# Patient Record
Sex: FEMALE | Race: WHITE | NOT HISPANIC OR LATINO | Employment: OTHER | URBAN - METROPOLITAN AREA
[De-identification: names, ages, dates, MRNs, and addresses within clinical notes are randomized per-mention and may not be internally consistent; named-entity substitution may affect disease eponyms.]

---

## 2017-01-06 ENCOUNTER — OFFICE VISIT (OUTPATIENT)
Dept: FAMILY MEDICINE | Facility: CLINIC | Age: 77
End: 2017-01-06
Payer: COMMERCIAL

## 2017-01-06 VITALS
SYSTOLIC BLOOD PRESSURE: 130 MMHG | TEMPERATURE: 98 F | OXYGEN SATURATION: 99 % | HEIGHT: 65 IN | HEART RATE: 63 BPM | BODY MASS INDEX: 20.41 KG/M2 | WEIGHT: 122.5 LBS | RESPIRATION RATE: 16 BRPM | DIASTOLIC BLOOD PRESSURE: 70 MMHG

## 2017-01-06 DIAGNOSIS — R05.3 CHRONIC COUGH: ICD-10-CM

## 2017-01-06 DIAGNOSIS — M35.00 SJOGREN'S DISEASE (H): ICD-10-CM

## 2017-01-06 DIAGNOSIS — D75.89 MACROCYTOSIS: ICD-10-CM

## 2017-01-06 DIAGNOSIS — R77.8 ABNORMAL SPEP: ICD-10-CM

## 2017-01-06 DIAGNOSIS — I25.10 ASCVD (ARTERIOSCLEROTIC CARDIOVASCULAR DISEASE): ICD-10-CM

## 2017-01-06 PROCEDURE — 99214 OFFICE O/P EST MOD 30 MIN: CPT | Performed by: FAMILY MEDICINE

## 2017-01-06 NOTE — MR AVS SNAPSHOT
After Visit Summary   1/6/2017    Ya Stahl    MRN: [id number]           Patient Information     Date Of Birth          1940        Visit Information        Provider Department      1/6/2017 8:30 AM Scarlett Spencer MD [facility]        Today's Diagnoses     Cough    -  1        Follow-ups after your visit        Additional Services     PULMONARY MEDICINE REFERRAL       Your provider has referred you to: [facility]: [facility] [phone]   [email or web address]    Please be aware that coverage of these services is subject to the terms and limitations of your health insurance plan.  Call member services at your health plan with any benefit or coverage questions.      Please bring the following with you to your appointment:    (1) Any X-Rays, CTs or MRIs which have been performed.  Contact the facility where they were done to arrange for  prior to your scheduled appointment.    (2) List of current medications   (3) This referral request   (4) Any documents/labs given to you for this referral                  Your next 10 appointments already scheduled     Jan 12, 2017  1:45 PM   New Visit with Vandana Kasper MD   [facility] ([facility])    [facility]  [address]  [facility] [id number] [phone]            Jan 17, 2017   Procedure with Fan Giordano MD   [facility] ([facility])    [address]  [facility] [id number] [phone]           [facility] is located at [address] [place]              Who to contact     If you have questions or need follow up information about today's clinic visit or your schedule please contact [facility] directly at [phone].  Normal or non-critical lab and imaging results will be communicated to you by MyChart, letter or phone within 4  "business days after the clinic has received the results. If you do not hear from us within 7 days, please contact the clinic through [employer] or phone. If you have a critical or abnormal lab result, we will notify you by phone as soon as possible.  Submit refill requests through [employer] or call your pharmacy and they will forward the refill request to us. Please allow 3 business days for your refill to be completed.          Additional Information About Your Visit        [employer] Information     [employer] lets you send messages to your doctor, view your test results, renew your prescriptions, schedule appointments and more. To sign up, go to [email or web address] . Click on \"Log in\" on the left side of the screen, which will take you to the Welcome page. Then click on \"Sign up Now\" on the right side of the page.     You will be asked to enter the access code listed below, as well as some personal information. Please follow the directions to create your username and password.     Your access code is: [email or web address]  Expires: 2017  3:39 PM     Your access code will  in 90 days. If you need help or a new code, please call your [place] clinic or [phone].        Care EveryWhere ID     This is your Care EveryWhere ID. This could be used by other organizations to access your [place] medical records  [id number]        Your Vitals Were     Pulse Temperature Respirations Height BMI (Body Mass Index) Pulse Oximetry    63 98  F (36.7  C) (Oral) 16 5' 5.25\" (1.657 m) 20.24 kg/m2 99%       Blood Pressure from Last 3 Encounters:   17 130/70   16 114/78   16 120/68    Weight from Last 3 Encounters:   17 122 lb 8 oz (55.566 kg)   16 123 lb (55.792 kg)   16 123 lb 4.8 oz (55.929 kg)              We Performed the Following     PULMONARY MEDICINE REFERRAL          Today's Medication Changes          These changes are accurate as of: 17  9:11 AM.  If you have any questions, ask " your nurse or doctor.               These medicines have changed or have updated prescriptions.        Dose/Directions    ammonium lactate 12 % cream   Commonly known as:  LAC-HYDRIN   This may have changed:  when to take this   Used for:  Callus        Apply topically 2 times daily as needed for dry skin   Quantity:  385 g   Refills:  3       conjugated estrogens cream   Commonly known as:  PREMARIN   This may have changed:    - how much to take  - when to take this  - reasons to take this  - additional instructions   Used for:  Atrophic vaginitis        Place 1.5 g vaginally twice a week.   Quantity:  42.5 g   Refills:  prn       MIRALAX powder   This may have changed:  See the new instructions.   Used for:  Chronic constipation   Generic drug:  polyethylene glycol        STIR 1 CAPFUL (17GM) IN 8 OZ OF LIQUID AND DRINK   Quantity:  1 BOTTLE   Refills:  1 YEAR                Primary Care Provider Office Phone # Fax #    Scarlett Spencer [phone]       [facility] [address] [facility] [address]        Thank you!     Thank you for choosing [facility]  for your care. Our goal is always to provide you with excellent care. Hearing back from our patients is one way we can continue to improve our services. Please take a few minutes to complete the written survey that you may receive in the mail after your visit with us. Thank you!             Your Updated Medication List - Protect others around you: Learn how to safely use, store and throw away your medicines at www.disposemymeds.org.          This list is accurate as of: 1/6/17  9:11 AM.  Always use your most recent med list.                   Brand Name Dispense Instructions for use    ACAI PO      Take 1 tablet by mouth daily       alendronate 70 MG tablet    FOSAMAX    12 tablet    Take 1 tablet (70 mg) by mouth every 7 days Take with over 8 ounces water and stay upright for at least 30 minutes after dose.  Take  at least 60 minutes before breakfast       ammonium lactate 12 % cream    LAC-HYDRIN    385 g    Apply topically 2 times daily as needed for dry skin       aspirin 81 MG EC tablet     1 tablet    Take 1 tablet (81 mg) by mouth daily       atorvastatin 20 MG tablet    LIPITOR    102 tablet    Take 1 tablet (20 mg) by mouth daily       calcium-vitamin D 600-400 MG-UNIT per tablet    CALTRATE     Take 1 tablet by mouth daily       CENTRUM SILVER per tablet      Take 1 tablet by mouth daily       cevimeline 30 MG capsule    EVOXAC     Take 1 capsule (30 mg) by mouth 3 times daily       clopidogrel 75 MG tablet    PLAVIX    90 tablet    Take 1 tablet (75 mg) by mouth daily       conjugated estrogens cream    PREMARIN    42.5 g    Place 1.5 g vaginally twice a week.       levothyroxine 88 MCG tablet    SYNTHROID/LEVOTHROID    102 tablet    Take 1 tablet (88 mcg) by mouth daily       MIRALAX powder   Generic drug:  polyethylene glycol     1 BOTTLE    STIR 1 CAPFUL (17GM) IN 8 OZ OF LIQUID AND DRINK       nitroglycerin 0.4 MG sublingual tablet    NITROSTAT    25 tablet    Place 1 tablet (0.4 mg) under the tongue every 5 minutes as needed for chest pain if you are still having symptoms after 3 doses (15 minutes) call 911.       OMEGA-3 FISH OIL PO      Take 1 g by mouth daily       * OSTEO BI-FLEX ADV TRIPLE ST PO      Take 1 tablet by mouth daily       * LUTEIN 20 PO      Take 1 tablet by mouth daily       scopolamine 72 hr patch    TRANSDERM    4 patch    Place 1 patch onto the skin every 72 hours       * Notice:  This list has 2 medication(s) that are the same as other medications prescribed for you. Read the directions carefully, and ask your doctor or other care provider to review them with you.

## 2017-01-06 NOTE — PROGRESS NOTES
SUBJECTIVE:                                                    Ya Stahl is a 76 year old female who presents to clinic today for the following health issues:      Hypertension Follow-up      Outpatient blood pressures are not being checked.    Low Salt Diet: very little        Amount of exercise or physical activity: walking 6 days per and club 3 times per week    Problems taking medications regularly: No    Medication side effects: none    Diet: regular (no restrictions)        Patient Active Problem List   Diagnosis     Hypothyroidism     Chondrodermatitis nodularis helicis     Grave's disease     Bile reflux gastritis     Hyperlipidemia LDL goal <70     Advanced directives, counseling/discussion     Sjogren's disease (H)     HL (hearing loss)     Mixed incontinence     Health Care Home     Chronic cough     Impaired fasting glucose     Status post-operative repair of hip fracture, RIGHT     Constipation     Pubic ramus fracture (H)     Osteoporosis     Gastroesophageal reflux disease without esophagitis     AMI (acute myocardial infarction) (H)     ASCVD (arteriosclerotic cardiovascular disease)     Macrocytosis       Current Outpatient Prescriptions   Medication Sig Dispense Refill     scopolamine (TRANSDERM) 72 hr patch Place 1 patch onto the skin every 72 hours 4 patch 0     levothyroxine (SYNTHROID/LEVOTHROID) 88 MCG tablet Take 1 tablet (88 mcg) by mouth daily 102 tablet 3     atorvastatin (LIPITOR) 20 MG tablet Take 1 tablet (20 mg) by mouth daily 102 tablet 3     cevimeline (EVOXAC) 30 MG capsule Take 1 capsule (30 mg) by mouth 3 times daily       alendronate (FOSAMAX) 70 MG tablet Take 1 tablet (70 mg) by mouth every 7 days Take with over 8 ounces water and stay upright for at least 30 minutes after dose.  Take at least 60 minutes before breakfast 12 tablet 1     clopidogrel (PLAVIX) 75 MG tablet Take 1 tablet (75 mg) by mouth daily 90 tablet 3     Multiple Vitamins-Minerals (CENTRUM SILVER) per  tablet Take 1 tablet by mouth daily       Misc Natural Products (OSTEO BI-FLEX ADV TRIPLE ST PO) Take 1 tablet by mouth daily       Misc Natural Products (LUTEIN 20 PO) Take 1 tablet by mouth daily       Omega-3 Fatty Acids (OMEGA-3 FISH OIL PO) Take 1 g by mouth daily       aspirin 81 MG EC tablet Take 1 tablet (81 mg) by mouth daily 1 tablet 0     nitroglycerin (NITROSTAT) 0.4 MG SL tablet Place 1 tablet (0.4 mg) under the tongue every 5 minutes as needed for chest pain if you are still having symptoms after 3 doses (15 minutes) call 911. 25 tablet 0     calcium-vitamin D (CALTRATE) 600-400 MG-UNIT per tablet Take 1 tablet by mouth daily       ACAI PO Take 1 tablet by mouth daily       conjugated estrogens (PREMARIN) vaginal cream Place 1.5 g vaginally twice a week. (Patient taking differently: 1.5 g 2 times daily as needed Place 1.5 g vaginally twice a week.) 42.5 g prn     ammonium lactate (LAC-HYDRIN) 12 % cream Apply topically 2 times daily as needed for dry skin (Patient taking differently: Apply topically daily as needed for dry skin ) 385 g 3     MIRALAX OR POWD STIR 1 CAPFUL (17GM) IN 8 OZ OF LIQUID AND DRINK (Patient taking differently: STIR 1 CAPFUL (17GM) IN 8 OZ OF LIQUID AND DRINK twice daily) 1 BOTTLE 1 YEAR     Social History   Substance Use Topics     Smoking status: Never Smoker      Smokeless tobacco: Never Used     Alcohol Use: Yes      Comment: social; maybe a glass of wine or highball per week         ROS:  CONSTITUTIONAL:NEGATIVE for fever, chills, change in weight  ENT/MOUTH: NEGATIVE for ear, mouth and throat problems  RESP: cough is really bad. Did not go away with stopping ACEi.  Will occasionally wake up at night. But notes it can get her feeling fatigued.  Worse usually most mornings.  Sometimes will be in the afternoon.  Coughs all day; with an occasional hard cough. Will cough more if does a lot of talking.  Not so much with exercise.  CV: NEGATIVE for chest pain, palpitations or  "peripheral edema   PSYCHIATRIC: NEGATIVE for changes in mood or affect    Stopped ACEi due to cough.  She notes her bp had not been an issue; was put on it when she had her MI.    Notes lungs have been checked.  Has had allergy tests; perhaps 2-3 years ago, went to .    Saw a lung specialist about 3 years ago.  This has been going on x 15-16 years.   She thought due to medication for sjogrens. Cut way back on that; now down to only 3 per day. Seems to have started coughing when started generic for Evoxac; did not have when was on the branded name.  She notes her prescriber tried a Mani for the brand name and they would not allow it.    Inhalers do not help.    Notes that her voice has changed as she is coughing so much.     Also asking about recent labs.     OBJECTIVE:                                                    /70 mmHg  Pulse 63  Temp(Src) 98  F (36.7  C) (Oral)  Resp 16  Ht 5' 5.25\" (1.657 m)  Wt 122 lb 8 oz (55.566 kg)  BMI 20.24 kg/m2  SpO2 99%  Body mass index is 20.24 kg/(m^2).  GENERAL APPEARANCE: alert and no distress  HENT: ear canals and TM's normal and nose and mouth without ulcers or lesions  RESP: lungs clear to auscultation - no rales, rhonchi or wheezes  CV: regular rates and rhythm  MS: extremities normal- no gross deformities noted  PSYCH: mentation appears normal and affect normal/bright    Component      Latest Ref Rng 12/13/2016   WBC      4.0 - 11.0 10e9/L 5.5   RBC Count      3.8 - 5.2 10e12/L 4.26   Hemoglobin      11.7 - 15.7 g/dL 13.9   Hematocrit      35.0 - 47.0 % 42.8   MCV      78 - 100 fl 101 (H)   MCH      26.5 - 33.0 pg 32.6   MCHC      31.5 - 36.5 g/dL 32.5   RDW      10.0 - 15.0 % 13.5   Platelet Count      150 - 450 10e9/L 287   % Neutrophils       58.0   % Lymphocytes       35.0   % Monocytes       5.0   % Eosinophils       2.0   Absolute Neutrophil      1.6 - 8.3 10e9/L 3.2   Absolute Lymphocytes      0.8 - 5.3 10e9/L 1.9   Absolute Monocytes      0.0 - 1.3 " 10e9/L 0.3   Absolute Eosinophils      0.0 - 0.7 10e9/L 0.1   RBC Morphology       Consistent with reported results   Platelet Estimate       Normal   Diff Method       Manual Differential   Albumin Fraction      3.7 - 5.1 g/dL 4.2   Alpha 1 Fraction      0.2 - 0.4 g/dL 0.3   Alpha 2 Fraction      0.5 - 0.9 g/dL 0.8   Beta Fraction      0.6 - 1.0 g/dL 0.8   Gamma Fraction      0.7 - 1.6 g/dL 1.0   Monoclonal Peak      0.0 g/dL 0.1 (H)   ELP Interpretation:       Small monoclonal protein (0.1 g/dL) seen in the gamma fraction, not previously . . .   Immunofixation ELP          IGG      695 - 1620 mg/dL    IGA      70 - 380 mg/dL    IGM      60 - 265 mg/dL    Immunofix ELP Urine            Component      Latest Ref Rng 12/28/2016   WBC      4.0 - 11.0 10e9/L    RBC Count      3.8 - 5.2 10e12/L    Hemoglobin      11.7 - 15.7 g/dL    Hematocrit      35.0 - 47.0 %    MCV      78 - 100 fl    MCH      26.5 - 33.0 pg    MCHC      31.5 - 36.5 g/dL    RDW      10.0 - 15.0 %    Platelet Count      150 - 450 10e9/L    % Neutrophils          % Lymphocytes          % Monocytes          % Eosinophils          Absolute Neutrophil      1.6 - 8.3 10e9/L    Absolute Lymphocytes      0.8 - 5.3 10e9/L    Absolute Monocytes      0.0 - 1.3 10e9/L    Absolute Eosinophils      0.0 - 0.7 10e9/L    RBC Morphology          Platelet Estimate          Diff Method          Albumin Fraction      3.7 - 5.1 g/dL    Alpha 1 Fraction      0.2 - 0.4 g/dL    Alpha 2 Fraction      0.5 - 0.9 g/dL    Beta Fraction      0.6 - 1.0 g/dL    Gamma Fraction      0.7 - 1.6 g/dL    Monoclonal Peak      0.0 g/dL    ELP Interpretation:          Immunofixation ELP       (Note) . . .   IGG      695 - 1620 mg/dL 898   IGA      70 - 380 mg/dL 152   IGM      60 - 265 mg/dL 174   Immunofix ELP Urine       (Note) . . .        ASSESSMENT/PLAN:                                                        Chronic cough  Uncertain etiology.   This is becoming very bothersome to  her.  Will refer to Pulmonary at this time; she notes she has been in the past, but interested in getting another opinion.  She has attributed to generic for Evoxac; but has worked down on dose. She does not want to go off. She notes she has discussed with her specialist dentist about alternatives. She might want to consider paying for the brand for short time to see if really does make a difference.   - PULMONARY MEDICINE REFERRAL    Abnormal SPEP  Discussed. I am not sure how to interpret.  I wonder about MGUS. Don't know about multiple myeloma.   She does have appointment next week with Hematologist.     Sjogren's disease (H)  As above.     ASCVD (arteriosclerotic cardiovascular disease)  She notes she was put on ACEi when she had her MI. Willing to go back on. At this time, will hold as her bp is ok. Will await Pulmonary evaluation due to cough.   Consider seeing what Cardiology feels once she gets this done.     Macrocytosis  Reason for pursuing hematology evaluation.      Scarlett Spencer MD, MD  [facility]

## 2017-01-06 NOTE — NURSING NOTE
"Chief Complaint   Patient presents with     Hypertension       Initial /70 mmHg  Pulse 63  Temp(Src) 98  F (36.7  C) (Oral)  Resp 16  Ht 5' 5.25\" (1.657 m)  Wt 122 lb 8 oz (55.566 kg)  BMI 20.24 kg/m2  SpO2 99% Estimated body mass index is 20.24 kg/(m^2) as calculated from the following:    Height as of this encounter: 5' 5.25\" (1.657 m).    Weight as of this encounter: 122 lb 8 oz (55.566 kg).  BP completed using cuff size: seymour Mitchell CMA      "

## 2017-01-10 DIAGNOSIS — M81.0 OSTEOPOROSIS: Primary | ICD-10-CM

## 2017-01-10 NOTE — TELEPHONE ENCOUNTER
alendronate (FOSAMAX) 70 MG    Last Written Prescription Date: 6/14/16  Last Fill Quantity: 12, # refills: 1  [place] [facility] [place] [facility] or [facility] prescribing provider: 1/6/17       DEXA Scan:  Last order of DX HIP/PELVIS/SPINE was found on 4/12/2011 from Orders Only on 4/12/2011     Last order of DX HIP/PELVIS/SPINE W LAT FRACTION ANALYSIS was found on 7/21/2014 from Radiant Appointment on 7/21/2014       CREATININE   Date Value Ref Range Status   11/29/2016 0.85 0.52 - 1.04 mg/dL Final

## 2017-01-11 ENCOUNTER — TELEPHONE (OUTPATIENT)
Dept: ONCOLOGY | Facility: CLINIC | Age: 77
End: 2017-01-11

## 2017-01-11 RX ORDER — ALENDRONATE SODIUM 70 MG/1
70 TABLET ORAL
Qty: 12 TABLET | Refills: 3 | Status: SHIPPED
Start: 2017-01-11 | End: 2017-12-19

## 2017-01-11 NOTE — TELEPHONE ENCOUNTER
Phoned patient today for Pre-visit welcome call.  Patient was available.Confirmed appt:    Scheduled for:  Date:  1/12/17  Time: 1345  with Dr.:  Dr. Ranjith MD    Requested patient:  Please check in at [id number]  [facility] [place].  Please Arrive 15 minutes before your appointment time.    Please Bring:  Insurance Card(s)  Photo ID    Patient is [place] patient, so all records are in EMR.  MD will be able to preview record prior to visit.  Patient aware of parking and location of clinic in building.  Did receive information form Central Scheduling.      Patient s Care Team:  MD Conrado Orourke, RN Patient Care Coordinator    Conrado Waldrop, RN, BSN, OCN  [facility]  Patient Care Coordinator'

## 2017-01-11 NOTE — TELEPHONE ENCOUNTER
Pt had dexa on 07/21/14, dexa isn't active in health maintenance. Please advise on dexa recheck.    Wilfredo, RN  Triage Nurse

## 2017-01-12 ENCOUNTER — HOSPITAL ENCOUNTER (OUTPATIENT)
Facility: CLINIC | Age: 77
Setting detail: SPECIMEN
Discharge: HOME | End: 2017-01-12
Attending: INTERNAL MEDICINE | Admitting: INTERNAL MEDICINE
Payer: MEDICARE

## 2017-01-12 ENCOUNTER — ONCOLOGY VISIT (OUTPATIENT)
Dept: ONCOLOGY | Facility: CLINIC | Age: 77
End: 2017-01-12
Attending: INTERNAL MEDICINE
Payer: COMMERCIAL

## 2017-01-12 VITALS
SYSTOLIC BLOOD PRESSURE: 159 MMHG | WEIGHT: 123.5 LBS | BODY MASS INDEX: 20.58 KG/M2 | DIASTOLIC BLOOD PRESSURE: 89 MMHG | OXYGEN SATURATION: 99 % | TEMPERATURE: 97.2 F | HEIGHT: 65 IN | RESPIRATION RATE: 16 BRPM | HEART RATE: 55 BPM

## 2017-01-12 DIAGNOSIS — D47.2 MONOCLONAL PARAPROTEINEMIA: ICD-10-CM

## 2017-01-12 DIAGNOSIS — D75.89 MACROCYTOSIS: Primary | ICD-10-CM

## 2017-01-12 LAB
ALBUMIN SERPL-MCNC: 4 G/DL (ref 3.4–5)
ALP SERPL-CCNC: 69 U/L (ref 40–150)
ALT SERPL W P-5'-P-CCNC: 37 U/L (ref 0–50)
ANION GAP SERPL CALCULATED.3IONS-SCNC: 7 MMOL/L (ref 3–14)
AST SERPL W P-5'-P-CCNC: 31 U/L (ref 0–45)
BASOPHILS # BLD AUTO: 0 10E9/L (ref 0–0.2)
BASOPHILS NFR BLD AUTO: 0.5 %
BILIRUB SERPL-MCNC: 0.8 MG/DL (ref 0.2–1.3)
BUN SERPL-MCNC: 19 MG/DL (ref 7–30)
CALCIUM SERPL-MCNC: 9.3 MG/DL (ref 8.5–10.1)
CHLORIDE SERPL-SCNC: 101 MMOL/L (ref 94–109)
CO2 SERPL-SCNC: 32 MMOL/L (ref 20–32)
CREAT SERPL-MCNC: 0.68 MG/DL (ref 0.52–1.04)
DIFFERENTIAL METHOD BLD: ABNORMAL
EOSINOPHIL # BLD AUTO: 0.1 10E9/L (ref 0–0.7)
EOSINOPHIL NFR BLD AUTO: 1.4 %
ERYTHROCYTE [DISTWIDTH] IN BLOOD BY AUTOMATED COUNT: 12.9 % (ref 10–15)
GFR SERPL CREATININE-BSD FRML MDRD: 85 ML/MIN/1.7M2
GLUCOSE SERPL-MCNC: 84 MG/DL (ref 70–99)
HCT VFR BLD AUTO: 44.7 % (ref 35–47)
HGB BLD-MCNC: 14.4 G/DL (ref 11.7–15.7)
IMM GRANULOCYTES # BLD: 0 10E9/L (ref 0–0.4)
IMM GRANULOCYTES NFR BLD: 0.4 %
LDH SERPL L TO P-CCNC: 204 U/L (ref 81–234)
LYMPHOCYTES # BLD AUTO: 2 10E9/L (ref 0.8–5.3)
LYMPHOCYTES NFR BLD AUTO: 25.1 %
MCH RBC QN AUTO: 32.6 PG (ref 26.5–33)
MCHC RBC AUTO-ENTMCNC: 32.2 G/DL (ref 31.5–36.5)
MCV RBC AUTO: 101 FL (ref 78–100)
MONOCYTES # BLD AUTO: 0.9 10E9/L (ref 0–1.3)
MONOCYTES NFR BLD AUTO: 11.1 %
NEUTROPHILS # BLD AUTO: 5 10E9/L (ref 1.6–8.3)
NEUTROPHILS NFR BLD AUTO: 61.5 %
NRBC # BLD AUTO: 0 10*3/UL
NRBC BLD AUTO-RTO: 0 /100
PLATELET # BLD AUTO: 286 10E9/L (ref 150–450)
POTASSIUM SERPL-SCNC: 4 MMOL/L (ref 3.4–5.3)
PROT SERPL-MCNC: 7.7 G/DL (ref 6.8–8.8)
RBC # BLD AUTO: 4.42 10E12/L (ref 3.8–5.2)
SODIUM SERPL-SCNC: 140 MMOL/L (ref 133–144)
WBC # BLD AUTO: 8.1 10E9/L (ref 4–11)

## 2017-01-12 PROCEDURE — 99211 OFF/OP EST MAY X REQ PHY/QHP: CPT

## 2017-01-12 PROCEDURE — 82784 ASSAY IGA/IGD/IGG/IGM EACH: CPT | Performed by: INTERNAL MEDICINE

## 2017-01-12 PROCEDURE — 84165 PROTEIN E-PHORESIS SERUM: CPT | Performed by: INTERNAL MEDICINE

## 2017-01-12 PROCEDURE — 86334 IMMUNOFIX E-PHORESIS SERUM: CPT | Performed by: INTERNAL MEDICINE

## 2017-01-12 PROCEDURE — 83883 ASSAY NEPHELOMETRY NOT SPEC: CPT | Performed by: INTERNAL MEDICINE

## 2017-01-12 PROCEDURE — 83615 LACTATE (LD) (LDH) ENZYME: CPT | Performed by: INTERNAL MEDICINE

## 2017-01-12 PROCEDURE — 82232 ASSAY OF BETA-2 PROTEIN: CPT | Performed by: INTERNAL MEDICINE

## 2017-01-12 PROCEDURE — 85025 COMPLETE CBC W/AUTO DIFF WBC: CPT | Performed by: INTERNAL MEDICINE

## 2017-01-12 PROCEDURE — 99203 OFFICE O/P NEW LOW 30 MIN: CPT | Performed by: INTERNAL MEDICINE

## 2017-01-12 PROCEDURE — 80053 COMPREHEN METABOLIC PANEL: CPT | Performed by: INTERNAL MEDICINE

## 2017-01-12 PROCEDURE — 36415 COLL VENOUS BLD VENIPUNCTURE: CPT

## 2017-01-12 PROCEDURE — 00000402 ZZHCL STATISTIC TOTAL PROTEIN: Performed by: INTERNAL MEDICINE

## 2017-01-12 ASSESSMENT — PAIN SCALES - GENERAL: PAINLEVEL: NO PAIN (0)

## 2017-01-12 NOTE — PROGRESS NOTES
"Ya Stahl is a 76 year old female who presents for:  Chief Complaint   Patient presents with     Oncology Clinic Visit     New Patient - Consult        Initial Vitals:  There were no vitals taken for this visit. Estimated body mass index is 20.24 kg/(m^2) as calculated from the following:    Height as of 1/6/17: 1.657 m (5' 5.25\").    Weight as of 1/6/17: 55.566 kg (122 lb 8 oz).. There is no height or weight on file to calculate BSA. BP completed using cuff size: regular  Data Unavailable No LMP recorded. Patient has had a hysterectomy. Allergies and medications reviewed.     Medications: Medication refills not needed today.  Pharmacy name entered into creads:    [facility] [id number] [place] [id number] [facility] AT [place] [address] & Counts include 234 beds at the [facility] - [place], [place] - [address] [place] [address]    Comments:New patient    8 minutes for nursing intake (face to face time)   Anne Marie Leon CMA     DISCHARGE PLAN:  Next appointments: See patient instruction section  Departure Mode: Ambulatory  Accompanied by:   0 minutes for nursing discharge (face to face time)   Anne Marie Leon CMA              "

## 2017-01-12 NOTE — Clinical Note
"January 12, 2017      RE: Ya Stahl  [address] [address]  [place] [address]        Dear Colleague,    Thank you for the opportunity to participate in the care of your patient, Ya Stahl, at [facility]. Please see a copy of my visit note below.    Ya Stahl is a 76 year old female who presents for:  Chief Complaint   Patient presents with     Oncology Clinic Visit     New Patient - Consult        Initial Vitals:  There were no vitals taken for this visit. Estimated body mass index is 20.24 kg/(m^2) as calculated from the following:    Height as of 1/6/17: 1.657 m (5' 5.25\").    Weight as of 1/6/17: 55.566 kg (122 lb 8 oz).. There is no height or weight on file to calculate BSA. BP completed using cuff size: regular  Data Unavailable No LMP recorded. Patient has had a hysterectomy. Allergies and medications reviewed.     Medications: Medication refills not needed today.  Pharmacy name entered into [employer]:    [facility] [id number] [place] [address] [facility] [place] [address] & [facility] - [place], [place] - [address]    Comments:New patient    8 minutes for nursing intake (face to face time)   Anne Marie Leon CMA     DISCHARGE PLAN:  Next appointments: See patient instruction section  Departure Mode: Ambulatory  Accompanied by:   0 minutes for nursing discharge (face to face time)   Anne Marie Leon CMA                [facility]    Hematology/Oncology New Patient Note      Today's Date: 01/12/2017    Reason for Consult: Monoclonal gammopathy, macrocytosis      HISTORY OF PRESENT ILLNESS: Ya Stahl is a 76 year old female with PMHx of Sjogren's syndrome, GERD, CAD, who presents with monoclonal gammopathy and macrocytosis.      On chart review, it appears that she has had a high-normal MCV for many years.  In November 2016, it " became slightly elevated at 103.  Her hemoglobin, WBC, and platelet count are normal.  Vitamin B12 and folate levels are normal.  She had SPEP checked, which showed M-spike of 0.1 g/dL, ANNA with IgM kappa.      Ya says that she feels well.  She denies any problems.  Energy level is good.  She denies fatigue.  She notes that she takes Plavix, so if she cuts herself, it takes a little longer to stop bleeding.  She denies easy bruising.  No other bleeding symptoms.  She notes that she has had chronic back pain, but she has been doing exercises, so that has been better.        REVIEW OF SYSTEMS:   14 point ROS was reviewed and is negative other than as noted above in HPI.       HOME MEDICATIONS:  Current Outpatient Prescriptions   Medication Sig Dispense Refill     alendronate (FOSAMAX) 70 MG tablet Take 1 tablet (70 mg) by mouth every 7 days Take with over 8 ounces water and stay upright for at least 30 minutes after dose.  Take at least 60 minutes before breakfast 12 tablet 3     scopolamine (TRANSDERM) 72 hr patch Place 1 patch onto the skin every 72 hours 4 patch 0     levothyroxine (SYNTHROID/LEVOTHROID) 88 MCG tablet Take 1 tablet (88 mcg) by mouth daily 102 tablet 3     atorvastatin (LIPITOR) 20 MG tablet Take 1 tablet (20 mg) by mouth daily 102 tablet 3     cevimeline (EVOXAC) 30 MG capsule Take 1 capsule (30 mg) by mouth 3 times daily       clopidogrel (PLAVIX) 75 MG tablet Take 1 tablet (75 mg) by mouth daily 90 tablet 3     Multiple Vitamins-Minerals (CENTRUM SILVER) per tablet Take 1 tablet by mouth daily       Misc Natural Products (OSTEO BI-FLEX ADV TRIPLE ST PO) Take 1 tablet by mouth daily       Misc Natural Products (LUTEIN 20 PO) Take 1 tablet by mouth daily       Omega-3 Fatty Acids (OMEGA-3 FISH OIL PO) Take 1 g by mouth daily       aspirin 81 MG EC tablet Take 1 tablet (81 mg) by mouth daily 1 tablet 0     nitroglycerin (NITROSTAT) 0.4 MG SL tablet Place 1 tablet (0.4 mg) under the tongue every  5 minutes as needed for chest pain if you are still having symptoms after 3 doses (15 minutes) call 911. 25 tablet 0     calcium-vitamin D (CALTRATE) 600-400 MG-UNIT per tablet Take 1 tablet by mouth daily       ACAI PO Take 1 tablet by mouth daily       conjugated estrogens (PREMARIN) vaginal cream Place 1.5 g vaginally twice a week. (Patient taking differently: 1.5 g 2 times daily as needed Place 1.5 g vaginally twice a week.) 42.5 g prn     ammonium lactate (LAC-HYDRIN) 12 % cream Apply topically 2 times daily as needed for dry skin (Patient taking differently: Apply topically daily as needed for dry skin ) 385 g 3     MIRALAX OR POWD STIR 1 CAPFUL (17GM) IN 8 OZ OF LIQUID AND DRINK (Patient taking differently: STIR 1 CAPFUL (17GM) IN 8 OZ OF LIQUID AND DRINK twice daily) 1 BOTTLE 1 YEAR         ALLERGIES:  Allergies   Allergen Reactions     Codeine      fatigue     Vicodin [Acetaminophen] Fatigue         PAST MEDICAL HISTORY:  Past Medical History   Diagnosis Date     Displacement of lumbar intervertebral disc without myelopathy      Unspecified hypothyroidism      Sicca syndrome (H)      Arthritis      Sjogren's syndrome (H)      sees specialist; dentist     Gastro-oesophageal reflux disease      Low back pain      Hypertension      Coronary artery disease      History of angina      Heart attack (H) 3/2016         PAST SURGICAL HISTORY:  Past Surgical History   Procedure Laterality Date     C nonspecific procedure  1976     D&C     Hysterectomy, pap no longer indicated  1977     Hysterectomy     Gyn surgery  1978     ovaries removed     Bunionectomy  ~2010     L foot.      Inject steroid (location)  9/11/2012     Procedure: INJECT STEROID (LOCATION);;  Surgeon: Anne Marie Catherine MD;  Location: [facility]     Surgical history of -   distant past     ablation for palpitations.     Orthopedic surgery  ~2008     Right foot, bunionectomy      Rotator cuff repair rt/lt  ~1998     Lt shoulder; rotator cuff     Endoscopic  release carpal tunnel  9/11/2012     R Procedure: ENDOSCOPIC RELEASE CARPAL TUNNEL;  Right Endoscopic carpal tunnel release, left ringer finger cortizon injection;  Surgeon: Anne Marie Catherine MD;  Location: [facility] OR     Arthroscopy knee  10/5/2012     R Procedure: ARTHROSCOPY KNEE;  RIGHT KNEE ARTHROSCOPY, PARTIAL MEDIAL MENISCECTOMY;  Surgeon: Karli Zaragoza MD;  Location:  [facility]     Right hip orif 4/1/2014       Esophagoscopy, gastroscopy, duodenoscopy (egd), combined N/A 12/26/2014     Procedure: COMBINED ESOPHAGOSCOPY, GASTROSCOPY, DUODENOSCOPY (EGD);  Surgeon: Fan Giordano MD;  Location:  GI     Surgical history of -   June 2015     left carpal tunnel surgery     Heart cath, angioplasty  3/4/16     dz <40%     Laparoscopic cholecystectomy N/A 4/7/2016     Procedure: LAPAROSCOPIC CHOLECYSTECTOMY;  Surgeon: Hans Medina MD;  Location:  OR     Excise exostosis foot Left 12/1/2016     Procedure: EXCISE EXOSTOSIS FOOT;  Surgeon: Jody Gallagher, DPM, Pod;  Location: [facility] OR         SOCIAL HISTORY:  Social History     Social History     Marital Status:      Spouse Name: N/A     Number of Children: N/A     Years of Education: N/A     Occupational History     Not on file.     Social History Main Topics     Smoking status: Never Smoker      Smokeless tobacco: Never Used     Alcohol Use: Yes      Comment: social; maybe a glass of wine or highball per week      Drug Use: No     Sexual Activity:     Partners: Male     Other Topics Concern     Parent/Sibling W/ Cabg, Mi Or Angioplasty Before 65f 55m? Yes     Caffeine Concern No     1 capp. daily     Sleep Concern No     Special Diet No     Exercise Yes     walks 3 miles 6 days per week , curves 3 days per week     Social History Narrative    Dairy/d 0-1 servings/d.     Caffeine 0 servings/d    Exercise 6 x week walk and curves    Sunscreen used - Yes    Seatbelts used - Yes    Working smoke/CO detectors in the home - Yes    Guns stored in the home - Yes  "LOCKED    Self Breast Exams - sometimes    Self Testicular Exam - NA    Eye Exam up to date - Yes 4/2009    Dental Exam up to date - Yes 12/2009    Pap Smear up to date - Yes - Hysterectomy    Mammogram up to date - Yes 11/25/2009    PSA up to date - NA    Dexa Scan up to date - 2006    Flex Sig / Colonoscopy up to date - Yes 5/14/2008    Immunizations up to date -9-2007 tetanus    Abuse: Current or Past(Physical, Sexual or Emotional)- No    Do you feel safe in your environment - Yes    DAMION Shaffer CMA    11/25/2009 updated         FAMILY HISTORY:  Family History   Problem Relation Age of Onset     C.A.D. Mother 76     C.A.D. Father 59     CANCER Mother      non Hodgekin's Lymphoma     Hypertension Brother      Hypertension Brother      Connective Tissue Disorder Daughter      scleroderma     HEART DISEASE Father      valve problem     HEART DISEASE Mother      enlarged heart         PHYSICAL EXAM:  Vital signs:  /89 mmHg  Pulse 55  Temp(Src) 97.2  F (36.2  C) (Tympanic)  Resp 16  Ht 1.657 m (5' 5.25\")  Wt 56.019 kg (123 lb 8 oz)  BMI 20.40 kg/m2  SpO2 99%   GENERAL/CONSTITUTIONAL: No acute distress. Accompanied by .  EYES: No scleral icterus.  RESPIRATORY: Clear to auscultation bilaterally. No crackles or wheezing.   CARDIOVASCULAR: Regular rate and rhythm without murmurs, gallops, or rubs.  GASTROINTESTINAL: No tenderness. The patient has normal bowel sounds. No guarding.  No distention.  MUSCULOSKELETAL: Warm and well-perfused, no cyanosis, clubbing, or edema.  NEUROLOGIC: Alert, oriented, answers questions appropriately.  INTEGUMENTARY: No jaundice.      LABS:  CBC RESULTS:   Recent Labs   Lab Test  12/13/16   0808   WBC  5.5   RBC  4.26   HGB  13.9   HCT  42.8   MCV  101*   MCH  32.6   MCHC  32.5   RDW  13.5   PLT  287       Recent Labs   Lab Test  11/29/16   1553  03/30/16   1115   NA  141  139   POTASSIUM  4.4  3.9   CHLORIDE  105  103   CO2  27  28   ANIONGAP  9  8   GLC  89  100*   BUN  " 20  21   CR  0.85  0.70   CARMEN  9.5  9.2     AST       24   11/29/2016  ALT       35   11/29/2016  BILITOTAL      0.7   11/29/2016  ALBUMIN      3.8   11/29/2016  PROTTOTAL      6.9   11/29/2016   ALKPHOS       56   11/29/2016    Component      Latest Ref Rng 12/13/2016   % Retic      0.5 - 2.0 % 1.2   Absolute Retic      25 - 95 10e9/L 50.9   Vitamin B12      193 - 986 pg/mL 592   Folate      >5.4 ng/mL 35.2     SPEP:  M-spike (12/13/16): 0.1 g/dL IgM kappa      PATHOLOGY:  Peripheral smear 12/13/16:  FINAL DIAGNOSIS:   Peripheral blood morphology:   - Macrocytosis, mild.   - Negative for anemia and reticulocytosis.     COMMENT:   There is evidence of effective hematopoiesis.  The differential   diagnosis of macrocytosis, in the absence of anemia and reticulocytosis,   includes thyroid disease, liver disease, medications and vitamin   B12/folate deficiency.  There is increased rouleaux formation.  Serum   protein electrophoresis/immunofixation should be considered to exclude   M- paraproteinemia and pseudo-macrocytosis.  Correlation with clinical   findings is recommended.         ASSESSMENT/PLAN:  Ya Stahl is a 76 year old female with:    1) MGUS: SPEP with 0.1 g/dL M-spike, IgM kappa.  Normal calcium level, renal function, normal counts.  Will obtain skeletal survey to evaluate if lytic lesions.  If normal, then can likely monitor the SPEP periodically.  Would hold off on bone marrow biopsy for now.    -additional labs today  -schedule skeletal survey  -will call patient with results.  If skeletal survey is negative and labs stable, can return to clinic in 3 months with labs    2) Macrocytosis: She does not have anemia or reticulocytosis.  Vitamin B12 and folate levels are normal.  It may be secondary to #1.  She has hypothyroidism, but is controlled on medication.    -monitor      I spent a total of 30 minutes with the patient, with over >50% of the time in counseling and/or coordination of care.        Vandana Kasper MD  Hematology/Oncology  [facility]        Please do not hesitate to contact me if you have any questions/concerns.     Sincerely,       Vandana Kasper MD

## 2017-01-12 NOTE — PROGRESS NOTES
[facility]    Hematology/Oncology New Patient Note      Today's Date: 01/12/2017    Reason for Consult: Monoclonal gammopathy, macrocytosis      HISTORY OF PRESENT ILLNESS: Ya Stahl is a 76 year old female with PMHx of Sjogren's syndrome, GERD, CAD, who presents with monoclonal gammopathy and macrocytosis.      On chart review, it appears that she has had a high-normal MCV for many years.  In November 2016, it became slightly elevated at 103.  Her hemoglobin, WBC, and platelet count are normal.  Vitamin B12 and folate levels are normal.  She had SPEP checked, which showed M-spike of 0.1 g/dL, ANNA with IgM kappa.      Ya says that she feels well.  She denies any problems.  Energy level is good.  She denies fatigue.  She notes that she takes Plavix, so if she cuts herself, it takes a little longer to stop bleeding.  She denies easy bruising.  No other bleeding symptoms.  She notes that she has had chronic back pain, but she has been doing exercises, so that has been better.        REVIEW OF SYSTEMS:   14 point ROS was reviewed and is negative other than as noted above in HPI.       HOME MEDICATIONS:  Current Outpatient Prescriptions   Medication Sig Dispense Refill     alendronate (FOSAMAX) 70 MG tablet Take 1 tablet (70 mg) by mouth every 7 days Take with over 8 ounces water and stay upright for at least 30 minutes after dose.  Take at least 60 minutes before breakfast 12 tablet 3     scopolamine (TRANSDERM) 72 hr patch Place 1 patch onto the skin every 72 hours 4 patch 0     levothyroxine (SYNTHROID/LEVOTHROID) 88 MCG tablet Take 1 tablet (88 mcg) by mouth daily 102 tablet 3     atorvastatin (LIPITOR) 20 MG tablet Take 1 tablet (20 mg) by mouth daily 102 tablet 3     cevimeline (EVOXAC) 30 MG capsule Take 1 capsule (30 mg) by mouth 3 times daily       clopidogrel (PLAVIX) 75 MG tablet Take 1 tablet (75 mg) by mouth daily 90 tablet 3     Multiple Vitamins-Minerals (CENTRUM SILVER)  per tablet Take 1 tablet by mouth daily       Misc Natural Products (OSTEO BI-FLEX ADV TRIPLE ST PO) Take 1 tablet by mouth daily       Misc Natural Products (LUTEIN 20 PO) Take 1 tablet by mouth daily       Omega-3 Fatty Acids (OMEGA-3 FISH OIL PO) Take 1 g by mouth daily       aspirin 81 MG EC tablet Take 1 tablet (81 mg) by mouth daily 1 tablet 0     nitroglycerin (NITROSTAT) 0.4 MG SL tablet Place 1 tablet (0.4 mg) under the tongue every 5 minutes as needed for chest pain if you are still having symptoms after 3 doses (15 minutes) call 911. 25 tablet 0     calcium-vitamin D (CALTRATE) 600-400 MG-UNIT per tablet Take 1 tablet by mouth daily       ACAI PO Take 1 tablet by mouth daily       conjugated estrogens (PREMARIN) vaginal cream Place 1.5 g vaginally twice a week. (Patient taking differently: 1.5 g 2 times daily as needed Place 1.5 g vaginally twice a week.) 42.5 g prn     ammonium lactate (LAC-HYDRIN) 12 % cream Apply topically 2 times daily as needed for dry skin (Patient taking differently: Apply topically daily as needed for dry skin ) 385 g 3     MIRALAX OR POWD STIR 1 CAPFUL (17GM) IN 8 OZ OF LIQUID AND DRINK (Patient taking differently: STIR 1 CAPFUL (17GM) IN 8 OZ OF LIQUID AND DRINK twice daily) 1 BOTTLE 1 YEAR         ALLERGIES:  Allergies   Allergen Reactions     Codeine      fatigue     Vicodin [Acetaminophen] Fatigue         PAST MEDICAL HISTORY:  Past Medical History   Diagnosis Date     Displacement of lumbar intervertebral disc without myelopathy      Unspecified hypothyroidism      Sicca syndrome (H)      Arthritis      Sjogren's syndrome (H)      sees specialist; dentist     Gastro-oesophageal reflux disease      Low back pain      Hypertension      Coronary artery disease      History of angina      Heart attack (H) 3/2016         PAST SURGICAL HISTORY:  Past Surgical History   Procedure Laterality Date     C nonspecific procedure  1976     D&C     Hysterectomy, pap no longer indicated   1977     Hysterectomy     Gyn surgery  1978     ovaries removed     Bunionectomy  ~2010     L foot.      Inject steroid (location)  9/11/2012     Procedure: INJECT STEROID (LOCATION);;  Surgeon: Anne Marie Catherine MD;  Location: [facility] OR     Surgical history of -   distant past     ablation for palpitations.     Orthopedic surgery  ~2008     Right foot, bunionectomy      Rotator cuff repair rt/lt  ~1998     Lt shoulder; rotator cuff     Endoscopic release carpal tunnel  9/11/2012     R Procedure: ENDOSCOPIC RELEASE CARPAL TUNNEL;  Right Endoscopic carpal tunnel release, left ringer finger cortizon injection;  Surgeon: Anne Marie Catherine MD;  Location: [facility] OR     Arthroscopy knee  10/5/2012     R Procedure: ARTHROSCOPY KNEE;  RIGHT KNEE ARTHROSCOPY, PARTIAL MEDIAL MENISCECTOMY;  Surgeon: Karli Zaragoza MD;  Location: [facility]     Right hip orif 4/1/2014       Esophagoscopy, gastroscopy, duodenoscopy (egd), combined N/A 12/26/2014     Procedure: COMBINED ESOPHAGOSCOPY, GASTROSCOPY, DUODENOSCOPY (EGD);  Surgeon: Fan Giordano MD;  Location:  GI     Surgical history of -   June 2015     left carpal tunnel surgery     Heart cath, angioplasty  3/4/16     dz <40%     Laparoscopic cholecystectomy N/A 4/7/2016     Procedure: LAPAROSCOPIC CHOLECYSTECTOMY;  Surgeon: Hans Medina MD;  Location: [facility] OR     Excise exostosis foot Left 12/1/2016     Procedure: EXCISE EXOSTOSIS FOOT;  Surgeon: Jody Gallagher, DPM, Pod;  Location: [facility] OR         SOCIAL HISTORY:  Social History     Social History     Marital Status:      Spouse Name: N/A     Number of Children: N/A     Years of Education: N/A     Occupational History     Not on file.     Social History Main Topics     Smoking status: Never Smoker      Smokeless tobacco: Never Used     Alcohol Use: Yes      Comment: social; maybe a glass of wine or highball per week      Drug Use: No     Sexual Activity:     Partners: Male     Other Topics Concern     Parent/Sibling W/ Cabg, Mi  "Or Angioplasty Before 65f 55m? Yes     Caffeine Concern No     1 capp. daily     Sleep Concern No     Special Diet No     Exercise Yes     walks 3 miles 6 days per week , curves 3 days per week     Social History Narrative    Dairy/d 0-1 servings/d.     Caffeine 0 servings/d    Exercise 6 x week walk and curves    Sunscreen used - Yes    Seatbelts used - Yes    Working smoke/CO detectors in the home - Yes    Guns stored in the home - Yes LOCKED    Self Breast Exams - sometimes    Self Testicular Exam - NA    Eye Exam up to date - Yes 4/2009    Dental Exam up to date - Yes 12/2009    Pap Smear up to date - Yes - Hysterectomy    Mammogram up to date - Yes 11/25/2009    PSA up to date - NA    Dexa Scan up to date - 2006    Flex Sig / Colonoscopy up to date - Yes 5/14/2008    Immunizations up to date -9-2007 tetanus    Abuse: Current or Past(Physical, Sexual or Emotional)- No    Do you feel safe in your environment - Yes    DAMION Shaffer CMA    11/25/2009 updated         FAMILY HISTORY:  Family History   Problem Relation Age of Onset     C.A.D. Mother 76     C.A.D. Father 59     CANCER Mother      non Hodgekin's Lymphoma     Hypertension Brother      Hypertension Brother      Connective Tissue Disorder Daughter      scleroderma     HEART DISEASE Father      valve problem     HEART DISEASE Mother      enlarged heart         PHYSICAL EXAM:  Vital signs:  /89 mmHg  Pulse 55  Temp(Src) 97.2  F (36.2  C) (Tympanic)  Resp 16  Ht 1.657 m (5' 5.25\")  Wt 56.019 kg (123 lb 8 oz)  BMI 20.40 kg/m2  SpO2 99%   GENERAL/CONSTITUTIONAL: No acute distress. Accompanied by .  EYES: No scleral icterus.  RESPIRATORY: Clear to auscultation bilaterally. No crackles or wheezing.   CARDIOVASCULAR: Regular rate and rhythm without murmurs, gallops, or rubs.  GASTROINTESTINAL: No tenderness. The patient has normal bowel sounds. No guarding.  No distention.  MUSCULOSKELETAL: Warm and well-perfused, no cyanosis, clubbing, or " edema.  NEUROLOGIC: Alert, oriented, answers questions appropriately.  INTEGUMENTARY: No jaundice.      LABS:  CBC RESULTS:   Recent Labs   Lab Test  12/13/16   0808   WBC  5.5   RBC  4.26   HGB  13.9   HCT  42.8   MCV  101*   MCH  32.6   MCHC  32.5   RDW  13.5   PLT  287       Recent Labs   Lab Test  11/29/16   1553  03/30/16   1115   NA  141  139   POTASSIUM  4.4  3.9   CHLORIDE  105  103   CO2  27  28   ANIONGAP  9  8   GLC  89  100*   BUN  20  21   CR  0.85  0.70   CARMEN  9.5  9.2     AST       24   11/29/2016  ALT       35   11/29/2016  BILITOTAL      0.7   11/29/2016  ALBUMIN      3.8   11/29/2016  PROTTOTAL      6.9   11/29/2016   ALKPHOS       56   11/29/2016    Component      Latest Ref Rng 12/13/2016   % Retic      0.5 - 2.0 % 1.2   Absolute Retic      25 - 95 10e9/L 50.9   Vitamin B12      193 - 986 pg/mL 592   Folate      >5.4 ng/mL 35.2     SPEP:  M-spike (12/13/16): 0.1 g/dL IgM kappa      PATHOLOGY:  Peripheral smear 12/13/16:  FINAL DIAGNOSIS:   Peripheral blood morphology:   - Macrocytosis, mild.   - Negative for anemia and reticulocytosis.     COMMENT:   There is evidence of effective hematopoiesis.  The differential   diagnosis of macrocytosis, in the absence of anemia and reticulocytosis,   includes thyroid disease, liver disease, medications and vitamin   B12/folate deficiency.  There is increased rouleaux formation.  Serum   protein electrophoresis/immunofixation should be considered to exclude   M- paraproteinemia and pseudo-macrocytosis.  Correlation with clinical   findings is recommended.         ASSESSMENT/PLAN:  Ya Stahl is a 76 year old female with:    1) MGUS: SPEP with 0.1 g/dL M-spike, IgM kappa.  Normal calcium level, renal function, normal counts.  Will obtain skeletal survey to evaluate if lytic lesions.  If normal, then can likely monitor the SPEP periodically.  Would hold off on bone marrow biopsy for now.    -additional labs today  -schedule skeletal survey  -will call  patient with results.  If skeletal survey is negative and labs stable, can return to clinic in 3 months with labs    2) Macrocytosis: She does not have anemia or reticulocytosis.  Vitamin B12 and folate levels are normal.  It may be secondary to #1.  She has hypothyroidism, but is controlled on medication.    -monitor      I spent a total of 30 minutes with the patient, with over >50% of the time in counseling and/or coordination of care.       Vandana Kasper MD  Hematology/Oncology  [facility]

## 2017-01-12 NOTE — PATIENT INSTRUCTIONS
-labs today-Ruchi  -schedule skeletal survey- Scheduled for Jan 13th @ 10:30/[facility].  Amanda  -return to clinic in 3 months with labs a few days prior-  Scheduled for April 6th @ 12:45/Amanda    Be sure and have your labs drawn at [facility] 1 week prior-March 30th.  Amanda  Orders are in Epic.  Amanda    AVS printed and given to ptMary Alice Patel

## 2017-01-12 NOTE — MR AVS SNAPSHOT
After Visit Summary   1/12/2017    Ya Stahl    MRN: [id number]           Patient Information     Date Of Birth          1940        Visit Information        Provider Department      1/12/2017 1:45 PM Vandana Kasper MD [facility] Oncology [facility]      Today's Diagnoses     Macrocytosis    -  1     Monoclonal paraproteinemia           Care Instructions    -labs today  -schedule skeletal survey- Scheduled for Jan 13th @ 10:30/[facility].  Amanda  -return to clinic in 3 months with labs a few days prior-  Scheduled for April 6th @ 12:45/Amanda    Be sure and have your labs drawn at [facility] 1 week prior-March 30th.  Amanda  Orders are in Epic.  Amanda    AVS printed and given to pt.  Amanda        Follow-ups after your visit        Your next 10 appointments already scheduled     Jan 13, 2017  9:30 AM   Nurse Only with  NURSE   [facility] ([facility]    [address] [address] [phone]            Jan 13, 2017 10:45 AM   XR BONE SURVEY COMPLETE with RHXR1   [facility] ([facility])    [address] [place] [address]   [phone]           Please bring a list of your current medicines to your exam. (Include vitamins, minerals and over-thecounter medicines.) Leave your valuables at home.  Tell your doctor if there is a chance you may be pregnant.  You do not need to do anything special for this exam.            Jan 17, 2017   Procedure with Fan Giordano MD   [facility] ([facility])    [address] [place] [address]   [phone]           [facility] is located at [address] [place]            Mar 06, 2017  9:30 AM   Return Visit with Venancio Montanez MD   [facility] PHYSICIANS [facility] [place] ([facility])    [address]  [place]  "[place] [address]   [phone]            Apr 06, 2017 12:45 PM   Return Visit with Vandana Kasper MD   [facility] ([facility])    [facility]  [address] [place] [address]   [phone]              Future tests that were ordered for you today     Open Future Orders        Priority Expected Expires Ordered    CBC with platelets differential Routine 4/12/2017 1/12/2018 1/12/2017    Comprehensive metabolic panel Routine 4/12/2017 1/12/2018 1/12/2017    Kappa and lambda light chain Routine 4/12/2017 1/12/2018 1/12/2017    Protein electrophoresis Routine 4/12/2017 1/12/2018 1/12/2017    XR Bone Survey Complete Routine 1/12/2017 1/12/2018 1/12/2017            Who to contact     If you have questions or need follow up information about today's clinic visit or your schedule please contact [facility] directly at [phone].  Normal or non-critical lab and imaging results will be communicated to you by [employer], letter or phone within 4 business days after the clinic has received the results. If you do not hear from us within 7 days, please contact the clinic through Certifyt or phone. If you have a critical or abnormal lab result, we will notify you by phone as soon as possible.  Submit refill requests through Agilence or call your pharmacy and they will forward the refill request to us. Please allow 3 business days for your refill to be completed.          Additional Information About Your Visit        [employer] Information     Agilence lets you send messages to your doctor, view your test results, renew your prescriptions, schedule appointments and more. To sign up, go to [email or web address] . Click on \"Log in\" on the left side of the screen, which will take you to the Welcome page. Then click on \"Sign up Now\" on the right side of the page.     You will be asked to enter the access code listed below, as " "well as some personal information. Please follow the directions to create your username and password.     Your access code is: [email or web address]  Expires: 2017  3:39 PM     Your access code will  in 90 days. If you need help or a new code, please call your [place] clinic or [phone].        Care EveryWhere ID     This is your Care EveryWhere ID. This could be used by other organizations to access your [place] medical records  [id number]        Your Vitals Were     Pulse Temperature Respirations Height BMI (Body Mass Index) Pulse Oximetry    55 97.2  F (36.2  C) (Tympanic) 16 1.657 m (5' 5.25\") 20.40 kg/m2 99%       Blood Pressure from Last 3 Encounters:   17 159/89   17 130/70   16 114/78    Weight from Last 3 Encounters:   17 56.019 kg (123 lb 8 oz)   17 55.566 kg (122 lb 8 oz)   16 55.792 kg (123 lb)              We Performed the Following     Beta 2 microglobuline     CBC with platelets differential     Comprehensive metabolic panel     Kappa and lambda light chain     Lactate Dehydrogenase     Protein electrophoresis     Protein Immunofixation Serum          Today's Medication Changes          These changes are accurate as of: 17  2:38 PM.  If you have any questions, ask your nurse or doctor.               These medicines have changed or have updated prescriptions.        Dose/Directions    ammonium lactate 12 % cream   Commonly known as:  LAC-HYDRIN   This may have changed:  when to take this   Used for:  Callus        Apply topically 2 times daily as needed for dry skin   Quantity:  385 g   Refills:  3       conjugated estrogens cream   Commonly known as:  PREMARIN   This may have changed:    - how much to take  - when to take this  - reasons to take this  - additional instructions   Used for:  Atrophic vaginitis        Place 1.5 g vaginally twice a week.   Quantity:  42.5 g   Refills:  prn       MIRALAX powder   This may have changed:  See the new " instructions.   Used for:  Chronic constipation   Generic drug:  polyethylene glycol        STIR 1 CAPFUL (17GM) IN 8 OZ OF LIQUID AND DRINK   Quantity:  1 BOTTLE   Refills:  1 YEAR                Primary Care Provider Office Phone # Fax #    Scarlett Spencer [phone]       [facility] [address]  [facility] [address]        Thank you!     Thank you for choosing [facility]  for your care. Our goal is always to provide you with excellent care. Hearing back from our patients is one way we can continue to improve our services. Please take a few minutes to complete the written survey that you may receive in the mail after your visit with us. Thank you!             Your Updated Medication List - Protect others around you: Learn how to safely use, store and throw away your medicines at www.disposemymeds.org.          This list is accurate as of: 1/12/17  2:38 PM.  Always use your most recent med list.                   Brand Name Dispense Instructions for use    ACAI PO      Take 1 tablet by mouth daily       alendronate 70 MG tablet    FOSAMAX    12 tablet    Take 1 tablet (70 mg) by mouth every 7 days Take with over 8 ounces water and stay upright for at least 30 minutes after dose.  Take at least 60 minutes before breakfast       ammonium lactate 12 % cream    LAC-HYDRIN    385 g    Apply topically 2 times daily as needed for dry skin       aspirin 81 MG EC tablet     1 tablet    Take 1 tablet (81 mg) by mouth daily       atorvastatin 20 MG tablet    LIPITOR    102 tablet    Take 1 tablet (20 mg) by mouth daily       calcium-vitamin D 600-400 MG-UNIT per tablet    CALTRATE     Take 1 tablet by mouth daily       CENTRUM SILVER per tablet      Take 1 tablet by mouth daily       cevimeline 30 MG capsule    EVOXAC     Take 1 capsule (30 mg) by mouth 3 times daily       clopidogrel 75 MG tablet    PLAVIX    90 tablet    Take 1 tablet (75 mg) by mouth daily        conjugated estrogens cream    PREMARIN    42.5 g    Place 1.5 g vaginally twice a week.       levothyroxine 88 MCG tablet    SYNTHROID/LEVOTHROID    102 tablet    Take 1 tablet (88 mcg) by mouth daily       MIRALAX powder   Generic drug:  polyethylene glycol     1 BOTTLE    STIR 1 CAPFUL (17GM) IN 8 OZ OF LIQUID AND DRINK       nitroglycerin 0.4 MG sublingual tablet    NITROSTAT    25 tablet    Place 1 tablet (0.4 mg) under the tongue every 5 minutes as needed for chest pain if you are still having symptoms after 3 doses (15 minutes) call 911.       OMEGA-3 FISH OIL PO      Take 1 g by mouth daily       * OSTEO BI-FLEX ADV TRIPLE ST PO      Take 1 tablet by mouth daily       * LUTEIN 20 PO      Take 1 tablet by mouth daily       scopolamine 72 hr patch    TRANSDERM    4 patch    Place 1 patch onto the skin every 72 hours       * Notice:  This list has 2 medication(s) that are the same as other medications prescribed for you. Read the directions carefully, and ask your doctor or other care provider to review them with you.

## 2017-01-13 ENCOUNTER — ALLIED HEALTH/NURSE VISIT (OUTPATIENT)
Dept: NURSING | Facility: CLINIC | Age: 77
End: 2017-01-13
Payer: COMMERCIAL

## 2017-01-13 ENCOUNTER — TELEPHONE (OUTPATIENT)
Dept: ONCOLOGY | Facility: CLINIC | Age: 77
End: 2017-01-13

## 2017-01-13 ENCOUNTER — HOSPITAL ENCOUNTER (OUTPATIENT)
Dept: GENERAL RADIOLOGY | Facility: CLINIC | Age: 77
Discharge: HOME | End: 2017-01-13
Attending: INTERNAL MEDICINE | Admitting: INTERNAL MEDICINE
Payer: MEDICARE

## 2017-01-13 DIAGNOSIS — D47.2 MONOCLONAL PARAPROTEINEMIA: ICD-10-CM

## 2017-01-13 DIAGNOSIS — D75.89 MACROCYTOSIS: ICD-10-CM

## 2017-01-13 DIAGNOSIS — H61.21 IMPACTED CERUMEN OF RIGHT EAR: Primary | ICD-10-CM

## 2017-01-13 LAB
ALBUMIN SERPL ELPH-MCNC: 4.3 G/DL (ref 3.7–5.1)
ALPHA1 GLOB SERPL ELPH-MCNC: 0.2 G/DL (ref 0.2–0.4)
ALPHA2 GLOB SERPL ELPH-MCNC: 0.8 G/DL (ref 0.5–0.9)
B-GLOBULIN SERPL ELPH-MCNC: 0.8 G/DL (ref 0.6–1)
B2 MICROGLOB SERPL-MCNC: 1.7 MG/L
GAMMA GLOB SERPL ELPH-MCNC: 1.1 G/DL (ref 0.7–1.6)
IGA SERPL-MCNC: 192 MG/DL (ref 70–380)
IGG SERPL-MCNC: 1280 MG/DL (ref 695–1620)
IGM SERPL-MCNC: 220 MG/DL (ref 60–265)
IMMUNOFIXATION ELP: NORMAL
KAPPA LC UR-MCNC: 2.07 MG/DL (ref 0.33–1.94)
KAPPA LC/LAMBDA SER: 1.71 {RATIO} (ref 0.26–1.65)
LAMBDA LC SERPL-MCNC: 1.21 MG/DL (ref 0.57–2.63)
M PROTEIN SERPL ELPH-MCNC: 0.1 G/DL
PROT PATTERN SERPL ELPH-IMP: ABNORMAL

## 2017-01-13 PROCEDURE — 77075 RADEX OSSEOUS SURVEY COMPL: CPT

## 2017-01-13 PROCEDURE — 99207 ZZC NO CHARGE NURSE ONLY: CPT

## 2017-01-13 NOTE — NURSING NOTE
Right ear was flushed due to wax build up.  Wax was removed and ear drum was visible when complete.   Rylee Roque MA

## 2017-01-14 NOTE — TELEPHONE ENCOUNTER
I call patient regarding her skeletal survey results, which showed no lytic lesions.  Her labs are also stable with low M-spike of 0.1.  Will monitor for now, and repeat labs with follow-up in 3 months as scheduled.

## 2017-01-25 ENCOUNTER — TRANSFERRED RECORDS (OUTPATIENT)
Dept: HEALTH INFORMATION MANAGEMENT | Facility: CLINIC | Age: 77
End: 2017-01-25

## 2017-01-30 ENCOUNTER — HOSPITAL ENCOUNTER (OUTPATIENT)
Dept: CT IMAGING | Facility: CLINIC | Age: 77
Discharge: HOME | End: 2017-01-30
Attending: INTERNAL MEDICINE | Admitting: INTERNAL MEDICINE
Payer: MEDICARE

## 2017-01-30 DIAGNOSIS — R05.9 COUGH: ICD-10-CM

## 2017-01-30 DIAGNOSIS — M35.09 SICCA SYNDROME WITH OTHER ORGAN INVOLVEMENT: ICD-10-CM

## 2017-01-30 PROCEDURE — 71250 CT THORAX DX C-: CPT

## 2017-02-28 DIAGNOSIS — N95.2 ATROPHIC VAGINITIS: ICD-10-CM

## 2017-02-28 DIAGNOSIS — R07.89 OTHER CHEST PAIN: ICD-10-CM

## 2017-02-28 RX ORDER — CLOPIDOGREL BISULFATE 75 MG/1
75 TABLET ORAL DAILY
Qty: 90 TABLET | Refills: 0 | Status: SHIPPED | OUTPATIENT
Start: 2017-02-28 | End: 2017-03-06

## 2017-02-28 NOTE — TELEPHONE ENCOUNTER
conjugated estrogens (PREMARIN) vaginal cream      Last Written Prescription Date: 12/10/15  Last Fill Quantity: 42.5g, # refills: prn  Last Office Visit with G, [facility] or [facility] prescribing provider: 1/6/17  Next 5 appointments (look out 90 days)     Mar 06, 2017  9:30 AM CST   Return Visit with Venancio Montanez MD   [facility] [place] ([facility])    [address]  [facility] [address] [phone]            Apr 06, 2017 12:45 PM CDT   Return Visit with Vandana aKsper MD   [facility] ([facility])    [facility]  [address]  [facility] [address] [phone]                   BP Readings from Last 3 Encounters:   01/17/17 145/79   01/12/17 159/89   01/06/17 130/70     Date of last Breast Exam: 12/27/16

## 2017-03-01 NOTE — TELEPHONE ENCOUNTER
Routing refill request to provider for review/approval because:  A break in medication  Prompt showing to change dosage. Please approve of refill request.    Liane Garces, RN, BSN, PHN

## 2017-03-02 NOTE — TELEPHONE ENCOUNTER
Please verify what she is doing. This did say she was doing bid prn.    Does it mean twice weekly as needed? This would be OK.    I would probably go to 1 gm twice weekly or twice weekly prn...

## 2017-03-02 NOTE — TELEPHONE ENCOUNTER
Called patient to ask about the medication.  Patient is taking the medication only prn-taking it once or twice weekly.

## 2017-03-06 ENCOUNTER — OFFICE VISIT (OUTPATIENT)
Dept: CARDIOLOGY | Facility: CLINIC | Age: 77
End: 2017-03-06
Attending: INTERNAL MEDICINE
Payer: COMMERCIAL

## 2017-03-06 VITALS
SYSTOLIC BLOOD PRESSURE: 138 MMHG | BODY MASS INDEX: 21.33 KG/M2 | WEIGHT: 128 LBS | HEIGHT: 65 IN | HEART RATE: 60 BPM | DIASTOLIC BLOOD PRESSURE: 82 MMHG

## 2017-03-06 DIAGNOSIS — I25.10 CORONARY ARTERY DISEASE INVOLVING NATIVE CORONARY ARTERY OF NATIVE HEART WITHOUT ANGINA PECTORIS: ICD-10-CM

## 2017-03-06 PROCEDURE — 99214 OFFICE O/P EST MOD 30 MIN: CPT | Performed by: INTERNAL MEDICINE

## 2017-03-06 NOTE — MR AVS SNAPSHOT
"              After Visit Summary   3/6/2017    Ya Stahl    MRN: [id number]           Patient Information     Date Of Birth          1940        Visit Information        Provider Department      3/6/2017 9:30 AM Venancio Montanez MD [facility] [place]        Today's Diagnoses     Coronary artery disease involving native coronary artery of native heart without angina pectoris           Follow-ups after your visit        Your next 10 appointments already scheduled     Apr 06, 2017 12:45 PM CDT   Return Visit with Vandana Kasper MD   [facility] ([facility])    [facility]  [address]  [facility] [address] [phone]              Who to contact     If you have questions or need follow up information about today's clinic visit or your schedule please contact [facility] [place] directly at [phone].  Normal or non-critical lab and imaging results will be communicated to you by MyChart, letter or phone within 4 business days after the clinic has received the results. If you do not hear from us within 7 days, please contact the clinic through Vaimicomhart or phone. If you have a critical or abnormal lab result, we will notify you by phone as soon as possible.  Submit refill requests through [employer] or call your pharmacy and they will forward the refill request to us. Please allow 3 business days for your refill to be completed.          Additional Information About Your Visit        Vaimicomhart Information     [employer] lets you send messages to your doctor, view your test results, renew your prescriptions, schedule appointments and more. To sign up, go to [email or web address] . Click on \"Log in\" on the left side of the screen, which will take you to the Welcome page. Then click on \"Sign up Now\" on the right side of the page.     You will be asked to " "enter the access code listed below, as well as some personal information. Please follow the directions to create your username and password.     Your access code is: [email or web address]  Expires: 2017  9:54 AM     Your access code will  in 90 days. If you need help or a new code, please call your [place] clinic or [phone].        Care EveryWhere ID     This is your Care EveryWhere ID. This could be used by other organizations to access your [place] medical records  [id number]        Your Vitals Were     Pulse Height BMI (Body Mass Index)             60 1.657 m (5' 5.25\") 21.14 kg/m2          Blood Pressure from Last 3 Encounters:   17 138/82   17 145/79   17 159/89    Weight from Last 3 Encounters:   17 58.1 kg (128 lb)   17 56 kg (123 lb 8 oz)   17 55.6 kg (122 lb 8 oz)              We Performed the Following     Follow-Up with Cardiologist          Today's Medication Changes          These changes are accurate as of: 3/6/17  9:54 AM.  If you have any questions, ask your nurse or doctor.               These medicines have changed or have updated prescriptions.        Dose/Directions    MIRALAX powder   This may have changed:  See the new instructions.   Used for:  Chronic constipation   Generic drug:  polyethylene glycol        STIR 1 CAPFUL (17GM) IN 8 OZ OF LIQUID AND DRINK   Quantity:  1 BOTTLE   Refills:  1 YEAR         Stop taking these medicines if you haven't already. Please contact your care team if you have questions.     clopidogrel 75 MG tablet   Commonly known as:  PLAVIX   Stopped by:  Venancio Montanez MD                    Primary Care Provider Office Phone # Fax #    Scarlett Spencer [phone]       [facility] [address] [address] [facility] [address]        Thank you!     Thank you for choosing [facility] [place]  for your care. Our goal is always to provide you with excellent care. " Hearing back from our patients is one way we can continue to improve our services. Please take a few minutes to complete the written survey that you may receive in the mail after your visit with us. Thank you!             Your Updated Medication List - Protect others around you: Learn how to safely use, store and throw away your medicines at www.disposemymeds.org.          This list is accurate as of: 3/6/17  9:54 AM.  Always use your most recent med list.                   Brand Name Dispense Instructions for use    alendronate 70 MG tablet    FOSAMAX    12 tablet    Take 1 tablet (70 mg) by mouth every 7 days Take with over 8 ounces water and stay upright for at least 30 minutes after dose.  Take at least 60 minutes before breakfast       aspirin 81 MG EC tablet     1 tablet    Take 1 tablet (81 mg) by mouth daily       atorvastatin 20 MG tablet    LIPITOR    102 tablet    Take 1 tablet (20 mg) by mouth daily       calcium-vitamin D 600-400 MG-UNIT per tablet    CALTRATE     Take 1 tablet by mouth daily       CENTRUM SILVER per tablet      Take 1 tablet by mouth daily       cevimeline 30 MG capsule    EVOXAC     Take 30 mg by mouth Reported on 3/6/2017       conjugated estrogens cream    PREMARIN    30 g    Place 1.5 g vaginally 2 times daily as needed Place 1.5 g vaginally twice a week.       levothyroxine 88 MCG tablet    SYNTHROID/LEVOTHROID    102 tablet    Take 1 tablet (88 mcg) by mouth daily       MIRALAX powder   Generic drug:  polyethylene glycol     1 BOTTLE    STIR 1 CAPFUL (17GM) IN 8 OZ OF LIQUID AND DRINK       nitroglycerin 0.4 MG sublingual tablet    NITROSTAT    25 tablet    Place 1 tablet (0.4 mg) under the tongue every 5 minutes as needed for chest pain if you are still having symptoms after 3 doses (15 minutes) call 911.       OMEGA-3 FISH OIL PO      Take 1 g by mouth daily       * OSTEO BI-FLEX ADV TRIPLE ST PO      Take 1 tablet by mouth daily       * LUTEIN 20 PO      Take 1 tablet by mouth  daily       scopolamine 72 hr patch    TRANSDERM    4 patch    Place 1 patch onto the skin every 72 hours       * Notice:  This list has 2 medication(s) that are the same as other medications prescribed for you. Read the directions carefully, and ask your doctor or other care provider to review them with you.

## 2017-03-06 NOTE — PROGRESS NOTES
HISTORY OF PRESENT ILLNESS:  Ya Stahl, a 76-year-old woman with coronary artery disease, hypothyroidism, sicca syndrome, cholelithiasis( status post laparoscopic cholecystectomy) was seen today at your request for followup.      Ms. Stahl was seen at [facility] with an episode of atypical chest pain in 03/2016.  A CT scan showed no evidence of pulmonary embolus or aortic dissection.  Troponin levels were positive, but the pattern was not suggestive of plaque rupture.  Coronary angiography showed only mild atherosclerotic change, but no significant focal narrowing and a well-preserved ejection fraction.      The patient underwent an uncomplicated gallbladder surgery in 04/2016, only about 1 month after her hospitalization at  [facility].      She remains entirely free of chest, arm, neck, jaw or back discomfort with good exercise tolerance since last seen.      The patient bleeds easily on clopidogrel would like to stop the drug.  She is otherwise tolerating her medications well.  She plans a trip to the [place] in the near future.        PAST MEDICAL HISTORY:   1.  Hypertension.   2.  Dyslipidemia.   3.  Cholelithiasis, status post laparoscopic cholecystectomy.   4.  Sjogren's disease.   5.  Hypothyroidism.   6.  History of gastroesophageal reflux.   7. Mild coronary artery disease     PHYSICAL EXAMINATION:   GENERAL:  Exam today demonstrates a vivacious 76-year-old woman who appears comfortable at rest.   VITAL SIGNS:  Her blood pressure is 130/82, heart rate 60.  Her height is 1.66 meters.  Her weight is 58 kg for a BMI of 21.2.   LUNGS:  Clear to percussion and auscultation.   CARDIOVASCULAR:  Normal S1 with a normal S2, no S3.  There is no murmur, rub or click.      LABORATORY STUDIES:  Her LDL cholesterol is 43.  Her potassium was 4.0 with a creatinine of 0.88.      ASSESSMENT:  Ms. Stahl is asymptomatic on guideline-directed medical therapy.I feel the patient should  stop the clopidogrel as I believe the bleeding risk exceeds the benefit  I have nothing to add to her current excellent medical therapy and am not significantly contributing to her care at this time.     .      RECOMMENDATIONS:   1.  Continue present medical therapy with the exception of clopidogrel.   2.  Mediterranean style diet.   3.  Exercise program.   4.  We will see the patient on a p.r.n. basis in the future.      We appreciate the opportunity to see your patient, Ya Gloria.      cc:      Scarlett Spencer MD    [facility]   [address] [place] [address]         JADA KLEIN MD             D: 2017 09:59   T: 2017 14:05   MT: MARIANA      Name:     YA GLORIA   MRN:      [id number]        Account:      [id number]   :      1940           Service Date: 2017      Document: [id number]

## 2017-03-06 NOTE — PROGRESS NOTES
HPI and Plan:   See dictation    No orders of the defined types were placed in this encounter.      No orders of the defined types were placed in this encounter.      Medications Discontinued During This Encounter   Medication Reason     cevimeline (EVOXAC) 30 MG capsule Unavailable     ammonium lactate (LAC-HYDRIN) 12 % cream Therapy completed     ACAI PO Therapy completed     clopidogrel (PLAVIX) 75 MG tablet          Encounter Diagnosis   Name Primary?     Coronary artery disease involving native coronary artery of native heart without angina pectoris        CURRENT MEDICATIONS:  Current Outpatient Prescriptions   Medication Sig Dispense Refill     conjugated estrogens (PREMARIN) cream Place 1.5 g vaginally 2 times daily as needed Place 1.5 g vaginally twice a week. 30 g 3     cevimeline (EVOXAC) 30 MG capsule Take 30 mg by mouth Reported on 3/6/2017       alendronate (FOSAMAX) 70 MG tablet Take 1 tablet (70 mg) by mouth every 7 days Take with over 8 ounces water and stay upright for at least 30 minutes after dose.  Take at least 60 minutes before breakfast 12 tablet 3     scopolamine (TRANSDERM) 72 hr patch Place 1 patch onto the skin every 72 hours 4 patch 0     levothyroxine (SYNTHROID/LEVOTHROID) 88 MCG tablet Take 1 tablet (88 mcg) by mouth daily 102 tablet 3     atorvastatin (LIPITOR) 20 MG tablet Take 1 tablet (20 mg) by mouth daily 102 tablet 3     Multiple Vitamins-Minerals (CENTRUM SILVER) per tablet Take 1 tablet by mouth daily       Misc Natural Products (OSTEO BI-FLEX ADV TRIPLE ST PO) Take 1 tablet by mouth daily       Misc Natural Products (LUTEIN 20 PO) Take 1 tablet by mouth daily       Omega-3 Fatty Acids (OMEGA-3 FISH OIL PO) Take 1 g by mouth daily       aspirin 81 MG EC tablet Take 1 tablet (81 mg) by mouth daily 1 tablet 0     nitroglycerin (NITROSTAT) 0.4 MG SL tablet Place 1 tablet (0.4 mg) under the tongue every 5 minutes as needed for chest pain if you are still having symptoms after 3  doses (15 minutes) call 911. 25 tablet 0     calcium-vitamin D (CALTRATE) 600-400 MG-UNIT per tablet Take 1 tablet by mouth daily       MIRALAX OR POWD STIR 1 CAPFUL (17GM) IN 8 OZ OF LIQUID AND DRINK (Patient taking differently: STIR 1 CAPFUL (17GM) IN 8 OZ OF LIQUID AND DRINK twice daily) 1 BOTTLE 1 YEAR       ALLERGIES     Allergies   Allergen Reactions     Codeine      fatigue     Vicodin [Acetaminophen] Fatigue       PAST MEDICAL HISTORY:  Past Medical History   Diagnosis Date     Arthritis      Coronary artery disease      Displacement of lumbar intervertebral disc without myelopathy      Gastro-oesophageal reflux disease      Heart attack (H) 3/2016     History of angina      Hypertension      Low back pain      Sicca syndrome (H)      Sjogren's syndrome (H)      sees specialist; dentist     Unspecified hypothyroidism        PAST SURGICAL HISTORY:  Past Surgical History   Procedure Laterality Date     C nonspecific procedure  1976     D&C     Hysterectomy, pap no longer indicated  1977     Hysterectomy     Gyn surgery  1978     ovaries removed     Bunionectomy  ~2010     L foot.      Inject steroid (location)  9/11/2012     Procedure: INJECT STEROID (LOCATION);;  Surgeon: Anne Marie Catherine MD;  Location:  OR     Surgical history of -   distant past     ablation for palpitations.     Orthopedic surgery  ~2008     Right foot, bunionectomy      Rotator cuff repair rt/lt  ~1998     Lt shoulder; rotator cuff     Endoscopic release carpal tunnel  9/11/2012     R Procedure: ENDOSCOPIC RELEASE CARPAL TUNNEL;  Right Endoscopic carpal tunnel release, left ringer finger cortizon injection;  Surgeon: Anne Marie Catherine MD;  Location: [facility] OR     Arthroscopy knee  10/5/2012     R Procedure: ARTHROSCOPY KNEE;  RIGHT KNEE ARTHROSCOPY, PARTIAL MEDIAL MENISCECTOMY;  Surgeon: Karli Zaragoza MD;  Location: [facility]     Right hip orif 4/1/2014       Esophagoscopy, gastroscopy, duodenoscopy (egd), combined N/A 12/26/2014      Procedure: COMBINED ESOPHAGOSCOPY, GASTROSCOPY, DUODENOSCOPY (EGD);  Surgeon: Fan Giordano MD;  Location:  GI     Surgical history of -   June 2015     left carpal tunnel surgery     Heart cath, angioplasty  3/4/16     dz <40%     Laparoscopic cholecystectomy N/A 4/7/2016     Procedure: LAPAROSCOPIC CHOLECYSTECTOMY;  Surgeon: Hans Medina MD;  Location: [facility] OR     Excise exostosis foot Left 12/1/2016     Procedure: EXCISE EXOSTOSIS FOOT;  Surgeon: Jody Gallagher DPM, Pod;  Location: [facility] OR     Colonoscopy  1/17     normal; no repeat.     Colonoscopy N/A 1/17/2017     Procedure: COLONOSCOPY;  Surgeon: Fan Giordano MD;  Location: [facility] GI       FAMILY HISTORY:  Family History   Problem Relation Age of Onset     C.A.D. Mother 76     CANCER Mother      non Hodgekin's Lymphoma     HEART DISEASE Mother      enlarged heart     C.A.D. Father 59     HEART DISEASE Father      valve problem     Hypertension Brother      Hypertension Brother      Connective Tissue Disorder Daughter      scleroderma     Colon Cancer No family hx of        SOCIAL HISTORY:  Social History     Social History     Marital status:      Spouse name: N/A     Number of children: N/A     Years of education: N/A     Social History Main Topics     Smoking status: Never Smoker     Smokeless tobacco: Never Used     Alcohol use Yes      Comment: social; maybe a glass of wine or highball per week      Drug use: No     Sexual activity: Yes     Partners: Male     Other Topics Concern     Parent/Sibling W/ Cabg, Mi Or Angioplasty Before 65f 55m? Yes     Caffeine Concern No     1 capp. daily     Sleep Concern No     Special Diet No     Exercise Yes     walks 3 miles 6 days per week , curves 3 days per week     Social History Narrative    Dairy/d 0-1 servings/d.     Caffeine 0 servings/d    Exercise 6 x week walk and curves    Sunscreen used - Yes    Seatbelts used - Yes    Working smoke/CO detectors in the home - Yes    Guns stored in the  "home - Yes LOCKED    Self Breast Exams - sometimes    Self Testicular Exam - NA    Eye Exam up to date - Yes 4/2009    Dental Exam up to date - Yes 12/2009    Pap Smear up to date - Yes - Hysterectomy    Mammogram up to date - Yes 11/25/2009    PSA up to date - NA    Dexa Scan up to date - 2006    Flex Sig / Colonoscopy up to date - Yes 5/14/2008    Immunizations up to date -9-2007 tetanus    Abuse: Current or Past(Physical, Sexual or Emotional)- No    Do you feel safe in your environment - Yes    DAMION Shaffer, [facility]    11/25/2009 updated       Review of Systems:  Skin:  Positive for bruising     Eyes:  Positive for glasses Dry eyes  ENT:  Negative      Respiratory:  Positive for cough chronic dry cough (sees ENT)   Cardiovascular:  Negative      Gastroenterology: Negative      Genitourinary:  Negative      Musculoskeletal:  Positive for arthritis;joint pain;joint stiffness;back pain increased joint pain   Neurologic:  Negative      Psychiatric:  Negative      Heme/Lymph/Imm:  Positive for allergies RX allergies  Endocrine:  Positive for thyroid disorder      Physical Exam:  Vitals: /82  Pulse 60  Ht 1.657 m (5' 5.25\")  Wt 58.1 kg (128 lb)  BMI 21.14 kg/m2    Constitutional:           Skin:           Head:           Eyes:           ENT:           Neck:           Chest:             Cardiac:                    Abdomen:           Vascular:                                          Extremities and Back:                 Neurological:                 CC  Venancio Montanez MD   [facility]  [address]  [place] [id number]              "

## 2017-03-06 NOTE — LETTER
3/6/2017    Scarlett Spencer MD  [facility]   [address]  [facility] [address]    RE: Ya Patelmark       Dear Colleague,    I had the pleasure of seeing Ya Stahl in the [facility].    Ya Stahl, a 76-year-old woman with coronary artery disease, hypothyroidism, sicca syndrome, cholelithiasis( status post laparoscopic cholecystectomy) was seen today at your request for followup.      Ms. Stahl was seen at [facility] with an episode of atypical chest pain in 03/2016.  A CT scan showed no evidence of pulmonary embolus or aortic dissection.  Troponin levels were positive, but the pattern was not suggestive of plaque rupture.  Coronary angiography showed only mild atherosclerotic change, but no significant focal narrowing and a well-preserved ejection fraction.      The patient underwent an uncomplicated gallbladder surgery in 04/2016, only about 1 month after her hospitalization at  [facility].      She remains entirely free of chest, arm, neck, jaw or back discomfort with good exercise tolerance since last seen.      The patient bleeds easily on clopidogrel would like to stop the drug.  She is otherwise tolerating her medications well.  She plans a trip to the [place] in the near future.      PAST MEDICAL HISTORY:   1.  Hypertension.   2.  Dyslipidemia.   3.  Cholelithiasis, status post laparoscopic cholecystectomy.   4.  Sjogren's disease.   5.  Hypothyroidism.   6.  History of gastroesophageal reflux.   7. Mild coronary artery disease     PHYSICAL EXAMINATION:   GENERAL:  Exam today demonstrates a vivacious 76-year-old woman who appears comfortable at rest.   VITAL SIGNS:  Her blood pressure is 130/82, heart rate 60.  Her height is 1.66 meters.  Her weight is 58 kg for a BMI of 21.2.   LUNGS:  Clear to percussion and auscultation.   CARDIOVASCULAR:  Normal S1 with a normal S2, no S3.  There is no murmur, rub or click.       LABORATORY STUDIES:  Her LDL cholesterol is 43.  Her potassium was 4.0 with a creatinine of 0.88.     Outpatient Encounter Prescriptions as of 3/6/2017   Medication Sig Dispense Refill     [DISCONTINUED] conjugated estrogens (PREMARIN) cream Place 1.5 g vaginally 2 times daily as needed Place 1.5 g vaginally twice a week. 30 g 3     cevimeline (EVOXAC) 30 MG capsule Take 30 mg by mouth Reported on 3/6/2017       alendronate (FOSAMAX) 70 MG tablet Take 1 tablet (70 mg) by mouth every 7 days Take with over 8 ounces water and stay upright for at least 30 minutes after dose.  Take at least 60 minutes before breakfast 12 tablet 3     scopolamine (TRANSDERM) 72 hr patch Place 1 patch onto the skin every 72 hours 4 patch 0     levothyroxine (SYNTHROID/LEVOTHROID) 88 MCG tablet Take 1 tablet (88 mcg) by mouth daily 102 tablet 3     atorvastatin (LIPITOR) 20 MG tablet Take 1 tablet (20 mg) by mouth daily 102 tablet 3     Multiple Vitamins-Minerals (CENTRUM SILVER) per tablet Take 1 tablet by mouth daily       Misc Natural Products (OSTEO BI-FLEX ADV TRIPLE ST PO) Take 1 tablet by mouth daily       Misc Natural Products (LUTEIN 20 PO) Take 1 tablet by mouth daily       Omega-3 Fatty Acids (OMEGA-3 FISH OIL PO) Take 1 g by mouth daily       aspirin 81 MG EC tablet Take 1 tablet (81 mg) by mouth daily 1 tablet 0     nitroglycerin (NITROSTAT) 0.4 MG SL tablet Place 1 tablet (0.4 mg) under the tongue every 5 minutes as needed for chest pain if you are still having symptoms after 3 doses (15 minutes) call 911. 25 tablet 0     calcium-vitamin D (CALTRATE) 600-400 MG-UNIT per tablet Take 1 tablet by mouth daily       MIRALAX OR POWD STIR 1 CAPFUL (17GM) IN 8 OZ OF LIQUID AND DRINK (Patient taking differently: STIR 1 CAPFUL (17GM) IN 8 OZ OF LIQUID AND DRINK twice daily) 1 BOTTLE 1 YEAR     [DISCONTINUED] clopidogrel (PLAVIX) 75 MG tablet Take 1 tablet (75 mg) by mouth daily 90 tablet 0     [DISCONTINUED] cevimeline (EVOXAC) 30  MG capsule Take 30 mg by mouth 3 times daily Reported on 3/6/2017       [DISCONTINUED] ACAI PO Take 1 tablet by mouth daily Reported on 3/6/2017       [DISCONTINUED] ammonium lactate (LAC-HYDRIN) 12 % cream Apply topically 2 times daily as needed for dry skin (Patient not taking: Reported on 3/6/2017) 385 g 3     No facility-administered encounter medications on file as of 3/6/2017.       ASSESSMENT:  Ms. Stahl is asymptomatic on guideline-directed medical therapy.I feel the patient should stop the clopidogrel as I believe the bleeding risk exceeds the benefit  I have nothing to add to her current excellent medical therapy and am not significantly contributing to her care at this time.     RECOMMENDATIONS:   1.  Continue present medical therapy with the exception of clopidogrel.   2.  Mediterranean style diet.   3.  Exercise program.   4.  We will see the patient on a p.r.n. basis in the future.      We appreciate the opportunity to see your patient, Ya Stahl.     Sincerely,    Venancio Montanez MD     [facility]

## 2017-04-05 ENCOUNTER — RADIANT APPOINTMENT (OUTPATIENT)
Dept: GENERAL RADIOLOGY | Facility: CLINIC | Age: 77
End: 2017-04-05
Attending: PODIATRIST
Payer: COMMERCIAL

## 2017-04-05 ENCOUNTER — OFFICE VISIT (OUTPATIENT)
Dept: PODIATRY | Facility: CLINIC | Age: 77
End: 2017-04-05
Payer: COMMERCIAL

## 2017-04-05 VITALS
BODY MASS INDEX: 21.33 KG/M2 | DIASTOLIC BLOOD PRESSURE: 70 MMHG | HEIGHT: 65 IN | WEIGHT: 128 LBS | SYSTOLIC BLOOD PRESSURE: 128 MMHG

## 2017-04-05 DIAGNOSIS — M79.672 LEFT FOOT PAIN: ICD-10-CM

## 2017-04-05 DIAGNOSIS — M79.672 LEFT FOOT PAIN: Primary | ICD-10-CM

## 2017-04-05 DIAGNOSIS — S92.354A CLOSED NONDISPLACED FRACTURE OF FIFTH METATARSAL BONE OF RIGHT FOOT, INITIAL ENCOUNTER: ICD-10-CM

## 2017-04-05 LAB
BASOPHILS # BLD AUTO: 0 10E9/L (ref 0–0.2)
BASOPHILS NFR BLD AUTO: 0.3 %
CRP SERPL-MCNC: <2.9 MG/L (ref 0–8)
DIFFERENTIAL METHOD BLD: ABNORMAL
EOSINOPHIL # BLD AUTO: 0.3 10E9/L (ref 0–0.7)
EOSINOPHIL NFR BLD AUTO: 3.9 %
ERYTHROCYTE [DISTWIDTH] IN BLOOD BY AUTOMATED COUNT: 13.2 % (ref 10–15)
ERYTHROCYTE [SEDIMENTATION RATE] IN BLOOD BY WESTERGREN METHOD: 7 MM/H (ref 0–30)
HCT VFR BLD AUTO: 43.4 % (ref 35–47)
HGB BLD-MCNC: 13.7 G/DL (ref 11.7–15.7)
LYMPHOCYTES # BLD AUTO: 1.4 10E9/L (ref 0.8–5.3)
LYMPHOCYTES NFR BLD AUTO: 21.9 %
MCH RBC QN AUTO: 31.7 PG (ref 26.5–33)
MCHC RBC AUTO-ENTMCNC: 31.6 G/DL (ref 31.5–36.5)
MCV RBC AUTO: 101 FL (ref 78–100)
MONOCYTES # BLD AUTO: 0.7 10E9/L (ref 0–1.3)
MONOCYTES NFR BLD AUTO: 11.7 %
NEUTROPHILS # BLD AUTO: 3.9 10E9/L (ref 1.6–8.3)
NEUTROPHILS NFR BLD AUTO: 62.2 %
PLATELET # BLD AUTO: 231 10E9/L (ref 150–450)
RBC # BLD AUTO: 4.32 10E12/L (ref 3.8–5.2)
WBC # BLD AUTO: 6.3 10E9/L (ref 4–11)

## 2017-04-05 PROCEDURE — 85652 RBC SED RATE AUTOMATED: CPT | Performed by: PODIATRIST

## 2017-04-05 PROCEDURE — 73630 X-RAY EXAM OF FOOT: CPT | Mod: LT

## 2017-04-05 PROCEDURE — 36415 COLL VENOUS BLD VENIPUNCTURE: CPT | Performed by: PODIATRIST

## 2017-04-05 PROCEDURE — 85025 COMPLETE CBC W/AUTO DIFF WBC: CPT | Performed by: PODIATRIST

## 2017-04-05 PROCEDURE — 86140 C-REACTIVE PROTEIN: CPT | Performed by: PODIATRIST

## 2017-04-05 PROCEDURE — 99213 OFFICE O/P EST LOW 20 MIN: CPT | Performed by: PODIATRIST

## 2017-04-05 NOTE — NURSING NOTE
"Chief Complaint   Patient presents with     Foot Problems     left foot swelling x2.5months constant        Initial /70  Ht 5' 5.25\" (1.657 m)  Wt 128 lb (58.1 kg)  BMI 21.14 kg/m2 Estimated body mass index is 21.14 kg/(m^2) as calculated from the following:    Height as of this encounter: 5' 5.25\" (1.657 m).    Weight as of this encounter: 128 lb (58.1 kg).  Medication Reconciliation: complete   Jovi Peters MA      "

## 2017-04-05 NOTE — PATIENT INSTRUCTIONS
DR. DAVIS'S CLINIC SCHEDULE     [facility]  [address] [place] [address]  P: [phone]  F: [phone] [facility]  [address] [place] [address]  P: [phone]  F: [phone] [facility]  [address] [place] [address]  P: [phone]  F: [phone]   FRIDAY AM FRIDAY PM SURGERY   [facility]     [facility] [place]  [address]  [place] [address]  P: [phone] [facility]  [address]  [place] [address]  P: [phone]  F: [phone] Surgery [phone]   Appointment [phone] General After Hours:  [phone] Patient [phone]             Body Mass Index (BMI)  Many things can cause foot and ankle problems. Foot structure, activity level, foot mechanics and injuries are common causes of pain.    One very important issue that often goes unmentioned, is body weight.  Extra weight can cause increased stress on muscles, ligaments, bones and tendons.  Sometimes just a few extra pounds is all it takes to put one over her/his threshold.   Without reducing that stress, it can be difficult to alleviate pain.      Some people are uncomfortable addressing this issue, but we feel it is important for you to think about it.  As Foot &  Ankle specialists, our job is addressing the lower extremity problem and possible causes.     Regarding extra body weight, we encourage patients to discuss diet and weight management plans with their primary care doctors.  It is this team approach that gives you the best opportunity for pain relief and getting you back on your feet.        TOE & METATARSAL FRACTURES  The structure of the foot is complex, consisting of bones, muscles, tendons, and other soft tissues. Of the 26 bones in the foot, 19 are toe bones (phalanges) and metatarsal bones (the long bones in the midfoot). Fractures of  the toe and metatarsal bones are common and require evaluation by a specialist. A foot and ankle surgeon should be seen for proper diagnosis and treatment, even if initial treatment has been received in an emergency room.  A fracture is a break in the bone. Fractures can be divided into two categories: traumatic fractures and stress fractures.  TRAMATIC FRACTURES (also called acute fractures) are caused by a direct blow or impact, such as seriously stubbing your toe. Traumatic fractures can be displaced or non-displaced. If the fracture is displaced, the bone is broken in such a way that it has changed in position (dislocated).  Signs and symptoms of a traumatic fracture include:  You may hear a sound at the time of the break.    Pinpoint pain  (pain at the place of impact) at the time the fracture occurs and perhaps for a few hours later, but often the pain goes away after several hours.   Crooked or abnormal appearance of the toe.   Bruising and swelling the next day.   It is not true that  if you can walk on it, it s not broken.  Evaluation by a foot and ankle surgeon is always recommended.   STRESS FRACTURES are tiny, hairline breaks that are usually caused by repetitive stress. Stress fractures often afflict athletes who, for example, too rapidly increase their running mileage. They can also be caused by an abnormal foot structure, deformities, or osteoporosis. Improper footwear may also lead to stress fractures. Stress fractures should not be ignored. They require proper medical attention to heal correctly.  Symptoms of stress fractures include:  Pain with or after normal activity   Pain that goes away when resting and then returns when standing or during activity    Pinpoint pain  (pain at the site of the fracture) when touched   Swelling, but no bruising   IMPROPER TREATMENT  Some people say that  the doctor can t do anything for a broken bone in the foot.  This is usually not true. In fact, if a fractured  toe or metatarsal bone is not treated correctly, serious complications may develop. For example:  A deformity in the bony architecture which may limit the ability to move the foot or cause difficulty in fitting shoes   Arthritis, which may be caused by a fracture in a joint (the juncture where two bones meet), or may be a result of angular deformities that develop when a displaced fracture is severe or hasn t been properly corrected   Chronic pain and deformity   Non-union, or failure to heal, can lead to subsequent surgery or chronic pain.   PROPER TREATMENT FOR TOES  Fractures of the toe bones are almost always traumatic fractures. Treatment for traumatic fractures depends on the break itself and may include these options:  Rest. Sometimes rest is all that is needed to treat a traumatic fracture of the toe.   Splinting. The toe may be fitted with a splint to keep it in a fixed position.   Rigid or stiff-soled shoe. Wearing a stiff-soled shoe protects the toe and helps keep it properly positioned.    Akshat taping  the fractured toe to another toe is sometimes appropriate, but in other cases it may be harmful.   Surgery. If the break is badly displaced or if the joint is affected, surgery may be necessary. Surgery often involves the use of fixation devices, such as pins.   PROPER TREATMENT OF METATARSALS  Breaks in the metatarsal bones may be either stress or traumatic fractures. Certain kinds of fractures of the metatarsal bones present unique challenges.  For example, sometimes a fracture of the first metatarsal bone (behind the big toe) can lead to arthritis. Since the big toe is used so frequently and bears more weight than other toes, arthritis in that area can make it painful to walk, bend, or even stand.  Another type of break, called a Durán fracture, occurs at the base of the fifth metatarsal bone (behind the little toe). It is often misdiagnosed as an ankle sprain, and misdiagnosis can have serious  consequences since sprains and fractures require different treatments. Your foot and ankle surgeon is an expert in correctly identifying these conditions as well as other problems of the foot.  Treatment of metatarsal fractures depends on the type and extent of the fracture, and may include:  Rest. Sometimes rest is the only treatment needed to promote healing of a stress or traumatic fracture of a metatarsal bone.   Avoid the offending activity. Because stress fractures result from repetitive stress, it is important to avoid the activity that led to the fracture. Crutches or a wheelchair are sometimes required to offload weight from the foot to give it time to heal.   Immobilization, casting, or rigid shoe. A stiff-soled shoe or other form of immobilization may be used to protect the fractured bone while it is healing.   Surgery. Some traumatic fractures of the metatarsal bones require surgery, especially if the break is badly displaced.   Follow-up care. Your foot and ankle surgeon will provide instructions for care following surgical or non-surgical treatment. Physical therapy, exercises and rehabilitation may be included in a schedule for return to normal activities.

## 2017-04-05 NOTE — PROGRESS NOTES
"Podiatry / Foot and Ankle Surgery Progress Note    April 5, 2017    Subject: Patient was seen for pain and swelling to the left foot. She had surgery for spur removal 4 months ago. Was doing well until about a month and a half ago. Started to have pain and swelling to foot. Denies injury. Has increased walking since it has been nice out. Pain is all the time. 8/10. Hard to walk. Would like to know what is causing pain.     Objective:  Vitals: Ht 1.657 m (5' 5.25\")  Wt 58.1 kg (128 lb)  BMI 21.14 kg/m2  BMI= Body mass index is 21.14 kg/(m^2).     WBC   Date Value Ref Range Status   04/05/2017 6.3 4.0 - 11.0 10e9/L Final     ESR: 7    General:  Patient is alert and orientated.  NAD  Vascular:  DP and PT pulses are palpable.  No varicosities noted  CFT's < 3secs.  Skin temp is normal  Neuro:  Light and gross touch sensation intact to digits, dorsum, and plantar aspects of the feet.  Derm:  Skin is supple.  No rashes, lesions, or ulcerations noted.  Musculoskeletal:  Pain on palpation of left 5th metatarsal head.     Imaging:  Shows fracture of the left 5th metatarsal head. Soft tissue edema.     Assessment:    Left foot pain  Closed nondisplaced fracture of fifth metatarsal bone of right foot, initial encounter    Plan:  Reviewed xrays. Talked about fractures. Discussed that healing can take 6-10 weeks. Risk that the fracture will not heal and we may need to do surgery. Risk is increased 10-15% if you smoke.     Labs do not show any signs of infection.     Recommend she wear her boot for the next month. Will follow up at that time for reassessment and rexray. If still painful, may need further removal of bone. Will have her double her Vitamin D dose. She will also follow up with primary for possible dexascan for osteopenia.     Jody Gallagher DPM, Podiatry/Foot and Ankle Surgery          "

## 2017-04-05 NOTE — LETTER
"  4/5/2017       RE: Ya Stahl  [address]  [facility] [address]           Dear Colleague,    Thank you for referring your patient, Ya Stahl, to the [facility]. Please see a copy of my visit note below.    Podiatry / Foot and Ankle Surgery Progress Note    April 5, 2017    Subject: Patient was seen for pain and swelling to the left foot. She had surgery for spur removal 4 months ago. Was doing well until about a month and a half ago. Started to have pain and swelling to foot. Denies injury. Has increased walking since it has been nice out. Pain is all the time. 8/10. Hard to walk. Would like to know what is causing pain.     Objective:  Vitals: Ht 1.657 m (5' 5.25\")  Wt 58.1 kg (128 lb)  BMI 21.14 kg/m2  BMI= Body mass index is 21.14 kg/(m^2).     WBC   Date Value Ref Range Status   04/05/2017 6.3 4.0 - 11.0 10e9/L Final     ESR: 7    General:  Patient is alert and orientated.  NAD  Vascular:  DP and PT pulses are palpable.  No varicosities noted  CFT's < 3secs.  Skin temp is normal  Neuro:  Light and gross touch sensation intact to digits, dorsum, and plantar aspects of the feet.  Derm:  Skin is supple.  No rashes, lesions, or ulcerations noted.  Musculoskeletal:  Pain on palpation of left 5th metatarsal head.     Imaging:  Shows fracture of the left 5th metatarsal head. Soft tissue edema.     Assessment:    Left foot pain  Closed nondisplaced fracture of fifth metatarsal bone of right foot, initial encounter    Plan:  Reviewed xrays. Talked about fractures. Discussed that healing can take 6-10 weeks. Risk that the fracture will not heal and we may need to do surgery. Risk is increased 10-15% if you smoke.     Labs do not show any signs of infection.     Recommend she wear her boot for the next month. Will follow up at that time for reassessment and rexray. If still painful, may need further removal of bone. Will have her double her Vitamin D dose. She will " also follow up with primary for possible dexascan for osteopenia.     Jody Gallagher DPM, Podiatry/Foot and Ankle Surgery            Again, thank you for allowing me to participate in the care of your patient.        Sincerely,              Jody Gallagher DPM, Podiatry/Foot and Ankle Surgery

## 2017-04-05 NOTE — MR AVS SNAPSHOT
After Visit Summary   2017    Ya Stahl    MRN: [id number]           Patient Information     Date Of Birth          1940        Visit Information        Provider Department      2017 8:15 AM Jody Gallagher DPM, Podiatry/Foot and Ankle Surgery [facility]        Today's Diagnoses     Left foot pain    -  1      Care Instructions    DR. GALLAGHER'S CLINIC SCHEDULE     [facility]  [address] [place] [address]  P: [phone]  F: [phone] [facility]  [address] [place] [address]  P: [phone]  F: [phone] [facility]  [address] [place] [address]  P: [phone]  F: [phone]   FRIDAY AM FRIDAY PM SURGERY   [facility]     [facility]  [address]  [place] [address]  P: [phone] [facility]  [address]  [place] [address]  P: [phone]  F: [phone] Surgery [phone]   Appointment [phone] General After Hours:  [phone] Patient [phone]             Body Mass Index (BMI)  Many things can cause foot and ankle problems. Foot structure, activity level, foot mechanics and injuries are common causes of pain.    One very important issue that often goes unmentioned, is body weight.  Extra weight can cause increased stress on muscles, ligaments, bones and tendons.  Sometimes just a few extra pounds is all it takes to put one over her/his threshold.   Without reducing that stress, it can be difficult to alleviate pain.      Some people are uncomfortable addressing this issue, but we feel it is important for you to think about it.  As Foot &  Ankle specialists, our job is addressing the lower extremity problem and possible causes.     Regarding extra body weight, we encourage patients to discuss diet and weight management plans with their primary care  doctors.  It is this team approach that gives you the best opportunity for pain relief and getting you back on your feet.        TOE & METATARSAL FRACTURES  The structure of the foot is complex, consisting of bones, muscles, tendons, and other soft tissues. Of the 26 bones in the foot, 19 are toe bones (phalanges) and metatarsal bones (the long bones in the midfoot). Fractures of the toe and metatarsal bones are common and require evaluation by a specialist. A foot and ankle surgeon should be seen for proper diagnosis and treatment, even if initial treatment has been received in an emergency room.  A fracture is a break in the bone. Fractures can be divided into two categories: traumatic fractures and stress fractures.  TRAMATIC FRACTURES (also called acute fractures) are caused by a direct blow or impact, such as seriously stubbing your toe. Traumatic fractures can be displaced or non-displaced. If the fracture is displaced, the bone is broken in such a way that it has changed in position (dislocated).  Signs and symptoms of a traumatic fracture include:  You may hear a sound at the time of the break.    Pinpoint pain  (pain at the place of impact) at the time the fracture occurs and perhaps for a few hours later, but often the pain goes away after several hours.   Crooked or abnormal appearance of the toe.   Bruising and swelling the next day.   It is not true that  if you can walk on it, it s not broken.  Evaluation by a foot and ankle surgeon is always recommended.   STRESS FRACTURES are tiny, hairline breaks that are usually caused by repetitive stress. Stress fractures often afflict athletes who, for example, too rapidly increase their running mileage. They can also be caused by an abnormal foot structure, deformities, or osteoporosis. Improper footwear may also lead to stress fractures. Stress fractures should not be ignored. They require proper medical attention to heal correctly.  Symptoms of stress  fractures include:  Pain with or after normal activity   Pain that goes away when resting and then returns when standing or during activity    Pinpoint pain  (pain at the site of the fracture) when touched   Swelling, but no bruising   IMPROPER TREATMENT  Some people say that  the doctor can t do anything for a broken bone in the foot.  This is usually not true. In fact, if a fractured toe or metatarsal bone is not treated correctly, serious complications may develop. For example:  A deformity in the bony architecture which may limit the ability to move the foot or cause difficulty in fitting shoes   Arthritis, which may be caused by a fracture in a joint (the juncture where two bones meet), or may be a result of angular deformities that develop when a displaced fracture is severe or hasn t been properly corrected   Chronic pain and deformity   Non-union, or failure to heal, can lead to subsequent surgery or chronic pain.   PROPER TREATMENT FOR TOES  Fractures of the toe bones are almost always traumatic fractures. Treatment for traumatic fractures depends on the break itself and may include these options:  Rest. Sometimes rest is all that is needed to treat a traumatic fracture of the toe.   Splinting. The toe may be fitted with a splint to keep it in a fixed position.   Rigid or stiff-soled shoe. Wearing a stiff-soled shoe protects the toe and helps keep it properly positioned.    Akshat taping  the fractured toe to another toe is sometimes appropriate, but in other cases it may be harmful.   Surgery. If the break is badly displaced or if the joint is affected, surgery may be necessary. Surgery often involves the use of fixation devices, such as pins.   PROPER TREATMENT OF METATARSALS  Breaks in the metatarsal bones may be either stress or traumatic fractures. Certain kinds of fractures of the metatarsal bones present unique challenges.  For example, sometimes a fracture of the first metatarsal bone (behind the big  toe) can lead to arthritis. Since the big toe is used so frequently and bears more weight than other toes, arthritis in that area can make it painful to walk, bend, or even stand.  Another type of break, called a Durán fracture, occurs at the base of the fifth metatarsal bone (behind the little toe). It is often misdiagnosed as an ankle sprain, and misdiagnosis can have serious consequences since sprains and fractures require different treatments. Your foot and ankle surgeon is an expert in correctly identifying these conditions as well as other problems of the foot.  Treatment of metatarsal fractures depends on the type and extent of the fracture, and may include:  Rest. Sometimes rest is the only treatment needed to promote healing of a stress or traumatic fracture of a metatarsal bone.   Avoid the offending activity. Because stress fractures result from repetitive stress, it is important to avoid the activity that led to the fracture. Crutches or a wheelchair are sometimes required to offload weight from the foot to give it time to heal.   Immobilization, casting, or rigid shoe. A stiff-soled shoe or other form of immobilization may be used to protect the fractured bone while it is healing.   Surgery. Some traumatic fractures of the metatarsal bones require surgery, especially if the break is badly displaced.   Follow-up care. Your foot and ankle surgeon will provide instructions for care following surgical or non-surgical treatment. Physical therapy, exercises and rehabilitation may be included in a schedule for return to normal activities.           Follow-ups after your visit        Your next 10 appointments already scheduled     Apr 06, 2017 12:45 PM CDT   Return Visit with Vandana Kasper MD   [facility] ([facility])    [facility]  [address]  [facility] [address]   [phone]            Apr 25, 2017  2:00 PM CDT  "  Office Visit with Alice Whitfield, [facility] ([facility])    [address] [facility] [address] [phone]           Bring a current list of meds and any records pertaining to this visit.  For Physicals, please bring immunization records and any forms needing to be filled out.  Please arrive 10 minutes early to complete paperwork.              Who to contact     If you have questions or need follow up information about today's clinic visit or your schedule please contact [facility] directly at [phone].  Normal or non-critical lab and imaging results will be communicated to you by [employer], letter or phone within 4 business days after the clinic has received the results. If you do not hear from us within 7 days, please contact the clinic through [employer] or phone. If you have a critical or abnormal lab result, we will notify you by phone as soon as possible.  Submit refill requests through [employer] or call your pharmacy and they will forward the refill request to us. Please allow 3 business days for your refill to be completed.          Additional Information About Your Visit        [employer] Information     [employer] lets you send messages to your doctor, view your test results, renew your prescriptions, schedule appointments and more. To sign up, go to [email or web address] . Click on \"Log in\" on the left side of the screen, which will take you to the Welcome page. Then click on \"Sign up Now\" on the right side of the page.     You will be asked to enter the access code listed below, as well as some personal information. Please follow the directions to create your username and password.     Your access code is: [email or web address]  Expires: 2017 10:54 AM     Your access code will  in 90 days. If you need help or a new code, please call your [facility] or [phone].        Care EveryWhere ID     This is your Care EveryWhere " "ID. This could be used by other organizations to access your [facility] medical records  [id number]        Your Vitals Were     Height BMI (Body Mass Index)                5' 5.25\" (1.657 m) 21.14 kg/m2           Blood Pressure from Last 3 Encounters:   04/05/17 128/70   03/06/17 138/82   01/17/17 145/79    Weight from Last 3 Encounters:   04/05/17 128 lb (58.1 kg)   03/06/17 128 lb (58.1 kg)   01/12/17 123 lb 8 oz (56 kg)                 Today's Medication Changes          These changes are accurate as of: 4/5/17  8:28 AM.  If you have any questions, ask your nurse or doctor.               These medicines have changed or have updated prescriptions.        Dose/Directions    MIRALAX powder   This may have changed:  See the new instructions.   Used for:  Chronic constipation   Generic drug:  polyethylene glycol        STIR 1 CAPFUL (17GM) IN 8 OZ OF LIQUID AND DRINK   Quantity:  1 BOTTLE   Refills:  1 YEAR                Primary Care Provider Office Phone # Fax #    Scarlett Spencer [phone]       [facility] [address] [facility] [address]        Thank you!     Thank you for choosing [facility]  for your care. Our goal is always to provide you with excellent care. Hearing back from our patients is one way we can continue to improve our services. Please take a few minutes to complete the written survey that you may receive in the mail after your visit with us. Thank you!             Your Updated Medication List - Protect others around you: Learn how to safely use, store and throw away your medicines at www.disposemymeds.org.          This list is accurate as of: 4/5/17  8:28 AM.  Always use your most recent med list.                   Brand Name Dispense Instructions for use    alendronate 70 MG tablet    FOSAMAX    12 tablet    Take 1 tablet (70 mg) by mouth every 7 days Take with over 8 ounces water and stay upright for at least 30 minutes after dose.  Take at " least 60 minutes before breakfast       aspirin 81 MG EC tablet     1 tablet    Take 1 tablet (81 mg) by mouth daily       atorvastatin 20 MG tablet    LIPITOR    102 tablet    Take 1 tablet (20 mg) by mouth daily       calcium-vitamin D 600-400 MG-UNIT per tablet    CALTRATE     Take 1 tablet by mouth daily       CENTRUM SILVER per tablet      Take 1 tablet by mouth daily       cevimeline 30 MG capsule    EVOXAC     Take 30 mg by mouth Reported on 3/6/2017       conjugated estrogens cream    PREMARIN    30 g    Place 1 g vaginally twice a week prn       levothyroxine 88 MCG tablet    SYNTHROID/LEVOTHROID    102 tablet    Take 1 tablet (88 mcg) by mouth daily       MIRALAX powder   Generic drug:  polyethylene glycol     1 BOTTLE    STIR 1 CAPFUL (17GM) IN 8 OZ OF LIQUID AND DRINK       nitroglycerin 0.4 MG sublingual tablet    NITROSTAT    25 tablet    Place 1 tablet (0.4 mg) under the tongue every 5 minutes as needed for chest pain if you are still having symptoms after 3 doses (15 minutes) call 911.       OMEGA-3 FISH OIL PO      Take 1 g by mouth daily       * OSTEO BI-FLEX ADV TRIPLE ST PO      Take 1 tablet by mouth daily       * LUTEIN 20 PO      Take 1 tablet by mouth daily       scopolamine 72 hr patch    TRANSDERM    4 patch    Place 1 patch onto the skin every 72 hours       * Notice:  This list has 2 medication(s) that are the same as other medications prescribed for you. Read the directions carefully, and ask your doctor or other care provider to review them with you.

## 2017-04-10 ENCOUNTER — TELEPHONE (OUTPATIENT)
Dept: FAMILY MEDICINE | Facility: CLINIC | Age: 77
End: 2017-04-10

## 2017-04-10 DIAGNOSIS — D47.2 MGUS (MONOCLONAL GAMMOPATHY OF UNKNOWN SIGNIFICANCE): Primary | ICD-10-CM

## 2017-04-10 NOTE — TELEPHONE ENCOUNTER
Patient is calling to ask for labs to be ordered ahead of her next visit with Dr. Kasper 4/27.  She says these labs need to be done:    CBC with differential and platelets  CMP  Kappa and lambda light chain  Protein electrophoresis    You have ordered these in the past for her. Please sign if ok.  Please have nurse let her know when the orders are ok so she can schedule a lab only visit.     Yolie Canales RN  Triage Nurse

## 2017-04-10 NOTE — TELEPHONE ENCOUNTER
Can you see about getting the orders from the doctor that needs them. Then the results go directly to her.     Actually; the orders are all there already!

## 2017-04-11 NOTE — TELEPHONE ENCOUNTER
Future orders are in place.   Called patient to notify her this morning, then  The results will go to Dr. Kasper. Transferred patient to scheduling to set up lab visit.  Yolie Canales RN  Triage Nurse

## 2017-04-13 ENCOUNTER — HOSPITAL ENCOUNTER (OUTPATIENT)
Dept: LAB | Facility: CLINIC | Age: 77
Discharge: HOME | End: 2017-04-13
Attending: ORTHOPAEDIC SURGERY | Admitting: PHYSICIAN ASSISTANT
Payer: MEDICARE

## 2017-04-13 DIAGNOSIS — M79.673 FOOT PAIN: Primary | ICD-10-CM

## 2017-04-13 LAB
BASOPHILS # BLD AUTO: 0 10E9/L (ref 0–0.2)
BASOPHILS NFR BLD AUTO: 0.4 %
CRP SERPL-MCNC: <2.9 MG/L (ref 0–8)
DIFFERENTIAL METHOD BLD: NORMAL
EOSINOPHIL # BLD AUTO: 0.3 10E9/L (ref 0–0.7)
EOSINOPHIL NFR BLD AUTO: 3.9 %
ERYTHROCYTE [DISTWIDTH] IN BLOOD BY AUTOMATED COUNT: 13 % (ref 10–15)
ERYTHROCYTE [SEDIMENTATION RATE] IN BLOOD BY WESTERGREN METHOD: 6 MM/H (ref 0–30)
HCT VFR BLD AUTO: 42.5 % (ref 35–47)
HGB BLD-MCNC: 13.7 G/DL (ref 11.7–15.7)
IMM GRANULOCYTES # BLD: 0 10E9/L (ref 0–0.4)
IMM GRANULOCYTES NFR BLD: 0.3 %
LYMPHOCYTES # BLD AUTO: 2 10E9/L (ref 0.8–5.3)
LYMPHOCYTES NFR BLD AUTO: 29.5 %
MCH RBC QN AUTO: 31.8 PG (ref 26.5–33)
MCHC RBC AUTO-ENTMCNC: 32.2 G/DL (ref 31.5–36.5)
MCV RBC AUTO: 99 FL (ref 78–100)
MONOCYTES # BLD AUTO: 0.9 10E9/L (ref 0–1.3)
MONOCYTES NFR BLD AUTO: 12.7 %
NEUTROPHILS # BLD AUTO: 3.7 10E9/L (ref 1.6–8.3)
NEUTROPHILS NFR BLD AUTO: 53.2 %
NRBC # BLD AUTO: 0 10*3/UL
NRBC BLD AUTO-RTO: 0 /100
PLATELET # BLD AUTO: 231 10E9/L (ref 150–450)
RBC # BLD AUTO: 4.31 10E12/L (ref 3.8–5.2)
WBC # BLD AUTO: 6.9 10E9/L (ref 4–11)

## 2017-04-13 PROCEDURE — 36415 COLL VENOUS BLD VENIPUNCTURE: CPT | Performed by: PHYSICIAN ASSISTANT

## 2017-04-13 PROCEDURE — 85652 RBC SED RATE AUTOMATED: CPT | Performed by: PHYSICIAN ASSISTANT

## 2017-04-13 PROCEDURE — 86140 C-REACTIVE PROTEIN: CPT | Performed by: PHYSICIAN ASSISTANT

## 2017-04-13 PROCEDURE — 85025 COMPLETE CBC W/AUTO DIFF WBC: CPT | Performed by: PHYSICIAN ASSISTANT

## 2017-04-17 DIAGNOSIS — D75.89 MACROCYTOSIS: ICD-10-CM

## 2017-04-17 DIAGNOSIS — D47.2 MONOCLONAL PARAPROTEINEMIA: ICD-10-CM

## 2017-04-17 LAB
ALBUMIN SERPL-MCNC: 3.9 G/DL (ref 3.4–5)
ALP SERPL-CCNC: 60 U/L (ref 40–150)
ALT SERPL W P-5'-P-CCNC: 26 U/L (ref 0–50)
ANION GAP SERPL CALCULATED.3IONS-SCNC: 8 MMOL/L (ref 3–14)
AST SERPL W P-5'-P-CCNC: 21 U/L (ref 0–45)
BASOPHILS # BLD AUTO: 0 10E9/L (ref 0–0.2)
BASOPHILS NFR BLD AUTO: 0.4 %
BILIRUB SERPL-MCNC: 0.9 MG/DL (ref 0.2–1.3)
BUN SERPL-MCNC: 20 MG/DL (ref 7–30)
CALCIUM SERPL-MCNC: 9.3 MG/DL (ref 8.5–10.1)
CHLORIDE SERPL-SCNC: 106 MMOL/L (ref 94–109)
CO2 SERPL-SCNC: 28 MMOL/L (ref 20–32)
CREAT SERPL-MCNC: 0.73 MG/DL (ref 0.52–1.04)
DIFFERENTIAL METHOD BLD: NORMAL
EOSINOPHIL # BLD AUTO: 0.3 10E9/L (ref 0–0.7)
EOSINOPHIL NFR BLD AUTO: 5 %
ERYTHROCYTE [DISTWIDTH] IN BLOOD BY AUTOMATED COUNT: 13 % (ref 10–15)
GFR SERPL CREATININE-BSD FRML MDRD: 77 ML/MIN/1.7M2
GLUCOSE SERPL-MCNC: 108 MG/DL (ref 70–99)
HCT VFR BLD AUTO: 42.4 % (ref 35–47)
HGB BLD-MCNC: 13.7 G/DL (ref 11.7–15.7)
KAPPA LC UR-MCNC: 1.69 MG/DL (ref 0.33–1.94)
KAPPA LC/LAMBDA SER: 1.46 {RATIO} (ref 0.26–1.65)
LAMBDA LC SERPL-MCNC: 1.16 MG/DL (ref 0.57–2.63)
LYMPHOCYTES # BLD AUTO: 1.5 10E9/L (ref 0.8–5.3)
LYMPHOCYTES NFR BLD AUTO: 27.7 %
MCH RBC QN AUTO: 32.1 PG (ref 26.5–33)
MCHC RBC AUTO-ENTMCNC: 32.3 G/DL (ref 31.5–36.5)
MCV RBC AUTO: 99 FL (ref 78–100)
MONOCYTES # BLD AUTO: 0.7 10E9/L (ref 0–1.3)
MONOCYTES NFR BLD AUTO: 11.9 %
NEUTROPHILS # BLD AUTO: 3 10E9/L (ref 1.6–8.3)
NEUTROPHILS NFR BLD AUTO: 55 %
PLATELET # BLD AUTO: 236 10E9/L (ref 150–450)
POTASSIUM SERPL-SCNC: 4.6 MMOL/L (ref 3.4–5.3)
PROT SERPL-MCNC: 7.4 G/DL (ref 6.8–8.8)
RBC # BLD AUTO: 4.27 10E12/L (ref 3.8–5.2)
SODIUM SERPL-SCNC: 142 MMOL/L (ref 133–144)
WBC # BLD AUTO: 5.5 10E9/L (ref 4–11)

## 2017-04-17 PROCEDURE — 36415 COLL VENOUS BLD VENIPUNCTURE: CPT | Performed by: INTERNAL MEDICINE

## 2017-04-17 PROCEDURE — 85025 COMPLETE CBC W/AUTO DIFF WBC: CPT | Performed by: INTERNAL MEDICINE

## 2017-04-17 PROCEDURE — 83883 ASSAY NEPHELOMETRY NOT SPEC: CPT

## 2017-04-17 PROCEDURE — 84165 PROTEIN E-PHORESIS SERUM: CPT | Performed by: INTERNAL MEDICINE

## 2017-04-17 PROCEDURE — 80053 COMPREHEN METABOLIC PANEL: CPT | Performed by: INTERNAL MEDICINE

## 2017-04-17 PROCEDURE — 00000402 ZZHCL STATISTIC TOTAL PROTEIN: Performed by: INTERNAL MEDICINE

## 2017-04-18 LAB
ALBUMIN SERPL ELPH-MCNC: 4.3 G/DL (ref 3.7–5.1)
ALPHA1 GLOB SERPL ELPH-MCNC: 0.3 G/DL (ref 0.2–0.4)
ALPHA2 GLOB SERPL ELPH-MCNC: 0.8 G/DL (ref 0.5–0.9)
B-GLOBULIN SERPL ELPH-MCNC: 0.8 G/DL (ref 0.6–1)
GAMMA GLOB SERPL ELPH-MCNC: 1 G/DL (ref 0.7–1.6)
M PROTEIN SERPL ELPH-MCNC: 0.1 G/DL
PROT PATTERN SERPL ELPH-IMP: ABNORMAL

## 2017-04-25 ENCOUNTER — OFFICE VISIT (OUTPATIENT)
Dept: PHARMACY | Facility: CLINIC | Age: 77
End: 2017-04-25
Payer: COMMERCIAL

## 2017-04-25 ENCOUNTER — OFFICE VISIT (OUTPATIENT)
Dept: FAMILY MEDICINE | Facility: CLINIC | Age: 77
End: 2017-04-25
Payer: COMMERCIAL

## 2017-04-25 VITALS
SYSTOLIC BLOOD PRESSURE: 149 MMHG | HEIGHT: 65 IN | WEIGHT: 129 LBS | HEART RATE: 49 BPM | DIASTOLIC BLOOD PRESSURE: 80 MMHG | BODY MASS INDEX: 21.49 KG/M2

## 2017-04-25 VITALS
HEART RATE: 49 BPM | DIASTOLIC BLOOD PRESSURE: 80 MMHG | WEIGHT: 129.7 LBS | BODY MASS INDEX: 21.42 KG/M2 | SYSTOLIC BLOOD PRESSURE: 149 MMHG

## 2017-04-25 DIAGNOSIS — I25.10 ASCVD (ARTERIOSCLEROTIC CARDIOVASCULAR DISEASE): ICD-10-CM

## 2017-04-25 DIAGNOSIS — M79.672 LEFT FOOT PAIN: ICD-10-CM

## 2017-04-25 DIAGNOSIS — E78.5 HYPERLIPIDEMIA LDL GOAL <70: ICD-10-CM

## 2017-04-25 DIAGNOSIS — I10 ESSENTIAL HYPERTENSION: ICD-10-CM

## 2017-04-25 DIAGNOSIS — K59.00 CONSTIPATION, UNSPECIFIED CONSTIPATION TYPE: ICD-10-CM

## 2017-04-25 DIAGNOSIS — N89.8 VAGINAL DRYNESS: ICD-10-CM

## 2017-04-25 DIAGNOSIS — H04.123 DRY EYES: ICD-10-CM

## 2017-04-25 DIAGNOSIS — Z01.818 PREOP GENERAL PHYSICAL EXAM: Primary | ICD-10-CM

## 2017-04-25 DIAGNOSIS — E63.9 NUTRITIONAL DEFICIENCY: ICD-10-CM

## 2017-04-25 DIAGNOSIS — K21.9 GASTROESOPHAGEAL REFLUX DISEASE WITHOUT ESOPHAGITIS: ICD-10-CM

## 2017-04-25 DIAGNOSIS — M19.90 ARTHRITIS PAIN: ICD-10-CM

## 2017-04-25 DIAGNOSIS — M81.0 OSTEOPOROSIS: Primary | ICD-10-CM

## 2017-04-25 DIAGNOSIS — M35.00 SJOGREN'S DISEASE (H): ICD-10-CM

## 2017-04-25 DIAGNOSIS — E03.9 HYPOTHYROIDISM, UNSPECIFIED TYPE: ICD-10-CM

## 2017-04-25 PROCEDURE — 99607 MTMS BY PHARM ADDL 15 MIN: CPT | Performed by: PHARMACIST

## 2017-04-25 PROCEDURE — 99214 OFFICE O/P EST MOD 30 MIN: CPT | Performed by: FAMILY MEDICINE

## 2017-04-25 PROCEDURE — 99605 MTMS BY PHARM NP 15 MIN: CPT | Performed by: PHARMACIST

## 2017-04-25 ASSESSMENT — ANXIETY QUESTIONNAIRES
2. NOT BEING ABLE TO STOP OR CONTROL WORRYING: NOT AT ALL
1. FEELING NERVOUS, ANXIOUS, OR ON EDGE: NOT AT ALL
6. BECOMING EASILY ANNOYED OR IRRITABLE: NOT AT ALL
IF YOU CHECKED OFF ANY PROBLEMS ON THIS QUESTIONNAIRE, HOW DIFFICULT HAVE THESE PROBLEMS MADE IT FOR YOU TO DO YOUR WORK, TAKE CARE OF THINGS AT HOME, OR GET ALONG WITH OTHER PEOPLE: NOT DIFFICULT AT ALL
7. FEELING AFRAID AS IF SOMETHING AWFUL MIGHT HAPPEN: NOT AT ALL
5. BEING SO RESTLESS THAT IT IS HARD TO SIT STILL: NOT AT ALL
GAD7 TOTAL SCORE: 0
3. WORRYING TOO MUCH ABOUT DIFFERENT THINGS: NOT AT ALL

## 2017-04-25 ASSESSMENT — PATIENT HEALTH QUESTIONNAIRE - PHQ9: 5. POOR APPETITE OR OVEREATING: NOT AT ALL

## 2017-04-25 NOTE — PATIENT INSTRUCTIONS
Before Your Surgery      Call your surgeon if there is any change in your health. This includes signs of a cold or flu (such as a sore throat, runny nose, cough, rash or fever).    Do not smoke, drink alcohol or take over the counter medicine (unless your surgeon or primary care doctor tells you to) for the 24 hours before and after surgery.    If you take prescribed drugs: Follow your doctor s orders about which medicines to take and which to stop until after surgery.    Eating and drinking prior to surgery: follow the instructions from your surgeon    Take a shower or bath the night before surgery. Use the soap your surgeon gave you to gently clean your skin. If you do not have soap from your surgeon, use your regular soap. Do not shave or scrub the surgery site.  Wear clean pajamas and have clean sheets on your bed.       --------------------------------------------------------------    Stop the asprin and fish oil until after surgery.

## 2017-04-25 NOTE — MR AVS SNAPSHOT
After Visit Summary   4/25/2017    Ya Stahl    MRN: [id number]           Patient Information     Date Of Birth          1940        Visit Information        Provider Department      4/25/2017 3:10 PM Scarlett Spencer MD [facility]        Today's Diagnoses     Preop general physical exam    -  1    Left foot pain          Care Instructions      Before Your Surgery      Call your surgeon if there is any change in your health. This includes signs of a cold or flu (such as a sore throat, runny nose, cough, rash or fever).    Do not smoke, drink alcohol or take over the counter medicine (unless your surgeon or primary care doctor tells you to) for the 24 hours before and after surgery.    If you take prescribed drugs: Follow your doctor s orders about which medicines to take and which to stop until after surgery.    Eating and drinking prior to surgery: follow the instructions from your surgeon    Take a shower or bath the night before surgery. Use the soap your surgeon gave you to gently clean your skin. If you do not have soap from your surgeon, use your regular soap. Do not shave or scrub the surgery site.  Wear clean pajamas and have clean sheets on your bed.       --------------------------------------------------------------    Stop the asprin and fish oil until after surgery.            Follow-ups after your visit        Your next 10 appointments already scheduled     Apr 27, 2017  3:45 PM CDT   Return Visit with Vandana Kasper MD   [facility] ([facility])    [facility]  [address]  [place] [address]   [phone]            Apr 28, 2017   Procedure with Fabián Phipps MD   [facility] (--)    [address]  [facility] [address]   [phone]            May 03, 2017  1:00 PM CDT   Return Visit with Jody Gallagher DPM, Podiatry/Foot and Ankle  "Surgery   [facility] ([facility])    [address] [facility] [address]   [phone]              Who to contact     If you have questions or need follow up information about today's clinic visit or your schedule please contact [facility] directly at [phone].  Normal or non-critical lab and imaging results will be communicated to you by MyChart, letter or phone within 4 business days after the clinic has received the results. If you do not hear from us within 7 days, please contact the clinic through MyChart or phone. If you have a critical or abnormal lab result, we will notify you by phone as soon as possible.  Submit refill requests through [employer] or call your pharmacy and they will forward the refill request to us. Please allow 3 business days for your refill to be completed.          Additional Information About Your Visit        [employer] Information     [employer] lets you send messages to your doctor, view your test results, renew your prescriptions, schedule appointments and more. To sign up, go to [email or web address] . Click on \"Log in\" on the left side of the screen, which will take you to the Welcome page. Then click on \"Sign up Now\" on the right side of the page.     You will be asked to enter the access code listed below, as well as some personal information. Please follow the directions to create your username and password.     Your access code is: [email or web address]  Expires: 2017 10:54 AM     Your access code will  in 90 days. If you need help or a new code, please call your [facility] or [phone].        Care EveryWhere ID     This is your Care EveryWhere ID. This could be used by other organizations to access your [facility] medical records  [id number]        Your Vitals Were     Pulse Height BMI (Body Mass Index)             49 5' 5.25\" (1.657 m) 21.3 kg/m2          Blood Pressure from Last 3 Encounters:   17 149/80 "   04/25/17 149/80   04/05/17 128/70    Weight from Last 3 Encounters:   04/25/17 129 lb (58.5 kg)   04/25/17 129 lb 11.2 oz (58.8 kg)   04/05/17 128 lb (58.1 kg)              Today, you had the following     No orders found for display         Today's Medication Changes          These changes are accurate as of: 4/25/17  4:03 PM.  If you have any questions, ask your nurse or doctor.               Start taking these medicines.        Dose/Directions    calcium citrate-vitamin D 315-250 MG-UNIT Tabs per tablet   Commonly known as:  CITRACAL   Used for:  Osteoporosis   Started by:  Alice Whitfield RPH        Take 2 tablets in the morning and 1 tablet in the evening   Quantity:  120 tablet   Refills:  0         These medicines have changed or have updated prescriptions.        Dose/Directions    MIRALAX powder   This may have changed:  See the new instructions.   Used for:  Chronic constipation   Generic drug:  polyethylene glycol        STIR 1 CAPFUL (17GM) IN 8 OZ OF LIQUID AND DRINK   Quantity:  1 BOTTLE   Refills:  1 YEAR            Where to get your medicines      Some of these will need a paper prescription and others can be bought over the counter.  Ask your nurse if you have questions.     You don't need a prescription for these medications     calcium citrate-vitamin D 315-250 MG-UNIT Tabs per tablet                Primary Care Provider Office Phone # Fax #    Scarlett Spencer [phone]       [facility] [address] [facility] [address]        Thank you!     Thank you for choosing [facility]  for your care. Our goal is always to provide you with excellent care. Hearing back from our patients is one way we can continue to improve our services. Please take a few minutes to complete the written survey that you may receive in the mail after your visit with us. Thank you!             Your Updated Medication List - Protect others around you: Learn how to  safely use, store and throw away your medicines at www.disposemymeds.org.          This list is accurate as of: 4/25/17  4:03 PM.  Always use your most recent med list.                   Brand Name Dispense Instructions for use    ACAI PO      Take 25 mg by mouth daily       alendronate 70 MG tablet    FOSAMAX    12 tablet    Take 1 tablet (70 mg) by mouth every 7 days Take with over 8 ounces water and stay upright for at least 30 minutes after dose.  Take at least 60 minutes before breakfast       aspirin 81 MG EC tablet     1 tablet    Take 1 tablet (81 mg) by mouth daily       atorvastatin 20 MG tablet    LIPITOR    102 tablet    Take 1 tablet (20 mg) by mouth daily       calcium citrate-vitamin D 315-250 MG-UNIT Tabs per tablet    CITRACAL    120 tablet    Take 2 tablets in the morning and 1 tablet in the evening       CENTRUM SILVER per tablet      Take 1 tablet by mouth daily       cevimeline 30 MG capsule    EVOXAC     Take 30 mg by mouth 3 times daily Reported on 3/6/2017       conjugated estrogens cream    PREMARIN    30 g    Place 1 g vaginally twice a week prn       levothyroxine 88 MCG tablet    SYNTHROID/LEVOTHROID    102 tablet    Take 1 tablet (88 mcg) by mouth daily       LUBRICATING EYE DROPS OP      Apply 1 drop to eye 2 times daily       MIRALAX powder   Generic drug:  polyethylene glycol     1 BOTTLE    STIR 1 CAPFUL (17GM) IN 8 OZ OF LIQUID AND DRINK       nitroglycerin 0.4 MG sublingual tablet    NITROSTAT    25 tablet    Place 1 tablet (0.4 mg) under the tongue every 5 minutes as needed for chest pain if you are still having symptoms after 3 doses (15 minutes) call 911.       OMEGA-3 FISH OIL PO      Take 1 g by mouth 2 times daily (with meals)       OMEPRAZOLE PO      Take 20 mg by mouth 2 times daily (before meals)       * OSTEO BI-FLEX ADV TRIPLE ST PO      Take 2 tablets by mouth daily       * LUTEIN 20 PO      Take 1 tablet by mouth daily       * Notice:  This list has 2 medication(s)  that are the same as other medications prescribed for you. Read the directions carefully, and ask your doctor or other care provider to review them with you.

## 2017-04-25 NOTE — PROGRESS NOTES
SUBJECTIVE/OBJECTIVE:                                                    Ya Stahl is a 76 year old female coming in for an initial visit for Medication Therapy Management.  She was referred to me from Dr. Spencer.     Chief Complaint: Forgetting to take alendronate for 2 weeks .    Allergies/ADRs: Reviewed in Epic  Tobacco: No tobacco use   Alcohol: not currently using  Caffeine: no caffeine, capaccino  Activity: Walk 3 miles a day, treadmill, [employer] once a week; 15,000 steps per day  PMH: Reviewed in Epic    Medication Adherence: issues found and discussed below and takes meds 2 times per day    Hypertension: Current medications include none. She was taken off both lisinopril and amlodipine in the past.    ASCVD: Current medications are aspirin 81mg once and nitroglycerin as needed, atorvastatin.  She is not noticing any new bruising or bleeding.  She is not reporting chest pain.  She reports not being on beta-blocker due to low heart rate.  Saw Dr. Montanez from cardiology 3/6/17 who took her off clopidogrel due to bleeding/bruising.    GERD: Current medications include: omeprazole 20mg BID. Pt c/o acid burn on throat and hoarse and raspy by the end of the day.  Patient feels that current regimen is somewhat effective. Her ENT specialist said it may take 9 months for esophageal acid erosion to improve.  She has f/u appt with ENT.    Hyperlipidemia: Current therapy includes atorvastatin 20mg once daily.  Pt reports no significant myalgias or other side effects.     Hypothyroidism: Patient is taking levothyroxine 88 mcg daily. Patient is having the following symptoms: none. 7 lbs weight in last 2 months and not sure why. Second week in February this started.  No constipation that is different, she is on medication for this.  She did start working out a few months ago with weights.  TSH   Date Value Ref Range Status   11/29/2016 1.48 0.40 - 4.00 mU/L Final   ]  T4 Free   Date Value Ref Range Status    11/29/2010 1.23 0.70 - 1.85 ng/dL Final   ]    Osteoporosis: Current therapy includes: calcium carbonate 600/Vitamin D 800 1 tablet per day and multivitamin (has vitamin D 1,000 IU) and alendronate 70mg once weekly. She is forgetting to take this.  Been on since August of 2014. Pt is not experiencing side effects.  Pt is getting the following sources of dietary calcium: 1/2 cup of milk, every other day yogurt, some cheese or green leafy vegetables.  Nails break, she would like to be on more calcium.  Last vitamin D level:   Component      Latest Ref Rng 11/29/2010 12/5/2013   25 OH Vit D2       <5    25 OH Vit D3       56    25 OH Vit D total      30 - 75 ug/L <61 . . .    Vitamin D Deficiency screening      30 - 75 ug/L  48     DEXA History: 7/21/2014  Risk factors: recent fracture  chronic PPI use    Vitamins: Current medication are multivitamin, ACAI which is an antioxidant -ACAI extract 25mg QD, leutin 20mg once daily for eyes/macular degeneration.     Vaginal dryness: Premarin vaginal cream about once a week, helps    Constipation: Miralax twice daily.  She has BM every day. No side effects.    Sjogren's disease: Current medication is cevimeline 30mg 3 times a day and works well, if she does not take suffer.   She saw [facility] Dr. Mckeon 1/25/17 and she tried the Breo inhaler and fluticasone and not work but benzonate did work but not covered.     Arthritis Pain:  Once in awhile Aleve once in awhile, maybe once a week and Osteobioflex 1 per day for arthritis. Pain in hands and general arthritis pain. No side effects.    Dry eyes: Current medication is fish oil 1 tablet BID and eye drops-lubricating BID. No bruising.  Get burping with non enteric coated.    Current labs include:  BP Readings from Last 3 Encounters:   04/05/17 128/70   03/06/17 138/82   01/17/17 145/79     Today's Vitals: /80  Pulse (!) 49  Wt 129 lb 11.2 oz (58.8 kg)  BMI 21.42 kg/m2  Lab Results   Component Value Date    A1C 5.9  12/13/2016   .  Lab Results   Component Value Date    CHOL 157 12/13/2016     Lab Results   Component Value Date    TRIG 121 12/13/2016     Lab Results   Component Value Date    HDL 90 12/13/2016     Lab Results   Component Value Date    LDL 43 12/13/2016       Liver Function Studies -   Recent Labs   Lab Test  04/17/17   0735   PROTTOTAL  7.4   ALBUMIN  3.9   BILITOTAL  0.9   ALKPHOS  60   AST  21   ALT  26     Last Basic Metabolic Panel:  Lab Results   Component Value Date     04/17/2017      Lab Results   Component Value Date    POTASSIUM 4.6 04/17/2017     Lab Results   Component Value Date    CHLORIDE 106 04/17/2017     Lab Results   Component Value Date    BUN 20 04/17/2017     Lab Results   Component Value Date    CR 0.73 04/17/2017     GFR Estimate   Date Value Ref Range Status   04/17/2017 77 >60 mL/min/1.7m2 Final     Comment:     Non  GFR Calc   01/12/2017 85 >60 mL/min/1.7m2 Final     Comment:     Non  GFR Calc   11/29/2016 65 >60 mL/min/1.7m2 Final     Comment:     Non  GFR Calc     GFR Estimate If Black   Date Value Ref Range Status   04/17/2017 >90   GFR Calc   >60 mL/min/1.7m2 Final   01/12/2017 >90   GFR Calc   >60 mL/min/1.7m2 Final   11/29/2016 79 >60 mL/min/1.7m2 Final     Comment:      GFR Calc     TSH   Date Value Ref Range Status   11/29/2016 1.48 0.40 - 4.00 mU/L Final   ]    Most Recent Immunizations   Administered Date(s) Administered     Hepatitis A Vac Ped/Adol-2 Dose 06/16/1997     Influenza (H1N1) 11/25/2009     Influenza (High Dose) 3 valent vaccine 10/18/2016     Influenza (IIV3) 10/07/2014     Pneumococcal (PCV 13) 12/10/2015     Pneumococcal 23 valent 04/02/2010     TD (ADULT, 7+) 09/06/2007     TDAP Vaccine (Adacel) 12/04/2012     Zoster vaccine, live 12/18/2008     ASSESSMENT:                                                       Current medications were reviewed today.      Medication Adherence: needs improvement - see below Medicare Part D topics discussed:OTC products, Lab monitoring, Medication changes, Importance of taking medications as prescribed and Drug interactions    Hypertension: Needs Improvement. Patient is not meeting BP goal of < 140/90mmHg.  She would benefit from recheck as previous readings wnl    ASCVD: stable    GERD: Partially improved. Pt would benefit from continuing PPI for longer duration as recommended by ENT specialist.    Hyperlipidemia: Stable. Further increase in statin dose not warranted as LDL is well controlled (close to or less than 40mg/dL).     Hypothyroidism: Needs Improvement. Last TSH is within normal limits.  Pt would benefit from rechecking thyroid labs due to recent symptoms.    Osteoporosis: Needs Improvement. Pt is not meeting RDI of calcium 1200mg/day. Pt is meeting RDI of Vitamin D 1000 IU/day. Pt would benefit from:switching from calcium carbonate to calcium citrate for improved absorption in combination with PPI and bisphosphonate therapy (alendronate)-set up in pillbox once weekly.    Vitamins: stable    Vaginal dryness: stable    Constipation: stable    Sjogren's disease: unimproved.  Cough continues to be symptoms for her.  She would benefit from continuing to follow with  [facility]/Dr. Mckeon and ENT specialist.    Arthritis Pain:  Needs improvement.  Dose too low on Osteo-bioflex.    Dry eyes: Needs improvement.  She may benefit from increasing fish oil concentration-EPA+DHA--from [employer], the studies done used higher amounts of EPA+DHA.  She may benefit from evaluation of different product for macular degeneration, not just taking luetin, defer to eye specialist.    PLAN:                                                      Elevated blood pressure today:  Recheck at Dr. Spencer's visit today    Dry eyes:  Pt to give trial of triple strength fish oil next time.    Bone loss/Osteoporosis: Pt to use Pill box for  alendronate.  Stop calcium carbonate.  Take calcium citrate + D or Citracal+D take 2 tablets in the AM and 1 tablet at night.    Pain:  Increase Osteo Bi-Flex to 2 tablets per day.    Macular degeneration: pt to ask eye doctor about Luetin versus Perservision, AREDS formula.    Pt to Ask Dr. Spencer about needing thyroid lab versus muscle weight.    Next MTM/pharmacist visit: 1 year    I spent 50 minutes with this patient today (an extra 15 minutes was spent creating the Medication Action Plan). All changes were made via collaborative practice agreement with Scarlett Spencer. A copy of the visit note was provided to the patient's primary care provider.    The patient was given a summary of these recommendations as an after visit summary.     Alice Whitfield, PharmD [facility]  Medication Therapy Management Practitioner   #[phone]

## 2017-04-25 NOTE — PATIENT INSTRUCTIONS
Recommendations from today's MTM visit:                                                    MTM (medication therapy management) is a service provided by a clinical pharmacist designed to help you get the most of out of your medicines.     Dry eyes:  Look for triple strength fish oil.    Bone loss/Osteoporosis: Use Pill box for alendronate.  Stop calcium carbonate.  Take calcium citrate + D or Citracal+D take 2 tablets in the AM and 1 tablet at night.    Pain:  Increase Osteo Bi-Flex to 2 tablets per day.    Ask eye doctor about macular degeneration and type of vitamin he wants you on--Perservision, AREDS formula.    Great job on your activity!!    Ask Dr. Spencer about needing thyroid lab versus muscle weight.    Next MTM/pharmacist visit: 1 year    To schedule another MTM appointment, please call the clinic directly or you may call the MTM scheduling line at [phone] or toll-free at [phone].     My Clinical Pharmacist's contact information:                                                      It was a pleasure seeing you today!  Please feel free to contact me with any questions or concerns you have.      Alice Whitfield, PharmD [facility]  Medication Therapy Management Practitioner   #[phone]    You may receive a survey about the MTM services you received.  I would appreciate your feedback to help me serve you better in the future. Please fill it out and return it when you can. Your comments will be anonymous.      My healthcare goals:

## 2017-04-25 NOTE — PROGRESS NOTES
[facility]  [id number] [facility] [id number] [phone]  Dept: [phone]    PRE-OP EVALUATION:  Today's date: 2017    Ya Stahl (: 1940) presents for pre-operative evaluation assessment as requested by Dr. Godwin.  She requires evaluation and anesthesia risk assessment prior to undergoing surgery/procedure for treatment of left foot  Proposed procedure: reconstruct forefoot with metatarsalphalangeal fusion    Date of Surgery/ Procedure: 2017  Time of Surgery/ Procedure: 9:00  Hospital/Surgical Facility: [facility]  Primary Physician: Scarlett Spencer  Type of Anesthesia Anticipated: Local with MAC    Patient has a Health Care Directive or Living Will:  YES     Preop Questions 2017   1.  Do you have a history of heart attack, stroke, stent, bypass or surgery on an artery in the head, neck, heart or legs? YES - 2016   2.  Do you ever have any pain or discomfort in your chest? No   3.  Do you have a history of  Heart Failure? No   4.   Are you troubled by shortness of breath when:  walking on a level surface, or up a slight hill, or at night? No   5.  Do you currently have a cold, bronchitis or other respiratory infection? No   6.  Do you have a cough, shortness of breath, or wheezing? YES - chronic cough   7.  Do you sometimes get pains in the calves of your legs when you walk? No   8. Do you or anyone in your family have previous history of blood clots? No   9.  Do you or does anyone in your family have a serious bleeding problem such as prolonged bleeding following surgeries or cuts? No   10. Have you ever had problems with anemia or been told to take iron pills? No   11. Have you had any abnormal blood loss such as black, tarry or bloody stools, or abnormal vaginal bleeding? No   12. Have you ever had a blood transfusion? No   13. Have you or any of your relatives ever had problems with anesthesia? YES - PONV. Takes awhile to wake  up.   14. Do you have sleep apnea, excessive snoring or daytime drowsiness? No   15. Do you have any prosthetic heart valves? No   16. Do you have prosthetic joints? No   17. Is there any chance that you may be pregnant? No         HPI:                                                      Brief HPI related to upcoming procedure: Left foot pain.  Infection in bone laterally.       See problem list for active medical problems.  Problems all longstanding and stable, except as noted/documented.  See ROS for pertinent symptoms related to these conditions.                                                                                                  .    MEDICAL HISTORY:                                                      Patient Active Problem List    Diagnosis Date Noted     Health Care Home 05/09/2012     Priority: High           Diagnosis Specialist Due to be seen Following   cough ENT; [facility]; Dr. Sharita Londono. Inhalers.    [facility] spec in this  [facility]                   DX V65.8 REPLACED WITH [id number] (04/08/2013)       MGUS (monoclonal gammopathy of unknown significance) 01/14/2017     Priority: Medium     Monoclonal paraproteinemia 01/12/2017     Priority: Medium     Macrocytosis 12/01/2016     Priority: Medium     ASCVD (arteriosclerotic cardiovascular disease) 03/09/2016     Priority: Medium     AMI (acute myocardial infarction) (H) 03/04/2016     Priority: Medium     Constipation 04/07/2014     Priority: Medium     Status post-operative repair of hip fracture, RIGHT 04/01/2014     Priority: Medium     Gastroesophageal reflux disease without esophagitis 05/20/2015     Osteoporosis 08/12/2014     started alendronate Aug 2014       Pubic ramus fracture (H) 04/10/2014     Per report from DR. Julien at [facility] in [place], [place], XR showed a closed right superior/inferior pubic ramus fracture.        Impaired fasting glucose 12/09/2012     Chronic cough 09/10/2012     HL (hearing loss)  12/01/2011     Mixed incontinence 12/01/2011     Advanced directives, counseling/discussion 06/02/2011     Advance Directive Problem List Overview:   Name Relationship Phone    Primary Health Care Agent            Alternative Health Care Agent          Advance Directive received and scanned. Click on Code in the patient header to view. 6/2/2011     Was already on file here.       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 Provider to review        Past Medical History:   Diagnosis Date     Arthritis      Coronary artery disease      Displacement of lumbar intervertebral disc without myelopathy      Gastro-oesophageal reflux disease      Heart attack (H) 3/2016     History of angina      Hypertension      Low back pain      Sicca syndrome (H)      Sjogren's syndrome (H)     sees specialist; dentist     Unspecified hypothyroidism      Past Surgical History:   Procedure Laterality Date     ARTHROSCOPY KNEE  10/5/2012    R Procedure: ARTHROSCOPY KNEE;  RIGHT KNEE ARTHROSCOPY, PARTIAL MEDIAL MENISCECTOMY;  Surgeon: Karli Zaragoza MD;  Location:  [facility]     BUNIONECTOMY  ~2010    L foot.      C NONSPECIFIC PROCEDURE  1976    D&C     COLONOSCOPY  1/17    normal; no repeat.     COLONOSCOPY N/A 1/17/2017    Procedure: COLONOSCOPY;  Surgeon: Fan Giordano MD;  Location:  GI     ENDOSCOPIC RELEASE CARPAL TUNNEL  9/11/2012    R Procedure: ENDOSCOPIC RELEASE CARPAL TUNNEL;  Right Endoscopic carpal tunnel release, left ringer finger cortizon injection;  Surgeon: Anne Marie Catherine MD;  Location:  OR     ESOPHAGOSCOPY, GASTROSCOPY, DUODENOSCOPY (EGD), COMBINED N/A 12/26/2014    Procedure: COMBINED ESOPHAGOSCOPY, GASTROSCOPY,  DUODENOSCOPY (EGD);  Surgeon: Fan Giordano MD;  Location: [facility] GI     EXCISE EXOSTOSIS FOOT Left 12/1/2016    Procedure: EXCISE EXOSTOSIS FOOT;  Surgeon: Jody Gallagher DPM, Pod;  Location: [facility] OR     GYN SURGERY  1978    ovaries removed     HEART CATH, ANGIOPLASTY  3/4/16    dz <40%     HYSTERECTOMY, PAP NO LONGER INDICATED  1977    Hysterectomy     INJECT STEROID (LOCATION)  9/11/2012    Procedure: INJECT STEROID (LOCATION);;  Surgeon: Anne Marie Catherine MD;  Location: [facility] OR     LAPAROSCOPIC CHOLECYSTECTOMY N/A 4/7/2016    Procedure: LAPAROSCOPIC CHOLECYSTECTOMY;  Surgeon: Hans Medina MD;  Location: [facility] OR     ORTHOPEDIC SURGERY  ~2008    Right foot, bunionectomy      RIGHT HIP ORIF 4/1/2014       ROTATOR CUFF REPAIR RT/LT  ~1998    Lt shoulder; rotator cuff     SURGICAL HISTORY OF -   distant past    ablation for palpitations.     SURGICAL HISTORY OF -   June 2015    left carpal tunnel surgery     Current Outpatient Prescriptions   Medication Sig Dispense Refill     OMEPRAZOLE PO Take 20 mg by mouth 2 times daily (before meals)       calcium citrate-vitamin D (CITRACAL) 315-250 MG-UNIT TABS per tablet Take 2 tablets in the morning and 1 tablet in the evening 120 tablet      Carboxymethylcellul-Glycerin (LUBRICATING EYE DROPS OP) Apply 1 drop to eye 2 times daily       ACAI PO Take 25 mg by mouth daily       conjugated estrogens (PREMARIN) cream Place 1 g vaginally twice a week prn (Patient not taking: Reported on 4/25/2017) 30 g 3     cevimeline (EVOXAC) 30 MG capsule Take 30 mg by mouth 3 times daily Reported on 3/6/2017       alendronate (FOSAMAX) 70 MG tablet Take 1 tablet (70 mg) by mouth every 7 days Take with over 8 ounces water and stay upright for at least 30 minutes after dose.  Take at least 60 minutes before breakfast (Patient not taking: Reported on 4/25/2017) 12 tablet 3     levothyroxine (SYNTHROID/LEVOTHROID) 88 MCG tablet Take 1 tablet (88 mcg) by mouth daily 102 tablet 3     atorvastatin  (LIPITOR) 20 MG tablet Take 1 tablet (20 mg) by mouth daily 102 tablet 3     Multiple Vitamins-Minerals (CENTRUM SILVER) per tablet Take 1 tablet by mouth daily       Misc Natural Products (OSTEO BI-FLEX ADV TRIPLE ST PO) Take 2 tablets by mouth daily        Misc Natural Products (LUTEIN 20 PO) Take 1 tablet by mouth daily       Omega-3 Fatty Acids (OMEGA-3 FISH OIL PO) Take 1 g by mouth 2 times daily (with meals)        aspirin 81 MG EC tablet Take 1 tablet (81 mg) by mouth daily 1 tablet 0     nitroglycerin (NITROSTAT) 0.4 MG SL tablet Place 1 tablet (0.4 mg) under the tongue every 5 minutes as needed for chest pain if you are still having symptoms after 3 doses (15 minutes) call 911. 25 tablet 0     MIRALAX OR POWD STIR 1 CAPFUL (17GM) IN 8 OZ OF LIQUID AND DRINK (Patient taking differently: STIR 1 CAPFUL (17GM) IN 8 OZ OF LIQUID AND DRINK twice daily) 1 BOTTLE 1 YEAR     OTC products: None, except as noted above    Allergies   Allergen Reactions     Codeine      fatigue     Vicodin [Acetaminophen] Fatigue      Latex Allergy: NO    Social History   Substance Use Topics     Smoking status: Never Smoker     Smokeless tobacco: Never Used     Alcohol use Yes      Comment: social; maybe a glass of wine or highball per week      History   Drug Use No       REVIEW OF SYSTEMS:                                                    C: NEGATIVE for fever, chills, change in weight. Notes has gained some weight. Is still exercising. No change in how she is eating.  E/M: NEGATIVE for ear, mouth and throat problems  R: NEGATIVE for significant cough or short of breath x chronic cough.  CV: NEGATIVE for chest pain, palpitations or peripheral edema  No nausea, vomitting or change in bowel habits.  No urinary symptoms.    Saw ENT and said had acid burn; believes related to cough. Was told could take up to 9 months. So far, no change.   PPI bid.        EXAM:                                                    /80 (BP Location:  "Right arm, Patient Position: Chair, Cuff Size: Adult Regular)  Pulse (!) 49  Ht 5' 5.25\" (1.657 m)  Wt 129 lb (58.5 kg)  BMI 21.3 kg/m2  GENERAL APPEARANCE: alert and no distress  HENT: ear canals and TM's normal and nose and mouth without ulcers or lesions  RESP: lungs clear to auscultation - no rales, rhonchi or wheezes  CV: regular rates and rhythm  ABDOMEN: soft, nontender, no HSM or masses and bowel sounds normal  NEURO: Normal strength and tone, sensory exam grossly normal, mentation intact and speech normal    TSH   Date Value Ref Range Status   11/29/2016 1.48 0.40 - 4.00 mU/L Final   ]      DIAGNOSTICS:                                                    EKG (done in November 2016): sinus bradycardia, first degree AV block, unchanged from previous tracings    Component      Latest Ref Rng & Units 4/17/2017   WBC      4.0 - 11.0 10e9/L 5.5   RBC Count      3.8 - 5.2 10e12/L 4.27   Hemoglobin      11.7 - 15.7 g/dL 13.7   Hematocrit      35.0 - 47.0 % 42.4   MCV      78 - 100 fl 99   MCH      26.5 - 33.0 pg 32.1   MCHC      31.5 - 36.5 g/dL 32.3   RDW      10.0 - 15.0 % 13.0   Platelet Count      150 - 450 10e9/L 236   Diff Method       Automated Method   % Neutrophils      % 55.0   % Lymphocytes      % 27.7   % Monocytes      % 11.9   % Eosinophils      % 5.0   % Basophils      % 0.4   Absolute Neutrophil      1.6 - 8.3 10e9/L 3.0   Absolute Lymphocytes      0.8 - 5.3 10e9/L 1.5   Absolute Monocytes      0.0 - 1.3 10e9/L 0.7   Absolute Eosinophils      0.0 - 0.7 10e9/L 0.3   Absolute Basophils      0.0 - 0.2 10e9/L 0.0   Sodium      133 - 144 mmol/L 142   Potassium      3.4 - 5.3 mmol/L 4.6   Chloride      94 - 109 mmol/L 106   Carbon Dioxide      20 - 32 mmol/L 28   Anion Gap      3 - 14 mmol/L 8   Glucose      70 - 99 mg/dL 108 (H)   Urea Nitrogen      7 - 30 mg/dL 20   Creatinine      0.52 - 1.04 mg/dL 0.73   GFR Estimate      >60 mL/min/1.7m2 77   GFR Estimate If Black      >60 mL/min/1.7m2 >90 . . . "   Calcium      8.5 - 10.1 mg/dL 9.3   Bilirubin Total      0.2 - 1.3 mg/dL 0.9   Albumin      3.4 - 5.0 g/dL 3.9   Protein Total      6.8 - 8.8 g/dL 7.4   Alkaline Phosphatase      40 - 150 U/L 60   ALT      0 - 50 U/L 26   AST      0 - 45 U/L 21       Recent Labs   Lab Test  04/17/17   0735  04/13/17   1620   01/12/17   1427  12/13/16   0808  12/01/16   0955   08/23/16   1153   12/02/14   0722   HGB  13.7  13.7   < >  14.4  13.9   --    < >   --    < >   --    PLT  236  231   < >  286  287   --    < >   --    < >   --    INR   --    --    --    --    --   0.96   --   1.00   --    --    NA  142   --    --   140   --    --    < >   --    < >  139   POTASSIUM  4.6   --    --   4.0   --    --    < >   --    < >  4.1   CR  0.73   --    --   0.68   --    --    < >   --    < >  0.87   A1C   --    --    --    --   5.9   --    --    --    --   5.8    < > = values in this interval not displayed.        IMPRESSION:                                                    Reason for surgery/procedure: left foot pain    The proposed surgical procedure is considered INTERMEDIATE risk.    REVISED CARDIAC RISK INDEX  The patient has the following serious cardiovascular risks for perioperative complications such as (MI, PE, VFib and 3  AV Block):  History of MI > 1 year ago    INTERPRETATION: 1 risks: Class II (low risk - 0.9% complication rate)    The patient has the following additional risks for perioperative complications:  No identified additional risks    Preop general physical exam      Left foot pain  Anticipating surgery      RECOMMENDATIONS:                                                          --Patient is to take all scheduled medications on the day of surgery EXCEPT for modifications listed below.    Anticoagulant or Antiplatelet Medication Use  ASPIRIN: Discontinue ASA 7-10 days prior to procedure to reduce bleeding risk.  It should be resumed post-operatively. (she will stop now)  Discontinue fish oil at this time as  well.        APPROVAL GIVEN to proceed with proposed procedure, without further diagnostic evaluation       Signed Electronically by: Scarlett Spencer MD, MD    Copy of this evaluation report is provided to requesting physician.    [facility] Preop Guidelines

## 2017-04-25 NOTE — LETTER
"     [facility]     Date: 2017    Ya Stahl  [address]  [place] [address]    Dear Ms. Stahl,    Thank you for talking with me on 2017 about your health and medications. Medicare s MTM (Medication Therapy Management) program helps you understand your medications and use them safely.      This letter includes an action plan (Medication Action Plan) and medication list (Personal Medication List). The action plan has steps you should take to help you get the best results from your medications. The medication list will help you keep track of your medications and how to use them the right way.       Have your action plan and medication list with you when you talk with your doctors, pharmacists, and other healthcare providers in your care team.     Ask your doctors, pharmacists, and other healthcare providers to update the action plan and medication list at every visit.     Take your medication list with you if you go to the hospital or emergency room.     Give a copy of the action plan and medication list to your family or caregivers.     If you want to talk about this letter or any of the papers with it, please call   [phone].   We look forward to working with you, your doctors, and other healthcare providers to help you stay healthy.     Sincerely,    Alice Whitfield      Enclosed: Medication Action Plan and Personal Medication List    MEDICATION ACTION PLAN FOR Ya Stahl,  1940     This action plan will help you get the best results from your medications if you:   1. Read \"What we talked about.\"   2. Take the steps listed in the \"What I need to do\" boxes.   3. Fill in \"What I did and when I did it.\"   4. Fill in \"My follow-up plan\" and \"Questions I want to ask.\"     Have this action plan with you when you talk with your doctors, pharmacists, and other healthcare providers in your care team. Share this with your family or caregivers " too.  DATE PREPARED: 2017  What we talked about: Dry eyes                                                  What I need to do: Increase the fish oil concentration to the triple strength formulation, studies for dry eyes indicated a higher amount of EPA+DHA       What I did and when I did it:                                              What we talked about: Alendronate                                                  What I need to do: Use small pill box for taking alendronate to help you remember to take it       What I did and when I did it:                                               What we talked about: Change calcium product, does not absorb well while on omeprazole                                                  What I need to do: Stop calcium carbonate.  Take calcium citrate + D or Citracal+D take 2 tablets in the AM and 1 tablet at night.       What I did and when I did it:                                               What we talked about: Arthritis pain                                                  What I need to do: Increase Osteo Bi-Flex to 2 tablets per day       What I did and when I did it:                                               What we talked about: Thyroid and weight gain                                                  What I need to do: Ask Dr. Spencer if you need thyroid level rechecked       What I did and when I did it:                                                   My follow-up plan:                 Questions I want to ask:              If you have any questions about your action plan, call Alice Whitfield, Phone: [phone] , Monday-Friday 8-4:30pm.           MEDICATION LIST FOR [place] 1940     This medication list was made for you after we talked. We also used information from your doctor's chart.      Use blank rows to add new medications. Then fill in the dates you started using them.    Cross out medications when you no longer use them. Then  write the date and why you stopped using them.    Ask your doctors, pharmacists, and other healthcare providers to update this list at every visit. Keep this list up-to-date with:       Prescription medications    Over the counter drugs     Herbals    Vitamins    Minerals      If you go to the hospital or emergency room, take this list with you. Share this with your family or caregivers too.     DATE PREPARED: 4/25/2017  Allergies or side effects: Codeine and Vicodin [acetaminophen]     Medication:  ACAI PO      How I use it:  Take 25 mg by mouth daily      Why I use it:  Supplement    Prescriber:  Patient Reported      Date I started using it:       Date I stopped using it:         Why I stopped using it:            Medication:  ALENDRONATE SODIUM 70 MG PO TABS      How I use it:  Take 1 tablet (70 mg) by mouth every 7 days Take with over 8 ounces water and stay upright for at least 30 minutes after dose.  Take at least 60 minutes before breakfast      Why I use it: Osteoporosis    Prescriber:  Scarlett Spencer MD      Date I started using it:       Date I stopped using it:         Why I stopped using it:            Medication:  ASPIRIN 81 MG PO TBEC      How I use it:  Take 1 tablet (81 mg) by mouth daily      Why I use it:  Heart disease and Stroke prevention    Prescriber:  Scarlett Spencer MD      Date I started using it:       Date I stopped using it:         Why I stopped using it:            Medication:  ATORVASTATIN CALCIUM 20 MG PO TABS      How I use it:  Take 1 tablet (20 mg) by mouth daily      Why I use it: Hyperlipidemia LDL goal <70    Prescriber:  Scarlett Spencer MD      Date I started using it:       Date I stopped using it:         Why I stopped using it:            Medication:  CALCIUM CITRATE-VITAMIN D 315-250 MG-UNIT PO TABS      How I use it:  Take 2 tablets in the morning and 1 tablet in the evening      Why I use it: Osteoporosis    Prescriber:  Scarlett Spencer MD      Date I started using it:        Date I stopped using it:         Why I stopped using it:            Medication:  CENTRUM SILVER PO TABS      How I use it:  Take 1 tablet by mouth daily      Why I use it:  Supplement    Prescriber:  Patient Reported      Date I started using it:       Date I stopped using it:         Why I stopped using it:            Medication:  CEVIMELINE HCL 30 MG PO CAPS      How I use it:  Take 30 mg by mouth 3 times daily Reported on 3/6/2017      Why I use it:  Sjogren's    Prescriber:  Patient Reported      Date I started using it:       Date I stopped using it:         Why I stopped using it:            Medication:  ESTROGENS, CONJUGATED 0.625 MG/GM VA CREA      How I use it:  Place 1 g vaginally twice a week prn      Why I use it: Atrophic vaginitis    Prescriber:  Scarlett Spencer MD      Date I started using it:       Date I stopped using it:         Why I stopped using it:            Medication:  LEVOTHYROXINE SODIUM 88 MCG PO TABS      How I use it:  Take 1 tablet (88 mcg) by mouth daily      Why I use it: Hypothyroidism, unspecified type    Prescriber:  Scarlett Spencer MD      Date I started using it:       Date I stopped using it:         Why I stopped using it:            Medication:  LUBRICATING EYE DROPS OP      How I use it:  Apply 1 drop to eye 2 times daily      Why I use it:  Dry eyes    Prescriber:  Patient Reported      Date I started using it:       Date I stopped using it:         Why I stopped using it:            Medication:  LUTEIN 20 PO      How I use it:  Take 1 tablet by mouth daily      Why I use it:  Macular degeneration    Prescriber:  Patient Reported      Date I started using it:       Date I stopped using it:         Why I stopped using it:            Medication:  MIRALAX OR POWD      How I use it:  STIR 1 CAPFUL (17GM) IN 8 OZ OF LIQUID AND DRINK      Why I use it: Chronic constipation    Prescriber:  Altaf Trotter MD      Date I started using it:       Date I stopped using it:         Why I  stopped using it:            Medication:  NITROGLYCERIN 0.4 MG SL SUBL      How I use it:  Place 1 tablet (0.4 mg) under the tongue every 5 minutes as needed for chest pain if you are still having symptoms after 3 doses (15 minutes) call [phone].      Why I use it: Acute chest pain    Prescriber:  Miguel Padron MD      Date I started using it:       Date I stopped using it:         Why I stopped using it:            Medication:  OMEGA-3 FISH OIL PO      How I use it:  Take 1 g by mouth 2 times daily (with meals)       Why I use it:  Dry eyes    Prescriber:  Patient Reported      Date I started using it:       Date I stopped using it:         Why I stopped using it:            Medication:  OMEPRAZOLE PO      How I use it:  Take 20 mg by mouth 2 times daily (before meals)      Why I use it:  Reflux    Prescriber:  Patient Reported      Date I started using it:       Date I stopped using it:         Why I stopped using it:            Medication:  OSTEO BI-FLEX ADV TRIPLE ST PO      How I use it:  Take 2 tablets by mouth daily       Why I use it:  Arthritis pain    Prescriber:  Patient Reported      Date I started using it:       Date I stopped using it:         Why I stopped using it:            Medication:         How I use it:         Why I use it:      Prescriber:         Date I started using it:       Date I stopped using it:         Why I stopped using it:            Medication:         How I use it:         Why I use it:      Prescriber:         Date I started using it:       Date I stopped using it:         Why I stopped using it:            Medication:         How I use it:         Why I use it:      Prescriber:         Date I started using it:       Date I stopped using it:         Why I stopped using it:              Other Information:     If you have any questions about your action plan, call [phone].    According to the Paperwork Reduction Act of 1995, no persons are required to respond to a  collection of information unless it displays a valid OMB control number. The valid OMB number for this information collection is 0355-2470. The time required to complete this information collection is estimated to average 40 minutes per response, including the time to review instructions, searching existing data resources, gather the data needed, and complete and review the information collection. If you have any comments concerning the accuracy of the time estimate(s) or suggestions for improving this form, please write to: CMS, Attn: MANNY Reports Clearance Officer, [address] [place] [phone].

## 2017-04-25 NOTE — MR AVS SNAPSHOT
After Visit Summary   4/25/2017    Ya Stahl    MRN: [id number]           Patient Information     Date Of Birth          1940        Visit Information        Provider Department      4/25/2017 2:00 PM Alice Whitfield, [facility] MTM        Today's Diagnoses     Osteoporosis    -  1      Care Instructions    Recommendations from today's MTM visit:                                                    MTM (medication therapy management) is a service provided by a clinical pharmacist designed to help you get the most of out of your medicines.     Dry eyes:  Look for triple strength fish oil.    Bone loss/Osteoporosis: Use Pill box for alendronate.  Stop calcium carbonate.  Take calcium citrate + D or Citracal+D take 2 tablets in the AM and 1 tablet at night.    Pain:  Increase Osteo Bi-Flex to 2 tablets per day.    Ask eye doctor about macular degeneration and type of vitamin he wants you on--Perservision, AREDS formula.    Great job on your activity!!    Ask Dr. Spencer about needing thyroid lab versus muscle weight.    Next MTM/pharmacist visit: 1 year    To schedule another MTM appointment, please call the clinic directly or you may call the MTM scheduling line at [phone] or toll-free at [phone].     My Clinical Pharmacist's contact information:                                                      It was a pleasure seeing you today!  Please feel free to contact me with any questions or concerns you have.      Alice Whitfield, PharmD [facility]  Medication Therapy Management Practitioner   #[phone]    You may receive a survey about the MTM services you received.  I would appreciate your feedback to help me serve you better in the future. Please fill it out and return it when you can. Your comments will be anonymous.      My healthcare goals:                                                                    Follow-ups after your visit        Your next 10  "appointments already scheduled     Apr 25, 2017  3:10 PM CDT   [facility]   [facility]    [address] [facility] [id number] [phone]            Apr 27, 2017  3:45 PM CDT   Return Visit with Vandana Kasper MD   [facility])    [facility]  [address]  [facility] [address]   [phone]            Apr 28, 2017   Procedure with Fabián Phipps MD   [facility] (--)    [address]  [facility] [address] [phone]            May 03, 2017  1:00 PM CDT   Return Visit with Jody Gallagher DPM, Podiatry/Foot and Ankle Surgery   [facility]    [address] [facility] [address] [phone]              Who to contact     If you have questions or need follow up information about today's clinic visit or your schedule please contact [facility] MTM directly at [phone].  Normal or non-critical lab and imaging results will be communicated to you by MyChart, letter or phone within 4 business days after the clinic has received the results. If you do not hear from us within 7 days, please contact the clinic through MyChart or phone. If you have a critical or abnormal lab result, we will notify you by phone as soon as possible.  Submit refill requests through [employer] or call your pharmacy and they will forward the refill request to us. Please allow 3 business days for your refill to be completed.          Additional Information About Your Visit        [employer] Information     [employer] lets you send messages to your doctor, view your test results, renew your prescriptions, schedule appointments and more. To sign up, go to [email or web address] . Click on \"Log in\" on the left side of the screen, which will take you to the Welcome page. " "Then click on \"Sign up Now\" on the right side of the page.     You will be asked to enter the access code listed below, as well as some personal information. Please follow the directions to create your username and password.     Your access code is: [email or web address]  Expires: 2017 10:54 AM     Your access code will  in 90 days. If you need help or a new code, please call your [facility] clinic or [phone].        Care EveryWhere ID     This is your Care EveryWhere ID. This could be used by other organizations to access your [facility] medical records  [id number]        Your Vitals Were     Pulse BMI (Body Mass Index)                49 21.42 kg/m2           Blood Pressure from Last 3 Encounters:   17 149/80   17 128/70   17 138/82    Weight from Last 3 Encounters:   17 129 lb 11.2 oz (58.8 kg)   17 128 lb (58.1 kg)   17 128 lb (58.1 kg)              Today, you had the following     No orders found for display         Today's Medication Changes          These changes are accurate as of: 17  2:44 PM.  If you have any questions, ask your nurse or doctor.               Start taking these medicines.        Dose/Directions    calcium citrate-vitamin D 315-250 MG-UNIT Tabs per tablet   Commonly known as:  CITRACAL   Used for:  Osteoporosis   Started by:  Alice Whitfield RPH        Take 2 tablets in the morning and 1 tablet in the evening   Quantity:  120 tablet   Refills:  0         These medicines have changed or have updated prescriptions.        Dose/Directions    MIRALAX powder   This may have changed:  See the new instructions.   Used for:  Chronic constipation   Generic drug:  polyethylene glycol        STIR 1 CAPFUL (17GM) IN 8 OZ OF LIQUID AND DRINK   Quantity:  1 BOTTLE   Refills:  1 YEAR            Where to get your medicines      Some of these will need a paper prescription and others can be bought over the counter.  Ask your nurse if you have questions.     " You don't need a prescription for these medications     calcium citrate-vitamin D 315-250 MG-UNIT Tabs per tablet                Primary Care Provider Office Phone # Fax #    Scarlett Spencer [phone]       [facility] [address]  [facility] [address]        Thank you!     Thank you for choosing [facility]  for your care. Our goal is always to provide you with excellent care. Hearing back from our patients is one way we can continue to improve our services. Please take a few minutes to complete the written survey that you may receive in the mail after your visit with us. Thank you!             Your Updated Medication List - Protect others around you: Learn how to safely use, store and throw away your medicines at www.disposemymeds.org.          This list is accurate as of: 4/25/17  2:44 PM.  Always use your most recent med list.                   Brand Name Dispense Instructions for use    ACAI PO      Take 25 mg by mouth daily       alendronate 70 MG tablet    FOSAMAX    12 tablet    Take 1 tablet (70 mg) by mouth every 7 days Take with over 8 ounces water and stay upright for at least 30 minutes after dose.  Take at least 60 minutes before breakfast       aspirin 81 MG EC tablet     1 tablet    Take 1 tablet (81 mg) by mouth daily       atorvastatin 20 MG tablet    LIPITOR    102 tablet    Take 1 tablet (20 mg) by mouth daily       calcium citrate-vitamin D 315-250 MG-UNIT Tabs per tablet    CITRACAL    120 tablet    Take 2 tablets in the morning and 1 tablet in the evening       CENTRUM SILVER per tablet      Take 1 tablet by mouth daily       cevimeline 30 MG capsule    EVOXAC     Take 30 mg by mouth 3 times daily Reported on 3/6/2017       conjugated estrogens cream    PREMARIN    30 g    Place 1 g vaginally twice a week prn       levothyroxine 88 MCG tablet    SYNTHROID/LEVOTHROID    102 tablet    Take 1 tablet (88 mcg) by mouth daily       LUBRICATING  EYE DROPS OP      Apply 1 drop to eye 2 times daily       MIRALAX powder   Generic drug:  polyethylene glycol     1 BOTTLE    STIR 1 CAPFUL (17GM) IN 8 OZ OF LIQUID AND DRINK       nitroglycerin 0.4 MG sublingual tablet    NITROSTAT    25 tablet    Place 1 tablet (0.4 mg) under the tongue every 5 minutes as needed for chest pain if you are still having symptoms after 3 doses (15 minutes) call 911.       OMEGA-3 FISH OIL PO      Take 1 g by mouth 2 times daily (with meals)       OMEPRAZOLE PO      Take 20 mg by mouth 2 times daily (before meals)       * OSTEO BI-FLEX ADV TRIPLE ST PO      Take 2 tablets by mouth daily       * LUTEIN 20 PO      Take 1 tablet by mouth daily       * Notice:  This list has 2 medication(s) that are the same as other medications prescribed for you. Read the directions carefully, and ask your doctor or other care provider to review them with you.

## 2017-04-25 NOTE — NURSING NOTE
"Chief Complaint   Patient presents with     Pre-Op Exam       Initial /80 (BP Location: Right arm, Patient Position: Chair, Cuff Size: Adult Regular)  Pulse (!) 49  Ht 5' 5.25\" (1.657 m)  Wt 129 lb (58.5 kg)  BMI 21.3 kg/m2 Estimated body mass index is 21.3 kg/(m^2) as calculated from the following:    Height as of this encounter: 5' 5.25\" (1.657 m).    Weight as of this encounter: 129 lb (58.5 kg).  Medication Reconciliation: complete   Radha Mitchell, IAVN      "

## 2017-04-26 ASSESSMENT — PATIENT HEALTH QUESTIONNAIRE - PHQ9: SUM OF ALL RESPONSES TO PHQ QUESTIONS 1-9: 1

## 2017-04-26 ASSESSMENT — ANXIETY QUESTIONNAIRES: GAD7 TOTAL SCORE: 0

## 2017-04-27 ENCOUNTER — ONCOLOGY VISIT (OUTPATIENT)
Dept: ONCOLOGY | Facility: CLINIC | Age: 77
End: 2017-04-27
Attending: INTERNAL MEDICINE
Payer: COMMERCIAL

## 2017-04-27 VITALS
HEIGHT: 65 IN | WEIGHT: 129.4 LBS | BODY MASS INDEX: 21.56 KG/M2 | RESPIRATION RATE: 16 BRPM | HEART RATE: 60 BPM | TEMPERATURE: 97.6 F | SYSTOLIC BLOOD PRESSURE: 129 MMHG | DIASTOLIC BLOOD PRESSURE: 75 MMHG | OXYGEN SATURATION: 95 %

## 2017-04-27 DIAGNOSIS — D47.2 MGUS (MONOCLONAL GAMMOPATHY OF UNKNOWN SIGNIFICANCE): Primary | ICD-10-CM

## 2017-04-27 PROCEDURE — 99211 OFF/OP EST MAY X REQ PHY/QHP: CPT

## 2017-04-27 PROCEDURE — 99213 OFFICE O/P EST LOW 20 MIN: CPT | Performed by: INTERNAL MEDICINE

## 2017-04-27 ASSESSMENT — PAIN SCALES - GENERAL: PAINLEVEL: NO PAIN (0)

## 2017-04-27 NOTE — MR AVS SNAPSHOT
After Visit Summary   4/27/2017    Ya Stahl    MRN: [id number]           Patient Information     Date Of Birth          1940        Visit Information        Provider Department      4/27/2017 3:45 PM Vandana Kasper MD [facility]        Today's Diagnoses     MGUS (monoclonal gammopathy of unknown significance)    -  1      Care Instructions    -      return to clinic in 6 months with labs a week prior (labs can be done at [facility])  Scheduled  Yue CAROLINA given to patient          Follow-ups after your visit        Your next 10 appointments already scheduled     Apr 28, 2017   Procedure with Fabián Phipps MD   [facility] (--)    [address]  [facility] [address]   [phone]            May 03, 2017  1:00 PM CDT   Return Visit with Jody Gallagher DPM, Podiatry/Foot and Ankle Surgery   [facility] ([facility])    [address] [facility] [address]   [phone]            Oct 12, 2017  8:00 AM CDT   LAB with  LAB   [facility] ([facility])    [address] [facility] [address] [phone]           Patient must bring picture ID.  Patient should be prepared to give a urine specimen  Please do not eat 10-12 hours before your appointment if you are coming in fasting for labs on lipids, cholesterol, or glucose (sugar).  Pregnant women should follow their Care Team instructions. Water with medications is okay. Do not drink coffee or other fluids.   If you have concerns about taking  your medications, please ask at office or if scheduling via [employer], send a message by clicking on Secure Messaging, Message Your Care Team.            Oct 19, 2017  2:15 PM CDT   Return Visit with aVndana Kasper MD   [facility] ([facility])    [facility]  [address]   [address]  [facility] [address]   [phone]              Future tests that were ordered for you today     Open Future Orders        Priority Expected Expires Ordered    CBC with platelets differential Routine 10/27/2017 2018 2017    Comprehensive metabolic panel Routine 10/27/2017 2018 2017    Anegam and lambda light chain Routine 10/27/2017 2018 2017    Protein electrophoresis Routine 10/27/2017 2018 2017            Who to contact     If you have questions or need follow up information about today's clinic visit or your schedule please contact [facility] directly at [phone].  Normal or non-critical lab and imaging results will be communicated to you by [employer], letter or phone within 4 business days after the clinic has received the results. If you do not hear from us within 7 days, please contact the clinic through [employer] or phone. If you have a critical or abnormal lab result, we will notify you by phone as soon as possible.  Submit refill requests through [employer] or call your pharmacy and they will forward the refill request to us. Please allow 3 business days for your refill to be completed.          Additional Information About Your Visit        [employer] Information     [employer] lets you send messages to your doctor, view your test results, renew your prescriptions, schedule appointments and more. To sign up, go to [email or web address] . Click on \"Log in\" on the left side of the screen, which will take you to the Welcome page. Then click on \"Sign up Now\" on the right side of the page.     You will be asked to enter the access code listed below, as well as some personal information. Please follow the directions to create your username and password.     Your access code is: [email or web address]  Expires: 2017 10:54 AM     Your access code will  in 90 days. If you need help or a new code, please call your [facility] clinic or " "[phone].        Care EveryWhere ID     This is your Care EveryWhere ID. This could be used by other organizations to access your [place] medical records  [id number]        Your Vitals Were     Pulse Temperature Respirations Height Pulse Oximetry BMI (Body Mass Index)    60 97.6  F (36.4  C) (Tympanic) 16 1.657 m (5' 5.25\") 95% 21.37 kg/m2       Blood Pressure from Last 3 Encounters:   04/27/17 129/75   04/25/17 149/80   04/25/17 149/80    Weight from Last 3 Encounters:   04/27/17 58.7 kg (129 lb 6.4 oz)   04/25/17 58.5 kg (129 lb)   04/25/17 58.8 kg (129 lb 11.2 oz)                 Today's Medication Changes          These changes are accurate as of: 4/27/17  4:06 PM.  If you have any questions, ask your nurse or doctor.               These medicines have changed or have updated prescriptions.        Dose/Directions    MIRALAX powder   This may have changed:  See the new instructions.   Used for:  Chronic constipation   Generic drug:  polyethylene glycol        STIR 1 CAPFUL (17GM) IN 8 OZ OF LIQUID AND DRINK   Quantity:  1 BOTTLE   Refills:  1 YEAR                Primary Care Provider Office Phone # Fax #    Scarlett Spencer [phone]       [facility] [address] [facility] [address]        Thank you!     Thank you for choosing [facility]  for your care. Our goal is always to provide you with excellent care. Hearing back from our patients is one way we can continue to improve our services. Please take a few minutes to complete the written survey that you may receive in the mail after your visit with us. Thank you!             Your Updated Medication List - Protect others around you: Learn how to safely use, store and throw away your medicines at www.disposemymeds.org.          This list is accurate as of: 4/27/17  4:06 PM.  Always use your most recent med list.                   Brand Name Dispense Instructions for use    ACAI PO      Take 25 mg " by mouth daily       alendronate 70 MG tablet    FOSAMAX    12 tablet    Take 1 tablet (70 mg) by mouth every 7 days Take with over 8 ounces water and stay upright for at least 30 minutes after dose.  Take at least 60 minutes before breakfast       aspirin 81 MG EC tablet     1 tablet    Take 1 tablet (81 mg) by mouth daily       atorvastatin 20 MG tablet    LIPITOR    102 tablet    Take 1 tablet (20 mg) by mouth daily       calcium citrate-vitamin D 315-250 MG-UNIT Tabs per tablet    CITRACAL    120 tablet    Take 2 tablets in the morning and 1 tablet in the evening       CENTRUM SILVER per tablet      Take 1 tablet by mouth daily       cevimeline 30 MG capsule    EVOXAC     Take 30 mg by mouth 3 times daily Reported on 3/6/2017       conjugated estrogens cream    PREMARIN    30 g    Place 1 g vaginally twice a week prn       levothyroxine 88 MCG tablet    SYNTHROID/LEVOTHROID    102 tablet    Take 1 tablet (88 mcg) by mouth daily       LUBRICATING EYE DROPS OP      Apply 1 drop to eye 2 times daily       MIRALAX powder   Generic drug:  polyethylene glycol     1 BOTTLE    STIR 1 CAPFUL (17GM) IN 8 OZ OF LIQUID AND DRINK       nitroglycerin 0.4 MG sublingual tablet    NITROSTAT    25 tablet    Place 1 tablet (0.4 mg) under the tongue every 5 minutes as needed for chest pain if you are still having symptoms after 3 doses (15 minutes) call 911.       OMEGA-3 FISH OIL PO      Take 1 g by mouth 2 times daily (with meals)       OMEPRAZOLE PO      Take 20 mg by mouth 2 times daily (before meals)       * OSTEO BI-FLEX ADV TRIPLE ST PO      Take 2 tablets by mouth daily       * LUTEIN 20 PO      Take 1 tablet by mouth daily       * Notice:  This list has 2 medication(s) that are the same as other medications prescribed for you. Read the directions carefully, and ask your doctor or other care provider to review them with you.

## 2017-04-27 NOTE — PATIENT INSTRUCTIONS
-      return to clinic in 6 months with labs a week prior (labs can be done at [facility])  Scheduled  Yue CAROLINA given to patient

## 2017-04-27 NOTE — H&P (VIEW-ONLY)
[facility]  [address] [facility] [address] [phone]  Dept: [phone]    PRE-OP EVALUATION:  Today's date: 2017    Ya Stahl (: 1940) presents for pre-operative evaluation assessment as requested by Dr. Godwin.  She requires evaluation and anesthesia risk assessment prior to undergoing surgery/procedure for treatment of left foot  Proposed procedure: reconstruct forefoot with metatarsalphalangeal fusion    Date of Surgery/ Procedure: 2017  Time of Surgery/ Procedure: 9:00  Hospital/Surgical Facility: [facility]  Primary Physician: Scarlett Spencer  Type of Anesthesia Anticipated: Local with MAC    Patient has a Health Care Directive or Living Will:  YES     Preop Questions 2017   1.  Do you have a history of heart attack, stroke, stent, bypass or surgery on an artery in the head, neck, heart or legs? YES - 2016   2.  Do you ever have any pain or discomfort in your chest? No   3.  Do you have a history of  Heart Failure? No   4.   Are you troubled by shortness of breath when:  walking on a level surface, or up a slight hill, or at night? No   5.  Do you currently have a cold, bronchitis or other respiratory infection? No   6.  Do you have a cough, shortness of breath, or wheezing? YES - chronic cough   7.  Do you sometimes get pains in the calves of your legs when you walk? No   8. Do you or anyone in your family have previous history of blood clots? No   9.  Do you or does anyone in your family have a serious bleeding problem such as prolonged bleeding following surgeries or cuts? No   10. Have you ever had problems with anemia or been told to take iron pills? No   11. Have you had any abnormal blood loss such as black, tarry or bloody stools, or abnormal vaginal bleeding? No   12. Have you ever had a blood transfusion? No   13. Have you or any of your relatives ever had problems with anesthesia? YES - PONV. Takes awhile to wake  up.   14. Do you have sleep apnea, excessive snoring or daytime drowsiness? No   15. Do you have any prosthetic heart valves? No   16. Do you have prosthetic joints? No   17. Is there any chance that you may be pregnant? No         HPI:                                                      Brief HPI related to upcoming procedure: Left foot pain.  Infection in bone laterally.       See problem list for active medical problems.  Problems all longstanding and stable, except as noted/documented.  See ROS for pertinent symptoms related to these conditions.                                                                                                  .    MEDICAL HISTORY:                                                      Patient Active Problem List    Diagnosis Date Noted     Health Care Home 05/09/2012     Priority: High           Diagnosis Specialist Due to be seen Following   cough ENT; [facility]; Dr. Sharita Londono. Inhalers.    Sjogren's Dentist spec in this  [facility]                   DX V65.8 REPLACED WITH [id number]ALTH CARE HOME (04/08/2013)       MGUS (monoclonal gammopathy of unknown significance) 01/14/2017     Priority: Medium     Monoclonal paraproteinemia 01/12/2017     Priority: Medium     Macrocytosis 12/01/2016     Priority: Medium     ASCVD (arteriosclerotic cardiovascular disease) 03/09/2016     Priority: Medium     AMI (acute myocardial infarction) (H) 03/04/2016     Priority: Medium     Constipation 04/07/2014     Priority: Medium     Status post-operative repair of hip fracture, RIGHT 04/01/2014     Priority: Medium     Gastroesophageal reflux disease without esophagitis 05/20/2015     Osteoporosis 08/12/2014     started alendronate Aug 2014       Pubic ramus fracture (H) 04/10/2014     Per report from DR. Julien at [facility] in [place], [place], XR showed a closed right superior/inferior pubic ramus fracture.        Impaired fasting glucose 12/09/2012     Chronic cough 09/10/2012     HL (hearing loss)  12/01/2011     Mixed incontinence 12/01/2011     Advanced directives, counseling/discussion 06/02/2011     Advance Directive Problem List Overview:   Name Relationship Phone    Primary Health Care Agent            Alternative Health Care Agent          Advance Directive received and scanned. Click on Code in the patient header to view. 6/2/2011     Was already on file here.       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 Provider to review        Past Medical History:   Diagnosis Date     Arthritis      Coronary artery disease      Displacement of lumbar intervertebral disc without myelopathy      Gastro-oesophageal reflux disease      Heart attack (H) 3/2016     History of angina      Hypertension      Low back pain      Sicca syndrome (H)      Sjogren's syndrome (H)     sees specialist; dentist     Unspecified hypothyroidism      Past Surgical History:   Procedure Laterality Date     ARTHROSCOPY KNEE  10/5/2012    R Procedure: ARTHROSCOPY KNEE;  RIGHT KNEE ARTHROSCOPY, PARTIAL MEDIAL MENISCECTOMY;  Surgeon: Karli Zaragoza MD;  Location:  [facility]     BUNIONECTOMY  ~2010    L foot.      C NONSPECIFIC PROCEDURE  1976    D&C     COLONOSCOPY  1/17    normal; no repeat.     COLONOSCOPY N/A 1/17/2017    Procedure: COLONOSCOPY;  Surgeon: Fan Giordano MD;  Location:  GI     ENDOSCOPIC RELEASE CARPAL TUNNEL  9/11/2012    R Procedure: ENDOSCOPIC RELEASE CARPAL TUNNEL;  Right Endoscopic carpal tunnel release, left ringer finger cortizon injection;  Surgeon: Anne Marie Catherine MD;  Location:  OR     ESOPHAGOSCOPY, GASTROSCOPY, DUODENOSCOPY (EGD), COMBINED N/A 12/26/2014    Procedure: COMBINED ESOPHAGOSCOPY, GASTROSCOPY,  DUODENOSCOPY (EGD);  Surgeon: Fan Giordano MD;  Location: [facility] GI     EXCISE EXOSTOSIS FOOT Left 12/1/2016    Procedure: EXCISE EXOSTOSIS FOOT;  Surgeon: Jody Gallagher DPM, Pod;  Location: [facility] OR     GYN SURGERY  1978    ovaries removed     HEART CATH, ANGIOPLASTY  3/4/16    dz <40%     HYSTERECTOMY, PAP NO LONGER INDICATED  1977    Hysterectomy     INJECT STEROID (LOCATION)  9/11/2012    Procedure: INJECT STEROID (LOCATION);;  Surgeon: Anne Marie Catherine MD;  Location: [facility] OR     LAPAROSCOPIC CHOLECYSTECTOMY N/A 4/7/2016    Procedure: LAPAROSCOPIC CHOLECYSTECTOMY;  Surgeon: Hans Medina MD;  Location: [facility] OR     ORTHOPEDIC SURGERY  ~2008    Right foot, bunionectomy      RIGHT HIP ORIF 4/1/2014       ROTATOR CUFF REPAIR RT/LT  ~1998    Lt shoulder; rotator cuff     SURGICAL HISTORY OF -   distant past    ablation for palpitations.     SURGICAL HISTORY OF -   June 2015    left carpal tunnel surgery     Current Outpatient Prescriptions   Medication Sig Dispense Refill     OMEPRAZOLE PO Take 20 mg by mouth 2 times daily (before meals)       calcium citrate-vitamin D (CITRACAL) 315-250 MG-UNIT TABS per tablet Take 2 tablets in the morning and 1 tablet in the evening 120 tablet      Carboxymethylcellul-Glycerin (LUBRICATING EYE DROPS OP) Apply 1 drop to eye 2 times daily       ACAI PO Take 25 mg by mouth daily       conjugated estrogens (PREMARIN) cream Place 1 g vaginally twice a week prn (Patient not taking: Reported on 4/25/2017) 30 g 3     cevimeline (EVOXAC) 30 MG capsule Take 30 mg by mouth 3 times daily Reported on 3/6/2017       alendronate (FOSAMAX) 70 MG tablet Take 1 tablet (70 mg) by mouth every 7 days Take with over 8 ounces water and stay upright for at least 30 minutes after dose.  Take at least 60 minutes before breakfast (Patient not taking: Reported on 4/25/2017) 12 tablet 3     levothyroxine (SYNTHROID/LEVOTHROID) 88 MCG tablet Take 1 tablet (88 mcg) by mouth daily 102 tablet 3     atorvastatin  (LIPITOR) 20 MG tablet Take 1 tablet (20 mg) by mouth daily 102 tablet 3     Multiple Vitamins-Minerals (CENTRUM SILVER) per tablet Take 1 tablet by mouth daily       Misc Natural Products (OSTEO BI-FLEX ADV TRIPLE ST PO) Take 2 tablets by mouth daily        Misc Natural Products (LUTEIN 20 PO) Take 1 tablet by mouth daily       Omega-3 Fatty Acids (OMEGA-3 FISH OIL PO) Take 1 g by mouth 2 times daily (with meals)        aspirin 81 MG EC tablet Take 1 tablet (81 mg) by mouth daily 1 tablet 0     nitroglycerin (NITROSTAT) 0.4 MG SL tablet Place 1 tablet (0.4 mg) under the tongue every 5 minutes as needed for chest pain if you are still having symptoms after 3 doses (15 minutes) call 911. 25 tablet 0     MIRALAX OR POWD STIR 1 CAPFUL (17GM) IN 8 OZ OF LIQUID AND DRINK (Patient taking differently: STIR 1 CAPFUL (17GM) IN 8 OZ OF LIQUID AND DRINK twice daily) 1 BOTTLE 1 YEAR     OTC products: None, except as noted above    Allergies   Allergen Reactions     Codeine      fatigue     Vicodin [Acetaminophen] Fatigue      Latex Allergy: NO    Social History   Substance Use Topics     Smoking status: Never Smoker     Smokeless tobacco: Never Used     Alcohol use Yes      Comment: social; maybe a glass of wine or highball per week      History   Drug Use No       REVIEW OF SYSTEMS:                                                    C: NEGATIVE for fever, chills, change in weight. Notes has gained some weight. Is still exercising. No change in how she is eating.  E/M: NEGATIVE for ear, mouth and throat problems  R: NEGATIVE for significant cough or short of breath x chronic cough.  CV: NEGATIVE for chest pain, palpitations or peripheral edema  No nausea, vomitting or change in bowel habits.  No urinary symptoms.    Saw ENT and said had acid burn; believes related to cough. Was told could take up to 9 months. So far, no change.   PPI bid.        EXAM:                                                    /80 (BP Location:  "Right arm, Patient Position: Chair, Cuff Size: Adult Regular)  Pulse (!) 49  Ht 5' 5.25\" (1.657 m)  Wt 129 lb (58.5 kg)  BMI 21.3 kg/m2  GENERAL APPEARANCE: alert and no distress  HENT: ear canals and TM's normal and nose and mouth without ulcers or lesions  RESP: lungs clear to auscultation - no rales, rhonchi or wheezes  CV: regular rates and rhythm  ABDOMEN: soft, nontender, no HSM or masses and bowel sounds normal  NEURO: Normal strength and tone, sensory exam grossly normal, mentation intact and speech normal    TSH   Date Value Ref Range Status   11/29/2016 1.48 0.40 - 4.00 mU/L Final   ]      DIAGNOSTICS:                                                    EKG (done in November 2016): sinus bradycardia, first degree AV block, unchanged from previous tracings    Component      Latest Ref Rng & Units 4/17/2017   WBC      4.0 - 11.0 10e9/L 5.5   RBC Count      3.8 - 5.2 10e12/L 4.27   Hemoglobin      11.7 - 15.7 g/dL 13.7   Hematocrit      35.0 - 47.0 % 42.4   MCV      78 - 100 fl 99   MCH      26.5 - 33.0 pg 32.1   MCHC      31.5 - 36.5 g/dL 32.3   RDW      10.0 - 15.0 % 13.0   Platelet Count      150 - 450 10e9/L 236   Diff Method       Automated Method   % Neutrophils      % 55.0   % Lymphocytes      % 27.7   % Monocytes      % 11.9   % Eosinophils      % 5.0   % Basophils      % 0.4   Absolute Neutrophil      1.6 - 8.3 10e9/L 3.0   Absolute Lymphocytes      0.8 - 5.3 10e9/L 1.5   Absolute Monocytes      0.0 - 1.3 10e9/L 0.7   Absolute Eosinophils      0.0 - 0.7 10e9/L 0.3   Absolute Basophils      0.0 - 0.2 10e9/L 0.0   Sodium      133 - 144 mmol/L 142   Potassium      3.4 - 5.3 mmol/L 4.6   Chloride      94 - 109 mmol/L 106   Carbon Dioxide      20 - 32 mmol/L 28   Anion Gap      3 - 14 mmol/L 8   Glucose      70 - 99 mg/dL 108 (H)   Urea Nitrogen      7 - 30 mg/dL 20   Creatinine      0.52 - 1.04 mg/dL 0.73   GFR Estimate      >60 mL/min/1.7m2 77   GFR Estimate If Black      >60 mL/min/1.7m2 >90 . . . "   Calcium      8.5 - 10.1 mg/dL 9.3   Bilirubin Total      0.2 - 1.3 mg/dL 0.9   Albumin      3.4 - 5.0 g/dL 3.9   Protein Total      6.8 - 8.8 g/dL 7.4   Alkaline Phosphatase      40 - 150 U/L 60   ALT      0 - 50 U/L 26   AST      0 - 45 U/L 21       Recent Labs   Lab Test  04/17/17   0735  04/13/17   1620   01/12/17   1427  12/13/16   0808  12/01/16   0955   08/23/16   1153   12/02/14   0722   HGB  13.7  13.7   < >  14.4  13.9   --    < >   --    < >   --    PLT  236  231   < >  286  287   --    < >   --    < >   --    INR   --    --    --    --    --   0.96   --   1.00   --    --    NA  142   --    --   140   --    --    < >   --    < >  139   POTASSIUM  4.6   --    --   4.0   --    --    < >   --    < >  4.1   CR  0.73   --    --   0.68   --    --    < >   --    < >  0.87   A1C   --    --    --    --   5.9   --    --    --    --   5.8    < > = values in this interval not displayed.        IMPRESSION:                                                    Reason for surgery/procedure: left foot pain    The proposed surgical procedure is considered INTERMEDIATE risk.    REVISED CARDIAC RISK INDEX  The patient has the following serious cardiovascular risks for perioperative complications such as (MI, PE, VFib and 3  AV Block):  History of MI > 1 year ago    INTERPRETATION: 1 risks: Class II (low risk - 0.9% complication rate)    The patient has the following additional risks for perioperative complications:  No identified additional risks    Preop general physical exam      Left foot pain  Anticipating surgery      RECOMMENDATIONS:                                                          --Patient is to take all scheduled medications on the day of surgery EXCEPT for modifications listed below.    Anticoagulant or Antiplatelet Medication Use  ASPIRIN: Discontinue ASA 7-10 days prior to procedure to reduce bleeding risk.  It should be resumed post-operatively. (she will stop now)  Discontinue fish oil at this time as  well.        APPROVAL GIVEN to proceed with proposed procedure, without further diagnostic evaluation       Signed Electronically by: Scarlett Spencer MD, MD    Copy of this evaluation report is provided to requesting physician.    [facility] Preop Guidelines

## 2017-04-27 NOTE — PROGRESS NOTES
[facility]    Hematology/Oncology Established Patient Note      Today's Date: 04/27/2017    Reason for Follow-up: Monoclonal gammopathy, macrocytosis      HISTORY OF PRESENT ILLNESS: Ya Stahl is a 76 year old female with PMHx of Sjogren's syndrome, GERD, CAD, who presents with monoclonal gammopathy and macrocytosis.      On chart review, it appears that she has had a high-normal MCV for many years.  In November 2016, it became slightly elevated at 103.  Her hemoglobin, WBC, and platelet count are normal.  Vitamin B12 and folate levels are normal.  She had SPEP checked, which showed M-spike of 0.1 g/dL, ANNA with IgM kappa.        INTERIM HISTORY: Ya comes in for follow-up today.  She says that she feels well overall.  She is having left foot surgery tomorrow.      REVIEW OF SYSTEMS:   14 point ROS was reviewed and is negative other than as noted above in HPI.       HOME MEDICATIONS:  Current Outpatient Prescriptions   Medication Sig Dispense Refill     OMEPRAZOLE PO Take 20 mg by mouth 2 times daily (before meals)       calcium citrate-vitamin D (CITRACAL) 315-250 MG-UNIT TABS per tablet Take 2 tablets in the morning and 1 tablet in the evening 120 tablet      Carboxymethylcellul-Glycerin (LUBRICATING EYE DROPS OP) Apply 1 drop to eye 2 times daily       ACAI PO Take 25 mg by mouth daily       conjugated estrogens (PREMARIN) cream Place 1 g vaginally twice a week prn 30 g 3     cevimeline (EVOXAC) 30 MG capsule Take 30 mg by mouth 3 times daily Reported on 3/6/2017       alendronate (FOSAMAX) 70 MG tablet Take 1 tablet (70 mg) by mouth every 7 days Take with over 8 ounces water and stay upright for at least 30 minutes after dose.  Take at least 60 minutes before breakfast 12 tablet 3     levothyroxine (SYNTHROID/LEVOTHROID) 88 MCG tablet Take 1 tablet (88 mcg) by mouth daily 102 tablet 3     atorvastatin (LIPITOR) 20 MG tablet Take 1 tablet (20 mg) by mouth daily 102 tablet 3      Multiple Vitamins-Minerals (CENTRUM SILVER) per tablet Take 1 tablet by mouth daily       Misc Natural Products (OSTEO BI-FLEX ADV TRIPLE ST PO) Take 2 tablets by mouth daily        Misc Natural Products (LUTEIN 20 PO) Take 1 tablet by mouth daily       Omega-3 Fatty Acids (OMEGA-3 FISH OIL PO) Take 1 g by mouth 2 times daily (with meals)        aspirin 81 MG EC tablet Take 1 tablet (81 mg) by mouth daily 1 tablet 0     nitroglycerin (NITROSTAT) 0.4 MG SL tablet Place 1 tablet (0.4 mg) under the tongue every 5 minutes as needed for chest pain if you are still having symptoms after 3 doses (15 minutes) call 911. 25 tablet 0     MIRALAX OR POWD STIR 1 CAPFUL (17GM) IN 8 OZ OF LIQUID AND DRINK (Patient taking differently: STIR 1 CAPFUL (17GM) IN 8 OZ OF LIQUID AND DRINK twice daily) 1 BOTTLE 1 YEAR         ALLERGIES:  Allergies   Allergen Reactions     Codeine      fatigue     Vicodin [Acetaminophen] Fatigue         PAST MEDICAL HISTORY:  Past Medical History:   Diagnosis Date     Arthritis      Coronary artery disease      Displacement of lumbar intervertebral disc without myelopathy      Gastro-oesophageal reflux disease      Heart attack (H) 3/2016     History of angina      Hypertension      Low back pain      Sicca syndrome (H)      Sjogren's syndrome (H)     sees specialist; dentist     Unspecified hypothyroidism          PAST SURGICAL HISTORY:  Past Surgical History:   Procedure Laterality Date     ARTHROSCOPY KNEE  10/5/2012    R Procedure: ARTHROSCOPY KNEE;  RIGHT KNEE ARTHROSCOPY, PARTIAL MEDIAL MENISCECTOMY;  Surgeon: Karli Zaragoza MD;  Location: [facility]     BUNIONECTOMY  ~2010    L foot.      C NONSPECIFIC PROCEDURE  1976    D&C     COLONOSCOPY  1/17    normal; no repeat.     COLONOSCOPY N/A 1/17/2017    Procedure: COLONOSCOPY;  Surgeon: Fan Giordano MD;  Location:  GI     ENDOSCOPIC RELEASE CARPAL TUNNEL  9/11/2012    R Procedure: ENDOSCOPIC RELEASE CARPAL TUNNEL;  Right Endoscopic carpal  tunnel release, left ringer finger cortizon injection;  Surgeon: Anne Marie Catherine MD;  Location: [facility]     ESOPHAGOSCOPY, GASTROSCOPY, DUODENOSCOPY (EGD), COMBINED N/A 12/26/2014    Procedure: COMBINED ESOPHAGOSCOPY, GASTROSCOPY, DUODENOSCOPY (EGD);  Surgeon: Fan Giordano MD;  Location: [facility]     EXCISE EXOSTOSIS FOOT Left 12/1/2016    Procedure: EXCISE EXOSTOSIS FOOT;  Surgeon: Jody Gallagher DPM, Pod;  Location: [facility] OR     GYN SURGERY  1978    ovaries removed     HEART CATH, ANGIOPLASTY  3/4/16    dz <40%     HYSTERECTOMY, PAP NO LONGER INDICATED  1977    Hysterectomy     INJECT STEROID (LOCATION)  9/11/2012    Procedure: INJECT STEROID (LOCATION);;  Surgeon: Anne Marie Catherine MD;  Location: [facility]     LAPAROSCOPIC CHOLECYSTECTOMY N/A 4/7/2016    Procedure: LAPAROSCOPIC CHOLECYSTECTOMY;  Surgeon: Hans Medina MD;  Location: [facility] OR     ORTHOPEDIC SURGERY  ~2008    Right foot, bunionectomy      RIGHT HIP ORIF 4/1/2014       ROTATOR CUFF REPAIR RT/LT  ~1998    Lt shoulder; rotator cuff     SURGICAL HISTORY OF -   distant past    ablation for palpitations.     SURGICAL HISTORY OF -   June 2015    left carpal tunnel surgery         SOCIAL HISTORY:  Social History     Social History     Marital status:      Spouse name: N/A     Number of children: N/A     Years of education: N/A     Occupational History     Not on file.     Social History Main Topics     Smoking status: Never Smoker     Smokeless tobacco: Never Used     Alcohol use Yes      Comment: social; maybe a glass of wine or highball per week      Drug use: No     Sexual activity: Yes     Partners: Male     Other Topics Concern     Parent/Sibling W/ Cabg, Mi Or Angioplasty Before 65f 55m? Yes     Caffeine Concern No     1 capp. daily     Sleep Concern No     Special Diet No     Exercise Yes     walks 3 miles 6 days per week , curves 3 days per week     Social History Narrative    Dairy/d 0-1 servings/d.     Caffeine 0 servings/d    Exercise 6 x week  "walk and curves    Sunscreen used - Yes    Seatbelts used - Yes    Working smoke/CO detectors in the home - Yes    Guns stored in the home - Yes LOCKED    Self Breast Exams - sometimes    Self Testicular Exam - NA    Eye Exam up to date - Yes 4/2009    Dental Exam up to date - Yes 12/2009    Pap Smear up to date - Yes - Hysterectomy    Mammogram up to date - Yes 11/25/2009    PSA up to date - NA    Dexa Scan up to date - 2006    Flex Sig / Colonoscopy up to date - Yes 5/14/2008    Immunizations up to date -9-2007 tetanus    Abuse: Current or Past(Physical, Sexual or Emotional)- No    Do you feel safe in your environment - Yes    DAMION Shaffer, CMA    11/25/2009 updated         FAMILY HISTORY:  Family History   Problem Relation Age of Onset     C.A.D. Mother 76     CANCER Mother      non Hodgekin's Lymphoma     HEART DISEASE Mother      enlarged heart     C.A.D. Father 59     HEART DISEASE Father      valve problem     Hypertension Brother      Hypertension Brother      Connective Tissue Disorder Daughter      scleroderma     Colon Cancer No family hx of          PHYSICAL EXAM:  Vital signs:  /75  Pulse 60  Temp 97.6  F (36.4  C) (Tympanic)  Resp 16  Ht 1.657 m (5' 5.25\")  Wt 58.7 kg (129 lb 6.4 oz)  SpO2 95%  BMI 21.37 kg/m2   GENERAL/CONSTITUTIONAL: No acute distress.   EYES: No scleral icterus.  NEUROLOGIC: Alert, oriented, answers questions appropriately.  INTEGUMENTARY: No jaundice.      LABS:  CBC RESULTS:   Recent Labs   Lab Test  04/17/17   0735   WBC  5.5   RBC  4.27   HGB  13.7   HCT  42.4   MCV  99   MCH  32.1   MCHC  32.3   RDW  13.0   PLT  236     Recent Labs   Lab Test  04/17/17   0735  01/12/17   1427   NA  142  140   POTASSIUM  4.6  4.0   CHLORIDE  106  101   CO2  28  32   ANIONGAP  8  7   GLC  108*  84   BUN  20  19   CR  0.73  0.68   CARMEN  9.3  9.3     Lab Results   Component Value Date    AST 21 04/17/2017     Lab Results   Component Value Date    ALT 26 04/17/2017     No results found for: " BILICONJ   Lab Results   Component Value Date    BILITOTAL 0.9 04/17/2017     Lab Results   Component Value Date    ALBUMIN 3.9 04/17/2017     Lab Results   Component Value Date    PROTTOTAL 7.4 04/17/2017      Lab Results   Component Value Date    ALKPHOS 60 04/17/2017         SPEP:  M-spike (12/13/16): 0.1 g/dL IgM kappa  4/27/17: 0.1 g/dL    Skeletal survey 1/13/17:  No convincing osseous lytic lesions to suggest multiple  myeloma.       ASSESSMENT/PLAN:  Ya Stahl is a 76 year old female with:    1) MGUS: SPEP with 0.1 g/dL M-spike, IgM kappa.  Normal calcium level, renal function, normal counts.  No evidence of lytic lesions on skeletal survey.    Her M-spike continues to be low and stable at 0.1 g/dL.    Will continue to monitor for now.  -RTC in 6 months with repeat labs    2) Macrocytosis: She does not have anemia or reticulocytosis.  Vitamin B12 and folate levels are normal.  It may be secondary to #1.  She has hypothyroidism, but is controlled on medication.    -monitor      I spent a total of 15 minutes with the patient, with over >50% of the time in counseling and/or coordination of care.       Vandana Kasper MD  Hematology/Oncology  [facility]

## 2017-04-27 NOTE — LETTER
April 27, 2017      RE: Ya Stahl  [address]  [place] [address]      [facility]    Hematology/Oncology Established Patient Note      Today's Date: 04/27/2017    Reason for Follow-up: Monoclonal gammopathy, macrocytosis      HISTORY OF PRESENT ILLNESS: Ya Stahl is a 76 year old female with PMHx of Sjogren's syndrome, GERD, CAD, who presents with monoclonal gammopathy and macrocytosis.      On chart review, it appears that she has had a high-normal MCV for many years.  In November 2016, it became slightly elevated at 103.  Her hemoglobin, WBC, and platelet count are normal.  Vitamin B12 and folate levels are normal.  She had SPEP checked, which showed M-spike of 0.1 g/dL, ANNA with IgM kappa.        INTERIM HISTORY: Ya comes in for follow-up today.  She says that she feels well overall.  She is having left foot surgery tomorrow.      REVIEW OF SYSTEMS:   14 point ROS was reviewed and is negative other than as noted above in HPI.       HOME MEDICATIONS:  Current Outpatient Prescriptions   Medication Sig Dispense Refill     OMEPRAZOLE PO Take 20 mg by mouth 2 times daily (before meals)       calcium citrate-vitamin D (CITRACAL) 315-250 MG-UNIT TABS per tablet Take 2 tablets in the morning and 1 tablet in the evening 120 tablet      Carboxymethylcellul-Glycerin (LUBRICATING EYE DROPS OP) Apply 1 drop to eye 2 times daily       ACAI PO Take 25 mg by mouth daily       conjugated estrogens (PREMARIN) cream Place 1 g vaginally twice a week prn 30 g 3     cevimeline (EVOXAC) 30 MG capsule Take 30 mg by mouth 3 times daily Reported on 3/6/2017       alendronate (FOSAMAX) 70 MG tablet Take 1 tablet (70 mg) by mouth every 7 days Take with over 8 ounces water and stay upright for at least 30 minutes after dose.  Take at least 60 minutes before breakfast 12 tablet 3     levothyroxine (SYNTHROID/LEVOTHROID) 88 MCG tablet Take 1 tablet (88 mcg) by mouth daily 102  tablet 3     atorvastatin (LIPITOR) 20 MG tablet Take 1 tablet (20 mg) by mouth daily 102 tablet 3     Multiple Vitamins-Minerals (CENTRUM SILVER) per tablet Take 1 tablet by mouth daily       Misc Natural Products (OSTEO BI-FLEX ADV TRIPLE ST PO) Take 2 tablets by mouth daily        Misc Natural Products (LUTEIN 20 PO) Take 1 tablet by mouth daily       Omega-3 Fatty Acids (OMEGA-3 FISH OIL PO) Take 1 g by mouth 2 times daily (with meals)        aspirin 81 MG EC tablet Take 1 tablet (81 mg) by mouth daily 1 tablet 0     nitroglycerin (NITROSTAT) 0.4 MG SL tablet Place 1 tablet (0.4 mg) under the tongue every 5 minutes as needed for chest pain if you are still having symptoms after 3 doses (15 minutes) call 911. 25 tablet 0     MIRALAX OR POWD STIR 1 CAPFUL (17GM) IN 8 OZ OF LIQUID AND DRINK (Patient taking differently: STIR 1 CAPFUL (17GM) IN 8 OZ OF LIQUID AND DRINK twice daily) 1 BOTTLE 1 YEAR         ALLERGIES:  Allergies   Allergen Reactions     Codeine      fatigue     Vicodin [Acetaminophen] Fatigue         PAST MEDICAL HISTORY:  Past Medical History:   Diagnosis Date     Arthritis      Coronary artery disease      Displacement of lumbar intervertebral disc without myelopathy      Gastro-oesophageal reflux disease      Heart attack (H) 3/2016     History of angina      Hypertension      Low back pain      Sicca syndrome (H)      Sjogren's syndrome (H)     sees specialist; dentist     Unspecified hypothyroidism          PAST SURGICAL HISTORY:  Past Surgical History:   Procedure Laterality Date     ARTHROSCOPY KNEE  10/5/2012    R Procedure: ARTHROSCOPY KNEE;  RIGHT KNEE ARTHROSCOPY, PARTIAL MEDIAL MENISCECTOMY;  Surgeon: Karli Zaragoza MD;  Location:  [facility]     BUNIONECTOMY  ~2010    L foot.      C NONSPECIFIC PROCEDURE  1976    D&C     COLONOSCOPY  1/17    normal; no repeat.     COLONOSCOPY N/A 1/17/2017    Procedure: COLONOSCOPY;  Surgeon: Fan Giordano MD;  Location:  GI     ENDOSCOPIC RELEASE  CARPAL TUNNEL  9/11/2012    R Procedure: ENDOSCOPIC RELEASE CARPAL TUNNEL;  Right Endoscopic carpal tunnel release, left ringer finger cortizon injection;  Surgeon: Anne Marie Catherine MD;  Location: [facility]     ESOPHAGOSCOPY, GASTROSCOPY, DUODENOSCOPY (EGD), COMBINED N/A 12/26/2014    Procedure: COMBINED ESOPHAGOSCOPY, GASTROSCOPY, DUODENOSCOPY (EGD);  Surgeon: Fan Giordano MD;  Location: [facility]     EXCISE EXOSTOSIS FOOT Left 12/1/2016    Procedure: EXCISE EXOSTOSIS FOOT;  Surgeon: Jody Gallagher DPM, Pod;  Location: [facility]     GYN SURGERY  1978    ovaries removed     HEART CATH, ANGIOPLASTY  3/4/16    dz <40%     HYSTERECTOMY, PAP NO LONGER INDICATED  1977    Hysterectomy     INJECT STEROID (LOCATION)  9/11/2012    Procedure: INJECT STEROID (LOCATION);;  Surgeon: Anne Marie Catherine MD;  [facility]: [facility]     LAPAROSCOPIC CHOLECYSTECTOMY N/A 4/7/2016    Procedure: LAPAROSCOPIC CHOLECYSTECTOMY;  Surgeon: Hans Medina MD;  Location: [facility]     ORTHOPEDIC SURGERY  ~2008    Right foot, bunionectomy      RIGHT HIP ORIF 4/1/2014       ROTATOR CUFF REPAIR RT/LT  ~1998    Lt shoulder; rotator cuff     SURGICAL HISTORY OF -   distant past    ablation for palpitations.     SURGICAL HISTORY OF -   June 2015    left carpal tunnel surgery         SOCIAL HISTORY:  Social History     Social History     Marital status:      Spouse name: N/A     Number of children: N/A     Years of education: N/A     Occupational History     Not on file.     Social History Main Topics     Smoking status: Never Smoker     Smokeless tobacco: Never Used     Alcohol use Yes      Comment: social; maybe a glass of wine or highball per week      Drug use: No     Sexual activity: Yes     Partners: Male     Other Topics Concern     Parent/Sibling W/ Cabg, Mi Or Angioplasty Before 65f 55m? Yes     Caffeine Concern No     1 capp. daily     Sleep Concern No     Special Diet No     Exercise Yes     walks 3 miles 6 days per week , curves 3 days per week  "    Social History Narrative    Dairy/d 0-1 servings/d.     Caffeine 0 servings/d    Exercise 6 x week walk and curves    Sunscreen used - Yes    Seatbelts used - Yes    Working smoke/CO detectors in the home - Yes    Guns stored in the home - Yes LOCKED    Self Breast Exams - sometimes    Self Testicular Exam - NA    Eye Exam up to date - Yes 4/2009    Dental Exam up to date - Yes 12/2009    Pap Smear up to date - Yes - Hysterectomy    Mammogram up to date - Yes 11/25/2009    PSA up to date - NA    Dexa Scan up to date - 2006    Flex Sig / Colonoscopy up to date - Yes 5/14/2008    Immunizations up to date -9-2007 tetanus    Abuse: Current or Past(Physical, Sexual or Emotional)- No    Do you feel safe in your environment - Yes    DAMION Shaffer CMA    11/25/2009 updated         FAMILY HISTORY:  Family History   Problem Relation Age of Onset     C.A.D. Mother 76     CANCER Mother      non Hodgekin's Lymphoma     HEART DISEASE Mother      enlarged heart     C.A.D. Father 59     HEART DISEASE Father      valve problem     Hypertension Brother      Hypertension Brother      Connective Tissue Disorder Daughter      scleroderma     Colon Cancer No family hx of          PHYSICAL EXAM:  Vital signs:  /75  Pulse 60  Temp 97.6  F (36.4  C) (Tympanic)  Resp 16  Ht 1.657 m (5' 5.25\")  Wt 58.7 kg (129 lb 6.4 oz)  SpO2 95%  BMI 21.37 kg/m2   GENERAL/CONSTITUTIONAL: No acute distress.   EYES: No scleral icterus.  NEUROLOGIC: Alert, oriented, answers questions appropriately.  INTEGUMENTARY: No jaundice.      LABS:  CBC RESULTS:   Recent Labs   Lab Test  04/17/17   0735   WBC  5.5   RBC  4.27   HGB  13.7   HCT  42.4   MCV  99   MCH  32.1   MCHC  32.3   RDW  13.0   PLT  236     Recent Labs   Lab Test  04/17/17   0735  01/12/17   1427   NA  142  140   POTASSIUM  4.6  4.0   CHLORIDE  106  101   CO2  28  32   ANIONGAP  8  7   GLC  108*  84   BUN  20  19   CR  0.73  0.68   CARMEN  9.3  9.3     Lab Results   Component Value Date    " AST 21 04/17/2017     Lab Results   Component Value Date    ALT 26 04/17/2017     No results found for: BILICONJ   Lab Results   Component Value Date    BILITOTAL 0.9 04/17/2017     Lab Results   Component Value Date    ALBUMIN 3.9 04/17/2017     Lab Results   Component Value Date    PROTTOTAL 7.4 04/17/2017      Lab Results   Component Value Date    ALKPHOS 60 04/17/2017         SPEP:  M-spike (12/13/16): 0.1 g/dL IgM kappa  4/27/17: 0.1 g/dL    Skeletal survey 1/13/17:  No convincing osseous lytic lesions to suggest multiple  myeloma.       ASSESSMENT/PLAN:  Ya Stahl is a 76 year old female with:    1) MGUS: SPEP with 0.1 g/dL M-spike, IgM kappa.  Normal calcium level, renal function, normal counts.  No evidence of lytic lesions on skeletal survey.    Her M-spike continues to be low and stable at 0.1 g/dL.    Will continue to monitor for now.  -RTC in 6 months with repeat labs    2) Macrocytosis: She does not have anemia or reticulocytosis.  Vitamin B12 and folate levels are normal.  It may be secondary to #1.  She has hypothyroidism, but is controlled on medication.    -monitor      I spent a total of 15 minutes with the patient, with over >50% of the time in counseling and/or coordination of care.       Vandana Kasper MD  Hematology/Oncology  [facility]        Vandana Kasper MD

## 2017-04-27 NOTE — NURSING NOTE
"Oncology Rooming Note    April 27, 2017 3:29 PM   Ya Stahl is a 53 year old female who presents for: Oncology Clinic Visit    Initial Vitals: /75  Pulse 60  Temp 97.6  F (36.4  C) (Tympanic)  Resp 16  Ht 1.657 m (5' 5.25\")  Wt 58.7 kg (129 lb 6.4 oz)  SpO2 95%  BMI 21.37 kg/m2 Estimated body mass index is 21.37 kg/(m^2) as calculated from the following:    Height as of this encounter: 1.657 m (5' 5.25\").    Weight as of this encounter: 58.7 kg (129 lb 6.4 oz). Body surface area is 1.64 meters squared.  No Pain (0) Comment: Data Unavailable   No LMP recorded. Patient has had a hysterectomy.  Allergies reviewed: Yes  Medications reviewed: Yes    Medications: Medication refills not needed today.  Pharmacy name entered into [facility]:    [facility] [address] [place] [id number] [facility] AT [place] [address] & Counts include 234 beds at the [facility] - [place], [place] - [address] [place] [address]    Clinical concerns:Follow-up     8 minutes for nursing intake (face to face time)     Ruchi Love MA  DISCHARGE PLAN:  Next appointments: See patient instruction section  Departure Mode: Ambulatory  Accompanied by: self  0 minutes for nursing discharge (face to face time)                   "

## 2017-04-28 ENCOUNTER — APPOINTMENT (OUTPATIENT)
Dept: GENERAL RADIOLOGY | Facility: CLINIC | Age: 77
End: 2017-04-28
Attending: ORTHOPAEDIC SURGERY
Payer: MEDICARE

## 2017-04-28 ENCOUNTER — HOSPITAL ENCOUNTER (OUTPATIENT)
Facility: CLINIC | Age: 77
Discharge: HOME | End: 2017-04-28
Attending: ORTHOPAEDIC SURGERY | Admitting: ORTHOPAEDIC SURGERY
Payer: MEDICARE

## 2017-04-28 ENCOUNTER — ANESTHESIA (OUTPATIENT)
Dept: SURGERY | Facility: CLINIC | Age: 77
End: 2017-04-28
Payer: MEDICARE

## 2017-04-28 ENCOUNTER — ANESTHESIA EVENT (OUTPATIENT)
Dept: SURGERY | Facility: CLINIC | Age: 77
End: 2017-04-28
Payer: MEDICARE

## 2017-04-28 VITALS
BODY MASS INDEX: 21.3 KG/M2 | HEART RATE: 55 BPM | SYSTOLIC BLOOD PRESSURE: 166 MMHG | OXYGEN SATURATION: 99 % | HEIGHT: 65 IN | TEMPERATURE: 98.3 F | WEIGHT: 127.87 LBS | DIASTOLIC BLOOD PRESSURE: 97 MMHG | RESPIRATION RATE: 16 BRPM

## 2017-04-28 DIAGNOSIS — M87.00 AVASCULAR NECROSIS OF BONE (H): Primary | ICD-10-CM

## 2017-04-28 LAB
GRAM STN SPEC: NORMAL
MICRO REPORT STATUS: NORMAL
SPECIMEN SOURCE: NORMAL

## 2017-04-28 PROCEDURE — 88304 TISSUE EXAM BY PATHOLOGIST: CPT | Mod: 26 | Performed by: ORTHOPAEDIC SURGERY

## 2017-04-28 PROCEDURE — 87070 CULTURE OTHR SPECIMN AEROBIC: CPT | Performed by: ORTHOPAEDIC SURGERY

## 2017-04-28 PROCEDURE — 25000125 ZZHC RX 250: Performed by: NURSE ANESTHETIST, CERTIFIED REGISTERED

## 2017-04-28 PROCEDURE — 37000009 ZZH ANESTHESIA TECHNICAL FEE, EACH ADDTL 15 MIN: Performed by: ORTHOPAEDIC SURGERY

## 2017-04-28 PROCEDURE — 25000128 H RX IP 250 OP 636: Performed by: ORTHOPAEDIC SURGERY

## 2017-04-28 PROCEDURE — 88311 DECALCIFY TISSUE: CPT | Mod: 26 | Performed by: ORTHOPAEDIC SURGERY

## 2017-04-28 PROCEDURE — 88311 DECALCIFY TISSUE: CPT | Performed by: ORTHOPAEDIC SURGERY

## 2017-04-28 PROCEDURE — 25000125 ZZHC RX 250: Performed by: ORTHOPAEDIC SURGERY

## 2017-04-28 PROCEDURE — 88312 SPECIAL STAINS GROUP 1: CPT | Mod: 26 | Performed by: ORTHOPAEDIC SURGERY

## 2017-04-28 PROCEDURE — 71000027 ZZH RECOVERY PHASE 2 EACH 15 MINS: Performed by: ORTHOPAEDIC SURGERY

## 2017-04-28 PROCEDURE — 40000278 XR SURGERY CARM FLUORO LESS THAN 5 MIN: Mod: TC

## 2017-04-28 PROCEDURE — 25000128 H RX IP 250 OP 636: Performed by: NURSE ANESTHETIST, CERTIFIED REGISTERED

## 2017-04-28 PROCEDURE — A9270 NON-COVERED ITEM OR SERVICE: HCPCS | Mod: GY | Performed by: PHYSICIAN ASSISTANT

## 2017-04-28 PROCEDURE — 25000132 ZZH RX MED GY IP 250 OP 250 PS 637: Mod: GY | Performed by: PHYSICIAN ASSISTANT

## 2017-04-28 PROCEDURE — 25800025 ZZH RX 258: Performed by: NURSE ANESTHETIST, CERTIFIED REGISTERED

## 2017-04-28 PROCEDURE — 87075 CULTR BACTERIA EXCEPT BLOOD: CPT | Performed by: ORTHOPAEDIC SURGERY

## 2017-04-28 PROCEDURE — 87205 SMEAR GRAM STAIN: CPT | Performed by: ORTHOPAEDIC SURGERY

## 2017-04-28 PROCEDURE — 88312 SPECIAL STAINS GROUP 1: CPT | Performed by: ORTHOPAEDIC SURGERY

## 2017-04-28 PROCEDURE — 88304 TISSUE EXAM BY PATHOLOGIST: CPT | Performed by: ORTHOPAEDIC SURGERY

## 2017-04-28 PROCEDURE — S0020 INJECTION, BUPIVICAINE HYDRO: HCPCS | Performed by: ORTHOPAEDIC SURGERY

## 2017-04-28 PROCEDURE — 27210794 ZZH OR GENERAL SUPPLY STERILE: Performed by: ORTHOPAEDIC SURGERY

## 2017-04-28 PROCEDURE — 37000008 ZZH ANESTHESIA TECHNICAL FEE, 1ST 30 MIN: Performed by: ORTHOPAEDIC SURGERY

## 2017-04-28 PROCEDURE — 36000058 ZZH SURGERY LEVEL 3 EA 15 ADDTL MIN: Performed by: ORTHOPAEDIC SURGERY

## 2017-04-28 PROCEDURE — 40000306 ZZH STATISTIC PRE PROC ASSESS II: Performed by: ORTHOPAEDIC SURGERY

## 2017-04-28 PROCEDURE — 36000060 ZZH SURGERY LEVEL 3 W FLUORO 1ST 30 MIN: Performed by: ORTHOPAEDIC SURGERY

## 2017-04-28 RX ORDER — CEFAZOLIN SODIUM 1 G/3ML
1 INJECTION, POWDER, FOR SOLUTION INTRAMUSCULAR; INTRAVENOUS SEE ADMIN INSTRUCTIONS
Status: DISCONTINUED | OUTPATIENT
Start: 2017-04-28 | End: 2017-04-28 | Stop reason: HOSPADM

## 2017-04-28 RX ORDER — HYDROMORPHONE HYDROCHLORIDE 1 MG/ML
.3-.5 INJECTION, SOLUTION INTRAMUSCULAR; INTRAVENOUS; SUBCUTANEOUS EVERY 10 MIN PRN
Status: DISCONTINUED | OUTPATIENT
Start: 2017-04-28 | End: 2017-04-28 | Stop reason: HOSPADM

## 2017-04-28 RX ORDER — PROMETHAZINE HYDROCHLORIDE 25 MG/ML
12.5 INJECTION, SOLUTION INTRAMUSCULAR; INTRAVENOUS
Status: DISCONTINUED | OUTPATIENT
Start: 2017-04-28 | End: 2017-04-28 | Stop reason: HOSPADM

## 2017-04-28 RX ORDER — LIDOCAINE HYDROCHLORIDE 10 MG/ML
INJECTION, SOLUTION INFILTRATION; PERINEURAL PRN
Status: DISCONTINUED | OUTPATIENT
Start: 2017-04-28 | End: 2017-04-28

## 2017-04-28 RX ORDER — TRAMADOL HYDROCHLORIDE 50 MG/1
50 TABLET ORAL EVERY 6 HOURS PRN
Status: DISCONTINUED | OUTPATIENT
Start: 2017-04-28 | End: 2017-04-28 | Stop reason: HOSPADM

## 2017-04-28 RX ORDER — HYDRALAZINE HYDROCHLORIDE 20 MG/ML
2.5-5 INJECTION INTRAMUSCULAR; INTRAVENOUS EVERY 10 MIN PRN
Status: DISCONTINUED | OUTPATIENT
Start: 2017-04-28 | End: 2017-04-28 | Stop reason: HOSPADM

## 2017-04-28 RX ORDER — PROPOFOL 10 MG/ML
INJECTION, EMULSION INTRAVENOUS PRN
Status: DISCONTINUED | OUTPATIENT
Start: 2017-04-28 | End: 2017-04-28

## 2017-04-28 RX ORDER — ACETAMINOPHEN 325 MG/1
650 TABLET ORAL EVERY 6 HOURS PRN
Qty: 60 TABLET | Refills: 0 | Status: SHIPPED | OUTPATIENT
Start: 2017-04-28 | End: 2018-10-17

## 2017-04-28 RX ORDER — SODIUM CHLORIDE, SODIUM LACTATE, POTASSIUM CHLORIDE, CALCIUM CHLORIDE 600; 310; 30; 20 MG/100ML; MG/100ML; MG/100ML; MG/100ML
INJECTION, SOLUTION INTRAVENOUS CONTINUOUS PRN
Status: DISCONTINUED | OUTPATIENT
Start: 2017-04-28 | End: 2017-04-28

## 2017-04-28 RX ORDER — ONDANSETRON 4 MG/1
4 TABLET, ORALLY DISINTEGRATING ORAL
Status: DISCONTINUED | OUTPATIENT
Start: 2017-04-28 | End: 2017-04-28 | Stop reason: HOSPADM

## 2017-04-28 RX ORDER — FENTANYL CITRATE 50 UG/ML
25-50 INJECTION, SOLUTION INTRAMUSCULAR; INTRAVENOUS
Status: DISCONTINUED | OUTPATIENT
Start: 2017-04-28 | End: 2017-04-28 | Stop reason: HOSPADM

## 2017-04-28 RX ORDER — ONDANSETRON 2 MG/ML
4 INJECTION INTRAMUSCULAR; INTRAVENOUS EVERY 30 MIN PRN
Status: DISCONTINUED | OUTPATIENT
Start: 2017-04-28 | End: 2017-04-28 | Stop reason: HOSPADM

## 2017-04-28 RX ORDER — NALOXONE HYDROCHLORIDE 0.4 MG/ML
.1-.4 INJECTION, SOLUTION INTRAMUSCULAR; INTRAVENOUS; SUBCUTANEOUS
Status: DISCONTINUED | OUTPATIENT
Start: 2017-04-28 | End: 2017-04-28 | Stop reason: HOSPADM

## 2017-04-28 RX ORDER — SODIUM CHLORIDE, SODIUM LACTATE, POTASSIUM CHLORIDE, CALCIUM CHLORIDE 600; 310; 30; 20 MG/100ML; MG/100ML; MG/100ML; MG/100ML
INJECTION, SOLUTION INTRAVENOUS CONTINUOUS
Status: DISCONTINUED | OUTPATIENT
Start: 2017-04-28 | End: 2017-04-28 | Stop reason: HOSPADM

## 2017-04-28 RX ORDER — CEFAZOLIN SODIUM 2 G/100ML
2 INJECTION, SOLUTION INTRAVENOUS
Status: DISCONTINUED | OUTPATIENT
Start: 2017-04-28 | End: 2017-04-28

## 2017-04-28 RX ORDER — MEPERIDINE HYDROCHLORIDE 25 MG/ML
12.5 INJECTION INTRAMUSCULAR; INTRAVENOUS; SUBCUTANEOUS
Status: DISCONTINUED | OUTPATIENT
Start: 2017-04-28 | End: 2017-04-28 | Stop reason: HOSPADM

## 2017-04-28 RX ORDER — TRAMADOL HYDROCHLORIDE 50 MG/1
50 TABLET ORAL EVERY 6 HOURS PRN
Qty: 24 TABLET | Refills: 0 | Status: SHIPPED | OUTPATIENT
Start: 2017-04-28 | End: 2017-06-27

## 2017-04-28 RX ORDER — DROPERIDOL 2.5 MG/ML
0.62 INJECTION, SOLUTION INTRAMUSCULAR; INTRAVENOUS
Status: DISCONTINUED | OUTPATIENT
Start: 2017-04-28 | End: 2017-04-28 | Stop reason: CLARIF

## 2017-04-28 RX ORDER — GABAPENTIN 300 MG/1
300 CAPSULE ORAL 3 TIMES DAILY
Qty: 9 CAPSULE | Refills: 0 | Status: SHIPPED | OUTPATIENT
Start: 2017-04-28 | End: 2017-06-27

## 2017-04-28 RX ORDER — IBUPROFEN 600 MG/1
600 TABLET, FILM COATED ORAL EVERY 6 HOURS PRN
Qty: 60 TABLET | Refills: 0 | Status: SHIPPED | OUTPATIENT
Start: 2017-04-28 | End: 2018-10-17

## 2017-04-28 RX ORDER — ONDANSETRON 4 MG/1
4 TABLET, ORALLY DISINTEGRATING ORAL EVERY 30 MIN PRN
Status: DISCONTINUED | OUTPATIENT
Start: 2017-04-28 | End: 2017-04-28 | Stop reason: HOSPADM

## 2017-04-28 RX ORDER — DEXAMETHASONE SODIUM PHOSPHATE 4 MG/ML
4 INJECTION, SOLUTION INTRA-ARTICULAR; INTRALESIONAL; INTRAMUSCULAR; INTRAVENOUS; SOFT TISSUE EVERY 10 MIN PRN
Status: DISCONTINUED | OUTPATIENT
Start: 2017-04-28 | End: 2017-04-28 | Stop reason: HOSPADM

## 2017-04-28 RX ORDER — HYDROXYZINE HYDROCHLORIDE 10 MG/1
10 TABLET, FILM COATED ORAL
Status: DISCONTINUED | OUTPATIENT
Start: 2017-04-28 | End: 2017-04-28 | Stop reason: HOSPADM

## 2017-04-28 RX ORDER — CEFAZOLIN SODIUM 2 G/100ML
2 INJECTION, SOLUTION INTRAVENOUS
Status: COMPLETED | OUTPATIENT
Start: 2017-04-28 | End: 2017-04-28

## 2017-04-28 RX ORDER — METOCLOPRAMIDE HYDROCHLORIDE 5 MG/ML
5 INJECTION INTRAMUSCULAR; INTRAVENOUS EVERY 6 HOURS PRN
Status: DISCONTINUED | OUTPATIENT
Start: 2017-04-28 | End: 2017-04-28 | Stop reason: HOSPADM

## 2017-04-28 RX ORDER — DIMENHYDRINATE 50 MG/ML
25 INJECTION, SOLUTION INTRAMUSCULAR; INTRAVENOUS
Status: DISCONTINUED | OUTPATIENT
Start: 2017-04-28 | End: 2017-04-28 | Stop reason: HOSPADM

## 2017-04-28 RX ORDER — FENTANYL CITRATE 50 UG/ML
INJECTION, SOLUTION INTRAMUSCULAR; INTRAVENOUS PRN
Status: DISCONTINUED | OUTPATIENT
Start: 2017-04-28 | End: 2017-04-28

## 2017-04-28 RX ORDER — ACETAMINOPHEN 325 MG/1
650 TABLET ORAL EVERY 6 HOURS PRN
Status: DISCONTINUED | OUTPATIENT
Start: 2017-04-28 | End: 2017-04-28 | Stop reason: HOSPADM

## 2017-04-28 RX ORDER — GABAPENTIN 300 MG/1
300 CAPSULE ORAL 3 TIMES DAILY
Status: DISCONTINUED | OUTPATIENT
Start: 2017-04-28 | End: 2017-04-28 | Stop reason: HOSPADM

## 2017-04-28 RX ORDER — LABETALOL HYDROCHLORIDE 5 MG/ML
10 INJECTION, SOLUTION INTRAVENOUS
Status: DISCONTINUED | OUTPATIENT
Start: 2017-04-28 | End: 2017-04-28 | Stop reason: HOSPADM

## 2017-04-28 RX ORDER — IBUPROFEN 600 MG/1
600 TABLET, FILM COATED ORAL
Status: DISCONTINUED | OUTPATIENT
Start: 2017-04-28 | End: 2017-04-28 | Stop reason: HOSPADM

## 2017-04-28 RX ORDER — LIDOCAINE 40 MG/G
CREAM TOPICAL
Status: DISCONTINUED | OUTPATIENT
Start: 2017-04-28 | End: 2017-04-28 | Stop reason: HOSPADM

## 2017-04-28 RX ORDER — METOCLOPRAMIDE 5 MG/1
5 TABLET ORAL EVERY 6 HOURS PRN
Status: DISCONTINUED | OUTPATIENT
Start: 2017-04-28 | End: 2017-04-28 | Stop reason: HOSPADM

## 2017-04-28 RX ORDER — BUPIVACAINE HYDROCHLORIDE 5 MG/ML
INJECTION, SOLUTION PERINEURAL PRN
Status: DISCONTINUED | OUTPATIENT
Start: 2017-04-28 | End: 2017-04-28 | Stop reason: HOSPADM

## 2017-04-28 RX ORDER — IBUPROFEN 600 MG/1
600 TABLET, FILM COATED ORAL EVERY 6 HOURS PRN
Status: DISCONTINUED | OUTPATIENT
Start: 2017-04-28 | End: 2017-04-28 | Stop reason: HOSPADM

## 2017-04-28 RX ADMIN — FENTANYL CITRATE 50 MCG: 50 INJECTION, SOLUTION INTRAMUSCULAR; INTRAVENOUS at 09:34

## 2017-04-28 RX ADMIN — SODIUM CHLORIDE, POTASSIUM CHLORIDE, SODIUM LACTATE AND CALCIUM CHLORIDE: 600; 310; 30; 20 INJECTION, SOLUTION INTRAVENOUS at 09:01

## 2017-04-28 RX ADMIN — MIDAZOLAM HYDROCHLORIDE 1 MG: 1 INJECTION, SOLUTION INTRAMUSCULAR; INTRAVENOUS at 09:16

## 2017-04-28 RX ADMIN — PROPOFOL 30 MG: 10 INJECTION, EMULSION INTRAVENOUS at 09:33

## 2017-04-28 RX ADMIN — LIDOCAINE HYDROCHLORIDE 30 MG: 10 INJECTION, SOLUTION INFILTRATION; PERINEURAL at 09:29

## 2017-04-28 RX ADMIN — MIDAZOLAM HYDROCHLORIDE 1 MG: 1 INJECTION, SOLUTION INTRAMUSCULAR; INTRAVENOUS at 09:18

## 2017-04-28 RX ADMIN — FENTANYL CITRATE 50 MCG: 50 INJECTION, SOLUTION INTRAMUSCULAR; INTRAVENOUS at 09:22

## 2017-04-28 RX ADMIN — ACETAMINOPHEN 650 MG: 325 TABLET, FILM COATED ORAL at 10:15

## 2017-04-28 RX ADMIN — CEFAZOLIN SODIUM 2 G: 2 INJECTION, SOLUTION INTRAVENOUS at 09:17

## 2017-04-28 RX ADMIN — PROPOFOL 40 MG: 10 INJECTION, EMULSION INTRAVENOUS at 09:29

## 2017-04-28 RX ADMIN — FENTANYL CITRATE 50 MCG: 50 INJECTION, SOLUTION INTRAMUSCULAR; INTRAVENOUS at 09:29

## 2017-04-28 RX ADMIN — PROPOFOL 30 MG: 10 INJECTION, EMULSION INTRAVENOUS at 09:38

## 2017-04-28 RX ADMIN — SODIUM CHLORIDE, POTASSIUM CHLORIDE, SODIUM LACTATE AND CALCIUM CHLORIDE: 600; 310; 30; 20 INJECTION, SOLUTION INTRAVENOUS at 08:43

## 2017-04-28 NOTE — IP AVS SNAPSHOT
MRN:[id number]                      After Visit Summary   4/28/2017    Ya Stahl    MRN: [id number]           Thank you!     Thank you for choosing [facility] for your care. Our goal is always to provide you with excellent care. Hearing back from our patients is one way we can continue to improve our services. Please take a few minutes to complete the written survey that you may receive in the mail after you visit. If you would like to speak to someone directly about your visit please contact Patient Relations at [phone]. Thank you!          Patient Information     Date Of Birth          1940        About your hospital stay     You were admitted on:  April 28, 2017 You last received care in the:  [facility] PreOP/PostOP    You were discharged on:  April 28, 2017       Who to Call     For medical emergencies, please call 801.  For non-urgent questions about your medical care, please call your primary care provider or clinic, [phone]  For questions related to your surgery, please call your surgery clinic        Attending Provider     Provider Fabián Wheatley MD Orthopaedic Surgery       Primary Care Provider Office Phone # Fax #    Scarlett Spencer [phone]       [facility] [address] [facility] [address]        After Care Instructions     Activity       Ambulate with assistance until independent            Discharge Instructions       Review discharge instructions as directed by Provider.            Discharge Instructions       Patient to arrange for return to clinic appointment in two weeks.            Elevate affected extremity           Ice to affected area       Ice pack to affected area PRN (as needed).            No Alcohol       for 24 hours post-procedure            No dressing change       For 3 days.  Then may change dressing and redress with clean gauze and dressing.            No driving or  operating machinery       until the day after procedure            Weight bearing status - As tolerated                 Your next 10 appointments already scheduled     Oct 12, 2017  8:00 AM CDT   LAB with  LAB   [facility]    [address] [address] [phone]           Patient must bring picture ID.  Patient should be prepared to give a urine specimen  Please do not eat 10-12 hours before your appointment if you are coming in fasting for labs on lipids, cholesterol, or glucose (sugar).  Pregnant women should follow their Care Team instructions. Water with medications is okay. Do not drink coffee or other fluids.   If you have concerns about taking  your medications, please ask at office or if scheduling via [employer], send a message by clicking on Secure Messaging, Message Your Care Team.            Oct 19, 2017  2:15 PM CDT   Return Visit with Vandana Kasper MD   [facility] ([facility])    [facility]  [address]  [facility] [id number] [phone]              Further instructions from your care team       GENERAL ANESTHESIA OR SEDATION ADULT DISCHARGE INSTRUCTIONS   SPECIAL PRECAUTIONS FOR 24 HOURS AFTER SURGERY    IT IS NOT UNUSUAL TO FEEL LIGHT-HEADED OR FAINT, UP TO 24 HOURS AFTER SURGERY OR WHILE TAKING PAIN MEDICATION.  IF YOU HAVE THESE SYMPTOMS; SIT FOR A FEW MINUTES BEFORE STANDING AND HAVE SOMEONE ASSIST YOU WHEN YOU GET UP TO WALK OR USE THE BATHROOM.    YOU SHOULD REST AND RELAX FOR THE NEXT 24 HOURS AND YOU MUST MAKE ARRANGEMENTS TO HAVE SOMEONE STAY WITH YOU FOR AT LEAST 24 HOURS AFTER YOUR DISCHARGE.  AVOID HAZARDOUS AND STRENUOUS ACTIVITIES.  DO NOT MAKE IMPORTANT DECISIONS FOR 24 HOURS.    DO NOT DRIVE ANY VEHICLE OR OPERATE MECHANICAL EQUIPMENT FOR 24 HOURS FOLLOWING THE END OF YOUR SURGERY.  EVEN THOUGH YOU MAY FEEL NORMAL, YOUR  REACTIONS MAY BE AFFECTED BY THE MEDICATION YOU HAVE RECEIVED.    DO NOT DRINK ALCOHOLIC BEVERAGES FOR 24 HOURS FOLLOWING YOUR SURGERY.    DRINK CLEAR LIQUIDS (APPLE JUICE, GINGER ALE, 7-UP, BROTH, ETC.).  PROGRESS TO YOUR REGULAR DIET AS YOU FEEL ABLE.    YOU MAY HAVE A DRY MOUTH, A SORE THROAT, MUSCLES ACHES OR TROUBLE SLEEPING.  THESE SHOULD GO AWAY AFTER 24 HOURS.    CALL YOUR DOCTOR FOR ANY OF THE FOLLOWING:  SIGNS OF INFECTION (FEVER, GROWING TENDERNESS AT THE SURGERY SITE, A LARGE AMOUNT OF DRAINAGE OR BLEEDING, SEVERE PAIN, FOUL-SMELLING DRAINAGE, REDNESS OR SWELLING.    IT HAS BEEN OVER 8 TO 10 HOURS SINCE SURGERY AND YOU ARE STILL NOT ABLE TO URINATE (PASS WATER).       HOME CARE FOLLOWING MINOR SURGERY    DRESSING  Keep dressing dry and in place until your doctor instructs you to remove the dressing.    DRAINAGE  There should be minimal drainage. If bleeding should occur and soaks through the dressing apply a sterile, dry dressing over it and tape in place. If bleeding persists, apply gentle, steady pressure with your hand over the dressing for 5 minutes. If the bleeding does not stop, call your doctor.    SKIN CLOSURE  You may have stitches or special skin closures. You will be given an appointment for the removal of any external stitches. You may have stitches under the skin which will absorb. You may have steri-strips over the incision. These look like thin tapes and will peel off in 5-7 days. If they don t after 7 days, you may carefully remove them. You may shower with the steri-strips but do not soak them, as with swimming or taking a bath. A protective covering of plastic may be placed over external stitches for showering.    NOTIFY YOUR PHYSICIAN IF YOU HAVE ANY OF THE FOLLOWING SYMPTOMS:    1. Fever  2. Excessive bleeding or drainage  3. Disruption of the skin closure  4. Swelling, redness or excessive tenderness at the site  5. Severe pain  6. Drainage that is green, yellow, thick white or has a  "bad odor      Maximum acetaminophen (Tylenol) dose from all sources should not exceed 4 grams (4000 mg) per day.  You received 650 mg at 10:15 am              Pending Results     Date and Time Order Name Status Description    2017 0938 Surgical pathology exam In process     2017 0938 Wound Culture Aerobic Bacterial In process     2017 0938 Gram stain In process     2017 0938 Anaerobic bacterial culture In process             Admission Information     Date & Time Provider Department Dept. Phone    2017 Fabián Phipps MD [facility] PreOP/PostOP [phone]      Your Vitals Were     Blood Pressure Temperature Respirations Height Weight Pulse Oximetry    157/89 98.2  F (36.8  C) (Temporal) 27 1.657 m (5' 5.25\") 58 kg (127 lb 13.9 oz) 98%    BMI (Body Mass Index)                   21.12 kg/m2           MyChart Information     [employer] lets you send messages to your doctor, view your test results, renew your prescriptions, schedule appointments and more. To sign up, go to [email or web address] . Click on \"Log in\" on the left side of the screen, which will take you to the Welcome page. Then click on \"Sign up Now\" on the right side of the page.     You will be asked to enter the access code listed below, as well as some personal information. Please follow the directions to create your username and password.     Your access code is: [email or web address]  Expires: 2017 10:54 AM     Your access code will  in 90 days. If you need help or a new code, please call your [facility] clinic or [phone].        Care EveryWhere ID     This is your Care EveryWhere ID. This could be used by other organizations to access your [facility] medical records  [id number]           Review of your medicines      START taking        Dose / Directions    acetaminophen 325 MG tablet   Commonly known as:  TYLENOL   Used for:  Avascular necrosis of bone (H)        Dose:  650 mg   Take 2 tablets (650 mg) by " mouth every 6 hours as needed for mild pain or fever   Quantity:  60 tablet   Refills:  0       gabapentin 300 MG capsule   Commonly known as:  NEURONTIN   Used for:  Avascular necrosis of bone (H)        Dose:  300 mg   Take 1 capsule (300 mg) by mouth 3 times daily for 3 days   Quantity:  9 capsule   Refills:  0       ibuprofen 600 MG tablet   Commonly known as:  ADVIL/MOTRIN   Used for:  Avascular necrosis of bone (H)        Dose:  600 mg   Take 1 tablet (600 mg) by mouth every 6 hours as needed for moderate pain   Quantity:  60 tablet   Refills:  0       traMADol 50 MG tablet   Commonly known as:  ULTRAM   Used for:  Avascular necrosis of bone (H)        Dose:  50 mg   Take 1 tablet (50 mg) by mouth every 6 hours as needed for breakthrough pain   Quantity:  24 tablet   Refills:  0         CONTINUE these medicines which may have CHANGED, or have new prescriptions. If we are uncertain of the size of tablets/capsules you have at home, strength may be listed as something that might have changed.        Dose / Directions    MIRALAX powder   This may have changed:  See the new instructions.   Used for:  Chronic constipation   Generic drug:  polyethylene glycol        STIR 1 CAPFUL (17GM) IN 8 OZ OF LIQUID AND DRINK   Quantity:  1 BOTTLE   Refills:  1 YEAR         CONTINUE these medicines which have NOT CHANGED        Dose / Directions    ACAI PO        Dose:  25 mg   Take 25 mg by mouth daily   Refills:  0       alendronate 70 MG tablet   Commonly known as:  FOSAMAX   Used for:  Osteoporosis        Dose:  70 mg   Take 1 tablet (70 mg) by mouth every 7 days Take with over 8 ounces water and stay upright for at least 30 minutes after dose.  Take at least 60 minutes before breakfast   Quantity:  12 tablet   Refills:  3       aspirin 81 MG EC tablet        Dose:  81 mg   Take 1 tablet (81 mg) by mouth daily   Quantity:  1 tablet   Refills:  0       atorvastatin 20 MG tablet   Commonly known as:  LIPITOR   Used for:   Hyperlipidemia LDL goal <70        Dose:  20 mg   Take 1 tablet (20 mg) by mouth daily   Quantity:  102 tablet   Refills:  3       calcium citrate-vitamin D 315-250 MG-UNIT Tabs per tablet   Commonly known as:  CITRACAL   Used for:  Osteoporosis        Take 2 tablets in the morning and 1 tablet in the evening   Quantity:  120 tablet   Refills:  0       CENTRUM SILVER per tablet        Dose:  1 tablet   Take 1 tablet by mouth daily   Refills:  0       cevimeline 30 MG capsule   Commonly known as:  EVOXAC        Dose:  30 mg   Take 30 mg by mouth 3 times daily Reported on 3/6/2017   Refills:  0       conjugated estrogens cream   Commonly known as:  PREMARIN   Used for:  Atrophic vaginitis        Dose:  1 g   Place 1 g vaginally twice a week prn   Quantity:  30 g   Refills:  3       levothyroxine 88 MCG tablet   Commonly known as:  SYNTHROID/LEVOTHROID   Used for:  Hypothyroidism, unspecified type        Dose:  88 mcg   Take 1 tablet (88 mcg) by mouth daily   Quantity:  102 tablet   Refills:  3       nitroglycerin 0.4 MG sublingual tablet   Commonly known as:  NITROSTAT   Used for:  Acute chest pain        Dose:  0.4 mg   Place 1 tablet (0.4 mg) under the tongue every 5 minutes as needed for chest pain if you are still having symptoms after 3 doses (15 minutes) call 911.   Quantity:  25 tablet   Refills:  0       OMEGA-3 FISH OIL PO        Dose:  1 g   Take 1 g by mouth 2 times daily (with meals)   Refills:  0       OMEPRAZOLE PO        Dose:  20 mg   Take 20 mg by mouth 2 times daily (before meals)   Refills:  0       * OSTEO BI-FLEX ADV TRIPLE ST PO        Dose:  2 tablet   Take 2 tablets by mouth daily   Refills:  0       * LUTEIN 20 PO        Dose:  1 tablet   Take 1 tablet by mouth daily   Refills:  0       * Notice:  This list has 2 medication(s) that are the same as other medications prescribed for you. Read the directions carefully, and ask your doctor or other care provider to review them with you.          Where to get your medicines      These medications were sent to [id number] - [place] - [address] AT [facility] [address] & [facility]  [address] [place] [address]     Phone:  [phone]     acetaminophen 325 MG tablet    gabapentin 300 MG capsule    ibuprofen 600 MG tablet         Some of these will need a paper prescription and others can be bought over the counter. Ask your nurse if you have questions.     Bring a paper prescription for each of these medications     traMADol 50 MG tablet                Protect others around you: Learn how to safely use, store and throw away your medicines at www.disposemymeds.org.             Medication List: This is a list of all your medications and when to take them. Check marks below indicate your daily home schedule. Keep this list as a reference.      Medications           Morning Afternoon Evening Bedtime As Needed    ACAI PO   Take 25 mg by mouth daily                                acetaminophen 325 MG tablet   Commonly known as:  TYLENOL   Take 2 tablets (650 mg) by mouth every 6 hours as needed for mild pain or fever   Last time this was given:  650 mg on 4/28/2017 10:15 AM                                alendronate 70 MG tablet   Commonly known as:  FOSAMAX   Take 1 tablet (70 mg) by mouth every 7 days Take with over 8 ounces water and stay upright for at least 30 minutes after dose.  Take at least 60 minutes before breakfast                                aspirin 81 MG EC tablet   Take 1 tablet (81 mg) by mouth daily                                atorvastatin 20 MG tablet   Commonly known as:  LIPITOR   Take 1 tablet (20 mg) by mouth daily                                calcium citrate-vitamin D 315-250 MG-UNIT Tabs per tablet   Commonly known as:  CITRACAL   Take 2 tablets in the morning and 1 tablet in the evening                                CENTRUM SILVER per tablet   Take 1 tablet by mouth daily                                 cevimeline 30 MG capsule   Commonly known as:  EVOXAC   Take 30 mg by mouth 3 times daily Reported on 3/6/2017                                conjugated estrogens cream   Commonly known as:  PREMARIN   Place 1 g vaginally twice a week prn                                gabapentin 300 MG capsule   Commonly known as:  NEURONTIN   Take 1 capsule (300 mg) by mouth 3 times daily for 3 days                                ibuprofen 600 MG tablet   Commonly known as:  ADVIL/MOTRIN   Take 1 tablet (600 mg) by mouth every 6 hours as needed for moderate pain                                levothyroxine 88 MCG tablet   Commonly known as:  SYNTHROID/LEVOTHROID   Take 1 tablet (88 mcg) by mouth daily                                MIRALAX powder   STIR 1 CAPFUL (17GM) IN 8 OZ OF LIQUID AND DRINK   Generic drug:  polyethylene glycol                                nitroglycerin 0.4 MG sublingual tablet   Commonly known as:  NITROSTAT   Place 1 tablet (0.4 mg) under the tongue every 5 minutes as needed for chest pain if you are still having symptoms after 3 doses (15 minutes) call 911.                                OMEGA-3 FISH OIL PO   Take 1 g by mouth 2 times daily (with meals)                                OMEPRAZOLE PO   Take 20 mg by mouth 2 times daily (before meals)                                * OSTEO BI-FLEX ADV TRIPLE ST PO   Take 2 tablets by mouth daily                                * LUTEIN 20 PO   Take 1 tablet by mouth daily                                traMADol 50 MG tablet   Commonly known as:  ULTRAM   Take 1 tablet (50 mg) by mouth every 6 hours as needed for breakthrough pain                                * Notice:  This list has 2 medication(s) that are the same as other medications prescribed for you. Read the directions carefully, and ask your doctor or other care provider to review them with you.

## 2017-04-28 NOTE — OR NURSING
Pt with elevated BP and low HR (40s - 50s).  Dr Kirby made aware, ok to discharge patient, believes it is do to the epi in the local medication.

## 2017-04-28 NOTE — ANESTHESIA PREPROCEDURE EVALUATION
Anesthesia Evaluation     . Pt has had prior anesthetic. Type: General, MAC and Regional           ROS/MED HX    ENT/Pulmonary:  - neg pulmonary ROS     Neurologic:  - neg neurologic ROS     Cardiovascular:     (+) hypertension--CAD, angina-with excertion, -. : . . . :. .       METS/Exercise Tolerance:     Hematologic:  - neg hematologic  ROS       Musculoskeletal:  - neg musculoskeletal ROS       GI/Hepatic:     (+) GERD Asymptomatic on medication,       Renal/Genitourinary:  - ROS Renal section negative       Endo:     (+) thyroid problem hypothyroidism, .      Psychiatric:  - neg psychiatric ROS       Infectious Disease:  - neg infectious disease ROS       Malignancy:      - no malignancy   Other:    (+) No chance of pregnancy C-spine cleared: N/A, no H/O Chronic Pain,no other significant disability   - neg other ROS                 Physical Exam  Normal systems: cardiovascular, pulmonary and dental    Airway   Mallampati: II  TM distance: >3 FB  Neck ROM: full    Dental     Cardiovascular       Pulmonary                     Anesthesia Plan      History & Physical Review  History and physical reviewed and following examination; no interval change.    ASA Status:  3 .    NPO Status:  > 8 hours    Plan for MAC with Propofol induction. Maintenance will be Balanced.  Reason for MAC:  Deep or markedly invasive procedure (G8)  PONV prophylaxis:  Ondansetron (or other 5HT-3) and Dexamethasone or Solumedrol  Surgeon will perform a local block in OR      Postoperative Care  Postoperative pain management:  IV analgesics.      Consents  Anesthetic plan, risks, benefits and alternatives discussed with:  Patient.  Use of blood products discussed: Yes.   Use of blood products discussed with Patient.  .                          .

## 2017-04-28 NOTE — ANESTHESIA POSTPROCEDURE EVALUATION
Patient: Ya Stahl    Procedure(s):  5th resection metatarsophalangeal arthroplasty, irrigation and debridement of 5th metatarsophalangeal joint, left foot   - Wound Class: I-Clean    Diagnosis:osteomyelitis  Diagnosis Additional Information: Osteomyelitis, Avascular necrosis 5th metatarsal head, left foot  S/p exostectomy 5th metatarsal head, left foot    Anesthesia Type:  MAC    Note:  Anesthesia Post Evaluation    Patient location during evaluation: PACU  Patient participation: Able to fully participate in evaluation  Level of consciousness: awake and alert  Pain management: adequate  Airway patency: patent  Cardiovascular status: acceptable  Respiratory status: acceptable  Hydration status: acceptable  PONV: controlled     Anesthetic complications: None          Last vitals:  Vitals:    04/28/17 1105 04/28/17 1120 04/28/17 1128   BP: (!) 174/96 (!) 147/97 (!) 166/97   Pulse: 55     Resp: 18 16 16   Temp: 98  F (36.7  C)  98.3  F (36.8  C)   SpO2: 96% 99% 99%         Electronically Signed By: Artemio Kirby MD  April 28, 2017  12:17 PM

## 2017-04-28 NOTE — ANESTHESIA CARE TRANSFER NOTE
Patient: Ya Stahl    Procedure(s):  5th resection metatarsophalangeal arthroplasty, irrigation and debridement of 5th metatarsophalangeal joint, left foot   - Wound Class: I-Clean    Diagnosis: osteomyelitis  Diagnosis Additional Information: No value filed.    Anesthesia Type:   MAC     Note:  Airway :Room Air  Patient transferred to:Phase II        Vitals: (Last set prior to Anesthesia Care Transfer)    CRNA VITALS  4/28/2017 0924 - 4/28/2017 1001      4/28/2017             NIBP: 142/84    NIBP Mean: 111                Electronically Signed By: SINTIA Mota CRNA  April 28, 2017  10:01 AM

## 2017-04-28 NOTE — DISCHARGE INSTRUCTIONS
GENERAL ANESTHESIA OR SEDATION ADULT DISCHARGE INSTRUCTIONS   SPECIAL PRECAUTIONS FOR 24 HOURS AFTER SURGERY    IT IS NOT UNUSUAL TO FEEL LIGHT-HEADED OR FAINT, UP TO 24 HOURS AFTER SURGERY OR WHILE TAKING PAIN MEDICATION.  IF YOU HAVE THESE SYMPTOMS; SIT FOR A FEW MINUTES BEFORE STANDING AND HAVE SOMEONE ASSIST YOU WHEN YOU GET UP TO WALK OR USE THE BATHROOM.    YOU SHOULD REST AND RELAX FOR THE NEXT 24 HOURS AND YOU MUST MAKE ARRANGEMENTS TO HAVE SOMEONE STAY WITH YOU FOR AT LEAST 24 HOURS AFTER YOUR DISCHARGE.  AVOID HAZARDOUS AND STRENUOUS ACTIVITIES.  DO NOT MAKE IMPORTANT DECISIONS FOR 24 HOURS.    DO NOT DRIVE ANY VEHICLE OR OPERATE MECHANICAL EQUIPMENT FOR 24 HOURS FOLLOWING THE END OF YOUR SURGERY.  EVEN THOUGH YOU MAY FEEL NORMAL, YOUR REACTIONS MAY BE AFFECTED BY THE MEDICATION YOU HAVE RECEIVED.    DO NOT DRINK ALCOHOLIC BEVERAGES FOR 24 HOURS FOLLOWING YOUR SURGERY.    DRINK CLEAR LIQUIDS (APPLE JUICE, GINGER ALE, 7-UP, BROTH, ETC.).  PROGRESS TO YOUR REGULAR DIET AS YOU FEEL ABLE.    YOU MAY HAVE A DRY MOUTH, A SORE THROAT, MUSCLES ACHES OR TROUBLE SLEEPING.  THESE SHOULD GO AWAY AFTER 24 HOURS.    CALL YOUR DOCTOR FOR ANY OF THE FOLLOWING:  SIGNS OF INFECTION (FEVER, GROWING TENDERNESS AT THE SURGERY SITE, A LARGE AMOUNT OF DRAINAGE OR BLEEDING, SEVERE PAIN, FOUL-SMELLING DRAINAGE, REDNESS OR SWELLING.    IT HAS BEEN OVER 8 TO 10 HOURS SINCE SURGERY AND YOU ARE STILL NOT ABLE TO URINATE (PASS WATER).       HOME CARE FOLLOWING MINOR SURGERY    DRESSING  Keep dressing dry and in place until your doctor instructs you to remove the dressing.    DRAINAGE  There should be minimal drainage. If bleeding should occur and soaks through the dressing apply a sterile, dry dressing over it and tape in place. If bleeding persists, apply gentle, steady pressure with your hand over the dressing for 5 minutes. If the bleeding does not stop, call your doctor.    SKIN CLOSURE  You may have stitches or special skin  closures. You will be given an appointment for the removal of any external stitches. You may have stitches under the skin which will absorb. You may have steri-strips over the incision. These look like thin tapes and will peel off in 5-7 days. If they don t after 7 days, you may carefully remove them. You may shower with the steri-strips but do not soak them, as with swimming or taking a bath. A protective covering of plastic may be placed over external stitches for showering.    NOTIFY YOUR PHYSICIAN IF YOU HAVE ANY OF THE FOLLOWING SYMPTOMS:    1. Fever  2. Excessive bleeding or drainage  3. Disruption of the skin closure  4. Swelling, redness or excessive tenderness at the site  5. Severe pain  6. Drainage that is green, yellow, thick white or has a bad odor      Maximum acetaminophen (Tylenol) dose from all sources should not exceed 4 grams (4000 mg) per day.  You received 650 mg at 10:15 am

## 2017-04-28 NOTE — BRIEF OP NOTE
[facility] Brief Operative Note    Pre-operative diagnosis: Osteomyelitis 5th metatarsal head, left foot  S/p exostectomy 5th metatarsal head, left foot   Post-operative diagnosis Avascular necrosis 5th metatarsal head, left foot  S/p exostectomy 5th metatarsal head, left foot   Procedure: Procedure(s):  5th resection metatarsophalangeal arthroplasty, irrigation and debridement of 5th metatarsophalangeal joint, left foot   - Wound Class: I-Clean   Surgeon(s): Surgeon(s) and Role:     * Fabián Phipps MD - Primary   Estimated blood loss: minimal   Specimens:   ID Type Source Tests Collected by Time Destination   1 : 5th metatarsolphalangeal joint-Left foot Wound Foot, Left ANAEROBIC BACTERIAL CULTURE, GRAM STAIN, WOUND CULTURE AEROBIC BACTERIAL Fabián Phipps MD 4/28/2017  9:34 AM    A : 5th metatarsolphalangeal joint-Left foot Tissue Foot, Left SURGICAL PATHOLOGY EXAM Fabián Phipps MD 4/28/2017  9:38 AM       Findings: No gross purulence.

## 2017-04-28 NOTE — IP AVS SNAPSHOT
[facility] PreOP/PostOP    [address]    [facility] [address]    Phone:  [phone]    Fax:  [phone]                                       After Visit Summary   4/28/2017    Ya Stahl    MRN: [id number]           After Visit Summary Signature Page     I have received my discharge instructions, and my questions have been answered. I have discussed any challenges I see with this plan with the nurse or doctor.    ..........................................................................................................................................  Patient/Patient Representative Signature      ..........................................................................................................................................  Patient Representative Print Name and Relationship to Patient    ..................................................               ................................................  Date                                            Time    ..........................................................................................................................................  Reviewed by Signature/Title    ...................................................              ..............................................  Date                                                            Time

## 2017-04-30 LAB
BACTERIA SPEC CULT: NO GROWTH
MICRO REPORT STATUS: NORMAL
SPECIMEN SOURCE: NORMAL

## 2017-05-01 NOTE — OP NOTE
DATE OF PROCEDURE:  04/28/2017      PREOPERATIVE DIAGNOSES:   1.  Status post exostectomy fifth metatarsal head, left foot.   2.  Osteomyelitis, fifth metatarsal head, left foot.      POSTOPERATIVE DIAGNOSES:   1.  Status post exostectomy fifth metatarsal head, left foot.   2.  Avascular necrosis with arthritis fifth metatarsal head, left foot.      PROCEDURES:   1.  Resection arthroplasty, fifth metatarsophalangeal joint, left foot.   2.  Irrigation and debridement of fifth metatarsophalangeal joint, left foot  (excisional to the level of the bone).      SURGEON:  Fabián Phipps MD      ASSISTANT:  Kanchan Sandy PA-C      ANESTHESIA:  General.      ESTIMATED BLOOD LOSS:  Minimal.      DRAINS:  None.      COMPLICATIONS:  None.      SPECIMENS:  Fluid for aerobic and anaerobic culture, fifth metatarsal head bone for surgical pathology.      INDICATIONS:  Ya Stahl is a 76-year-old woman who presented with pain and swelling status post exostosectomy of the fifth toe.  Review of her x-rays over time demonstrated relatively rapid destruction of the fifth metatarsal head.  An MRI scan was performed and felt to be consistent with osteomyelitis.  Certainly avascular necrosis was another possibility, but given the pain and bone destruction, we recommended surgical treatment.      PROCEDURE IN DETAIL AND FINDINGS:  The patient was identified in the preoperative area appropriately marked.  She was brought to the operating room where general anesthetic was administered and airway secured without difficulty.  Preoperative antibiotic prophylaxis was provided within an hour of the incision.  Prophylaxis was discontinued on the date of surgery.  A tourniquet was placed on the left lower leg.  The left leg and foot were prepped and draped in sterile fashion.  The limb was elevated for exsanguination and the tourniquet inflated.  A pause for the cause was performed.      I made a direct lateral incision through  "her previous incision.  This was carried down and the joint was exposed.  The capsule was quite thickened.  This was entered.  The fifth metatarsal head was encountered.  There was no gross purulence.  The bone itself had a look more consistent with avascular necrosis.  There was complete collapse of the bone under the cartilage and the cartilage itself was flaking off in large unsupported sections.  The cartilage at the base of the proximal phalanx was well preserved.  I transected the bone at the level of the metatarsal neck being careful to angle this from distal dorsal to proximal plantar.  The bony pieces were then excised from the wound.  There was thickened reactive tissue surrounding this.  Using a knife, I surgically excised the tissues and removed them from the wound.  This was done down to and including the level of the bone at the distal aspect of the resection arthroplasty.      The wound was then irrigated with 1000 mL of sterile saline.  Fluid from the capsule when initially opened as well as bone from the metatarsal head was sent for micro and passed respectively.      The wound was then closed in layers.  The toe sat well both in resting plantar flexion and in the \"push up test\".  Therefore, I did not feel a pin was necessary.  In addition, given the concerns for potential infection, I did not wish to place a pin through the joint.      Adaptic and sterile dressings were applied.  The patient tolerated the procedure well.  There were no complications.  All sponge and needle counts were correct.      PLAN:  We will await the results of the cultures.  I did not place her on any additional antibiotics at this point.  Certainly this could be amended if the cultures are positive.      I will see her back in 2 weeks for wound check and sutures out.  She can then begin weightbearing essentially as tolerated in a Darco shoe and wean from that to a normal shoe over the course of 2 weeks.         ELSA " PELON CARRENO MD             D: 2017 12:53   T: 2017 22:40   MT: [id number]      Name:     PAT GLORIA   MRN:      [id number]        Account:        [id number]   :      1940           Procedure Date: 2017      Document: [id number]

## 2017-05-02 LAB — COPATH REPORT: NORMAL

## 2017-05-05 LAB
BACTERIA SPEC CULT: NORMAL
Lab: NORMAL
MICRO REPORT STATUS: NORMAL
SPECIMEN SOURCE: NORMAL

## 2017-06-27 ENCOUNTER — OFFICE VISIT (OUTPATIENT)
Dept: FAMILY MEDICINE | Facility: CLINIC | Age: 77
End: 2017-06-27
Payer: COMMERCIAL

## 2017-06-27 VITALS
DIASTOLIC BLOOD PRESSURE: 78 MMHG | OXYGEN SATURATION: 98 % | RESPIRATION RATE: 16 BRPM | WEIGHT: 131.6 LBS | SYSTOLIC BLOOD PRESSURE: 138 MMHG | HEIGHT: 65 IN | BODY MASS INDEX: 21.92 KG/M2 | HEART RATE: 51 BPM | TEMPERATURE: 98 F

## 2017-06-27 DIAGNOSIS — H26.9 CATARACT OF BOTH EYES, UNSPECIFIED CATARACT TYPE: ICD-10-CM

## 2017-06-27 DIAGNOSIS — M81.0 AGE-RELATED OSTEOPOROSIS WITHOUT CURRENT PATHOLOGICAL FRACTURE: ICD-10-CM

## 2017-06-27 DIAGNOSIS — Z01.818 PREOP GENERAL PHYSICAL EXAM: Primary | ICD-10-CM

## 2017-06-27 PROCEDURE — 99214 OFFICE O/P EST MOD 30 MIN: CPT | Performed by: FAMILY MEDICINE

## 2017-06-27 NOTE — NURSING NOTE
"Chief Complaint   Patient presents with     Pre-Op Exam       Initial /78 (BP Location: Right arm, Patient Position: Chair, Cuff Size: Adult Regular)  Pulse 51  Temp 98  F (36.7  C) (Oral)  Resp 16  Ht 5' 5.25\" (1.657 m)  Wt 131 lb 9.6 oz (59.7 kg)  SpO2 98%  BMI 21.73 kg/m2 Estimated body mass index is 21.73 kg/(m^2) as calculated from the following:    Height as of this encounter: 5' 5.25\" (1.657 m).    Weight as of this encounter: 131 lb 9.6 oz (59.7 kg).  Medication Reconciliation: complete   Radha Mitchell, IVAN      "

## 2017-06-27 NOTE — PROGRESS NOTES
[facility]  [address] [address] [phone]  Dept: [phone]    PRE-OP EVALUATION:  Today's date: 2017    Ya Stahl (: 1940) presents for pre-operative evaluation assessment as requested by Dr. hernandez.  She requires evaluation and anesthesia risk assessment prior to undergoing surgery/procedure for treatment of eyes .  Proposed procedure: cataract    Date of Surgery/ Procedure:2017 and 2017  Time of Surgery/ Procedure: 8:30  Hospital/Surgical Facility: [facility]  Fax number for surgical facility: [phone]  Primary Physician: Scarlett Spencer  Type of Anesthesia Anticipated: Local    Patient has a Health Care Directive or Living Will:  YES     Preop Questions 2017   1.  Do you have a history of heart attack, stroke, stent, bypass or surgery on an artery in the head, neck, heart or legs? YES - MI over a year ago.   2.  Do you ever have any pain or discomfort in your chest? No   3.  Do you have a history of  Heart Failure? No   4.   Are you troubled by shortness of breath when:  walking on a level surface, or up a slight hill, or at night? No   5.  Do you currently have a cold, bronchitis or other respiratory infection? No   6.  Do you have a cough, shortness of breath, or wheezing? YES - chronic cough   7.  Do you sometimes get pains in the calves of your legs when you walk? No   8. Do you or anyone in your family have previous history of blood clots? No   9.  Do you or does anyone in your family have a serious bleeding problem such as prolonged bleeding following surgeries or cuts? No   10. Have you ever had problems with anemia or been told to take iron pills? No   11. Have you had any abnormal blood loss such as black, tarry or bloody stools, or abnormal vaginal bleeding? No   12. Have you ever had a blood transfusion? No   13. Have you or any of your relatives ever had problems with anesthesia? YES - needs to go  light; does not wake up easily.   14. Do you have sleep apnea, excessive snoring or daytime drowsiness? No   15. Do you have any prosthetic heart valves? No   16. Do you have prosthetic joints? No   17. Is there any chance that you may be pregnant? No         HPI:                                                      Brief HPI related to upcoming procedure: decreased vision related to cataracts.       See problem list for active medical problems.  Problems all longstanding and stable, except as noted/documented.  See ROS for pertinent symptoms related to these conditions.                                                                                                  .    MEDICAL HISTORY:                                                      Patient Active Problem List    Diagnosis Date Noted     Health Care Home 05/09/2012     Priority: High           Diagnosis Specialist Due to be seen Following   cough ENT; [facility]; Dr. Sharita Londono. Inhalers.    S[facility]'s Dentist spec in this  [facility]                   DX V65.8 REPLACED WITH [id number] HEALTH CARE HOME (04/08/2013)       MGUS (monoclonal gammopathy of unknown significance) 01/14/2017     Priority: Medium     Monoclonal paraproteinemia 01/12/2017     Priority: Medium     Macrocytosis 12/01/2016     Priority: Medium     ASCVD (arteriosclerotic cardiovascular disease) 03/09/2016     Priority: Medium     AMI (acute myocardial infarction) (H) 03/04/2016     Priority: Medium     Constipation 04/07/2014     Priority: Medium     Status post-operative repair of hip fracture, RIGHT 04/01/2014     Priority: Medium     Gastroesophageal reflux disease without esophagitis 05/20/2015     Osteoporosis 08/12/2014     started alendronate Aug 2014       Pubic ramus fracture (H) 04/10/2014     Per report from DR. Julien at [facility] in [place], [place], XR showed a closed right superior/inferior pubic ramus fracture.        Impaired fasting glucose 12/09/2012     Chronic cough 09/10/2012      HL (hearing loss) 12/01/2011     Mixed incontinence 12/01/2011     Advanced directives, counseling/discussion 06/02/2011     Advance Directive Problem List Overview:   Name Relationship Phone    Primary Health Care Agent            Alternative Health Care Agent          Advance Directive received and scanned. Click on Code in the patient header to view. 6/2/2011     Was already on file here.       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 Provider to review        Past Medical History:   Diagnosis Date     Arthritis      Coronary artery disease      Displacement of lumbar intervertebral disc without myelopathy      Gastro-oesophageal reflux disease      Heart attack (H) 3/2016     History of angina      Hypertension      Low back pain      Sicca syndrome (H)      Sjogren's syndrome (H)     sees specialist; dentist     Unspecified hypothyroidism      Past Surgical History:   Procedure Laterality Date     ARTHROSCOPY KNEE  10/5/2012    R Procedure: ARTHROSCOPY KNEE;  RIGHT KNEE ARTHROSCOPY, PARTIAL MEDIAL MENISCECTOMY;  Surgeon: Karli Zaragoza MD;  Location:  [facility]     BUNIONECTOMY  ~2010    L foot.      C NONSPECIFIC PROCEDURE  1976    D&C     COLONOSCOPY  1/17    normal; no repeat.     COLONOSCOPY N/A 1/17/2017    Procedure: COLONOSCOPY;  Surgeon: Fan Giordano MD;  Location:  GI     ENDOSCOPIC RELEASE CARPAL TUNNEL  9/11/2012    R Procedure: ENDOSCOPIC RELEASE CARPAL TUNNEL;  Right Endoscopic carpal tunnel release, left ringer finger cortizon injection;  Surgeon: Anne Marie Catherine MD;  Location:  OR     ESOPHAGOSCOPY, GASTROSCOPY, DUODENOSCOPY (EGD), COMBINED N/A 12/26/2014    Procedure: COMBINED  ESOPHAGOSCOPY, GASTROSCOPY, DUODENOSCOPY (EGD);  Surgeon: Fan Giordano MD;  Location: [facility] GI     EXCISE EXOSTOSIS FOOT Left 12/1/2016    Procedure: EXCISE EXOSTOSIS FOOT;  Surgeon: Jody Gallagher, DPM, Pod;  Location: [facility] OR     GYN SURGERY  1978    ovaries removed     HEART CATH, ANGIOPLASTY  3/4/16    dz <40%     HYSTERECTOMY, PAP NO LONGER INDICATED  1977    Hysterectomy     INJECT STEROID (LOCATION)  9/11/2012    Procedure: INJECT STEROID (LOCATION);;  Surgeon: Anne Marie Catherine MD;  Location: [facility] OR     LAPAROSCOPIC CHOLECYSTECTOMY N/A 4/7/2016    Procedure: LAPAROSCOPIC CHOLECYSTECTOMY;  Surgeon: Hans Medina MD;  Location: [facility] OR     ORTHOPEDIC SURGERY  ~2008    Right foot, bunionectomy      RECONSTRUCT FOREFOOT WITH METATARSOPHALANGEAL (MTP) FUSION Left 4/28/2017    Procedure: RECONSTRUCT FOREFOOT WITH METATARSOPHALANGEAL (MTP) FUSION;  1. Resection arthroplasty, fifth metatarsophalangeal joint, left foot.   2. Irrigation and debridement of fifth metatarsophalangeal joint, left foot (excisional to the level of the bone).     ;  Surgeon: Fabián Phipps MD;  Location: [facility] OR     RIGHT HIP ORIF 4/1/2014       ROTATOR CUFF REPAIR RT/LT  ~1998    Lt shoulder; rotator cuff     SURGICAL HISTORY OF -   distant past    ablation for palpitations.     SURGICAL HISTORY OF -   June 2015    left carpal tunnel surgery     Current Outpatient Prescriptions   Medication Sig Dispense Refill     ibuprofen (ADVIL/MOTRIN) 600 MG tablet Take 1 tablet (600 mg) by mouth every 6 hours as needed for moderate pain 60 tablet 0     acetaminophen (TYLENOL) 325 MG tablet Take 2 tablets (650 mg) by mouth every 6 hours as needed for mild pain or fever 60 tablet 0     OMEPRAZOLE PO Take 20 mg by mouth 2 times daily (before meals)       calcium citrate-vitamin D (CITRACAL) 315-250 MG-UNIT TABS per tablet Take 2 tablets in the morning and 1 tablet in the evening 120 tablet      conjugated estrogens (PREMARIN) cream Place 1 g  vaginally twice a week prn 30 g 3     cevimeline (EVOXAC) 30 MG capsule Take 30 mg by mouth 3 times daily Reported on 3/6/2017       alendronate (FOSAMAX) 70 MG tablet Take 1 tablet (70 mg) by mouth every 7 days Take with over 8 ounces water and stay upright for at least 30 minutes after dose.  Take at least 60 minutes before breakfast 12 tablet 3     levothyroxine (SYNTHROID/LEVOTHROID) 88 MCG tablet Take 1 tablet (88 mcg) by mouth daily 102 tablet 3     atorvastatin (LIPITOR) 20 MG tablet Take 1 tablet (20 mg) by mouth daily 102 tablet 3     Multiple Vitamins-Minerals (CENTRUM SILVER) per tablet Take 1 tablet by mouth daily       Misc Natural Products (OSTEO BI-FLEX ADV TRIPLE ST PO) Take 2 tablets by mouth daily        Misc Natural Products (LUTEIN 20 PO) Take 1 tablet by mouth daily       Omega-3 Fatty Acids (OMEGA-3 FISH OIL PO) Take 1 g by mouth 2 times daily (with meals)        aspirin 81 MG EC tablet Take 1 tablet (81 mg) by mouth daily 1 tablet 0     nitroglycerin (NITROSTAT) 0.4 MG SL tablet Place 1 tablet (0.4 mg) under the tongue every 5 minutes as needed for chest pain if you are still having symptoms after 3 doses (15 minutes) call 911. 25 tablet 0     MIRALAX OR POWD STIR 1 CAPFUL (17GM) IN 8 OZ OF LIQUID AND DRINK (Patient taking differently: STIR 1 CAPFUL (17GM) IN 8 OZ OF LIQUID AND DRINK twice daily) 1 BOTTLE 1 YEAR     OTC products: None, except as noted above    Allergies   Allergen Reactions     Codeine      fatigue     Vicodin [Acetaminophen] Fatigue      Latex Allergy: NO    Social History   Substance Use Topics     Smoking status: Never Smoker     Smokeless tobacco: Never Used     Alcohol use Yes      Comment: social; maybe a glass of wine or highball per week      History   Drug Use No       REVIEW OF SYSTEMS:                                                    C: NEGATIVE for fever, chills, change in weight x weight gain.  E/M: NEGATIVE for ear, mouth and throat problems  R: NEGATIVE for  "significant cough or short of breath x chronic cough  CV: NEGATIVE for chest pain, palpitations or peripheral edema  No nausea, vomitting or change in bowel habits.  No urinary symptoms.      ENT said has acid burn. So supposed to take PPI twice daily.    EXAM:                                                    /78 (BP Location: Right arm, Patient Position: Chair, Cuff Size: Adult Regular)  Pulse 51  Temp 98  F (36.7  C) (Oral)  Resp 16  Ht 5' 5.25\" (1.657 m)  Wt 131 lb 9.6 oz (59.7 kg)  SpO2 98%  BMI 21.73 kg/m2  GENERAL APPEARANCE: alert and no distress  HENT: ear canals and TM's normal and nose and mouth without ulcers or lesions. She does wear bilateral hearing aids.  RESP: lungs clear to auscultation - no rales, rhonchi or wheezes  CV: regular rates and rhythm  ABDOMEN: soft, nontender, no HSM or masses and bowel sounds normal  MS: no ankle edema.  NEURO: Normal strength and tone, sensory exam grossly normal, mentation intact and speech normal  PSYCH: mentation appears normal and affect normal/bright    TSH   Date Value Ref Range Status   11/29/2016 1.48 0.40 - 4.00 mU/L Final   ]      DIAGNOSTICS:                                                    No labs or EKG required for low risk surgery (cataract, skin procedure, breast biopsy, etc)    Recent Labs   Lab Test  04/17/17   0735  04/13/17   1620   01/12/17   1427  12/13/16   0808  12/01/16   0955   08/23/16   1153   12/02/14   0722   HGB  13.7  13.7   < >  14.4  13.9   --    < >   --    < >   --    PLT  236  231   < >  286  287   --    < >   --    < >   --    INR   --    --    --    --    --   0.96   --   1.00   --    --    NA  142   --    --   140   --    --    < >   --    < >  139   POTASSIUM  4.6   --    --   4.0   --    --    < >   --    < >  4.1   CR  0.73   --    --   0.68   --    --    < >   --    < >  0.87   A1C   --    --    --    --   5.9   --    --    --    --   5.8    < > = values in this interval not displayed.        IMPRESSION:     "                                                Reason for surgery/procedure: bilateral cataracts.    The proposed surgical procedure is considered LOW risk.    REVISED CARDIAC RISK INDEX  The patient has the following serious cardiovascular risks for perioperative complications such as (MI, PE, VFib and 3  AV Block):  No serious cardiac risks  INTERPRETATION: 0 risks: Class I (very low risk - 0.4% complication rate)    The patient has the following additional risks for perioperative complications:  No identified additional risks    Preop general physical exam  She notes she was told she can take her prescribed medications on the am of surgery. We did review this.     Cataract of both eyes, unspecified cataract type  Anticipating surgery.    Age-related osteoporosis without current pathological fracture  Reviewed calcium and vitamin D recommendations; she had been asking how much caltrate to take. Also see patient instructions.       RECOMMENDATIONS:                                                          --Patient is to take all scheduled prescribed medications on the day of surgery    APPROVAL GIVEN to proceed with proposed procedure, without further diagnostic evaluation       Signed Electronically by: Scarlett Spencer MD, MD    Copy of this evaluation report is provided to requesting physician.    [place] Preop Guidelines

## 2017-06-27 NOTE — MR AVS SNAPSHOT
After Visit Summary   6/27/2017    Ya Stahl    MRN: [id number]           Patient Information     Date Of Birth          1940        Visit Information        Provider Department      6/27/2017 3:10 PM Scarlett Spencer MD [facility]        Today's Diagnoses     Preop general physical exam    -  1      Care Instructions      Before Your Surgery      Call your surgeon if there is any change in your health. This includes signs of a cold or flu (such as a sore throat, runny nose, cough, rash or fever).    Do not smoke, drink alcohol or take over the counter medicine (unless your surgeon or primary care doctor tells you to) for the 24 hours before and after surgery.    If you take prescribed drugs: Follow your doctor s orders about which medicines to take and which to stop until after surgery.    Eating and drinking prior to surgery: follow the instructions from your surgeon    Take a shower or bath the night before surgery. Use the soap your surgeon gave you to gently clean your skin. If you do not have soap from your surgeon, use your regular soap. Do not shave or scrub the surgery site.  Wear clean pajamas and have clean sheets on your bed.       -----------------------------------------------------------------    I would recommend about 5368-4993 mg calcium daily. This is best obtained in the diet.    Dairy is the best source.  A diet without dairy is thought to have about 250-300 mg calcium.  An 8 oz glass of milk has about 300 mg. A cup of yougurt has about the same. Many cheeses will have about that much per ounce.    Some foods are fortified with calcium, such as specific orange juices. This is probably the next best source of calcium.    Supplements can help make up the difference if you are not getting in the diet.    It is recommended to take 800-1000 iu vitamin D3 as well.            Follow-ups after your visit        Your next 10 appointments already scheduled     Oct 12,  "2017  8:00 AM CDT   LAB with  LAB   [place] [facility] [place] ([facility])    [address] [facility] [address] [phone]           Patient must bring picture ID.  Patient should be prepared to give a urine specimen  Please do not eat 10-12 hours before your appointment if you are coming in fasting for labs on lipids, cholesterol, or glucose (sugar).  Pregnant women should follow their Care Team instructions. Water with medications is okay. Do not drink coffee or other fluids.   If you have concerns about taking  your medications, please ask at office or if scheduling via nxtControl, send a message by clicking on Secure Messaging, Message Your Care Team.            Oct 19, 2017  2:15 PM CDT   Return Visit with Vandana Kasper MD   [facility] ([facility])    [facility]  [address] [place]  [facility] [address] [phone]              Who to contact     If you have questions or need follow up information about today's clinic visit or your schedule please contact [facility] directly at [phone].  Normal or non-critical lab and imaging results will be communicated to you by MyChart, letter or phone within 4 business days after the clinic has received the results. If you do not hear from us within 7 days, please contact the clinic through TearSciencehart or phone. If you have a critical or abnormal lab result, we will notify you by phone as soon as possible.  Submit refill requests through nxtControl or call your pharmacy and they will forward the refill request to us. Please allow 3 business days for your refill to be completed.          Additional Information About Your Visit        nxtControl Information     nxtControl lets you send messages to your doctor, view your test results, renew your prescriptions, schedule appointments and more. To sign up, go to [email or web address] . Click on \"Log in\" " "on the left side of the screen, which will take you to the Welcome page. Then click on \"Sign up Now\" on the right side of the page.     You will be asked to enter the access code listed below, as well as some personal information. Please follow the directions to create your username and password.     Your access code is: [email or web address]  Expires: 2017  3:54 PM     Your access code will  in 90 days. If you need help or a new code, please call your [facility] or [phone].        Care EveryWhere ID     This is your Care EveryWhere ID. This could be used by other organizations to access your [place] medical records  [id number]        Your Vitals Were     Pulse Temperature Respirations Height Pulse Oximetry BMI (Body Mass Index)    51 98  F (36.7  C) (Oral) 16 5' 5.25\" (1.657 m) 98% 21.73 kg/m2       Blood Pressure from Last 3 Encounters:   17 138/78   17 (!) 166/97   17 129/75    Weight from Last 3 Encounters:   17 131 lb 9.6 oz (59.7 kg)   17 127 lb 13.9 oz (58 kg)   17 129 lb 6.4 oz (58.7 kg)              Today, you had the following     No orders found for display         Today's Medication Changes          These changes are accurate as of: 17  3:54 PM.  If you have any questions, ask your nurse or doctor.               These medicines have changed or have updated prescriptions.        Dose/Directions    MIRALAX powder   This may have changed:  See the new instructions.   Used for:  Chronic constipation   Generic drug:  polyethylene glycol        STIR 1 CAPFUL (17GM) IN 8 OZ OF LIQUID AND DRINK   Quantity:  1 BOTTLE   Refills:  1 YEAR         Stop taking these medicines if you haven't already. Please contact your care team if you have questions.     ACAI PO   Stopped by:  Scarlett Spencer MD           gabapentin 300 MG capsule   Commonly known as:  NEURONTIN   Stopped by:  Scarlett Spencer MD                    Primary Care Provider Office Phone # Fax #    Scarlett " DEBBIE Spencer -[phone]       [facility] [address]  [facility] [address]        Equal Access to Services     KATHKEI FEDERICA : Hadii marquita lemus sixtoo Sobingali, wamelvinda luqadaha, qaybta kaalmada odalis, fritz mao ryanabimbola hawkins laevandreina tavarez. So [facility] [phone].    ATENCIÓN: Si habla español, tiene a costa disposición servicios gratuitos de asistencia lingüística. Llame al [phone].    We comply with applicable federal civil rights laws and [place] laws. We do not discriminate on the basis of race, color, national origin, age, disability sex, sexual orientation or gender identity.            Thank you!     Thank you for choosing [facility]  for your care. Our goal is always to provide you with excellent care. Hearing back from our patients is one way we can continue to improve our services. Please take a few minutes to complete the written survey that you may receive in the mail after your visit with us. Thank you!             Your Updated Medication List - Protect others around you: Learn how to safely use, store and throw away your medicines at www.disposemymeds.org.          This list is accurate as of: 6/27/17  3:54 PM.  Always use your most recent med list.                   Brand Name Dispense Instructions for use Diagnosis    acetaminophen 325 MG tablet    TYLENOL    60 tablet    Take 2 tablets (650 mg) by mouth every 6 hours as needed for mild pain or fever    Avascular necrosis of bone (H)       alendronate 70 MG tablet    FOSAMAX    12 tablet    Take 1 tablet (70 mg) by mouth every 7 days Take with over 8 ounces water and stay upright for at least 30 minutes after dose.  Take at least 60 minutes before breakfast    Osteoporosis       aspirin 81 MG EC tablet     1 tablet    Take 1 tablet (81 mg) by mouth daily        atorvastatin 20 MG tablet    LIPITOR    102 tablet    Take 1 tablet (20 mg) by mouth daily    Hyperlipidemia LDL goal <70       calcium  citrate-vitamin D 315-250 MG-UNIT Tabs per tablet    CITRACAL    120 tablet    Take 2 tablets in the morning and 1 tablet in the evening    Osteoporosis       CENTRUM SILVER per tablet      Take 1 tablet by mouth daily        cevimeline 30 MG capsule    EVOXAC     Take 30 mg by mouth 3 times daily Reported on 3/6/2017        conjugated estrogens cream    PREMARIN    30 g    Place 1 g vaginally twice a week prn    Atrophic vaginitis       ibuprofen 600 MG tablet    ADVIL/MOTRIN    60 tablet    Take 1 tablet (600 mg) by mouth every 6 hours as needed for moderate pain    Avascular necrosis of bone (H)       levothyroxine 88 MCG tablet    SYNTHROID/LEVOTHROID    102 tablet    Take 1 tablet (88 mcg) by mouth daily    Hypothyroidism, unspecified type       MIRALAX powder   Generic drug:  polyethylene glycol     1 BOTTLE    STIR 1 CAPFUL (17GM) IN 8 OZ OF LIQUID AND DRINK    Chronic constipation       nitroglycerin 0.4 MG sublingual tablet    NITROSTAT    25 tablet    Place 1 tablet (0.4 mg) under the tongue every 5 minutes as needed for chest pain if you are still having symptoms after 3 doses (15 minutes) call 911.    Acute chest pain       OMEGA-3 FISH OIL PO      Take 1 g by mouth 2 times daily (with meals)        OMEPRAZOLE PO      Take 20 mg by mouth 2 times daily (before meals)        * OSTEO BI-FLEX ADV TRIPLE ST PO      Take 2 tablets by mouth daily        * LUTEIN 20 PO      Take 1 tablet by mouth daily        * Notice:  This list has 2 medication(s) that are the same as other medications prescribed for you. Read the directions carefully, and ask your doctor or other care provider to review them with you.

## 2017-06-27 NOTE — PATIENT INSTRUCTIONS
Before Your Surgery      Call your surgeon if there is any change in your health. This includes signs of a cold or flu (such as a sore throat, runny nose, cough, rash or fever).    Do not smoke, drink alcohol or take over the counter medicine (unless your surgeon or primary care doctor tells you to) for the 24 hours before and after surgery.    If you take prescribed drugs: Follow your doctor s orders about which medicines to take and which to stop until after surgery.    Eating and drinking prior to surgery: follow the instructions from your surgeon    Take a shower or bath the night before surgery. Use the soap your surgeon gave you to gently clean your skin. If you do not have soap from your surgeon, use your regular soap. Do not shave or scrub the surgery site.  Wear clean pajamas and have clean sheets on your bed.       -----------------------------------------------------------------    I would recommend about 3654-0991 mg calcium daily. This is best obtained in the diet.    Dairy is the best source.  A diet without dairy is thought to have about 250-300 mg calcium.  An 8 oz glass of milk has about 300 mg. A cup of yougurt has about the same. Many cheeses will have about that much per ounce.    Some foods are fortified with calcium, such as specific orange juices. This is probably the next best source of calcium.    Supplements can help make up the difference if you are not getting in the diet.    It is recommended to take 800-1000 iu vitamin D3 as well.

## 2017-10-12 DIAGNOSIS — D47.2 MGUS (MONOCLONAL GAMMOPATHY OF UNKNOWN SIGNIFICANCE): ICD-10-CM

## 2017-10-12 LAB
ALBUMIN SERPL-MCNC: 4.1 G/DL (ref 3.4–5)
ALP SERPL-CCNC: 58 U/L (ref 40–150)
ALT SERPL W P-5'-P-CCNC: 35 U/L (ref 0–50)
ANION GAP SERPL CALCULATED.3IONS-SCNC: 9 MMOL/L (ref 3–14)
AST SERPL W P-5'-P-CCNC: 26 U/L (ref 0–45)
BASOPHILS # BLD AUTO: 0 10E9/L (ref 0–0.2)
BASOPHILS NFR BLD AUTO: 0.4 %
BILIRUB SERPL-MCNC: 1.5 MG/DL (ref 0.2–1.3)
BUN SERPL-MCNC: 20 MG/DL (ref 7–30)
CALCIUM SERPL-MCNC: 9.4 MG/DL (ref 8.5–10.1)
CHLORIDE SERPL-SCNC: 102 MMOL/L (ref 94–109)
CO2 SERPL-SCNC: 28 MMOL/L (ref 20–32)
CREAT SERPL-MCNC: 0.81 MG/DL (ref 0.52–1.04)
DIFFERENTIAL METHOD BLD: NORMAL
EOSINOPHIL # BLD AUTO: 0.2 10E9/L (ref 0–0.7)
EOSINOPHIL NFR BLD AUTO: 3.8 %
ERYTHROCYTE [DISTWIDTH] IN BLOOD BY AUTOMATED COUNT: 13.8 % (ref 10–15)
GFR SERPL CREATININE-BSD FRML MDRD: 69 ML/MIN/1.7M2
GLUCOSE SERPL-MCNC: 100 MG/DL (ref 70–99)
HCT VFR BLD AUTO: 44.6 % (ref 35–47)
HGB BLD-MCNC: 14.5 G/DL (ref 11.7–15.7)
LYMPHOCYTES # BLD AUTO: 1.5 10E9/L (ref 0.8–5.3)
LYMPHOCYTES NFR BLD AUTO: 26.6 %
MCH RBC QN AUTO: 32.3 PG (ref 26.5–33)
MCHC RBC AUTO-ENTMCNC: 32.5 G/DL (ref 31.5–36.5)
MCV RBC AUTO: 99 FL (ref 78–100)
MONOCYTES # BLD AUTO: 0.6 10E9/L (ref 0–1.3)
MONOCYTES NFR BLD AUTO: 11.5 %
NEUTROPHILS # BLD AUTO: 3.2 10E9/L (ref 1.6–8.3)
NEUTROPHILS NFR BLD AUTO: 57.7 %
PLATELET # BLD AUTO: 239 10E9/L (ref 150–450)
POTASSIUM SERPL-SCNC: 4.3 MMOL/L (ref 3.4–5.3)
PROT SERPL-MCNC: 7.6 G/DL (ref 6.8–8.8)
RBC # BLD AUTO: 4.49 10E12/L (ref 3.8–5.2)
SODIUM SERPL-SCNC: 139 MMOL/L (ref 133–144)
WBC # BLD AUTO: 5.5 10E9/L (ref 4–11)

## 2017-10-12 PROCEDURE — 00000402 ZZHCL STATISTIC TOTAL PROTEIN: Performed by: INTERNAL MEDICINE

## 2017-10-12 PROCEDURE — 84165 PROTEIN E-PHORESIS SERUM: CPT | Performed by: INTERNAL MEDICINE

## 2017-10-12 PROCEDURE — 80053 COMPREHEN METABOLIC PANEL: CPT | Performed by: INTERNAL MEDICINE

## 2017-10-12 PROCEDURE — 85025 COMPLETE CBC W/AUTO DIFF WBC: CPT | Performed by: INTERNAL MEDICINE

## 2017-10-12 PROCEDURE — 83883 ASSAY NEPHELOMETRY NOT SPEC: CPT | Performed by: INTERNAL MEDICINE

## 2017-10-12 PROCEDURE — 36415 COLL VENOUS BLD VENIPUNCTURE: CPT | Performed by: INTERNAL MEDICINE

## 2017-10-13 LAB
ALBUMIN SERPL ELPH-MCNC: 4.4 G/DL (ref 3.7–5.1)
ALPHA1 GLOB SERPL ELPH-MCNC: 0.3 G/DL (ref 0.2–0.4)
ALPHA2 GLOB SERPL ELPH-MCNC: 0.8 G/DL (ref 0.5–0.9)
B-GLOBULIN SERPL ELPH-MCNC: 0.9 G/DL (ref 0.6–1)
GAMMA GLOB SERPL ELPH-MCNC: 1.1 G/DL (ref 0.7–1.6)
KAPPA LC UR-MCNC: 1.54 MG/DL (ref 0.33–1.94)
KAPPA LC/LAMBDA SER: 0.89 {RATIO} (ref 0.26–1.65)
LAMBDA LC SERPL-MCNC: 1.74 MG/DL (ref 0.57–2.63)
M PROTEIN SERPL ELPH-MCNC: 0.1 G/DL
PROT PATTERN SERPL ELPH-IMP: ABNORMAL

## 2017-10-19 ENCOUNTER — ONCOLOGY VISIT (OUTPATIENT)
Dept: ONCOLOGY | Facility: CLINIC | Age: 77
End: 2017-10-19
Attending: INTERNAL MEDICINE
Payer: COMMERCIAL

## 2017-10-19 VITALS
SYSTOLIC BLOOD PRESSURE: 149 MMHG | HEART RATE: 59 BPM | OXYGEN SATURATION: 98 % | WEIGHT: 135.2 LBS | TEMPERATURE: 97.1 F | RESPIRATION RATE: 16 BRPM | BODY MASS INDEX: 22.33 KG/M2 | DIASTOLIC BLOOD PRESSURE: 79 MMHG

## 2017-10-19 DIAGNOSIS — D47.2 MGUS (MONOCLONAL GAMMOPATHY OF UNKNOWN SIGNIFICANCE): ICD-10-CM

## 2017-10-19 DIAGNOSIS — R10.13 EPIGASTRIC PAIN: Primary | ICD-10-CM

## 2017-10-19 PROCEDURE — 99214 OFFICE O/P EST MOD 30 MIN: CPT | Performed by: INTERNAL MEDICINE

## 2017-10-19 PROCEDURE — 99211 OFF/OP EST MAY X REQ PHY/QHP: CPT

## 2017-10-19 ASSESSMENT — PAIN SCALES - GENERAL: PAINLEVEL: NO PAIN (0)

## 2017-10-19 NOTE — LETTER
10/19/2017         RE: Ya Stahl  [address]  [facility] [address]        Dear Colleague,    Thank you for referring your patient, Ya Stahl, to the [facility]. Please see a copy of my visit note below.    [facility]    Hematology/Oncology Established Patient Note      Today's Date: 10/19/2017    Reason for Follow-up: Monoclonal gammopathy, macrocytosis      HISTORY OF PRESENT ILLNESS: Ya Stahl is a 77 year old female with PMHx of Sjogren's syndrome, GERD, CAD, who presents with monoclonal gammopathy and macrocytosis.      On chart review, it appears that she has had a high-normal MCV for many years.  In November 2016, it became slightly elevated at 103.  Her hemoglobin, WBC, and platelet count are normal.  Vitamin B12 and folate levels are normal.  She had SPEP checked, which showed M-spike of 0.1 g/dL, ANNA with IgM kappa.        INTERIM HISTORY: Ya comes in for follow-up today.  She says that she feels well.  She says that she has had a sore throat and she is getting an upper endoscopy soon.  She also says that she has had epigastric abdominal pain off and on for about a year.  Each episode could last about a day.  It seems to be worse after eating.          REVIEW OF SYSTEMS:   14 point ROS was reviewed and is negative other than as noted above in HPI.       HOME MEDICATIONS:  Current Outpatient Prescriptions   Medication Sig Dispense Refill     CycloSPORINE (RESTASIS OP)        ibuprofen (ADVIL/MOTRIN) 600 MG tablet Take 1 tablet (600 mg) by mouth every 6 hours as needed for moderate pain 60 tablet 0     acetaminophen (TYLENOL) 325 MG tablet Take 2 tablets (650 mg) by mouth every 6 hours as needed for mild pain or fever 60 tablet 0     OMEPRAZOLE PO Take 20 mg by mouth 2 times daily (before meals)       calcium citrate-vitamin D (CITRACAL) 315-250 MG-UNIT TABS per tablet Take 2 tablets in the morning and 1 tablet  in the evening 120 tablet      conjugated estrogens (PREMARIN) cream Place 1 g vaginally twice a week prn 30 g 3     cevimeline (EVOXAC) 30 MG capsule Take 30 mg by mouth 3 times daily Reported on 3/6/2017       alendronate (FOSAMAX) 70 MG tablet Take 1 tablet (70 mg) by mouth every 7 days Take with over 8 ounces water and stay upright for at least 30 minutes after dose.  Take at least 60 minutes before breakfast 12 tablet 3     levothyroxine (SYNTHROID/LEVOTHROID) 88 MCG tablet Take 1 tablet (88 mcg) by mouth daily 102 tablet 3     atorvastatin (LIPITOR) 20 MG tablet Take 1 tablet (20 mg) by mouth daily 102 tablet 3     Multiple Vitamins-Minerals (CENTRUM SILVER) per tablet Take 1 tablet by mouth daily       Misc Natural Products (OSTEO BI-FLEX ADV TRIPLE ST PO) Take 2 tablets by mouth daily        Misc Natural Products (LUTEIN 20 PO) Take 1 tablet by mouth daily       Omega-3 Fatty Acids (OMEGA-3 FISH OIL PO) Take 1 g by mouth 2 times daily (with meals)        aspirin 81 MG EC tablet Take 1 tablet (81 mg) by mouth daily 1 tablet 0     nitroglycerin (NITROSTAT) 0.4 MG SL tablet Place 1 tablet (0.4 mg) under the tongue every 5 minutes as needed for chest pain if you are still having symptoms after 3 doses (15 minutes) call 911. 25 tablet 0     MIRALAX OR POWD STIR 1 CAPFUL (17GM) IN 8 OZ OF LIQUID AND DRINK (Patient taking differently: STIR 1 CAPFUL (17GM) IN 8 OZ OF LIQUID AND DRINK twice daily) 1 BOTTLE 1 YEAR         ALLERGIES:  Allergies   Allergen Reactions     Codeine      fatigue     Vicodin [Acetaminophen] Fatigue         PAST MEDICAL HISTORY:  Past Medical History:   Diagnosis Date     Arthritis      Coronary artery disease      Displacement of lumbar intervertebral disc without myelopathy      Gastro-oesophageal reflux disease      Hearing loss     has bilateral aids     Heart attack 3/2016     History of angina      Hypertension      Low back pain      Sicca syndrome (H)      Sjogren's syndrome (H)      sees specialist; dentist     Unspecified hypothyroidism          PAST SURGICAL HISTORY:  Past Surgical History:   Procedure Laterality Date     ARTHROSCOPY KNEE  10/5/2012    R Procedure: ARTHROSCOPY KNEE;  RIGHT KNEE ARTHROSCOPY, PARTIAL MEDIAL MENISCECTOMY;  Surgeon: Karli Zaragoza MD;  Location:  SD     BUNIONECTOMY  ~2010    L foot.      C NONSPECIFIC PROCEDURE  1976    D&C     COLONOSCOPY N/A 1/17/2017    normal; no repeat Procedure: COLONOSCOPY;  Surgeon: Fan Giordano MD;  Location:  GI     ENDOSCOPIC RELEASE CARPAL TUNNEL  9/11/2012    R Procedure: ENDOSCOPIC RELEASE CARPAL TUNNEL;  Right Endoscopic carpal tunnel release, left ringer finger cortizon injection;  Surgeon: Anne Marie Catherine MD;  Location:  OR     ESOPHAGOSCOPY, GASTROSCOPY, DUODENOSCOPY (EGD), COMBINED N/A 12/26/2014    Procedure: COMBINED ESOPHAGOSCOPY, GASTROSCOPY, DUODENOSCOPY (EGD);  Surgeon: Fan Giordano MD;  Location:  GI     EXCISE EXOSTOSIS FOOT Left 12/1/2016    Procedure: EXCISE EXOSTOSIS FOOT;  Surgeon: Jody Gallagher DPM, Pod;  Location:  OR     GYN SURGERY  1978    ovaries removed     HEART CATH, ANGIOPLASTY  3/4/16    dz <40%     HYSTERECTOMY, PAP NO LONGER INDICATED  1977    Hysterectomy     INJECT STEROID (LOCATION)  9/11/2012    Procedure: INJECT STEROID (LOCATION);;  Surgeon: Anne Marie Catherine MD;  Location:  OR     LAPAROSCOPIC CHOLECYSTECTOMY N/A 4/7/2016    Procedure: LAPAROSCOPIC CHOLECYSTECTOMY;  Surgeon: Hans Medina MD;  Location:  OR     ORTHOPEDIC SURGERY  ~2008    Right foot, bunionectomy      RECONSTRUCT FOREFOOT WITH METATARSOPHALANGEAL (MTP) FUSION Left 4/28/2017    Procedure: RECONSTRUCT FOREFOOT WITH METATARSOPHALANGEAL (MTP) FUSION;  1. Resection arthroplasty, fifth metatarsophalangeal joint, left foot.   2. Irrigation and debridement of fifth metatarsophalangeal joint, left foot (excisional to the level of the bone).     ;  Surgeon: Fabián Phipps MD;  Location:  OR      RIGHT HIP ORIF 4/1/2014       ROTATOR CUFF REPAIR RT/LT  ~1998    Lt shoulder; rotator cuff     SURGICAL HISTORY OF -   distant past    ablation for palpitations.     SURGICAL HISTORY OF -   June 2015    left carpal tunnel surgery         SOCIAL HISTORY:  Social History     Social History     Marital status:      Spouse name: N/A     Number of children: N/A     Years of education: N/A     Occupational History     Not on file.     Social History Main Topics     Smoking status: Never Smoker     Smokeless tobacco: Never Used     Alcohol use Yes      Comment: social; maybe a glass of wine or highball per week      Drug use: No     Sexual activity: Yes     Partners: Male     Other Topics Concern     Parent/Sibling W/ Cabg, Mi Or Angioplasty Before 65f 55m? Yes     Caffeine Concern No     1 capp. daily     Sleep Concern No     Special Diet No     Exercise Yes     walks 3 miles 6 days per week , curves 3 days per week     Social History Narrative    Dairy/d 0-1 servings/d.     Caffeine 0 servings/d    Exercise 6 x week walk and curves    Sunscreen used - Yes    Seatbelts used - Yes    Working smoke/CO detectors in the home - Yes    Guns stored in the home - Yes LOCKED    Self Breast Exams - sometimes    Self Testicular Exam - NA    Eye Exam up to date - Yes 4/2009    Dental Exam up to date - Yes 12/2009    Pap Smear up to date - Yes - Hysterectomy    Mammogram up to date - Yes 11/25/2009    PSA up to date - NA    Dexa Scan up to date - 2006    Flex Sig / Colonoscopy up to date - Yes 5/14/2008    Immunizations up to date -9-2007 tetanus    Abuse: Current or Past(Physical, Sexual or Emotional)- No    Do you feel safe in your environment - Yes    DAMION Shaffer CMA    11/25/2009 updated         FAMILY HISTORY:  Family History   Problem Relation Age of Onset     C.A.D. Mother 76     CANCER Mother      non Hodgekin's Lymphoma     HEART DISEASE Mother      enlarged heart     C.A.D. Father 59     HEART DISEASE Father       valve problem     Hypertension Brother      Hypertension Brother      Connective Tissue Disorder Daughter      scleroderma     Colon Cancer No family hx of          PHYSICAL EXAM:  Vital signs:  /79  Pulse 59  Temp 97.1  F (36.2  C) (Oral)  Resp 16  Wt 61.3 kg (135 lb 3.2 oz)  SpO2 98%  BMI 22.33 kg/m2   GENERAL/CONSTITUTIONAL: No acute distress.   EYES: No scleral icterus.  CARDIOVASCULAR: Regular rate and rhythm.  RESPIRATORY: Clear to auscultation bilaterally  GASTROINTESTINAL: Positive bowel sounds, non-tender to palpation, non-distended.  NEUROLOGIC: Alert, oriented, answers questions appropriately.  INTEGUMENTARY: No jaundice.      LABS:  CBC RESULTS:   Recent Labs   Lab Test  10/12/17   0759   WBC  5.5   RBC  4.49   HGB  14.5   HCT  44.6   MCV  99   MCH  32.3   MCHC  32.5   RDW  13.8   PLT  239     Recent Labs   Lab Test  10/12/17   0759  04/17/17   0735   NA  139  142   POTASSIUM  4.3  4.6   CHLORIDE  102  106   CO2  28  28   ANIONGAP  9  8   GLC  100*  108*   BUN  20  20   CR  0.81  0.73   CARMEN  9.4  9.3     Lab Results   Component Value Date    AST 26 10/12/2017     Lab Results   Component Value Date    ALT 35 10/12/2017     No results found for: BILICONJ   Lab Results   Component Value Date    BILITOTAL 1.5 10/12/2017     Lab Results   Component Value Date    ALBUMIN 4.1 10/12/2017     Lab Results   Component Value Date    PROTTOTAL 7.6 10/12/2017      Lab Results   Component Value Date    ALKPHOS 58 10/12/2017         SPEP:  M-spike (12/13/16): 0.1 g/dL IgM kappa  4/27/17: 0.1 g/dL  10/12/17: 0.1 g/dL    Skeletal survey 1/13/17:  No convincing osseous lytic lesions to suggest multiple  myeloma.       ASSESSMENT/PLAN:  Ya Stahl is a 77 year old female with:    1) MGUS: SPEP with 0.1 g/dL M-spike, IgM kappa.  Normal calcium level, renal function, normal counts.  No evidence of lytic lesions on skeletal survey.    Her M-spike continues to be low and stable at 0.1 g/dL.    Will continue to  monitor for now.  -RTC in 6 months with repeat labs    2) Abdominal pain: It has been off and on for about a year.  Her bilirubin is slightly elevated, which may be non-specific, but with the abdominal pain, will obtain a CT abdomen/pelvis for further evaluation.  -CT abdomen/pelvis next week      I spent a total of 25 minutes with the patient, with over >50% of the time in counseling and/or coordination of care.       Vandana Kasper MD  Hematology/Oncology  [facility]        Again, thank you for allowing me to participate in the care of your patient.        Sincerely,        Vandana Kasper MD

## 2017-10-19 NOTE — PATIENT INSTRUCTIONS
-schedule CT scan next week-Scheduled for 10/25/17. Nuzhat NEVES  -return to clinic in 6 months with labs a few days prior-Scheduled for labs at [place] 4/13/18 and Dr. Kasper on 4/19/18. Nuzhat NEVES  Oral contrast and instructions given to Chivo NEVES  AVS printed and given to Chivo NEVES

## 2017-10-19 NOTE — NURSING NOTE
"Oncology Rooming Note    October 19, 2017 2:08 PM   Ya Stahl is a 77 year old female who presents for:    Chief Complaint   Patient presents with     Oncology Clinic Visit     Follow up      Initial Vitals: /79  Pulse 59  Temp 97.1  F (36.2  C) (Oral)  Resp 16  Wt 61.3 kg (135 lb 3.2 oz)  SpO2 98%  BMI 22.33 kg/m2 Estimated body mass index is 22.33 kg/(m^2) as calculated from the following:    Height as of 6/27/17: 1.657 m (5' 5.25\").    Weight as of this encounter: 61.3 kg (135 lb 3.2 oz). Body surface area is 1.68 meters squared.  No Pain (0) Comment: Data Unavailable   No LMP recorded. Patient has had a hysterectomy.  Allergies reviewed: Yes  Medications reviewed: Yes    Medications: Medication refills not needed today.  Pharmacy name entered into Truly:    [facility] [address] [place] [address] AT [place] [address] & [facility]    Clinical concerns: Follow up     8 minutes for nursing intake (face to face time)     Amy Aguayo CMA    DISCHARGE PLAN:  Next appointments: See patient instruction section  Departure Mode: Ambulatory  Accompanied by: self  0 minutes for nursing discharge (face to face time)   Amy Aguayo CMA                "

## 2017-10-19 NOTE — PROGRESS NOTES
[facility]    Hematology/Oncology Established Patient Note      Today's Date: 10/19/2017    Reason for Follow-up: Monoclonal gammopathy, macrocytosis      HISTORY OF PRESENT ILLNESS: Ya Stahl is a 77 year old female with PMHx of Sjogren's syndrome, GERD, CAD, who presents with monoclonal gammopathy and macrocytosis.      On chart review, it appears that she has had a high-normal MCV for many years.  In November 2016, it became slightly elevated at 103.  Her hemoglobin, WBC, and platelet count are normal.  Vitamin B12 and folate levels are normal.  She had SPEP checked, which showed M-spike of 0.1 g/dL, ANNA with IgM kappa.        INTERIM HISTORY: Ya comes in for follow-up today.  She says that she feels well.  She says that she has had a sore throat and she is getting an upper endoscopy soon.  She also says that she has had epigastric abdominal pain off and on for about a year.  Each episode could last about a day.  It seems to be worse after eating.          REVIEW OF SYSTEMS:   14 point ROS was reviewed and is negative other than as noted above in HPI.       HOME MEDICATIONS:  Current Outpatient Prescriptions   Medication Sig Dispense Refill     CycloSPORINE (RESTASIS OP)        ibuprofen (ADVIL/MOTRIN) 600 MG tablet Take 1 tablet (600 mg) by mouth every 6 hours as needed for moderate pain 60 tablet 0     acetaminophen (TYLENOL) 325 MG tablet Take 2 tablets (650 mg) by mouth every 6 hours as needed for mild pain or fever 60 tablet 0     OMEPRAZOLE PO Take 20 mg by mouth 2 times daily (before meals)       calcium citrate-vitamin D (CITRACAL) 315-250 MG-UNIT TABS per tablet Take 2 tablets in the morning and 1 tablet in the evening 120 tablet      conjugated estrogens (PREMARIN) cream Place 1 g vaginally twice a week prn 30 g 3     cevimeline (EVOXAC) 30 MG capsule Take 30 mg by mouth 3 times daily Reported on 3/6/2017       alendronate (FOSAMAX) 70 MG tablet Take 1 tablet (70 mg) by  mouth every 7 days Take with over 8 ounces water and stay upright for at least 30 minutes after dose.  Take at least 60 minutes before breakfast 12 tablet 3     levothyroxine (SYNTHROID/LEVOTHROID) 88 MCG tablet Take 1 tablet (88 mcg) by mouth daily 102 tablet 3     atorvastatin (LIPITOR) 20 MG tablet Take 1 tablet (20 mg) by mouth daily 102 tablet 3     Multiple Vitamins-Minerals (CENTRUM SILVER) per tablet Take 1 tablet by mouth daily       Misc Natural Products (OSTEO BI-FLEX ADV TRIPLE ST PO) Take 2 tablets by mouth daily        Misc Natural Products (LUTEIN 20 PO) Take 1 tablet by mouth daily       Omega-3 Fatty Acids (OMEGA-3 FISH OIL PO) Take 1 g by mouth 2 times daily (with meals)        aspirin 81 MG EC tablet Take 1 tablet (81 mg) by mouth daily 1 tablet 0     nitroglycerin (NITROSTAT) 0.4 MG SL tablet Place 1 tablet (0.4 mg) under the tongue every 5 minutes as needed for chest pain if you are still having symptoms after 3 doses (15 minutes) call 911. 25 tablet 0     MIRALAX OR POWD STIR 1 CAPFUL (17GM) IN 8 OZ OF LIQUID AND DRINK (Patient taking differently: STIR 1 CAPFUL (17GM) IN 8 OZ OF LIQUID AND DRINK twice daily) 1 BOTTLE 1 YEAR         ALLERGIES:  Allergies   Allergen Reactions     Codeine      fatigue     Vicodin [Acetaminophen] Fatigue         PAST MEDICAL HISTORY:  Past Medical History:   Diagnosis Date     Arthritis      Coronary artery disease      Displacement of lumbar intervertebral disc without myelopathy      Gastro-oesophageal reflux disease      Hearing loss     has bilateral aids     Heart attack 3/2016     History of angina      Hypertension      Low back pain      Sicca syndrome (H)      Sjogren's syndrome (H)     sees specialist; dentist     Unspecified hypothyroidism          PAST SURGICAL HISTORY:  Past Surgical History:   Procedure Laterality Date     ARTHROSCOPY KNEE  10/5/2012    R Procedure: ARTHROSCOPY KNEE;  RIGHT KNEE ARTHROSCOPY, PARTIAL MEDIAL MENISCECTOMY;  Surgeon:  Karli Zaragoaz MD;  Location:  SD     BUNIONECTOMY  ~2010    L foot.      C NONSPECIFIC PROCEDURE  1976    D&C     COLONOSCOPY N/A 1/17/2017    normal; no repeat Procedure: COLONOSCOPY;  Surgeon: Fan Giordano MD;  Location:  GI     ENDOSCOPIC RELEASE CARPAL TUNNEL  9/11/2012    R Procedure: ENDOSCOPIC RELEASE CARPAL TUNNEL;  Right Endoscopic carpal tunnel release, left ringer finger cortizon injection;  Surgeon: Anne Marie Catherine MD;  Location:  OR     ESOPHAGOSCOPY, GASTROSCOPY, DUODENOSCOPY (EGD), COMBINED N/A 12/26/2014    Procedure: COMBINED ESOPHAGOSCOPY, GASTROSCOPY, DUODENOSCOPY (EGD);  Surgeon: Fan Giordano MD;  Location:  GI     EXCISE EXOSTOSIS FOOT Left 12/1/2016    Procedure: EXCISE EXOSTOSIS FOOT;  Surgeon: Jody Gallagher, DPM, Pod;  Location:  OR     GYN SURGERY  1978    ovaries removed     HEART CATH, ANGIOPLASTY  3/4/16    dz <40%     HYSTERECTOMY, PAP NO LONGER INDICATED  1977    Hysterectomy     INJECT STEROID (LOCATION)  9/11/2012    Procedure: INJECT STEROID (LOCATION);;  Surgeon: Anne Marie Catherine MD;  Location:  OR     LAPAROSCOPIC CHOLECYSTECTOMY N/A 4/7/2016    Procedure: LAPAROSCOPIC CHOLECYSTECTOMY;  Surgeon: Hans Medina MD;  Location:  OR     ORTHOPEDIC SURGERY  ~2008    Right foot, bunionectomy      RECONSTRUCT FOREFOOT WITH METATARSOPHALANGEAL (MTP) FUSION Left 4/28/2017    Procedure: RECONSTRUCT FOREFOOT WITH METATARSOPHALANGEAL (MTP) FUSION;  1. Resection arthroplasty, fifth metatarsophalangeal joint, left foot.   2. Irrigation and debridement of fifth metatarsophalangeal joint, left foot (excisional to the level of the bone).     ;  Surgeon: Fabián Phipps MD;  Location:  OR     RIGHT HIP ORIF 4/1/2014       ROTATOR CUFF REPAIR RT/LT  ~1998    Lt shoulder; rotator cuff     SURGICAL HISTORY OF -   distant past    ablation for palpitations.     SURGICAL HISTORY OF -   June 2015    left carpal tunnel surgery         SOCIAL HISTORY:  Social  History     Social History     Marital status:      Spouse name: N/A     Number of children: N/A     Years of education: N/A     Occupational History     Not on file.     Social History Main Topics     Smoking status: Never Smoker     Smokeless tobacco: Never Used     Alcohol use Yes      Comment: social; maybe a glass of wine or highball per week      Drug use: No     Sexual activity: Yes     Partners: Male     Other Topics Concern     Parent/Sibling W/ Cabg, Mi Or Angioplasty Before 65f 55m? Yes     Caffeine Concern No     1 capp. daily     Sleep Concern No     Special Diet No     Exercise Yes     walks 3 miles 6 days per week , curves 3 days per week     Social History Narrative    Dairy/d 0-1 servings/d.     Caffeine 0 servings/d    Exercise 6 x week walk and curves    Sunscreen used - Yes    Seatbelts used - Yes    Working smoke/CO detectors in the home - Yes    Guns stored in the home - Yes LOCKED    Self Breast Exams - sometimes    Self Testicular Exam - NA    Eye Exam up to date - Yes 4/2009    Dental Exam up to date - Yes 12/2009    Pap Smear up to date - Yes - Hysterectomy    Mammogram up to date - Yes 11/25/2009    PSA up to date - NA    Dexa Scan up to date - 2006    Flex Sig / Colonoscopy up to date - Yes 5/14/2008    Immunizations up to date -9-2007 tetanus    Abuse: Current or Past(Physical, Sexual or Emotional)- No    Do you feel safe in your environment - Yes    DAMION Shaffer CMA    11/25/2009 updated         FAMILY HISTORY:  Family History   Problem Relation Age of Onset     C.A.D. Mother 76     CANCER Mother      non Hodgekin's Lymphoma     HEART DISEASE Mother      enlarged heart     C.A.D. Father 59     HEART DISEASE Father      valve problem     Hypertension Brother      Hypertension Brother      Connective Tissue Disorder Daughter      scleroderma     Colon Cancer No family hx of          PHYSICAL EXAM:  Vital signs:  /79  Pulse 59  Temp 97.1  F (36.2  C) (Oral)  Resp 16  Wt 61.3  kg (135 lb 3.2 oz)  SpO2 98%  BMI 22.33 kg/m2   GENERAL/CONSTITUTIONAL: No acute distress.   EYES: No scleral icterus.  CARDIOVASCULAR: Regular rate and rhythm.  RESPIRATORY: Clear to auscultation bilaterally  GASTROINTESTINAL: Positive bowel sounds, non-tender to palpation, non-distended.  NEUROLOGIC: Alert, oriented, answers questions appropriately.  INTEGUMENTARY: No jaundice.      LABS:  CBC RESULTS:   Recent Labs   Lab Test  10/12/17   0759   WBC  5.5   RBC  4.49   HGB  14.5   HCT  44.6   MCV  99   MCH  32.3   MCHC  32.5   RDW  13.8   PLT  239     Recent Labs   Lab Test  10/12/17   0759  04/17/17   0735   NA  139  142   POTASSIUM  4.3  4.6   CHLORIDE  102  106   CO2  28  28   ANIONGAP  9  8   GLC  100*  108*   BUN  20  20   CR  0.81  0.73   CARMEN  9.4  9.3     Lab Results   Component Value Date    AST 26 10/12/2017     Lab Results   Component Value Date    ALT 35 10/12/2017     No results found for: BILICONJ   Lab Results   Component Value Date    BILITOTAL 1.5 10/12/2017     Lab Results   Component Value Date    ALBUMIN 4.1 10/12/2017     Lab Results   Component Value Date    PROTTOTAL 7.6 10/12/2017      Lab Results   Component Value Date    ALKPHOS 58 10/12/2017         SPEP:  M-spike (12/13/16): 0.1 g/dL IgM kappa  4/27/17: 0.1 g/dL  10/12/17: 0.1 g/dL    Skeletal survey 1/13/17:  No convincing osseous lytic lesions to suggest multiple  myeloma.       ASSESSMENT/PLAN:  Ya Stahl is a 77 year old female with:    1) MGUS: SPEP with 0.1 g/dL M-spike, IgM kappa.  Normal calcium level, renal function, normal counts.  No evidence of lytic lesions on skeletal survey.    Her M-spike continues to be low and stable at 0.1 g/dL.    Will continue to monitor for now.  -RTC in 6 months with repeat labs    2) Abdominal pain: It has been off and on for about a year.  Her bilirubin is slightly elevated, which may be non-specific, but with the abdominal pain, will obtain a CT abdomen/pelvis for further evaluation.  -CT  abdomen/pelvis next week      I spent a total of 25 minutes with the patient, with over >50% of the time in counseling and/or coordination of care.       Vandana Kasper MD  Hematology/Oncology  [facility]

## 2017-10-19 NOTE — MR AVS SNAPSHOT
After Visit Summary   10/19/2017    Ya Stahl    MRN: [id number]           Patient Information     Date Of Birth          1940        Visit Information        Provider Department      10/19/2017 2:15 PM Vandana Kasper MD [facility]  Oncology [facility]      Today's Diagnoses     Epigastric pain    -  1    MGUS (monoclonal gammopathy of unknown significance)          Care Instructions      -schedule CT scan next week  -return to clinic in 6 months with labs a few days prior          Follow-ups after your visit        Your next 10 appointments already scheduled     Oct 25, 2017  1:30 PM CDT   CT ABDOMEN PELVIS W CONTRAST with [id number]   [facility] ([facility] [place])    [address]  [facility] [address] [phone]           Please bring any scans or X-rays taken at other hospitals, if similar tests were done. Also bring a list of your medicines, including vitamins, minerals and over-the-counter drugs. It is safest to leave personal items at home.  Be sure to tell your doctor:   If you have any allergies.   If there s any chance you are pregnant.   If you are breastfeeding.   If you have any special needs.  You may have contrast for this exam. To prepare:   Do not eat or drink for 2 hours before your exam. If you need to take medicine, you may take it with small sips of water. (We may ask you to take liquid medicine as well.)   The day before your exam, drink extra fluids at least six 8-ounce glasses (unless your doctor tells you to restrict your fluids).  Patients over 70 or patients with diabetes or kidney problems:   If you haven t had a blood test (creatinine test) within the last 30 days, go to your clinic or Diagnostic Imaging Department for this test.  If you have diabetes:   If your kidney function is normal, continue taking your metformin (Avandamet, Glucophage, Glucovance, Metaglip) on the  day of your exam.   If your kidney function is abnormal, wait 48 hours before restarting this medicine.  You will have oral contrast for this exam:   You will drink the contrast at home. Get this from your clinic or Diagnostic Imaging Department. Please follow the directions given.  Please wear loose clothing, such as a sweat suit or jogging clothes. Avoid snaps, zippers and other metal. We may ask you to undress and put on a hospital gown.  If you have any questions, please call the Imaging Department where you will have your exam.            Dec 19, 2017  7:10 AM CST   PHYSICAL with Scarlett Spencer MD   [facility]    [address] [id number] [phone]            Apr 19, 2018  3:15 PM CDT   Return Visit with Vandana Kasper MD   [facility] ([facility])    [facility]  [address] [place] [address]  [facility] [address]  [facility] [id number] [phone]              Future tests that were ordered for you today     Open Future Orders        Priority Expected Expires Ordered    CBC with platelets differential Routine 4/19/2018 10/19/2018 10/19/2017    Comprehensive metabolic panel Routine 4/19/2018 10/19/2018 10/19/2017    [place] and lambda light chain Routine 4/19/2018 10/19/2018 10/19/2017    Protein electrophoresis Routine 4/19/2018 10/19/2018 10/19/2017    CT Abdomen Pelvis w Contrast Routine  10/19/2018 10/19/2017            Who to contact     If you have questions or need follow up information about today's clinic visit or your schedule please contact [facility] directly at [phone].  Normal or non-critical lab and imaging results will be communicated to you by MyChart, letter or phone within 4 business days after the clinic has received the results. If you do not hear from us within 7 days, please contact the clinic through MyChart or phone. If you have a critical or  "abnormal lab result, we will notify you by phone as soon as possible.  Submit refill requests through [employer] or call your pharmacy and they will forward the refill request to us. Please allow 3 business days for your refill to be completed.          Additional Information About Your Visit        [employer] Information     [employer]bles lets you send messages to your doctor, view your test results, renew your prescriptions, schedule appointments and more. To sign up, go to [email or web address] . Click on \"Log in\" on the left side of the screen, which will take you to the Welcome page. Then click on \"Sign up Now\" on the right side of the page.     You will be asked to enter the access code listed below, as well as some personal information. Please follow the directions to create your username and password.     Your access code is: [email or web address]  Expires: 2018  2:26 PM     Your access code will  in 90 days. If you need help or a new code, please call your [place] clinic or [phone].        Care EveryWhere ID     This is your Care EveryWhere ID. This could be used by other organizations to access your [facility] medical records  [id number]        Your Vitals Were     Pulse Temperature Respirations Pulse Oximetry BMI (Body Mass Index)       59 97.1  F (36.2  C) (Oral) 16 98% 22.33 kg/m2        Blood Pressure from Last 3 Encounters:   10/19/17 149/79   17 138/78   17 (!) 166/97    Weight from Last 3 Encounters:   10/19/17 61.3 kg (135 lb 3.2 oz)   17 59.7 kg (131 lb 9.6 oz)   17 58 kg (127 lb 13.9 oz)                 Today's Medication Changes          These changes are accurate as of: 10/19/17  2:26 PM.  If you have any questions, ask your nurse or doctor.               These medicines have changed or have updated prescriptions.        Dose/Directions    MIRALAX powder   This may have changed:  See the new instructions.   Used for:  Chronic constipation   Generic drug:  polyethylene " glycol        STIR 1 CAPFUL (17GM) IN 8 OZ OF LIQUID AND DRINK   Quantity:  1 BOTTLE   Refills:  1 YEAR                Primary Care Provider Office Phone # Fax #    Scarlett Spencer [phone] [address] [place] [address]        Equal Access to Services     NEVA STALLWORTH : Hadii aad ku hadasho Soomaali, waaxda luqadaha, qaybta kaalmada adeegyada, waxay idiin hayaan adeabimbola khtiffanylázaro lachristina ah. So [facility] [phone].    ATENCIÓN: Si habla español, tiene a costa disposición servicios gratuitos de asistencia lingüística. Llame al [phone].    We comply with applicable federal civil rights laws and [place] laws. We do not discriminate on the basis of race, color, national origin, age, disability, sex, sexual orientation, or gender identity.            Thank you!     Thank you for choosing [facility]  for your care. Our goal is always to provide you with excellent care. Hearing back from our patients is one way we can continue to improve our services. Please take a few minutes to complete the written survey that you may receive in the mail after your visit with us. Thank you!             Your Updated Medication List - Protect others around you: Learn how to safely use, store and throw away your medicines at www.disposemymeds.org.          This list is accurate as of: 10/19/17  2:26 PM.  Always use your most recent med list.                   Brand Name Dispense Instructions for use Diagnosis    acetaminophen 325 MG tablet    TYLENOL    60 tablet    Take 2 tablets (650 mg) by mouth every 6 hours as needed for mild pain or fever    Avascular necrosis of bone (H)       alendronate 70 MG tablet    FOSAMAX    12 tablet    Take 1 tablet (70 mg) by mouth every 7 days Take with over 8 ounces water and stay upright for at least 30 minutes after dose.  Take at least 60 minutes before breakfast    Osteoporosis       aspirin 81 MG EC tablet     1 tablet    Take 1 tablet (81 mg) by  mouth daily        atorvastatin 20 MG tablet    LIPITOR    102 tablet    Take 1 tablet (20 mg) by mouth daily    Hyperlipidemia LDL goal <70       calcium citrate-vitamin D 315-250 MG-UNIT Tabs per tablet    CITRACAL    120 tablet    Take 2 tablets in the morning and 1 tablet in the evening    Osteoporosis       CENTRUM SILVER per tablet      Take 1 tablet by mouth daily        cevimeline 30 MG capsule    EVOXAC     Take 30 mg by mouth 3 times daily Reported on 3/6/2017        conjugated estrogens cream    PREMARIN    30 g    Place 1 g vaginally twice a week prn    Atrophic vaginitis       ibuprofen 600 MG tablet    ADVIL/MOTRIN    60 tablet    Take 1 tablet (600 mg) by mouth every 6 hours as needed for moderate pain    Avascular necrosis of bone (H)       levothyroxine 88 MCG tablet    SYNTHROID/LEVOTHROID    102 tablet    Take 1 tablet (88 mcg) by mouth daily    Hypothyroidism, unspecified type       MIRALAX powder   Generic drug:  polyethylene glycol     1 BOTTLE    STIR 1 CAPFUL (17GM) IN 8 OZ OF LIQUID AND DRINK    Chronic constipation       nitroGLYcerin 0.4 MG sublingual tablet    NITROSTAT    25 tablet    Place 1 tablet (0.4 mg) under the tongue every 5 minutes as needed for chest pain if you are still having symptoms after 3 doses (15 minutes) call 911.    Acute chest pain       OMEGA-3 FISH OIL PO      Take 1 g by mouth 2 times daily (with meals)        OMEPRAZOLE PO      Take 20 mg by mouth 2 times daily (before meals)        * OSTEO BI-FLEX ADV TRIPLE ST PO      Take 2 tablets by mouth daily        * LUTEIN 20 PO      Take 1 tablet by mouth daily        RESTASIS OP           * Notice:  This list has 2 medication(s) that are the same as other medications prescribed for you. Read the directions carefully, and ask your doctor or other care provider to review them with you.

## 2017-10-25 ENCOUNTER — HOSPITAL ENCOUNTER (OUTPATIENT)
Dept: CT IMAGING | Facility: CLINIC | Age: 77
Discharge: HOME | End: 2017-10-25
Attending: INTERNAL MEDICINE | Admitting: INTERNAL MEDICINE
Payer: MEDICARE

## 2017-10-25 DIAGNOSIS — R10.13 EPIGASTRIC PAIN: ICD-10-CM

## 2017-10-25 PROCEDURE — 25000128 H RX IP 250 OP 636: Performed by: INTERNAL MEDICINE

## 2017-10-25 PROCEDURE — 74177 CT ABD & PELVIS W/CONTRAST: CPT

## 2017-10-25 RX ORDER — IOPAMIDOL 755 MG/ML
500 INJECTION, SOLUTION INTRAVASCULAR ONCE
Status: COMPLETED | OUTPATIENT
Start: 2017-10-25 | End: 2017-10-25

## 2017-10-25 RX ADMIN — SODIUM CHLORIDE 48 ML: 9 INJECTION, SOLUTION INTRAVENOUS at 13:46

## 2017-10-25 RX ADMIN — IOPAMIDOL 71 ML: 755 INJECTION, SOLUTION INTRAVENOUS at 13:46

## 2017-10-27 ENCOUNTER — TRANSFERRED RECORDS (OUTPATIENT)
Dept: HEALTH INFORMATION MANAGEMENT | Facility: CLINIC | Age: 77
End: 2017-10-27

## 2017-12-18 DIAGNOSIS — E03.9 HYPOTHYROIDISM, UNSPECIFIED TYPE: ICD-10-CM

## 2017-12-19 ENCOUNTER — OFFICE VISIT (OUTPATIENT)
Dept: FAMILY MEDICINE | Facility: CLINIC | Age: 77
End: 2017-12-19
Payer: COMMERCIAL

## 2017-12-19 VITALS
HEIGHT: 65 IN | BODY MASS INDEX: 21.11 KG/M2 | TEMPERATURE: 98.1 F | RESPIRATION RATE: 16 BRPM | SYSTOLIC BLOOD PRESSURE: 126 MMHG | WEIGHT: 126.7 LBS | DIASTOLIC BLOOD PRESSURE: 88 MMHG | HEART RATE: 57 BPM | OXYGEN SATURATION: 98 %

## 2017-12-19 DIAGNOSIS — D47.2 MGUS (MONOCLONAL GAMMOPATHY OF UNKNOWN SIGNIFICANCE): ICD-10-CM

## 2017-12-19 DIAGNOSIS — H91.93 BILATERAL HEARING LOSS, UNSPECIFIED HEARING LOSS TYPE: ICD-10-CM

## 2017-12-19 DIAGNOSIS — M81.0 OSTEOPOROSIS, UNSPECIFIED OSTEOPOROSIS TYPE, UNSPECIFIED PATHOLOGICAL FRACTURE PRESENCE: ICD-10-CM

## 2017-12-19 DIAGNOSIS — R73.01 IMPAIRED FASTING GLUCOSE: ICD-10-CM

## 2017-12-19 DIAGNOSIS — I25.10 ASCVD (ARTERIOSCLEROTIC CARDIOVASCULAR DISEASE): ICD-10-CM

## 2017-12-19 DIAGNOSIS — E78.5 HYPERLIPIDEMIA LDL GOAL <70: ICD-10-CM

## 2017-12-19 DIAGNOSIS — H61.91 LESION OF SKIN OF RIGHT EAR: ICD-10-CM

## 2017-12-19 DIAGNOSIS — Z00.00 ENCOUNTER FOR ROUTINE ADULT HEALTH EXAMINATION WITHOUT ABNORMAL FINDINGS: Primary | ICD-10-CM

## 2017-12-19 DIAGNOSIS — E03.9 HYPOTHYROIDISM, UNSPECIFIED TYPE: ICD-10-CM

## 2017-12-19 LAB
ALBUMIN SERPL-MCNC: 3.6 G/DL (ref 3.4–5)
ALP SERPL-CCNC: 66 U/L (ref 40–150)
ALT SERPL W P-5'-P-CCNC: 46 U/L (ref 0–50)
ANION GAP SERPL CALCULATED.3IONS-SCNC: 3 MMOL/L (ref 3–14)
AST SERPL W P-5'-P-CCNC: 33 U/L (ref 0–45)
BILIRUB SERPL-MCNC: 1.2 MG/DL (ref 0.2–1.3)
BUN SERPL-MCNC: 17 MG/DL (ref 7–30)
CALCIUM SERPL-MCNC: 9.4 MG/DL (ref 8.5–10.1)
CHLORIDE SERPL-SCNC: 104 MMOL/L (ref 94–109)
CHOLEST SERPL-MCNC: 178 MG/DL
CO2 SERPL-SCNC: 31 MMOL/L (ref 20–32)
CREAT SERPL-MCNC: 0.75 MG/DL (ref 0.52–1.04)
GFR SERPL CREATININE-BSD FRML MDRD: 75 ML/MIN/1.7M2
GLUCOSE SERPL-MCNC: 94 MG/DL (ref 70–99)
HBA1C MFR BLD: 6.3 % (ref 4.3–6)
HDLC SERPL-MCNC: 99 MG/DL
LDLC SERPL CALC-MCNC: 60 MG/DL
NONHDLC SERPL-MCNC: 79 MG/DL
POTASSIUM SERPL-SCNC: 4.1 MMOL/L (ref 3.4–5.3)
PROT SERPL-MCNC: 7.2 G/DL (ref 6.8–8.8)
SODIUM SERPL-SCNC: 138 MMOL/L (ref 133–144)
T4 FREE SERPL-MCNC: 1.26 NG/DL (ref 0.76–1.46)
TRIGL SERPL-MCNC: 97 MG/DL
TSH SERPL DL<=0.005 MIU/L-ACNC: 4.82 MU/L (ref 0.4–4)

## 2017-12-19 PROCEDURE — G0439 PPPS, SUBSEQ VISIT: HCPCS | Performed by: FAMILY MEDICINE

## 2017-12-19 PROCEDURE — 80061 LIPID PANEL: CPT | Performed by: FAMILY MEDICINE

## 2017-12-19 PROCEDURE — 36415 COLL VENOUS BLD VENIPUNCTURE: CPT | Performed by: FAMILY MEDICINE

## 2017-12-19 PROCEDURE — 84443 ASSAY THYROID STIM HORMONE: CPT | Performed by: FAMILY MEDICINE

## 2017-12-19 PROCEDURE — 83036 HEMOGLOBIN GLYCOSYLATED A1C: CPT | Performed by: FAMILY MEDICINE

## 2017-12-19 PROCEDURE — 84439 ASSAY OF FREE THYROXINE: CPT | Performed by: FAMILY MEDICINE

## 2017-12-19 PROCEDURE — 80053 COMPREHEN METABOLIC PANEL: CPT | Performed by: FAMILY MEDICINE

## 2017-12-19 RX ORDER — ALENDRONATE SODIUM 70 MG/1
70 TABLET ORAL
Qty: 12 TABLET | Refills: 3 | Status: SHIPPED | OUTPATIENT
Start: 2017-12-19 | End: 2018-11-20

## 2017-12-19 RX ORDER — ATORVASTATIN CALCIUM 20 MG/1
20 TABLET, FILM COATED ORAL DAILY
Qty: 102 TABLET | Refills: 3 | Status: SHIPPED | OUTPATIENT
Start: 2017-12-19 | End: 2021-01-05

## 2017-12-19 RX ORDER — LEVOTHYROXINE SODIUM 88 UG/1
TABLET ORAL
Qty: 102 TABLET | Refills: 0 | Status: SHIPPED | OUTPATIENT
Start: 2017-12-19 | End: 2017-12-22 | Stop reason: DRUGHIGH

## 2017-12-19 ASSESSMENT — ACTIVITIES OF DAILY LIVING (ADL)
I_NEED_ASSISTANCE_FOR_THE_FOLLOWING_DAILY_ACTIVITIES:: NO ASSISTANCE IS NEEDED
CURRENT_FUNCTION: NO ASSISTANCE NEEDED

## 2017-12-19 NOTE — LETTER
"December 22, 2017      Ya Stahl  [address]  [place] [address]        Dear Ms. Ya Stahl,    Enclosed is a copy of your recent results.    TSH stands for Thyroid Stimulating Hormone. It is the most sensitive thyroid test that we have. It goes in the opposite direction of the thyroid hormone level; if your thyroid is not producing sufficient thyroid hormone, it goes up to tell your thyroid to make more.    I have sent a higher dose; we should recheck in 6-8 weeks.  I did ask someone to contact you about this.    The Hgb A1C is a reflection of your glucose control over the last 3 months.  In people who have diabetes, we like to see this under 7.0. We do consider someone to have diabetes if this is 6.5 or higher.   An HgbA1C greater than or equal to 5.7 is considered to be in the pre-diabetes range. It is important to eat healthy, get regular exercise and maintain a healthy weight.  I do think you are doing a good job with this; we should continue to monitor.    The newer guidelines for cholesterol involve determining risk and making recommendations based on that.     HDL is considered the \"good\" cholesterol.  LDL is considered the \"bad\" cholesterol.     An elevated cholesterol is one factor that increases your risk for heart disease and stroke. You can improve your cholesterol by controlling the amount and type of fat you eat and by increasing your daily activity level.    I hope you have a great holiday season!    Sincerely,     Scarlett Spencer MD      "

## 2017-12-19 NOTE — PROGRESS NOTES
SUBJECTIVE:   Ya Stahl is a 77 year old female who presents for Preventive Visit.      Are you in the first 12 months of your Medicare coverage?  No    Physical   Annual:     Getting at least 3 servings of Calcium per day::  Yes    Bi-annual eye exam::  Yes    Dental care twice a year::  Yes    Sleep apnea or symptoms of sleep apnea::  None    Diet::  Regular (no restrictions)    Frequency of exercise::  6-7 days/week    Duration of exercise::  45-60 minutes    Taking medications regularly::  Yes    Medication side effects::  None    Additional concerns today::  YES    Ability to successfully perform activities of daily living: no assistance needed  Home Safety:  No safety concerns identified  Hearing Impairment: difficulty following a conversation in a noisy restaurant or crowded room, feel that people are mumbling or not speaking clearly, difficulty following dialogue in the theater, difficult to understand a speaker at a public meeting or [employer] service, need to ask people to speak up or repeat themselves, difficulty understanding soft or whispered speech and difficulty understanding speech on the telephone    She does wear aids.      Fall risk:         COGNITIVE SCREEN  1) Repeat 3 items (Banana, Sunrise, Chair)    2) Clock draw: NORMAL  3) 3 item recall: Recalls 3 objects  Results: 3 items recalled: COGNITIVE IMPAIRMENT LESS LIKELY    Mini-CogTM Copyright JADE Hook. Licensed by the author for use in [facility]; reprinted with permission (eduar@.[facility]). All rights reserved.          Reviewed and updated as needed this visit by clinical staff         Reviewed and updated as needed this visit by Provider      Social History   Substance Use Topics     Smoking status: Never Smoker     Smokeless tobacco: Never Used     Alcohol use Yes      Comment: social; maybe a glass of wine or highball per week        Alcohol Use 12/19/2017   If you drink alcohol, do you typically have greater than 3 drinks  per day OR greater than 7 drinks per week?   No               Today's PHQ-2 Score:   PHQ-2 ( 1999 [employer]) 12/19/2017   Q1: Little interest or pleasure in doing things 0   Q2: Feeling down, depressed or hopeless 0   PHQ-2 Score 0   Q1: Little interest or pleasure in doing things Not at all   Q2: Feeling down, depressed or hopeless Not at all   PHQ-2 Score 0       Do you feel safe in your environment - Yes    Do you have a Health Care Directive?: Yes: Advance Directive has been received and scanned.    Current providers sharing in care for this patient include:   Patient Care Team:  Scarlett Spencer MD as PCP - General (Family Practice)    The following health maintenance items are reviewed in Epic and correct as of today:  Health Maintenance   Topic Date Due     ADVANCE DIRECTIVE PLANNING Q5 YRS  06/02/2016     TSH Q1 YEAR  11/29/2017     FALL RISK ASSESSMENT  12/13/2017     LIPID SCREEN Q5 YR FEMALE (SYSTEM ASSIGNED)  12/13/2021     TETANUS IMMUNIZATION (SYSTEM ASSIGNED)  12/04/2022     INFLUENZA VACCINE (SYSTEM ASSIGNED)  Completed     DEXA SCAN SCREENING (SYSTEM ASSIGNED)  Completed     PNEUMOCOCCAL  Completed             Patient Active Problem List   Diagnosis     Hypothyroidism     Chondrodermatitis nodularis helicis     Grave's disease     Bile reflux gastritis     Hyperlipidemia LDL goal <70     Advanced directives, counseling/discussion     Sjogren's disease (H)     HL (hearing loss)     Mixed incontinence     Health Care Home     Chronic cough     Impaired fasting glucose     Status post-operative repair of hip fracture, RIGHT     Constipation     Pubic ramus fracture (H)     Osteoporosis     Gastroesophageal reflux disease without esophagitis     AMI (acute myocardial infarction)     ASCVD (arteriosclerotic cardiovascular disease)     Macrocytosis     Monoclonal paraproteinemia     MGUS (monoclonal gammopathy of unknown significance)     Epigastric pain       Past Medical History:   Diagnosis Date      Arthritis      Coronary artery disease      Displacement of lumbar intervertebral disc without myelopathy      Gastro-oesophageal reflux disease      Hearing loss     has bilateral aids     Heart attack 3/2016     History of angina      Hypertension      Low back pain      Sicca syndrome (H)      Sjogren's syndrome (H)     sees specialist; dentist     Unspecified hypothyroidism        Past Surgical History:   Procedure Laterality Date     ARTHROSCOPY KNEE  10/5/2012    R Procedure: ARTHROSCOPY KNEE;  RIGHT KNEE ARTHROSCOPY, PARTIAL MEDIAL MENISCECTOMY;  Surgeon: Karli Zaragoza MD;  Location: [facility]     BUNIONECTOMY  ~2010    L foot.      C NONSPECIFIC PROCEDURE  1976    D&C     CATARACT IOL, RT/LT Bilateral 07/2017     COLONOSCOPY N/A 1/17/2017    normal; no repeat Procedure: COLONOSCOPY;  Surgeon: Fan Giordano MD;  Location:  GI     ENDOSCOPIC RELEASE CARPAL TUNNEL  9/11/2012    R Procedure: ENDOSCOPIC RELEASE CARPAL TUNNEL;  Right Endoscopic carpal tunnel release, left ringer finger cortizon injection;  Surgeon: Anne Marie Catherine MD;  Location:  OR     ESOPHAGOSCOPY, GASTROSCOPY, DUODENOSCOPY (EGD), COMBINED N/A 12/26/2014    Procedure: COMBINED ESOPHAGOSCOPY, GASTROSCOPY, DUODENOSCOPY (EGD);  Surgeon: Fan Giordano MD;  Location:  GI     EXCISE EXOSTOSIS FOOT Left 12/1/2016    Procedure: EXCISE EXOSTOSIS FOOT;  Surgeon: Jody Gallagher, DPM, Pod;  Location: [facility] OR     GYN SURGERY  1978    ovaries removed     HEART CATH, ANGIOPLASTY  3/4/16    dz <40%     HYSTERECTOMY, PAP NO LONGER INDICATED  1977    Hysterectomy; benign     INJECT STEROID (LOCATION)  9/11/2012    Procedure: INJECT STEROID (LOCATION);;  Surgeon: Anne Marie Catherine MD;  Location:  OR     LAPAROSCOPIC CHOLECYSTECTOMY N/A 4/7/2016    Procedure: LAPAROSCOPIC CHOLECYSTECTOMY;  Surgeon: Hans Medina MD;  Location:  OR     ORTHOPEDIC SURGERY  ~2008    Right foot, bunionectomy      RECONSTRUCT FOREFOOT WITH METATARSOPHALANGEAL (MTP)  FUSION Left 2017    Procedure: RECONSTRUCT FOREFOOT WITH METATARSOPHALANGEAL (MTP) FUSION;  1. Resection arthroplasty, fifth metatarsophalangeal joint, left foot.   2. Irrigation and debridement of fifth metatarsophalangeal joint, left foot (excisional to the level of the bone).     ;  Surgeon: Fabián Phipps MD;  Location: [facility] OR     RIGHT HIP ORIF 2014       ROTATOR CUFF REPAIR RT/LT  ~    Lt shoulder; rotator cuff     SURGICAL HISTORY OF -   distant past    ablation for palpitations.     SURGICAL HISTORY OF -   2015    left carpal tunnel surgery       Obstetric History       T3      L3     SAB2   TAB0   Ectopic0   Multiple0   Live Births0       # Outcome Date GA Lbr Conor/2nd Weight Sex Delivery Anes PTL Lv   6 SAB            5 SAB            4             3 Term            2 Term            1 Term                   Current Outpatient Prescriptions   Medication Sig Dispense Refill     atorvastatin (LIPITOR) 20 MG tablet Take 1 tablet (20 mg) by mouth daily 102 tablet 3     alendronate (FOSAMAX) 70 MG tablet Take 1 tablet (70 mg) by mouth every 7 days Take with over 8 ounces water and stay upright for at least 30 minutes after dose.  Take at least 60 minutes before breakfast 12 tablet 3     CycloSPORINE (RESTASIS OP)        ibuprofen (ADVIL/MOTRIN) 600 MG tablet Take 1 tablet (600 mg) by mouth every 6 hours as needed for moderate pain 60 tablet 0     acetaminophen (TYLENOL) 325 MG tablet Take 2 tablets (650 mg) by mouth every 6 hours as needed for mild pain or fever 60 tablet 0     OMEPRAZOLE PO Take 40 mg by mouth 2 times daily (before meals)        calcium citrate-vitamin D (CITRACAL) 315-250 MG-UNIT TABS per tablet Take 2 tablets in the morning and 1 tablet in the evening 120 tablet      conjugated estrogens (PREMARIN) cream Place 1 g vaginally twice a week prn 30 g 3     cevimeline (EVOXAC) 30 MG capsule Take 30 mg by mouth 3 times daily Reported on 3/6/2017        levothyroxine (SYNTHROID/LEVOTHROID) 88 MCG tablet Take 1 tablet (88 mcg) by mouth daily 102 tablet 3     Multiple Vitamins-Minerals (CENTRUM SILVER) per tablet Take 1 tablet by mouth daily       Misc Natural Products (OSTEO BI-FLEX ADV TRIPLE ST PO) Take 2 tablets by mouth daily        Misc Natural Products (LUTEIN 20 PO) Take 1 tablet by mouth daily       Omega-3 Fatty Acids (OMEGA-3 FISH OIL PO) Take 1 g by mouth 2 times daily (with meals)        aspirin 81 MG EC tablet Take 1 tablet (81 mg) by mouth daily 1 tablet 0     nitroglycerin (NITROSTAT) 0.4 MG SL tablet Place 1 tablet (0.4 mg) under the tongue every 5 minutes as needed for chest pain if you are still having symptoms after 3 doses (15 minutes) call 911. 25 tablet 0     MIRALAX OR POWD STIR 1 CAPFUL (17GM) IN 8 OZ OF LIQUID AND DRINK (Patient taking differently: STIR 1 CAPFUL (17GM) IN 8 OZ OF LIQUID AND DRINK twice daily) 1 BOTTLE 1 YEAR     [DISCONTINUED] alendronate (FOSAMAX) 70 MG tablet Take 1 tablet (70 mg) by mouth every 7 days Take with over 8 ounces water and stay upright for at least 30 minutes after dose.  Take at least 60 minutes before breakfast 12 tablet 3     [DISCONTINUED] atorvastatin (LIPITOR) 20 MG tablet Take 1 tablet (20 mg) by mouth daily 102 tablet 3       Family History   Problem Relation Age of Onset     C.A.D. Mother 76     CANCER Mother      non Hodgekin's Lymphoma     HEART DISEASE Mother      enlarged heart     C.A.D. Father 59     HEART DISEASE Father      valve problem     Hypertension Brother      Hypertension Brother      Connective Tissue Disorder Daughter      scleroderma     Colon Cancer No family hx of        Social History   Substance Use Topics     Smoking status: Never Smoker     Smokeless tobacco: Never Used     Alcohol use Yes      Comment: social; maybe a glass of wine or highball per week        Immunization History   Administered Date(s) Administered     HEPA 08/21/1996, 06/16/1997     Influenza (H1N1)  "11/25/2009     Influenza (High Dose) 3 valent vaccine 10/06/2010, 10/17/2011, 09/10/2012, 10/03/2013, 01/01/2014, 09/17/2015, 10/18/2016, 10/05/2017     Influenza (IIV3) PF 12/20/2004, 11/07/2005, 10/25/2006, 10/15/2007, 11/10/2008, 10/07/2014     Pneumococcal (PCV 13) 12/10/2015     Pneumococcal 23 valent 11/24/2003, 04/02/2010     TD (ADULT, 7+) 09/06/2007     TDAP Vaccine (Adacel) 12/04/2012     Zoster vaccine, live 12/18/2008       Review of Systems  C: NEGATIVE for fever, chills, change in weight  E/M: NEGATIVE for ear, mouth and throat problems; does get hoarse.  R: NEGATIVE for significant cough or short of breath. Cough thought due to reflux.  CV: NEGATIVE for chest pain, palpitations or peripheral edema  No nausea, vomitting or change in bowel habits. Having heartburn.  No urinary symptoms other than odor.     Seeing GI on Thursday.  Will get EGD. Has acid burn on voice box.  If HH present, will get repair.  Not on PPI at present for test.      OBJECTIVE:   /88 (BP Location: Right arm, Cuff Size: Adult Regular)  Pulse 57  Temp 98.1  F (36.7  C) (Oral)  Resp 16  Ht 5' 5.25\" (1.657 m)  Wt 126 lb 11.2 oz (57.5 kg)  SpO2 98%  BMI 20.92 kg/m2 Estimated body mass index is 20.92 kg/(m^2) as calculated from the following:    Height as of this encounter: 5' 5.25\" (1.657 m).    Weight as of this encounter: 126 lb 11.2 oz (57.5 kg).  Physical Exam  GENERAL APPEARANCE: healthy, alert and no distress  EYES: Eyes grossly normal to inspection, PERRL and conjunctivae and sclerae normal  HENT: ear canals and TM's normal, nose and mouth without ulcers or lesions, oropharynx clear and oral mucous membranes moist  NECK: no adenopathy, no asymmetry, masses, or scars and thyroid normal to palpation  RESP: lungs clear to auscultation - no rales, rhonchi or wheezes  BREAST: normal without masses, tenderness or nipple discharge and no palpable axillary masses or adenopathy  CV: regular rates and rhythm and no " peripheral edema  ABDOMEN: soft, nontender, no hepatosplenomegaly, no masses and bowel sounds normal  MS: no musculoskeletal defects are noted and gait is age appropriate without ataxia  SKIN: no suspicious lesions or rashes. There is a scab on the helix right ear, ~ 3-4 mm diameter.   NEURO: Normal strength and tone, sensory exam grossly normal, mentation intact and speech normal  PSYCH: mentation appears normal and affect normal/bright    TSH   Date Value Ref Range Status   11/29/2016 1.48 0.40 - 4.00 mU/L Final   ]    Recent Labs   Lab Test  12/13/16   0808  03/05/16   0704   12/02/14   0722  12/05/13   0925   CHOL  157  183   < >  212*  213*   HDL  90  84   < >  93  85   LDL  43  80   < >  98  107   TRIG  121  94   < >  105  101   CHOLHDLRATIO   --    --    --   2.3  2.5    < > = values in this interval not displayed.       Lab Results   Component Value Date    A1C 5.9 12/13/2016    A1C 5.8 12/02/2014    A1C 5.7 12/05/2013       ASSESSMENT / PLAN:   Encounter for routine adult health examination without abnormal findings      Hypothyroidism, unspecified type  Clinically euthyroid.   - TSH with free T4 reflex    Hyperlipidemia LDL goal <70  She is fasting.   - Lipid panel reflex to direct LDL Fasting  - Comprehensive metabolic panel  - atorvastatin (LIPITOR) 20 MG tablet; Take 1 tablet (20 mg) by mouth daily    Impaired fasting glucose  Working on lifestyle.   - Comprehensive metabolic panel  - **A1C FUTURE 6mo    ASCVD (arteriosclerotic cardiovascular disease)  She notes doing well. Cardiology told her she does not need to see them routinely.    Osteoporosis, unspecified osteoporosis type, unspecified pathological fracture presence  Refillling. At this time, anticipate being on x ~ 5 years.   - alendronate (FOSAMAX) 70 MG tablet; Take 1 tablet (70 mg) by mouth every 7 days Take with over 8 ounces water and stay upright for at least 30 minutes after dose.  Take at least 60 minutes before breakfast    Bilateral  "hearing loss, unspecified hearing loss type  Wears aids.     MGUS (monoclonal gammopathy of unknown significance)  She has lab and follow up with Dr. Kasper in the Spring.    Lesion of skin of right ear  Looks like a scab. If resolves over the next couple weeks; no need for further evaluation.  Notes it has been there a couple weeks. Has another one inside ear; laying now on pillow with a hole in it.   Will see Derm if this does not clear.      End of Life Planning:  Patient currently has an advanced directive: Yes.  Practitioner is supportive of decision.    COUNSELING:  Reviewed preventive health counseling, as reflected in patient instructions       Regular exercise       Healthy diet/nutrition       Vision screening       Hearing screening       Dental care       Osteoporosis Prevention/Bone Health      BP Screening:   Last 3 BP Readings:    BP Readings from Last 3 Encounters:   12/19/17 126/88   10/19/17 149/79   06/27/17 138/78       The following was recommended to the patient:  Re-screen BP within a year and recommended lifestyle modifications  Estimated body mass index is 22.33 kg/(m^2) as calculated from the following:    Height as of 6/27/17: 5' 5.25\" (1.657 m).    Weight as of 10/19/17: 135 lb 3.2 oz (61.3 kg).  Weight management plan noted, stable and monitoring   reports that she has never smoked. She has never used smokeless tobacco.        Appropriate preventive services were discussed with this patient, including applicable screening as appropriate for cardiovascular disease, diabetes, osteopenia/osteoporosis, and glaucoma.  As appropriate for age/gender, discussed screening for colorectal cancer, prostate cancer, breast cancer, and cervical cancer. Checklist reviewing preventive services available has been given to the patient.    Reviewed patients plan of care and provided an AVS. The Basic Care Plan (routine screening as documented in Health Maintenance) for Ya meets the Care Plan requirement. " This Care Plan has been established and reviewed with the Patient.    Counseling Resources:  ATP IV Guidelines  Pooled Cohorts Equation Calculator  Breast Cancer Risk Calculator  FRAX Risk Assessment  ICSI Preventive Guidelines  Dietary Guidelines for Americans, 2010  [employer]'s MyPlate  ASA Prophylaxis  Lung CA Screening    Scarlett Spencer MD, MD  [facility] for HPI/ROS submitted by the patient on 12/19/2017   PHQ-2 Score: 0

## 2017-12-19 NOTE — PATIENT INSTRUCTIONS
Preventive Health Recommendations    Female Ages 65 +    Yearly exam:     See your health care provider every year in order to  o Review health changes.   o Discuss preventive care.    o Review your medicines if your doctor has prescribed any.      You no longer need a yearly Pap test unless you've had an abnormal Pap test in the past 10 years. If you have vaginal symptoms, such as bleeding or discharge, be sure to talk with your provider about a Pap test.      Every 1 to 2 years, have a mammogram.  If you are over 69, talk with your health care provider about whether or not you want to continue having screening mammograms.      Every 10 years, have a colonoscopy. Or, have a yearly FIT test (stool test). These exams will check for colon cancer.       Have a cholesterol test every 5 years, or more often if your doctor advises it.       Have a diabetes test (fasting glucose) every three years. If you are at risk for diabetes, you should have this test more often.       At age 65, have a bone density scan (DEXA) to check for osteoporosis (brittle bone disease).    Shots:    Get a flu shot each year.    Get a tetanus shot every 10 years.    Talk to your doctor about your pneumonia vaccines. There are now two you should receive - Pneumovax (PPSV 23) and Prevnar (PCV 13).    Talk to your doctor about the shingles vaccine.    Talk to your doctor about the hepatitis B vaccine.    Nutrition:     Eat at least 5 servings of fruits and vegetables each day.      Eat whole-grain bread, whole-wheat pasta and brown rice instead of white grains and rice.      Talk to your provider about Calcium and Vitamin D.     Lifestyle    Exercise at least 150 minutes a week (30 minutes a day, 5 days a week). This will help you control your weight and prevent disease.      Limit alcohol to one drink per day.      No smoking.       Wear sunscreen to prevent skin cancer.       See your dentist twice a year for an exam and cleaning.      See your  eye doctor every 1 to 2 years to screen for conditions such as glaucoma, macular degeneration and cataracts.    -----------------------------------------------------------------------------------------------    I would recommend about 1200 mg calcium daily. This is best obtained in the diet.    Dairy is the best source.  A diet without dairy is thought to have about 250-300 mg calcium.  An 8 oz glass of milk has about 300 mg. A cup of yougurt has about the same. Many cheeses will have about that much per ounce.    Some foods are fortified with calcium, such as specific orange juices. This is probably the next best source of calcium.    Supplements can help make up the difference if you are not getting in the diet.    Calcium carbonate is a common form of calcium. I would recommend if needing more than 500 mg to divide the dose (take at different times). It is also best absorbed with food.    If you have hard time tolerating that, calcium citrate is better tolerated.    Some people seem to tolerate viactiv even if they do not tolerate other forms of calcium carbonate.    Also, you should be getting about 1000 iu of vitamin D; this is often incorporated into a calcium supplement.

## 2017-12-19 NOTE — TELEPHONE ENCOUNTER
Prescription approved per [facility] Refill Protocol. Patient had appointment today along with lab recheck  Valorie Pascal RN

## 2017-12-19 NOTE — MR AVS SNAPSHOT
After Visit Summary   12/19/2017    Ya Stahl    MRN: [id number]           Patient Information     Date Of Birth          1940        Visit Information        Provider Department      12/19/2017 7:10 AM Scarlett Spencer MD [facility]        Today's Diagnoses     Hypothyroidism, unspecified type    -  1    Hyperlipidemia LDL goal <70        Impaired fasting glucose        Osteoporosis, unspecified osteoporosis type, unspecified pathological fracture presence          Care Instructions      Preventive Health Recommendations    Female Ages 65 +    Yearly exam:     See your health care provider every year in order to  o Review health changes.   o Discuss preventive care.    o Review your medicines if your doctor has prescribed any.      You no longer need a yearly Pap test unless you've had an abnormal Pap test in the past 10 years. If you have vaginal symptoms, such as bleeding or discharge, be sure to talk with your provider about a Pap test.      Every 1 to 2 years, have a mammogram.  If you are over 69, talk with your health care provider about whether or not you want to continue having screening mammograms.      Every 10 years, have a colonoscopy. Or, have a yearly FIT test (stool test). These exams will check for colon cancer.       Have a cholesterol test every 5 years, or more often if your doctor advises it.       Have a diabetes test (fasting glucose) every three years. If you are at risk for diabetes, you should have this test more often.       At age 65, have a bone density scan (DEXA) to check for osteoporosis (brittle bone disease).    Shots:    Get a flu shot each year.    Get a tetanus shot every 10 years.    Talk to your doctor about your pneumonia vaccines. There are now two you should receive - Pneumovax (PPSV 23) and Prevnar (PCV 13).    Talk to your doctor about the shingles vaccine.    Talk to your doctor about the hepatitis B vaccine.    Nutrition:     Eat at  least 5 servings of fruits and vegetables each day.      Eat whole-grain bread, whole-wheat pasta and brown rice instead of white grains and rice.      Talk to your provider about Calcium and Vitamin D.     Lifestyle    Exercise at least 150 minutes a week (30 minutes a day, 5 days a week). This will help you control your weight and prevent disease.      Limit alcohol to one drink per day.      No smoking.       Wear sunscreen to prevent skin cancer.       See your dentist twice a year for an exam and cleaning.      See your eye doctor every 1 to 2 years to screen for conditions such as glaucoma, macular degeneration and cataracts.    -----------------------------------------------------------------------------------------------    I would recommend about 1200 mg calcium daily. This is best obtained in the diet.    Dairy is the best source.  A diet without dairy is thought to have about 250-300 mg calcium.  An 8 oz glass of milk has about 300 mg. A cup of yougurt has about the same. Many cheeses will have about that much per ounce.    Some foods are fortified with calcium, such as specific orange juices. This is probably the next best source of calcium.    Supplements can help make up the difference if you are not getting in the diet.    Calcium carbonate is a common form of calcium. I would recommend if needing more than 500 mg to divide the dose (take at different times). It is also best absorbed with food.    If you have hard time tolerating that, calcium citrate is better tolerated.    Some people seem to tolerate viactiv even if they do not tolerate other forms of calcium carbonate.    Also, you should be getting about 1000 iu of vitamin D; this is often incorporated into a calcium supplement.          Follow-ups after your visit        Your next 10 appointments already scheduled     Jan 05, 2018  8:15 AM CST   MA SCREENING DIGITAL BILATERAL with [facility]   [facility]     [address] [id number] [phone]           Do not use any powder, lotion or deodorant under your arms or on your breast. If you do, we will ask you to remove it before your exam.  Wear comfortable, two-piece clothing.  If you have any allergies, tell your care team.  Bring any previous mammograms from other facilities or have them mailed to the breast center.            Apr 13, 2018  8:00 AM CDT   LAB with  LAB   [facility] ([facility])    [address] [id number] [phone]           Please do not eat 10-12 hours before your appointment if you are coming in fasting for labs on lipids, cholesterol, or glucose (sugar). This does not apply to pregnant women. Water, hot tea and black coffee (with nothing added) are okay. Do not drink other fluids, diet soda or chew gum.            Apr 19, 2018  3:15 PM CDT   Return Visit with Vandana Kasper MD   [facility] ([facility])    [facility]  [address]  [facility] [id number] [phone]              Who to contact     If you have questions or need follow up information about today's clinic visit or your schedule please contact [facility] directly at [phone].  Normal or non-critical lab and imaging results will be communicated to you by MyChart, letter or phone within 4 business days after the clinic has received the results. If you do not hear from us within 7 days, please contact the clinic through MyChart or phone. If you have a critical or abnormal lab result, we will notify you by phone as soon as possible.  Submit refill requests through [employer] or call your pharmacy and they will forward the refill request to us. Please allow 3 business days for your refill to be completed.          Additional Information About Your Visit        MyChart Information     [employer] lets you  "send messages to your doctor, view your test results, renew your prescriptions, schedule appointments and more. To sign up, go to [email or web address] . Click on \"Log in\" on the left side of the screen, which will take you to the Welcome page. Then click on \"Sign up Now\" on the right side of the page.     You will be asked to enter the access code listed below, as well as some personal information. Please follow the directions to create your username and password.     Your access code is: [email or web address]  Expires: 2018  1:26 PM     Your access code will  in 90 days. If you need help or a new code, please call your [place] clinic or [phone].        Care EveryWhere ID     This is your Care EveryWhere ID. This could be used by other organizations to access your [place] medical records  [id number]        Your Vitals Were     Pulse Temperature Respirations Height Pulse Oximetry BMI (Body Mass Index)    57 98.1  F (36.7  C) (Oral) 16 5' 5.25\" (1.657 m) 98% 20.92 kg/m2       Blood Pressure from Last 3 Encounters:   17 126/88   10/19/17 149/79   17 138/78    Weight from Last 3 Encounters:   17 126 lb 11.2 oz (57.5 kg)   10/19/17 135 lb 3.2 oz (61.3 kg)   17 131 lb 9.6 oz (59.7 kg)              We Performed the Following     **A1C FUTURE 6mo     Comprehensive metabolic panel     Lipid panel reflex to direct LDL Fasting     TSH with free T4 reflex          Today's Medication Changes          These changes are accurate as of: 17  7:41 AM.  If you have any questions, ask your nurse or doctor.               These medicines have changed or have updated prescriptions.        Dose/Directions    MIRALAX powder   This may have changed:  See the new instructions.   Used for:  Chronic constipation   Generic drug:  polyethylene glycol        STIR 1 CAPFUL (17GM) IN 8 OZ OF LIQUID AND DRINK   Quantity:  1 BOTTLE   Refills:  1 YEAR            Where to get your medicines      These " medications were sent to [facility] - [place], [place] - [address]  [address], [place] [address]     Phone:  [phone]     atorvastatin 20 MG tablet         Some of these will need a paper prescription and others can be bought over the counter.  Ask your nurse if you have questions.     Bring a paper prescription for each of these medications     alendronate 70 MG tablet                Primary Care Provider Office Phone # Fax #    Scarlett Spencer [phone]       [address] [facility] [address]        Equal Access to Services     [facility]: Hadii aad ku hadasho Soomaali, waaxda luqadaha, qaybta kaalmada adeegyada, waxhipolito santacruz hayaan adeabimbola greene . So [facility] [phone].    ATENCIÓN: Si habla español, tiene a costa disposición servicios gratuitos de asistencia lingüística. Ll[facility] [phone].    We comply with applicable federal civil rights laws and [place] laws. We do not discriminate on the basis of race, color, national origin, age, disability, sex, sexual orientation, or gender identity.            Thank you!     Thank you for choosing [facility]  for your care. Our goal is always to provide you with excellent care. Hearing back from our patients is one way we can continue to improve our services. Please take a few minutes to complete the written survey that you may receive in the mail after your visit with us. Thank you!             Your Updated Medication List - Protect others around you: Learn how to safely use, store and throw away your medicines at www.disposemymeds.org.          This list is accurate as of: 12/19/17  7:41 AM.  Always use your most recent med list.                   Brand Name Dispense Instructions for use Diagnosis    acetaminophen 325 MG tablet    TYLENOL    60 tablet    Take 2 tablets (650 mg) by mouth every 6 hours as needed for mild pain or fever    Avascular necrosis of bone  (H)       alendronate 70 MG tablet    FOSAMAX    12 tablet    Take 1 tablet (70 mg) by mouth every 7 days Take with over 8 ounces water and stay upright for at least 30 minutes after dose.  Take at least 60 minutes before breakfast    Osteoporosis, unspecified osteoporosis type, unspecified pathological fracture presence       aspirin 81 MG EC tablet     1 tablet    Take 1 tablet (81 mg) by mouth daily        atorvastatin 20 MG tablet    LIPITOR    102 tablet    Take 1 tablet (20 mg) by mouth daily    Hyperlipidemia LDL goal <70       calcium citrate-vitamin D 315-250 MG-UNIT Tabs per tablet    CITRACAL    120 tablet    Take 2 tablets in the morning and 1 tablet in the evening    Osteoporosis       CENTRUM SILVER per tablet      Take 1 tablet by mouth daily        cevimeline 30 MG capsule    EVOXAC     Take 30 mg by mouth 3 times daily Reported on 3/6/2017        conjugated estrogens cream    PREMARIN    30 g    Place 1 g vaginally twice a week prn    Atrophic vaginitis       ibuprofen 600 MG tablet    ADVIL/MOTRIN    60 tablet    Take 1 tablet (600 mg) by mouth every 6 hours as needed for moderate pain    Avascular necrosis of bone (H)       levothyroxine 88 MCG tablet    SYNTHROID/LEVOTHROID    102 tablet    Take 1 tablet (88 mcg) by mouth daily    Hypothyroidism, unspecified type       MIRALAX powder   Generic drug:  polyethylene glycol     1 BOTTLE    STIR 1 CAPFUL (17GM) IN 8 OZ OF LIQUID AND DRINK    Chronic constipation       nitroGLYcerin 0.4 MG sublingual tablet    NITROSTAT    25 tablet    Place 1 tablet (0.4 mg) under the tongue every 5 minutes as needed for chest pain if you are still having symptoms after 3 doses (15 minutes) call 911.    Acute chest pain       OMEGA-3 FISH OIL PO      Take 1 g by mouth 2 times daily (with meals)        OMEPRAZOLE PO      Take 40 mg by mouth 2 times daily (before meals)        * OSTEO BI-FLEX ADV TRIPLE ST PO      Take 2 tablets by mouth daily        * LUTEIN 20 PO       Take 1 tablet by mouth daily        RESTASIS OP           * Notice:  This list has 2 medication(s) that are the same as other medications prescribed for you. Read the directions carefully, and ask your doctor or other care provider to review them with you.

## 2017-12-19 NOTE — NURSING NOTE
"Chief Complaint   Patient presents with     Physical       Initial /88 (BP Location: Right arm, Cuff Size: Adult Regular)  Pulse 57  Temp 98.1  F (36.7  C) (Oral)  Wt 126 lb 11.2 oz (57.5 kg)  SpO2 98%  BMI 20.92 kg/m2 Estimated body mass index is 20.92 kg/(m^2) as calculated from the following:    Height as of 6/27/17: 5' 5.25\" (1.657 m).    Weight as of this encounter: 126 lb 11.2 oz (57.5 kg).  Medication Reconciliation: complete   Radha Mitchell, IVAN    "

## 2017-12-19 NOTE — TELEPHONE ENCOUNTER
Requested Prescriptions   Pending Prescriptions Disp Refills     levothyroxine (SYTHROID/LEVOTHROID) 88 MCG tablet [Pharmacy Med Name: LEVOTHYROXINE SODIUM 88MCG TABS]  Last Written Prescription Date:  12/13/16  Last Fill Quantity: 106,  # refills: 3   Last Office Visit with G, P or [facility] prescribing provider:  6/27/17   Future Office Visit:      102 tablet 3     Sig: TAKE ONE TABLET BY MOUTH EVERY DAY    Thyroid Protocol Failed    12/18/2017  8:40 AM       Failed - Normal TSH on file in past 12 months    Recent Labs   Lab Test  11/29/16   1553   TSH  1.48             Passed - Patient is 12 years or older       Passed - Recent or future visit with authorizing provider's specialty    Patient had office visit in the last year or has a visit in the next 30 days with authorizing provider.  See chart review.              Passed - No active pregnancy on record    If patient is pregnant or has had a positive pregnancy test, please check TSH.         Passed - No positive pregnancy test in past 12 months    If patient is pregnant or has had a positive pregnancy test, please check TSH.

## 2017-12-22 ENCOUNTER — TELEPHONE (OUTPATIENT)
Dept: FAMILY MEDICINE | Facility: CLINIC | Age: 77
End: 2017-12-22

## 2017-12-22 RX ORDER — LEVOTHYROXINE SODIUM 100 UG/1
100 TABLET ORAL DAILY
Qty: 90 TABLET | Refills: 0 | Status: SHIPPED | OUTPATIENT
Start: 2017-12-22 | End: 2018-02-20

## 2017-12-22 NOTE — TELEPHONE ENCOUNTER
Please call and let her know that her TSH was a little elevated.   I have increased her thyroid medication a small amount. Sent it to her mail order.    I would like to have her check her thyroid again in 6-8 weeks to see if this is OK.   (enough time so we can renew or change dose before her next fill).    Thanks!

## 2017-12-22 NOTE — TELEPHONE ENCOUNTER
Called patient to notify her of message below.  She is notified, and will come back for labs in 6-8 weeks.  Yolie Canales, RN  Triage Nurse

## 2018-01-03 ENCOUNTER — TRANSFERRED RECORDS (OUTPATIENT)
Dept: HEALTH INFORMATION MANAGEMENT | Facility: CLINIC | Age: 78
End: 2018-01-03

## 2018-01-05 ENCOUNTER — RADIANT APPOINTMENT (OUTPATIENT)
Dept: MAMMOGRAPHY | Facility: CLINIC | Age: 78
End: 2018-01-05
Payer: COMMERCIAL

## 2018-01-05 DIAGNOSIS — Z12.31 VISIT FOR SCREENING MAMMOGRAM: ICD-10-CM

## 2018-01-05 PROCEDURE — 77067 SCR MAMMO BI INCL CAD: CPT | Mod: TC

## 2018-02-01 ENCOUNTER — OFFICE VISIT (OUTPATIENT)
Dept: FAMILY MEDICINE | Facility: CLINIC | Age: 78
End: 2018-02-01
Payer: COMMERCIAL

## 2018-02-01 VITALS
TEMPERATURE: 98.3 F | BODY MASS INDEX: 21.56 KG/M2 | DIASTOLIC BLOOD PRESSURE: 88 MMHG | WEIGHT: 129.4 LBS | SYSTOLIC BLOOD PRESSURE: 154 MMHG | HEIGHT: 65 IN | OXYGEN SATURATION: 99 % | RESPIRATION RATE: 16 BRPM | HEART RATE: 58 BPM

## 2018-02-01 DIAGNOSIS — R53.83 FATIGUE, UNSPECIFIED TYPE: ICD-10-CM

## 2018-02-01 DIAGNOSIS — E03.9 HYPOTHYROIDISM, UNSPECIFIED TYPE: Primary | ICD-10-CM

## 2018-02-01 DIAGNOSIS — G47.00 INSOMNIA, UNSPECIFIED TYPE: ICD-10-CM

## 2018-02-01 DIAGNOSIS — I10 HYPERTENSION GOAL BP (BLOOD PRESSURE) < 140/90: ICD-10-CM

## 2018-02-01 DIAGNOSIS — R25.1 TREMOR: ICD-10-CM

## 2018-02-01 PROCEDURE — 84439 ASSAY OF FREE THYROXINE: CPT | Performed by: FAMILY MEDICINE

## 2018-02-01 PROCEDURE — 36415 COLL VENOUS BLD VENIPUNCTURE: CPT | Performed by: FAMILY MEDICINE

## 2018-02-01 PROCEDURE — 99214 OFFICE O/P EST MOD 30 MIN: CPT | Performed by: FAMILY MEDICINE

## 2018-02-01 PROCEDURE — 80050 GENERAL HEALTH PANEL: CPT | Performed by: FAMILY MEDICINE

## 2018-02-01 NOTE — PROGRESS NOTES
"  SUBJECTIVE:   Ya Stahl is a 77 year old female who presents to clinic today for the following health issues:      Insomnia      Duration: x 2 weeks     Description  Frequency of insomnia:  4-5 times per week  Time to fall asleep: 2 hours  Middle of night awakening:  nightly  Early morning awakening:  several times a week    Accompanying signs and symptoms:  none    History  Similar episodes in past:  YES- was not a problem at that time  Previous evaluation/sleep study:  no     Precipitating or alleviating factors:  New stressful situation: no   Caffeine intake after lunchtime: no   OTC decongestants: no   Any new medications: no     Therapies tried and outcome: Melatonin , tried lavendar essentional oil    Here discuss \"shakiness\" on the inside.  Not visibly shaking. People have noticed that head is shaking.  Would like a referral to a neurologist if needed.   Is always feeling warm. Not like hot flashes.          Problem list and histories reviewed & adjusted, as indicated.  Additional history:     See under ROS     Patient Active Problem List   Diagnosis     Hypothyroidism     Chondrodermatitis nodularis helicis     Grave's disease     Bile reflux gastritis     Hyperlipidemia LDL goal <70     Advanced directives, counseling/discussion     Sjogren's disease (H)     HL (hearing loss)     Mixed incontinence     Health Care Home     Chronic cough     Impaired fasting glucose     Status post-operative repair of hip fracture, RIGHT     Constipation     Pubic ramus fracture (H)     Osteoporosis     Gastroesophageal reflux disease without esophagitis     AMI (acute myocardial infarction)     ASCVD (arteriosclerotic cardiovascular disease)     Macrocytosis     Monoclonal paraproteinemia     MGUS (monoclonal gammopathy of unknown significance)     Epigastric pain       Current Outpatient Prescriptions   Medication Sig Dispense Refill     levothyroxine (SYNTHROID/LEVOTHROID) 100 MCG tablet Take 1 tablet (100 mcg) by " mouth daily 90 tablet 0     atorvastatin (LIPITOR) 20 MG tablet Take 1 tablet (20 mg) by mouth daily 102 tablet 3     alendronate (FOSAMAX) 70 MG tablet Take 1 tablet (70 mg) by mouth every 7 days Take with over 8 ounces water and stay upright for at least 30 minutes after dose.  Take at least 60 minutes before breakfast 12 tablet 3     CycloSPORINE (RESTASIS OP)        ibuprofen (ADVIL/MOTRIN) 600 MG tablet Take 1 tablet (600 mg) by mouth every 6 hours as needed for moderate pain 60 tablet 0     acetaminophen (TYLENOL) 325 MG tablet Take 2 tablets (650 mg) by mouth every 6 hours as needed for mild pain or fever 60 tablet 0     calcium citrate-vitamin D (CITRACAL) 315-250 MG-UNIT TABS per tablet Take 2 tablets in the morning and 1 tablet in the evening 120 tablet      cevimeline (EVOXAC) 30 MG capsule Take 30 mg by mouth 3 times daily Reported on 3/6/2017       Multiple Vitamins-Minerals (CENTRUM SILVER) per tablet Take 1 tablet by mouth daily       Misc Natural Products (OSTEO BI-FLEX ADV TRIPLE ST PO) Take 2 tablets by mouth daily        Misc Natural Products (LUTEIN 20 PO) Take 1 tablet by mouth daily       Omega-3 Fatty Acids (OMEGA-3 FISH OIL PO) Take 1 g by mouth 2 times daily (with meals)        aspirin 81 MG EC tablet Take 1 tablet (81 mg) by mouth daily 1 tablet 0     nitroglycerin (NITROSTAT) 0.4 MG SL tablet Place 1 tablet (0.4 mg) under the tongue every 5 minutes as needed for chest pain if you are still having symptoms after 3 doses (15 minutes) call 911. 25 tablet 0     MIRALAX OR POWD STIR 1 CAPFUL (17GM) IN 8 OZ OF LIQUID AND DRINK (Patient taking differently: STIR 1 CAPFUL (17GM) IN 8 OZ OF LIQUID AND DRINK twice daily) 1 BOTTLE 1 YEAR       Reviewed and updated as needed this visit by clinical staff  Tobacco  Allergies  Med Hx  Surg Hx  Fam Hx  Soc Hx      Reviewed and updated as needed this visit by Provider         ROS:  CONSTITUTIONAL:NEGATIVE for fever, chills, change in weight; she has a  "difficult time keeping weight on.  RESP:NEGATIVE for significant cough or SOB  CV: NEGATIVE for chest pain, palpitations or peripheral edema  GI: NEGATIVE for nausea, abdominal pain, heartburn, or change in bowel habits  NEURO: see below  PSYCHIATRIC: NEGATIVE for changes in mood or affect    Feeling hot.  Not sleeping.  Will do a little and is then exhausted.     When sitting and watching TV, feels like head shakes. ? Seeing a Neurologist.  Not physically seeing the tremor; but feels it. Other people have noted head.   Not aware of other areas with a tremor.    Head shaking since November.  The insomnia x 1 month.  Other symptoms in the last couple weeks.    Neck will get spasms; was told could be from Sjogren;s.     OBJECTIVE:     /86 (BP Location: Right arm, Cuff Size: Adult Regular)  Pulse 58  Temp 98.3  F (36.8  C) (Oral)  Resp 16  Ht 5' 5.25\" (1.657 m)  Wt 129 lb 6.4 oz (58.7 kg)  SpO2 99%  BMI 21.37 kg/m2  Body mass index is 21.37 kg/(m^2).  GENERAL APPEARANCE: alert and no distress  NECK: no adenopathy and thyroid normal to palpation  RESP: lungs clear to auscultation - no rales, rhonchi or wheezes  CV: regular rates and rhythm  MS: no ankle edema.   SKIN: no suspicious lesions or rashes  Neuro: no tremor noted.  PSYCH: mentation appears normal and affect normal/bright    TSH   Date Value Ref Range Status   12/19/2017 4.82 (H) 0.40 - 4.00 mU/L Final   ]        ASSESSMENT/PLAN:     Hypothyroidism, unspecified type  Had increased her thyroid medication recently. This may be too much for her, despite it being a slight increase.  At this time, will have her try to go back to what she had been using. We could tolerate a slight elevation in TSH. Recheck in another ~ 6 weeks.  - TSH with free T4 reflex  - TSH with free T4 reflex; Future  - T4 free    Fatigue, unspecified type  As above.   - TSH with free T4 reflex  - Comprehensive metabolic panel  - CBC with platelets differential    Tremor  She is " interested in visiting with Neurolgy.   - NEUROLOGY ADULT REFERRAL    Insomnia, unspecified type  May go with the above.   Did give some information on sleep hygiene.    Hypertension:  She has been off medication.  Recommend we get some more values; if remains elevated, would antiipate going back on.      Patient Instructions       Keep track of your blood pressure at home.      I would recommend that you come in to our pharmacy or make a nurse only bp check. You check your bp with your cuff and have us check with ours. That way we can see how true yours is reading.    We may wish to restart medication.    -----------------------------------------------    Go back to the 88 mcg levothyroxine.    Let's check in a couple months.    Let me know sooner if having problems.        -----------------------------------------------------------------------------------------------------    General recommendations for sleep problems (Insomnia)   (Allow 2-4 weeks to see results)   Establish a regular sleep schedule   Go to bed at same time each night   Get up at same time each day   Avoid sleeping-in on Sunday morning   Cut down time in bed (if not asleep, get up)   Avoid trying to force yourself to sleep   Use your bed only for sleep and sex   Do not read or watch Television in bed   Make the bedroom comfortable   Keep temperature in your bedroom comfortable   Keep bedroom quiet when sleeping   Consider ear plugs (silicon)   Keep bedroom dark enough   Use dark blinds or wear an eye mask if needed   Relax at bedtime   Relax each muscle group individually   Begin with your feet   Work toward your head   Deal with your worries before bedtime   Set aside a worry time for 30 minutes earlier   Listen to relaxation tapes   Classical Music or Nature sounds   Imagine a tranquil scene (e.g. waterfall or beach)   Back Massage   Gentle 5-minute back rub prior to bedtime   Perform measures to make you tired at bedtime   Get regular Exercise  each day (6 hours before bedtime)   Take medications only as directed   Eat a light bedtime snack or warm drink   Warm milk   Warm herbal tea (non-caffeinated)   Special Therapy Measures   Sleep Restriction Therapy   Limit time in bed for 15 minutes more than sleep   Do not set limit under 4 hours   Increase time by 15 minutes per effective sleep trial   Effective sleep suggests >90% of bedtime asleep   Stimulus Control   Do not lie awake for more than 30 minutes   Get out of bed and perform a quiet activity   Return to bed when sleepy   Repeat as many times per night as needed   No napping during day   Things to avoid   Do not Exercise just before bedtime   No overstimulating activities just before bed   No competitive games before bedtime   No exciting Television programs before bedtime   Avoid caffeine after lunchtime   Avoid chocolate   Avoid coffee, tea, or soda   Do not use alcohol to induce sleep (worsens Insomnia)   Do not take someone else's sleeping pills   Do not look at the clock when awakening   Do not turn on light when getting up to use bathroom   Read the book Say Good Night To Insomnia          Scarlett Spencer MD, MD  [facility]

## 2018-02-01 NOTE — NURSING NOTE
"Chief Complaint   Patient presents with     warmth     Insomnia     Shaking       Initial /86 (BP Location: Right arm, Cuff Size: Adult Regular)  Pulse 58  Temp 98.3  F (36.8  C) (Oral)  Resp 16  Ht 5' 5.25\" (1.657 m)  Wt 129 lb 6.4 oz (58.7 kg)  SpO2 99%  BMI 21.37 kg/m2 Estimated body mass index is 21.37 kg/(m^2) as calculated from the following:    Height as of this encounter: 5' 5.25\" (1.657 m).    Weight as of this encounter: 129 lb 6.4 oz (58.7 kg).  Medication Reconciliation: complete   Radha Mitchell, IVAN    "

## 2018-02-01 NOTE — MR AVS SNAPSHOT
After Visit Summary   2/1/2018    Ya Stahl    MRN: [id number]           Patient Information     Date Of Birth          1940        Visit Information        Provider Department      2/1/2018 4:30 PM Scarlett Spencer MD [facility]        Today's Diagnoses     Hypothyroidism, unspecified type    -  1    Fatigue, unspecified type        Tremor          Care Instructions        Keep track of your blood pressure at home.      I would recommend that you come in to our pharmacy or make a nurse only bp check. You check your bp with your cuff and have us check with ours. That way we can see how true yours is reading.    We may wish to restart medication.    -----------------------------------------------    Go back to the 88 mcg levothyroxine.    Let's check in a couple months.    Let me know sooner if having problems.        -----------------------------------------------------------------------------------------------------    General recommendations for sleep problems (Insomnia)   (Allow 2-4 weeks to see results)   Establish a regular sleep schedule   Go to bed at same time each night   Get up at same time each day   Avoid sleeping-in on Sunday morning   Cut down time in bed (if not asleep, get up)   Avoid trying to force yourself to sleep   Use your bed only for sleep and sex   Do not read or watch Television in bed   Make the bedroom comfortable   Keep temperature in your bedroom comfortable   Keep bedroom quiet when sleeping   Consider ear plugs (silicon)   Keep bedroom dark enough   Use dark blinds or wear an eye mask if needed   Relax at bedtime   Relax each muscle group individually   Begin with your feet   Work toward your head   Deal with your worries before bedtime   Set aside a worry time for 30 minutes earlier   Listen to relaxation tapes   Classical Music or Nature sounds   Imagine a tranquil scene (e.g. waterfall or beach)   Back Massage   Gentle 5-minute back rub prior  to bedtime   Perform measures to make you tired at bedtime   Get regular Exercise each day (6 hours before bedtime)   Take medications only as directed   Eat a light bedtime snack or warm drink   Warm milk   Warm herbal tea (non-caffeinated)   Special Therapy Measures   Sleep Restriction Therapy   Limit time in bed for 15 minutes more than sleep   Do not set limit under 4 hours   Increase time by 15 minutes per effective sleep trial   Effective sleep suggests >90% of bedtime asleep   Stimulus Control   Do not lie awake for more than 30 minutes   Get out of bed and perform a quiet activity   Return to bed when sleepy   Repeat as many times per night as needed   No napping during day   Things to avoid   Do not Exercise just before bedtime   No overstimulating activities just before bed   No competitive games before bedtime   No exciting Television programs before bedtime   Avoid caffeine after lunchtime   Avoid chocolate   Avoid coffee, tea, or soda   Do not use alcohol to induce sleep (worsens Insomnia)   Do not take someone else's sleeping pills   Do not look at the clock when awakening   Do not turn on light when getting up to use bathroom   Read the book Say Good Night To Insomnia              Follow-ups after your visit        Additional Services     NEUROLOGY ADULT REFERRAL       Your provider has referred you for the following:   Consult at [facility]: [facility] [phone]   [email or web address]    Please be aware that coverage of these services is subject to the terms and limitations of your health insurance plan.  Call member services at your health plan with any benefit or coverage questions.      Please bring the following with you to your appointment:    (1) Any X-Rays, CTs or MRIs which have been performed.  Contact the facility where they were done to arrange for  prior to your scheduled appointment.    (2) List of current medications  (3) This referral request    (4) Any documents/labs given to you for this referral                  Your next 10 appointments already scheduled     Apr 13, 2018  8:00 AM CDT   LAB with  LAB   [facility] ([facility])    [address] [facility] [address] [phone]           Please do not eat 10-12 hours before your appointment if you are coming in fasting for labs on lipids, cholesterol, or glucose (sugar). This does not apply to pregnant women. Water, hot tea and black coffee (with nothing added) are okay. Do not drink other fluids, diet soda or chew gum.            Apr 19, 2018  3:15 PM CDT   Return Visit with Vandana Kasper MD   [facility] ([facility])    [facility]  [address]  [facility] [address] [phone]              Future tests that were ordered for you today     Open Future Orders        Priority Expected Expires Ordered    TSH with free T4 reflex Routine  3/29/2018 2/1/2018            Who to contact     If you have questions or need follow up information about today's clinic visit or your schedule please contact [facility] directly at [phone].  Normal or non-critical lab and imaging results will be communicated to you by MyChart, letter or phone within 4 business days after the clinic has received the results. If you do not hear from us within 7 days, please contact the clinic through [facility] or phone. If you have a critical or abnormal lab result, we will notify you by phone as soon as possible.  Submit refill requests through [employer] or call your pharmacy and they will forward the refill request to us. Please allow 3 business days for your refill to be completed.          Additional Information About Your Visit        [employer] Information     [employer] lets you send messages to your doctor, view your test results, renew your prescriptions, schedule appointments and  "more. To sign up, go to [email or web address] . Click on \"Log in\" on the left side of the screen, which will take you to the Welcome page. Then click on \"Sign up Now\" on the right side of the page.     You will be asked to enter the access code listed below, as well as some personal information. Please follow the directions to create your username and password.     Your access code is: [email or web address]  Expires: 2018  5:23 PM     Your access code will  in 90 days. If you need help or a new code, please call your [facility] clinic or [phone].        Care EveryWhere ID     This is your Care EveryWhere ID. This could be used by other organizations to access your [facility] medical records  [id number]        Your Vitals Were     Pulse Temperature Respirations Height Pulse Oximetry BMI (Body Mass Index)    58 98.3  F (36.8  C) (Oral) 16 5' 5.25\" (1.657 m) 99% 21.37 kg/m2       Blood Pressure from Last 3 Encounters:   18 154/88   17 126/88   10/19/17 149/79    Weight from Last 3 Encounters:   18 129 lb 6.4 oz (58.7 kg)   17 126 lb 11.2 oz (57.5 kg)   10/19/17 135 lb 3.2 oz (61.3 kg)              We Performed the Following     CBC with platelets     Comprehensive metabolic panel     NEUROLOGY ADULT REFERRAL     TSH with free T4 reflex          Today's Medication Changes          These changes are accurate as of 18  5:23 PM.  If you have any questions, ask your nurse or doctor.               These medicines have changed or have updated prescriptions.        Dose/Directions    MIRALAX powder   This may have changed:  See the new instructions.   Used for:  Chronic constipation   Generic drug:  polyethylene glycol        STIR 1 CAPFUL (17GM) IN 8 OZ OF LIQUID AND DRINK   Quantity:  1 BOTTLE   Refills:  1 YEAR                Primary Care Provider Office Phone # Fax #    Scarlett Spencer [phone]       [address]  [facility] [address]        Equal Access to Services  "    [facility] : Hadii aad ku kerry Serrato, waaxda luqadaha, qaybta kaalmada odalis, fritz neeta hayrika bucktiffanylázaro greene . So [facility] [phone].    ATENCIÓN: Si habla espmary, tiene a costa disposición servicios gratuitos de asistencia lingüística. Llame al [phone].    We comply with applicable federal civil rights laws and [place] laws. We do not discriminate on the basis of race, color, national origin, age, disability, sex, sexual orientation, or gender identity.            Thank you!     Thank you for choosing [facility]  for your care. Our goal is always to provide you with excellent care. Hearing back from our patients is one way we can continue to improve our services. Please take a few minutes to complete the written survey that you may receive in the mail after your visit with us. Thank you!             Your Updated Medication List - Protect others around you: Learn how to safely use, store and throw away your medicines at www.disposemymeds.org.          This list is accurate as of 2/1/18  5:23 PM.  Always use your most recent med list.                   Brand Name Dispense Instructions for use Diagnosis    acetaminophen 325 MG tablet    TYLENOL    60 tablet    Take 2 tablets (650 mg) by mouth every 6 hours as needed for mild pain or fever    Avascular necrosis of bone (H)       alendronate 70 MG tablet    FOSAMAX    12 tablet    Take 1 tablet (70 mg) by mouth every 7 days Take with over 8 ounces water and stay upright for at least 30 minutes after dose.  Take at least 60 minutes before breakfast    Osteoporosis, unspecified osteoporosis type, unspecified pathological fracture presence       aspirin 81 MG EC tablet     1 tablet    Take 1 tablet (81 mg) by mouth daily        atorvastatin 20 MG tablet    LIPITOR    102 tablet    Take 1 tablet (20 mg) by mouth daily    Hyperlipidemia LDL goal <70       calcium citrate-vitamin D 315-250 MG-UNIT Tabs per tablet    CITRACAL    120  tablet    Take 2 tablets in the morning and 1 tablet in the evening    Osteoporosis       CENTRUM SILVER per tablet      Take 1 tablet by mouth daily        cevimeline 30 MG capsule    EVOXAC     Take 30 mg by mouth 3 times daily Reported on 3/6/2017        ibuprofen 600 MG tablet    ADVIL/MOTRIN    60 tablet    Take 1 tablet (600 mg) by mouth every 6 hours as needed for moderate pain    Avascular necrosis of bone (H)       levothyroxine 100 MCG tablet    SYNTHROID/LEVOTHROID    90 tablet    Take 1 tablet (100 mcg) by mouth daily    Hypothyroidism, unspecified type       MIRALAX powder   Generic drug:  polyethylene glycol     1 BOTTLE    STIR 1 CAPFUL (17GM) IN 8 OZ OF LIQUID AND DRINK    Chronic constipation       nitroGLYcerin 0.4 MG sublingual tablet    NITROSTAT    25 tablet    Place 1 tablet (0.4 mg) under the tongue every 5 minutes as needed for chest pain if you are still having symptoms after 3 doses (15 minutes) call 911.    Acute chest pain       OMEGA-3 FISH OIL PO      Take 1 g by mouth 2 times daily (with meals)        * OSTEO BI-FLEX ADV TRIPLE ST PO      Take 2 tablets by mouth daily        * LUTEIN 20 PO      Take 1 tablet by mouth daily        RESTASIS OP           * Notice:  This list has 2 medication(s) that are the same as other medications prescribed for you. Read the directions carefully, and ask your doctor or other care provider to review them with you.

## 2018-02-01 NOTE — PATIENT INSTRUCTIONS
Keep track of your blood pressure at home.      I would recommend that you come in to our pharmacy or make a nurse only bp check. You check your bp with your cuff and have us check with ours. That way we can see how true yours is reading.    We may wish to restart medication.    -----------------------------------------------    Go back to the 88 mcg levothyroxine.    Let's check in a couple months.    Let me know sooner if having problems.        -----------------------------------------------------------------------------------------------------    General recommendations for sleep problems (Insomnia)   (Allow 2-4 weeks to see results)   Establish a regular sleep schedule   Go to bed at same time each night   Get up at same time each day   Avoid sleeping-in on Sunday morning   Cut down time in bed (if not asleep, get up)   Avoid trying to force yourself to sleep   Use your bed only for sleep and sex   Do not read or watch Television in bed   Make the bedroom comfortable   Keep temperature in your bedroom comfortable   Keep bedroom quiet when sleeping   Consider ear plugs (silicon)   Keep bedroom dark enough   Use dark blinds or wear an eye mask if needed   Relax at bedtime   Relax each muscle group individually   Begin with your feet   Work toward your head   Deal with your worries before bedtime   Set aside a worry time for 30 minutes earlier   Listen to relaxation tapes   Classical Music or Nature sounds   Imagine a tranquil scene (e.g. waterfall or beach)   Back Massage   Gentle 5-minute back rub prior to bedtime   Perform measures to make you tired at bedtime   Get regular Exercise each day (6 hours before bedtime)   Take medications only as directed   Eat a light bedtime snack or warm drink   Warm milk   Warm herbal tea (non-caffeinated)   Special Therapy Measures   Sleep Restriction Therapy   Limit time in bed for 15 minutes more than sleep   Do not set limit under 4 hours   Increase time by 15 minutes  per effective sleep trial   Effective sleep suggests >90% of bedtime asleep   Stimulus Control   Do not lie awake for more than 30 minutes   Get out of bed and perform a quiet activity   Return to bed when sleepy   Repeat as many times per night as needed   No napping during day   Things to avoid   Do not Exercise just before bedtime   No overstimulating activities just before bed   No competitive games before bedtime   No exciting Television programs before bedtime   Avoid caffeine after lunchtime   Avoid chocolate   Avoid coffee, tea, or soda   Do not use alcohol to induce sleep (worsens Insomnia)   Do not take someone else's sleeping pills   Do not look at the clock when awakening   Do not turn on light when getting up to use bathroom   Read the book Say Good Night To Insomnia

## 2018-02-02 LAB
DIFFERENTIAL METHOD BLD: ABNORMAL
ERYTHROCYTE [DISTWIDTH] IN BLOOD BY AUTOMATED COUNT: 13.5 % (ref 10–15)
HCT VFR BLD AUTO: 46.8 % (ref 35–47)
HGB BLD-MCNC: 15.3 G/DL (ref 11.7–15.7)
LYMPHOCYTES # BLD AUTO: 0.9 10E9/L (ref 0.8–5.3)
LYMPHOCYTES NFR BLD AUTO: 7 %
MCH RBC QN AUTO: 32.2 PG (ref 26.5–33)
MCHC RBC AUTO-ENTMCNC: 32.7 G/DL (ref 31.5–36.5)
MCV RBC AUTO: 99 FL (ref 78–100)
MONOCYTES # BLD AUTO: 0.9 10E9/L (ref 0–1.3)
MONOCYTES NFR BLD AUTO: 7 %
MYELOCYTES # BLD: 0.1 10E9/L
MYELOCYTES NFR BLD MANUAL: 1 %
NEUTROPHILS # BLD AUTO: 11.3 10E9/L (ref 1.6–8.3)
NEUTROPHILS NFR BLD AUTO: 85 %
PLATELET # BLD AUTO: 291 10E9/L (ref 150–450)
PLATELET # BLD EST: ABNORMAL 10*3/UL
RBC # BLD AUTO: 4.75 10E12/L (ref 3.8–5.2)
RBC MORPH BLD: ABNORMAL
WBC # BLD AUTO: 13.2 10E9/L (ref 4–11)

## 2018-02-03 ENCOUNTER — TELEPHONE (OUTPATIENT)
Dept: FAMILY MEDICINE | Facility: CLINIC | Age: 78
End: 2018-02-03

## 2018-02-03 DIAGNOSIS — Z53.9 ERRONEOUS ENCOUNTER--DISREGARD: Primary | ICD-10-CM

## 2018-02-03 LAB
ALBUMIN SERPL-MCNC: 4 G/DL (ref 3.4–5)
ALP SERPL-CCNC: 61 U/L (ref 40–150)
ALT SERPL W P-5'-P-CCNC: 44 U/L (ref 0–50)
ANION GAP SERPL CALCULATED.3IONS-SCNC: 6 MMOL/L (ref 3–14)
AST SERPL W P-5'-P-CCNC: 31 U/L (ref 0–45)
BILIRUB SERPL-MCNC: 1 MG/DL (ref 0.2–1.3)
BUN SERPL-MCNC: 30 MG/DL (ref 7–30)
CALCIUM SERPL-MCNC: 9.7 MG/DL (ref 8.5–10.1)
CHLORIDE SERPL-SCNC: 99 MMOL/L (ref 94–109)
CO2 SERPL-SCNC: 30 MMOL/L (ref 20–32)
CREAT SERPL-MCNC: 0.71 MG/DL (ref 0.52–1.04)
GFR SERPL CREATININE-BSD FRML MDRD: 80 ML/MIN/1.7M2
GLUCOSE SERPL-MCNC: 117 MG/DL (ref 70–99)
POTASSIUM SERPL-SCNC: 5 MMOL/L (ref 3.4–5.3)
PROT SERPL-MCNC: 7.1 G/DL (ref 6.8–8.8)
SODIUM SERPL-SCNC: 135 MMOL/L (ref 133–144)
T4 FREE SERPL-MCNC: 1.44 NG/DL (ref 0.76–1.46)
TSH SERPL DL<=0.005 MIU/L-ACNC: 0.18 MU/L (ref 0.4–4)

## 2018-02-05 ENCOUNTER — OFFICE VISIT (OUTPATIENT)
Dept: FAMILY MEDICINE | Facility: CLINIC | Age: 78
End: 2018-02-05
Payer: COMMERCIAL

## 2018-02-05 VITALS
DIASTOLIC BLOOD PRESSURE: 74 MMHG | BODY MASS INDEX: 21.31 KG/M2 | OXYGEN SATURATION: 98 % | HEIGHT: 65 IN | SYSTOLIC BLOOD PRESSURE: 136 MMHG | HEART RATE: 64 BPM | WEIGHT: 127.9 LBS | RESPIRATION RATE: 17 BRPM | TEMPERATURE: 98.4 F

## 2018-02-05 DIAGNOSIS — R42 DIZZINESS: ICD-10-CM

## 2018-02-05 DIAGNOSIS — R05.9 COUGH: ICD-10-CM

## 2018-02-05 DIAGNOSIS — J10.1 INFLUENZA A: Primary | ICD-10-CM

## 2018-02-05 LAB
FLUAV+FLUBV AG SPEC QL: NEGATIVE
FLUAV+FLUBV AG SPEC QL: POSITIVE
SPECIMEN SOURCE: ABNORMAL

## 2018-02-05 PROCEDURE — 87804 INFLUENZA ASSAY W/OPTIC: CPT | Performed by: PHYSICIAN ASSISTANT

## 2018-02-05 PROCEDURE — 99213 OFFICE O/P EST LOW 20 MIN: CPT | Performed by: PHYSICIAN ASSISTANT

## 2018-02-05 RX ORDER — OSELTAMIVIR PHOSPHATE 75 MG/1
75 CAPSULE ORAL 2 TIMES DAILY
Qty: 10 CAPSULE | Refills: 0 | Status: SHIPPED | OUTPATIENT
Start: 2018-02-05 | End: 2018-02-13

## 2018-02-05 NOTE — PROGRESS NOTES
"  SUBJECTIVE:   Ya Stahl is a 77 year old female who presents to clinic today for the following health issues:    RESPIRATORY SYMPTOMS      Duration: Since Saturday     Description  rhinorrhea, cough and headache    Severity: moderate    Accompanying signs and symptoms: Dizziness, fatigue      History (predisposing factors):  none    Precipitating or alleviating factors: None    Therapies tried and outcome: OTC cough medication     Patient presents after the onset of slight dizziness, headache that are \"not that bad\" but irritating \"enought to come in  She has also developed cough, onset yesterday, suddenly   -symptoms started with a runny nose the day before   -did take some elderberry syrup to help with symptoms  Some body ache, fatigue    Monitoring BP since last visit with primary care provider    -generally it has been elevated, took this morning reading was 158/93.   There is no chest pain, palpitations  -Has felt some feverish symptoms, feeling roasting  -has not taken any medication for this      Problem list and histories reviewed & adjusted, as indicated.  Additional history: as documented    Patient Active Problem List   Diagnosis     Hypothyroidism     Chondrodermatitis nodularis helicis     Grave's disease     Bile reflux gastritis     Hyperlipidemia LDL goal <70     Advanced directives, counseling/discussion     Sjogren's disease (H)     HL (hearing loss)     Mixed incontinence     Health Care Home     Chronic cough     Impaired fasting glucose     Status post-operative repair of hip fracture, RIGHT     Constipation     Pubic ramus fracture (H)     Osteoporosis     Gastroesophageal reflux disease without esophagitis     AMI (acute myocardial infarction)     ASCVD (arteriosclerotic cardiovascular disease)     Macrocytosis     Monoclonal paraproteinemia     MGUS (monoclonal gammopathy of unknown significance)     Epigastric pain     Past Surgical History:   Procedure Laterality Date     " ARTHROSCOPY KNEE  10/5/2012    R Procedure: ARTHROSCOPY KNEE;  RIGHT KNEE ARTHROSCOPY, PARTIAL MEDIAL MENISCECTOMY;  Surgeon: Karli Zaragoza MD;  Location:  [facility]     BUNIONECTOMY  ~2010    L foot.      C NONSPECIFIC PROCEDURE  1976    D&C     CATARACT IOL, RT/LT Bilateral 07/2017     COLONOSCOPY N/A 1/17/2017    normal; no repeat Procedure: COLONOSCOPY;  Surgeon: Fan Giordano MD;  Location:  GI     ENDOSCOPIC RELEASE CARPAL TUNNEL  9/11/2012    R Procedure: ENDOSCOPIC RELEASE CARPAL TUNNEL;  Right Endoscopic carpal tunnel release, left ringer finger cortizon injection;  Surgeon: Anne Marie Catherine MD;  Location:  OR     ESOPHAGOSCOPY, GASTROSCOPY, DUODENOSCOPY (EGD), COMBINED N/A 12/26/2014    Procedure: COMBINED ESOPHAGOSCOPY, GASTROSCOPY, DUODENOSCOPY (EGD);  Surgeon: Fan Giordano MD;  Location:  GI     EXCISE EXOSTOSIS FOOT Left 12/1/2016    Procedure: EXCISE EXOSTOSIS FOOT;  Surgeon: Jody Gallagher, DPM, Pod;  Location: [facility] OR     GYN SURGERY  1978    ovaries removed     HEART CATH, ANGIOPLASTY  3/4/16    dz <40%     HYSTERECTOMY, PAP NO LONGER INDICATED  1977    Hysterectomy; benign     INJECT STEROID (LOCATION)  9/11/2012    Procedure: INJECT STEROID (LOCATION);;  Surgeon: Anne Marie Catherine MD;  Location:  OR     LAPAROSCOPIC CHOLECYSTECTOMY N/A 4/7/2016    Procedure: LAPAROSCOPIC CHOLECYSTECTOMY;  Surgeon: Hans Medina MD;  Location:  OR     ORTHOPEDIC SURGERY  ~2008    Right foot, bunionectomy      RECONSTRUCT FOREFOOT WITH METATARSOPHALANGEAL (MTP) FUSION Left 4/28/2017    Procedure: RECONSTRUCT FOREFOOT WITH METATARSOPHALANGEAL (MTP) FUSION;  1. Resection arthroplasty, fifth metatarsophalangeal joint, left foot.   2. Irrigation and debridement of fifth metatarsophalangeal joint, left foot (excisional to the level of the bone).     ;  Surgeon: Fabián Phipps MD;  Location:  OR     RIGHT HIP ORIF 4/1/2014       ROTATOR CUFF REPAIR RT/LT  ~1998    Lt shoulder; rotator cuff  "    SURGICAL HISTORY OF -   distant past    ablation for palpitations.     SURGICAL HISTORY OF -   June 2015    left carpal tunnel surgery       Social History   Substance Use Topics     Smoking status: Never Smoker     Smokeless tobacco: Never Used     Alcohol use Yes      Comment: social; maybe a glass of wine or highball per week      Family History   Problem Relation Age of Onset     C.A.D. Mother 76     CANCER Mother      non Hodgekin's Lymphoma     HEART DISEASE Mother      enlarged heart     C.A.D. Father 59     HEART DISEASE Father      valve problem     Hypertension Brother      Hypertension Brother      Connective Tissue Disorder Daughter      scleroderma     Colon Cancer No family hx of            Reviewed and updated as needed this visit by clinical staff       Reviewed and updated as needed this visit by Provider         ROS:  Constitutional, HEENT, cardiovascular, pulmonary, gi and gu systems are negative, except as otherwise noted.    OBJECTIVE:     /74  Pulse 64  Temp 98.4  F (36.9  C) (Tympanic)  Resp 17  Ht 5' 5.25\" (1.657 m)  Wt 127 lb 14.4 oz (58 kg)  SpO2 98%  BMI 21.12 kg/m2  Body mass index is 21.12 kg/(m^2).  GENERAL: healthy, alert and no distress  EYES: Eyes grossly normal to inspection, PERRL and conjunctivae and sclerae normal  HENT: normal cephalic/atraumatic, ear canals and TM's normal, nasal mucosa edematous  and oropharynx raw  NECK: no adenopathy  RESP: lungs clear to auscultation - no rales, rhonchi or wheezes  CV: regular rates and rhythm, normal S1 S2, no S3 or S4 and no murmur, click or rub  MS: no peripheral edema    Diagnostic Test Results:  Results for orders placed or performed in visit on 02/05/18 (from the past 24 hour(s))   Influenza A/B antigen   Result Value Ref Range    Influenza A/B Agn Specimen Nasal     Influenza A Positive (A) NEG^Negative    Influenza B Negative NEG^Negative       ASSESSMENT/PLAN:   1. Influenza A  2. Cough  3. Dizziness  76yo female " with new onset flu symptoms and positive test. Given age, medical hx did elect to start tx. Side effects reviewed. Fortunately her symptoms are rather controlled and her vitals (see below) look good overall. Continue with supportive cares and maintain close follow up with any change in symptoms.   - oseltamivir (TAMIFLU) 75 MG capsule; Take 1 capsule (75 mg) by mouth 2 times daily  Dispense: 10 capsule; Refill: 0  - Influenza A/B antigen    Her BP is elevated today and more recently but I am wondering if this is more related to the current symptoms. SHe'll recheck within the next week at our pharmacy when feeling improved.     Jovi Elder PA-C  [facility]

## 2018-02-05 NOTE — MR AVS SNAPSHOT
After Visit Summary   2/5/2018    Ya Stahl    MRN: [id number]           Patient Information     Date Of Birth          1940        Visit Information        Provider Department      2/5/2018 11:00 AM Jovi Elder PA-C [facility]        Today's Diagnoses     Influenza A    -  1    Cough        Dizziness           Follow-ups after your visit        Your next 10 appointments already scheduled     Apr 13, 2018  8:00 AM CDT   [facility]  [facility]   [facility]    [address] [facility] [id number] [phone]           Please do not eat 10-12 hours before your appointment if you are coming in fasting for labs on lipids, cholesterol, or glucose (sugar). This does not apply to pregnant women. Water, hot tea and black coffee (with nothing added) are okay. Do not drink other fluids, diet soda or chew gum.            Apr 19, 2018  3:15 PM CDT   Return Visit with Vandana Kasper MD   [facility] ([facility])    [facility]  [address]  [facility] [id number] [phone]              Who to contact     If you have questions or need follow up information about today's clinic visit or your schedule please contact [facility] directly at [phone].  Normal or non-critical lab and imaging results will be communicated to you by MyChart, letter or phone within 4 business days after the clinic has received the results. If you do not hear from us within 7 days, please contact the clinic through MyChart or phone. If you have a critical or abnormal lab result, we will notify you by phone as soon as possible.  Submit refill requests through [employer] or call your pharmacy and they will forward the refill request to us. Please allow 3 business days for your refill to be completed.          Additional Information About Your Visit       "  MyChart Information     [email or web address] lets you send messages to your doctor, view your test results, renew your prescriptions, schedule appointments and more. To sign up, go to [email or web address] . Click on \"Log in\" on the left side of the screen, which will take you to the Welcome page. Then click on \"Sign up Now\" on the right side of the page.     You will be asked to enter the access code listed below, as well as some personal information. Please follow the directions to create your username and password.     Your access code is: [email or web address]  Expires: 2018  5:23 PM     Your access code will  in 90 days. If you need help or a new code, please call your [place] clinic or [phone].        Care EveryWhere ID     This is your Care EveryWhere ID. This could be used by other organizations to access your [place] medical records  [id number]        Your Vitals Were     Pulse Temperature Respirations Height Pulse Oximetry BMI (Body Mass Index)    64 98.4  F (36.9  C) (Tympanic) 17 5' 5.25\" (1.657 m) 98% 21.12 kg/m2       Blood Pressure from Last 3 Encounters:   18 136/74   18 154/88   17 126/88    Weight from Last 3 Encounters:   18 127 lb 14.4 oz (58 kg)   18 129 lb 6.4 oz (58.7 kg)   17 126 lb 11.2 oz (57.5 kg)              We Performed the Following     Influenza A/B antigen          Today's Medication Changes          These changes are accurate as of 18 11:38 AM.  If you have any questions, ask your nurse or doctor.               Start taking these medicines.        Dose/Directions    oseltamivir 75 MG capsule   Commonly known as:  TAMIFLU   Used for:  Influenza A   Started by:  Jovi Elder PA-C        Dose:  75 mg   Take 1 capsule (75 mg) by mouth 2 times daily   Quantity:  10 capsule   Refills:  0         These medicines have changed or have updated prescriptions.        Dose/Directions    MIRALAX powder   This may have changed:  See the new " instructions.   Used for:  Chronic constipation   Generic drug:  polyethylene glycol        STIR 1 CAPFUL (17GM) IN 8 OZ OF LIQUID AND DRINK   Quantity:  1 BOTTLE   Refills:  1 YEAR            Where to get your medicines      These medications [facility] [id number]  [facility] [place] [facility] [address] [facility] [address] [facility]  [address], [place] [address]     Phone:  [phone]     oseltamivir 75 MG capsule                Primary Care Provider Office Phone # Fax #    Scarlett Spencer [phone] [address]  [facility] [address]        Equal Access to Services     [facility]: Hadii aad ku hadasho Soomaali, waaxda luqadaha, qaybta kaalmada adeegyada, waxay bismarkin haysondran adeabimbola greene . So [facility] [phone].    ATENCIÓN: Si habla español, tiene a costa disposición servicios gratuitos de asistencia lingüística. Llame al [phone].    We comply with applicable federal civil rights laws and [place] laws. We do not discriminate on the basis of race, color, national origin, age, disability, sex, sexual orientation, or gender identity.            Thank you!     Thank you for choosing [facility]  for your care. Our goal is always to provide you with excellent care. Hearing back from our patients is one way we can continue to improve our services. Please take a few minutes to complete the written survey that you may receive in the mail after your visit with us. Thank you!             Your Updated Medication List - Protect others around you: Learn how to safely use, store and throw away your medicines at www.disposemymeds.org.          This list is accurate as of 2/5/18 11:38 AM.  Always use your most recent med list.                   Brand Name Dispense Instructions for use Diagnosis    acetaminophen 325 MG tablet    TYLENOL    60 tablet    Take 2 tablets (650 mg) by mouth every 6 hours as needed for mild pain or fever     Avascular necrosis of bone (H)       alendronate 70 MG tablet    FOSAMAX    12 tablet    Take 1 tablet (70 mg) by mouth every 7 days Take with over 8 ounces water and stay upright for at least 30 minutes after dose.  Take at least 60 minutes before breakfast    Osteoporosis, unspecified osteoporosis type, unspecified pathological fracture presence       aspirin 81 MG EC tablet     1 tablet    Take 1 tablet (81 mg) by mouth daily        atorvastatin 20 MG tablet    LIPITOR    102 tablet    Take 1 tablet (20 mg) by mouth daily    Hyperlipidemia LDL goal <70       calcium citrate-vitamin D 315-250 MG-UNIT Tabs per tablet    CITRACAL    120 tablet    Take 2 tablets in the morning and 1 tablet in the evening    Osteoporosis       CENTRUM SILVER per tablet      Take 1 tablet by mouth daily        cevimeline 30 MG capsule    EVOXAC     Take 30 mg by mouth 3 times daily Reported on 3/6/2017        ibuprofen 600 MG tablet    ADVIL/MOTRIN    60 tablet    Take 1 tablet (600 mg) by mouth every 6 hours as needed for moderate pain    Avascular necrosis of bone (H)       levothyroxine 100 MCG tablet    SYNTHROID/LEVOTHROID    90 tablet    Take 1 tablet (100 mcg) by mouth daily    Hypothyroidism, unspecified type       MIRALAX powder   Generic drug:  polyethylene glycol     1 BOTTLE    STIR 1 CAPFUL (17GM) IN 8 OZ OF LIQUID AND DRINK    Chronic constipation       nitroGLYcerin 0.4 MG sublingual tablet    NITROSTAT    25 tablet    Place 1 tablet (0.4 mg) under the tongue every 5 minutes as needed for chest pain if you are still having symptoms after 3 doses (15 minutes) call 911.    Acute chest pain       OMEGA-3 FISH OIL PO      Take 1 g by mouth 2 times daily (with meals)        oseltamivir 75 MG capsule    TAMIFLU    10 capsule    Take 1 capsule (75 mg) by mouth 2 times daily    Influenza A       * OSTEO BI-FLEX ADV TRIPLE ST PO      Take 2 tablets by mouth daily        * LUTEIN 20 PO      Take 1 tablet by mouth daily         RESTASIS OP           * Notice:  This list has 2 medication(s) that are the same as other medications prescribed for you. Read the directions carefully, and ask your doctor or other care provider to review them with you.

## 2018-02-05 NOTE — NURSING NOTE
"Chief Complaint   Patient presents with     Hypertension     URI       Initial /84 (BP Location: Right arm, Patient Position: Chair, Cuff Size: Adult Regular)  Pulse 64  Temp 98.4  F (36.9  C) (Tympanic)  Resp 17  Ht 5' 5.25\" (1.657 m)  Wt 127 lb 14.4 oz (58 kg)  SpO2 98%  BMI 21.12 kg/m2 Estimated body mass index is 21.12 kg/(m^2) as calculated from the following:    Height as of this encounter: 5' 5.25\" (1.657 m).    Weight as of this encounter: 127 lb 14.4 oz (58 kg).  Medication Reconciliation: complete   Rylee Roque MA       "

## 2018-02-08 ENCOUNTER — TELEPHONE (OUTPATIENT)
Dept: FAMILY MEDICINE | Facility: CLINIC | Age: 78
End: 2018-02-08

## 2018-02-08 NOTE — TELEPHONE ENCOUNTER
Talked to patient, she was confused by the letter, and wanted to know what  It meant. The WBC was elevated due to possible stress and how she was feeling.  It is not worrisome. Per LOV note, advised that TSH had dropped, so go back to   Previous dose of 88 mcg per day and recheck labs in 6 weeks.   She is coughing, and feeling light headed at times. BP is elevated today in the 140's.  Denies headache, advised to stay hydrated, not taking any decongestants, just the  Coricidin HBP to help congestion. Come in to recheck if the vertigo persists, is doing  A lot of coughing. She will call us if things change.     Yolie Canales, RN  Triage Nurse

## 2018-02-12 ENCOUNTER — TRANSFERRED RECORDS (OUTPATIENT)
Dept: HEALTH INFORMATION MANAGEMENT | Facility: CLINIC | Age: 78
End: 2018-02-12

## 2018-02-13 ENCOUNTER — OFFICE VISIT (OUTPATIENT)
Dept: FAMILY MEDICINE | Facility: CLINIC | Age: 78
End: 2018-02-13
Payer: COMMERCIAL

## 2018-02-13 VITALS
TEMPERATURE: 98 F | OXYGEN SATURATION: 97 % | HEART RATE: 64 BPM | DIASTOLIC BLOOD PRESSURE: 88 MMHG | HEIGHT: 65 IN | WEIGHT: 133.7 LBS | BODY MASS INDEX: 22.28 KG/M2 | RESPIRATION RATE: 16 BRPM | SYSTOLIC BLOOD PRESSURE: 144 MMHG

## 2018-02-13 DIAGNOSIS — K21.9 GASTROESOPHAGEAL REFLUX DISEASE, ESOPHAGITIS PRESENCE NOT SPECIFIED: ICD-10-CM

## 2018-02-13 DIAGNOSIS — R05.9 COUGH: ICD-10-CM

## 2018-02-13 DIAGNOSIS — I10 HYPERTENSION GOAL BP (BLOOD PRESSURE) < 140/90: Primary | ICD-10-CM

## 2018-02-13 PROCEDURE — 99213 OFFICE O/P EST LOW 20 MIN: CPT | Performed by: FAMILY MEDICINE

## 2018-02-13 RX ORDER — LOSARTAN POTASSIUM 50 MG/1
50 TABLET ORAL DAILY
Qty: 30 TABLET | Refills: 1 | Status: SHIPPED | OUTPATIENT
Start: 2018-02-13 | End: 2018-03-13

## 2018-02-13 NOTE — PROGRESS NOTES
SUBJECTIVE:   Ya Stahl is a 77 year old female who presents to clinic today for the following health issues:      Here to discuss blood pressure readings that have been running higher.  Readings are 129-154/80-96        Problem list and histories reviewed & adjusted, as indicated.  Additional history:     See under ROS     Patient Active Problem List   Diagnosis     Hypothyroidism     Chondrodermatitis nodularis helicis     Grave's disease     Bile reflux gastritis     Hyperlipidemia LDL goal <70     Advanced directives, counseling/discussion     Sjogren's disease (H)     HL (hearing loss)     Mixed incontinence     Health Care Home     Chronic cough     Impaired fasting glucose     Status post-operative repair of hip fracture, RIGHT     Constipation     Pubic ramus fracture (H)     Osteoporosis     Gastroesophageal reflux disease without esophagitis     AMI (acute myocardial infarction)     ASCVD (arteriosclerotic cardiovascular disease)     Macrocytosis     Monoclonal paraproteinemia     MGUS (monoclonal gammopathy of unknown significance)     Epigastric pain       Current Outpatient Prescriptions   Medication Sig Dispense Refill             levothyroxine (SYNTHROID/LEVOTHROID) 100 MCG tablet Take 1 tablet (100 mcg) by mouth daily 90 tablet 0     atorvastatin (LIPITOR) 20 MG tablet Take 1 tablet (20 mg) by mouth daily 102 tablet 3     alendronate (FOSAMAX) 70 MG tablet Take 1 tablet (70 mg) by mouth every 7 days Take with over 8 ounces water and stay upright for at least 30 minutes after dose.  Take at least 60 minutes before breakfast 12 tablet 3     CycloSPORINE (RESTASIS OP)        ibuprofen (ADVIL/MOTRIN) 600 MG tablet Take 1 tablet (600 mg) by mouth every 6 hours as needed for moderate pain 60 tablet 0     acetaminophen (TYLENOL) 325 MG tablet Take 2 tablets (650 mg) by mouth every 6 hours as needed for mild pain or fever 60 tablet 0     calcium citrate-vitamin D (CITRACAL) 315-250 MG-UNIT TABS per  "tablet Take 2 tablets in the morning and 1 tablet in the evening 120 tablet      cevimeline (EVOXAC) 30 MG capsule Take 30 mg by mouth 3 times daily Reported on 3/6/2017       Multiple Vitamins-Minerals (CENTRUM SILVER) per tablet Take 1 tablet by mouth daily       Misc Natural Products (OSTEO BI-FLEX ADV TRIPLE ST PO) Take 2 tablets by mouth daily        Misc Natural Products (LUTEIN 20 PO) Take 1 tablet by mouth daily       Omega-3 Fatty Acids (OMEGA-3 FISH OIL PO) Take 1 g by mouth 2 times daily (with meals)        aspirin 81 MG EC tablet Take 1 tablet (81 mg) by mouth daily 1 tablet 0     nitroglycerin (NITROSTAT) 0.4 MG SL tablet Place 1 tablet (0.4 mg) under the tongue every 5 minutes as needed for chest pain if you are still having symptoms after 3 doses (15 minutes) call 911. 25 tablet 0     MIRALAX OR POWD STIR 1 CAPFUL (17GM) IN 8 OZ OF LIQUID AND DRINK (Patient taking differently: STIR 1 CAPFUL (17GM) IN 8 OZ OF LIQUID AND DRINK twice daily) 1 BOTTLE 1 YEAR       Reviewed and updated as needed this visit by clinical staff  Tobacco  Allergies  Meds  Med Hx  Surg Hx  Fam Hx  Soc Hx      Reviewed and updated as needed this visit by Provider         ROS:  CONSTITUTIONAL:NEGATIVE for fever, chills, change in weight  RESP:getting over influenza; feeling relatively well now.   Ongoing cough; see below.  CV: NEGATIVE for chest pain, palpitations or peripheral edema  GI: she notes reflux.  PSYCHIATRIC: NEGATIVE for changes in mood or affect    Has been noting her blood pressure is higher.     Had been on lisinopril in the past  She notes 10 mg was too much.     Cough did not get better after stopping the lisinopril.  Going on zantac in evening and something else in am (?PPI) for cough and hoarseness through GI. Acid reflux. Sour stomach.     OBJECTIVE:     /88 (BP Location: Right arm, Cuff Size: Adult Regular)  Pulse 64  Temp 98  F (36.7  C) (Oral)  Resp 16  Ht 5' 5.25\" (1.657 m)  Wt 133 lb 11.2 " oz (60.6 kg)  SpO2 97%  BMI 22.08 kg/m2  Body mass index is 22.08 kg/(m^2).  GENERAL APPEARANCE: alert and no distress  CV: regular rates and rhythm  MS: no edema.   PSYCH: mentation appears normal and affect normal/bright    Reviewed chart; had gone off lisinopril as a trial to see if cough improved.  Reviewed last note we have from [facility]; early 2018; discussing pH results.     ASSESSMENT/PLAN:       Hypertension goal BP (blood pressure) < 140/90  Not at optimal control. Did well for quite some time p stopping lisinopril. Now going up.  Do not feel that her cough is ACEi induced cough; however, she is still undergoing evaluation/treatment for this so will not muddy the perez with that.  Will try losartan.   - losartan (COZAAR) 50 MG tablet; Take 1 tablet (50 mg) by mouth daily    Gastroesophageal reflux disease, esophagitis presence not specified  She notes her symptoms are sour stomach and yuck feeling going up.  Denied having heartburn.  Saw [facility] yesterday. Sounds like she will be on PPI in am and zantac at night.   Will watch for note.     Cough  Thought is that this may be related to GERD. As per [facility].   Not using ACEi as may make evaluation tougher.         Patient Instructions   I would like to see you in about 3 weeks.           Scarlett Spencer MD, MD  [facility]

## 2018-02-13 NOTE — MR AVS SNAPSHOT
After Visit Summary   2/13/2018    Ya Stahl    MRN: [id number]           Patient Information     Date Of Birth          1940        Visit Information        Provider Department      2/13/2018 3:30 PM Scarlett Spencer MD [facility]        Today's Diagnoses     Hypertension goal BP (blood pressure) < 140/90    -  1    Gastroesophageal reflux disease, esophagitis presence not specified          Care Instructions    I would like to see you in about 3 weeks.               Follow-ups after your visit        Your next 10 appointments already scheduled     Apr 13, 2018  8:00 AM CDT   LAB with  LAB   [facility] ([facility])    [address] [facility] [address]   [phone]           Please do not eat 10-12 hours before your appointment if you are coming in fasting for labs on lipids, cholesterol, or glucose (sugar). This does not apply to pregnant women. Water, hot tea and black coffee (with nothing added) are okay. Do not drink other fluids, diet soda or chew gum.            Apr 19, 2018  3:15 PM CDT   Return Visit with Vandana Kasper MD   [facility] ([facility])    [facility]  [address]  [address]  [facility] [address] [phone]              Who to contact     If you have questions or need follow up information about today's clinic visit or your schedule please contact [facility] directly at [phone].  Normal or non-critical lab and imaging results will be communicated to you by MyChart, letter or phone within 4 business days after the clinic has received the results. If you do not hear from us within 7 days, please contact the clinic through MyChart or phone. If you have a critical or abnormal lab result, we will notify you by phone as soon as possible.  Submit refill requests through [employer] or call your pharmacy and they will  "forward the refill request to us. Please allow 3 business days for your refill to be completed.          Additional Information About Your Visit        [employer] Information     [employer] lets you send messages to your doctor, view your test results, renew your prescriptions, schedule appointments and more. To sign up, go to [email or web address] . Click on \"Log in\" on the left side of the screen, which will take you to the Welcome page. Then click on \"Sign up Now\" on the right side of the page.     You will be asked to enter the access code listed below, as well as some personal information. Please follow the directions to create your username and password.     Your access code is: [email or web address]  Expires: 2018  5:23 PM     Your access code will  in 90 days. If you need help or a new code, please call your [place] clinic or [phone].        Care EveryWhere ID     This is your Care EveryWhere ID. This could be used by other organizations to access your [place] medical records  [id number]        Your Vitals Were     Pulse Temperature Respirations Height Pulse Oximetry BMI (Body Mass Index)    64 98  F (36.7  C) (Oral) 16 5' 5.25\" (1.657 m) 97% 22.08 kg/m2       Blood Pressure from Last 3 Encounters:   18 144/88   18 136/74   18 154/88    Weight from Last 3 Encounters:   18 133 lb 11.2 oz (60.6 kg)   18 127 lb 14.4 oz (58 kg)   18 129 lb 6.4 oz (58.7 kg)              Today, you had the following     No orders found for display         Today's Medication Changes          These changes are accurate as of 18  4:10 PM.  If you have any questions, ask your nurse or doctor.               Start taking these medicines.        Dose/Directions    losartan 50 MG tablet   Commonly known as:  COZAAR   Used for:  Hypertension goal BP (blood pressure) < 140/90   Started by:  Scarlett Spencer MD        Dose:  50 mg   Take 1 tablet (50 mg) by mouth daily   Quantity:  30 tablet "   Refills:  1         These medicines have changed or have updated prescriptions.        Dose/Directions    MIRALAX powder   This may have changed:  See the new instructions.   Used for:  Chronic constipation   Generic drug:  polyethylene glycol        STIR 1 CAPFUL (17GM) IN 8 OZ OF LIQUID AND DRINK   Quantity:  1 BOTTLE   Refills:  1 YEAR            Where to get your medicines      [facility] [place], [place] - [address] [facility] [address]  [address] [facility] [address], [place] [address]     Phone:  [phone]     losartan 50 MG tablet                Primary Care Provider Office Phone # Fax #    Scarlett Spencer [phone] [address]  [facility] [address]        Equal Access to Services     [facility] : Hadii aad ku hadasho Soomaali, waaxda luqadaha, qaybta kaalmada adeegyada, waxay bismarkin mayco greene . So [facility] [phone].    ATENCIÓN: Si habla español, tiene a costa disposición servicios gratuitos de asistencia lingüística. [facility] [phone].    We comply with applicable federal civil rights laws and [place] laws. We do not discriminate on the basis of race, color, national origin, age, disability, sex, sexual orientation, or gender identity.            Thank you!     Thank you for choosing [facility]  for your care. Our goal is always to provide you with excellent care. Hearing back from our patients is one way we can continue to improve our services. Please take a few minutes to complete the written survey that you may receive in the mail after your visit with us. Thank you!             Your Updated Medication List - Protect others around you: Learn how to safely use, store and throw away your medicines at www.disposemymeds.org.          This list is accurate as of 2/13/18  4:10 PM.  Always use your most recent med list.                   Brand Name Dispense Instructions for use Diagnosis     acetaminophen 325 MG tablet    TYLENOL    60 tablet    Take 2 tablets (650 mg) by mouth every 6 hours as needed for mild pain or fever    Avascular necrosis of bone (H)       alendronate 70 MG tablet    FOSAMAX    12 tablet    Take 1 tablet (70 mg) by mouth every 7 days Take with over 8 ounces water and stay upright for at least 30 minutes after dose.  Take at least 60 minutes before breakfast    Osteoporosis, unspecified osteoporosis type, unspecified pathological fracture presence       aspirin 81 MG EC tablet     1 tablet    Take 1 tablet (81 mg) by mouth daily        atorvastatin 20 MG tablet    LIPITOR    102 tablet    Take 1 tablet (20 mg) by mouth daily    Hyperlipidemia LDL goal <70       calcium citrate-vitamin D 315-250 MG-UNIT Tabs per tablet    CITRACAL    120 tablet    Take 2 tablets in the morning and 1 tablet in the evening    Osteoporosis       CENTRUM SILVER per tablet      Take 1 tablet by mouth daily        cevimeline 30 MG capsule    EVOXAC     Take 30 mg by mouth 3 times daily Reported on 3/6/2017        ibuprofen 600 MG tablet    ADVIL/MOTRIN    60 tablet    Take 1 tablet (600 mg) by mouth every 6 hours as needed for moderate pain    Avascular necrosis of bone (H)       levothyroxine 100 MCG tablet    SYNTHROID/LEVOTHROID    90 tablet    Take 1 tablet (100 mcg) by mouth daily    Hypothyroidism, unspecified type       losartan 50 MG tablet    COZAAR    30 tablet    Take 1 tablet (50 mg) by mouth daily    Hypertension goal BP (blood pressure) < 140/90       MIRALAX powder   Generic drug:  polyethylene glycol     1 BOTTLE    STIR 1 CAPFUL (17GM) IN 8 OZ OF LIQUID AND DRINK    Chronic constipation       nitroGLYcerin 0.4 MG sublingual tablet    NITROSTAT    25 tablet    Place 1 tablet (0.4 mg) under the tongue every 5 minutes as needed for chest pain if you are still having symptoms after 3 doses (15 minutes) call 911.    Acute chest pain       OMEGA-3 FISH OIL PO      Take 1 g by mouth 2 times  daily (with meals)        * OSTEO BI-FLEX ADV TRIPLE ST PO      Take 2 tablets by mouth daily        * LUTEIN 20 PO      Take 1 tablet by mouth daily        RESTASIS OP           * Notice:  This list has 2 medication(s) that are the same as other medications prescribed for you. Read the directions carefully, and ask your doctor or other care provider to review them with you.

## 2018-02-13 NOTE — NURSING NOTE
"Chief Complaint   Patient presents with     Hypertension     higher blood pressure readings       Initial /88 (BP Location: Right arm, Cuff Size: Adult Regular)  Pulse 64  Temp 98  F (36.7  C) (Oral)  Resp 16  Ht 5' 5.25\" (1.657 m)  Wt 133 lb 11.2 oz (60.6 kg)  SpO2 97%  BMI 22.08 kg/m2 Estimated body mass index is 22.08 kg/(m^2) as calculated from the following:    Height as of this encounter: 5' 5.25\" (1.657 m).    Weight as of this encounter: 133 lb 11.2 oz (60.6 kg).  Medication Reconciliation: complete   Radha Mitchell, IVAN    "

## 2018-02-20 ENCOUNTER — OFFICE VISIT (OUTPATIENT)
Dept: FAMILY MEDICINE | Facility: CLINIC | Age: 78
End: 2018-02-20
Payer: COMMERCIAL

## 2018-02-20 VITALS
HEART RATE: 64 BPM | HEIGHT: 65 IN | RESPIRATION RATE: 16 BRPM | SYSTOLIC BLOOD PRESSURE: 160 MMHG | WEIGHT: 129.2 LBS | DIASTOLIC BLOOD PRESSURE: 84 MMHG | OXYGEN SATURATION: 97 % | BODY MASS INDEX: 21.52 KG/M2 | TEMPERATURE: 98 F

## 2018-02-20 DIAGNOSIS — J01.90 ACUTE SINUSITIS WITH SYMPTOMS > 10 DAYS: Primary | ICD-10-CM

## 2018-02-20 DIAGNOSIS — H69.93 DYSFUNCTION OF BOTH EUSTACHIAN TUBES: ICD-10-CM

## 2018-02-20 DIAGNOSIS — I10 HYPERTENSION GOAL BP (BLOOD PRESSURE) < 140/90: ICD-10-CM

## 2018-02-20 DIAGNOSIS — E03.9 HYPOTHYROIDISM, UNSPECIFIED TYPE: ICD-10-CM

## 2018-02-20 LAB — TSH SERPL DL<=0.005 MIU/L-ACNC: 1.87 MU/L (ref 0.4–4)

## 2018-02-20 PROCEDURE — 99213 OFFICE O/P EST LOW 20 MIN: CPT | Performed by: FAMILY MEDICINE

## 2018-02-20 PROCEDURE — 84443 ASSAY THYROID STIM HORMONE: CPT | Performed by: FAMILY MEDICINE

## 2018-02-20 PROCEDURE — 36415 COLL VENOUS BLD VENIPUNCTURE: CPT | Performed by: FAMILY MEDICINE

## 2018-02-20 RX ORDER — AMOXICILLIN 500 MG/1
1000 CAPSULE ORAL 2 TIMES DAILY
Qty: 40 CAPSULE | Refills: 0 | Status: SHIPPED | OUTPATIENT
Start: 2018-02-20 | End: 2018-03-13

## 2018-02-20 RX ORDER — LEVOTHYROXINE SODIUM 100 UG/1
100 TABLET ORAL DAILY
Qty: 90 TABLET | Refills: 3 | Status: SHIPPED | OUTPATIENT
Start: 2018-02-20 | End: 2018-05-15

## 2018-02-20 RX ORDER — AMOXICILLIN 500 MG/1
1000 CAPSULE ORAL 2 TIMES DAILY
Qty: 40 CAPSULE | Refills: 0 | Status: SHIPPED | OUTPATIENT
Start: 2018-02-20 | End: 2018-02-20

## 2018-02-20 NOTE — PATIENT INSTRUCTIONS
Be cautious with flonase; it can be drying.  Add some nasal saline to help with moisture. If the bleeding worsens, stop the flonase.    See what is in your sinus pill.  I am not real fond of decongestants. They can dry things up and leave a plug.  I will usually recommend guaifenesin by itself, such as plain Mucinex.    Keep up with fluids and humidity.      Earache, No Infection (Adult)  Earaches can happen without an infection. This occurs when air and fluid build up behind the eardrum causing a feeling of fullness and discomfort and reduced hearing. This is called otitis media with effusion (OME) or serous otitis media. It means there is fluid in the middle ear. It is not the same as acute otitis media, which is typically from infection.  OME can happen when you have a cold if congestion blocks the passage that drains the middle ear. This passage is called the eustachian tube. OME may also occur with nasal allergies or after a bacterial middle ear infection.    The pain or discomfort may come and go. You may hear clicking or popping sounds when you chew or swallow. You may feel that your balance is off. Or you may hear ringing in the ear.  It often takes from several weeks up to 3 months for the fluid to clear on its own. Oral pain relievers and ear drops help if there is pain. Decongestants and antihistamines sometimes help. Antibiotics don't help since there is no infection. Your doctor may prescribe a nasal spray to help reduce swelling in the nose and eustachian tube. This can allow the ear to drain.  If your OME doesn't improve after 3 months, surgery may be used to drain the fluid and insert a small tube in the eardrum to allow continued drainage.  Because the middle ear fluid can become infected, it is important to watch for signs of an ear infection which may develop later. These signs include increased ear pain, fever, or drainage from the ear.  Home care  The following guidelines will help you care  for yourself at home:    You may use over-the-counter medicine as directed to control pain, unless another medicine was prescribed. If you have chronic liver or kidney disease or ever had a stomach ulcer or GI bleeding, talk with your doctor before using these medicines. Aspirin should never be used in anyone under 18 years of age who is ill with a fever. It may cause severe liver damage.    You may use over-the-counter decongestants such as phenylephrine or pseudoephedrine. But they are not always helpful. Don't use nasal spray decongestants more than 3 days. Longer use can make congestion worse. Prescription nasal sprays from your doctor don't typically have those restrictions.    Antihistamines may help if you are also having allergy symptoms.    You may use medicines such as guaifenesin to thin mucus and promote drainage.  Follow-up care  Follow up with your healthcare provider or as advised if you are not feeling better after 3 days.  When to seek medical advice  Call your healthcare provider right away if any of the following occur:    Your ear pain gets worse or does not start to improve     Fever of 100.4 F (38 C) or higher, or as directed by your healthcare provider    Fluid or blood draining from the ear    Headache or sinus pain    Stiff neck    Unusual drowsiness or confusion  Date Last Reviewed: 10/1/2016    9990-0517 [employer]. [address] [place] [address], [place] [address]. All rights reserved. This information is not intended as a substitute for professional medical care. Always follow your healthcare professional's instructions.

## 2018-02-20 NOTE — NURSING NOTE
"Chief Complaint   Patient presents with     Sinus Problem       Initial /84 (BP Location: Right arm, Cuff Size: Adult Regular)  Pulse 64  Temp 98  F (36.7  C) (Oral)  Resp 16  Ht 5' 5.25\" (1.657 m)  Wt 129 lb 3.2 oz (58.6 kg)  SpO2 97%  BMI 21.34 kg/m2 Estimated body mass index is 21.34 kg/(m^2) as calculated from the following:    Height as of this encounter: 5' 5.25\" (1.657 m).    Weight as of this encounter: 129 lb 3.2 oz (58.6 kg).  Medication Reconciliation: complete   Radha Mitchell, IVAN    "

## 2018-02-20 NOTE — PROGRESS NOTES
SUBJECTIVE:   Ya Stahl is a 77 year old female who presents to clinic today for the following health issues:      Acute Illness   Acute illness concerns: sinus  Onset: x 1 week    Fever: no     Chills/Sweats: no     Headache (location?): YES    Sinus Pressure:YES- tender, post-nasal drainage and teeth pain    Conjunctivitis:  YES- sore    Ear Pain: YES: right    Rhinorrhea: YES    Congestion: YES    Sore Throat: no   Bloody discharge from nasal   Cough: YES - constant    Wheeze: no     Decreased Appetite: no     Nausea: no     Vomiting: no     Diarrhea:  no     Dysuria/Freq.: no     Fatigue/Achiness: no     Sick/Strep Exposure: no      Therapies Tried and outcome: flonase, sinus tablets- not effective          Problem list and histories reviewed & adjusted, as indicated.  Additional history:     See under ROS    Patient Active Problem List   Diagnosis     Hypothyroidism     Chondrodermatitis nodularis helicis     Grave's disease     Bile reflux gastritis     Hyperlipidemia LDL goal <70     Advanced directives, counseling/discussion     Sjogren's disease (H)     HL (hearing loss)     Mixed incontinence     Health Care Home     Chronic cough     Impaired fasting glucose     Status post-operative repair of hip fracture, RIGHT     Constipation     Pubic ramus fracture (H)     Osteoporosis     Gastroesophageal reflux disease without esophagitis     AMI (acute myocardial infarction)     ASCVD (arteriosclerotic cardiovascular disease)     Macrocytosis     Monoclonal paraproteinemia     MGUS (monoclonal gammopathy of unknown significance)     Epigastric pain       Current Outpatient Prescriptions   Medication Sig Dispense Refill     losartan (COZAAR) 50 MG tablet Take 1 tablet (50 mg) by mouth daily 30 tablet 1     levothyroxine (SYNTHROID/LEVOTHROID) 100 MCG tablet Take 1 tablet (100 mcg) by mouth daily 90 tablet 0     atorvastatin (LIPITOR) 20 MG tablet Take 1 tablet (20 mg) by mouth daily 102 tablet 3      alendronate (FOSAMAX) 70 MG tablet Take 1 tablet (70 mg) by mouth every 7 days Take with over 8 ounces water and stay upright for at least 30 minutes after dose.  Take at least 60 minutes before breakfast 12 tablet 3     ibuprofen (ADVIL/MOTRIN) 600 MG tablet Take 1 tablet (600 mg) by mouth every 6 hours as needed for moderate pain 60 tablet 0     acetaminophen (TYLENOL) 325 MG tablet Take 2 tablets (650 mg) by mouth every 6 hours as needed for mild pain or fever 60 tablet 0     calcium citrate-vitamin D (CITRACAL) 315-250 MG-UNIT TABS per tablet Take 2 tablets in the morning and 1 tablet in the evening 120 tablet      cevimeline (EVOXAC) 30 MG capsule Take 30 mg by mouth 3 times daily Reported on 3/6/2017       Multiple Vitamins-Minerals (CENTRUM SILVER) per tablet Take 1 tablet by mouth daily       Misc Natural Products (OSTEO BI-FLEX ADV TRIPLE ST PO) Take 2 tablets by mouth daily        Misc Natural Products (LUTEIN 20 PO) Take 1 tablet by mouth daily       Omega-3 Fatty Acids (OMEGA-3 FISH OIL PO) Take 1 g by mouth 2 times daily (with meals)        aspirin 81 MG EC tablet Take 1 tablet (81 mg) by mouth daily 1 tablet 0     nitroglycerin (NITROSTAT) 0.4 MG SL tablet Place 1 tablet (0.4 mg) under the tongue every 5 minutes as needed for chest pain if you are still having symptoms after 3 doses (15 minutes) call 911. 25 tablet 0     MIRALAX OR POWD STIR 1 CAPFUL (17GM) IN 8 OZ OF LIQUID AND DRINK (Patient taking differently: STIR 1 CAPFUL (17GM) IN 8 OZ OF LIQUID AND DRINK twice daily) 1 BOTTLE 1 YEAR     CycloSPORINE (RESTASIS OP)            Reviewed and updated as needed this visit by clinical staff       Reviewed and updated as needed this visit by Provider         ROS:  CONSTITUTIONAL:NEGATIVE for fever, chills, change in weight  ENT/MOUTH: see below  RESP:no change in cough  CV: NEGATIVE for chest pain, palpitations or peripheral edema  PSYCHIATRIC: NEGATIVE for changes in mood or affect    Ill for about a  "week. Getting worse.  Ears plugged.  Nose drips.  Blowing blood.   Using flonase in the last week.  Taking sinus tablets; not helping.     No fever.  Some right sided ear pain.    No change in cough.    Slowing her down.  Sleeping OK; actually needs to sleep during the day.  Dragging on since influenza.    Anticipates her bp pills should come today.      OBJECTIVE:     /84 (BP Location: Right arm, Cuff Size: Adult Regular)  Pulse 64  Temp 98  F (36.7  C) (Oral)  Resp 16  Ht 5' 5.25\" (1.657 m)  Wt 129 lb 3.2 oz (58.6 kg)  SpO2 97%  BMI 21.34 kg/m2  Body mass index is 21.34 kg/(m^2).    General appearance: alert and has no apparent distress  Skin color is pink  Hydration status appears adequate with normal skin turgor and moist mucous membranes.  Sinus area mildly tender right frontal area..   Left TM is normal: no effusions, no erythema, and normal landmarks.  Right TM is normal: no effusions, no erythema, and normal landmarks.  Nasal mucosa is discharge mucoid.  Oropharyngeal exam is normal: no lesions, erythema, adenopathy or exudate.  Neck is supple without adenopathy.  RESP: Normal - CTA without rales, rhonchi, or wheezing.  COR: Regular rate and rhythm.    Reviewed chart.     ASSESSMENT/PLAN:       Acute sinusitis with symptoms > 10 days  She notes taking a turn for the worse; seems to have been lingering since her influenza.   Will treat with antibiotics. Discussed things to try to help open up and get things draining.   - amoxicillin (AMOXIL) 500 MG capsule; Take 2 capsules (1,000 mg) by mouth 2 times daily    Dysfunction of both eustachian tubes  Also see patient instructions. Discussed mechanism behind this.     Hypertension goal BP (blood pressure) < 140/90  Mentioned that decongestants can increase bp.  She has not yet started the medication; believes it will arrive today.      Will follow up after she has been on bp medications for a couple weeks.      Patient Instructions       Be cautious " with flonase; it can be drying.  Add some nasal saline to help with moisture. If the bleeding worsens, stop the flonase.    See what is in your sinus pill.  I am not real fond of decongestants. They can dry things up and leave a plug.  I will usually recommend guaifenesin by itself, such as plain Mucinex.    Keep up with fluids and humidity.      Earache, No Infection (Adult)  Earaches can happen without an infection. This occurs when air and fluid build up behind the eardrum causing a feeling of fullness and discomfort and reduced hearing. This is called otitis media with effusion (OME) or serous otitis media. It means there is fluid in the middle ear. It is not the same as acute otitis media, which is typically from infection.  OME can happen when you have a cold if congestion blocks the passage that drains the middle ear. This passage is called the eustachian tube. OME may also occur with nasal allergies or after a bacterial middle ear infection.    The pain or discomfort may come and go. You may hear clicking or popping sounds when you chew or swallow. You may feel that your balance is off. Or you may hear ringing in the ear.  It often takes from several weeks up to 3 months for the fluid to clear on its own. Oral pain relievers and ear drops help if there is pain. Decongestants and antihistamines sometimes help. Antibiotics don't help since there is no infection. Your doctor may prescribe a nasal spray to help reduce swelling in the nose and eustachian tube. This can allow the ear to drain.  If your OME doesn't improve after 3 months, surgery may be used to drain the fluid and insert a small tube in the eardrum to allow continued drainage.  Because the middle ear fluid can become infected, it is important to watch for signs of an ear infection which may develop later. These signs include increased ear pain, fever, or drainage from the ear.  Home care  The following guidelines will help you care for yourself at  home:    You may use over-the-counter medicine as directed to control pain, unless another medicine was prescribed. If you have chronic liver or kidney disease or ever had a stomach ulcer or GI bleeding, talk with your doctor before using these medicines. Aspirin should never be used in anyone under 18 years of age who is ill with a fever. It may cause severe liver damage.    You may use over-the-counter decongestants such as phenylephrine or pseudoephedrine. But they are not always helpful. Don't use nasal spray decongestants more than 3 days. Longer use can make congestion worse. Prescription nasal sprays from your doctor don't typically have those restrictions.    Antihistamines may help if you are also having allergy symptoms.    You may use medicines such as guaifenesin to thin mucus and promote drainage.  Follow-up care  Follow up with your healthcare provider or as advised if you are not feeling better after 3 days.  When to seek medical advice  Call your healthcare provider right away if any of the following occur:    Your ear pain gets worse or does not start to improve     Fever of 100.4 F (38 C) or higher, or as directed by your healthcare provider    Fluid or blood draining from the ear    Headache or sinus pain    Stiff neck    Unusual drowsiness or confusion  Date Last Reviewed: 10/1/2016    5498-0029 [employer]. [address] [place] [address], [place] [address]. All rights reserved. This information is not intended as a substitute for professional medical care. Always follow your healthcare professional's instructions.            Scarlett Spencer MD, MD  [facility]

## 2018-02-20 NOTE — LETTER
February 20, 2018      Ya Stahl  [id number]  [facility] [id number]        Dear Ms. Ya Stahl,    Enclosed is a copy of your recent results.     TSH stands for Thyroid Stimulating Hormone. It is the most sensitive thyroid test that we have. It goes in the opposite direction of the thyroid hormone level; if your thyroid is not producing sufficient thyroid hormone, it goes up to tell your thyroid to make more.     Continue same dose thyroid med, and recheck in a year; earlier if concerns.  I would also recommend rechecking in 4-6 weeks if you get a different brand of thyroid medication.   I did send a prescription to your pharmacy.     I hope you feel better soon!     Sincerely,     Scarlett Spencer MD

## 2018-02-20 NOTE — MR AVS SNAPSHOT
After Visit Summary   2/20/2018    Ya Stahl    MRN: [id number]           Patient Information     Date Of Birth          1940        Visit Information        Provider Department      2/20/2018 8:50 AM Scalrett Spencer MD [facility]        Today's Diagnoses     Acute sinusitis with symptoms > 10 days    -  1      Care Instructions        Be cautious with flonase; it can be drying.  Add some nasal saline to help with moisture. If the bleeding worsens, stop the flonase.    See what is in your sinus pill.  I am not real fond of decongestants. They can dry things up and leave a plug.  I will usually recommend guaifenesin by itself, such as plain Mucinex.    Keep up with fluids and humidity.      Earache, No Infection (Adult)  Earaches can happen without an infection. This occurs when air and fluid build up behind the eardrum causing a feeling of fullness and discomfort and reduced hearing. This is called otitis media with effusion (OME) or serous otitis media. It means there is fluid in the middle ear. It is not the same as acute otitis media, which is typically from infection.  OME can happen when you have a cold if congestion blocks the passage that drains the middle ear. This passage is called the eustachian tube. OME may also occur with nasal allergies or after a bacterial middle ear infection.    The pain or discomfort may come and go. You may hear clicking or popping sounds when you chew or swallow. You may feel that your balance is off. Or you may hear ringing in the ear.  It often takes from several weeks up to 3 months for the fluid to clear on its own. Oral pain relievers and ear drops help if there is pain. Decongestants and antihistamines sometimes help. Antibiotics don't help since there is no infection. Your doctor may prescribe a nasal spray to help reduce swelling in the nose and eustachian tube. This can allow the ear to drain.  If your OME doesn't improve after 3  months, surgery may be used to drain the fluid and insert a small tube in the eardrum to allow continued drainage.  Because the middle ear fluid can become infected, it is important to watch for signs of an ear infection which may develop later. These signs include increased ear pain, fever, or drainage from the ear.  Home care  The following guidelines will help you care for yourself at home:    You may use over-the-counter medicine as directed to control pain, unless another medicine was prescribed. If you have chronic liver or kidney disease or ever had a stomach ulcer or GI bleeding, talk with your doctor before using these medicines. Aspirin should never be used in anyone under 18 years of age who is ill with a fever. It may cause severe liver damage.    You may use over-the-counter decongestants such as phenylephrine or pseudoephedrine. But they are not always helpful. Don't use nasal spray decongestants more than 3 days. Longer use can make congestion worse. Prescription nasal sprays from your doctor don't typically have those restrictions.    Antihistamines may help if you are also having allergy symptoms.    You may use medicines such as guaifenesin to thin mucus and promote drainage.  Follow-up care  Follow up with your healthcare provider or as advised if you are not feeling better after 3 days.  When to seek medical advice  Call your healthcare provider right away if any of the following occur:    Your ear pain gets worse or does not start to improve     Fever of 100.4 F (38 C) or higher, or as directed by your healthcare provider    Fluid or blood draining from the ear    Headache or sinus pain    Stiff neck    Unusual drowsiness or confusion  Date Last Reviewed: 10/1/2016    6101-3735 [employer]. [address] [place] [address], [place] [address]. All rights reserved. This information is not intended as a substitute for professional medical care. Always follow your healthcare professional's  instructions.                Follow-ups after your visit        Your next 10 appointments already scheduled     Feb 20, 2018  8:50 AM CST   Office Visit with MD Orville [place] Cathy [place] ([facility])    [address] [facility] [address] [phone]           Bring a current list of meds and any records pertaining to this visit. For Physicals, please bring immunization records and any forms needing to be filled out. Please arrive 10 minutes early to complete paperwork.            Mar 13, 2018 11:30 AM CDT   SHORT with MD Jeri Gleason ([facility])    [address] [facility] [address] [phone]            Apr 13, 2018  8:00 AM CDT   LAB with  LAB   Jeri Del Rio ([facility])    [address] [facility] [address] [phone]           Please do not eat 10-12 hours before your appointment if you are coming in fasting for labs on lipids, cholesterol, or glucose (sugar). This does not apply to pregnant women. Water, hot tea and black coffee (with nothing added) are okay. Do not drink other fluids, diet soda or chew gum.            Apr 19, 2018  3:15 PM CDT   Return Visit with Vandana Kasper MD   [facility] ([facility])    [facility]  [address]  [facility] [id number] [phone]              Who to contact     If you have questions or need follow up information about today's clinic visit or your schedule please contact [facility] directly at [phone].  Normal or non-critical lab and imaging results will be communicated to you by MyChart, letter or phone within 4 business days after the clinic has received the results. If you do not hear from us within 7 days, please contact the clinic through MyChart or phone. If you have a critical or abnormal lab result,  "we will notify you by phone as soon as possible.  Submit refill requests through NICO or call your pharmacy and they will forward the refill request to us. Please allow 3 business days for your refill to be completed.          Additional Information About Your Visit        Digital Labhart Information     NICO lets you send messages to your doctor, view your test results, renew your prescriptions, schedule appointments and more. To sign up, go to [email or web address] . Click on \"Log in\" on the left side of the screen, which will take you to the Welcome page. Then click on \"Sign up Now\" on the right side of the page.     You will be asked to enter the access code listed below, as well as some personal information. Please follow the directions to create your username and password.     Your access code is: [email or web address]  Expires: 2018  5:23 PM     Your access code will  in 90 days. If you need help or a new code, please call your [place] clinic or [phone].        Care EveryWhere ID     This is your Care EveryWhere ID. This could be used by other organizations to access your [place] medical records  [id number]        Your Vitals Were     Pulse Temperature Respirations Height Pulse Oximetry BMI (Body Mass Index)    64 98  F (36.7  C) (Oral) 16 5' 5.25\" (1.657 m) 97% 21.34 kg/m2       Blood Pressure from Last 3 Encounters:   18 160/84   18 144/88   18 136/74    Weight from Last 3 Encounters:   18 129 lb 3.2 oz (58.6 kg)   18 133 lb 11.2 oz (60.6 kg)   18 127 lb 14.4 oz (58 kg)              Today, you had the following     No orders found for display         Today's Medication Changes          These changes are accurate as of 18  8:45 AM.  If you have any questions, ask your nurse or doctor.               Start taking these medicines.        Dose/Directions    amoxicillin 500 MG capsule   Commonly known as:  AMOXIL   Used for:  Acute sinusitis with symptoms > 10 " days   Started by:  Scarlett Spencer MD        Dose:  1000 mg   Take 2 capsules (1,000 mg) by mouth 2 times daily   Quantity:  40 capsule   Refills:  0         These medicines have changed or have updated prescriptions.        Dose/Directions    MIRALAX powder   This may have changed:  See the new instructions.   Used for:  Chronic constipation   Generic drug:  polyethylene glycol        STIR 1 CAPFUL (17GM) IN 8 OZ OF LIQUID AND DRINK   Quantity:  1 BOTTLE   Refills:  1 YEAR            Where to get your medicines      These medications were sent to [facility] [place], [place] - [address]  [address] [facility] [address]     Phone:  [phone]     amoxicillin 500 MG capsule                Primary Care Provider Office Phone # Fax #    Scarlett Spencer [phone] [address]  [facility] [address]        Equal Access to Services     [facility]: Hadii aad ku hadasho Soomaali, waaxda luqadaha, qaybta kaalmada adeegyada, fritz santacruz hayaaandreina greene . So [facility] [phone].    ATENCIÓN: Si habla español, tiene a costa disposición servicios gratuitos de asistencia lingüística. Llame al [phone].    We comply with applicable federal civil rights laws and [place] laws. We do not discriminate on the basis of race, color, national origin, age, disability, sex, sexual orientation, or gender identity.            Thank you!     Thank you for choosing [facility]  for your care. Our goal is always to provide you with excellent care. Hearing back from our patients is one way we can continue to improve our services. Please take a few minutes to complete the written survey that you may receive in the mail after your visit with us. Thank you!             Your Updated Medication List - Protect others around you: Learn how to safely use, store and throw away your medicines at www.disposemymeds.org.          This list is accurate as of 2/20/18  8:45  AM.  Always use your most recent med list.                   Brand Name Dispense Instructions for use Diagnosis    acetaminophen 325 MG tablet    TYLENOL    60 tablet    Take 2 tablets (650 mg) by mouth every 6 hours as needed for mild pain or fever    Avascular necrosis of bone (H)       alendronate 70 MG tablet    FOSAMAX    12 tablet    Take 1 tablet (70 mg) by mouth every 7 days Take with over 8 ounces water and stay upright for at least 30 minutes after dose.  Take at least 60 minutes before breakfast    Osteoporosis, unspecified osteoporosis type, unspecified pathological fracture presence       amoxicillin 500 MG capsule    AMOXIL    40 capsule    Take 2 capsules (1,000 mg) by mouth 2 times daily    Acute sinusitis with symptoms > 10 days       aspirin 81 MG EC tablet     1 tablet    Take 1 tablet (81 mg) by mouth daily        atorvastatin 20 MG tablet    LIPITOR    102 tablet    Take 1 tablet (20 mg) by mouth daily    Hyperlipidemia LDL goal <70       calcium citrate-vitamin D 315-250 MG-UNIT Tabs per tablet    CITRACAL    120 tablet    Take 2 tablets in the morning and 1 tablet in the evening    Osteoporosis       CENTRUM SILVER per tablet      Take 1 tablet by mouth daily        cevimeline 30 MG capsule    EVOXAC     Take 30 mg by mouth 3 times daily Reported on 3/6/2017        ibuprofen 600 MG tablet    ADVIL/MOTRIN    60 tablet    Take 1 tablet (600 mg) by mouth every 6 hours as needed for moderate pain    Avascular necrosis of bone (H)       levothyroxine 100 MCG tablet    SYNTHROID/LEVOTHROID    90 tablet    Take 1 tablet (100 mcg) by mouth daily    Hypothyroidism, unspecified type       losartan 50 MG tablet    COZAAR    30 tablet    Take 1 tablet (50 mg) by mouth daily    Hypertension goal BP (blood pressure) < 140/90       MIRALAX powder   Generic drug:  polyethylene glycol     1 BOTTLE    STIR 1 CAPFUL (17GM) IN 8 OZ OF LIQUID AND DRINK    Chronic constipation       nitroGLYcerin 0.4 MG sublingual  tablet    NITROSTAT    25 tablet    Place 1 tablet (0.4 mg) under the tongue every 5 minutes as needed for chest pain if you are still having symptoms after 3 doses (15 minutes) call 911.    Acute chest pain       OMEGA-3 FISH OIL PO      Take 1 g by mouth 2 times daily (with meals)        * OSTEO BI-FLEX ADV TRIPLE ST PO      Take 2 tablets by mouth daily        * LUTEIN 20 PO      Take 1 tablet by mouth daily        RESTASIS OP           * Notice:  This list has 2 medication(s) that are the same as other medications prescribed for you. Read the directions carefully, and ask your doctor or other care provider to review them with you.

## 2018-03-13 ENCOUNTER — TELEPHONE (OUTPATIENT)
Dept: FAMILY MEDICINE | Facility: CLINIC | Age: 78
End: 2018-03-13

## 2018-03-13 ENCOUNTER — OFFICE VISIT (OUTPATIENT)
Dept: FAMILY MEDICINE | Facility: CLINIC | Age: 78
End: 2018-03-13
Payer: COMMERCIAL

## 2018-03-13 DIAGNOSIS — R05.3 CHRONIC COUGH: ICD-10-CM

## 2018-03-13 DIAGNOSIS — I10 HYPERTENSION GOAL BP (BLOOD PRESSURE) < 140/90: ICD-10-CM

## 2018-03-13 DIAGNOSIS — R23.3 EASY BRUISING: Primary | ICD-10-CM

## 2018-03-13 LAB
ANION GAP SERPL CALCULATED.3IONS-SCNC: 6 MMOL/L (ref 3–14)
BUN SERPL-MCNC: 18 MG/DL (ref 7–30)
CALCIUM SERPL-MCNC: 9.3 MG/DL (ref 8.5–10.1)
CHLORIDE SERPL-SCNC: 101 MMOL/L (ref 94–109)
CO2 SERPL-SCNC: 31 MMOL/L (ref 20–32)
CREAT SERPL-MCNC: 0.7 MG/DL (ref 0.52–1.04)
CREAT UR-MCNC: 83 MG/DL
GFR SERPL CREATININE-BSD FRML MDRD: 81 ML/MIN/1.7M2
GLUCOSE SERPL-MCNC: 97 MG/DL (ref 70–99)
MICROALBUMIN UR-MCNC: 8 MG/L
MICROALBUMIN/CREAT UR: 9.94 MG/G CR (ref 0–25)
POTASSIUM SERPL-SCNC: 3.9 MMOL/L (ref 3.4–5.3)
SODIUM SERPL-SCNC: 138 MMOL/L (ref 133–144)

## 2018-03-13 PROCEDURE — 80048 BASIC METABOLIC PNL TOTAL CA: CPT | Performed by: FAMILY MEDICINE

## 2018-03-13 PROCEDURE — 36415 COLL VENOUS BLD VENIPUNCTURE: CPT | Performed by: FAMILY MEDICINE

## 2018-03-13 PROCEDURE — 82043 UR ALBUMIN QUANTITATIVE: CPT | Performed by: FAMILY MEDICINE

## 2018-03-13 PROCEDURE — 99213 OFFICE O/P EST LOW 20 MIN: CPT | Performed by: FAMILY MEDICINE

## 2018-03-13 RX ORDER — LANSOPRAZOLE 30 MG/1
CAPSULE, DELAYED RELEASE ORAL DAILY
COMMUNITY
End: 2018-04-19

## 2018-03-13 RX ORDER — LOSARTAN POTASSIUM 50 MG/1
50 TABLET ORAL DAILY
Qty: 90 TABLET | Refills: 1 | Status: SHIPPED | OUTPATIENT
Start: 2018-03-13 | End: 2018-08-08

## 2018-03-13 NOTE — LETTER
March 19, 2018      Ya Stahl  [address]  [place] [address]        Dear Ms. Ya CABRALES Favio,    Enclosed is a copy of your recent results.   These look great!     GFR stands for glomerular filtration rate (this is in regards to the kidney). They are now showing an estimate of this, based on the actual creatinine, age, gender, and race. This is commonly lower as one gets older. Your level of hydration can have an impact also.   If this is low, we should be aggressive with managing high blood pressure or high cholesterol.     The urine albumin to creatinine ratio serves as a marker of higher risk of cardiovascular and kidney disease.  If this is elevated, we typically like to see you on either an ACE inhibitor, or an ARB.   We are also getting more aggressive with both blood pressure and cholesterol goals.   (The number we are looking at is the ratio; this usually appears at the bottom of the report with a normal range 0-25).     Have a great Spring!   Sincerely,     Scarlett Spencer MD

## 2018-03-13 NOTE — TELEPHONE ENCOUNTER
Patient called to indicate that she is taking Lansoprazole 30mg daily for heartburn relief.  Wanted the medication added to her med list.   -Birgit Vásquez

## 2018-03-13 NOTE — MR AVS SNAPSHOT
After Visit Summary   3/13/2018    Ya Stahl    MRN: [id number]           Patient Information     Date Of Birth          1940        Visit Information        Provider Department      3/13/2018 11:30 AM Scarlett Spencer MD [facility]        Today's Diagnoses     Hypertension goal BP (blood pressure) < 140/90           Follow-ups after your visit        Follow-up notes from your care team     Return in about 6 months (around 9/13/2018), or if bp running high.      Your next 10 appointments already scheduled     Apr 13, 2018  8:00 AM CDT   LAB with  LAB   [facility] ([facility])    [address] [facility] [id number] [phone]           Please do not eat 10-12 hours before your appointment if you are coming in fasting for labs on lipids, cholesterol, or glucose (sugar). This does not apply to pregnant women. Water, hot tea and black coffee (with nothing added) are okay. Do not drink other fluids, diet soda or chew gum.            Apr 19, 2018  3:15 PM CDT   Return Visit with Vandana Kasper MD   [facility] ([facility])    [facility]  [address]  [facility] [address] [phone]              Who to contact     If you have questions or need follow up information about today's clinic visit or your schedule please contact [facility] directly at [phone].  Normal or non-critical lab and imaging results will be communicated to you by MyChart, letter or phone within 4 business days after the clinic has received the results. If you do not hear from us within 7 days, please contact the clinic through MyChart or phone. If you have a critical or abnormal lab result, we will notify you by phone as soon as possible.  Submit refill requests through MyPrintCloud or call your pharmacy and they will forward the refill request to us. Please allow  "3 business days for your refill to be completed.          Additional Information About Your Visit        MyChart Information     Orb Networkshart lets you send messages to your doctor, view your test results, renew your prescriptions, schedule appointments and more. To sign up, go to [email or web address] . Click on \"Log in\" on the left side of the screen, which will take you to the Welcome page. Then click on \"Sign up Now\" on the right side of the page.     You will be asked to enter the access code listed below, as well as some personal information. Please follow the directions to create your username and password.     Your access code is: [email or web address]  Expires: 2018  6:23 PM     Your access code will  in 90 days. If you need help or a new code, please call your [place] clinic or [phone].        Care EveryWhere ID     This is your Care EveryWhere ID. This could be used by other organizations to access your [place] medical records  [id number]         Blood Pressure from Last 3 Encounters:   18 (P) 130/76   18 160/84   18 144/88    Weight from Last 3 Encounters:   18 (P) 134 lb 12.8 oz (61.1 kg)   18 129 lb 3.2 oz (58.6 kg)   18 133 lb 11.2 oz (60.6 kg)              Today, you had the following     No orders found for display         Today's Medication Changes          These changes are accurate as of 3/13/18 12:07 PM.  If you have any questions, ask your nurse or doctor.               These medicines have changed or have updated prescriptions.        Dose/Directions    MIRALAX powder   This may have changed:  See the new instructions.   Used for:  Chronic constipation   Generic drug:  polyethylene glycol        STIR 1 CAPFUL (17GM) IN 8 OZ OF LIQUID AND DRINK   Quantity:  1 BOTTLE   Refills:  1 YEAR            Where to get your medicines      These medications were sent to [facility] - [place], [place] - [address]  [address], [place] [address]     Phone:  [phone]     losartan 50 MG tablet                Primary Care Provider Office Phone # Fax #    Scarlett Spencer [phone] [address] [address] [facility] [address]        Equal Access to Services     [facility]: Hadii aad ku hadasho Soomaali, waaxda luqadaha, qaybta kaalmada adeegyada, waxay idiin hayaan adeeg kharash la'aan ah. So [facility] [phone].    ATENCIÓN: Si habla español, tiene a costa disposición servicios gratuitos de asistencia lingüística. Titiame al [phone].    We comply with applicable federal civil rights laws and [place] laws. We do not discriminate on the basis of race, color, national origin, age, disability, sex, sexual orientation, or gender identity.            Thank you!     Thank you for choosing [facility]  for your care. Our goal is always to provide you with excellent care. Hearing back from our patients is one way we can continue to improve our services. Please take a few minutes to complete the written survey that you may receive in the mail after your visit with us. Thank you!             Your Updated Medication List - Protect others around you: Learn how to safely use, store and throw away your medicines at www.disposemymeds.org.          This list is accurate as of 3/13/18 12:07 PM.  Always use your most recent med list.                   Brand Name Dispense Instructions for use Diagnosis    acetaminophen 325 MG tablet    TYLENOL    60 tablet    Take 2 tablets (650 mg) by mouth every 6 hours as needed for mild pain or fever    Avascular necrosis of bone (H)       alendronate 70 MG tablet    FOSAMAX    12 tablet    Take 1 tablet (70 mg) by mouth every 7 days Take with over 8 ounces water and stay upright for at least 30 minutes after dose.  Take at least 60 minutes before breakfast    Osteoporosis, unspecified osteoporosis type, unspecified pathological fracture presence       aspirin 81 MG EC tablet      1 tablet    Take 1 tablet (81 mg) by mouth daily        atorvastatin 20 MG tablet    LIPITOR    102 tablet    Take 1 tablet (20 mg) by mouth daily    Hyperlipidemia LDL goal <70       calcium citrate-vitamin D 315-250 MG-UNIT Tabs per tablet    CITRACAL    120 tablet    Take 2 tablets in the morning and 1 tablet in the evening    Osteoporosis       CENTRUM SILVER per tablet      Take 1 tablet by mouth daily        cevimeline 30 MG capsule    EVOXAC     Take 30 mg by mouth 3 times daily Reported on 3/6/2017        ibuprofen 600 MG tablet    ADVIL/MOTRIN    60 tablet    Take 1 tablet (600 mg) by mouth every 6 hours as needed for moderate pain    Avascular necrosis of bone (H)       levothyroxine 100 MCG tablet    SYNTHROID/LEVOTHROID    90 tablet    Take 1 tablet (100 mcg) by mouth daily    Hypothyroidism, unspecified type       losartan 50 MG tablet    COZAAR    90 tablet    Take 1 tablet (50 mg) by mouth daily    Hypertension goal BP (blood pressure) < 140/90       MIRALAX powder   Generic drug:  polyethylene glycol     1 BOTTLE    STIR 1 CAPFUL (17GM) IN 8 OZ OF LIQUID AND DRINK    Chronic constipation       nitroGLYcerin 0.4 MG sublingual tablet    NITROSTAT    25 tablet    Place 1 tablet (0.4 mg) under the tongue every 5 minutes as needed for chest pain if you are still having symptoms after 3 doses (15 minutes) call 911.    Acute chest pain       OMEGA-3 FISH OIL PO      Take 1 g by mouth 2 times daily (with meals)        * OSTEO BI-FLEX ADV TRIPLE ST PO      Take 2 tablets by mouth daily        * LUTEIN 20 PO      Take 1 tablet by mouth daily        ranitidine 300 MG tablet    ZANTAC     Take by mouth At Bedtime        RESTASIS OP           * Notice:  This list has 2 medication(s) that are the same as other medications prescribed for you. Read the directions carefully, and ask your doctor or other care provider to review them with you.

## 2018-03-29 ENCOUNTER — TRANSFERRED RECORDS (OUTPATIENT)
Dept: HEALTH INFORMATION MANAGEMENT | Facility: CLINIC | Age: 78
End: 2018-03-29

## 2018-03-29 DIAGNOSIS — G25.0 ESSENTIAL TREMOR: ICD-10-CM

## 2018-03-29 DIAGNOSIS — M79.641 BILATERAL HAND PAIN: Primary | ICD-10-CM

## 2018-03-29 DIAGNOSIS — G56.03 BILATERAL CARPAL TUNNEL SYNDROME: ICD-10-CM

## 2018-03-29 DIAGNOSIS — M79.642 BILATERAL HAND PAIN: Primary | ICD-10-CM

## 2018-04-02 ENCOUNTER — ALLIED HEALTH/NURSE VISIT (OUTPATIENT)
Dept: FAMILY MEDICINE | Facility: CLINIC | Age: 78
End: 2018-04-02

## 2018-04-02 VITALS — DIASTOLIC BLOOD PRESSURE: 70 MMHG | SYSTOLIC BLOOD PRESSURE: 124 MMHG

## 2018-04-02 DIAGNOSIS — I10 HYPERTENSION GOAL BP (BLOOD PRESSURE) < 140/90: Primary | ICD-10-CM

## 2018-04-02 DIAGNOSIS — M79.642 BILATERAL HAND PAIN: ICD-10-CM

## 2018-04-02 DIAGNOSIS — M79.641 BILATERAL HAND PAIN: ICD-10-CM

## 2018-04-02 DIAGNOSIS — G56.03 BILATERAL CARPAL TUNNEL SYNDROME: ICD-10-CM

## 2018-04-02 DIAGNOSIS — G25.0 ESSENTIAL TREMOR: ICD-10-CM

## 2018-04-02 LAB
ALBUMIN SERPL-MCNC: 4 G/DL (ref 3.4–5)
ALP SERPL-CCNC: 52 U/L (ref 40–150)
ALT SERPL W P-5'-P-CCNC: 29 U/L (ref 0–50)
AST SERPL W P-5'-P-CCNC: 28 U/L (ref 0–45)
BILIRUB DIRECT SERPL-MCNC: 0.3 MG/DL (ref 0–0.2)
BILIRUB SERPL-MCNC: 1.3 MG/DL (ref 0.2–1.3)
GLUCOSE SERPL-MCNC: 100 MG/DL (ref 70–99)
PROT SERPL-MCNC: 6.9 G/DL (ref 6.8–8.8)
TSH SERPL DL<=0.005 MIU/L-ACNC: 0.79 MU/L (ref 0.4–4)

## 2018-04-02 PROCEDURE — 83655 ASSAY OF LEAD: CPT | Mod: 90 | Performed by: PSYCHIATRY & NEUROLOGY

## 2018-04-02 PROCEDURE — 84443 ASSAY THYROID STIM HORMONE: CPT | Performed by: PSYCHIATRY & NEUROLOGY

## 2018-04-02 PROCEDURE — 36415 COLL VENOUS BLD VENIPUNCTURE: CPT | Performed by: PSYCHIATRY & NEUROLOGY

## 2018-04-02 PROCEDURE — 82947 ASSAY GLUCOSE BLOOD QUANT: CPT | Performed by: PSYCHIATRY & NEUROLOGY

## 2018-04-02 PROCEDURE — 80076 HEPATIC FUNCTION PANEL: CPT | Performed by: PSYCHIATRY & NEUROLOGY

## 2018-04-02 PROCEDURE — 99207 ZZC NO CHARGE NURSE ONLY: CPT | Performed by: FAMILY MEDICINE

## 2018-04-02 PROCEDURE — 99000 SPECIMEN HANDLING OFFICE-LAB: CPT | Performed by: PSYCHIATRY & NEUROLOGY

## 2018-04-02 NOTE — PROGRESS NOTES
Ya Stahl is enrolled/participating in the retail pharmacy Blood Pressure Goals Achievement Program (BPGAP).  Ya Stahl was evaluated at [facility] on April 2, 2018 at which time her blood pressure was:    BP Readings from Last 3 Encounters:   04/02/18 124/70   03/13/18 (P) 130/76   02/20/18 160/84     Reviewed lifestyle modifications for blood pressure control and reduction: including making healthy food choices, managing weight, getting regular exercise, smoking cessation, reducing alcohol consumption, monitoring blood pressure regularly.     Ya Stahl is not experiencing symptoms.    Follow-Up: BP is at goal of < 140/90mmHg (patient 18+ years of age with or without diabetes).  Recommended follow-up at pharmacy in 6 months.     Recommendation to Provider: none    Ya Stahl was evaluated for enrollment into the PGEN study today.    Patient eligible for enrollment:  No  Patient interested in enrollment:  No    Completed by: Alma Rosa Anguiano, PharmD  [facility]

## 2018-04-02 NOTE — MR AVS SNAPSHOT
After Visit Summary   4/2/2018    Ya Stahl    MRN: [id number]           Patient Information     Date Of Birth          1940        Visit Information        Provider Department      4/2/2018 8:26 AM Scarlett Spencer MD [facility]        Today's Diagnoses     Hypertension goal BP (blood pressure) < 140/90    -  1       Follow-ups after your visit        Your next 10 appointments already scheduled     Apr 02, 2018  8:30 AM CDT   LAB with  LAB   [facility]    [address] [address] [phone]           Please do not eat 10-12 hours before your appointment if you are coming in fasting for labs on lipids, cholesterol, or glucose (sugar). This does not apply to pregnant women. Water, hot tea and black coffee (with nothing added) are okay. Do not drink other fluids, diet soda or chew gum.            Apr 13, 2018  8:00 AM CDT   LAB with  LAB   [facility] ([facility]    [address] [address] [phone]           Please do not eat 10-12 hours before your appointment if you are coming in fasting for labs on lipids, cholesterol, or glucose (sugar). This does not apply to pregnant women. Water, hot tea and black coffee (with nothing added) are okay. Do not drink other fluids, diet soda or chew gum.            Apr 19, 2018  3:15 PM CDT   Return Visit with Vandana Kasper MD   [facility] ([facility])    [facility]  [address]  [place] [id number] [phone]              Who to contact     If you have questions or need follow up information about today's clinic visit or your schedule please contact [facility] directly at [phone].  Normal or non-critical lab and imaging results will be communicated to you by MyChart, letter or phone within 4  "business days after the clinic has received the results. If you do not hear from us within 7 days, please contact the clinic through [employer] or phone. If you have a critical or abnormal lab result, we will notify you by phone as soon as possible.  Submit refill requests through [employer] or call your pharmacy and they will forward the refill request to us. Please allow 3 business days for your refill to be completed.          Additional Information About Your Visit        [employer] Information     [employer] lets you send messages to your doctor, view your test results, renew your prescriptions, schedule appointments and more. To sign up, go to [email or web address] . Click on \"Log in\" on the left side of the screen, which will take you to the Welcome page. Then click on \"Sign up Now\" on the right side of the page.     You will be asked to enter the access code listed below, as well as some personal information. Please follow the directions to create your username and password.     Your access code is: [email or web address]  Expires: 2018  6:23 PM     Your access code will  in 90 days. If you need help or a new code, please call your [place] clinic or [phone].        Care EveryWhere ID     This is your Care EveryWhere ID. This could be used by other organizations to access your [place] medical records  [id number]         Blood Pressure from Last 3 Encounters:   18 124/70   18 (P) 130/76   18 160/84    Weight from Last 3 Encounters:   18 (P) 134 lb 12.8 oz (61.1 kg)   18 129 lb 3.2 oz (58.6 kg)   18 133 lb 11.2 oz (60.6 kg)              Today, you had the following     No orders found for display         Today's Medication Changes          These changes are accurate as of 18  8:27 AM.  If you have any questions, ask your nurse or doctor.               These medicines have changed or have updated prescriptions.        Dose/Directions    MIRALAX powder   This may have " changed:  See the new instructions.   Used for:  Chronic constipation   Generic drug:  polyethylene glycol        STIR 1 CAPFUL (17GM) IN 8 OZ OF LIQUID AND DRINK   Quantity:  1 BOTTLE   Refills:  1 YEAR                Primary Care Provider Office Phone # Fax #    Scarlett Spencer [phone] [address] [place] [address]        Equal Access to Services     [facility]: Hadii aad ku hadasho Soomaali, waaxda luqadaha, qaybta kaalmada adeegyada, waxay idiin hayaan adeeg kharash laevn . So [facility] [phone].    ATENCIÓN: Si habla español, tiene a costa disposición servicios gratuitos de asistencia lingüística. Llame al [phone].    We comply with applicable federal civil rights laws and [place] laws. We do not discriminate on the basis of race, color, national origin, age, disability, sex, sexual orientation, or gender identity.            Thank you!     Thank you for choosing [facility]  for your care. Our goal is always to provide you with excellent care. Hearing back from our patients is one way we can continue to improve our services. Please take a few minutes to complete the written survey that you may receive in the mail after your visit with us. Thank you!             Your Updated Medication List - Protect others around you: Learn how to safely use, store and throw away your medicines at www.disposemymeds.org.          This list is accurate as of 4/2/18  8:27 AM.  Always use your most recent med list.                   Brand Name Dispense Instructions for use Diagnosis    acetaminophen 325 MG tablet    TYLENOL    60 tablet    Take 2 tablets (650 mg) by mouth every 6 hours as needed for mild pain or fever    Avascular necrosis of bone (H)       alendronate 70 MG tablet    FOSAMAX    12 tablet    Take 1 tablet (70 mg) by mouth every 7 days Take with over 8 ounces water and stay upright for at least 30 minutes after dose.  Take at least 60 minutes before  breakfast    Osteoporosis, unspecified osteoporosis type, unspecified pathological fracture presence       aspirin 81 MG EC tablet     1 tablet    Take 1 tablet (81 mg) by mouth daily        atorvastatin 20 MG tablet    LIPITOR    102 tablet    Take 1 tablet (20 mg) by mouth daily    Hyperlipidemia LDL goal <70       calcium citrate-vitamin D 315-250 MG-UNIT Tabs per tablet    CITRACAL    120 tablet    Take 2 tablets in the morning and 1 tablet in the evening    Osteoporosis       CENTRUM SILVER per tablet      Take 1 tablet by mouth daily        cevimeline 30 MG capsule    EVOXAC     Take 30 mg by mouth 3 times daily Reported on 3/6/2017        ibuprofen 600 MG tablet    ADVIL/MOTRIN    60 tablet    Take 1 tablet (600 mg) by mouth every 6 hours as needed for moderate pain    Avascular necrosis of bone (H)       LANsoprazole 30 MG CR capsule    PREVACID     Take by mouth daily        levothyroxine 100 MCG tablet    SYNTHROID/LEVOTHROID    90 tablet    Take 1 tablet (100 mcg) by mouth daily    Hypothyroidism, unspecified type       losartan 50 MG tablet    COZAAR    90 tablet    Take 1 tablet (50 mg) by mouth daily    Hypertension goal BP (blood pressure) < 140/90       MIRALAX powder   Generic drug:  polyethylene glycol     1 BOTTLE    STIR 1 CAPFUL (17GM) IN 8 OZ OF LIQUID AND DRINK    Chronic constipation       nitroGLYcerin 0.4 MG sublingual tablet    NITROSTAT    25 tablet    Place 1 tablet (0.4 mg) under the tongue every 5 minutes as needed for chest pain if you are still having symptoms after 3 doses (15 minutes) call 911.    Acute chest pain       OMEGA-3 FISH OIL PO      Take 1 g by mouth 2 times daily (with meals)        * OSTEO BI-FLEX ADV TRIPLE ST PO      Take 2 tablets by mouth daily        * LUTEIN 20 PO      Take 1 tablet by mouth daily        ranitidine 300 MG tablet    ZANTAC     Take by mouth At Bedtime        RESTASIS OP           * Notice:  This list has 2 medication(s) that are the same as other  medications prescribed for you. Read the directions carefully, and ask your doctor or other care provider to review them with you.

## 2018-04-04 LAB — LEAD BLDV-MCNC: <2 UG/DL (ref 0–4.9)

## 2018-04-06 ENCOUNTER — TELEPHONE (OUTPATIENT)
Dept: FAMILY MEDICINE | Facility: CLINIC | Age: 78
End: 2018-04-06

## 2018-04-06 NOTE — TELEPHONE ENCOUNTER
I did see the letter last night....    I was anticipating she would be coming in for an appointment.     ___________________    (Regarding getting on propranolol for titubation)

## 2018-04-06 NOTE — TELEPHONE ENCOUNTER
Patient would like to discuss a letter that Dr. Spencer was supposed to be receiving from Dr. Sommer (Shantel). Please call back or advise if patient needs appointment.  -Winnie Rebollar

## 2018-04-06 NOTE — TELEPHONE ENCOUNTER
The note sure looked like this is what he recommended for the head shaking.    Can you please make a copy of the letter; will put in a folder at my desk to make sure we have it available when she comes in...

## 2018-04-06 NOTE — TELEPHONE ENCOUNTER
Called pt to confirm that she was planning on coming in for an appointment regarding the propranolol and could discuss the letter from Dr. Sommer at that point.  Pt stated that she wanted Dr. Spencer to discuss the letter with Dr. Sommer (Shantel) prior to any appointment to decide what they thought was best and then see if she even needed to come in for the propranolol.  Please advise.    Risa Damian RN.  Nurse Triage

## 2018-04-10 ENCOUNTER — OFFICE VISIT (OUTPATIENT)
Dept: FAMILY MEDICINE | Facility: CLINIC | Age: 78
End: 2018-04-10
Payer: COMMERCIAL

## 2018-04-10 VITALS
SYSTOLIC BLOOD PRESSURE: 150 MMHG | DIASTOLIC BLOOD PRESSURE: 76 MMHG | WEIGHT: 133.8 LBS | RESPIRATION RATE: 16 BRPM | HEIGHT: 65 IN | BODY MASS INDEX: 22.29 KG/M2 | TEMPERATURE: 98 F | HEART RATE: 58 BPM | OXYGEN SATURATION: 98 %

## 2018-04-10 DIAGNOSIS — R26.0 TITUBATION: Primary | ICD-10-CM

## 2018-04-10 DIAGNOSIS — R00.1 BRADYCARDIA: ICD-10-CM

## 2018-04-10 DIAGNOSIS — R25.1 TREMOR: ICD-10-CM

## 2018-04-10 DIAGNOSIS — I10 HYPERTENSION GOAL BP (BLOOD PRESSURE) < 140/90: ICD-10-CM

## 2018-04-10 PROCEDURE — 99214 OFFICE O/P EST MOD 30 MIN: CPT | Performed by: FAMILY MEDICINE

## 2018-04-10 RX ORDER — PROPRANOLOL HYDROCHLORIDE 20 MG/1
20 TABLET ORAL 2 TIMES DAILY
Qty: 60 TABLET | Refills: 0 | Status: SHIPPED | OUTPATIENT
Start: 2018-04-10 | End: 2018-05-22

## 2018-04-10 NOTE — MR AVS SNAPSHOT
After Visit Summary   4/10/2018    Ya Stahl    MRN: [id number]           Patient Information     Date Of Birth          1940        Visit Information        Provider Department      4/10/2018 3:30 PM Scarlett Spencer MD [facility]        Today's Diagnoses     Titubation    -  1      Care Instructions        Continue to monitor your blood pressure.  Write down your Heart Rate also.    Let me know if you feel dizzy or lightheaded.              Follow-ups after your visit        Follow-up notes from your care team     Return in about 1 week (around 4/17/2018) for BP Recheck, Medication recheck.      Your next 10 appointments already scheduled     Apr 13, 2018  8:00 AM CDT   LAB with  LAB   [facility] ([facility])    [address] [address] [phone]           Please do not eat 10-12 hours before your appointment if you are coming in fasting for labs on lipids, cholesterol, or glucose (sugar). This does not apply to pregnant women. Water, hot tea and black coffee (with nothing added) are okay. Do not drink other fluids, diet soda or chew gum.            Apr 19, 2018  3:15 PM CDT   Return Visit with Vandana Kasper MD   [facility] ([facility])    [facility]  [address] [place] [address]   [phone]            Apr 23, 2018 10:50 AM CDT   Office Visit with Scarlett Spencer MD   [facility] ([facility])    [address] [address] [phone]           Bring a current list of meds and any records pertaining to this visit. For Physicals, please bring immunization records and any forms needing to be filled out. Please arrive 10 minutes early to complete paperwork.              Who to contact     If you have questions or need follow up information about today's clinic visit or your  "schedule please contact [facility] directly at [phone].  Normal or non-critical lab and imaging results will be communicated to you by MyChart, letter or phone within 4 business days after the clinic has received the results. If you do not hear from us within 7 days, please contact the clinic through [employer] or phone. If you have a critical or abnormal lab result, we will notify you by phone as soon as possible.  Submit refill requests through [employer] or call your pharmacy and they will forward the refill request to us. Please allow 3 business days for your refill to be completed.          Additional Information About Your Visit        [employer] Information     [employer] lets you send messages to your doctor, view your test results, renew your prescriptions, schedule appointments and more. To sign up, go to [email or web address] . Click on \"Log in\" on the left side of the screen, which will take you to the Welcome page. Then click on \"Sign up Now\" on the right side of the page.     You will be asked to enter the access code listed below, as well as some personal information. Please follow the directions to create your username and password.     Your access code is: [email or web address]  Expires: 2018  6:23 PM     Your access code will  in 90 days. If you need help or a new code, please call your [place] clinic or [phone].        Care EveryWhere ID     This is your Care EveryWhere ID. This could be used by other organizations to access your [place] medical records  [id number]        Your Vitals Were     Pulse Temperature Respirations Height Pulse Oximetry BMI (Body Mass Index)    58 98  F (36.7  C) (Oral) 16 5' 5.25\" (1.657 m) 98% 22.1 kg/m2       Blood Pressure from Last 3 Encounters:   04/10/18 150/76   18 124/70   18 (P) 130/76    Weight from Last 3 Encounters:   04/10/18 133 lb 12.8 oz (60.7 kg)   18 (P) 134 lb 12.8 oz (61.1 kg)   18 129 lb 3.2 oz (58.6 kg)    "           Today, you had the following     No orders found for display         Today's Medication Changes          These changes are accurate as of 4/10/18  4:32 PM.  If you have any questions, ask your nurse or doctor.               Start taking these medicines.        Dose/Directions    propranolol 20 MG tablet   Commonly known as:  INDERAL   Used for:  Titubation   Started by:  Scarlett Spencer MD        Dose:  20 mg   Take 1 tablet (20 mg) by mouth 2 times daily   Quantity:  60 tablet   Refills:  0         These medicines have changed or have updated prescriptions.        Dose/Directions    MIRALAX powder   This may have changed:  See the new instructions.   Used for:  Chronic constipation   Generic drug:  polyethylene glycol        STIR 1 CAPFUL (17GM) IN 8 OZ OF LIQUID AND DRINK   Quantity:  1 BOTTLE   Refills:  1 YEAR            Where to get your medicines      These medications were sent to [facility] [place] - [address]  [address] [facility] [address]     Phone:  [phone]     propranolol 20 MG tablet                Primary Care Provider Office Phone # Fax #    Scarlett Spencer [phone] [address]  [facility] [address]        Equal Access to Services     [facility]: Hadii aad ku hadasho Soomaali, waaxda luqadaha, qaybta kaalmada adeegyada, fritz santacruz hayrika greene . So [facility] [phone].    ATENCIÓN: Si habla español, tiene a costa disposición servicios gratuitos de asistencia lingüística. Llame al [phone].    We comply with applicable federal civil rights laws and [place] laws. We do not discriminate on the basis of race, color, national origin, age, disability, sex, sexual orientation, or gender identity.            Thank you!     Thank you for choosing [facility]  for your care. Our goal is always to provide you with excellent care. Hearing back from our patients is one way we can continue to  improve our services. Please take a few minutes to complete the written survey that you may receive in the mail after your visit with us. Thank you!             Your Updated Medication List - Protect others around you: Learn how to safely use, store and throw away your medicines at www.disposemymeds.org.          This list is accurate as of 4/10/18  4:32 PM.  Always use your most recent med list.                   Brand Name Dispense Instructions for use Diagnosis    acetaminophen 325 MG tablet    TYLENOL    60 tablet    Take 2 tablets (650 mg) by mouth every 6 hours as needed for mild pain or fever    Avascular necrosis of bone (H)       alendronate 70 MG tablet    FOSAMAX    12 tablet    Take 1 tablet (70 mg) by mouth every 7 days Take with over 8 ounces water and stay upright for at least 30 minutes after dose.  Take at least 60 minutes before breakfast    Osteoporosis, unspecified osteoporosis type, unspecified pathological fracture presence       aspirin 81 MG EC tablet     1 tablet    Take 1 tablet (81 mg) by mouth daily        atorvastatin 20 MG tablet    LIPITOR    102 tablet    Take 1 tablet (20 mg) by mouth daily    Hyperlipidemia LDL goal <70       calcium citrate-vitamin D 315-250 MG-UNIT Tabs per tablet    CITRACAL    120 tablet    Take 2 tablets in the morning and 1 tablet in the evening    Osteoporosis       CENTRUM SILVER per tablet      Take 1 tablet by mouth daily        cevimeline 30 MG capsule    EVOXAC     Take 30 mg by mouth 3 times daily Reported on 3/6/2017        ibuprofen 600 MG tablet    ADVIL/MOTRIN    60 tablet    Take 1 tablet (600 mg) by mouth every 6 hours as needed for moderate pain    Avascular necrosis of bone (H)       LANsoprazole 30 MG CR capsule    PREVACID     Take by mouth daily        levothyroxine 100 MCG tablet    SYNTHROID/LEVOTHROID    90 tablet    Take 1 tablet (100 mcg) by mouth daily    Hypothyroidism, unspecified type       losartan 50 MG tablet    COZAAR    90  tablet    Take 1 tablet (50 mg) by mouth daily    Hypertension goal BP (blood pressure) < 140/90       MIRALAX powder   Generic drug:  polyethylene glycol     1 BOTTLE    STIR 1 CAPFUL (17GM) IN 8 OZ OF LIQUID AND DRINK    Chronic constipation       nitroGLYcerin 0.4 MG sublingual tablet    NITROSTAT    25 tablet    Place 1 tablet (0.4 mg) under the tongue every 5 minutes as needed for chest pain if you are still having symptoms after 3 doses (15 minutes) call 911.    Acute chest pain       OMEGA-3 FISH OIL PO      Take 1 g by mouth 2 times daily (with meals)        * OSTEO BI-FLEX ADV TRIPLE ST PO      Take 2 tablets by mouth daily        * LUTEIN 20 PO      Take 1 tablet by mouth daily        propranolol 20 MG tablet    INDERAL    60 tablet    Take 1 tablet (20 mg) by mouth 2 times daily    Titubation       ranitidine 300 MG tablet    ZANTAC     Take by mouth At Bedtime        RESTASIS OP           * Notice:  This list has 2 medication(s) that are the same as other medications prescribed for you. Read the directions carefully, and ask your doctor or other care provider to review them with you.

## 2018-04-10 NOTE — PATIENT INSTRUCTIONS
Continue to monitor your blood pressure.  Write down your Heart Rate also.    Let me know if you feel dizzy or lightheaded.

## 2018-04-10 NOTE — PROGRESS NOTES
SUBJECTIVE:   Ya Stahl is a 77 year old female who presents to clinic today for the following health issues:      Here to review letter from Dr. Sommer regarding a medication for the head shaking and a high bilirubin.        Problem list and histories reviewed & adjusted, as indicated.  Additional history:     See under ROS     Patient Active Problem List   Diagnosis     Hypothyroidism     Chondrodermatitis nodularis helicis     Grave's disease     Bile reflux gastritis     Hyperlipidemia LDL goal <70     Advanced directives, counseling/discussion     Sjogren's disease (H)     HL (hearing loss)     Mixed incontinence     Health Care Home     Chronic cough     Impaired fasting glucose     Status post-operative repair of hip fracture, RIGHT     Constipation     Pubic ramus fracture (H)     Osteoporosis     Gastroesophageal reflux disease without esophagitis     AMI (acute myocardial infarction)     ASCVD (arteriosclerotic cardiovascular disease)     Macrocytosis     Monoclonal paraproteinemia     MGUS (monoclonal gammopathy of unknown significance)     Epigastric pain     Hypertension goal BP (blood pressure) < 140/90       Current Outpatient Prescriptions   Medication Sig Dispense Refill     ranitidine (ZANTAC) 300 MG tablet Take by mouth At Bedtime       losartan (COZAAR) 50 MG tablet Take 1 tablet (50 mg) by mouth daily 90 tablet 1     LANsoprazole (PREVACID) 30 MG CR capsule Take by mouth daily       levothyroxine (SYNTHROID/LEVOTHROID) 100 MCG tablet Take 1 tablet (100 mcg) by mouth daily 90 tablet 3     atorvastatin (LIPITOR) 20 MG tablet Take 1 tablet (20 mg) by mouth daily 102 tablet 3     alendronate (FOSAMAX) 70 MG tablet Take 1 tablet (70 mg) by mouth every 7 days Take with over 8 ounces water and stay upright for at least 30 minutes after dose.  Take at least 60 minutes before breakfast 12 tablet 3     CycloSPORINE (RESTASIS OP)        ibuprofen (ADVIL/MOTRIN) 600 MG tablet Take 1 tablet (600 mg)  by mouth every 6 hours as needed for moderate pain 60 tablet 0     acetaminophen (TYLENOL) 325 MG tablet Take 2 tablets (650 mg) by mouth every 6 hours as needed for mild pain or fever 60 tablet 0     calcium citrate-vitamin D (CITRACAL) 315-250 MG-UNIT TABS per tablet Take 2 tablets in the morning and 1 tablet in the evening 120 tablet      cevimeline (EVOXAC) 30 MG capsule Take 30 mg by mouth 3 times daily Reported on 3/6/2017       Multiple Vitamins-Minerals (CENTRUM SILVER) per tablet Take 1 tablet by mouth daily       Misc Natural Products (OSTEO BI-FLEX ADV TRIPLE ST PO) Take 2 tablets by mouth daily        Misc Natural Products (LUTEIN 20 PO) Take 1 tablet by mouth daily       Omega-3 Fatty Acids (OMEGA-3 FISH OIL PO) Take 1 g by mouth 2 times daily (with meals)        aspirin 81 MG EC tablet Take 1 tablet (81 mg) by mouth daily 1 tablet 0     nitroglycerin (NITROSTAT) 0.4 MG SL tablet Place 1 tablet (0.4 mg) under the tongue every 5 minutes as needed for chest pain if you are still having symptoms after 3 doses (15 minutes) call 911. 25 tablet 0     MIRALAX OR POWD STIR 1 CAPFUL (17GM) IN 8 OZ OF LIQUID AND DRINK (Patient taking differently: STIR 1 CAPFUL (17GM) IN 8 OZ OF LIQUID AND DRINK twice daily) 1 BOTTLE 1 YEAR         Reviewed and updated as needed this visit by clinical staff  Tobacco  Allergies  Meds  Med Hx  Surg Hx  Fam Hx  Soc Hx      Reviewed and updated as needed this visit by Provider         ROS:  CONSTITUTIONAL:NEGATIVE for fever, chills, change in weight  RESP:NEGATIVE for significant cough or SOB  CV: NEGATIVE for chest pain, palpitations or peripheral edema  NEURO: See below  PSYCHIATRIC: NEGATIVE for changes in mood or affect    Not dizzy or lightheaded. No palpitations.     She recently saw neurology for tremor and head shake.  She notes the headshake primarily when goes to bed and sits on the sofa with head resting on back.  She does note the tremor on occasion.  She notes that  "she was happy to hear this was not consistent with Parkinson's.    Did note hands tremoring some yesterday.     She would like her to be on a medication a day prior to MRI.     OBJECTIVE:     /76 (BP Location: Right arm, Cuff Size: Adult Regular)  Pulse 58  Temp 98  F (36.7  C) (Oral)  Resp 16  Ht 5' 5.25\" (1.657 m)  Wt 133 lb 12.8 oz (60.7 kg)  SpO2 98%  BMI 22.1 kg/m2  Body mass index is 22.1 kg/(m^2).  GENERAL APPEARANCE: alert and no distress  CV: regular rates and rhythm  MS: She does have some mild titubation noted.  Very mild tremor when she holds her hands in front of her.  PSYCH: mentation appears normal and affect normal/bright    bp readings at home: 115-154/67-88.    Component      Latest Ref Rng & Units 4/2/2018   Bilirubin Total      0.2 - 1.3 mg/dL 1.3   Albumin      3.4 - 5.0 g/dL 4.0   Protein Total      6.8 - 8.8 g/dL 6.9   Alkaline Phosphatase      40 - 150 U/L 52   ALT      0 - 50 U/L 29   AST      0 - 45 U/L 28   Bilirubin Direct      0.0 - 0.2 mg/dL 0.3 (H)       Reviewed the neurology consult letter.  This will be scanned into the chart.    ASSESSMENT/PLAN:     Titubation  She does note that an MRI has been recommended to rule out other etiologies.  She notes that she is to be on propranolol prior to getting the MRI.  Discussed potential side effects of the propranolol.  This includes lowering of her blood pressure as well as her heart rate.  Discussed possible fatigue.  At this time, will start at a low dose and increase as needed and tolerated.  - propranolol (INDERAL) 20 MG tablet; Take 1 tablet (20 mg) by mouth 2 times daily    Tremor  As above.  - propranolol (INDERAL) 20 MG tablet; Take 1 tablet (20 mg) by mouth 2 times daily    Hypertension goal BP (blood pressure) < 140/90  At this time, her blood pressure is mildly elevated.  She does report some elevations at home as well.  Will currently add the propranolol.  If blood pressure decreases and we need to titrate up, " anticipate that we may decrease her losartan.  - propranolol (INDERAL) 20 MG tablet; Take 1 tablet (20 mg) by mouth 2 times daily    Bradycardia  Discussed propranolol may lower this as well.  At this time, I am most concerned for this.  She does relate that her home blood pressure cuff can monitor heart rate.  Encouraged her to follow this as well.  She will write these numbers down as well.  Also discussed that it may be good to see if her heart rate goes up with exercise.  Discussed some of the potential symptoms to watch for.  As above, anticipate seeing him again in 7-10 days.        She would like to follow up in the clinic fairly closely.  At this time, will have her return in 7-10 days.    Scarlett Spencer MD, MD  [facility]

## 2018-04-12 ENCOUNTER — TELEPHONE (OUTPATIENT)
Dept: FAMILY MEDICINE | Facility: CLINIC | Age: 78
End: 2018-04-12

## 2018-04-12 NOTE — TELEPHONE ENCOUNTER
That would be fine...     We don't want to continue a treatment that is worse than what we are treating....     Has her head stopped shaking? This is the reason they want her to be on it for the MRI

## 2018-04-12 NOTE — TELEPHONE ENCOUNTER
Called the pt.  Advised of below.      She said her head was not shaking that much, so she has a hard time telling.  She said she is sure they will let her know when she goes for the MRI.

## 2018-04-12 NOTE — TELEPHONE ENCOUNTER
Called the pt.  She started the med on Tuesday.  When asked she is a little bit dizzy.  Normally she is not really dizzy.  She said she is drinking lots of fluids.  The dizziness is kind of there all of the time, was really bad after her walk today.      Looks like the pts pulse has been in the 50's before.      She is wondering if after she has her MRI she can be done with the propranolol.  She would like to be done with it after the MRI.

## 2018-04-13 ENCOUNTER — TRANSFERRED RECORDS (OUTPATIENT)
Dept: HEALTH INFORMATION MANAGEMENT | Facility: CLINIC | Age: 78
End: 2018-04-13

## 2018-04-13 DIAGNOSIS — D47.2 MGUS (MONOCLONAL GAMMOPATHY OF UNKNOWN SIGNIFICANCE): ICD-10-CM

## 2018-04-13 LAB
ALBUMIN SERPL-MCNC: 3.9 G/DL (ref 3.4–5)
ALP SERPL-CCNC: 52 U/L (ref 40–150)
ALT SERPL W P-5'-P-CCNC: 30 U/L (ref 0–50)
ANION GAP SERPL CALCULATED.3IONS-SCNC: 8 MMOL/L (ref 3–14)
AST SERPL W P-5'-P-CCNC: 25 U/L (ref 0–45)
BASOPHILS # BLD AUTO: 0 10E9/L (ref 0–0.2)
BASOPHILS NFR BLD AUTO: 0.3 %
BILIRUB SERPL-MCNC: 1 MG/DL (ref 0.2–1.3)
BUN SERPL-MCNC: 23 MG/DL (ref 7–30)
CALCIUM SERPL-MCNC: 9.1 MG/DL (ref 8.5–10.1)
CHLORIDE SERPL-SCNC: 107 MMOL/L (ref 94–109)
CO2 SERPL-SCNC: 27 MMOL/L (ref 20–32)
CREAT SERPL-MCNC: 0.82 MG/DL (ref 0.52–1.04)
DIFFERENTIAL METHOD BLD: ABNORMAL
EOSINOPHIL # BLD AUTO: 0.2 10E9/L (ref 0–0.7)
EOSINOPHIL NFR BLD AUTO: 3.2 %
ERYTHROCYTE [DISTWIDTH] IN BLOOD BY AUTOMATED COUNT: 13 % (ref 10–15)
GFR SERPL CREATININE-BSD FRML MDRD: 67 ML/MIN/1.7M2
GLUCOSE SERPL-MCNC: 107 MG/DL (ref 70–99)
HCT VFR BLD AUTO: 42.8 % (ref 35–47)
HGB BLD-MCNC: 13.8 G/DL (ref 11.7–15.7)
LYMPHOCYTES # BLD AUTO: 1.7 10E9/L (ref 0.8–5.3)
LYMPHOCYTES NFR BLD AUTO: 23.2 %
MCH RBC QN AUTO: 32.5 PG (ref 26.5–33)
MCHC RBC AUTO-ENTMCNC: 32.2 G/DL (ref 31.5–36.5)
MCV RBC AUTO: 101 FL (ref 78–100)
MONOCYTES # BLD AUTO: 0.9 10E9/L (ref 0–1.3)
MONOCYTES NFR BLD AUTO: 12.3 %
NEUTROPHILS # BLD AUTO: 4.6 10E9/L (ref 1.6–8.3)
NEUTROPHILS NFR BLD AUTO: 61 %
PLATELET # BLD AUTO: 265 10E9/L (ref 150–450)
POTASSIUM SERPL-SCNC: 4.4 MMOL/L (ref 3.4–5.3)
PROT SERPL-MCNC: 6.9 G/DL (ref 6.8–8.8)
RBC # BLD AUTO: 4.24 10E12/L (ref 3.8–5.2)
SODIUM SERPL-SCNC: 142 MMOL/L (ref 133–144)
WBC # BLD AUTO: 7.5 10E9/L (ref 4–11)

## 2018-04-13 PROCEDURE — 85025 COMPLETE CBC W/AUTO DIFF WBC: CPT | Performed by: INTERNAL MEDICINE

## 2018-04-13 PROCEDURE — 36415 COLL VENOUS BLD VENIPUNCTURE: CPT | Performed by: INTERNAL MEDICINE

## 2018-04-13 PROCEDURE — 80053 COMPREHEN METABOLIC PANEL: CPT | Performed by: INTERNAL MEDICINE

## 2018-04-13 PROCEDURE — 83883 ASSAY NEPHELOMETRY NOT SPEC: CPT | Performed by: INTERNAL MEDICINE

## 2018-04-13 PROCEDURE — 84165 PROTEIN E-PHORESIS SERUM: CPT | Performed by: INTERNAL MEDICINE

## 2018-04-13 PROCEDURE — 83883 ASSAY NEPHELOMETRY NOT SPEC: CPT | Mod: 59 | Performed by: INTERNAL MEDICINE

## 2018-04-16 LAB
ALBUMIN SERPL ELPH-MCNC: 4 G/DL (ref 3.7–5.1)
ALPHA1 GLOB SERPL ELPH-MCNC: 0.3 G/DL (ref 0.2–0.4)
ALPHA2 GLOB SERPL ELPH-MCNC: 0.8 G/DL (ref 0.5–0.9)
B-GLOBULIN SERPL ELPH-MCNC: 0.7 G/DL (ref 0.6–1)
GAMMA GLOB SERPL ELPH-MCNC: 0.9 G/DL (ref 0.7–1.6)
KAPPA LC UR-MCNC: 1.5 MG/DL (ref 0.33–1.94)
KAPPA LC/LAMBDA SER: 1.72 {RATIO} (ref 0.26–1.65)
LAMBDA LC SERPL-MCNC: 0.87 MG/DL (ref 0.57–2.63)
M PROTEIN SERPL ELPH-MCNC: 0.1 G/DL
PROT PATTERN SERPL ELPH-IMP: ABNORMAL

## 2018-04-19 ENCOUNTER — ONCOLOGY VISIT (OUTPATIENT)
Dept: ONCOLOGY | Facility: CLINIC | Age: 78
End: 2018-04-19
Attending: INTERNAL MEDICINE
Payer: MEDICARE

## 2018-04-19 VITALS
WEIGHT: 133 LBS | HEART RATE: 54 BPM | OXYGEN SATURATION: 100 % | DIASTOLIC BLOOD PRESSURE: 76 MMHG | RESPIRATION RATE: 16 BRPM | BODY MASS INDEX: 21.96 KG/M2 | SYSTOLIC BLOOD PRESSURE: 128 MMHG

## 2018-04-19 DIAGNOSIS — D47.2 MGUS (MONOCLONAL GAMMOPATHY OF UNKNOWN SIGNIFICANCE): Primary | ICD-10-CM

## 2018-04-19 PROCEDURE — 99213 OFFICE O/P EST LOW 20 MIN: CPT | Performed by: INTERNAL MEDICINE

## 2018-04-19 PROCEDURE — G0463 HOSPITAL OUTPT CLINIC VISIT: HCPCS

## 2018-04-19 RX ORDER — DEXLANSOPRAZOLE 60 MG/1
60 CAPSULE, DELAYED RELEASE ORAL 2 TIMES DAILY
COMMUNITY
Start: 2018-03-29 | End: 2021-01-05

## 2018-04-19 ASSESSMENT — PAIN SCALES - GENERAL: PAINLEVEL: NO PAIN (0)

## 2018-04-19 NOTE — PROGRESS NOTES
[facility]    Hematology/Oncology Established Patient Note      Today's Date: 4/19/2018    Reason for Follow-up: Monoclonal gammopathy, macrocytosis      HISTORY OF PRESENT ILLNESS: Ya Stahl is a 77 year old female with PMHx of Sjogren's syndrome, GERD, CAD, who presents with monoclonal gammopathy and macrocytosis.      On chart review, it appears that she has had a high-normal MCV for many years.  In November 2016, it became slightly elevated at 103.  Her hemoglobin, WBC, and platelet count are normal.  Vitamin B12 and folate levels are normal.  She had SPEP checked, which showed M-spike of 0.1 g/dL, ANNA with IgM kappa.        INTERIM HISTORY: Ya comes in for follow-up today.  She says that she feels well.  She tore her right rotator cuff recently.  She also saw a neurologist for a head tremor.  She had an MRI brain; she says that it found age-related changes and an old stroke.      REVIEW OF SYSTEMS:   14 point ROS was reviewed and is negative other than as noted above in HPI.       HOME MEDICATIONS:  Current Outpatient Prescriptions   Medication Sig Dispense Refill     acetaminophen (TYLENOL) 325 MG tablet Take 2 tablets (650 mg) by mouth every 6 hours as needed for mild pain or fever 60 tablet 0     alendronate (FOSAMAX) 70 MG tablet Take 1 tablet (70 mg) by mouth every 7 days Take with over 8 ounces water and stay upright for at least 30 minutes after dose.  Take at least 60 minutes before breakfast 12 tablet 3     aspirin 81 MG EC tablet Take 1 tablet (81 mg) by mouth daily 1 tablet 0     atorvastatin (LIPITOR) 20 MG tablet Take 1 tablet (20 mg) by mouth daily 102 tablet 3     calcium citrate-vitamin D (CITRACAL) 315-250 MG-UNIT TABS per tablet Take 2 tablets in the morning and 1 tablet in the evening 120 tablet      cevimeline (EVOXAC) 30 MG capsule Take 30 mg by mouth 3 times daily Reported on 3/6/2017       CycloSPORINE (RESTASIS OP)        DEXILANT 60 MG CPDR CR capsule         ibuprofen (ADVIL/MOTRIN) 600 MG tablet Take 1 tablet (600 mg) by mouth every 6 hours as needed for moderate pain 60 tablet 0     levothyroxine (SYNTHROID/LEVOTHROID) 100 MCG tablet Take 1 tablet (100 mcg) by mouth daily 90 tablet 3     losartan (COZAAR) 50 MG tablet Take 1 tablet (50 mg) by mouth daily 90 tablet 1     MIRALAX OR POWD STIR 1 CAPFUL (17GM) IN 8 OZ OF LIQUID AND DRINK (Patient taking differently: STIR 1 CAPFUL (17GM) IN 8 OZ OF LIQUID AND DRINK twice daily) 1 BOTTLE 1 YEAR     Misc Natural Products (LUTEIN 20 PO) Take 1 tablet by mouth daily       Misc Natural Products (OSTEO BI-FLEX ADV TRIPLE ST PO) Take 2 tablets by mouth daily        Multiple Vitamins-Minerals (CENTRUM SILVER) per tablet Take 1 tablet by mouth daily       nitroglycerin (NITROSTAT) 0.4 MG SL tablet Place 1 tablet (0.4 mg) under the tongue every 5 minutes as needed for chest pain if you are still having symptoms after 3 doses (15 minutes) call 911. 25 tablet 0     Omega-3 Fatty Acids (OMEGA-3 FISH OIL PO) Take 1 g by mouth 2 times daily (with meals)        propranolol (INDERAL) 20 MG tablet Take 1 tablet (20 mg) by mouth 2 times daily 60 tablet 0     ranitidine (ZANTAC) 300 MG tablet Take by mouth At Bedtime           ALLERGIES:  Allergies   Allergen Reactions     Codeine      fatigue     Vicodin [Acetaminophen] Fatigue         PAST MEDICAL HISTORY:  Past Medical History:   Diagnosis Date     Arthritis      Coronary artery disease      Displacement of lumbar intervertebral disc without myelopathy      Gastro-oesophageal reflux disease      Hearing loss     has bilateral aids     Heart attack 3/2016     History of angina      Hypertension      Low back pain      Sicca syndrome (H)      Sjogren's syndrome (H)     sees specialist; dentist     Unspecified hypothyroidism          PAST SURGICAL HISTORY:  Past Surgical History:   Procedure Laterality Date     ARTHROSCOPY KNEE  10/5/2012    R Procedure: ARTHROSCOPY KNEE;  RIGHT KNEE  ARTHROSCOPY, PARTIAL MEDIAL MENISCECTOMY;  Surgeon: Karli Zaragoza MD;  Location:  [facility]     BUNIONECTOMY  ~2010    L foot.      C NONSPECIFIC PROCEDURE  1976    D&C     CATARACT IOL, RT/LT Bilateral 07/2017     COLONOSCOPY N/A 1/17/2017    normal; no repeat Procedure: COLONOSCOPY;  Surgeon: Fan Giordano MD;  Location:  GI     ENDOSCOPIC RELEASE CARPAL TUNNEL  9/11/2012    R Procedure: ENDOSCOPIC RELEASE CARPAL TUNNEL;  Right Endoscopic carpal tunnel release, left ringer finger cortizon injection;  Surgeon: Anne Marie Catherine MD;  Location:  OR     ESOPHAGOSCOPY, GASTROSCOPY, DUODENOSCOPY (EGD), COMBINED N/A 12/26/2014    Procedure: COMBINED ESOPHAGOSCOPY, GASTROSCOPY, DUODENOSCOPY (EGD);  Surgeon: Fan Giordano MD;  Location:  GI     EXCISE EXOSTOSIS FOOT Left 12/1/2016    Procedure: EXCISE EXOSTOSIS FOOT;  Surgeon: Jody Gallagher, DPM, Pod;  Location:  OR     GYN SURGERY  1978    ovaries removed     HEART CATH, ANGIOPLASTY  3/4/16    dz <40%     HYSTERECTOMY, PAP NO LONGER INDICATED  1977    Hysterectomy; benign     INJECT STEROID (LOCATION)  9/11/2012    Procedure: INJECT STEROID (LOCATION);;  Surgeon: Anne Marie Catherine MD;  Location:  OR     LAPAROSCOPIC CHOLECYSTECTOMY N/A 4/7/2016    Procedure: LAPAROSCOPIC CHOLECYSTECTOMY;  Surgeon: Hans Medina MD;  Location:  OR     ORTHOPEDIC SURGERY  ~2008    Right foot, bunionectomy      RECONSTRUCT FOREFOOT WITH METATARSOPHALANGEAL (MTP) FUSION Left 4/28/2017    Procedure: RECONSTRUCT FOREFOOT WITH METATARSOPHALANGEAL (MTP) FUSION;  1. Resection arthroplasty, fifth metatarsophalangeal joint, left foot.   2. Irrigation and debridement of fifth metatarsophalangeal joint, left foot (excisional to the level of the bone).     ;  Surgeon: Fabián Phipps MD;  Location:  OR     RIGHT HIP ORIF 4/1/2014       ROTATOR CUFF REPAIR RT/LT  ~1998    Lt shoulder; rotator cuff     SURGICAL HISTORY OF -   distant past    ablation for palpitations.      SURGICAL HISTORY OF -   June 2015    left carpal tunnel surgery         SOCIAL HISTORY:  Social History     Social History     Marital status:      Spouse name: N/A     Number of children: N/A     Years of education: N/A     Occupational History     Not on file.     Social History Main Topics     Smoking status: Never Smoker     Smokeless tobacco: Never Used     Alcohol use Yes      Comment: social; maybe a glass of wine or highball per week      Drug use: No     Sexual activity: Yes     Partners: Male     Other Topics Concern     Parent/Sibling W/ Cabg, Mi Or Angioplasty Before 65f 55m? Yes     Caffeine Concern No     1 capp. daily     Sleep Concern No     Special Diet No     Exercise Yes     walks 3 miles 6 days per week , curves 3 days per week     Social History Narrative    Dairy/d 0-1 servings/d.     Caffeine 0 servings/d    Exercise 6 x week walk and curves    Sunscreen used - Yes    Seatbelts used - Yes    Working smoke/CO detectors in the home - Yes    Guns stored in the home - Yes LOCKED    Self Breast Exams - sometimes    Self Testicular Exam - NA    Eye Exam up to date - Yes 4/2009    Dental Exam up to date - Yes 12/2009    Pap Smear up to date - Yes - Hysterectomy    Mammogram up to date - Yes 11/25/2009    PSA up to date - NA    Dexa Scan up to date - 2006    Flex Sig / Colonoscopy up to date - Yes 5/14/2008    Immunizations up to date -9-2007 tetanus    Abuse: Current or Past(Physical, Sexual or Emotional)- No    Do you feel safe in your environment - Yes    [facility]    11/25/2009 updated         FAMILY HISTORY:  Family History   Problem Relation Age of Onset     C.A.D. Mother 76     CANCER Mother      non Hodgekin's Lymphoma     HEART DISEASE Mother      enlarged heart     C.A.D. Father 59     HEART DISEASE Father      valve problem     Hypertension Brother      Hypertension Brother      Connective Tissue Disorder Daughter      scleroderma     Colon Cancer No family hx of           PHYSICAL EXAM:  Vital signs:  /76  Pulse 54  Resp 16  Wt 60.3 kg (133 lb)  SpO2 100%  BMI 21.96 kg/m2   GENERAL/CONSTITUTIONAL: No acute distress.   EYES: No scleral icterus.  NEUROLOGIC: Alert, oriented, answers questions appropriately.  INTEGUMENTARY: No jaundice.      LABS:  CBC RESULTS:   Recent Labs   Lab Test  04/13/18   0749   WBC  7.5   RBC  4.24   HGB  13.8   HCT  42.8   MCV  101*   MCH  32.5   MCHC  32.2   RDW  13.0   PLT  265     Recent Labs   Lab Test  04/13/18   0749  04/02/18   0812  03/13/18   1210   NA  142   --   138   POTASSIUM  4.4   --   3.9   CHLORIDE  107   --   101   CO2  27   --   31   ANIONGAP  8   --   6   GLC  107*  100*  97   BUN  23   --   18   CR  0.82   --   0.70   CARMEN  9.1   --   9.3     Lab Results   Component Value Date    AST 25 04/13/2018     Lab Results   Component Value Date    ALT 30 04/13/2018     No results found for: BILICONJ   Lab Results   Component Value Date    BILITOTAL 1.0 04/13/2018     Lab Results   Component Value Date    ALBUMIN 3.9 04/13/2018     Lab Results   Component Value Date    PROTTOTAL 6.9 04/13/2018      Lab Results   Component Value Date    ALKPHOS 52 04/13/2018       SPEP:  M-spike (12/13/16): 0.1 g/dL IgM kappa  4/27/17: 0.1 g/dL  10/12/17: 0.1 g/dL  4/19/18: 0.1 g/dL      Skeletal survey 1/13/17:  No convincing osseous lytic lesions to suggest multiple  myeloma.       ASSESSMENT/PLAN:  Ya Stahl is a 77 year old female with:    1) MGUS: SPEP with 0.1 g/dL M-spike, IgM kappa.  Normal calcium level, renal function, normal counts.  No evidence of lytic lesions on skeletal survey.    Her M-spike continues to be low and stable at 0.1 g/dL.    Will continue to monitor for now.  -RTC in 1 year with repeat labs        I spent a total of 15 minutes with the patient, with over >50% of the time in counseling and/or coordination of care.       Vandana Kasper MD  Hematology/Oncology  [facility]

## 2018-04-19 NOTE — NURSING NOTE
"Oncology Rooming Note    April 19, 2018 2:59 PM   Ya Stahl is a 77 year old female who presents for:    Chief Complaint   Patient presents with     Oncology Clinic Visit     Follow up - MGUS (monoclonal gammopathy of unknown significance)     Initial Vitals: Wt 60.3 kg (133 lb)  BMI 21.96 kg/m2 Estimated body mass index is 21.96 kg/(m^2) as calculated from the following:    Height as of 4/10/18: 1.657 m (5' 5.25\").    Weight as of this encounter: 60.3 kg (133 lb). Body surface area is 1.67 meters squared.  No Pain (0) Comment: Data Unavailable   No LMP recorded. Patient has had a hysterectomy.  Allergies reviewed: Yes  Medications reviewed: Yes    Medications: Medication refills not needed today.  Pharmacy name entered into gAuto:    [facility] - [facility] [place] - [address]  [facility] - [place], [place] - [address]    Clinical concerns: Follow up      8 minutes for nursing intake (face to face time)     Amy Aguayo CMA      DISCHARGE PLAN:  Next appointments: See patient instruction section  Departure Mode: Ambulatory  Accompanied by: self  0 minutes for nursing discharge (face to face time)   Amy Aguayo CMA                "

## 2018-04-19 NOTE — MR AVS SNAPSHOT
After Visit Summary   4/19/2018    Ya Stahl    MRN: [id number]           Patient Information     Date Of Birth          1940        Visit Information        Provider Department      4/19/2018 3:15 PM Vandana Kasper MD [facility]      Today's Diagnoses     MGUS (monoclonal gammopathy of unknown significance)    -  1      Care Instructions    Return to clinic in 1 year with labs a week prior Scheduled  Yue AVILESS given to patient            Follow-ups after your visit        Your next 10 appointments already scheduled     Apr 23, 2018 10:50 AM CDT   Office Visit with Scarlett Spencer MD   [facility]    [address] [facility] [address] [phone]           Bring a current list of meds and any records pertaining to this visit. For Physicals, please bring immunization records and any forms needing to be filled out. Please arrive 10 minutes early to complete paperwork.            Apr 11, 2019  8:00 AM CDT   LAB with  LAB   [facility]    [address] [facility] [address] [phone]           Please do not eat 10-12 hours before your appointment if you are coming in fasting for labs on lipids, cholesterol, or glucose (sugar). This does not apply to pregnant women. Water, hot tea and black coffee (with nothing added) are okay. Do not drink other fluids, diet soda or chew gum.            Apr 18, 2019  2:15 PM CDT   Return Visit with Vandana Kasper MD   [facility] ([facility])    [facility]  [address]  [facility] [address]   [phone]              Future tests that were ordered for you today     Open Future Orders        Priority Expected Expires Ordered    Comprehensive metabolic panel Routine 4/19/2019 4/19/2019 4/19/2018    Kappa  "and lambda light chain Routine 2019    Protein electrophoresis Routine 2019    CBC with platelets differential Routine 2019            Who to contact     If you have questions or need follow up information about today's clinic visit or your schedule please contact [facility] directly at [phone].  Normal or non-critical lab and imaging results will be communicated to you by ganttohart, letter or phone within 4 business days after the clinic has received the results. If you do not hear from us within 7 days, please contact the clinic through Vistronixt or phone. If you have a critical or abnormal lab result, we will notify you by phone as soon as possible.  Submit refill requests through ModaMi or call your pharmacy and they will forward the refill request to us. Please allow 3 business days for your refill to be completed.          Additional Information About Your Visit        ModaMi Information     ModaMi lets you send messages to your doctor, view your test results, renew your prescriptions, schedule appointments and more. To sign up, go to [email or web address] . Click on \"Log in\" on the left side of the screen, which will take you to the Welcome page. Then click on \"Sign up Now\" on the right side of the page.     You will be asked to enter the access code listed below, as well as some personal information. Please follow the directions to create your username and password.     Your access code is: [email or web address]  Expires: 2018  6:23 PM     Your access code will  in 90 days. If you need help or a new code, please call your [place] clinic or [phone].        Care EveryWhere ID     This is your Care EveryWhere ID. This could be used by other organizations to access your [place] medical records  [id number]        Your Vitals Were     Pulse Respirations Pulse Oximetry BMI (Body Mass Index)          " 54 16 100% 21.96 kg/m2         Blood Pressure from Last 3 Encounters:   04/19/18 128/76   04/10/18 150/76   04/02/18 124/70    Weight from Last 3 Encounters:   04/19/18 60.3 kg (133 lb)   04/10/18 60.7 kg (133 lb 12.8 oz)   03/13/18 (P) 61.1 kg (134 lb 12.8 oz)                 Today's Medication Changes          These changes are accurate as of 4/19/18  3:25 PM.  If you have any questions, ask your nurse or doctor.               These medicines have changed or have updated prescriptions.        Dose/Directions    MIRALAX powder   This may have changed:  See the new instructions.   Used for:  Chronic constipation   Generic drug:  polyethylene glycol        STIR 1 CAPFUL (17GM) IN 8 OZ OF LIQUID AND DRINK   Quantity:  1 BOTTLE   Refills:  1 YEAR         Stop taking these medicines if you haven't already. Please contact your care team if you have questions.     LANsoprazole 30 MG CR capsule   Commonly known as:  PREVACID   Stopped by:  Vandana Kasper MD                    Primary Care Provider Office Phone # Fax #    Scarlett Spencer [phone] [address] [facility] [address]        Equal Access to Services     KEI STALLWORTH AH: Hadcinda hensono Soomkar, waaxda luqadaha, qaybta kaalmada adeegyada, fritz tavarez. So [facility] [phone].    ATENCIÓN: Si habla español, tiene a costa disposición servicios gratuitos de asistencia lingüística. Llame al [phone].    We comply with applicable federal civil rights laws and [place] laws. We do not discriminate on the basis of race, color, national origin, age, disability, sex, sexual orientation, or gender identity.            Thank you!     Thank you for choosing [facility]  for your care. Our goal is always to provide you with excellent care. Hearing back from our patients is one way we can continue to improve our services. Please take a few minutes to complete the written survey  that you may receive in the mail after your visit with us. Thank you!             Your Updated Medication List - Protect others around you: Learn how to safely use, store and throw away your medicines at www.disposemymeds.org.          This list is accurate as of 4/19/18  3:25 PM.  Always use your most recent med list.                   Brand Name Dispense Instructions for use Diagnosis    acetaminophen 325 MG tablet    TYLENOL    60 tablet    Take 2 tablets (650 mg) by mouth every 6 hours as needed for mild pain or fever    Avascular necrosis of bone (H)       alendronate 70 MG tablet    FOSAMAX    12 tablet    Take 1 tablet (70 mg) by mouth every 7 days Take with over 8 ounces water and stay upright for at least 30 minutes after dose.  Take at least 60 minutes before breakfast    Osteoporosis, unspecified osteoporosis type, unspecified pathological fracture presence       aspirin 81 MG EC tablet     1 tablet    Take 1 tablet (81 mg) by mouth daily        atorvastatin 20 MG tablet    LIPITOR    102 tablet    Take 1 tablet (20 mg) by mouth daily    Hyperlipidemia LDL goal <70       calcium citrate-vitamin D 315-250 MG-UNIT Tabs per tablet    CITRACAL    120 tablet    Take 2 tablets in the morning and 1 tablet in the evening    Osteoporosis       CENTRUM SILVER per tablet      Take 1 tablet by mouth daily        cevimeline 30 MG capsule    EVOXAC     Take 30 mg by mouth 3 times daily Reported on 3/6/2017        DEXILANT 60 MG Cpdr CR capsule   Generic drug:  dexlansoprazole       MGUS (monoclonal gammopathy of unknown significance)       ibuprofen 600 MG tablet    ADVIL/MOTRIN    60 tablet    Take 1 tablet (600 mg) by mouth every 6 hours as needed for moderate pain    Avascular necrosis of bone (H)       levothyroxine 100 MCG tablet    SYNTHROID/LEVOTHROID    90 tablet    Take 1 tablet (100 mcg) by mouth daily    Hypothyroidism, unspecified type       losartan 50 MG tablet    COZAAR    90 tablet    Take 1 tablet (50  mg) by mouth daily    Hypertension goal BP (blood pressure) < 140/90       MIRALAX powder   Generic drug:  polyethylene glycol     1 BOTTLE    STIR 1 CAPFUL (17GM) IN 8 OZ OF LIQUID AND DRINK    Chronic constipation       nitroGLYcerin 0.4 MG sublingual tablet    NITROSTAT    25 tablet    Place 1 tablet (0.4 mg) under the tongue every 5 minutes as needed for chest pain if you are still having symptoms after 3 doses (15 minutes) call 911.    Acute chest pain       OMEGA-3 FISH OIL PO      Take 1 g by mouth 2 times daily (with meals)        * OSTEO BI-FLEX ADV TRIPLE ST PO      Take 2 tablets by mouth daily        * LUTEIN 20 PO      Take 1 tablet by mouth daily        propranolol 20 MG tablet    INDERAL    60 tablet    Take 1 tablet (20 mg) by mouth 2 times daily    Titubation, Tremor, Hypertension goal BP (blood pressure) < 140/90       ranitidine 300 MG tablet    ZANTAC     Take by mouth At Bedtime        RESTASIS OP           * Notice:  This list has 2 medication(s) that are the same as other medications prescribed for you. Read the directions carefully, and ask your doctor or other care provider to review them with you.

## 2018-04-19 NOTE — LETTER
4/19/2018         RE: Ya Stahl  [address]  [facility] [address]        Dear Colleague,    Thank you for referring your patient, Ya Stahl, to the [facility]. Please see a copy of my visit note below.    [facility]    Hematology/Oncology Established Patient Note      Today's Date: 4/19/2018    Reason for Follow-up: Monoclonal gammopathy, macrocytosis      HISTORY OF PRESENT ILLNESS: Ya Stahl is a 77 year old female with PMHx of Sjogren's syndrome, GERD, CAD, who presents with monoclonal gammopathy and macrocytosis.      On chart review, it appears that she has had a high-normal MCV for many years.  In November 2016, it became slightly elevated at 103.  Her hemoglobin, WBC, and platelet count are normal.  Vitamin B12 and folate levels are normal.  She had SPEP checked, which showed M-spike of 0.1 g/dL, ANNA with IgM kappa.        INTERIM HISTORY: Ya comes in for follow-up today.  She says that she feels well.  She tore her right rotator cuff recently.  She also saw a neurologist for a head tremor.  She had an MRI brain; she says that it found age-related changes and an old stroke.      REVIEW OF SYSTEMS:   14 point ROS was reviewed and is negative other than as noted above in HPI.       HOME MEDICATIONS:  Current Outpatient Prescriptions   Medication Sig Dispense Refill     acetaminophen (TYLENOL) 325 MG tablet Take 2 tablets (650 mg) by mouth every 6 hours as needed for mild pain or fever 60 tablet 0     alendronate (FOSAMAX) 70 MG tablet Take 1 tablet (70 mg) by mouth every 7 days Take with over 8 ounces water and stay upright for at least 30 minutes after dose.  Take at least 60 minutes before breakfast 12 tablet 3     aspirin 81 MG EC tablet Take 1 tablet (81 mg) by mouth daily 1 tablet 0     atorvastatin (LIPITOR) 20 MG tablet Take 1 tablet (20 mg) by mouth daily 102 tablet 3     calcium citrate-vitamin D  (CITRACAL) 315-250 MG-UNIT TABS per tablet Take 2 tablets in the morning and 1 tablet in the evening 120 tablet      cevimeline (EVOXAC) 30 MG capsule Take 30 mg by mouth 3 times daily Reported on 3/6/2017       CycloSPORINE (RESTASIS OP)        DEXILANT 60 MG CPDR CR capsule        ibuprofen (ADVIL/MOTRIN) 600 MG tablet Take 1 tablet (600 mg) by mouth every 6 hours as needed for moderate pain 60 tablet 0     levothyroxine (SYNTHROID/LEVOTHROID) 100 MCG tablet Take 1 tablet (100 mcg) by mouth daily 90 tablet 3     losartan (COZAAR) 50 MG tablet Take 1 tablet (50 mg) by mouth daily 90 tablet 1     MIRALAX OR POWD STIR 1 CAPFUL (17GM) IN 8 OZ OF LIQUID AND DRINK (Patient taking differently: STIR 1 CAPFUL (17GM) IN 8 OZ OF LIQUID AND DRINK twice daily) 1 BOTTLE 1 YEAR     Misc Natural Products (LUTEIN 20 PO) Take 1 tablet by mouth daily       Misc Natural Products (OSTEO BI-FLEX ADV TRIPLE ST PO) Take 2 tablets by mouth daily        Multiple Vitamins-Minerals (CENTRUM SILVER) per tablet Take 1 tablet by mouth daily       nitroglycerin (NITROSTAT) 0.4 MG SL tablet Place 1 tablet (0.4 mg) under the tongue every 5 minutes as needed for chest pain if you are still having symptoms after 3 doses (15 minutes) call 911. 25 tablet 0     Omega-3 Fatty Acids (OMEGA-3 FISH OIL PO) Take 1 g by mouth 2 times daily (with meals)        propranolol (INDERAL) 20 MG tablet Take 1 tablet (20 mg) by mouth 2 times daily 60 tablet 0     ranitidine (ZANTAC) 300 MG tablet Take by mouth At Bedtime           ALLERGIES:  Allergies   Allergen Reactions     Codeine      fatigue     Vicodin [Acetaminophen] Fatigue         PAST MEDICAL HISTORY:  Past Medical History:   Diagnosis Date     Arthritis      Coronary artery disease      Displacement of lumbar intervertebral disc without myelopathy      Gastro-oesophageal reflux disease      Hearing loss     has bilateral aids     Heart attack 3/2016     History of angina      Hypertension      Low back  pain      Sicca syndrome (H)      Sjogren's syndrome (H)     sees specialist; dentist     Unspecified hypothyroidism          PAST SURGICAL HISTORY:  Past Surgical History:   Procedure Laterality Date     ARTHROSCOPY KNEE  10/5/2012    R Procedure: ARTHROSCOPY KNEE;  RIGHT KNEE ARTHROSCOPY, PARTIAL MEDIAL MENISCECTOMY;  Surgeon: Karli Zaragoza MD;  Location: [facility]     BUNIONECTOMY  ~2010    L foot.      C NONSPECIFIC PROCEDURE  1976    D&C     CATARACT IOL, RT/LT Bilateral 07/2017     COLONOSCOPY N/A 1/17/2017    normal; no repeat Procedure: COLONOSCOPY;  Surgeon: Fan Giordano MD;  Location:  GI     ENDOSCOPIC RELEASE CARPAL TUNNEL  9/11/2012    R Procedure: ENDOSCOPIC RELEASE CARPAL TUNNEL;  Right Endoscopic carpal tunnel release, left ringer finger cortizon injection;  Surgeon: Anne Marie Catherine MD;  Location:  OR     ESOPHAGOSCOPY, GASTROSCOPY, DUODENOSCOPY (EGD), COMBINED N/A 12/26/2014    Procedure: COMBINED ESOPHAGOSCOPY, GASTROSCOPY, DUODENOSCOPY (EGD);  Surgeon: Fan Giordano MD;  Location:  GI     EXCISE EXOSTOSIS FOOT Left 12/1/2016    Procedure: EXCISE EXOSTOSIS FOOT;  Surgeon: Jody Gallagher, DPM, Pod;  Location:  OR     GYN SURGERY  1978    ovaries removed     HEART CATH, ANGIOPLASTY  3/4/16    dz <40%     HYSTERECTOMY, PAP NO LONGER INDICATED  1977    Hysterectomy; benign     INJECT STEROID (LOCATION)  9/11/2012    Procedure: INJECT STEROID (LOCATION);;  Surgeon: Anne Marie Catherine MD;  Location:  OR     LAPAROSCOPIC CHOLECYSTECTOMY N/A 4/7/2016    Procedure: LAPAROSCOPIC CHOLECYSTECTOMY;  Surgeon: Hans Medina MD;  Location:  OR     ORTHOPEDIC SURGERY  ~2008    Right foot, bunionectomy      RECONSTRUCT FOREFOOT WITH METATARSOPHALANGEAL (MTP) FUSION Left 4/28/2017    Procedure: RECONSTRUCT FOREFOOT WITH METATARSOPHALANGEAL (MTP) FUSION;  1. Resection arthroplasty, fifth metatarsophalangeal joint, left foot.   2. Irrigation and debridement of fifth metatarsophalangeal joint,  left foot (excisional to the level of the bone).     ;  Surgeon: Fabián Phipps MD;  Location: [facility] OR     RIGHT HIP ORIF 4/1/2014       ROTATOR CUFF REPAIR RT/LT  ~1998    Lt shoulder; rotator cuff     SURGICAL HISTORY OF -   distant past    ablation for palpitations.     SURGICAL HISTORY OF -   June 2015    left carpal tunnel surgery         SOCIAL HISTORY:  Social History     Social History     Marital status:      Spouse name: N/A     Number of children: N/A     Years of education: N/A     Occupational History     Not on file.     Social History Main Topics     Smoking status: Never Smoker     Smokeless tobacco: Never Used     Alcohol use Yes      Comment: social; maybe a glass of wine or highball per week      Drug use: No     Sexual activity: Yes     Partners: Male     Other Topics Concern     Parent/Sibling W/ Cabg, Mi Or Angioplasty Before 65f 55m? Yes     Caffeine Concern No     1 capp. daily     Sleep Concern No     Special Diet No     Exercise Yes     walks 3 miles 6 days per week , curves 3 days per week     Social History Narrative    Dairy/d 0-1 servings/d.     Caffeine 0 servings/d    Exercise 6 x week walk and curves    Sunscreen used - Yes    Seatbelts used - Yes    Working smoke/CO detectors in the home - Yes    Guns stored in the home - Yes LOCKED    Self Breast Exams - sometimes    Self Testicular Exam - NA    Eye Exam up to date - Yes 4/2009    Dental Exam up to date - Yes 12/2009    Pap Smear up to date - Yes - Hysterectomy    Mammogram up to date - Yes 11/25/2009    PSA up to date - NA    Dexa Scan up to date - 2006    Flex Sig / Colonoscopy up to date - Yes 5/14/2008    Immunizations up to date -9-2007 tetanus    Abuse: Current or Past(Physical, Sexual or Emotional)- No    Do you feel safe in your environment - Yes    DAMION Shaffer CMA    11/25/2009 updated         FAMILY HISTORY:  Family History   Problem Relation Age of Onset     C.A.D. Mother 76     CANCER Mother      non  Hodgekin's Lymphoma     HEART DISEASE Mother      enlarged heart     C.A.D. Father 59     HEART DISEASE Father      valve problem     Hypertension Brother      Hypertension Brother      Connective Tissue Disorder Daughter      scleroderma     Colon Cancer No family hx of          PHYSICAL EXAM:  Vital signs:  /76  Pulse 54  Resp 16  Wt 60.3 kg (133 lb)  SpO2 100%  BMI 21.96 kg/m2   GENERAL/CONSTITUTIONAL: No acute distress.   EYES: No scleral icterus.  NEUROLOGIC: Alert, oriented, answers questions appropriately.  INTEGUMENTARY: No jaundice.      LABS:  CBC RESULTS:   Recent Labs   Lab Test  04/13/18   0749   WBC  7.5   RBC  4.24   HGB  13.8   HCT  42.8   MCV  101*   MCH  32.5   MCHC  32.2   RDW  13.0   PLT  265     Recent Labs   Lab Test  04/13/18   0749  04/02/18   0812  03/13/18   1210   NA  142   --   138   POTASSIUM  4.4   --   3.9   CHLORIDE  107   --   101   CO2  27   --   31   ANIONGAP  8   --   6   GLC  107*  100*  97   BUN  23   --   18   CR  0.82   --   0.70   CARMEN  9.1   --   9.3     Lab Results   Component Value Date    AST 25 04/13/2018     Lab Results   Component Value Date    ALT 30 04/13/2018     No results found for: BILICONJ   Lab Results   Component Value Date    BILITOTAL 1.0 04/13/2018     Lab Results   Component Value Date    ALBUMIN 3.9 04/13/2018     Lab Results   Component Value Date    PROTTOTAL 6.9 04/13/2018      Lab Results   Component Value Date    ALKPHOS 52 04/13/2018       SPEP:  M-spike (12/13/16): 0.1 g/dL IgM kappa  4/27/17: 0.1 g/dL  10/12/17: 0.1 g/dL  4/19/18: 0.1 g/dL      Skeletal survey 1/13/17:  No convincing osseous lytic lesions to suggest multiple  myeloma.       ASSESSMENT/PLAN:  Ya Stahl is a 77 year old female with:    1) MGUS: SPEP with 0.1 g/dL M-spike, IgM kappa.  Normal calcium level, renal function, normal counts.  No evidence of lytic lesions on skeletal survey.    Her M-spike continues to be low and stable at 0.1 g/dL.    Will continue to  monitor for now.  -RTC in 1 year with repeat labs        I spent a total of 15 minutes with the patient, with over >50% of the time in counseling and/or coordination of care.       Vandana Kasper MD  Hematology/Oncology  [facility]        Again, thank you for allowing me to participate in the care of your patient.        Sincerely,        Vandana Kasper MD

## 2018-05-01 ENCOUNTER — TRANSFERRED RECORDS (OUTPATIENT)
Dept: HEALTH INFORMATION MANAGEMENT | Facility: CLINIC | Age: 78
End: 2018-05-01

## 2018-05-11 DIAGNOSIS — E03.9 HYPOTHYROIDISM, UNSPECIFIED TYPE: ICD-10-CM

## 2018-05-11 NOTE — TELEPHONE ENCOUNTER
"Requested Prescriptions   Pending Prescriptions Disp Refills     levothyroxine (SYNTHROID/LEVOTHROID) 88 MCG tablet [Pharmacy Med Name: LEVOTHYROXINE SODIUM 88MCG TABS]  Last Written Prescription Date:  2/20/18  Last Fill Quantity: 90,  # refills: 3   Last office visit: 4/10/2018 with prescribing provider:  4/10/2018     Future Office Visit:   Next 5 appointments (look out 90 days)     May 22, 2018  9:00 AM CDT   Office Visit with Alice Whitfield [facility] ([facility]    [address] [address] [phone]                  102 tablet 0     Sig: TAKE ONE TABLET BY MOUTH EVERY DAY    Thyroid Protocol Passed    5/11/2018  4:06 PM       Passed - Patient is 12 years or older       Passed - Recent (12 mo) or future (30 days) visit within the authorizing provider's specialty    Patient had office visit in the last 12 months or has a visit in the next 30 days with authorizing provider or within the authorizing provider's specialty.  See \"Patient Info\" tab in inbasket, or \"Choose Columns\" in Meds & Orders section of the refill encounter.           Passed - Normal TSH on file in past 12 months    Recent Labs   Lab Test  04/02/18   0812   TSH  0.79             Passed - No active pregnancy on record    If patient is pregnant or has had a positive pregnancy test, please check TSH.         Passed - No positive pregnancy test in past 12 months    If patient is pregnant or has had a positive pregnancy test, please check TSH.            "

## 2018-05-15 RX ORDER — LEVOTHYROXINE SODIUM 88 UG/1
TABLET ORAL
Qty: 102 TABLET | Refills: 3 | Status: SHIPPED | OUTPATIENT
Start: 2018-05-15 | End: 2018-12-30 | Stop reason: DRUGHIGH

## 2018-05-15 NOTE — TELEPHONE ENCOUNTER
Patient reports she is taking 88mcg. No current concerns or change in brand.    Routing refill request to provider for review/approval because:  PCP please confirm dosage change    Liane MUSTAFA RN, BSN, PHN  [facility] RN

## 2018-05-22 ENCOUNTER — OFFICE VISIT (OUTPATIENT)
Dept: PHARMACY | Facility: CLINIC | Age: 78
End: 2018-05-22
Payer: COMMERCIAL

## 2018-05-22 VITALS
DIASTOLIC BLOOD PRESSURE: 77 MMHG | BODY MASS INDEX: 21.96 KG/M2 | WEIGHT: 133 LBS | HEART RATE: 55 BPM | SYSTOLIC BLOOD PRESSURE: 133 MMHG

## 2018-05-22 DIAGNOSIS — E63.9 NUTRITIONAL DEFICIENCY: ICD-10-CM

## 2018-05-22 DIAGNOSIS — I10 ESSENTIAL HYPERTENSION: Primary | ICD-10-CM

## 2018-05-22 DIAGNOSIS — H04.123 DRY EYES: ICD-10-CM

## 2018-05-22 DIAGNOSIS — E78.5 HYPERLIPIDEMIA LDL GOAL <70: ICD-10-CM

## 2018-05-22 DIAGNOSIS — K21.9 GASTROESOPHAGEAL REFLUX DISEASE WITHOUT ESOPHAGITIS: ICD-10-CM

## 2018-05-22 DIAGNOSIS — K59.00 CONSTIPATION, UNSPECIFIED CONSTIPATION TYPE: ICD-10-CM

## 2018-05-22 DIAGNOSIS — R25.1 TREMOR: ICD-10-CM

## 2018-05-22 DIAGNOSIS — M19.90 ARTHRITIS PAIN: ICD-10-CM

## 2018-05-22 DIAGNOSIS — Z71.85 VACCINE COUNSELING: ICD-10-CM

## 2018-05-22 DIAGNOSIS — G47.00 INSOMNIA, UNSPECIFIED TYPE: ICD-10-CM

## 2018-05-22 DIAGNOSIS — M81.0 AGE-RELATED OSTEOPOROSIS WITHOUT CURRENT PATHOLOGICAL FRACTURE: ICD-10-CM

## 2018-05-22 DIAGNOSIS — E03.9 HYPOTHYROIDISM, UNSPECIFIED TYPE: ICD-10-CM

## 2018-05-22 DIAGNOSIS — M35.00 SJOGREN'S SYNDROME, WITH UNSPECIFIED ORGAN INVOLVEMENT (H): ICD-10-CM

## 2018-05-22 DIAGNOSIS — R07.9 ACUTE CHEST PAIN: ICD-10-CM

## 2018-05-22 PROCEDURE — 99607 MTMS BY PHARM ADDL 15 MIN: CPT | Performed by: PHARMACIST

## 2018-05-22 PROCEDURE — 99605 MTMS BY PHARM NP 15 MIN: CPT | Performed by: PHARMACIST

## 2018-05-22 RX ORDER — NAPROXEN SODIUM 220 MG
220 TABLET ORAL 2 TIMES DAILY WITH MEALS
COMMUNITY
End: 2018-10-17

## 2018-05-22 RX ORDER — NITROGLYCERIN 0.4 MG/1
0.4 TABLET SUBLINGUAL EVERY 5 MIN PRN
Qty: 25 TABLET | Refills: 0 | Status: SHIPPED | OUTPATIENT
Start: 2018-05-22 | End: 2023-02-16

## 2018-05-22 RX ORDER — NITROGLYCERIN 0.4 MG/1
0.4 TABLET SUBLINGUAL EVERY 5 MIN PRN
Qty: 25 TABLET | Refills: 0 | Status: SHIPPED | OUTPATIENT
Start: 2018-05-22 | End: 2018-05-22

## 2018-05-22 NOTE — PATIENT INSTRUCTIONS
Recommendations from today's MTM visit:                                                      Osteoporosis:  Increase calcium to 2 tablets in the AM (600mg) and 1 tablet (300) in the evening for total of 900mg per day.  Due for DEXA scan 2019, this will be 5 years of alendronate therapy.    Supplements:  Stop multivitamin.    Chest pain:  Refill nitroglycerin,     Sleep:  Can try melatonin 6mg, 2 of the 3mg tablets, for sleep    High Blood Pressure:  Take losartan at night    Vaccines:  Consider Shingrix vaccine    Next MTM/pharmacist visit: 1 year    To schedule another MTM appointment, please call the clinic directly or you may call the MTM scheduling line at [phone] or toll-free at [phone].     My Clinical Pharmacist's contact information:                                                      It was a pleasure seeing you today!  Please feel free to contact me with any questions or concerns you have.      Alice Whitfield, PharmD [facility]  Medication Therapy Management Practitioner   #[phone]    You may receive a survey about the MTM services you received.  I would appreciate your feedback to help me serve you better in the future. Please fill it out and return it when you can. Your comments will be anonymous.

## 2018-05-22 NOTE — LETTER
"     [facility]     Date: 2018    Ya Stahl  [address]  [place] [id number]    Dear Ms. Stahl,    Thank you for talking with me on 2018 about your health and medications. Medicare s MTM (Medication Therapy Management) program helps you understand your medications and use them safely.      This letter includes an action plan (Medication Action Plan) and medication list (Personal Medication List). The action plan has steps you should take to help you get the best results from your medications. The medication list will help you keep track of your medications and how to use them the right way.       Have your action plan and medication list with you when you talk with your doctors, pharmacists, and other healthcare providers in your care team.     Ask your doctors, pharmacists, and other healthcare providers to update the action plan and medication list at every visit.     Take your medication list with you if you go to the hospital or emergency room.     Give a copy of the action plan and medication list to your family or caregivers.     If you want to talk about this letter or any of the papers with it, please call   [phone].   We look forward to working with you, your doctors, and other healthcare providers to help you stay healthy.     Sincerely,    Alice Whitfield      Enclosed: Medication Action Plan and Personal Medication List    MEDICATION ACTION PLAN FOR Ya Stahl,  1940     This action plan will help you get the best results from your medications if you:   1. Read \"What we talked about.\"   2. Take the steps listed in the \"What I need to do\" boxes.   3. Fill in \"What I did and when I did it.\"   4. Fill in \"My follow-up plan\" and \"Questions I want to ask.\"     Have this action plan with you when you talk with your doctors, pharmacists, and other healthcare providers in your care team. Share this with your family or caregivers " too.  DATE PREPARED: 2018  What we talked about: Increasing your calcium amount                                                  What I need to do: Take 2 calcium/vitamin D tablets in the AM and 1 in the PM       What I did and when I did it:                                              What we talked about: Multivitamin                                                  What I need to do: Stop multivitamin       What I did and when I did it:                                               What we talked about:  nitroglycerin                                                  What I need to do: refill nitroglycerin       What I did and when I did it:                                               What we talked about: Sleep                                                  What I need to do: Increase melatonin to 6mg at night to see if helps you fall asleep       What I did and when I did it:                                               What we talked about: Blood pressure                                                  What I need to do: Move losartan at night for best effect       What I did and when I did it:                                               What we talked about: Vaccines                                                  What I need to do: Get the Shingrix vaccine at your pharmacy       What I did and when I did it:                                                         My follow-up plan:                 Questions I want to ask:              If you have any questions about your action plan, call Alice Whitfield, Phone: [phone] , Monday-Friday 8-4:30pm.           MEDICATION LIST FOR Ya StahlPRAVEEN 1940     This medication list was made for you after we talked. We also used information from your doctor's chart.      Use blank rows to add new medications. Then fill in the dates you started using them.    Cross out medications when you no longer use them. Then write the date  and why you stopped using them.    Ask your doctors, pharmacists, and other healthcare providers to update this list at every visit. Keep this list up-to-date with:       Prescription medications    Over the counter drugs     Herbals    Vitamins    Minerals      If you go to the hospital or emergency room, take this list with you. Share this with your family or caregivers too.     DATE PREPARED: 5/22/2018  Allergies or side effects: Codeine and Vicodin [acetaminophen]     Medication:  ACETAMINOPHEN 325 MG PO TABS      How I use it:  Take 2 tablets (650 mg) by mouth every 6 hours as needed for mild pain or fever      Why I use it: Avascular necrosis of bone (H)    Prescriber:  Fabián Phipps MD      Date I started using it:       Date I stopped using it:         Why I stopped using it:            Medication:  ALENDRONATE SODIUM 70 MG PO TABS      How I use it:  Take 1 tablet (70 mg) by mouth every 7 days Take with over 8 ounces water and stay upright for at least 30 minutes after dose.  Take at least 60 minutes before breakfast      Why I use it: Osteoporosis, unspecified osteoporosis type, unspecified pathological fracture presence    Prescriber:  Scarlett Spencer MD      Date I started using it:       Date I stopped using it:         Why I stopped using it:            Medication:  ASPIRIN 81 MG PO TBEC      How I use it:  Take 1 tablet (81 mg) by mouth daily      Why I use it:  Prevent heart attack and stroke    Prescriber:  Scarlett Spencer MD      Date I started using it:       Date I stopped using it:         Why I stopped using it:            Medication:  ATORVASTATIN CALCIUM 20 MG PO TABS      How I use it:  Take 1 tablet (20 mg) by mouth daily      Why I use it: Hyperlipidemia LDL goal <70    Prescriber:  Scarlett Spencer MD      Date I started using it:       Date I stopped using it:         Why I stopped using it:            Medication:  CALCIUM CITRATE-VITAMIN D 315-250 MG-UNIT PO TABS      How I use it:   Take 2 tablets in the morning and 1 tablet in the evening      Why I use it: Osteoporosis    Prescriber:  Scarlett Spencer MD      Date I started using it:       Date I stopped using it:         Why I stopped using it:            Medication:  CEVIMELINE HCL 30 MG PO CAPS      How I use it:  Take 30 mg by mouth 3 times daily Reported on 3/6/2017      Why I use it:  Dry mouth    Prescriber:  Patient Reported      Date I started using it:       Date I stopped using it:         Why I stopped using it:            Medication:  CLEAR EYES COMPLETE OP      How I use it:  Apply 1 drop to eye 3 times daily as needed      Why I use it:  Dry eyes    Prescriber:  Patient Reported      Date I started using it:       Date I stopped using it:         Why I stopped using it:            Medication:  DEXILANT 60 MG PO CPDR      How I use it:  60 mg daily       Why I use it:    Hoarse voice  Prescriber:  Patient Reported      Date I started using it:       Date I stopped using it:         Why I stopped using it:            Medication:  HERBALS      How I use it:  Cherry juice 1 tablespoon at night      Why I use it:  Pain    Prescriber:  Patient Reported      Date I started using it:       Date I stopped using it:         Why I stopped using it:            Medication:  IBUPROFEN 600 MG PO TABS      How I use it:  Take 1 tablet (600 mg) by mouth every 6 hours as needed for moderate pain      Why I use it: Pain    Prescriber:  Fabián Phipps MD      Date I started using it:       Date I stopped using it:         Why I stopped using it:            Medication:  LEVOTHYROXINE SODIUM 88 MCG PO TABS      How I use it:  TAKE ONE TABLET BY MOUTH EVERY DAY      Why I use it: Hypothyroidism, unspecified type    Prescriber:  Scarlett Spencer MD      Date I started using it:       Date I stopped using it:         Why I stopped using it:            Medication:  LOSARTAN POTASSIUM 50 MG PO TABS      How I use it:  Take 1 tablet (50 mg) by mouth  daily      Why I use it: Hypertension goal BP (blood pressure) < 140/90    Prescriber:  Scarlett Spencer MD      Date I started using it:       Date I stopped using it:         Why I stopped using it:            Medication:  LUTEIN 20 PO      How I use it:  Take 1 tablet by mouth daily      Why I use it:  Eyes    Prescriber:  Patient Reported      Date I started using it:       Date I stopped using it:         Why I stopped using it:            Medication:  MELATONIN PO      How I use it:  Take 3 mg by mouth At Bedtime      Why I use it:  Sleep    Prescriber:  Patient Reported      Date I started using it:       Date I stopped using it:         Why I stopped using it:            Medication:  MIRALAX OR POWD      How I use it:  STIR 1 CAPFUL (17GM) IN 8 OZ OF LIQUID AND DRINK      Why I use it: Chronic constipation    Prescriber:  Altaf Trotter MD      Date I started using it:       Date I stopped using it:         Why I stopped using it:            Medication:  NAPROXEN SODIUM 220 MG PO TABS      How I use it:  Take 220 mg by mouth 2 times daily (with meals)      Why I use it:  Pain    Prescriber:  Patient Reported      Date I started using it:       Date I stopped using it:         Why I stopped using it:            Medication:  NITROGLYCERIN 0.4 MG SL SUBL      How I use it:  Place 1 tablet (0.4 mg) under the tongue every 5 minutes as needed for chest pain if you are still having symptoms after 3 doses (15 minutes) call 911.      Why I use it: Acute chest pain    Prescriber:  Miguel Padron MD      Date I started using it:       Date I stopped using it:         Why I stopped using it:            Medication:  OMEGA-3 FISH OIL PO      How I use it:  Take 1 g by mouth 2 times daily (with meals)       Why I use it:  Dry eyes    Prescriber:  Patient Reported      Date I started using it:       Date I stopped using it:         Why I stopped using it:            Medication:  OSTEO BI-FLEX ADV TRIPLE ST PO       How I use it:  Take 1 tablet by mouth daily       Why I use it:  Pain    Prescriber:  Patient Reported      Date I started using it:       Date I stopped using it:         Why I stopped using it:            Medication:  RANITIDINE  MG PO TABS      How I use it:  Take 300 mg by mouth At Bedtime       Why I use it:  Hoarse voice    Prescriber:  Patient Reported      Date I started using it:       Date I stopped using it:         Why I stopped using it:            Medication:  RESTASIS OP      How I use it:  Apply 1 drop to eye 2 times daily       Why I use it:  Dry eyes    Prescriber:  Patient Reported      Date I started using it:       Date I stopped using it:         Why I stopped using it:            Medication:  SALONPAS ARTHRITIS PAIN RELIEF EX      How I use it:  Externally apply topically daily as needed      Why I use it:  Pain    Prescriber:  Patient Reported      Date I started using it:       Date I stopped using it:         Why I stopped using it:            Medication:         How I use it:         Why I use it:      Prescriber:         Date I started using it:       Date I stopped using it:         Why I stopped using it:            Medication:         How I use it:         Why I use it:      Prescriber:         Date I started using it:       Date I stopped using it:         Why I stopped using it:            Medication:         How I use it:         Why I use it:      Prescriber:         Date I started using it:       Date I stopped using it:         Why I stopped using it:              Other Information:     If you have any questions about your action plan, call [phone].    According to the Paperwork Reduction Act of 1995, no persons are required to respond to a collection of information unless it displays a valid OMB control number. The valid OMB number for this information collection is [id number]. The time required to complete this information collection is estimated to average 40  minutes per response, including the time to review instructions, searching existing data resources, gather the data needed, and complete and review the information collection. If you have any comments concerning the accuracy of the time estimate(s) or suggestions for improving this form, please write to: CMS, Attn: MANNY Reports Clearance Officer, [address] [place] [address], [place], [place] [address].

## 2018-05-22 NOTE — PROGRESS NOTES
SUBJECTIVE/OBJECTIVE:                           Ya Stahl is a 78 year old female coming in for an initial visit for Medication Therapy Management.  She was referred to me from Dr. Spencer.     Chief Complaint: Taking medication the correct time.    Personal Healthcare Goals: Getting back to walking after sciatic nerve pain for past 2 weeks.    Allergies/ADRs: Reviewed in Epic  Tobacco: No tobacco use  Alcohol: 1-3 beverages / week  Caffeine: 1 cup/day of coffee  Activity: She has a sciatic nerve for the past 2 weeks, but usually walks 2-3 miles every day and goes to [employer] 2-3 times a week  PMH: Reviewed in Epic    Medication Adherence/Access:  Patient uses pill box(es).  Patient takes medications 2 time(s) per day. AM and bedtime.  Per patient, misses medication 0 times per week.   Medication barriers: affording medications. Dexilant is expensive  The patient fills medications at [facility] and [facility]    Hypertension: Current medication includes: losartan 50mg daily. Side effects: none. Blood pressure is 127/78 at home she reports.    Tremor:  Current medication is propranolol, but stopped taking because it was not helpful.    ASCVD: Current medications are aspirin 81mg once daily, atorvastatin 20 mg daily, and nitroglycerin as needed. She reports some bruising.  She is not reporting chest pain.     GERD: Current medications include: Dexilant 60 mg in the morning and ranitidine 300 mg at bedtime. Patient c/o acid burn on throat and hoarse and raspy by the end of the day, but feels that current regimen is somewhat effective. She will continue trying the Delixant for 1 year ($85 every 3 months) to see if helps with her hoarse voice.    Hyperlipidemia: Current therapy includes: atorvastatin 20mg once daily at night.  Patient reports no significant myalgias or other side effects.     Hypothyroidism: Patient is taking levothyroxine 88 mcg daily, takes with baby aspirin and Evoxac. No side effects.   Doing better on 88 mcg versus 100 mcg dose with side effects.  TSH   Date Value Ref Range Status   04/02/2018 0.79 0.40 - 4.00 mU/L Final   ]    Osteoporosis: Current therapy includes: citracal 1 tablet per day and multivitamin (has vitamin D 1,000 IU) and alendronate 70mg once weekly.  Been on since August of 2014. Pt is not experiencing side effects.  Pt is getting the following sources of dietary calcium: 1/2 cup of milk and some cheese or green leafy vegetables.     Last vitamin D level:   Component      Latest Ref Rng 11/29/2010 12/5/2013   25 OH Vit D2       <5    25 OH Vit D3       56    25 OH Vit D total      30 - 75 ug/L <61 . . .    Vitamin D Deficiency screening      30 - 75 ug/L  48     DEXA History: 7/21/2014  Risk factors: recent fracture  chronic PPI use    Vitamins: Current medication are multivitamin, lutein 20mg once daily for eyes/macular degeneration and 1 tablespoon of cherry juice for joints.    Constipation: Miralax twice daily.  She has BM every day. No side effects.    Sjogren's disease: Current medication is cevimeline 30mg 2-3 times a day and works well.  No side effects.    Arthritis Pain:  Current medication is Aleve 1 tablet twice a day, Osteobioflex 1 per day for arthritis, Salonpas topically.  She had PT for shoulder but graduated and had PT for hip surgery.  She has sciatic nerve pain down her right leg.  Had steroid epidural and it did not work.  Had acupuncture last week and seems to be helping.  June 5th appointment again with orthopedic.  Headache-ibuprofen and acetaminophen as needed. No side effects.    Dry eyes: Current medications include: fish oil 1 tablet BID, eye drops-lubricating BID and lutein 20mg QD. She sees eye doctor on regular basis.    Insomnia: Current medications include: melatonin 3mg at bedtime. Patient reports trouble falling asleep and this is not new. No side effects reported    Current labs include:  Today's Vitals: /77  Pulse 55  Wt 133 lb (60.3  kg)  BMI 21.96 kg/m2    BP Readings from Last 3 Encounters:   04/19/18 128/76   04/10/18 150/76   04/02/18 124/70     Lab Results   Component Value Date    A1C 6.3 12/19/2017   .  Lab Results   Component Value Date    CHOL 178 12/19/2017     Lab Results   Component Value Date    TRIG 97 12/19/2017     Lab Results   Component Value Date    HDL 99 12/19/2017     Lab Results   Component Value Date    LDL 60 12/19/2017       Liver Function Studies -   Recent Labs   Lab Test  04/13/18   0749   PROTTOTAL  6.9   ALBUMIN  3.9   BILITOTAL  1.0   ALKPHOS  52   AST  25   ALT  30       Lab Results   Component Value Date    UCRR 83 03/13/2018    MICROL 8 03/13/2018    UMALCR 9.94 03/13/2018       Last Basic Metabolic Panel:  Lab Results   Component Value Date     04/13/2018      Lab Results   Component Value Date    POTASSIUM 4.4 04/13/2018     Lab Results   Component Value Date    CHLORIDE 107 04/13/2018     Lab Results   Component Value Date    BUN 23 04/13/2018     Lab Results   Component Value Date    CR 0.82 04/13/2018     GFR Estimate   Date Value Ref Range Status   04/13/2018 67 >60 mL/min/1.7m2 Final     Comment:     Non  GFR Calc   03/13/2018 81 >60 mL/min/1.7m2 Final     Comment:     Non  GFR Calc   02/01/2018 80 >60 mL/min/1.7m2 Final     Comment:     Non  GFR Calc     GFR Estimate If Black   Date Value Ref Range Status   04/13/2018 81 >60 mL/min/1.7m2 Final     Comment:      GFR Calc   03/13/2018 >90 >60 mL/min/1.7m2 Final     Comment:      GFR Calc   02/01/2018 >90 >60 mL/min/1.7m2 Final     Comment:      GFR Calc       Most Recent Immunizations   Administered Date(s) Administered     HEPA 06/16/1997     Influenza (H1N1) 11/25/2009     Influenza (High Dose) 3 valent vaccine 10/05/2017     Influenza (IIV3) PF 10/07/2014     Pneumo Conj 13-V (2010&after) 12/10/2015     Pneumococcal 23 valent 04/02/2010     TD (ADULT,  7+) 2007     TDAP Vaccine (Adacel) 2012     Zoster vaccine, live 2008       ASSESSMENT:                             Current medications were reviewed today. Medicare Part D topics discussed:OTC products, Reminder to refill/ medication, Health Goal(s), Medication changes and Timing of medication    Medication Adherence/Access: Patient uses pill boxes and does not miss taking medications. Her Dexilant is expensive, but will trial for 1 year.    Hypertension: Needs improvement. Patient's BP today is 133/77 mmHg, which is close to goal of <130/80 mmHg. Patient may benefit from taking losartan at night and continuing exercise after sciatic pain has resolved.    Tremor: unimproved. Patient no longer taking propranolol as it is not helpful.    ASCVD: Needs improvement. Patient's nitroglycerin is , so she would benefit from refilling her prescription.    GERD: Partially improved. Patient would benefit from continuing Dexilant for 1 year, as recommended by ENT.    Hyperlipidemia: Stable, LDL <70    Hypothyroidism: Stable . TSH wnl    Osteoporosis: Needs improvement. Patient would benefit from taking calcium citrate/vitamin D- 2 tablets in the morning and 1 tablet at night to meet RDI of calcium 1200 mg/day and vitamin D 1000 IU/day. She would also benefit from a DEXA scan in 2019 (5 years of alendronate therapy).    Vitamins: Needs improvement. Patient could benefit from discontinuation of multivitamin due to well balanced diet.    Constipation: Stable    Sjogren's disease: Stable    Arthritis Pain: unimproved. Patient is taking Aleve twice a day and Salonpas ointment until her sciatic pain resolves.  She would benefit from continue to work with orthopedic provider and acupuncture.    Dry eyes: Stable    Insomnia: Needs improvement. Patient is taking melatonin 3 mg at bedtime and has trouble falling asleep. She could benefit from increasing melatonin dose to 6 mg at  bedtime.    Vaccines:  Needs improvement.  Pt would benefit from Shingrix per ACIP vaccine guidelines.    PLAN:                            Osteoporosis:  Increase calcium to 2 tablets in the AM (600mg) and 1 tablet (300) in the evening for total of 900mg per day.  Due for DEXA scan 2019, this will be 5 years of alendronate therapy.    Supplements:  Stop multivitamin.    Chest pain:  Refill nitroglycerin,     Sleep:  Can try melatonin 6mg, 2 of the 3mg tablets, for sleep    High Blood Pressure:  Take losartan at night    Vaccines:  Pt to get Shingrix vaccine    Next MTM/pharmacist visit: 1 year    I spent 50 minutes with this patient today (an extra 15 minutes was spent creating the Medication Action Plan). All changes were made via collaborative practice agreement with Scarlett Spencer. A copy of the visit note was provided to the patient's primary care provider.    The patient was given a summary of these recommendations as an after visit summary.     Alice Whitfield, PharmD [facility]  Medication Therapy Management Practitioner   #[phone]

## 2018-05-22 NOTE — MR AVS SNAPSHOT
After Visit Summary   2018    Ya Stahl    MRN: [id number]           Patient Information     Date Of Birth          1940        Visit Information        Provider Department      2018 9:00 AM Alice Whitfield, [facility]        Today's Diagnoses     Acute chest pain          Care Instructions    Recommendations from today's MTM visit:                                                      Osteoporosis:  Increase calcium to 2 tablets in the AM (600mg) and 1 tablet (300) in the evening for total of 900mg per day.  Due for DEXA scan 2019, this will be 5 years of alendronate therapy.    Supplements:  Stop multivitamin.    Chest pain:  Refill nitroglycerin,     Sleep:  Can try melatonin 6mg, 2 of the 3mg tablets, for sleep    High Blood Pressure:  Take losartan at night    Vaccines:  Consider Shingrix vaccine    Next MTM/pharmacist visit: 1 year    To schedule another MTM appointment, please call the clinic directly or you may call the MTM scheduling line at [phone] or toll-free at [phone].     My Clinical Pharmacist's contact information:                                                      It was a pleasure seeing you today!  Please feel free to contact me with any questions or concerns you have.      Alice Whitfield, PharmD [facility]  Medication Therapy Management Practitioner   #[phone]    You may receive a survey about the MT services you received.  I would appreciate your feedback to help me serve you better in the future. Please fill it out and return it when you can. Your comments will be anonymous.                    Follow-ups after your visit        Your next 10 appointments already scheduled     2019  8:00 AM CDT   LAB with  LAB   [facility] ([facility]    [address] [facility] [address] [phone]           Please do not eat 10-12 hours before your appointment  "if you are coming in fasting for labs on lipids, cholesterol, or glucose (sugar). This does not apply to pregnant women. Water, hot tea and black coffee (with nothing added) are okay. Do not drink other fluids, diet soda or chew gum.            2019  2:15 PM CDT   Return Visit with Vandana Kasper MD   [facility] ([facility])    [facility]  [address]  [facility] [id number] [phone]              Who to contact     If you have questions or need follow up information about today's clinic visit or your schedule please contact [facility] directly at [phone].  Normal or non-critical lab and imaging results will be communicated to you by MyChart, letter or phone within 4 business days after the clinic has received the results. If you do not hear from us within 7 days, please contact the clinic through MyChart or phone. If you have a critical or abnormal lab result, we will notify you by phone as soon as possible.  Submit refill requests through [employer] or call your pharmacy and they will forward the refill request to us. Please allow 3 business days for your refill to be completed.          Additional Information About Your Visit        [employer] Information     [employer] lets you send messages to your doctor, view your test results, renew your prescriptions, schedule appointments and more. To sign up, go to [email or web address] . Click on \"Log in\" on the left side of the screen, which will take you to the Welcome page. Then click on \"Sign up Now\" on the right side of the page.     You will be asked to enter the access code listed below, as well as some personal information. Please follow the directions to create your username and password.     Your access code is: [email or web address]  Expires: 2018  9:45 AM     Your access code will  in 90 days. If you need help or a new code, please call your " [facility] or [phone].        Care EveryWhere ID     This is your Care EveryWhere ID. This could be used by other organizations to access your [facility] medical records  [id number]        Your Vitals Were     Pulse BMI (Body Mass Index)                55 21.96 kg/m2           Blood Pressure from Last 3 Encounters:   05/22/18 133/77   04/19/18 128/76   04/10/18 150/76    Weight from Last 3 Encounters:   05/22/18 133 lb (60.3 kg)   04/19/18 133 lb (60.3 kg)   04/10/18 133 lb 12.8 oz (60.7 kg)              Today, you had the following     No orders found for display         Today's Medication Changes          These changes are accurate as of 5/22/18  9:45 AM.  If you have any questions, ask your nurse or doctor.               Start taking these medicines.        Dose/Directions    nitroGLYcerin 0.4 MG sublingual tablet   Commonly known as:  NITROSTAT   Used for:  Acute chest pain        Dose:  0.4 mg   Place 1 tablet (0.4 mg) under the tongue every 5 minutes as needed for chest pain   Quantity:  25 tablet   Refills:  0         These medicines have changed or have updated prescriptions.        Dose/Directions    MIRALAX powder   This may have changed:  See the new instructions.   Used for:  Chronic constipation   Generic drug:  polyethylene glycol        STIR 1 CAPFUL (17GM) IN 8 OZ OF LIQUID AND DRINK   Quantity:  1 BOTTLE   Refills:  1 YEAR            Where to get your medicines      These medications were sent to [facility] [place] [address]  [address] [place] [address]     Phone:  [phone]     nitroGLYcerin 0.4 MG sublingual tablet                Primary Care Provider Office Phone # Fax #    Scarlett Spencer [phone] [address]  [facility] [address]        Equal Access to Services     NEVA STALLWORTH AH: Hadii marquita hensono Soomkar, waaxda luqadaha, qaybta kaalmada adeegyada, fritz tavarez. So [facility]  [phone].    ATENCIÓN: Si angie hdz, tiene a costa disposición servicios gratuitos de asistencia lingüística. Ismael ching [phone].    We comply with applicable federal civil rights laws and [place] laws. We do not discriminate on the basis of race, color, national origin, age, disability, sex, sexual orientation, or gender identity.            Thank you!     Thank you for choosing [facility]  for your care. Our goal is always to provide you with excellent care. Hearing back from our patients is one way we can continue to improve our services. Please take a few minutes to complete the written survey that you may receive in the mail after your visit with us. Thank you!             Your Updated Medication List - Protect others around you: Learn how to safely use, store and throw away your medicines at www.disposemymeds.org.          This list is accurate as of 5/22/18  9:45 AM.  Always use your most recent med list.                   Brand Name Dispense Instructions for use Diagnosis    acetaminophen 325 MG tablet    TYLENOL    60 tablet    Take 2 tablets (650 mg) by mouth every 6 hours as needed for mild pain or fever    Avascular necrosis of bone (H)       alendronate 70 MG tablet    FOSAMAX    12 tablet    Take 1 tablet (70 mg) by mouth every 7 days Take with over 8 ounces water and stay upright for at least 30 minutes after dose.  Take at least 60 minutes before breakfast    Osteoporosis, unspecified osteoporosis type, unspecified pathological fracture presence       ALEVE 220 MG tablet   Generic drug:  naproxen sodium      Take 220 mg by mouth 2 times daily (with meals)        aspirin 81 MG EC tablet     1 tablet    Take 1 tablet (81 mg) by mouth daily        atorvastatin 20 MG tablet    LIPITOR    102 tablet    Take 1 tablet (20 mg) by mouth daily    Hyperlipidemia LDL goal <70       calcium citrate-vitamin D 315-250 MG-UNIT Tabs per tablet    CITRACAL    120 tablet    Take 2 tablets  in the morning and 1 tablet in the evening    Osteoporosis       cevimeline 30 MG capsule    EVOXAC     Take 30 mg by mouth 3 times daily Reported on 3/6/2017        CLEAR EYES COMPLETE OP      Apply 1 drop to eye 3 times daily as needed        DEXILANT 60 MG Cpdr CR capsule   Generic drug:  dexlansoprazole      60 mg daily    MGUS (monoclonal gammopathy of unknown significance)       HERBALS      Cherry juice 1 tablespoon at night        ibuprofen 600 MG tablet    ADVIL/MOTRIN    60 tablet    Take 1 tablet (600 mg) by mouth every 6 hours as needed for moderate pain    Avascular necrosis of bone (H)       levothyroxine 88 MCG tablet    SYNTHROID/LEVOTHROID    102 tablet    TAKE ONE TABLET BY MOUTH EVERY DAY    Hypothyroidism, unspecified type       losartan 50 MG tablet    COZAAR    90 tablet    Take 1 tablet (50 mg) by mouth daily    Hypertension goal BP (blood pressure) < 140/90       MELATONIN PO      Take 3 mg by mouth At Bedtime        MIRALAX powder   Generic drug:  polyethylene glycol     1 BOTTLE    STIR 1 CAPFUL (17GM) IN 8 OZ OF LIQUID AND DRINK    Chronic constipation       nitroGLYcerin 0.4 MG sublingual tablet    NITROSTAT    25 tablet    Place 1 tablet (0.4 mg) under the tongue every 5 minutes as needed for chest pain    Acute chest pain       OMEGA-3 FISH OIL PO      Take 1 g by mouth 2 times daily (with meals)        * OSTEO BI-FLEX ADV TRIPLE ST PO      Take 1 tablet by mouth daily        * LUTEIN 20 PO      Take 1 tablet by mouth daily        ranitidine 300 MG tablet    ZANTAC     Take 300 mg by mouth At Bedtime        RESTASIS OP      Apply 1 drop to eye 2 times daily        SALONPAS ARTHRITIS PAIN RELIEF EX      Externally apply topically daily as needed        * Notice:  This list has 2 medication(s) that are the same as other medications prescribed for you. Read the directions carefully, and ask your doctor or other care provider to review them with you.

## 2018-07-17 ENCOUNTER — OFFICE VISIT (OUTPATIENT)
Dept: FAMILY MEDICINE | Facility: CLINIC | Age: 78
End: 2018-07-17
Payer: COMMERCIAL

## 2018-07-17 VITALS
DIASTOLIC BLOOD PRESSURE: 70 MMHG | HEIGHT: 65 IN | WEIGHT: 131.1 LBS | SYSTOLIC BLOOD PRESSURE: 132 MMHG | TEMPERATURE: 97.9 F | RESPIRATION RATE: 16 BRPM | BODY MASS INDEX: 21.84 KG/M2 | OXYGEN SATURATION: 98 % | HEART RATE: 59 BPM
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
## 2018-07-17 PROCEDURE — 99213 OFFICE O/P EST LOW 20 MIN: CPT | Performed by: FAMILY MEDICINE

## 2018-07-17 NOTE — MR AVS SNAPSHOT
After Visit Summary   7/17/2018    Ya Stahl    MRN: [id number]           Patient Information     Date Of Birth          1940        Visit Information        Provider Department      7/17/2018 11:50 AM Scarlett Spencer MD [facility]         Follow-ups after your visit        Follow-up notes from your care team     Return in about 4 weeks (around 8/14/2018), or if symptoms worsen or fail to improve.      Your next 10 appointments already scheduled     Apr 11, 2019  8:00 AM CDT   LAB with  LAB   [place] [facility] ([facility])    [address] [facility] [address]   [phone]           Please do not eat 10-12 hours before your appointment if you are coming in fasting for labs on lipids, cholesterol, or glucose (sugar). This does not apply to pregnant women. Water, hot tea and black coffee (with nothing added) are okay. Do not drink other fluids, diet soda or chew gum.            Apr 18, 2019  2:15 PM CDT   Return Visit with Vandana Kasper MD   [facility] ([facility])    [facility]  [address]  [facility] [address] [phone]              Who to contact     If you have questions or need follow up information about today's clinic visit or your schedule please contact [facility] directly at [phone].  Normal or non-critical lab and imaging results will be communicated to you by MyChart, letter or phone within 4 business days after the clinic has received the results. If you do not hear from us within 7 days, please contact the clinic through MyChart or phone. If you have a critical or abnormal lab result, we will notify you by phone as soon as possible.  Submit refill requests through [employer] or call your pharmacy and they will forward the refill request to us. Please allow 3 business days for your refill to be completed.           "Additional Information About Your Visit        MyChart Information     [employer] lets you send messages to your doctor, view your test results, renew your prescriptions, schedule appointments and more. To sign up, go to [email or web address] . Click on \"Log in\" on the left side of the screen, which will take you to the Welcome page. Then click on \"Sign up Now\" on the right side of the page.     You will be asked to enter the access code listed below, as well as some personal information. Please follow the directions to create your username and password.     Your access code is: [email or web address]  Expires: 2018  9:45 AM     Your access code will  in 90 days. If you need help or a new code, please call your [place] clinic or [phone].        Care EveryWhere ID     This is your Care EveryWhere ID. This could be used by other organizations to access your [place] medical records  [id number]        Your Vitals Were     Pulse Temperature Respirations Height Pulse Oximetry BMI (Body Mass Index)    59 97.9  F (36.6  C) (Oral) 16 5' 5.25\" (1.657 m) 98% 21.65 kg/m2       Blood Pressure from Last 3 Encounters:   18 132/70   18 133/77   18 128/76    Weight from Last 3 Encounters:   18 131 lb 1.6 oz (59.5 kg)   18 133 lb (60.3 kg)   18 133 lb (60.3 kg)              Today, you had the following     No orders found for display         Today's Medication Changes          These changes are accurate as of 18 12:32 PM.  If you have any questions, ask your nurse or doctor.               These medicines have changed or have updated prescriptions.        Dose/Directions    MIRALAX powder   This may have changed:  See the new instructions.   Used for:  Chronic constipation   Generic drug:  polyethylene glycol        STIR 1 CAPFUL (17GM) IN 8 OZ OF LIQUID AND DRINK   Quantity:  1 BOTTLE   Refills:  1 YEAR                Primary Care Provider Office Phone # Fax #    Scarlett Spencer MD " [phone]       [address]  [facility] [address]        Equal Access to Services     NEVA STALLWORTH : Hadii marquita lemus kerry Serrato, wamelvinda luqconnie, qaybta kaalmada odalis, fritz whiteheadandreina daysi. So [facility] [phone].    ATENCIÓN: Si habla español, tiene a costa disposición servicios gratuitos de asistencia lingüística. Llame al [phone].    We comply with applicable federal civil rights laws and [place] laws. We do not discriminate on the basis of race, color, national origin, age, disability, sex, sexual orientation, or gender identity.            Thank you!     Thank you for choosing [facility]  for your care. Our goal is always to provide you with excellent care. Hearing back from our patients is one way we can continue to improve our services. Please take a few minutes to complete the written survey that you may receive in the mail after your visit with us. Thank you!             Your Updated Medication List - Protect others around you: Learn how to safely use, store and throw away your medicines at www.disposemymeds.org.          This list is accurate as of 7/17/18 12:32 PM.  Always use your most recent med list.                   Brand Name Dispense Instructions for use Diagnosis    acetaminophen 325 MG tablet    TYLENOL    60 tablet    Take 2 tablets (650 mg) by mouth every 6 hours as needed for mild pain or fever    Avascular necrosis of bone (H)       alendronate 70 MG tablet    FOSAMAX    12 tablet    Take 1 tablet (70 mg) by mouth every 7 days Take with over 8 ounces water and stay upright for at least 30 minutes after dose.  Take at least 60 minutes before breakfast    Osteoporosis, unspecified osteoporosis type, unspecified pathological fracture presence       ALEVE 220 MG tablet   Generic drug:  naproxen sodium      Take 220 mg by mouth 2 times daily (with meals)        aspirin 81 MG EC tablet     1 tablet    Take 1 tablet (81 mg) by mouth  daily        atorvastatin 20 MG tablet    LIPITOR    102 tablet    Take 1 tablet (20 mg) by mouth daily    Hyperlipidemia LDL goal <70       calcium citrate-vitamin D 315-250 MG-UNIT Tabs per tablet    CITRACAL    120 tablet    Take 2 tablets in the morning and 1 tablet in the evening    Osteoporosis       cevimeline 30 MG capsule    EVOXAC     Take 30 mg by mouth 3 times daily Reported on 3/6/2017        CLEAR EYES COMPLETE OP      Apply 1 drop to eye 3 times daily as needed        DEXILANT 60 MG Cpdr CR capsule   Generic drug:  dexlansoprazole      60 mg daily    MGUS (monoclonal gammopathy of unknown significance)       HERBALS      Cherry juice 1 tablespoon at night        ibuprofen 600 MG tablet    ADVIL/MOTRIN    60 tablet    Take 1 tablet (600 mg) by mouth every 6 hours as needed for moderate pain    Avascular necrosis of bone (H)       levothyroxine 88 MCG tablet    SYNTHROID/LEVOTHROID    102 tablet    TAKE ONE TABLET BY MOUTH EVERY DAY    Hypothyroidism, unspecified type       losartan 50 MG tablet    COZAAR    90 tablet    Take 1 tablet (50 mg) by mouth daily    Hypertension goal BP (blood pressure) < 140/90       MELATONIN PO      Take 3 mg by mouth At Bedtime        MIRALAX powder   Generic drug:  polyethylene glycol     1 BOTTLE    STIR 1 CAPFUL (17GM) IN 8 OZ OF LIQUID AND DRINK    Chronic constipation       nitroGLYcerin 0.4 MG sublingual tablet    NITROSTAT    25 tablet    Place 1 tablet (0.4 mg) under the tongue every 5 minutes as needed for chest pain    Acute chest pain       OMEGA-3 FISH OIL PO      Take 1 g by mouth 2 times daily (with meals)        * OSTEO BI-FLEX ADV TRIPLE ST PO      Take 1 tablet by mouth daily        * LUTEIN 20 PO      Take 1 tablet by mouth daily        ranitidine 300 MG tablet    ZANTAC     Take 300 mg by mouth At Bedtime        RESTASIS OP      Apply 1 drop to eye 2 times daily        SALONPAS ARTHRITIS PAIN RELIEF EX      Externally apply topically daily as needed         * Notice:  This list has 2 medication(s) that are the same as other medications prescribed for you. Read the directions carefully, and ask your doctor or other care provider to review them with you.

## 2018-07-17 NOTE — PROGRESS NOTES
SUBJECTIVE:   Ya Stahl is a 78 year old female who presents to clinic today for the following health issues:      derm      Duration: x 2 weeks    Description (location/character/radiation): right forearm- has a red Rampart around the scab. Did not see any tick or bug bite.    Intensity:  none    Accompanying signs and symptoms: none    History (similar episodes/previous evaluation): None    Precipitating or alleviating factors: None    Therapies tried and outcome: None           Problem list and histories reviewed & adjusted, as indicated.  Additional history:     See under ROS     Patient Active Problem List   Diagnosis     Hypothyroidism     Chondrodermatitis nodularis helicis     Grave's disease     Bile reflux gastritis     Hyperlipidemia LDL goal <70     Advanced directives, counseling/discussion     Sjogren's disease (H)     HL (hearing loss)     Mixed incontinence     Health Care Home     Chronic cough     Impaired fasting glucose     Status post-operative repair of hip fracture, RIGHT     Constipation     Pubic ramus fracture (H)     Osteoporosis     Gastroesophageal reflux disease without esophagitis     AMI (acute myocardial infarction)     ASCVD (arteriosclerotic cardiovascular disease)     Macrocytosis     Monoclonal paraproteinemia     MGUS (monoclonal gammopathy of unknown significance)     Epigastric pain     Hypertension goal BP (blood pressure) < 140/90       Current Outpatient Prescriptions   Medication Sig Dispense Refill     acetaminophen (TYLENOL) 325 MG tablet Take 2 tablets (650 mg) by mouth every 6 hours as needed for mild pain or fever 60 tablet 0     alendronate (FOSAMAX) 70 MG tablet Take 1 tablet (70 mg) by mouth every 7 days Take with over 8 ounces water and stay upright for at least 30 minutes after dose.  Take at least 60 minutes before breakfast 12 tablet 3     aspirin 81 MG EC tablet Take 1 tablet (81 mg) by mouth daily 1 tablet 0     atorvastatin (LIPITOR) 20 MG tablet Take  1 tablet (20 mg) by mouth daily 102 tablet 3     calcium citrate-vitamin D (CITRACAL) 315-250 MG-UNIT TABS per tablet Take 2 tablets in the morning and 1 tablet in the evening 120 tablet      cevimeline (EVOXAC) 30 MG capsule Take 30 mg by mouth 3 times daily Reported on 3/6/2017       CycloSPORINE (RESTASIS OP) Apply 1 drop to eye 2 times daily        DEXILANT 60 MG CPDR CR capsule 60 mg daily        HERBALS Cherry juice 1 tablespoon at night       Hyprom-Naphaz-Polysorb-Zn Sulf (CLEAR EYES COMPLETE OP) Apply 1 drop to eye 3 times daily as needed       ibuprofen (ADVIL/MOTRIN) 600 MG tablet Take 1 tablet (600 mg) by mouth every 6 hours as needed for moderate pain 60 tablet 0     levothyroxine (SYNTHROID/LEVOTHROID) 88 MCG tablet TAKE ONE TABLET BY MOUTH EVERY  tablet 3     Liniments (SALONPAS ARTHRITIS PAIN RELIEF EX) Externally apply topically daily as needed       losartan (COZAAR) 50 MG tablet Take 1 tablet (50 mg) by mouth daily 90 tablet 1     MELATONIN PO Take 3 mg by mouth At Bedtime       MIRALAX OR POWD STIR 1 CAPFUL (17GM) IN 8 OZ OF LIQUID AND DRINK (Patient taking differently: STIR 1 CAPFUL (17GM) IN 8 OZ OF LIQUID AND DRINK twice daily) 1 BOTTLE 1 YEAR     Misc Natural Products (LUTEIN 20 PO) Take 1 tablet by mouth daily       Misc Natural Products (OSTEO BI-FLEX ADV TRIPLE ST PO) Take 1 tablet by mouth daily        naproxen sodium (ALEVE) 220 MG tablet Take 220 mg by mouth 2 times daily (with meals)       nitroGLYcerin (NITROSTAT) 0.4 MG sublingual tablet Place 1 tablet (0.4 mg) under the tongue every 5 minutes as needed for chest pain 25 tablet 0     Omega-3 Fatty Acids (OMEGA-3 FISH OIL PO) Take 1 g by mouth 2 times daily (with meals)        ranitidine (ZANTAC) 300 MG tablet Take 300 mg by mouth At Bedtime          Reviewed and updated as needed this visit by clinical staff  Tobacco  Allergies  Meds  Med Hx  Surg Hx  Fam Hx  Soc Hx      Reviewed and updated as needed this visit by  "Provider         ROS:  CONSTITUTIONAL:NEGATIVE for fever, chills, change in weight  INTEGUMENTARY/SKIN: See below  ENT/MOUTH: NEGATIVE for ear, mouth and throat problems  PSYCHIATRIC: NEGATIVE for changes in mood or affect    She notes a bug bite that has been present for approximately 2 weeks.  She comes in today with some concern for the potential for Lyme's.  This is on her right forearm.  She is unaware of what bit her.  Used to have a darker Kake around it (she points to a bruised area) as being similar.    There may have been some mild itching present early on.  Other than some fading of the coloration, there has been no change.  No expansion.    OBJECTIVE:     /70 (BP Location: Right arm, Cuff Size: Adult Regular)  Pulse 59  Temp 97.9  F (36.6  C) (Oral)  Resp 16  Ht 5' 5.25\" (1.657 m)  Wt 131 lb 1.6 oz (59.5 kg)  SpO2 98%  BMI 21.65 kg/m2  Body mass index is 21.65 kg/(m^2).  GENERAL APPEARANCE: alert and no distress  SKIN: On the flexor aspect of her right forearm perhaps 3 inches distal from the elbow, is a lesion that measures approximately 1 cm.  The center has a very small scab.  The surrounding ring appears to be mildly bluish in coloration.  PSYCH: mentation appears normal and affect normal/bright        ASSESSMENT/PLAN:     Insect bite, initial encounter  Uncertain insect.  This does not appear to be an erythema migrans to me.  It looks like the area may be of fading bruise.  As we reviewed Lyme's disease epidemiology, she notes that she would have noted even a small bug at that location for any period of time.  I took this opportunity to discuss and give her information on Lyme's disease prevention and recognition. Given patient education materials from [employer]    Follow-up as needed or for any additional concerns.    Scarlett Spencer MD, MD  [facility]  "

## 2018-08-08 ENCOUNTER — OFFICE VISIT (OUTPATIENT)
Dept: FAMILY MEDICINE | Facility: CLINIC | Age: 78
End: 2018-08-08
Payer: COMMERCIAL

## 2018-08-08 VITALS
HEART RATE: 64 BPM | OXYGEN SATURATION: 98 % | BODY MASS INDEX: 22.38 KG/M2 | TEMPERATURE: 98.2 F | WEIGHT: 135.54 LBS | RESPIRATION RATE: 18 BRPM | SYSTOLIC BLOOD PRESSURE: 156 MMHG | DIASTOLIC BLOOD PRESSURE: 90 MMHG

## 2018-08-08 DIAGNOSIS — R42 DIZZINESS: ICD-10-CM

## 2018-08-08 DIAGNOSIS — I10 HYPERTENSION GOAL BP (BLOOD PRESSURE) < 140/90: Primary | ICD-10-CM

## 2018-08-08 LAB
ERYTHROCYTE [DISTWIDTH] IN BLOOD BY AUTOMATED COUNT: 14.1 % (ref 10–15)
HCT VFR BLD AUTO: 42.7 % (ref 35–47)
HGB BLD-MCNC: 13.9 G/DL (ref 11.7–15.7)
MCH RBC QN AUTO: 32.1 PG (ref 26.5–33)
MCHC RBC AUTO-ENTMCNC: 32.6 G/DL (ref 31.5–36.5)
MCV RBC AUTO: 99 FL (ref 78–100)
PLATELET # BLD AUTO: 260 10E9/L (ref 150–450)
RBC # BLD AUTO: 4.33 10E12/L (ref 3.8–5.2)
WBC # BLD AUTO: 8.7 10E9/L (ref 4–11)

## 2018-08-08 PROCEDURE — 80048 BASIC METABOLIC PNL TOTAL CA: CPT | Performed by: NURSE PRACTITIONER

## 2018-08-08 PROCEDURE — 99214 OFFICE O/P EST MOD 30 MIN: CPT | Performed by: NURSE PRACTITIONER

## 2018-08-08 PROCEDURE — 84443 ASSAY THYROID STIM HORMONE: CPT | Performed by: NURSE PRACTITIONER

## 2018-08-08 PROCEDURE — 93000 ELECTROCARDIOGRAM COMPLETE: CPT | Performed by: NURSE PRACTITIONER

## 2018-08-08 PROCEDURE — 85027 COMPLETE CBC AUTOMATED: CPT | Performed by: NURSE PRACTITIONER

## 2018-08-08 PROCEDURE — 36415 COLL VENOUS BLD VENIPUNCTURE: CPT | Performed by: NURSE PRACTITIONER

## 2018-08-08 RX ORDER — IRBESARTAN 150 MG/1
150 TABLET ORAL DAILY
Qty: 30 TABLET | Refills: 1 | Status: SHIPPED | OUTPATIENT
Start: 2018-08-08 | End: 2018-08-23

## 2018-08-08 NOTE — MR AVS SNAPSHOT
After Visit Summary   8/8/2018    Ya Stahl    MRN: [id number]           Patient Information     Date Of Birth          1940        Visit Information        Provider Department      8/8/2018 10:40 AM Anca Zavaleta Ra, APRN CNP [facility]        Today's Diagnoses     Hypertension goal BP (blood pressure) < 140/90    -  1    Dizziness          Care Instructions    Monitor symptoms closely.  If any change or worsening please seek care.  Stop your losartan.  Start the irbesartan.  Follow up with Dr. Spencer in about 2-3 weeks.            Follow-ups after your visit        Follow-up notes from your care team     Return in about 3 weeks (around 8/29/2018) for BP Recheck, follow up.      Your next 10 appointments already scheduled     Apr 11, 2019  8:00 AM CDT   LAB with  LAB   [facility] ([facility])    [address] [place] [id number] [phone]           Please do not eat 10-12 hours before your appointment if you are coming in fasting for labs on lipids, cholesterol, or glucose (sugar). This does not apply to pregnant women. Water, hot tea and black coffee (with nothing added) are okay. Do not drink other fluids, diet soda or chew gum.            Apr 18, 2019  2:15 PM CDT   Return Visit with Vandana Kasper MD   [facility] ([facility])    [facility]  [address]  [facility] [id number] [phone]              Who to contact     If you have questions or need follow up information about today's clinic visit or your schedule please contact [facility] directly at [phone].  Normal or non-critical lab and imaging results will be communicated to you by MyChart, letter or phone within 4 business days after the clinic has received the results. If you do not hear from us within 7 days, please contact the clinic through MyChart or  phone. If you have a critical or abnormal lab result, we will notify you by phone as soon as possible.  Submit refill requests through [employer] or call your pharmacy and they will forward the refill request to us. Please allow 3 business days for your refill to be completed.          Additional Information About Your Visit        Care EveryWhere ID     This is your Care EveryWhere ID. This could be used by other organizations to access your [place] medical records  [id number]        Your Vitals Were     Pulse Temperature Respirations Pulse Oximetry BMI (Body Mass Index)       64 98.2  F (36.8  C) (Tympanic) 18 98% 22.38 kg/m2        Blood Pressure from Last 3 Encounters:   08/08/18 156/90   07/17/18 132/70   05/22/18 133/77    Weight from Last 3 Encounters:   08/08/18 135 lb 8.6 oz (61.5 kg)   07/17/18 131 lb 1.6 oz (59.5 kg)   05/22/18 133 lb (60.3 kg)              We Performed the Following     Basic metabolic panel     CBC with platelets     EKG 12-lead complete w/read - Clinics     TSH with free T4 reflex          Today's Medication Changes          These changes are accurate as of 8/8/18 11:32 AM.  If you have any questions, ask your nurse or doctor.               Start taking these medicines.        Dose/Directions    irbesartan 150 MG tablet   Commonly known as:  AVAPRO   Used for:  Hypertension goal BP (blood pressure) < 140/90   Started by:  Anca Zavaleta Ra, APRN CNP        Dose:  150 mg   Take 1 tablet (150 mg) by mouth daily   Quantity:  30 tablet   Refills:  1         These medicines have changed or have updated prescriptions.        Dose/Directions    MIRALAX powder   This may have changed:  See the new instructions.   Used for:  Chronic constipation   Generic drug:  polyethylene glycol        STIR 1 CAPFUL (17GM) IN 8 OZ OF LIQUID AND DRINK   Quantity:  1 BOTTLE   Refills:  1 YEAR         Stop taking these medicines if you haven't already. Please contact your care team if you have questions.      losartan 50 MG tablet   Commonly known as:  COZAAR   Stopped by:  Anca Zavaleta Ra, SINTIA CNP                Where to get your medicines      These [place] [facility] [place] [address]  [address] [place] [address]     Phone:  [phone]     irbesartan 150 MG tablet                Primary Care Provider Office Phone # Fax #    Scarlett Spencer [phone]       [address]  [facility] [address]        Equal Access to Services     [facility]: Hadii aad ku hadasho Soomaali, waaxda luqadaha, qaybta kaalmada adeegyada, waxay idiin hayaan adeeg khseema greene . So [facility] [phone].    ATENCIÓN: Si habla español, tiene a costa disposición servicios gratuitos de asistencia lingüística. [facility] [phone].    We comply with applicable federal civil rights laws and [place] laws. We do not discriminate on the basis of race, color, national origin, age, disability, sex, sexual orientation, or gender identity.            Thank you!     Thank you for choosing [facility]  for your care. Our goal is always to provide you with excellent care. Hearing back from our patients is one way we can continue to improve our services. Please take a few minutes to complete the written survey that you may receive in the mail after your visit with us. Thank you!             Your Updated Medication List - Protect others around you: Learn how to safely use, store and throw away your medicines at www.disposemymeds.org.          This list is accurate as of 8/8/18 11:32 AM.  Always use your most recent med list.                   Brand Name Dispense Instructions for use Diagnosis    acetaminophen 325 MG tablet    TYLENOL    60 tablet    Take 2 tablets (650 mg) by mouth every 6 hours as needed for mild pain or fever    Avascular necrosis of bone (H)       alendronate 70 MG tablet    FOSAMAX    12 tablet    Take 1 tablet (70 mg) by mouth every 7  days Take with over 8 ounces water and stay upright for at least 30 minutes after dose.  Take at least 60 minutes before breakfast    Osteoporosis, unspecified osteoporosis type, unspecified pathological fracture presence       ALEVE 220 MG tablet   Generic drug:  naproxen sodium      Take 220 mg by mouth 2 times daily (with meals)        aspirin 81 MG EC tablet     1 tablet    Take 1 tablet (81 mg) by mouth daily        atorvastatin 20 MG tablet    LIPITOR    102 tablet    Take 1 tablet (20 mg) by mouth daily    Hyperlipidemia LDL goal <70       calcium citrate-vitamin D 315-250 MG-UNIT Tabs per tablet    CITRACAL    120 tablet    Take 2 tablets in the morning and 1 tablet in the evening    Osteoporosis       cevimeline 30 MG capsule    EVOXAC     Take 30 mg by mouth 3 times daily Reported on 3/6/2017        CLEAR EYES COMPLETE OP      Apply 1 drop to eye 3 times daily as needed        DEXILANT 60 MG Cpdr CR capsule   Generic drug:  dexlansoprazole      60 mg daily    MGUS (monoclonal gammopathy of unknown significance)       HERBALS      Cherry juice 1 tablespoon at night        ibuprofen 600 MG tablet    ADVIL/MOTRIN    60 tablet    Take 1 tablet (600 mg) by mouth every 6 hours as needed for moderate pain    Avascular necrosis of bone (H)       irbesartan 150 MG tablet    AVAPRO    30 tablet    Take 1 tablet (150 mg) by mouth daily    Hypertension goal BP (blood pressure) < 140/90       levothyroxine 88 MCG tablet    SYNTHROID/LEVOTHROID    102 tablet    TAKE ONE TABLET BY MOUTH EVERY DAY    Hypothyroidism, unspecified type       MELATONIN PO      Take 3 mg by mouth At Bedtime        MIRALAX powder   Generic drug:  polyethylene glycol     1 BOTTLE    STIR 1 CAPFUL (17GM) IN 8 OZ OF LIQUID AND DRINK    Chronic constipation       nitroGLYcerin 0.4 MG sublingual tablet    NITROSTAT    25 tablet    Place 1 tablet (0.4 mg) under the tongue every 5 minutes as needed for chest pain    Acute chest pain       OMEGA-3 FISH  OIL PO      Take 1 g by mouth 2 times daily (with meals)        * OSTEO BI-FLEX ADV TRIPLE ST PO      Take 1 tablet by mouth daily        * LUTEIN 20 PO      Take 1 tablet by mouth daily        ranitidine 300 MG tablet    ZANTAC     Take 300 mg by mouth At Bedtime        RESTASIS OP      Apply 1 drop to eye 2 times daily        SALONPAS ARTHRITIS PAIN RELIEF EX      Externally apply topically daily as needed        * Notice:  This list has 2 medication(s) that are the same as other medications prescribed for you. Read the directions carefully, and ask your doctor or other care provider to review them with you.

## 2018-08-08 NOTE — PROGRESS NOTES
SUBJECTIVE:   Ya Stahl is a 78 year old female who presents to clinic today for the following health issues:      Hypertension Follow-up      Outpatient blood pressures are being checked at home.  Results are 171/90.    Low Salt Diet: no added salt      Amount of exercise or physical activity: None    Problems taking medications regularly: No    Medication side effects: none    Diet: regular (no restrictions)    Patient states she had been having dizziness for 2-3 weeks.  Sometimes worse with position change.  No vision or hearing changes.  Did have an MRI of her brain a few months ago due to tremors.  No illness symptoms.  No chest pain, palpitations, sob.  She has been eating and drinking as usual.  Normal output.  She had her bp taken yesterday prior to an epidural for back pain and it was quite elevated.  She took 1.5 tabs of losartan last night without any change in symptoms.    Problem list and histories reviewed & adjusted, as indicated.  Additional history: as documented    Patient Active Problem List   Diagnosis     Hypothyroidism     Chondrodermatitis nodularis helicis     Grave's disease     Bile reflux gastritis     Hyperlipidemia LDL goal <70     Advanced directives, counseling/discussion     Sjogren's disease (H)     HL (hearing loss)     Mixed incontinence     Health Care Home     Chronic cough     Impaired fasting glucose     Status post-operative repair of hip fracture, RIGHT     Constipation     Pubic ramus fracture (H)     Osteoporosis     Gastroesophageal reflux disease without esophagitis     AMI (acute myocardial infarction)     ASCVD (arteriosclerotic cardiovascular disease)     Macrocytosis     Monoclonal paraproteinemia     MGUS (monoclonal gammopathy of unknown significance)     Epigastric pain     Hypertension goal BP (blood pressure) < 140/90     Past Surgical History:   Procedure Laterality Date     ARTHROSCOPY KNEE  10/5/2012    R Procedure: ARTHROSCOPY KNEE;  RIGHT KNEE  ARTHROSCOPY, PARTIAL MEDIAL MENISCECTOMY;  Surgeon: Karli Zaragoza MD;  Location:  SD     BUNIONECTOMY  ~2010    L foot.      C NONSPECIFIC PROCEDURE  1976    D&C     CATARACT IOL, RT/LT Bilateral 07/2017     COLONOSCOPY N/A 1/17/2017    normal; no repeat Procedure: COLONOSCOPY;  Surgeon: Fan Giordano MD;  Location:  GI     ENDOSCOPIC RELEASE CARPAL TUNNEL  9/11/2012    R Procedure: ENDOSCOPIC RELEASE CARPAL TUNNEL;  Right Endoscopic carpal tunnel release, left ringer finger cortizon injection;  Surgeon: Anne Marie Catherine MD;  Location:  OR     ESOPHAGOSCOPY, GASTROSCOPY, DUODENOSCOPY (EGD), COMBINED N/A 12/26/2014    Procedure: COMBINED ESOPHAGOSCOPY, GASTROSCOPY, DUODENOSCOPY (EGD);  Surgeon: Fan Giordano MD;  Location:  GI     EXCISE EXOSTOSIS FOOT Left 12/1/2016    Procedure: EXCISE EXOSTOSIS FOOT;  Surgeon: Jody Gallagher, DPM, Pod;  Location:  OR     GYN SURGERY  1978    ovaries removed     HEART CATH, ANGIOPLASTY  3/4/16    dz <40%     HYSTERECTOMY, PAP NO LONGER INDICATED  1977    Hysterectomy; benign     INJECT STEROID (LOCATION)  9/11/2012    Procedure: INJECT STEROID (LOCATION);;  Surgeon: Anne Marie Catherine MD;  Location:  OR     LAPAROSCOPIC CHOLECYSTECTOMY N/A 4/7/2016    Procedure: LAPAROSCOPIC CHOLECYSTECTOMY;  Surgeon: Hans Medina MD;  Location:  OR     ORTHOPEDIC SURGERY  ~2008    Right foot, bunionectomy      RECONSTRUCT FOREFOOT WITH METATARSOPHALANGEAL (MTP) FUSION Left 4/28/2017    Procedure: RECONSTRUCT FOREFOOT WITH METATARSOPHALANGEAL (MTP) FUSION;  1. Resection arthroplasty, fifth metatarsophalangeal joint, left foot.   2. Irrigation and debridement of fifth metatarsophalangeal joint, left foot (excisional to the level of the bone).     ;  Surgeon: Fabián Phipps MD;  Location:  OR     RIGHT HIP ORIF 4/1/2014       ROTATOR CUFF REPAIR RT/LT  ~1998    Lt shoulder; rotator cuff     SURGICAL HISTORY OF -   distant past    ablation for palpitations.      SURGICAL HISTORY OF -   June 2015    left carpal tunnel surgery       Social History   Substance Use Topics     Smoking status: Never Smoker     Smokeless tobacco: Never Used     Alcohol use Yes      Comment: social; maybe a glass of wine or highball per week      Family History   Problem Relation Age of Onset     C.A.D. Mother 76     Cancer Mother      non Hodgekin's Lymphoma     HEART DISEASE Mother      enlarged heart     C.A.D. Father 59     HEART DISEASE Father      valve problem     Hypertension Brother      Hypertension Brother      Connective Tissue Disorder Daughter      scleroderma     Colon Cancer No family hx of            Reviewed and updated as needed this visit by clinical staff       Reviewed and updated as needed this visit by Provider         ROS:  SEE HPI.    OBJECTIVE:     /90 (BP Location: Right arm, Patient Position: Chair, Cuff Size: Adult Regular)  Pulse 64  Temp 98.2  F (36.8  C) (Tympanic)  Resp 18  Wt 135 lb 8.6 oz (61.5 kg)  SpO2 98%  BMI 22.38 kg/m2  Body mass index is 22.38 kg/(m^2).  GENERAL: healthy, alert and no distress  EYES: Eyes grossly normal to inspection, PERRL and conjunctivae and sclerae normal  HENT: ear canals and TM's normal, nose and mouth without ulcers or lesions  NECK: no adenopathy, no asymmetry, masses, or scars and thyroid normal to palpation  RESP: lungs clear to auscultation - no rales, rhonchi or wheezes  CV: regular rates and rhythm, normal S1 S2, no S3 or S4 and no murmur, click or rub  NEURO: Normal strength and tone, sensory exam grossly normal, mentation intact, cranial nerves 2-12 intact and rapid alternating movements normal  PSYCH: mentation appears normal, affect normal/bright    Diagnostic Test Results:  Results for orders placed or performed in visit on 08/08/18 (from the past 24 hour(s))   CBC with platelets   Result Value Ref Range    WBC 8.7 4.0 - 11.0 10e9/L    RBC Count 4.33 3.8 - 5.2 10e12/L    Hemoglobin 13.9 11.7 - 15.7 g/dL     Hematocrit 42.7 35.0 - 47.0 %    MCV 99 78 - 100 fl    MCH 32.1 26.5 - 33.0 pg    MCHC 32.6 31.5 - 36.5 g/dL    RDW 14.1 10.0 - 15.0 %    Platelet Count 260 150 - 450 10e9/L       ASSESSMENT/PLAN:   1. Hypertension goal BP (blood pressure) < 140/90  Above goal.  Could be contributing to symptoms.  Would change to valsartan but due to recall will use irbesartan.  Trial 150mg daily, may need to increase to 300mg daily.  Pt agrees with plan and verbalized understanding.  - EKG 12-lead complete w/read - Clinics  - Basic metabolic panel  - irbesartan (AVAPRO) 150 MG tablet; Take 1 tablet (150 mg) by mouth daily  Dispense: 30 tablet; Refill: 1    2. Dizziness  Labs today.  See how symptoms change with better control of bp.  Consider dizziness and balance eval through PT if needed.  - TSH with free T4 reflex  - CBC with platelets  - Basic metabolic panel    See PCP in 2 weeks for recheck.    SINTIA Carmona Ra, CNP  [facility]

## 2018-08-08 NOTE — PATIENT INSTRUCTIONS
Monitor symptoms closely.  If any change or worsening please seek care.  Stop your losartan.  Start the irbesartan.  Follow up with Dr. Spencer in about 2-3 weeks.

## 2018-08-08 NOTE — LETTER
August 20, 2018      Ya Stahl  [address]  [place] [address]        Hi Ya,     Here are copies of your recent labs.  Everything looked good.  Nothing to explain the dizziness you have shin having.  Let's continue with the plan to have you see Dr. Spencer next week.   Please let me know if you have any questions or concerns.     Thank you,     YUN Carmona

## 2018-08-09 LAB
ANION GAP SERPL CALCULATED.3IONS-SCNC: 11 MMOL/L (ref 3–14)
BUN SERPL-MCNC: 32 MG/DL (ref 7–30)
CALCIUM SERPL-MCNC: 9.1 MG/DL (ref 8.5–10.1)
CHLORIDE SERPL-SCNC: 100 MMOL/L (ref 94–109)
CO2 SERPL-SCNC: 25 MMOL/L (ref 20–32)
CREAT SERPL-MCNC: 0.73 MG/DL (ref 0.52–1.04)
GFR SERPL CREATININE-BSD FRML MDRD: 78 ML/MIN/1.7M2
GLUCOSE SERPL-MCNC: 138 MG/DL (ref 70–99)
POTASSIUM SERPL-SCNC: 4.3 MMOL/L (ref 3.4–5.3)
SODIUM SERPL-SCNC: 136 MMOL/L (ref 133–144)
TSH SERPL DL<=0.005 MIU/L-ACNC: 0.84 MU/L (ref 0.4–4)

## 2018-08-10 ENCOUNTER — CARE COORDINATION (OUTPATIENT)
Dept: ONCOLOGY | Facility: CLINIC | Age: 78
End: 2018-08-10

## 2018-08-10 NOTE — PROGRESS NOTES
Received Hematology Referral from [facility] from pt's rheumatolgist.  Pt follows with Dr Kasper for MGUS and scheduled to return 4/19.  Pt called and states she was confused why this referral was sent.  Informed pt writer will have Dr Kasper review her labs drawn at  clinic and call her back with plan if earlier appt needed.  Dr Kasper notified and reviewed labs in care everywhere and do not need to see pt sooner.   most likely not aware that pt seen at  for this reason as IgM Kappa protein detected but Dr Kasper follows pt for this diagnosis already and will continue to follow as scheduled. Pt called back and left message that MD does not need to see pt sooner but to call back with further questions.     Jovita Holland, RN, BSN, OCN

## 2018-08-22 NOTE — PROGRESS NOTES
SUBJECTIVE:   Ya Stahl is a 78 year old female who presents to clinic today for the following health issues:      Medication Followup of Irbesartan    Taking Medication as prescribed: yes    Side Effects:  None    Medication Helping Symptoms:  Yesterday blood pressure reading 145/89            Problem list and histories reviewed & adjusted, as indicated.  Additional history:     See under ROS     Patient Active Problem List   Diagnosis     Hypothyroidism     Chondrodermatitis nodularis helicis     Grave's disease     Bile reflux gastritis     Hyperlipidemia LDL goal <70     Advanced directives, counseling/discussion     Sjogren's disease (H)     HL (hearing loss)     Mixed incontinence     Health Care Home     Chronic cough     Impaired fasting glucose     Status post-operative repair of hip fracture, RIGHT     Constipation     Pubic ramus fracture (H)     Osteoporosis     Gastroesophageal reflux disease without esophagitis     AMI (acute myocardial infarction)     ASCVD (arteriosclerotic cardiovascular disease)     Macrocytosis     Monoclonal paraproteinemia     MGUS (monoclonal gammopathy of unknown significance)     Epigastric pain     Hypertension goal BP (blood pressure) < 140/90       Current Outpatient Prescriptions   Medication Sig Dispense Refill     acetaminophen (TYLENOL) 325 MG tablet Take 2 tablets (650 mg) by mouth every 6 hours as needed for mild pain or fever 60 tablet 0     alendronate (FOSAMAX) 70 MG tablet Take 1 tablet (70 mg) by mouth every 7 days Take with over 8 ounces water and stay upright for at least 30 minutes after dose.  Take at least 60 minutes before breakfast 12 tablet 3     aspirin 81 MG EC tablet Take 1 tablet (81 mg) by mouth daily 1 tablet 0     atorvastatin (LIPITOR) 20 MG tablet Take 1 tablet (20 mg) by mouth daily 102 tablet 3     calcium citrate-vitamin D (CITRACAL) 315-250 MG-UNIT TABS per tablet Take 2 tablets in the morning and 1 tablet in the evening 120 tablet       cevimeline (EVOXAC) 30 MG capsule Take 30 mg by mouth 3 times daily Reported on 3/6/2017       CycloSPORINE (RESTASIS OP) Apply 1 drop to eye 2 times daily        DEXILANT 60 MG CPDR CR capsule 60 mg daily        HERBALS Cherry juice 1 tablespoon at night       Hyprom-Naphaz-Polysorb-Zn Sulf (CLEAR EYES COMPLETE OP) Apply 1 drop to eye 3 times daily as needed       ibuprofen (ADVIL/MOTRIN) 600 MG tablet Take 1 tablet (600 mg) by mouth every 6 hours as needed for moderate pain 60 tablet 0     irbesartan (AVAPRO) 150 MG tablet Take 1 tablet (150 mg) by mouth daily 30 tablet 1     levothyroxine (SYNTHROID/LEVOTHROID) 88 MCG tablet TAKE ONE TABLET BY MOUTH EVERY  tablet 3     Liniments (SALONPAS ARTHRITIS PAIN RELIEF EX) Externally apply topically daily as needed       MELATONIN PO Take 3 mg by mouth At Bedtime       MIRALAX OR POWD STIR 1 CAPFUL (17GM) IN 8 OZ OF LIQUID AND DRINK (Patient taking differently: STIR 1 CAPFUL (17GM) IN 8 OZ OF LIQUID AND DRINK twice daily) 1 BOTTLE 1 YEAR     Misc Natural Products (LUTEIN 20 PO) Take 1 tablet by mouth daily       Misc Natural Products (OSTEO BI-FLEX ADV TRIPLE ST PO) Take 1 tablet by mouth daily        naproxen sodium (ALEVE) 220 MG tablet Take 220 mg by mouth 2 times daily (with meals)       nitroGLYcerin (NITROSTAT) 0.4 MG sublingual tablet Place 1 tablet (0.4 mg) under the tongue every 5 minutes as needed for chest pain 25 tablet 0     Omega-3 Fatty Acids (OMEGA-3 FISH OIL PO) Take 1 g by mouth 2 times daily (with meals)        predniSONE (DELTASONE) 10 MG tablet Take 10 mg by mouth daily       ranitidine (ZANTAC) 300 MG tablet Take 300 mg by mouth At Bedtime            Reviewed and updated as needed this visit by clinical staff       Reviewed and updated as needed this visit by Provider         ROS:  CONSTITUTIONAL:NEGATIVE for fever, chills, change in weight  RESP:NEGATIVE for significant cough or SOB  CV: NEGATIVE for chest pain, palpitations or peripheral  "edema  PSYCHIATRIC: NEGATIVE for changes in mood or affect    148/89 yesterday at home.       OBJECTIVE:     /78 (BP Location: Right arm, Cuff Size: Adult Regular)  Pulse 66  Temp 97.9  F (36.6  C) (Oral)  Resp 16  Ht 5' 5.25\" (1.657 m)  Wt 135 lb 12.8 oz (61.6 kg)  SpO2 99%  BMI 22.43 kg/m2  Body mass index is 22.43 kg/(m^2).  GENERAL APPEARANCE: alert and no distress  CV: regular rates and rhythm  MS: no edema.   PSYCH: mentation appears normal and affect normal/bright    Recent Labs   Lab Test  12/19/17   0751  12/13/16   0808   12/02/14   0722  12/05/13   0925   CHOL  178  157   < >  212*  213*   HDL  99  90   < >  93  85   LDL  60  43   < >  98  107   TRIG  97  121   < >  105  101   CHOLHDLRATIO   --    --    --   2.3  2.5    < > = values in this interval not displayed.     TSH   Date Value Ref Range Status   08/08/2018 0.84 0.40 - 4.00 mU/L Final     Lab Results   Component Value Date    A1C 6.3 12/19/2017    A1C 5.9 12/13/2016    A1C 5.8 12/02/2014    A1C 5.7 12/05/2013             ASSESSMENT/PLAN:     Hypertension goal BP (blood pressure) < 140/90  Not at optimal control.  Better today. Will continue current dose and monitor.   - irbesartan (AVAPRO) 150 MG tablet; Take 1 tablet (150 mg) by mouth daily  - Basic metabolic panel      Scarlett Spencer MD, MD  [facility]  "

## 2018-08-23 ENCOUNTER — OFFICE VISIT (OUTPATIENT)
Dept: FAMILY MEDICINE | Facility: CLINIC | Age: 78
End: 2018-08-23
Payer: COMMERCIAL

## 2018-08-23 VITALS
WEIGHT: 135.8 LBS | TEMPERATURE: 97.9 F | OXYGEN SATURATION: 99 % | RESPIRATION RATE: 16 BRPM | HEIGHT: 65 IN | BODY MASS INDEX: 22.63 KG/M2 | DIASTOLIC BLOOD PRESSURE: 78 MMHG | HEART RATE: 66 BPM | SYSTOLIC BLOOD PRESSURE: 130 MMHG

## 2018-08-23 DIAGNOSIS — R73.01 IMPAIRED FASTING GLUCOSE: ICD-10-CM

## 2018-08-23 DIAGNOSIS — E78.5 HYPERLIPIDEMIA LDL GOAL <70: ICD-10-CM

## 2018-08-23 DIAGNOSIS — I10 HYPERTENSION GOAL BP (BLOOD PRESSURE) < 140/90: Primary | ICD-10-CM

## 2018-08-23 PROCEDURE — 36415 COLL VENOUS BLD VENIPUNCTURE: CPT | Performed by: FAMILY MEDICINE

## 2018-08-23 PROCEDURE — 99213 OFFICE O/P EST LOW 20 MIN: CPT | Performed by: FAMILY MEDICINE

## 2018-08-23 PROCEDURE — 80048 BASIC METABOLIC PNL TOTAL CA: CPT | Performed by: FAMILY MEDICINE

## 2018-08-23 RX ORDER — IRBESARTAN 150 MG/1
150 TABLET ORAL DAILY
Qty: 102 TABLET | Refills: 0 | Status: SHIPPED | OUTPATIENT
Start: 2018-08-23 | End: 2018-10-17

## 2018-08-23 RX ORDER — PREDNISONE 10 MG/1
10 TABLET ORAL DAILY
COMMUNITY
End: 2018-10-17

## 2018-08-23 NOTE — LETTER
August 27, 2018      Ya Stahl  [address]  [place] [address]        Dear Ms. Ya Stahl,    Enclosed is a copy of your recent results.    Other than the sugar being elevated, all is normal.  Your kidney function looks good. That Urea Nitrogen is now normal; again, that can vary on level of hydration at the time the test was taken.    We are already watching the sugar.    Have a good rest of the summer!    Sincerely,     Scarlett Spencer MD

## 2018-08-23 NOTE — PATIENT INSTRUCTIONS
I would recommend checking your bp here in the pharmacy or nurse only in the clinic every couple weeks.   With the pharmacy, you could just stop in. For nurse only visits, you would want to make an appointment.      I would recommend that you come in to our pharmacy or make a nurse only bp check. You check your bp with your cuff and have us check with ours. That way we can see how true yours is reading.      --------------------------------------    Let's keep the same dose at this time and get some more numbers.      You will be due for fasting labs in December.

## 2018-08-23 NOTE — MR AVS SNAPSHOT
After Visit Summary   8/23/2018    Ya Stahl    MRN: [id number]           Patient Information     Date Of Birth          1940        Visit Information        Provider Department      8/23/2018 8:50 AM Scarlett Spencer MD [facility]        Today's Diagnoses     Hypertension goal BP (blood pressure) < 140/90          Care Instructions        I would recommend checking your bp here in the pharmacy or nurse only in the clinic every couple weeks.   With the pharmacy, you could just stop in. For nurse only visits, you would want to make an appointment.      I would recommend that you come in to our pharmacy or make a nurse only bp check. You check your bp with your cuff and have us check with ours. That way we can see how true yours is reading.      --------------------------------------    Let's keep the same dose at this time and get some more numbers.      You will be due for fasting labs in December.          Follow-ups after your visit        Follow-up notes from your care team     Return in about 4 months (around 12/23/2018), or if symptoms worsen or fail to improve; if blood pressure does not improve, for Physical Exam and fasting lab.      Your next 10 appointments already scheduled     Dec 20, 2018  8:10 AM CST   PHYSICAL with MD [place] [facility] [place] ([facility])    [address] [address]   [phone]            Apr 11, 2019  8:00 AM CDT   LAB with  LAB   [place] ([facility])    [address] [address]   [phone]           Please do not eat 10-12 hours before your appointment if you are coming in fasting for labs on lipids, cholesterol, or glucose (sugar). This does not apply to pregnant women. Water, hot tea and black coffee (with nothing added) are okay. Do not drink other fluids, diet soda or chew gum.            Apr 18, 2019  2:15 PM CDT   Return  "Visit with Vandana Kasper MD   [facility] ([facility])    [facility]  [address]  [place] [address] [phone]              Who to contact     If you have questions or need follow up information about today's clinic visit or your schedule please contact [facility] directly at [phone].  Normal or non-critical lab and imaging results will be communicated to you by MyChart, letter or phone within 4 business days after the clinic has received the results. If you do not hear from us within 7 days, please contact the clinic through MyChart or phone. If you have a critical or abnormal lab result, we will notify you by phone as soon as possible.  Submit refill requests through [employer] or call your pharmacy and they will forward the refill request to us. Please allow 3 business days for your refill to be completed.          Additional Information About Your Visit        Care EveryWhere ID     This is your Care EveryWhere ID. This could be used by other organizations to access your [place] medical records  [id number]        Your Vitals Were     Pulse Temperature Respirations Height Pulse Oximetry BMI (Body Mass Index)    66 97.9  F (36.6  C) (Oral) 16 5' 5.25\" (1.657 m) 99% 22.43 kg/m2       Blood Pressure from Last 3 Encounters:   08/23/18 130/78   08/08/18 156/90   07/17/18 132/70    Weight from Last 3 Encounters:   08/23/18 135 lb 12.8 oz (61.6 kg)   08/08/18 135 lb 8.6 oz (61.5 kg)   07/17/18 131 lb 1.6 oz (59.5 kg)              We Performed the Following     Basic metabolic panel          Today's Medication Changes          These changes are accurate as of 8/23/18  9:25 AM.  If you have any questions, ask your nurse or doctor.               These medicines have changed or have updated prescriptions.        Dose/Directions    MIRALAX powder   This may have changed:  See the new instructions.   Used for:  " Chronic constipation   Generic drug:  polyethylene glycol        STIR 1 CAPFUL (17GM) IN 8 OZ OF LIQUID AND DRINK   Quantity:  1 BOTTLE   Refills:  1 YEAR            Where to get your medicines      These medications were sent to [facility] - [place], [place] - [address] [facility] [address]  [address], [place] [address]     Phone:  [phone]     irbesartan 150 MG tablet                Primary Care Provider Office Phone # Fax #    Scarlett Spencer [phone]       [address]  [facility] [address]        Equal Access to Services     [facility]: Hadii aad ku hadasho Soomaali, waaxda luqadaha, qaybta kaalmada adeegyada, waxay neeta hayaan jose greene . So [facility] [phone].    ATENCIÓN: Si habla español, tiene a costa disposición servicios gratuitos de asistencia lingüística. Llame al [phone].    We comply with applicable federal civil rights laws and [place] laws. We do not discriminate on the basis of race, color, national origin, age, disability, sex, sexual orientation, or gender identity.            Thank you!     Thank you for choosing [facility]  for your care. Our goal is always to provide you with excellent care. Hearing back from our patients is one way we can continue to improve our services. Please take a few minutes to complete the written survey that you may receive in the mail after your visit with us. Thank you!             Your Updated Medication List - Protect others around you: Learn how to safely use, store and throw away your medicines at www.disposemymeds.org.          This list is accurate as of 8/23/18  9:25 AM.  Always use your most recent med list.                   Brand Name Dispense Instructions for use Diagnosis    acetaminophen 325 MG tablet    TYLENOL    60 tablet    Take 2 tablets (650 mg) by mouth every 6 hours as needed for mild pain or fever    Avascular necrosis of bone (H)       alendronate 70  MG tablet    FOSAMAX    12 tablet    Take 1 tablet (70 mg) by mouth every 7 days Take with over 8 ounces water and stay upright for at least 30 minutes after dose.  Take at least 60 minutes before breakfast    Osteoporosis, unspecified osteoporosis type, unspecified pathological fracture presence       ALEVE 220 MG tablet   Generic drug:  naproxen sodium      Take 220 mg by mouth 2 times daily (with meals)        aspirin 81 MG EC tablet     1 tablet    Take 1 tablet (81 mg) by mouth daily        atorvastatin 20 MG tablet    LIPITOR    102 tablet    Take 1 tablet (20 mg) by mouth daily    Hyperlipidemia LDL goal <70       calcium citrate-vitamin D 315-250 MG-UNIT Tabs per tablet    CITRACAL    120 tablet    Take 2 tablets in the morning and 1 tablet in the evening    Osteoporosis       cevimeline 30 MG capsule    EVOXAC     Take 30 mg by mouth 3 times daily Reported on 3/6/2017        CLEAR EYES COMPLETE OP      Apply 1 drop to eye 3 times daily as needed        DEXILANT 60 MG Cpdr CR capsule   Generic drug:  dexlansoprazole      60 mg daily    MGUS (monoclonal gammopathy of unknown significance)       HERBALS      Cherry juice 1 tablespoon at night        ibuprofen 600 MG tablet    ADVIL/MOTRIN    60 tablet    Take 1 tablet (600 mg) by mouth every 6 hours as needed for moderate pain    Avascular necrosis of bone (H)       irbesartan 150 MG tablet    AVAPRO    102 tablet    Take 1 tablet (150 mg) by mouth daily    Hypertension goal BP (blood pressure) < 140/90       levothyroxine 88 MCG tablet    SYNTHROID/LEVOTHROID    102 tablet    TAKE ONE TABLET BY MOUTH EVERY DAY    Hypothyroidism, unspecified type       MELATONIN PO      Take 3 mg by mouth At Bedtime        MIRALAX powder   Generic drug:  polyethylene glycol     1 BOTTLE    STIR 1 CAPFUL (17GM) IN 8 OZ OF LIQUID AND DRINK    Chronic constipation       nitroGLYcerin 0.4 MG sublingual tablet    NITROSTAT    25 tablet    Place 1 tablet (0.4 mg) under the tongue  every 5 minutes as needed for chest pain    Acute chest pain       OMEGA-3 FISH OIL PO      Take 1 g by mouth 2 times daily (with meals)        * OSTEO BI-FLEX ADV TRIPLE ST PO      Take 1 tablet by mouth daily        * LUTEIN 20 PO      Take 1 tablet by mouth daily        predniSONE 10 MG tablet    DELTASONE     Take 10 mg by mouth daily        ranitidine 300 MG tablet    ZANTAC     Take 300 mg by mouth At Bedtime        RESTASIS OP      Apply 1 drop to eye 2 times daily        SALONPAS ARTHRITIS PAIN RELIEF EX      Externally apply topically daily as needed        * Notice:  This list has 2 medication(s) that are the same as other medications prescribed for you. Read the directions carefully, and ask your doctor or other care provider to review them with you.

## 2018-08-23 NOTE — LETTER
[facility]  [address] [address] [phone]        October 4, 2019    Ya Stahl  [address]  [place] [address]              Dear Ya Stahl    This is to remind you that your fasting labs are due.    You may call our office at [phone]  to schedule an appointment.    Please disregard this notice if you have already had your labs drawn or made an appointment.        Sincerely,        Scarlett Spencer MD and [facility] lab staff

## 2018-08-24 LAB
ANION GAP SERPL CALCULATED.3IONS-SCNC: 9 MMOL/L (ref 3–14)
BUN SERPL-MCNC: 24 MG/DL (ref 7–30)
CALCIUM SERPL-MCNC: 9.1 MG/DL (ref 8.5–10.1)
CHLORIDE SERPL-SCNC: 105 MMOL/L (ref 94–109)
CO2 SERPL-SCNC: 26 MMOL/L (ref 20–32)
CREAT SERPL-MCNC: 0.67 MG/DL (ref 0.52–1.04)
GFR SERPL CREATININE-BSD FRML MDRD: 86 ML/MIN/1.7M2
GLUCOSE SERPL-MCNC: 164 MG/DL (ref 70–99)
POTASSIUM SERPL-SCNC: 4.3 MMOL/L (ref 3.4–5.3)
SODIUM SERPL-SCNC: 140 MMOL/L (ref 133–144)

## 2018-09-10 ENCOUNTER — ALLIED HEALTH/NURSE VISIT (OUTPATIENT)
Dept: FAMILY MEDICINE | Facility: CLINIC | Age: 78
End: 2018-09-10
Payer: COMMERCIAL

## 2018-09-10 VITALS — SYSTOLIC BLOOD PRESSURE: 124 MMHG | DIASTOLIC BLOOD PRESSURE: 78 MMHG

## 2018-09-10 DIAGNOSIS — Z01.30 BP CHECK: Primary | ICD-10-CM

## 2018-09-10 PROCEDURE — 99207 ZZC NO CHARGE NURSE ONLY: CPT | Performed by: FAMILY MEDICINE

## 2018-09-10 NOTE — PROGRESS NOTES
Ya Stahl is enrolled/participating in the retail pharmacy Blood Pressure Goals Achievement Program (BPGAP).  Ya Stahl was evaluated at [facility] on September 10, 2018 at which time her blood pressure was:    BP Readings from Last 3 Encounters:   09/10/18 124/78   08/23/18 130/78   08/08/18 156/90     Reviewed lifestyle modifications for blood pressure control and reduction: including making healthy food choices, managing weight, getting regular exercise, smoking cessation, reducing alcohol consumption, monitoring blood pressure regularly.     Ya Stahl is not experiencing symptoms.    Follow-Up: BP is at goal of < 140/90mmHg (per provider blood pressure goal).  Recommended follow-up at pharmacy in 6 months.     Recommendation to Provider: none    Ya Stahl was evaluated for enrollment into the PGEN study today.    Patient eligible for enrollment:  No  Patient interested in enrollment:  No    Completed by: Kristopher Orlando    Thank You   Larissa Diaz  [facility]

## 2018-09-10 NOTE — MR AVS SNAPSHOT
After Visit Summary   9/10/2018    Ya Stahl    MRN: [id number]           Patient Information     Date Of Birth          1940        Visit Information        Provider Department      9/10/2018 6:28 PM Scarlett Spencer MD [facility] [place]        Today's Diagnoses     BP check    -  1       Follow-ups after your visit        Your next 10 appointments already scheduled     Oct 23, 2018   Procedure with Aroldo Burdick MD   [facility] (--)    [address]  [facility] [address]   [phone]            Dec 20, 2018  8:10 AM CST   PHYSICAL with Scarlett Spencer MD   [facility] ([facility]    [address] [facility] [address] [facility] [address] [phone]            Apr 11, 2019  8:00 AM CDT   LAB with  LAB   [place] [facility] [place] ([facility])    [address] [facility] [address] [phone]           Please do not eat 10-12 hours before your appointment if you are coming in fasting for labs on lipids, cholesterol, or glucose (sugar). This does not apply to pregnant women. Water, hot tea and black coffee (with nothing added) are okay. Do not drink other fluids, diet soda or chew gum.            Apr 18, 2019  2:15 PM CDT   Return Visit with Vandana Kasper MD   [facility] ([facility])    [facility]  [address]  [facility] [id number] [phone]              Who to contact     If you have questions or need follow up information about today's clinic visit or your schedule please contact [facility] directly at [phone].  Normal or non-critical lab and imaging results will be communicated to you by MyChart, letter or phone within 4 business days after the clinic has received the results. If you do not hear from us within 7 days, please contact the clinic through MyChart or phone. If you have a  critical or abnormal lab result, we will notify you by phone as soon as possible.  Submit refill requests through [employer] or call your pharmacy and they will forward the refill request to us. Please allow 3 business days for your refill to be completed.          Additional Information About Your Visit        Care EveryWhere ID     This is your Care EveryWhere ID. This could be used by other organizations to access your [place] medical records  [id number]         Blood Pressure from Last 3 Encounters:   09/10/18 124/78   08/23/18 130/78   08/08/18 156/90    Weight from Last 3 Encounters:   08/23/18 135 lb 12.8 oz (61.6 kg)   08/08/18 135 lb 8.6 oz (61.5 kg)   07/17/18 131 lb 1.6 oz (59.5 kg)              Today, you had the following     No orders found for display         Today's Medication Changes          These changes are accurate as of 9/10/18  6:30 PM.  If you have any questions, ask your nurse or doctor.               These medicines have changed or have updated prescriptions.        Dose/Directions    MIRALAX powder   This may have changed:  See the new instructions.   Used for:  Chronic constipation   Generic drug:  polyethylene glycol        STIR 1 CAPFUL (17GM) IN 8 OZ OF LIQUID AND DRINK   Quantity:  1 BOTTLE   Refills:  1 YEAR                Primary Care Provider Office Phone # Fax #    Scarlett Spencer [phone] [address] [address] [facility] [address]        Equal Access to Services     [facility]: Hadii marquita lemus hadasho Soomkar, waaxda luqadaha, qaybta kaalmada odalis, waxay neeta greene . So [facility] [phone].    ATENCIÓN: Si habla español, tiene a costa disposición servicios gratuitos de asistencia lingüística. Llame al [phone].    We comply with applicable federal civil rights laws and [place] laws. We do not discriminate on the basis of race, color, national origin, age, disability, sex, sexual orientation, or gender identity.            Thank  you!     Thank you for choosing [facility]  for your care. Our goal is always to provide you with excellent care. Hearing back from our patients is one way we can continue to improve our services. Please take a few minutes to complete the written survey that you may receive in the mail after your visit with us. Thank you!             Your Updated Medication List - Protect others around you: Learn how to safely use, store and throw away your medicines at www.disposemymeds.org.          This list is accurate as of 9/10/18  6:30 PM.  Always use your most recent med list.                   Brand Name Dispense Instructions for use Diagnosis    acetaminophen 325 MG tablet    TYLENOL    60 tablet    Take 2 tablets (650 mg) by mouth every 6 hours as needed for mild pain or fever    Avascular necrosis of bone (H)       alendronate 70 MG tablet    FOSAMAX    12 tablet    Take 1 tablet (70 mg) by mouth every 7 days Take with over 8 ounces water and stay upright for at least 30 minutes after dose.  Take at least 60 minutes before breakfast    Osteoporosis, unspecified osteoporosis type, unspecified pathological fracture presence       ALEVE 220 MG tablet   Generic drug:  naproxen sodium      Take 220 mg by mouth 2 times daily (with meals)        aspirin 81 MG EC tablet     1 tablet    Take 1 tablet (81 mg) by mouth daily        atorvastatin 20 MG tablet    LIPITOR    102 tablet    Take 1 tablet (20 mg) by mouth daily    Hyperlipidemia LDL goal <70       calcium citrate-vitamin D 315-250 MG-UNIT Tabs per tablet    CITRACAL    120 tablet    Take 2 tablets in the morning and 1 tablet in the evening    Osteoporosis       cevimeline 30 MG capsule    EVOXAC     Take 30 mg by mouth 3 times daily Reported on 3/6/2017        CLEAR EYES COMPLETE OP      Apply 1 drop to eye 3 times daily as needed        DEXILANT 60 MG Cpdr CR capsule   Generic drug:  dexlansoprazole      60 mg daily    MGUS (monoclonal gammopathy of  unknown significance)       HERBALS      Cherry juice 1 tablespoon at night        ibuprofen 600 MG tablet    ADVIL/MOTRIN    60 tablet    Take 1 tablet (600 mg) by mouth every 6 hours as needed for moderate pain    Avascular necrosis of bone (H)       irbesartan 150 MG tablet    AVAPRO    102 tablet    Take 1 tablet (150 mg) by mouth daily    Hypertension goal BP (blood pressure) < 140/90       levothyroxine 88 MCG tablet    SYNTHROID/LEVOTHROID    102 tablet    TAKE ONE TABLET BY MOUTH EVERY DAY    Hypothyroidism, unspecified type       MELATONIN PO      Take 3 mg by mouth At Bedtime        MIRALAX powder   Generic drug:  polyethylene glycol     1 BOTTLE    STIR 1 CAPFUL (17GM) IN 8 OZ OF LIQUID AND DRINK    Chronic constipation       nitroGLYcerin 0.4 MG sublingual tablet    NITROSTAT    25 tablet    Place 1 tablet (0.4 mg) under the tongue every 5 minutes as needed for chest pain    Acute chest pain       OMEGA-3 FISH OIL PO      Take 1 g by mouth 2 times daily (with meals)        * OSTEO BI-FLEX ADV TRIPLE ST PO      Take 1 tablet by mouth daily        * LUTEIN 20 PO      Take 1 tablet by mouth daily        predniSONE 10 MG tablet    DELTASONE     Take 10 mg by mouth daily        ranitidine 300 MG tablet    ZANTAC     Take 300 mg by mouth At Bedtime        RESTASIS OP      Apply 1 drop to eye 2 times daily        SALONPAS ARTHRITIS PAIN RELIEF EX      Externally apply topically daily as needed        * Notice:  This list has 2 medication(s) that are the same as other medications prescribed for you. Read the directions carefully, and ask your doctor or other care provider to review them with you.

## 2018-10-04 ENCOUNTER — ALLIED HEALTH/NURSE VISIT (OUTPATIENT)
Dept: FAMILY MEDICINE | Facility: CLINIC | Age: 78
End: 2018-10-04
Payer: COMMERCIAL

## 2018-10-04 VITALS — DIASTOLIC BLOOD PRESSURE: 74 MMHG | SYSTOLIC BLOOD PRESSURE: 130 MMHG

## 2018-10-04 DIAGNOSIS — Z01.30 BP CHECK: Primary | ICD-10-CM

## 2018-10-04 PROCEDURE — 99207 ZZC NO CHARGE NURSE ONLY: CPT | Performed by: FAMILY MEDICINE

## 2018-10-04 NOTE — PROGRESS NOTES
Ya Stahl is enrolled/participating in the retail pharmacy Blood Pressure Goals Achievement Program (BPGAP).  Ya Stahl was evaluated at [facility] on October 4, 2018 at which time her blood pressure was:    BP Readings from Last 3 Encounters:   10/04/18 130/74   09/10/18 124/78   08/23/18 130/78     Reviewed lifestyle modifications for blood pressure control and reduction: including making healthy food choices, managing weight, getting regular exercise, smoking cessation, reducing alcohol consumption, monitoring blood pressure regularly.     Ya Stahl is not experiencing symptoms.    Follow-Up: BP is at goal of < 140/90mmHg (per provider blood pressure goal).  Recommended follow-up at pharmacy in 6 months.     Recommendation to Provider: none    Ya Stahl was evaluated for enrollment into the PGEN study today.    PLEASE INITIATE ENROLLMENT DISCUSSION WITH HTN PTS  1) Between 30-80 years old                                                                                                               2) BMI between 19-50                                                                                                        3) BP ?140/90 AND ?170/110 patients aged 30-59         BP ?150/90 AND ?170/110 non-diabetic patients aged 60-80       BP ?140/90 AND ?170/110 diabetic patients aged 60-80  4) Additional requirements for uncontrolled HTN patients:        Pt on only 1 class of medication  5) EXCLUDE patient if confirmation of:                  ? Cardiac disease                  ? Chronic Kidney Disease                  ? Pregnancy/Breastfeeding                  ? Secondary Hypertension/Pre-eclampsia                                 ? Vascular disease    Patient eligible for enrollment:  No  Patient interested in enrollment:  No    This note completed by: Kristopher Orlando    Thank You   Larissa Diaz  [facility]

## 2018-10-04 NOTE — MR AVS SNAPSHOT
After Visit Summary   10/4/2018    Ya Stahl    MRN: [id number]           Patient Information     Date Of Birth          1940        Visit Information        Provider Department      10/4/2018 12:23 PM Scarlett Spencer MD [place] [facility]        Today's Diagnoses     BP check    -  1       Follow-ups after your visit        Your next 10 appointments already scheduled     Oct 25, 2018  9:30 AM CDT   Pre-Op physical with MD [place] [facility] ([facility])    [address] [facility] [id number] [phone]            Oct 31, 2018   Procedure with Aroldo Burdick MD   [facility] PeriOp Services (--)    [address]  [facility] [id number] [phone]            Dec 20, 2018  8:10 AM CST   PHYSICAL with Scarlett Spencer MD   [place] [facility] ([facility])    [address] [facility] [id number] [phone]            Apr 11, 2019  8:00 AM CDT   LAB with  LAB   [place] [facility] ([facility])    [address] [facility] [address] [phone]           Please do not eat 10-12 hours before your appointment if you are coming in fasting for labs on lipids, cholesterol, or glucose (sugar). This does not apply to pregnant women. Water, hot tea and black coffee (with nothing added) are okay. Do not drink other fluids, diet soda or chew gum.            Apr 18, 2019  2:15 PM CDT   Return Visit with Vandana Kasper MD   [facility] ([facility])    [facility]  [address]  [facility] [id number] [phone]              Who to contact     If you have questions or need follow up information about today's clinic visit or your schedule please contact [place] [facility] directly at [phone].  Normal or non-critical lab and imaging results will be communicated to you  by MyChart, letter or phone within 4 business days after the clinic has received the results. If you do not hear from us within 7 days, please contact the clinic through MyChart or phone. If you have a critical or abnormal lab result, we will notify you by phone as soon as possible.  Submit refill requests through EasilyDo or call your pharmacy and they will forward the refill request to us. Please allow 3 business days for your refill to be completed.          Additional Information About Your Visit        Care EveryWhere ID     This is your Care EveryWhere ID. This could be used by other organizations to access your [facility] medical records  [id number]         Blood Pressure from Last 3 Encounters:   10/04/18 130/74   09/10/18 124/78   08/23/18 130/78    Weight from Last 3 Encounters:   08/23/18 135 lb 12.8 oz (61.6 kg)   08/08/18 135 lb 8.6 oz (61.5 kg)   07/17/18 131 lb 1.6 oz (59.5 kg)              Today, you had the following     No orders found for display         Today's Medication Changes          These changes are accurate as of 10/4/18 12:24 PM.  If you have any questions, ask your nurse or doctor.               These medicines have changed or have updated prescriptions.        Dose/Directions    MIRALAX powder   This may have changed:  See the new instructions.   Used for:  Chronic constipation   Generic drug:  polyethylene glycol        STIR 1 CAPFUL (17GM) IN 8 OZ OF LIQUID AND DRINK   Quantity:  1 BOTTLE   Refills:  1 YEAR                Primary Care Provider Office Phone # Fax #    Scarlett Spencer [phone] [address]  [facility] [address]        Equal Access to Services     [facility]: Hadii marquita lemus hadasho Soomaali, waaxda luqadaha, qaybta kaalmada fritz jacobo. So [facility] [phone].    ATENCIÓN: Si habla español, tiene a costa disposición servicios gratuitos de asistencia lingüística. Llame al [phone].    We comply with  applicable federal civil rights laws and [place] laws. We do not discriminate on the basis of race, color, national origin, age, disability, sex, sexual orientation, or gender identity.            Thank you!     Thank you for choosing [facility]  for your care. Our goal is always to provide you with excellent care. Hearing back from our patients is one way we can continue to improve our services. Please take a few minutes to complete the written survey that you may receive in the mail after your visit with us. Thank you!             Your Updated Medication List - Protect others around you: Learn how to safely use, store and throw away your medicines at www.disposemymeds.org.          This list is accurate as of 10/4/18 12:24 PM.  Always use your most recent med list.                   Brand Name Dispense Instructions for use Diagnosis    acetaminophen 325 MG tablet    TYLENOL    60 tablet    Take 2 tablets (650 mg) by mouth every 6 hours as needed for mild pain or fever    Avascular necrosis of bone (H)       alendronate 70 MG tablet    FOSAMAX    12 tablet    Take 1 tablet (70 mg) by mouth every 7 days Take with over 8 ounces water and stay upright for at least 30 minutes after dose.  Take at least 60 minutes before breakfast    Osteoporosis, unspecified osteoporosis type, unspecified pathological fracture presence       ALEVE 220 MG tablet   Generic drug:  naproxen sodium      Take 220 mg by mouth 2 times daily (with meals)        aspirin 81 MG EC tablet     1 tablet    Take 1 tablet (81 mg) by mouth daily        atorvastatin 20 MG tablet    LIPITOR    102 tablet    Take 1 tablet (20 mg) by mouth daily    Hyperlipidemia LDL goal <70       calcium citrate-vitamin D 315-250 MG-UNIT Tabs per tablet    CITRACAL    120 tablet    Take 2 tablets in the morning and 1 tablet in the evening    Osteoporosis       cevimeline 30 MG capsule    EVOXAC     Take 30 mg by mouth 3 times daily Reported on 3/6/2017         CLEAR EYES COMPLETE OP      Apply 1 drop to eye 3 times daily as needed        DEXILANT 60 MG Cpdr CR capsule   Generic drug:  dexlansoprazole      60 mg daily    MGUS (monoclonal gammopathy of unknown significance)       HERBALS      Cherry juice 1 tablespoon at night        ibuprofen 600 MG tablet    ADVIL/MOTRIN    60 tablet    Take 1 tablet (600 mg) by mouth every 6 hours as needed for moderate pain    Avascular necrosis of bone (H)       irbesartan 150 MG tablet    AVAPRO    102 tablet    Take 1 tablet (150 mg) by mouth daily    Hypertension goal BP (blood pressure) < 140/90       levothyroxine 88 MCG tablet    SYNTHROID/LEVOTHROID    102 tablet    TAKE ONE TABLET BY MOUTH EVERY DAY    Hypothyroidism, unspecified type       MELATONIN PO      Take 3 mg by mouth At Bedtime        MIRALAX powder   Generic drug:  polyethylene glycol     1 BOTTLE    STIR 1 CAPFUL (17GM) IN 8 OZ OF LIQUID AND DRINK    Chronic constipation       nitroGLYcerin 0.4 MG sublingual tablet    NITROSTAT    25 tablet    Place 1 tablet (0.4 mg) under the tongue every 5 minutes as needed for chest pain    Acute chest pain       OMEGA-3 FISH OIL PO      Take 1 g by mouth 2 times daily (with meals)        * OSTEO BI-FLEX ADV TRIPLE ST PO      Take 1 tablet by mouth daily        * LUTEIN 20 PO      Take 1 tablet by mouth daily        predniSONE 10 MG tablet    DELTASONE     Take 10 mg by mouth daily        ranitidine 300 MG tablet    ZANTAC     Take 300 mg by mouth At Bedtime        RESTASIS OP      Apply 1 drop to eye 2 times daily        SALONPAS ARTHRITIS PAIN RELIEF EX      Externally apply topically daily as needed        * Notice:  This list has 2 medication(s) that are the same as other medications prescribed for you. Read the directions carefully, and ask your doctor or other care provider to review them with you.

## 2018-10-17 RX ORDER — POLYETHYLENE GLYCOL 3350 17 G/17G
1 POWDER, FOR SOLUTION ORAL 2 TIMES DAILY
COMMUNITY

## 2018-10-18 ENCOUNTER — HOSPITAL ENCOUNTER (OUTPATIENT)
Dept: LAB | Facility: CLINIC | Age: 78
Discharge: HOME | End: 2018-10-18
Attending: ORTHOPAEDIC SURGERY | Admitting: ORTHOPAEDIC SURGERY
Payer: MEDICARE

## 2018-10-18 DIAGNOSIS — Z01.812 PRE-OPERATIVE LABORATORY EXAMINATION: ICD-10-CM

## 2018-10-18 LAB
MRSA DNA SPEC QL NAA+PROBE: NEGATIVE
SPECIMEN SOURCE: NORMAL

## 2018-10-18 PROCEDURE — 40000829 ZZHCL STATISTIC STAPH AUREUS SUSCEPT SCREEN PCR: Performed by: ORTHOPAEDIC SURGERY

## 2018-10-18 PROCEDURE — 87640 STAPH A DNA AMP PROBE: CPT | Performed by: ORTHOPAEDIC SURGERY

## 2018-10-18 PROCEDURE — 87641 MR-STAPH DNA AMP PROBE: CPT | Performed by: ORTHOPAEDIC SURGERY

## 2018-10-18 PROCEDURE — 40000830 ZZHCL STATISTIC STAPH AUREUS METH RESIST SCREEN PCR: Performed by: ORTHOPAEDIC SURGERY

## 2018-10-19 NOTE — PROVIDER NOTIFICATION
Nasal screen results MRSA negative, MSSA positive. Dr. Umanzor office notified, patient notified and instructed on Mupiricin ointment and extra showering, denies questions. Perscription for  Mupuricin ointment called into pharmacy of choice.

## 2018-10-23 ENCOUNTER — TRANSFERRED RECORDS (OUTPATIENT)
Dept: HEALTH INFORMATION MANAGEMENT | Facility: CLINIC | Age: 78
End: 2018-10-23

## 2018-10-25 ENCOUNTER — OFFICE VISIT (OUTPATIENT)
Dept: FAMILY MEDICINE | Facility: CLINIC | Age: 78
End: 2018-10-25
Payer: COMMERCIAL

## 2018-10-25 VITALS
OXYGEN SATURATION: 98 % | TEMPERATURE: 98 F | DIASTOLIC BLOOD PRESSURE: 72 MMHG | HEART RATE: 72 BPM | WEIGHT: 135.2 LBS | RESPIRATION RATE: 16 BRPM | SYSTOLIC BLOOD PRESSURE: 122 MMHG | HEIGHT: 65 IN | BODY MASS INDEX: 22.53 KG/M2

## 2018-10-25 DIAGNOSIS — D75.89 MACROCYTOSIS: ICD-10-CM

## 2018-10-25 DIAGNOSIS — I10 HYPERTENSION GOAL BP (BLOOD PRESSURE) < 140/90: ICD-10-CM

## 2018-10-25 DIAGNOSIS — T88.59XS COMPLICATION OF ANESTHESIA, SEQUELA: ICD-10-CM

## 2018-10-25 DIAGNOSIS — Z01.818 PREOP GENERAL PHYSICAL EXAM: Primary | ICD-10-CM

## 2018-10-25 DIAGNOSIS — E78.5 HYPERLIPIDEMIA LDL GOAL <70: ICD-10-CM

## 2018-10-25 DIAGNOSIS — R73.01 IMPAIRED FASTING GLUCOSE: ICD-10-CM

## 2018-10-25 DIAGNOSIS — M54.10 RADICULAR LOW BACK PAIN: ICD-10-CM

## 2018-10-25 LAB
ALBUMIN SERPL-MCNC: 3.7 G/DL (ref 3.4–5)
ALP SERPL-CCNC: 48 U/L (ref 40–150)
ALT SERPL W P-5'-P-CCNC: 25 U/L (ref 0–50)
ANION GAP SERPL CALCULATED.3IONS-SCNC: 10 MMOL/L (ref 3–14)
AST SERPL W P-5'-P-CCNC: 20 U/L (ref 0–45)
BILIRUB SERPL-MCNC: 1.3 MG/DL (ref 0.2–1.3)
BUN SERPL-MCNC: 16 MG/DL (ref 7–30)
CALCIUM SERPL-MCNC: 9.3 MG/DL (ref 8.5–10.1)
CHLORIDE SERPL-SCNC: 101 MMOL/L (ref 94–109)
CO2 SERPL-SCNC: 27 MMOL/L (ref 20–32)
CREAT SERPL-MCNC: 0.74 MG/DL (ref 0.52–1.04)
ERYTHROCYTE [DISTWIDTH] IN BLOOD BY AUTOMATED COUNT: 14.5 % (ref 10–15)
GFR SERPL CREATININE-BSD FRML MDRD: 76 ML/MIN/1.7M2
GLUCOSE SERPL-MCNC: 170 MG/DL (ref 70–99)
HCT VFR BLD AUTO: 43.9 % (ref 35–47)
HGB BLD-MCNC: 13.7 G/DL (ref 11.7–15.7)
MCH RBC QN AUTO: 32.4 PG (ref 26.5–33)
MCHC RBC AUTO-ENTMCNC: 31.2 G/DL (ref 31.5–36.5)
MCV RBC AUTO: 104 FL (ref 78–100)
PLATELET # BLD AUTO: 269 10E9/L (ref 150–450)
POTASSIUM SERPL-SCNC: 4 MMOL/L (ref 3.4–5.3)
PROT SERPL-MCNC: 7.1 G/DL (ref 6.8–8.8)
RBC # BLD AUTO: 4.23 10E12/L (ref 3.8–5.2)
SODIUM SERPL-SCNC: 138 MMOL/L (ref 133–144)
WBC # BLD AUTO: 6.5 10E9/L (ref 4–11)

## 2018-10-25 PROCEDURE — 99214 OFFICE O/P EST MOD 30 MIN: CPT | Performed by: FAMILY MEDICINE

## 2018-10-25 PROCEDURE — 85027 COMPLETE CBC AUTOMATED: CPT | Performed by: FAMILY MEDICINE

## 2018-10-25 PROCEDURE — 36415 COLL VENOUS BLD VENIPUNCTURE: CPT | Performed by: FAMILY MEDICINE

## 2018-10-25 PROCEDURE — 80053 COMPREHEN METABOLIC PANEL: CPT | Performed by: FAMILY MEDICINE

## 2018-10-25 NOTE — MR AVS SNAPSHOT
After Visit Summary   10/25/2018    Ya Stahl    MRN: [id number]           Patient Information     Date Of Birth          1940        Visit Information        Provider Department      10/25/2018 9:30 AM Scarlett Spencer MD [place] [facility]        Today's Diagnoses     Preop general physical exam    -  1    Complication of anesthesia, sequela        Hypertension goal BP (blood pressure) < 140/90        Hyperlipidemia LDL goal <70        Impaired fasting glucose          Care Instructions      Before Your Surgery      Call your surgeon if there is any change in your health. This includes signs of a cold or flu (such as a sore throat, runny nose, cough, rash or fever).    Do not smoke, drink alcohol or take over the counter medicine (unless your surgeon or primary care doctor tells you to) for the 24 hours before and after surgery.    If you take prescribed drugs: Follow your doctor s orders about which medicines to take and which to stop until after surgery.    Eating and drinking prior to surgery: follow the instructions from your surgeon    Take a shower or bath the night before surgery. Use the soap your surgeon gave you to gently clean your skin. If you do not have soap from your surgeon, use your regular soap. Do not shave or scrub the surgery site.  Wear clean pajamas and have clean sheets on your bed.       -----------------------------------------------------------------------------------      On the am of surgery, take your Dexilant.  OK to take your levothyroxine.  Take with sips of water.           Follow-ups after your visit        Your next 10 appointments already scheduled     Oct 31, 2018   [facility]    [address] [facility] [address]   [phone]            Dec 21, 2018  8:30 AM CST   [facility]    [address]  "[place]  [place] [address] [phone]            Apr 11, 2019  8:00 AM CDT   LAB with  LAB   [place] [facility] ([facility])    [address] [facility] [address] [phone]           Please do not eat 10-12 hours before your appointment if you are coming in fasting for labs on lipids, cholesterol, or glucose (sugar). This does not apply to pregnant women. Water, hot tea and black coffee (with nothing added) are okay. Do not drink other fluids, diet soda or chew gum.            Apr 18, 2019  2:15 PM CDT   Return Visit with Vandana Kasper MD   [facility] ([facility])    [facility]  [address] [place]  [address]  [place] [address] [phone]              Who to contact     If you have questions or need follow up information about today's clinic visit or your schedule please contact [facility] directly at [phone].  Normal or non-critical lab and imaging results will be communicated to you by MyChart, letter or phone within 4 business days after the clinic has received the results. If you do not hear from us within 7 days, please contact the clinic through MyChart or phone. If you have a critical or abnormal lab result, we will notify you by phone as soon as possible.  Submit refill requests through Aligot or call your pharmacy and they will forward the refill request to us. Please allow 3 business days for your refill to be completed.          Additional Information About Your Visit        Care EveryWhere ID     This is your Care EveryWhere ID. This could be used by other organizations to access your [place] medical records  [id number]        Your Vitals Were     Pulse Temperature Respirations Height Pulse Oximetry BMI (Body Mass Index)    72 98  F (36.7  C) (Oral) 16 5' 5.25\" (1.657 m) 98% 22.33 kg/m2       Blood Pressure from Last 3 Encounters:   10/25/18 122/72 "   10/04/18 130/74   09/10/18 124/78    Weight from Last 3 Encounters:   10/25/18 135 lb 3.2 oz (61.3 kg)   08/23/18 135 lb 12.8 oz (61.6 kg)   08/08/18 135 lb 8.6 oz (61.5 kg)              We Performed the Following     CBC with platelets     Comprehensive metabolic panel        Primary Care Provider Office Phone # Fax #    Scarlett Spencer [phone] [address] [facility] [address]        Equal Access to Services     [facility]: Hadii aad ku hadasho Soomaali, waaxda luqadaha, qaybta kaalmada adeegyada, fritz greene . So [facility] [phone].    ATENCIÓN: Si habla español, tiene a costa disposición servicios gratuitos de asistencia lingüística. [facility] [phone].    We comply with applicable federal civil rights laws and [place] laws. We do not discriminate on the basis of race, color, national origin, age, disability, sex, sexual orientation, or gender identity.            Thank you!     Thank you for choosing [facility]  for your care. Our goal is always to provide you with excellent care. Hearing back from our patients is one way we can continue to improve our services. Please take a few minutes to complete the written survey that you may receive in the mail after your visit with us. Thank you!             Your Updated Medication List - Protect others around you: Learn how to safely use, store and throw away your medicines at www.disposemymeds.org.          This list is accurate as of 10/25/18 10:06 AM.  Always use your most recent med list.                   Brand Name Dispense Instructions for use Diagnosis    alendronate 70 MG tablet    FOSAMAX    12 tablet    Take 1 tablet (70 mg) by mouth every 7 days Take with over 8 ounces water and stay upright for at least 30 minutes after dose.  Take at least 60 minutes before breakfast    Osteoporosis, unspecified osteoporosis type, unspecified pathological fracture presence       aspirin 81 MG  EC tablet     1 tablet    Take 1 tablet (81 mg) by mouth daily        atorvastatin 20 MG tablet    LIPITOR    102 tablet    Take 1 tablet (20 mg) by mouth daily    Hyperlipidemia LDL goal <70       AVAPRO PO      Take 150 mg by mouth every evening        calcium citrate-vitamin D 315-250 MG-UNIT Tabs per tablet    CITRACAL    120 tablet    Take 2 tablets in the morning and 1 tablet in the evening    Osteoporosis       cevimeline 30 MG capsule    EVOXAC     Take 30 mg by mouth 3 times daily Reported on 3/6/2017        CLEAR EYES COMPLETE OP      Apply 1 drop to eye 3 times daily as needed        DEXILANT 60 MG Cpdr CR capsule   Generic drug:  dexlansoprazole      60 mg 2 times daily    MGUS (monoclonal gammopathy of unknown significance)       HERBALS      Cherry juice 1 tablespoon at night PRN        levothyroxine 88 MCG tablet    SYNTHROID/LEVOTHROID    102 tablet    TAKE ONE TABLET BY MOUTH EVERY DAY    Hypothyroidism, unspecified type       LUTEIN 20 PO      Take 1 tablet by mouth daily        MELATONIN PO      Take 3 mg by mouth At Bedtime        MIRALAX powder   Generic drug:  polyethylene glycol      Take 1 capful by mouth 2 times daily In 8 ounces of liquid        mupirocin 2 % nasal ointment    BACTROBAN     Spray 1 g into both nostrils 2 times daily Apply small amount to each nostril 2 times a day for 7 days prior to surgery        nitroGLYcerin 0.4 MG sublingual tablet    NITROSTAT    25 tablet    Place 1 tablet (0.4 mg) under the tongue every 5 minutes as needed for chest pain    Acute chest pain       OMEGA-3 FISH OIL PO      Take 1 g by mouth 2 times daily (with meals)        ranitidine 300 MG tablet    ZANTAC     Take 300 mg by mouth At Bedtime        REFRESH LIQUIGEL OP      Apply 1 drop to eye every evening 15 minutes after Restasis drops.        RESTASIS OP      Apply 1 drop to eye every evening        TYLENOL PO      Take 1,500 mg by mouth daily as needed for mild pain or fever

## 2018-10-25 NOTE — PROGRESS NOTES
[facility]  [address] [facility] [id number] [phone]  Dept: [phone]    PRE-OP EVALUATION:  Today's date: 10/25/2018    Ya Stahl (: 1940) presents for pre-operative evaluation assessment as requested by Dr. Burdick  She requires evaluation and anesthesia risk assessment prior to undergoing surgery/procedure for treatment of   Central L3-4 L4-5 lamenectomies L4-5 posterior instrumented fusion  Fax number for surgical facility: [facility]  Primary Physician: Scarlett Spencer  Type of Anesthesia Anticipated: general    Patient has a Health Care Directive or Living Will:  YES     Preop Questions 10/25/2018   Who is doing your surgery? Sarah   What are you having done? back surgury forpinched nerve   Date of Surgery/Procedure:    Facility or Hospital where procedure/surgery will be performed: [facility]   1.  Do you have a history of Heart attack, stroke, stent, coronary bypass surgery, or other heart surgery? YES  -    2.  Do you ever have any pain or discomfort in your chest? No   3.  Do you have a history of  Heart Failure? No   4.   Are you troubled by shortness of breath when:  walking on a level surface, or up a slight hill, or at night? No   5.  Do you currently have a cold, bronchitis or other respiratory infection? No   6.  Do you have a cough, shortness of breath, or wheezing? YES - chronic cough. Not worse now.    7.  Do you sometimes get pains in the calves of your legs when you walk? No   8. Do you or anyone in your family have previous history of blood clots? UNKNOWN -    9.  Do you or does anyone in your family have a serious bleeding problem such as prolonged bleeding following surgeries or cuts? YES - she notes bleeding with cuts or scratches. (asa and fish oil now on hold)   10. Have you ever had problems with anemia or been told to take iron pills? No   11. Have you had any abnormal blood loss such as black, tarry or bloody  stools, or abnormal vaginal bleeding? No   12. Have you ever had a blood transfusion? No   13. Have you or any of your relatives ever had problems with anesthesia? YES - difficulty waking up; N/V   14. Do you have sleep apnea, excessive snoring or daytime drowsiness? No   15. Do you have any prosthetic heart valves? No   16. Do you have prosthetic joints? No   17. Is there any chance that you may be pregnant? No         HPI:     HPI related to upcoming procedure: Low back pain going down right leg.      See problem list for active medical problems.  Problems all longstanding and stable, except as noted/documented.  See ROS for pertinent symptoms related to these conditions.                                                                                                                                                          .    MEDICAL HISTORY:     Patient Active Problem List    Diagnosis Date Noted     Health Care Home 05/09/2012     Priority: High           Diagnosis Specialist Due to be seen Following   cough ENT; [facility]; Dr. Sharita Londono. Inhalers.    [facility]                   DX V65.8 REPLACED WITH [id number] (04/08/2013)       Hypertension goal BP (blood pressure) < 140/90 02/20/2018     Priority: Medium     Epigastric pain 10/19/2017     Priority: Medium     MGUS (monoclonal gammopathy of unknown significance) 01/14/2017     Priority: Medium     Monoclonal paraproteinemia 01/12/2017     Priority: Medium     Macrocytosis 12/01/2016     Priority: Medium     ASCVD (arteriosclerotic cardiovascular disease) 03/09/2016     Priority: Medium     AMI (acute myocardial infarction) (H) 03/04/2016     Priority: Medium     Gastroesophageal reflux disease without esophagitis 05/20/2015     Priority: Medium     Osteoporosis 08/12/2014     Priority: Medium     started alendronate Aug 2014       Pubic ramus fracture (H) 04/10/2014     Priority: Medium     Per report from DR. Julien at  "[facility] in [place], [place], XR showed a closed right superior/inferior pubic ramus fracture.        Constipation 04/07/2014     Priority: Medium     Status post-operative repair of hip fracture, RIGHT 04/01/2014     Priority: Medium     Impaired fasting glucose 12/09/2012     Priority: Medium     Chronic cough 09/10/2012     Priority: Medium     HL (hearing loss) 12/01/2011     Priority: Medium     Mixed incontinence 12/01/2011     Priority: Medium     Advanced directives, counseling/discussion 06/02/2011     Priority: Medium     Advance Directive Problem List Overview:   Name Relationship Phone    Primary Health Care Agent            Alternative Health Care Agent          Advance Directive received and scanned. Click on Code in the patient header to view. 6/2/2011     Was already on file here.       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 Provider to review        Past Medical History:   Diagnosis Date     Arthritis      Complication of anesthesia     \"hard time waking up / N & V\"     Coronary artery disease      Displacement of lumbar intervertebral disc without myelopathy      Gastro-oesophageal reflux disease      Hearing loss     has bilateral aids     Heart attack (H) 3/2016     History of angina      Hypertension      Low back pain      Numbness and tingling     down right leg (associated with back pain)     PONV (postoperative nausea and vomiting)      Sicca syndrome (H)      Sjogren's syndrome (H)     sees specialist; dentist     Unspecified hypothyroidism      Past Surgical " History:   Procedure Laterality Date     ARTHROSCOPY KNEE  10/5/2012    R Procedure: ARTHROSCOPY KNEE;  RIGHT KNEE ARTHROSCOPY, PARTIAL MEDIAL MENISCECTOMY;  Surgeon: Karli Zaragoza MD;  Location:  SD     BUNIONECTOMY  ~2010    L foot.      C NONSPECIFIC PROCEDURE  1976    D&C     CATARACT IOL, RT/LT Bilateral 07/2017     COLONOSCOPY N/A 1/17/2017    normal; no repeat Procedure: COLONOSCOPY;  Surgeon: Fan Giordano MD;  Location:  GI     ENDOSCOPIC RELEASE CARPAL TUNNEL  9/11/2012    R Procedure: ENDOSCOPIC RELEASE CARPAL TUNNEL;  Right Endoscopic carpal tunnel release, left ringer finger cortizon injection;  Surgeon: Anne Marie Catherine MD;  Location:  OR     ESOPHAGOSCOPY, GASTROSCOPY, DUODENOSCOPY (EGD), COMBINED N/A 12/26/2014    Procedure: COMBINED ESOPHAGOSCOPY, GASTROSCOPY, DUODENOSCOPY (EGD);  Surgeon: Fan Giordano MD;  Location:  GI     EXCISE EXOSTOSIS FOOT Left 12/1/2016    Procedure: EXCISE EXOSTOSIS FOOT;  Surgeon: Jody Gallagher, DPM, Pod;  Location:  OR     GYN SURGERY  1978    ovaries removed     HEART CATH, ANGIOPLASTY  3/4/16    dz <40%     HYSTERECTOMY, PAP NO LONGER INDICATED  1977    Hysterectomy; benign     INJECT STEROID (LOCATION)  9/11/2012    Procedure: INJECT STEROID (LOCATION);;  Surgeon: Anne Marie Catherine MD;  Location:  OR     LAPAROSCOPIC CHOLECYSTECTOMY N/A 4/7/2016    Procedure: LAPAROSCOPIC CHOLECYSTECTOMY;  Surgeon: Hans Medina MD;  Location:  OR     ORTHOPEDIC SURGERY  ~2008    Right foot, bunionectomy      RECONSTRUCT FOREFOOT WITH METATARSOPHALANGEAL (MTP) FUSION Left 4/28/2017    Procedure: RECONSTRUCT FOREFOOT WITH METATARSOPHALANGEAL (MTP) FUSION;  1. Resection arthroplasty, fifth metatarsophalangeal joint, left foot.   2. Irrigation and debridement of fifth metatarsophalangeal joint, left foot (excisional to the level of the bone).     ;  Surgeon: Fabián Phipps MD;  Location:  OR     RIGHT HIP ORIF 4/1/2014       ROTATOR CUFF REPAIR  RT/LT  ~1998    Lt shoulder; rotator cuff     SURGICAL HISTORY OF -   distant past    ablation for palpitations.     SURGICAL HISTORY OF -   June 2015    left carpal tunnel surgery     Current Outpatient Prescriptions   Medication Sig Dispense Refill     Acetaminophen (TYLENOL PO) Take 1,500 mg by mouth daily as needed for mild pain or fever       alendronate (FOSAMAX) 70 MG tablet Take 1 tablet (70 mg) by mouth every 7 days Take with over 8 ounces water and stay upright for at least 30 minutes after dose.  Take at least 60 minutes before breakfast 12 tablet 3     aspirin 81 MG EC tablet Take 1 tablet (81 mg) by mouth daily 1 tablet 0     atorvastatin (LIPITOR) 20 MG tablet Take 1 tablet (20 mg) by mouth daily 102 tablet 3     calcium citrate-vitamin D (CITRACAL) 315-250 MG-UNIT TABS per tablet Take 2 tablets in the morning and 1 tablet in the evening 120 tablet      Carboxymethylcellulose Sodium (REFRESH LIQUIGEL OP) Apply 1 drop to eye every evening 15 minutes after Restasis drops.       cevimeline (EVOXAC) 30 MG capsule Take 30 mg by mouth 3 times daily Reported on 3/6/2017       CycloSPORINE (RESTASIS OP) Apply 1 drop to eye every evening        DEXILANT 60 MG CPDR CR capsule 60 mg daily        HERBALS Cherry juice 1 tablespoon at night PRN       Hyprom-Naphaz-Polysorb-Zn Sulf (CLEAR EYES COMPLETE OP) Apply 1 drop to eye 3 times daily as needed       Irbesartan (AVAPRO PO) Take 150 mg by mouth every evening       levothyroxine (SYNTHROID/LEVOTHROID) 88 MCG tablet TAKE ONE TABLET BY MOUTH EVERY  tablet 3     MELATONIN PO Take 3 mg by mouth At Bedtime       Misc Natural Products (LUTEIN 20 PO) Take 1 tablet by mouth daily       mupirocin (BACTROBAN) 2 % nasal ointment Spray 1 g into both nostrils 2 times daily Apply small amount to each nostril 2 times a day for 7 days prior to surgery       nitroGLYcerin (NITROSTAT) 0.4 MG sublingual tablet Place 1 tablet (0.4 mg) under the tongue every 5 minutes as needed  "for chest pain 25 tablet 0     Omega-3 Fatty Acids (OMEGA-3 FISH OIL PO) Take 1 g by mouth 2 times daily (with meals)        polyethylene glycol (MIRALAX) powder Take 1 capful by mouth 2 times daily In 8 ounces of liquid       ranitidine (ZANTAC) 300 MG tablet Take 300 mg by mouth At Bedtime        Asprin and fish oil on hold.   OTC products: None, except as noted above    Allergies   Allergen Reactions     Codeine      fatigue     Vicodin [Acetaminophen] Fatigue      Latex Allergy: NO    Social History   Substance Use Topics     Smoking status: Never Smoker     Smokeless tobacco: Never Used     Alcohol use Yes      Comment: social; maybe a glass of wine or highball per week      History   Drug Use No       REVIEW OF SYSTEMS:   CONSTITUTIONAL: NEGATIVE for fever, chills, change in weight x some weight gain with decreased exercise.  ENT/MOUTH: NEGATIVE for ear, mouth and throat problems  RESP: NEGATIVE for significant cough or SOB  CV: NEGATIVE for chest pain, palpitations or peripheral edema  No nausea, vomitting or change in bowel habits.  No urinary symptoms.    Started some gel yesterday due to being positive for SA.        EXAM:   /72 (BP Location: Right arm, Cuff Size: Adult Regular)  Pulse 72  Temp 98  F (36.7  C) (Oral)  Resp 16  Ht 5' 5.25\" (1.657 m)  Wt 135 lb 3.2 oz (61.3 kg)  SpO2 98%  BMI 22.33 kg/m2  GENERAL APPEARANCE: alert and no distress  HENT: ear canals and TM's normal and nose and mouth without ulcers or lesions  RESP: lungs clear to auscultation - no rales, rhonchi or wheezes  CV: regular rates and rhythm  ABDOMEN: soft, nontender, no HSM or masses and bowel sounds normal  MS: no edema.  NEURO: Normal strength and tone, sensory exam grossly normal, mentation intact and speech normal  PSYCH: mentation appears normal and affect normal/bright    DIAGNOSTICS:   EKG: done in August: sinus bradycardia; otherwise normal  Lab:     Component      Latest Ref Rng & Units 10/25/2018   Sodium    "   133 - 144 mmol/L 138   Potassium      3.4 - 5.3 mmol/L 4.0   Chloride      94 - 109 mmol/L 101   Carbon Dioxide      20 - 32 mmol/L 27   Anion Gap      3 - 14 mmol/L 10   Glucose      70 - 99 mg/dL 170 (H)   Urea Nitrogen      7 - 30 mg/dL 16   Creatinine      0.52 - 1.04 mg/dL 0.74   GFR Estimate      >60 mL/min/1.7m2 76   GFR Estimate If Black      >60 mL/min/1.7m2 >90   Calcium      8.5 - 10.1 mg/dL 9.3   Bilirubin Total      0.2 - 1.3 mg/dL 1.3   Albumin      3.4 - 5.0 g/dL 3.7   Protein Total      6.8 - 8.8 g/dL 7.1   Alkaline Phosphatase      40 - 150 U/L 48   ALT      0 - 50 U/L 25   AST      0 - 45 U/L 20   WBC      4.0 - 11.0 10e9/L 6.5   RBC Count      3.8 - 5.2 10e12/L 4.23   Hemoglobin      11.7 - 15.7 g/dL 13.7   Hematocrit      35.0 - 47.0 % 43.9   MCV      78 - 100 fl 104 (H)   MCH      26.5 - 33.0 pg 32.4   MCHC      31.5 - 36.5 g/dL 31.2 (L)   RDW      10.0 - 15.0 % 14.5   Platelet Count      150 - 450 10e9/L 269     Recent Labs   Lab Test  08/23/18   0926  08/08/18   1138  04/13/18   0749   12/19/17   0751   12/13/16   0808  12/01/16   0955   08/23/16   1153   HGB   --   13.9  13.8   < >   --    < >  13.9   --    < >   --    PLT   --   260  265   < >   --    < >  287   --    < >   --    INR   --    --    --    --    --    --    --   0.96   --   1.00   NA  140  136  142   < >  138   < >   --    --    < >   --    POTASSIUM  4.3  4.3  4.4   < >  4.1   < >   --    --    < >   --    CR  0.67  0.73  0.82   < >  0.75   < >   --    --    < >   --    A1C   --    --    --    --   6.3*   --   5.9   --    --    --     < > = values in this interval not displayed.      Recent Labs   Lab Test  12/19/17   0751  12/13/16   0808   12/02/14   0722  12/05/13   0925   CHOL  178  157   < >  212*  213*   HDL  99  90   < >  93  85   LDL  60  43   < >  98  107   TRIG  97  121   < >  105  101   CHOLHDLRATIO   --    --    --   2.3  2.5    < > = values in this interval not displayed.       IMPRESSION:   Reason for  surgery/procedure: radicular low back pain.          The proposed surgical procedure is considered INTERMEDIATE risk.    REVISED CARDIAC RISK INDEX  The patient has the following serious cardiovascular risks for perioperative complications such as (MI, PE, VFib and 3  AV Block):  No serious cardiac risks  INTERPRETATION: 1 risks: Class II (low risk - 0.9% complication rate)    The patient has the following additional risks for perioperative complications:  Recent elevation of glucose  Macrocytosis; etiology unknown.      Preop general physical exam    - Comprehensive metabolic panel  - CBC with platelets    Radicular low back pain  Anticipating surgery.     Complication of anesthesia, sequela  She notes difficulty waking up.  Also with nausea and vomiting.   Does not do well with narcotics; N/V.     Hypertension goal BP (blood pressure) < 140/90  Controlled.   - Comprehensive metabolic panel    Hyperlipidemia LDL goal <70    - Comprehensive metabolic panel    Impaired fasting glucose    - Comprehensive metabolic panel    Macrocytosis  Newer with current labs. Normal 2 months ago.   Being followed for MGUS.      RECOMMENDATIONS:         Regarding medications:  On the am of surgery, take your Dexilant.  OK to take your levothyroxine.  Take with sips of water.       APPROVAL GIVEN to proceed with proposed procedure, without further diagnostic evaluation       Signed Electronically by: Scarlett Spencer MD, MD    Copy of this evaluation report is provided to requesting physician.    [facility] Preop Guidelines    Revised Cardiac Risk Index    Addendum: had sent note to Dr. Kasper regarding macrocytosis.  Received note back to keep her appointment; no change/eval needed currently.

## 2018-10-25 NOTE — PATIENT INSTRUCTIONS
Before Your Surgery      Call your surgeon if there is any change in your health. This includes signs of a cold or flu (such as a sore throat, runny nose, cough, rash or fever).    Do not smoke, drink alcohol or take over the counter medicine (unless your surgeon or primary care doctor tells you to) for the 24 hours before and after surgery.    If you take prescribed drugs: Follow your doctor s orders about which medicines to take and which to stop until after surgery.    Eating and drinking prior to surgery: follow the instructions from your surgeon    Take a shower or bath the night before surgery. Use the soap your surgeon gave you to gently clean your skin. If you do not have soap from your surgeon, use your regular soap. Do not shave or scrub the surgery site.  Wear clean pajamas and have clean sheets on your bed.       -----------------------------------------------------------------------------------      On the am of surgery, take your Dexilant.  OK to take your levothyroxine.  Take with sips of water.

## 2018-10-25 NOTE — PHARMACY-ADMISSION MEDICATION HISTORY
Admission medication history interview status for this patient is complete. See [facility] admission navigator for allergy information, prior to admission medications and immunization status.     Med rec completed by pre admitting RN         Prior to Admission medications    Medication Sig Last Dose Taking? Auth Provider   Acetaminophen (TYLENOL PO) Take 1,500 mg by mouth daily as needed for mild pain or fever  Yes Reported, Patient   alendronate (FOSAMAX) 70 MG tablet Take 1 tablet (70 mg) by mouth every 7 days Take with over 8 ounces water and stay upright for at least 30 minutes after dose.  Take at least 60 minutes before breakfast  Yes Scarlett Spencer MD   atorvastatin (LIPITOR) 20 MG tablet Take 1 tablet (20 mg) by mouth daily  Yes Scarlett Spencer MD   calcium citrate-vitamin D (CITRACAL) 315-250 MG-UNIT TABS per tablet Take 2 tablets by mouth every morning  Yes Unknown, Entered By History   calcium citrate-vitamin D (CITRACAL) 315-250 MG-UNIT TABS per tablet Take 1 tablet by mouth every evening  Yes Unknown, Entered By History   Carboxymethylcellulose Sodium (REFRESH LIQUIGEL OP) Apply 1 drop to eye every evening 15 minutes after Restasis drops.  Yes Reported, Patient   cevimeline (EVOXAC) 30 MG capsule Take 30 mg by mouth 3 times daily Reported on 3/6/2017  Yes Reported, Patient   CycloSPORINE (RESTASIS OP) Apply 1 drop to eye every evening   Yes Reported, Patient   DEXILANT 60 MG CPDR CR capsule 60 mg 2 times daily   Yes Reported, Patient   HERBALS Cherry juice 1 tablespoon at night PRN  Yes Reported, Patient   Hyprom-Naphaz-Polysorb-Zn Sulf (CLEAR EYES COMPLETE OP) Apply 1 drop to eye 3 times daily as needed  Yes Reported, Patient   Irbesartan (AVAPRO PO) Take 150 mg by mouth every evening  Yes Reported, Patient   levothyroxine (SYNTHROID/LEVOTHROID) 88 MCG tablet TAKE ONE TABLET BY MOUTH EVERY DAY  Yes Scarlett Spencer MD   MELATONIN PO Take 3 mg by mouth At Bedtime  Yes Reported, Patient   Misc Natural Products  (LUTEIN 20 PO) Take 1 tablet by mouth daily  Yes Reported, Patient   mupirocin (BACTROBAN) 2 % nasal ointment Spray 1 g into both nostrils 2 times daily Apply small amount to each nostril 2 times a day for 7 days prior to surgery  Yes Reported, Patient   nitroGLYcerin (NITROSTAT) 0.4 MG sublingual tablet Place 1 tablet (0.4 mg) under the tongue every 5 minutes as needed for chest pain  Yes Scarlett Spencer MD   polyethylene glycol (MIRALAX) powder Take 1 capful by mouth 2 times daily In 8 ounces of liquid  Yes Reported, Patient   ranitidine (ZANTAC) 300 MG tablet Take 300 mg by mouth At Bedtime   Yes Reported, Patient   aspirin 81 MG EC tablet Take 1 tablet (81 mg) by mouth daily   Scarlett Spencer MD   Omega-3 Fatty Acids (OMEGA-3 FISH OIL PO) Take 1 g by mouth 2 times daily (with meals)    Reported, Patient          Alia Lynn RN Wed Oct 17, 2018  3:58 PM

## 2018-10-29 ENCOUNTER — TELEPHONE (OUTPATIENT)
Dept: FAMILY MEDICINE | Facility: CLINIC | Age: 78
End: 2018-10-29

## 2018-10-29 ENCOUNTER — ANESTHESIA EVENT (OUTPATIENT)
Dept: SURGERY | Facility: CLINIC | Age: 78
DRG: 460 | End: 2018-10-29
Payer: MEDICARE

## 2018-10-29 NOTE — TELEPHONE ENCOUNTER
Discussed her elevated glucose; we had been following it. She is not fasting.  Might want to consider medication in the future; but not 2 days prior to surgery.    Also discussed MCV. Was normal in August.  No new medications other than Avapro.  (had been on losartan)  She has perhaps a glass of wine per week.  No recent blood loss.    Is being followed by Hematology; will send copy of labs to them.  At this time, not sure of explanation.         Calling regarding recent lab:  Component      Latest Ref Rng & Units 10/25/2018   Glucose      70 - 99 mg/dL 170 (H)   Urea Nitrogen      7 - 30 mg/dL 16   Creatinine      0.52 - 1.04 mg/dL 0.74   GFR Estimate      >60 mL/min/1.7m2 76   GFR Estimate If Black      >60 mL/min/1.7m2 >90   Calcium      8.5 - 10.1 mg/dL 9.3   Bilirubin Total      0.2 - 1.3 mg/dL 1.3   Albumin      3.4 - 5.0 g/dL 3.7   Protein Total      6.8 - 8.8 g/dL 7.1   Alkaline Phosphatase      40 - 150 U/L 48   ALT      0 - 50 U/L 25   AST      0 - 45 U/L 20   WBC      4.0 - 11.0 10e9/L 6.5   RBC Count      3.8 - 5.2 10e12/L 4.23   Hemoglobin      11.7 - 15.7 g/dL 13.7   Hematocrit      35.0 - 47.0 % 43.9   MCV      78 - 100 fl 104 (H)   MCH      26.5 - 33.0 pg 32.4   MCHC      31.5 - 36.5 g/dL 31.2 (L)   RDW      10.0 - 15.0 % 14.5   Platelet Count      150 - 450 10e9/L 269       Lab Results   Component Value Date    A1C 6.3 12/19/2017    A1C 5.9 12/13/2016    A1C 5.8 12/02/2014    A1C 5.7 12/05/2013

## 2018-10-30 ENCOUNTER — HOSPITAL ENCOUNTER (OUTPATIENT)
Dept: LAB | Facility: CLINIC | Age: 78
Discharge: HOME | DRG: 460 | End: 2018-10-30
Attending: ORTHOPAEDIC SURGERY | Admitting: ORTHOPAEDIC SURGERY
Payer: MEDICARE

## 2018-10-30 DIAGNOSIS — Z01.812 PRE-OPERATIVE LABORATORY EXAMINATION: ICD-10-CM

## 2018-10-30 LAB
ABO + RH BLD: NORMAL
ABO + RH BLD: NORMAL
BLD GP AB SCN SERPL QL: NORMAL
BLOOD BANK CMNT PATIENT-IMP: NORMAL
SPECIMEN EXP DATE BLD: NORMAL

## 2018-10-30 PROCEDURE — 86900 BLOOD TYPING SEROLOGIC ABO: CPT | Performed by: ORTHOPAEDIC SURGERY

## 2018-10-30 PROCEDURE — 86901 BLOOD TYPING SEROLOGIC RH(D): CPT | Performed by: ORTHOPAEDIC SURGERY

## 2018-10-30 PROCEDURE — 86850 RBC ANTIBODY SCREEN: CPT | Performed by: ORTHOPAEDIC SURGERY

## 2018-10-30 PROCEDURE — 36415 COLL VENOUS BLD VENIPUNCTURE: CPT | Performed by: ORTHOPAEDIC SURGERY

## 2018-10-30 NOTE — H&P (VIEW-ONLY)
[facility]  [address] [id number] [phone]  Dept: [phone]    PRE-OP EVALUATION:  Today's date: 10/25/2018    Ya Stahl (: 1940) presents for pre-operative evaluation assessment as requested by Dr. Burdick  She requires evaluation and anesthesia risk assessment prior to undergoing surgery/procedure for treatment of   Central L3-4 L4-5 lamenectomies L4-5 posterior instrumented fusion  Fax number for surgical facility: [facility]  Primary Physician: Scarlett Spencer  Type of Anesthesia Anticipated: general    Patient has a Health Care Directive or Living Will:  YES     Preop Questions 10/25/2018   Who is doing your surgery? Sarah   What are you having done? back surgury forpinched nerve   Date of Surgery/Procedure:    Facility or Hospital where procedure/surgery will be performed: [facility]   1.  Do you have a history of Heart attack, stroke, stent, coronary bypass surgery, or other heart surgery? YES  -    2.  Do you ever have any pain or discomfort in your chest? No   3.  Do you have a history of  Heart Failure? No   4.   Are you troubled by shortness of breath when:  walking on a level surface, or up a slight hill, or at night? No   5.  Do you currently have a cold, bronchitis or other respiratory infection? No   6.  Do you have a cough, shortness of breath, or wheezing? YES - chronic cough. Not worse now.    7.  Do you sometimes get pains in the calves of your legs when you walk? No   8. Do you or anyone in your family have previous history of blood clots? UNKNOWN -    9.  Do you or does anyone in your family have a serious bleeding problem such as prolonged bleeding following surgeries or cuts? YES - she notes bleeding with cuts or scratches. (asa and fish oil now on hold)   10. Have you ever had problems with anemia or been told to take iron pills? No   11. Have you had any abnormal blood loss such as black, tarry or bloody  stools, or abnormal vaginal bleeding? No   12. Have you ever had a blood transfusion? No   13. Have you or any of your relatives ever had problems with anesthesia? YES - difficulty waking up; N/V   14. Do you have sleep apnea, excessive snoring or daytime drowsiness? No   15. Do you have any prosthetic heart valves? No   16. Do you have prosthetic joints? No   17. Is there any chance that you may be pregnant? No         HPI:     HPI related to upcoming procedure: Low back pain going down right leg.      See problem list for active medical problems.  Problems all longstanding and stable, except as noted/documented.  See ROS for pertinent symptoms related to these conditions.                                                                                                                                                          .    MEDICAL HISTORY:     Patient Active Problem List    Diagnosis Date Noted     Health Care Home 05/09/2012     Priority: High           Diagnosis Specialist Due to be seen Following   cough ENT; [facility]; Dr. Sharita Londono. Inhalers.    [facility]                   DX V65.8 REPLACED WITH [facility] (04/08/2013)       Hypertension goal BP (blood pressure) < 140/90 02/20/2018     Priority: Medium     Epigastric pain 10/19/2017     Priority: Medium     MGUS (monoclonal gammopathy of unknown significance) 01/14/2017     Priority: Medium     Monoclonal paraproteinemia 01/12/2017     Priority: Medium     Macrocytosis 12/01/2016     Priority: Medium     ASCVD (arteriosclerotic cardiovascular disease) 03/09/2016     Priority: Medium     AMI (acute myocardial infarction) (H) 03/04/2016     Priority: Medium     Gastroesophageal reflux disease without esophagitis 05/20/2015     Priority: Medium     Osteoporosis 08/12/2014     Priority: Medium     started alendronate Aug 2014       Pubic ramus fracture (H) 04/10/2014     Priority: Medium     Per report from DR. Julien at  "[facility] in [place], [place], XR showed a closed right superior/inferior pubic ramus fracture.        Constipation 04/07/2014     Priority: Medium     Status post-operative repair of hip fracture, RIGHT 04/01/2014     Priority: Medium     Impaired fasting glucose 12/09/2012     Priority: Medium     Chronic cough 09/10/2012     Priority: Medium     HL (hearing loss) 12/01/2011     Priority: Medium     Mixed incontinence 12/01/2011     Priority: Medium     Advanced directives, counseling/discussion 06/02/2011     Priority: Medium     Advance Directive Problem List Overview:   Name Relationship Phone    Primary Health Care Agent            Alternative Health Care Agent          Advance Directive received and scanned. Click on Code in the patient header to view. 6/2/2011     Was already on file here.       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 Provider to review        Past Medical History:   Diagnosis Date     Arthritis      Complication of anesthesia     \"hard time waking up / N & V\"     Coronary artery disease      Displacement of lumbar intervertebral disc without myelopathy      Gastro-oesophageal reflux disease      Hearing loss     has bilateral aids     Heart attack (H) 3/2016     History of angina      Hypertension      Low back pain      Numbness and tingling     down right leg (associated with back pain)     PONV (postoperative nausea and vomiting)      Sicca syndrome (H)      Sjogren's syndrome (H)     sees specialist; dentist     Unspecified hypothyroidism      Past Surgical " History:   Procedure Laterality Date     ARTHROSCOPY KNEE  10/5/2012    R Procedure: ARTHROSCOPY KNEE;  RIGHT KNEE ARTHROSCOPY, PARTIAL MEDIAL MENISCECTOMY;  Surgeon: Karli Zaragoza MD;  Location:  [facility]     BUNIONECTOMY  ~2010    L foot.      C NONSPECIFIC PROCEDURE  1976    D&C     CATARACT IOL, RT/LT Bilateral 07/2017     COLONOSCOPY N/A 1/17/2017    normal; no repeat Procedure: COLONOSCOPY;  Surgeon: Fan Giordano MD;  Location:  GI     ENDOSCOPIC RELEASE CARPAL TUNNEL  9/11/2012    R Procedure: ENDOSCOPIC RELEASE CARPAL TUNNEL;  Right Endoscopic carpal tunnel release, left ringer finger cortizon injection;  Surgeon: Anne Marie Catherine MD;  Location:  OR     ESOPHAGOSCOPY, GASTROSCOPY, DUODENOSCOPY (EGD), COMBINED N/A 12/26/2014    Procedure: COMBINED ESOPHAGOSCOPY, GASTROSCOPY, DUODENOSCOPY (EGD);  Surgeon: Fan Giordano MD;  Location:  GI     EXCISE EXOSTOSIS FOOT Left 12/1/2016    Procedure: EXCISE EXOSTOSIS FOOT;  Surgeon: Jody Gallagher, DPM, Pod;  Location:  OR     GYN SURGERY  1978    ovaries removed     HEART CATH, ANGIOPLASTY  3/4/16    dz <40%     HYSTERECTOMY, PAP NO LONGER INDICATED  1977    Hysterectomy; benign     INJECT STEROID (LOCATION)  9/11/2012    Procedure: INJECT STEROID (LOCATION);;  Surgeon: Anne Marie Catherine MD;  Location:  OR     LAPAROSCOPIC CHOLECYSTECTOMY N/A 4/7/2016    Procedure: LAPAROSCOPIC CHOLECYSTECTOMY;  Surgeon: Hans Medina MD;  Location:  OR     ORTHOPEDIC SURGERY  ~2008    Right foot, bunionectomy      RECONSTRUCT FOREFOOT WITH METATARSOPHALANGEAL (MTP) FUSION Left 4/28/2017    Procedure: RECONSTRUCT FOREFOOT WITH METATARSOPHALANGEAL (MTP) FUSION;  1. Resection arthroplasty, fifth metatarsophalangeal joint, left foot.   2. Irrigation and debridement of fifth metatarsophalangeal joint, left foot (excisional to the level of the bone).     ;  Surgeon: Fabián Phipps MD;  Location:  OR     RIGHT HIP ORIF 4/1/2014       ROTATOR CUFF REPAIR  RT/LT  ~1998    Lt shoulder; rotator cuff     SURGICAL HISTORY OF -   distant past    ablation for palpitations.     SURGICAL HISTORY OF -   June 2015    left carpal tunnel surgery     Current Outpatient Prescriptions   Medication Sig Dispense Refill     Acetaminophen (TYLENOL PO) Take 1,500 mg by mouth daily as needed for mild pain or fever       alendronate (FOSAMAX) 70 MG tablet Take 1 tablet (70 mg) by mouth every 7 days Take with over 8 ounces water and stay upright for at least 30 minutes after dose.  Take at least 60 minutes before breakfast 12 tablet 3     aspirin 81 MG EC tablet Take 1 tablet (81 mg) by mouth daily 1 tablet 0     atorvastatin (LIPITOR) 20 MG tablet Take 1 tablet (20 mg) by mouth daily 102 tablet 3     calcium citrate-vitamin D (CITRACAL) 315-250 MG-UNIT TABS per tablet Take 2 tablets in the morning and 1 tablet in the evening 120 tablet      Carboxymethylcellulose Sodium (REFRESH LIQUIGEL OP) Apply 1 drop to eye every evening 15 minutes after Restasis drops.       cevimeline (EVOXAC) 30 MG capsule Take 30 mg by mouth 3 times daily Reported on 3/6/2017       CycloSPORINE (RESTASIS OP) Apply 1 drop to eye every evening        DEXILANT 60 MG CPDR CR capsule 60 mg daily        HERBALS Cherry juice 1 tablespoon at night PRN       Hyprom-Naphaz-Polysorb-Zn Sulf (CLEAR EYES COMPLETE OP) Apply 1 drop to eye 3 times daily as needed       Irbesartan (AVAPRO PO) Take 150 mg by mouth every evening       levothyroxine (SYNTHROID/LEVOTHROID) 88 MCG tablet TAKE ONE TABLET BY MOUTH EVERY  tablet 3     MELATONIN PO Take 3 mg by mouth At Bedtime       Misc Natural Products (LUTEIN 20 PO) Take 1 tablet by mouth daily       mupirocin (BACTROBAN) 2 % nasal ointment Spray 1 g into both nostrils 2 times daily Apply small amount to each nostril 2 times a day for 7 days prior to surgery       nitroGLYcerin (NITROSTAT) 0.4 MG sublingual tablet Place 1 tablet (0.4 mg) under the tongue every 5 minutes as needed  "for chest pain 25 tablet 0     Omega-3 Fatty Acids (OMEGA-3 FISH OIL PO) Take 1 g by mouth 2 times daily (with meals)        polyethylene glycol (MIRALAX) powder Take 1 capful by mouth 2 times daily In 8 ounces of liquid       ranitidine (ZANTAC) 300 MG tablet Take 300 mg by mouth At Bedtime        Asprin and fish oil on hold.   OTC products: None, except as noted above    Allergies   Allergen Reactions     Codeine      fatigue     Vicodin [Acetaminophen] Fatigue      Latex Allergy: NO    Social History   Substance Use Topics     Smoking status: Never Smoker     Smokeless tobacco: Never Used     Alcohol use Yes      Comment: social; maybe a glass of wine or highball per week      History   Drug Use No       REVIEW OF SYSTEMS:   CONSTITUTIONAL: NEGATIVE for fever, chills, change in weight x some weight gain with decreased exercise.  ENT/MOUTH: NEGATIVE for ear, mouth and throat problems  RESP: NEGATIVE for significant cough or SOB  CV: NEGATIVE for chest pain, palpitations or peripheral edema  No nausea, vomitting or change in bowel habits.  No urinary symptoms.    Started some gel yesterday due to being positive for SA.        EXAM:   /72 (BP Location: Right arm, Cuff Size: Adult Regular)  Pulse 72  Temp 98  F (36.7  C) (Oral)  Resp 16  Ht 5' 5.25\" (1.657 m)  Wt 135 lb 3.2 oz (61.3 kg)  SpO2 98%  BMI 22.33 kg/m2  GENERAL APPEARANCE: alert and no distress  HENT: ear canals and TM's normal and nose and mouth without ulcers or lesions  RESP: lungs clear to auscultation - no rales, rhonchi or wheezes  CV: regular rates and rhythm  ABDOMEN: soft, nontender, no HSM or masses and bowel sounds normal  MS: no edema.  NEURO: Normal strength and tone, sensory exam grossly normal, mentation intact and speech normal  PSYCH: mentation appears normal and affect normal/bright    DIAGNOSTICS:   EKG: done in August: sinus bradycardia; otherwise normal  Lab:     Component      Latest Ref Rng & Units 10/25/2018   Sodium    "   133 - 144 mmol/L 138   Potassium      3.4 - 5.3 mmol/L 4.0   Chloride      94 - 109 mmol/L 101   Carbon Dioxide      20 - 32 mmol/L 27   Anion Gap      3 - 14 mmol/L 10   Glucose      70 - 99 mg/dL 170 (H)   Urea Nitrogen      7 - 30 mg/dL 16   Creatinine      0.52 - 1.04 mg/dL 0.74   GFR Estimate      >60 mL/min/1.7m2 76   GFR Estimate If Black      >60 mL/min/1.7m2 >90   Calcium      8.5 - 10.1 mg/dL 9.3   Bilirubin Total      0.2 - 1.3 mg/dL 1.3   Albumin      3.4 - 5.0 g/dL 3.7   Protein Total      6.8 - 8.8 g/dL 7.1   Alkaline Phosphatase      40 - 150 U/L 48   ALT      0 - 50 U/L 25   AST      0 - 45 U/L 20   WBC      4.0 - 11.0 10e9/L 6.5   RBC Count      3.8 - 5.2 10e12/L 4.23   Hemoglobin      11.7 - 15.7 g/dL 13.7   Hematocrit      35.0 - 47.0 % 43.9   MCV      78 - 100 fl 104 (H)   MCH      26.5 - 33.0 pg 32.4   MCHC      31.5 - 36.5 g/dL 31.2 (L)   RDW      10.0 - 15.0 % 14.5   Platelet Count      150 - 450 10e9/L 269     Recent Labs   Lab Test  08/23/18   0926  08/08/18   1138  04/13/18   0749   12/19/17   0751   12/13/16   0808  12/01/16   0955   08/23/16   1153   HGB   --   13.9  13.8   < >   --    < >  13.9   --    < >   --    PLT   --   260  265   < >   --    < >  287   --    < >   --    INR   --    --    --    --    --    --    --   0.96   --   1.00   NA  140  136  142   < >  138   < >   --    --    < >   --    POTASSIUM  4.3  4.3  4.4   < >  4.1   < >   --    --    < >   --    CR  0.67  0.73  0.82   < >  0.75   < >   --    --    < >   --    A1C   --    --    --    --   6.3*   --   5.9   --    --    --     < > = values in this interval not displayed.      Recent Labs   Lab Test  12/19/17   0751  12/13/16   0808   12/02/14   0722  12/05/13   0925   CHOL  178  157   < >  212*  213*   HDL  99  90   < >  93  85   LDL  60  43   < >  98  107   TRIG  97  121   < >  105  101   CHOLHDLRATIO   --    --    --   2.3  2.5    < > = values in this interval not displayed.       IMPRESSION:   Reason for  surgery/procedure: radicular low back pain.          The proposed surgical procedure is considered INTERMEDIATE risk.    REVISED CARDIAC RISK INDEX  The patient has the following serious cardiovascular risks for perioperative complications such as (MI, PE, VFib and 3  AV Block):  No serious cardiac risks  INTERPRETATION: 1 risks: Class II (low risk - 0.9% complication rate)    The patient has the following additional risks for perioperative complications:  Recent elevation of glucose  Macrocytosis; etiology unknown.      Preop general physical exam    - Comprehensive metabolic panel  - CBC with platelets    Radicular low back pain  Anticipating surgery.     Complication of anesthesia, sequela  She notes difficulty waking up.  Also with nausea and vomiting.   Does not do well with narcotics; N/V.     Hypertension goal BP (blood pressure) < 140/90  Controlled.   - Comprehensive metabolic panel    Hyperlipidemia LDL goal <70    - Comprehensive metabolic panel    Impaired fasting glucose    - Comprehensive metabolic panel    Macrocytosis  Newer with current labs. Normal 2 months ago.   Being followed for MGUS.      RECOMMENDATIONS:         Regarding medications:  On the am of surgery, take your Dexilant.  OK to take your levothyroxine.  Take with sips of water.       APPROVAL GIVEN to proceed with proposed procedure, without further diagnostic evaluation       Signed Electronically by: Scarlett Spencer MD, MD    Copy of this evaluation report is provided to requesting physician.    [facility] Preop Guidelines    Revised Cardiac Risk Index

## 2018-10-31 ENCOUNTER — HOSPITAL ENCOUNTER (INPATIENT)
Facility: CLINIC | Age: 78
LOS: 3 days | Discharge: HOME | DRG: 460 | End: 2018-11-03
Attending: ORTHOPAEDIC SURGERY | Admitting: ORTHOPAEDIC SURGERY
Payer: MEDICARE

## 2018-10-31 ENCOUNTER — APPOINTMENT (OUTPATIENT)
Dept: GENERAL RADIOLOGY | Facility: CLINIC | Age: 78
DRG: 460 | End: 2018-10-31
Attending: ORTHOPAEDIC SURGERY
Payer: MEDICARE

## 2018-10-31 ENCOUNTER — ANESTHESIA (OUTPATIENT)
Dept: SURGERY | Facility: CLINIC | Age: 78
DRG: 460 | End: 2018-10-31
Payer: MEDICARE

## 2018-10-31 DIAGNOSIS — Z98.1 S/P LUMBAR FUSION: Primary | ICD-10-CM

## 2018-10-31 LAB — GLUCOSE BLDC GLUCOMTR-MCNC: 133 MG/DL (ref 70–99)

## 2018-10-31 PROCEDURE — 8E0WXBF COMPUTER ASSISTED PROCEDURE OF TRUNK REGION, WITH FLUOROSCOPY: ICD-10-PCS | Performed by: ORTHOPAEDIC SURGERY

## 2018-10-31 PROCEDURE — 71000012 ZZH RECOVERY PHASE 1 LEVEL 1 FIRST HR: Performed by: ORTHOPAEDIC SURGERY

## 2018-10-31 PROCEDURE — 27210995 ZZH RX 272: Performed by: ORTHOPAEDIC SURGERY

## 2018-10-31 PROCEDURE — 25000132 ZZH RX MED GY IP 250 OP 250 PS 637: Mod: GY | Performed by: INTERNAL MEDICINE

## 2018-10-31 PROCEDURE — 40000985 XR LUMBAR SPINE PORT 2-3VW: Mod: TC

## 2018-10-31 PROCEDURE — A9270 NON-COVERED ITEM OR SERVICE: HCPCS | Mod: GY | Performed by: PHYSICIAN ASSISTANT

## 2018-10-31 PROCEDURE — 40000306 ZZH STATISTIC PRE PROC ASSESS II: Performed by: ORTHOPAEDIC SURGERY

## 2018-10-31 PROCEDURE — 25000128 H RX IP 250 OP 636: Performed by: ANESTHESIOLOGY

## 2018-10-31 PROCEDURE — 25000566 ZZH SEVOFLURANE, EA 15 MIN: Performed by: ORTHOPAEDIC SURGERY

## 2018-10-31 PROCEDURE — 25000125 ZZHC RX 250: Performed by: ORTHOPAEDIC SURGERY

## 2018-10-31 PROCEDURE — 36000071 ZZH SURGERY LEVEL 5 W FLUORO 1ST 30 MIN: Performed by: ORTHOPAEDIC SURGERY

## 2018-10-31 PROCEDURE — 37000008 ZZH ANESTHESIA TECHNICAL FEE, 1ST 30 MIN: Performed by: ORTHOPAEDIC SURGERY

## 2018-10-31 PROCEDURE — 25000132 ZZH RX MED GY IP 250 OP 250 PS 637: Mod: GY | Performed by: PHYSICIAN ASSISTANT

## 2018-10-31 PROCEDURE — A9270 NON-COVERED ITEM OR SERVICE: HCPCS | Mod: GY | Performed by: INTERNAL MEDICINE

## 2018-10-31 PROCEDURE — 36000069 ZZH SURGERY LEVEL 5 EA 15 ADDTL MIN: Performed by: ORTHOPAEDIC SURGERY

## 2018-10-31 PROCEDURE — 93010 ELECTROCARDIOGRAM REPORT: CPT | Performed by: INTERNAL MEDICINE

## 2018-10-31 PROCEDURE — 25000128 H RX IP 250 OP 636: Performed by: ORTHOPAEDIC SURGERY

## 2018-10-31 PROCEDURE — 25000128 H RX IP 250 OP 636: Performed by: NURSE ANESTHETIST, CERTIFIED REGISTERED

## 2018-10-31 PROCEDURE — C1762 CONN TISS, HUMAN(INC FASCIA): HCPCS | Performed by: ORTHOPAEDIC SURGERY

## 2018-10-31 PROCEDURE — 27210794 ZZH OR GENERAL SUPPLY STERILE: Performed by: ORTHOPAEDIC SURGERY

## 2018-10-31 PROCEDURE — 00000146 ZZHCL STATISTIC GLUCOSE BY METER IP

## 2018-10-31 PROCEDURE — 0SG0071 FUSION OF LUMBAR VERTEBRAL JOINT WITH AUTOLOGOUS TISSUE SUBSTITUTE, POSTERIOR APPROACH, POSTERIOR COLUMN, OPEN APPROACH: ICD-10-PCS | Performed by: ORTHOPAEDIC SURGERY

## 2018-10-31 PROCEDURE — 40000278 XR SURGERY CARM FLUORO LESS THAN 5 MIN: Mod: TC

## 2018-10-31 PROCEDURE — 40000985 XR LUMBAR SPINE PORT 1 VW

## 2018-10-31 PROCEDURE — 71000013 ZZH RECOVERY PHASE 1 LEVEL 1 EA ADDTL HR: Performed by: ORTHOPAEDIC SURGERY

## 2018-10-31 PROCEDURE — 25000128 H RX IP 250 OP 636: Performed by: INTERNAL MEDICINE

## 2018-10-31 PROCEDURE — 25000125 ZZHC RX 250: Performed by: NURSE ANESTHETIST, CERTIFIED REGISTERED

## 2018-10-31 PROCEDURE — 37000009 ZZH ANESTHESIA TECHNICAL FEE, EACH ADDTL 15 MIN: Performed by: ORTHOPAEDIC SURGERY

## 2018-10-31 PROCEDURE — 25000128 H RX IP 250 OP 636: Performed by: PHYSICIAN ASSISTANT

## 2018-10-31 PROCEDURE — C1713 ANCHOR/SCREW BN/BN,TIS/BN: HCPCS | Performed by: ORTHOPAEDIC SURGERY

## 2018-10-31 PROCEDURE — 12000007 ZZH R&B INTERMEDIATE

## 2018-10-31 PROCEDURE — 01NB0ZZ RELEASE LUMBAR NERVE, OPEN APPROACH: ICD-10-PCS | Performed by: ORTHOPAEDIC SURGERY

## 2018-10-31 DEVICE — GRAFT BONE CRUSH CANC 30ML 400080: Type: IMPLANTABLE DEVICE | Site: SPINE LUMBAR | Status: FUNCTIONAL

## 2018-10-31 DEVICE — IMPLANTABLE DEVICE: Type: IMPLANTABLE DEVICE | Site: SPINE LUMBAR | Status: FUNCTIONAL

## 2018-10-31 RX ORDER — METOCLOPRAMIDE HYDROCHLORIDE 5 MG/ML
5 INJECTION INTRAMUSCULAR; INTRAVENOUS EVERY 6 HOURS PRN
Status: DISCONTINUED | OUTPATIENT
Start: 2018-10-31 | End: 2018-11-03 | Stop reason: HOSPADM

## 2018-10-31 RX ORDER — NITROGLYCERIN 0.4 MG/1
0.4 TABLET SUBLINGUAL EVERY 5 MIN PRN
Status: DISCONTINUED | OUTPATIENT
Start: 2018-10-31 | End: 2018-11-03 | Stop reason: HOSPADM

## 2018-10-31 RX ORDER — HYDRALAZINE HYDROCHLORIDE 20 MG/ML
2.5-5 INJECTION INTRAMUSCULAR; INTRAVENOUS EVERY 10 MIN PRN
Status: DISCONTINUED | OUTPATIENT
Start: 2018-10-31 | End: 2018-10-31 | Stop reason: HOSPADM

## 2018-10-31 RX ORDER — NEOSTIGMINE METHYLSULFATE 1 MG/ML
VIAL (ML) INJECTION PRN
Status: DISCONTINUED | OUTPATIENT
Start: 2018-10-31 | End: 2018-10-31

## 2018-10-31 RX ORDER — NALOXONE HYDROCHLORIDE 0.4 MG/ML
.1-.4 INJECTION, SOLUTION INTRAMUSCULAR; INTRAVENOUS; SUBCUTANEOUS
Status: ACTIVE | OUTPATIENT
Start: 2018-10-31 | End: 2018-11-01

## 2018-10-31 RX ORDER — CARBOXYMETHYLCELLULOSE SODIUM 5 MG/ML
1 SOLUTION/ DROPS OPHTHALMIC EVERY EVENING
Status: DISCONTINUED | OUTPATIENT
Start: 2018-10-31 | End: 2018-10-31

## 2018-10-31 RX ORDER — PANTOPRAZOLE SODIUM 40 MG/1
40 TABLET, DELAYED RELEASE ORAL 2 TIMES DAILY
Status: DISCONTINUED | OUTPATIENT
Start: 2018-10-31 | End: 2018-11-03 | Stop reason: HOSPADM

## 2018-10-31 RX ORDER — BUPIVACAINE HYDROCHLORIDE AND EPINEPHRINE 2.5; 5 MG/ML; UG/ML
INJECTION, SOLUTION EPIDURAL; INFILTRATION; INTRACAUDAL; PERINEURAL PRN
Status: DISCONTINUED | OUTPATIENT
Start: 2018-10-31 | End: 2018-10-31 | Stop reason: HOSPADM

## 2018-10-31 RX ORDER — LIDOCAINE 40 MG/G
CREAM TOPICAL
Status: DISCONTINUED | OUTPATIENT
Start: 2018-10-31 | End: 2018-11-03 | Stop reason: HOSPADM

## 2018-10-31 RX ORDER — SODIUM CHLORIDE, SODIUM LACTATE, POTASSIUM CHLORIDE, CALCIUM CHLORIDE 600; 310; 30; 20 MG/100ML; MG/100ML; MG/100ML; MG/100ML
INJECTION, SOLUTION INTRAVENOUS CONTINUOUS
Status: DISCONTINUED | OUTPATIENT
Start: 2018-10-31 | End: 2018-10-31 | Stop reason: HOSPADM

## 2018-10-31 RX ORDER — SODIUM CHLORIDE, SODIUM LACTATE, POTASSIUM CHLORIDE, CALCIUM CHLORIDE 600; 310; 30; 20 MG/100ML; MG/100ML; MG/100ML; MG/100ML
INJECTION, SOLUTION INTRAVENOUS CONTINUOUS PRN
Status: DISCONTINUED | OUTPATIENT
Start: 2018-10-31 | End: 2018-10-31

## 2018-10-31 RX ORDER — ONDANSETRON 4 MG/1
4 TABLET, ORALLY DISINTEGRATING ORAL EVERY 6 HOURS PRN
Status: DISCONTINUED | OUTPATIENT
Start: 2018-10-31 | End: 2018-11-03 | Stop reason: HOSPADM

## 2018-10-31 RX ORDER — ONDANSETRON 4 MG/1
4 TABLET, ORALLY DISINTEGRATING ORAL EVERY 30 MIN PRN
Status: DISCONTINUED | OUTPATIENT
Start: 2018-10-31 | End: 2018-10-31 | Stop reason: HOSPADM

## 2018-10-31 RX ORDER — CYCLOSPORINE 0.5 MG/ML
1 EMULSION OPHTHALMIC EVERY EVENING
Status: DISCONTINUED | OUTPATIENT
Start: 2018-10-31 | End: 2018-11-03 | Stop reason: HOSPADM

## 2018-10-31 RX ORDER — CEFAZOLIN SODIUM 1 G/50ML
1 INJECTION, SOLUTION INTRAVENOUS EVERY 8 HOURS
Status: COMPLETED | OUTPATIENT
Start: 2018-10-31 | End: 2018-11-01

## 2018-10-31 RX ORDER — ATORVASTATIN CALCIUM 20 MG/1
20 TABLET, FILM COATED ORAL DAILY
Status: DISCONTINUED | OUTPATIENT
Start: 2018-10-31 | End: 2018-10-31

## 2018-10-31 RX ORDER — GLYCOPYRROLATE 0.2 MG/ML
INJECTION, SOLUTION INTRAMUSCULAR; INTRAVENOUS PRN
Status: DISCONTINUED | OUTPATIENT
Start: 2018-10-31 | End: 2018-10-31

## 2018-10-31 RX ORDER — ONDANSETRON 2 MG/ML
4 INJECTION INTRAMUSCULAR; INTRAVENOUS EVERY 6 HOURS PRN
Status: DISCONTINUED | OUTPATIENT
Start: 2018-10-31 | End: 2018-11-03 | Stop reason: HOSPADM

## 2018-10-31 RX ORDER — FENTANYL CITRATE 50 UG/ML
25-50 INJECTION, SOLUTION INTRAMUSCULAR; INTRAVENOUS
Status: DISCONTINUED | OUTPATIENT
Start: 2018-10-31 | End: 2018-10-31 | Stop reason: HOSPADM

## 2018-10-31 RX ORDER — CARBOXYMETHYLCELLULOSE SODIUM 5 MG/ML
1 SOLUTION/ DROPS OPHTHALMIC AT BEDTIME
Status: DISCONTINUED | OUTPATIENT
Start: 2018-10-31 | End: 2018-10-31

## 2018-10-31 RX ORDER — AMOXICILLIN 250 MG
2 CAPSULE ORAL 2 TIMES DAILY
Status: DISCONTINUED | OUTPATIENT
Start: 2018-10-31 | End: 2018-11-03 | Stop reason: HOSPADM

## 2018-10-31 RX ORDER — ONDANSETRON 2 MG/ML
4 INJECTION INTRAMUSCULAR; INTRAVENOUS EVERY 30 MIN PRN
Status: DISCONTINUED | OUTPATIENT
Start: 2018-10-31 | End: 2018-10-31 | Stop reason: HOSPADM

## 2018-10-31 RX ORDER — HYDROXYZINE HYDROCHLORIDE 10 MG/1
10 TABLET, FILM COATED ORAL EVERY 6 HOURS PRN
Status: DISCONTINUED | OUTPATIENT
Start: 2018-10-31 | End: 2018-11-03 | Stop reason: HOSPADM

## 2018-10-31 RX ORDER — PROPOFOL 10 MG/ML
INJECTION, EMULSION INTRAVENOUS CONTINUOUS PRN
Status: DISCONTINUED | OUTPATIENT
Start: 2018-10-31 | End: 2018-10-31

## 2018-10-31 RX ORDER — AMOXICILLIN 250 MG
1 CAPSULE ORAL 2 TIMES DAILY
Status: DISCONTINUED | OUTPATIENT
Start: 2018-10-31 | End: 2018-11-03 | Stop reason: HOSPADM

## 2018-10-31 RX ORDER — NALOXONE HYDROCHLORIDE 0.4 MG/ML
.1-.4 INJECTION, SOLUTION INTRAMUSCULAR; INTRAVENOUS; SUBCUTANEOUS
Status: DISCONTINUED | OUTPATIENT
Start: 2018-10-31 | End: 2018-11-03 | Stop reason: HOSPADM

## 2018-10-31 RX ORDER — PROPOFOL 10 MG/ML
INJECTION, EMULSION INTRAVENOUS PRN
Status: DISCONTINUED | OUTPATIENT
Start: 2018-10-31 | End: 2018-10-31

## 2018-10-31 RX ORDER — ATORVASTATIN CALCIUM 20 MG/1
20 TABLET, FILM COATED ORAL EVERY EVENING
Status: DISCONTINUED | OUTPATIENT
Start: 2018-10-31 | End: 2018-11-03 | Stop reason: HOSPADM

## 2018-10-31 RX ORDER — LIDOCAINE 40 MG/G
CREAM TOPICAL
Status: DISCONTINUED | OUTPATIENT
Start: 2018-10-31 | End: 2018-10-31 | Stop reason: HOSPADM

## 2018-10-31 RX ORDER — LABETALOL HYDROCHLORIDE 5 MG/ML
10 INJECTION, SOLUTION INTRAVENOUS
Status: DISCONTINUED | OUTPATIENT
Start: 2018-10-31 | End: 2018-10-31 | Stop reason: HOSPADM

## 2018-10-31 RX ORDER — LIDOCAINE HYDROCHLORIDE 10 MG/ML
INJECTION, SOLUTION INFILTRATION; PERINEURAL PRN
Status: DISCONTINUED | OUTPATIENT
Start: 2018-10-31 | End: 2018-10-31

## 2018-10-31 RX ORDER — DEXAMETHASONE SODIUM PHOSPHATE 4 MG/ML
INJECTION, SOLUTION INTRA-ARTICULAR; INTRALESIONAL; INTRAMUSCULAR; INTRAVENOUS; SOFT TISSUE PRN
Status: DISCONTINUED | OUTPATIENT
Start: 2018-10-31 | End: 2018-10-31

## 2018-10-31 RX ORDER — CEFAZOLIN SODIUM 2 G/100ML
2 INJECTION, SOLUTION INTRAVENOUS
Status: COMPLETED | OUTPATIENT
Start: 2018-10-31 | End: 2018-10-31

## 2018-10-31 RX ORDER — CEVIMELINE HYDROCHLORIDE 30 MG/1
30 CAPSULE ORAL 3 TIMES DAILY
Status: DISCONTINUED | OUTPATIENT
Start: 2018-10-31 | End: 2018-11-03 | Stop reason: HOSPADM

## 2018-10-31 RX ORDER — EPHEDRINE SULFATE 50 MG/ML
INJECTION, SOLUTION INTRAVENOUS PRN
Status: DISCONTINUED | OUTPATIENT
Start: 2018-10-31 | End: 2018-10-31

## 2018-10-31 RX ORDER — SODIUM CHLORIDE 9 MG/ML
INJECTION, SOLUTION INTRAVENOUS CONTINUOUS
Status: DISCONTINUED | OUTPATIENT
Start: 2018-10-31 | End: 2018-11-03 | Stop reason: HOSPADM

## 2018-10-31 RX ORDER — LEVOTHYROXINE SODIUM 88 UG/1
88 TABLET ORAL DAILY
Status: DISCONTINUED | OUTPATIENT
Start: 2018-11-01 | End: 2018-11-03 | Stop reason: HOSPADM

## 2018-10-31 RX ORDER — IRBESARTAN 75 MG/1
150 TABLET ORAL EVERY EVENING
Status: DISCONTINUED | OUTPATIENT
Start: 2018-10-31 | End: 2018-11-03 | Stop reason: HOSPADM

## 2018-10-31 RX ORDER — HYDRALAZINE HYDROCHLORIDE 20 MG/ML
INJECTION INTRAMUSCULAR; INTRAVENOUS PRN
Status: DISCONTINUED | OUTPATIENT
Start: 2018-10-31 | End: 2018-10-31

## 2018-10-31 RX ORDER — FENTANYL CITRATE 50 UG/ML
INJECTION, SOLUTION INTRAMUSCULAR; INTRAVENOUS PRN
Status: DISCONTINUED | OUTPATIENT
Start: 2018-10-31 | End: 2018-10-31

## 2018-10-31 RX ORDER — CEFAZOLIN SODIUM 1 G/3ML
1 INJECTION, POWDER, FOR SOLUTION INTRAMUSCULAR; INTRAVENOUS SEE ADMIN INSTRUCTIONS
Status: DISCONTINUED | OUTPATIENT
Start: 2018-10-31 | End: 2018-10-31 | Stop reason: HOSPADM

## 2018-10-31 RX ORDER — KETAMINE HYDROCHLORIDE 10 MG/ML
INJECTION, SOLUTION INTRAMUSCULAR; INTRAVENOUS PRN
Status: DISCONTINUED | OUTPATIENT
Start: 2018-10-31 | End: 2018-10-31

## 2018-10-31 RX ADMIN — VITAMIN D, TAB 1000IU (100/BT) 1000 UNITS: 25 TAB at 17:09

## 2018-10-31 RX ADMIN — DEXAMETHASONE SODIUM PHOSPHATE 4 MG: 4 INJECTION, SOLUTION INTRA-ARTICULAR; INTRALESIONAL; INTRAMUSCULAR; INTRAVENOUS; SOFT TISSUE at 08:32

## 2018-10-31 RX ADMIN — CEFAZOLIN SODIUM 2 G: 2 INJECTION, SOLUTION INTRAVENOUS at 08:52

## 2018-10-31 RX ADMIN — PHENYLEPHRINE HYDROCHLORIDE 50 MCG: 10 INJECTION, SOLUTION INTRAMUSCULAR; INTRAVENOUS; SUBCUTANEOUS at 08:35

## 2018-10-31 RX ADMIN — CEVIMELINE HYDROCHLORIDE 30 MG: 30 CAPSULE ORAL at 21:46

## 2018-10-31 RX ADMIN — HYDRALAZINE HYDROCHLORIDE 5 MG: 20 INJECTION INTRAMUSCULAR; INTRAVENOUS at 09:25

## 2018-10-31 RX ADMIN — SODIUM CHLORIDE, POTASSIUM CHLORIDE, SODIUM LACTATE AND CALCIUM CHLORIDE: 600; 310; 30; 20 INJECTION, SOLUTION INTRAVENOUS at 12:42

## 2018-10-31 RX ADMIN — EPHEDRINE SULFATE 5 MG: 50 INJECTION, SOLUTION INTRAVENOUS at 10:26

## 2018-10-31 RX ADMIN — SODIUM CHLORIDE, POTASSIUM CHLORIDE, SODIUM LACTATE AND CALCIUM CHLORIDE: 600; 310; 30; 20 INJECTION, SOLUTION INTRAVENOUS at 08:00

## 2018-10-31 RX ADMIN — FENTANYL CITRATE 50 MCG: 50 INJECTION, SOLUTION INTRAMUSCULAR; INTRAVENOUS at 12:38

## 2018-10-31 RX ADMIN — ROCURONIUM BROMIDE 50 MG: 10 INJECTION INTRAVENOUS at 08:32

## 2018-10-31 RX ADMIN — KETAMINE HYDROCHLORIDE 10 MG: 10 INJECTION, SOLUTION INTRAMUSCULAR; INTRAVENOUS at 09:03

## 2018-10-31 RX ADMIN — PROPOFOL: 10 INJECTION, EMULSION INTRAVENOUS at 11:18

## 2018-10-31 RX ADMIN — ONDANSETRON 4 MG: 2 INJECTION INTRAMUSCULAR; INTRAVENOUS at 18:58

## 2018-10-31 RX ADMIN — METOCLOPRAMIDE 5 MG: 5 INJECTION, SOLUTION INTRAMUSCULAR; INTRAVENOUS at 20:11

## 2018-10-31 RX ADMIN — RANITIDINE 300 MG: 150 TABLET ORAL at 21:46

## 2018-10-31 RX ADMIN — EPHEDRINE SULFATE 5 MG: 50 INJECTION, SOLUTION INTRAVENOUS at 11:18

## 2018-10-31 RX ADMIN — HYDROMORPHONE HYDROCHLORIDE 0.5 MG: 1 INJECTION, SOLUTION INTRAMUSCULAR; INTRAVENOUS; SUBCUTANEOUS at 08:53

## 2018-10-31 RX ADMIN — OYSTER SHELL CALCIUM WITH VITAMIN D 1 TABLET: 500; 200 TABLET, FILM COATED ORAL at 17:09

## 2018-10-31 RX ADMIN — Medication 4 MG: at 12:35

## 2018-10-31 RX ADMIN — CYCLOSPORINE 1 DROP: 0.5 EMULSION OPHTHALMIC at 21:46

## 2018-10-31 RX ADMIN — PHENYLEPHRINE HYDROCHLORIDE 50 MCG: 10 INJECTION, SOLUTION INTRAMUSCULAR; INTRAVENOUS; SUBCUTANEOUS at 10:03

## 2018-10-31 RX ADMIN — LIDOCAINE HYDROCHLORIDE 40 MG: 10 INJECTION, SOLUTION INFILTRATION; PERINEURAL at 08:32

## 2018-10-31 RX ADMIN — EPHEDRINE SULFATE 5 MG: 50 INJECTION, SOLUTION INTRAVENOUS at 11:58

## 2018-10-31 RX ADMIN — GLYCOPYRROLATE 0.2 MG: 0.2 INJECTION, SOLUTION INTRAMUSCULAR; INTRAVENOUS at 08:32

## 2018-10-31 RX ADMIN — GLYCOPYRROLATE 0.6 MG: 0.2 INJECTION, SOLUTION INTRAMUSCULAR; INTRAVENOUS at 12:35

## 2018-10-31 RX ADMIN — PHENYLEPHRINE HYDROCHLORIDE 100 MCG: 10 INJECTION, SOLUTION INTRAMUSCULAR; INTRAVENOUS; SUBCUTANEOUS at 11:18

## 2018-10-31 RX ADMIN — CEFAZOLIN 1 G: 1 INJECTION, POWDER, FOR SOLUTION INTRAMUSCULAR; INTRAVENOUS at 11:00

## 2018-10-31 RX ADMIN — ROCURONIUM BROMIDE 20 MG: 10 INJECTION INTRAVENOUS at 09:44

## 2018-10-31 RX ADMIN — PHENYLEPHRINE HYDROCHLORIDE 100 MCG: 10 INJECTION, SOLUTION INTRAMUSCULAR; INTRAVENOUS; SUBCUTANEOUS at 10:26

## 2018-10-31 RX ADMIN — SODIUM CHLORIDE, POTASSIUM CHLORIDE, SODIUM LACTATE AND CALCIUM CHLORIDE: 600; 310; 30; 20 INJECTION, SOLUTION INTRAVENOUS at 08:52

## 2018-10-31 RX ADMIN — SODIUM CHLORIDE, POTASSIUM CHLORIDE, SODIUM LACTATE AND CALCIUM CHLORIDE: 600; 310; 30; 20 INJECTION, SOLUTION INTRAVENOUS at 11:18

## 2018-10-31 RX ADMIN — PANTOPRAZOLE SODIUM 40 MG: 40 TABLET, DELAYED RELEASE ORAL at 21:46

## 2018-10-31 RX ADMIN — Medication: at 13:27

## 2018-10-31 RX ADMIN — PROPOFOL 50 MCG/KG/MIN: 10 INJECTION, EMULSION INTRAVENOUS at 08:52

## 2018-10-31 RX ADMIN — KETAMINE HYDROCHLORIDE 10 MG: 10 INJECTION, SOLUTION INTRAMUSCULAR; INTRAVENOUS at 12:23

## 2018-10-31 RX ADMIN — PHENYLEPHRINE HYDROCHLORIDE 50 MCG: 10 INJECTION, SOLUTION INTRAMUSCULAR; INTRAVENOUS; SUBCUTANEOUS at 09:59

## 2018-10-31 RX ADMIN — CEFAZOLIN SODIUM 1 G: 1 INJECTION, SOLUTION INTRAVENOUS at 18:58

## 2018-10-31 RX ADMIN — CEVIMELINE HYDROCHLORIDE 30 MG: 30 CAPSULE ORAL at 17:09

## 2018-10-31 RX ADMIN — EPHEDRINE SULFATE 5 MG: 50 INJECTION, SOLUTION INTRAVENOUS at 10:03

## 2018-10-31 RX ADMIN — EPHEDRINE SULFATE 5 MG: 50 INJECTION, SOLUTION INTRAVENOUS at 10:42

## 2018-10-31 RX ADMIN — FENTANYL CITRATE 100 MCG: 50 INJECTION, SOLUTION INTRAMUSCULAR; INTRAVENOUS at 08:32

## 2018-10-31 RX ADMIN — ROCURONIUM BROMIDE 20 MG: 10 INJECTION INTRAVENOUS at 10:42

## 2018-10-31 RX ADMIN — PROPOFOL 160 MG: 10 INJECTION, EMULSION INTRAVENOUS at 08:32

## 2018-10-31 RX ADMIN — FENTANYL CITRATE 25 MCG: 50 INJECTION, SOLUTION INTRAMUSCULAR; INTRAVENOUS at 13:24

## 2018-10-31 RX ADMIN — HYDROMORPHONE HYDROCHLORIDE 0.5 MG: 1 INJECTION, SOLUTION INTRAMUSCULAR; INTRAVENOUS; SUBCUTANEOUS at 09:03

## 2018-10-31 RX ADMIN — SODIUM CHLORIDE: 9 INJECTION, SOLUTION INTRAVENOUS at 17:49

## 2018-10-31 ASSESSMENT — ACTIVITIES OF DAILY LIVING (ADL)
ADLS_ACUITY_SCORE: 10
ADLS_ACUITY_SCORE: 10

## 2018-10-31 NOTE — PROGRESS NOTES
Hospitalist consult noted.  Chart reviewed and preoperative H&P is also reviewed.  Of significance, known history of Sjogren's syndrome and sicca syndrome, chronically on evoxac and Restasis.  Other past medical history is significant for hyperlipidemia, hypertension, GERD, and hypothyroidism.  Medications are reconciled.  Do note that the patient is narcotic naïve so would recommend being gentle with narcotics.  Full consult to follow tomorrow.  Please call us with any questions in the interim.

## 2018-10-31 NOTE — ANESTHESIA PREPROCEDURE EVALUATION
"PAC NOTE:       ANESTHESIA PRE EVALUATION:  Anesthesia Evaluation     . Pt has had prior anesthetic.     History of anesthetic complications   - PONV        ROS/MED HX    ENT/Pulmonary:     (+), . Other pulmonary disease cough.    Neurologic:     (+)neuropathy     Cardiovascular:     (+) Dyslipidemia, hypertension--CAD, angina-past MI,-. : . . . :. .       METS/Exercise Tolerance:     Hematologic:         Musculoskeletal:         GI/Hepatic:     (+) GERD       Renal/Genitourinary:     (+) Other Renal/ Genitourinary, incont      Endo:     (+) thyroid problem hypothyroidism, .      Psychiatric:         Infectious Disease:         Malignancy:         Other:                     Physical Exam  Normal systems: cardiovascular and pulmonary    Airway   Mallampati: II  TM distance: >3 FB  Neck ROM: full    Dental     Cardiovascular       Pulmonary              Anesthesia Plan      History & Physical Review  History and physical reviewed and following examination; no interval change.    ASA Status:  3 .    NPO Status:  > 8 hours    Plan for General and ETT with Intravenous and Propofol induction. Maintenance will be Balanced.    PONV prophylaxis:  Ondansetron (or other 5HT-3) and Dexamethasone or Solumedrol  FULLY discussed plan and risks with pt, given her prev cardiac history and the magnitude of this procedures, also discussed visual loss, bleeding., Pt understands and desires to proceed, \"can't live with this back pain.\"      Postoperative Care  Postoperative pain management:  IV analgesics.      Consents  Anesthetic plan, risks, benefits and alternatives discussed with:  Patient.  Use of blood products discussed: Yes.   Use of blood products discussed with Patient.  Consented to blood products.  .                            .  "

## 2018-10-31 NOTE — PLAN OF CARE
Problem: Patient Care Overview  Goal: Plan of Care/Patient Progress Review  Outcome: No Change  Patient arrived to floor just after 1430. No c/o pain. Instructed On how To use pca. Nelson in place.     Tolerating water well.

## 2018-10-31 NOTE — IP AVS SNAPSHOT
MRN:[id number]                      After Visit Summary   10/31/2018    Ya Stahl    MRN: [id number]           Thank you!     Thank you for choosing [facility] for your care. Our goal is always to provide you with excellent care. Hearing back from our patients is one way we can continue to improve our services. Please take a few minutes to complete the written survey that you may receive in the mail after you visit. If you would like to speak to someone directly about your visit please contact Patient Relations at [phone]. Thank you!          Patient Information     Date Of Birth          1940        Designated Caregiver       Most Recent Value    Caregiver    Will someone help with your care after discharge? yes    Name of designated caregiver     Phone number of caregiver [phone]    Caregiver address Same as patient      About your hospital stay     You were admitted on:  October 31, 2018 You last received care in the:  [facility]    You were discharged on:  November 3, 2018        Reason for your hospital stay       Status post L3-5 lami with L4-5 fusion                  Who to Call     For medical emergencies, please call 911.  For non-urgent questions about your medical care, please call your primary care provider or clinic, [phone]  For questions related to your surgery, please call your surgery clinic        Attending Provider     Provider Specialty    Aroldo Burdick MD Orthopedics       Primary Care Provider Office Phone # Fax #    Scarlett Spencer [phone]      After Care Instructions     Activity       Minimize bending, lifting, twisting            Diet       Regular            Discharge Instructions       Lumbar brace as directed            Wound care and dressings       Change asa directed                  Follow-up Appointments     Follow-up and recommended labs and tests        Follow up with Dr. Burdick in  "two weeks.                  Your next 10 appointments already scheduled     Dec 21, 2018  8:30 AM CST   [facility] with Scarlett Spencer MD   [facility]    [address] [facility] [address] [phone]            Apr 11, 2019  8:00 AM CDT   LAB with  LAB   [facility]    [address] [facility] [address] [phone]           Please do not eat 10-12 hours before your appointment if you are coming in fasting for labs on lipids, cholesterol, or glucose (sugar). This does not apply to pregnant women. Water, hot tea and black coffee (with nothing added) are okay. Do not drink other fluids, diet soda or chew gum.            Apr 18, 2019  2:15 PM CDT   Return Visit with Vandana Kasper MD   [facility]    [facility]  [address]  [address] [address]  [facility] [address] [phone]              Pending Results     No orders found from 10/29/2018 to 11/1/2018.            Statement of Approval     Ordered          11/02/18 1735  I have reviewed and agree with all the recommendations and orders detailed in this document.  EFFECTIVE NOW     Approved and electronically signed by:  Sree Calix PA-C             Admission Information     Date & Time Provider Department Dept. Phone    10/31/2018 Aroldo Burdick MD [facility] [phone]      Your Vitals Were     Blood Pressure Pulse Temperature Respirations Height Weight    104/75 72 98.1  F (36.7  C) 16 1.657 m (5' 5.25\") 60.5 kg (133 lb 4.8 oz)    Pulse Oximetry BMI (Body Mass Index)                93% 22.01 kg/m2          Care EveryWhere ID     This is your Care EveryWhere ID. This could be used by other organizations to access your [place] medical records  [id number]        Equal Access to Services     NEVA [employer] AH: julio c Chung " maishaconnie fritz elizondoaan ah. So [facility] [phone].    ATENCIÓN: Si angie hdz, tiene a costa disposición servicios gratuitos de asistencia lingüística. Llfrances al [phone].    We comply with applicable federal civil rights laws and [place] laws. We do not discriminate on the basis of race, color, national origin, age, disability, sex, sexual orientation, or gender identity.               Review of your medicines      START taking        Dose / Directions    calcium carbonate 500 mg-vitamin D 200 units 500-200 MG-UNIT per tablet   Commonly known as:  OSCAL with D;OYSTER SHELL CALCIUM        Dose:  1 tablet   Take 1 tablet by mouth 3 times daily (before meals)   Quantity:  90 tablet   Refills:  3       cholecalciferol 1000 units Tabs        Dose:  1000 Units   Take 1,000 Units by mouth 2 times daily   Quantity:  30 tablet   Refills:  3       senna-docusate 8.6-50 MG per tablet   Commonly known as:  SENOKOT-S;PERICOLACE        Dose:  2 tablet   Take 2 tablets by mouth 2 times daily as needed for constipation   Quantity:  20 tablet   Refills:  0         CONTINUE these medicines which have NOT CHANGED        Dose / Directions    alendronate 70 MG tablet   Commonly known as:  FOSAMAX   Used for:  Osteoporosis, unspecified osteoporosis type, unspecified pathological fracture presence        Dose:  70 mg   Take 1 tablet (70 mg) by mouth every 7 days Take with over 8 ounces water and stay upright for at least 30 minutes after dose.  Take at least 60 minutes before breakfast   Quantity:  12 tablet   Refills:  3       atorvastatin 20 MG tablet   Commonly known as:  LIPITOR   Used for:  Hyperlipidemia LDL goal <70        Dose:  20 mg   Take 1 tablet (20 mg) by mouth daily   Quantity:  102 tablet   Refills:  3       AVAPRO PO        Dose:  150 mg   Take 150 mg by mouth every evening   Refills:  0       cevimeline 30 MG capsule   Commonly known as:  EVOXAC        Dose:  30 mg    Take 30 mg by mouth 3 times daily Reported on 3/6/2017   Refills:  0       CLEAR EYES COMPLETE OP        Dose:  1 drop   Apply 1 drop to eye 3 times daily as needed   Refills:  0       DEXILANT 60 MG Cpdr CR capsule   Used for:  MGUS (monoclonal gammopathy of unknown significance)   Generic drug:  dexlansoprazole        Dose:  60 mg   60 mg 2 times daily   Refills:  0       HERBALS        Cherry juice 1 tablespoon at night PRN   Refills:  0       levothyroxine 88 MCG tablet   Commonly known as:  SYNTHROID/LEVOTHROID   Used for:  Hypothyroidism, unspecified type        TAKE ONE TABLET BY MOUTH EVERY DAY   Quantity:  102 tablet   Refills:  3       LUTEIN 20 PO        Dose:  1 tablet   Take 1 tablet by mouth daily   Refills:  0       MELATONIN PO        Dose:  3 mg   Take 3 mg by mouth At Bedtime   Refills:  0       MIRALAX powder   Generic drug:  polyethylene glycol        Dose:  1 capful   Take 1 capful by mouth 2 times daily In 8 ounces of liquid   Refills:  0       mupirocin 2 % nasal ointment   Commonly known as:  BACTROBAN        Dose:  1 g   Spray 1 g into both nostrils 2 times daily Apply small amount to each nostril 2 times a day for 7 days prior to surgery   Refills:  0       nitroGLYcerin 0.4 MG sublingual tablet   Commonly known as:  NITROSTAT   Used for:  Acute chest pain        Dose:  0.4 mg   Place 1 tablet (0.4 mg) under the tongue every 5 minutes as needed for chest pain   Quantity:  25 tablet   Refills:  0       OMEGA-3 FISH OIL PO        Dose:  1 g   Take 1 g by mouth 2 times daily (with meals)   Refills:  0       ranitidine 300 MG tablet   Commonly known as:  ZANTAC        Dose:  300 mg   Take 300 mg by mouth At Bedtime   Refills:  0       REFRESH LIQUIGEL OP        Dose:  1 drop   Apply 1 drop to eye every evening 15 minutes after Restasis drops.   Refills:  0       RESTASIS OP        Dose:  1 drop   Apply 1 drop to eye every evening   Refills:  0         STOP taking     aspirin 81 MG EC tablet            calcium citrate-vitamin D 315-250 MG-UNIT Tabs per tablet   Commonly known as:  CITRACAL           TYLENOL PO                Where to get your medicines      These medications were sent to [place], [place] - [address] [facility]  [address] [facility] [address]     Phone:  [phone]     cholecalciferol 1000 units Tabs    senna-docusate 8.6-50 MG per tablet         Some of these will need a paper prescription and others can be bought over the counter. Ask your nurse if you have questions.     Bring a paper prescription for each of these medications     calcium carbonate 500 mg-vitamin D 200 units 500-200 MG-UNIT per tablet                Protect others around you: Learn how to safely use, store and throw away your medicines at www.disposemymeds.org.             Medication List: This is a list of all your medications and when to take them. Check marks below indicate your daily home schedule. Keep this list as a reference.      Medications           Morning Afternoon Evening Bedtime As Needed    alendronate 70 MG tablet   Commonly known as:  FOSAMAX   Take 1 tablet (70 mg) by mouth every 7 days Take with over 8 ounces water and stay upright for at least 30 minutes after dose.  Take at least 60 minutes before breakfast                                atorvastatin 20 MG tablet   Commonly known as:  LIPITOR   Take 1 tablet (20 mg) by mouth daily   Last time this was given:  20 mg on 11/2/2018  8:18 PM                                AVAPRO PO   Take 150 mg by mouth every evening   Last time this was given:  150 mg on 11/2/2018  8:17 PM                                calcium carbonate 500 mg-vitamin D 200 units 500-200 MG-UNIT per tablet   Commonly known as:  OSCAL with D;OYSTER SHELL CALCIUM   Take 1 tablet by mouth 3 times daily (before meals)   Last time this was given:  1 tablet on 11/3/2018  8:54 AM                                cevimeline 30 MG capsule   Commonly  known as:  EVOXAC   Take 30 mg by mouth 3 times daily Reported on 3/6/2017   Last time this was given:  30 mg on 11/3/2018  8:54 AM                                cholecalciferol 1000 units Tabs   Take 1,000 Units by mouth 2 times daily   Last time this was given:  1,000 Units on 11/3/2018  8:54 AM                                CLEAR EYES COMPLETE OP   Apply 1 drop to eye 3 times daily as needed                                DEXILANT 60 MG Cpdr CR capsule   60 mg 2 times daily   Generic drug:  dexlansoprazole                                HERBALS   Cherry juice 1 tablespoon at night PRN                                levothyroxine 88 MCG tablet   Commonly known as:  SYNTHROID/LEVOTHROID   TAKE ONE TABLET BY MOUTH EVERY DAY   Last time this was given:  88 mcg on 11/3/2018  8:54 AM                                LUTEIN 20 PO   Take 1 tablet by mouth daily                                MELATONIN PO   Take 3 mg by mouth At Bedtime                                MIRALAX powder   Take 1 capful by mouth 2 times daily In 8 ounces of liquid   Generic drug:  polyethylene glycol                                mupirocin 2 % nasal ointment   Commonly known as:  BACTROBAN   Spray 1 g into both nostrils 2 times daily Apply small amount to each nostril 2 times a day for 7 days prior to surgery                                nitroGLYcerin 0.4 MG sublingual tablet   Commonly known as:  NITROSTAT   Place 1 tablet (0.4 mg) under the tongue every 5 minutes as needed for chest pain                                OMEGA-3 FISH OIL PO   Take 1 g by mouth 2 times daily (with meals)                                ranitidine 300 MG tablet   Commonly known as:  ZANTAC   Take 300 mg by mouth At Bedtime   Last time this was given:  300 mg on 11/2/2018  8:21 PM                                REFRESH LIQUIGEL OP   Apply 1 drop to eye every evening 15 minutes after Restasis drops.   Last time this was given:  1 drop on 11/2/2018  8:22 PM                                 RESTASIS OP   Apply 1 drop to eye every evening   Last time this was given:  1 drop on 11/2/2018  8:18 PM                                senna-docusate 8.6-50 MG per tablet   Commonly known as:  SENOKOT-S;PERICOLACE   Take 2 tablets by mouth 2 times daily as needed for constipation   Last time this was given:  1 tablet on 11/2/2018  8:18 PM

## 2018-10-31 NOTE — ANESTHESIA CARE TRANSFER NOTE
Patient: Ya Stahl    Procedure(s):  Central L3-4 L4-5 lamenectomies L4-5 posterior instrumented fusion    Diagnosis: stenosis radiculapathy spondylothesis DDD  Diagnosis Additional Information: No value filed.    Anesthesia Type:   General, ETT     Note:    Patient transferred to:PACU  Comments: PT spont resps ETT removed to PACU VSS report to RNHandoff Report: Identifed the Patient, Identified the Reponsible Provider, Reviewed the pertinent medical history, Discussed the surgical course, Reviewed Intra-OP anesthesia mangement and issues during anesthesia, Set expectations for post-procedure period and Allowed opportunity for questions and acknowledgement of understanding      Vitals: (Last set prior to Anesthesia Care Transfer)    CRNA VITALS  10/31/2018 1226 - 10/31/2018 1304      10/31/2018             Pulse: 70    SpO2: 100 %    Resp Rate (observed): 14                Electronically Signed By: SINTIA Bah CRNA  October 31, 2018  1:04 PM

## 2018-10-31 NOTE — IP AVS SNAPSHOT
[facility]    [address]    [facility] [address]    Phone:  [phone]    Fax:  [phone]                                       After Visit Summary   10/31/2018    Ya Stahl    MRN: [id number]           After Visit Summary Signature Page     I have received my discharge instructions, and my questions have been answered. I have discussed any challenges I see with this plan with the nurse or doctor.    ..........................................................................................................................................  Patient/Patient Representative Signature      ..........................................................................................................................................  Patient Representative Print Name and Relationship to Patient    ..................................................               ................................................  Date                                   Time    ..........................................................................................................................................  Reviewed by Signature/Title    ...................................................              ..............................................  Date                                               Time          22EPIC Rev 08/18

## 2018-10-31 NOTE — ANESTHESIA POSTPROCEDURE EVALUATION
Patient: Ya Stahl    Procedure(s):  Central L3-4 L4-5 lamenectomies L4-5 posterior instrumented fusion    Diagnosis:stenosis radiculapathy spondylothesis DDD  Diagnosis Additional Information: Surgeon Brief Op Note       Brief Op Note by Aroldo Burdick MD at 10/31/2018  1:04 PM  Version 1 of 1    Author: Aroldo Burdick MD Service: Orthopedics Author Type: Physician    Filed: 10/31/2018  1:04 PM Date of Service: 10/31/2018  1:04 PM Creation T, ivan: 10/31/2018  1:03 PM    Status: Signed : Aroldo Burdick MD (Physician)         [facility] Brief Operative Note       Pre-operative diagnosis: L34 and L45 stenosis radiculapathy; L45 spondylothesis DDD  Post-operative diagnosis * No po, st-op diagnosis entered *  same  Procedure: Procedure(s):  Central L3-4 L4-5 lamenectomies L4-5 posterior instrumented fusion  Surgeon(s): Surgeon(s) and Role:     * Aroldo Burdick MD - Primary     * Sree Calix PA-C - Assisting  Estimated nicole morelos loss: 200 mL                  Specimens: * No specimens in log *  Findings: No comp     Aroldo Burdick MD               Anesthesia Type:  General, ETT    Note:  Anesthesia Post Evaluation    Patient location during evaluation: PACU  Patient participation: Able to fully participate in evaluation  Level of consciousness: awake  Pain management: adequate  Airway patency: patent  Cardiovascular status: acceptable  Respiratory status: acceptable  Hydration status: acceptable  PONV: none     Anesthetic complications: None          Last vitals:  Vitals:    10/31/18 1345 10/31/18 1400 10/31/18 1438   BP: 128/86  150/81   Resp: 11 11 16   Temp:      SpO2: 97% 100% 99%         Electronically Signed By: Rah Amezquita MD  October 31, 2018  3:09 PM

## 2018-11-01 ENCOUNTER — APPOINTMENT (OUTPATIENT)
Dept: PHYSICAL THERAPY | Facility: CLINIC | Age: 78
DRG: 460 | End: 2018-11-01
Attending: ORTHOPAEDIC SURGERY
Payer: MEDICARE

## 2018-11-01 LAB
GLUCOSE BLDC GLUCOMTR-MCNC: 111 MG/DL (ref 70–99)
INTERPRETATION ECG - MUSE: NORMAL

## 2018-11-01 PROCEDURE — 99222 1ST HOSP IP/OBS MODERATE 55: CPT | Performed by: INTERNAL MEDICINE

## 2018-11-01 PROCEDURE — 97530 THERAPEUTIC ACTIVITIES: CPT | Mod: GP

## 2018-11-01 PROCEDURE — 97161 PT EVAL LOW COMPLEX 20 MIN: CPT | Mod: GP

## 2018-11-01 PROCEDURE — 40000193 ZZH STATISTIC PT WARD VISIT

## 2018-11-01 PROCEDURE — A9270 NON-COVERED ITEM OR SERVICE: HCPCS | Mod: GY | Performed by: INTERNAL MEDICINE

## 2018-11-01 PROCEDURE — 97116 GAIT TRAINING THERAPY: CPT | Mod: GP | Performed by: PHYSICAL THERAPY ASSISTANT

## 2018-11-01 PROCEDURE — 40000193 ZZH STATISTIC PT WARD VISIT: Performed by: PHYSICAL THERAPY ASSISTANT

## 2018-11-01 PROCEDURE — 25000132 ZZH RX MED GY IP 250 OP 250 PS 637: Mod: GY | Performed by: PHYSICIAN ASSISTANT

## 2018-11-01 PROCEDURE — 99207 ZZC CONSULT E&M CHANGED TO INITIAL LEVEL: CPT | Performed by: INTERNAL MEDICINE

## 2018-11-01 PROCEDURE — 25000132 ZZH RX MED GY IP 250 OP 250 PS 637: Mod: GY | Performed by: INTERNAL MEDICINE

## 2018-11-01 PROCEDURE — 00000146 ZZHCL STATISTIC GLUCOSE BY METER IP

## 2018-11-01 PROCEDURE — 97116 GAIT TRAINING THERAPY: CPT | Mod: GP

## 2018-11-01 PROCEDURE — A9270 NON-COVERED ITEM OR SERVICE: HCPCS | Mod: GY | Performed by: PHYSICIAN ASSISTANT

## 2018-11-01 PROCEDURE — 12000000 ZZH R&B MED SURG/OB

## 2018-11-01 PROCEDURE — 25000128 H RX IP 250 OP 636: Performed by: PHYSICIAN ASSISTANT

## 2018-11-01 PROCEDURE — 25000128 H RX IP 250 OP 636: Performed by: INTERNAL MEDICINE

## 2018-11-01 PROCEDURE — 97530 THERAPEUTIC ACTIVITIES: CPT | Mod: GP | Performed by: PHYSICAL THERAPY ASSISTANT

## 2018-11-01 RX ORDER — ACETAMINOPHEN 325 MG/1
325 TABLET ORAL EVERY 4 HOURS PRN
Status: DISCONTINUED | OUTPATIENT
Start: 2018-11-01 | End: 2018-11-01

## 2018-11-01 RX ORDER — ACETAMINOPHEN 325 MG/1
650 TABLET ORAL EVERY 4 HOURS PRN
Status: DISCONTINUED | OUTPATIENT
Start: 2018-11-01 | End: 2018-11-03 | Stop reason: HOSPADM

## 2018-11-01 RX ADMIN — CEVIMELINE HYDROCHLORIDE 30 MG: 30 CAPSULE ORAL at 21:13

## 2018-11-01 RX ADMIN — OYSTER SHELL CALCIUM WITH VITAMIN D 1 TABLET: 500; 200 TABLET, FILM COATED ORAL at 12:49

## 2018-11-01 RX ADMIN — CYCLOSPORINE 1 DROP: 0.5 EMULSION OPHTHALMIC at 21:07

## 2018-11-01 RX ADMIN — HYDROXYZINE HYDROCHLORIDE 10 MG: 10 TABLET, FILM COATED ORAL at 14:56

## 2018-11-01 RX ADMIN — Medication: at 06:19

## 2018-11-01 RX ADMIN — ONDANSETRON 4 MG: 2 INJECTION INTRAMUSCULAR; INTRAVENOUS at 04:41

## 2018-11-01 RX ADMIN — METOCLOPRAMIDE 5 MG: 5 INJECTION, SOLUTION INTRAMUSCULAR; INTRAVENOUS at 06:55

## 2018-11-01 RX ADMIN — PANTOPRAZOLE SODIUM 40 MG: 40 TABLET, DELAYED RELEASE ORAL at 08:23

## 2018-11-01 RX ADMIN — OYSTER SHELL CALCIUM WITH VITAMIN D 1 TABLET: 500; 200 TABLET, FILM COATED ORAL at 18:08

## 2018-11-01 RX ADMIN — CEFAZOLIN SODIUM 1 G: 1 INJECTION, SOLUTION INTRAVENOUS at 03:36

## 2018-11-01 RX ADMIN — RANITIDINE 300 MG: 150 TABLET ORAL at 21:13

## 2018-11-01 RX ADMIN — PANTOPRAZOLE SODIUM 40 MG: 40 TABLET, DELAYED RELEASE ORAL at 21:13

## 2018-11-01 RX ADMIN — OYSTER SHELL CALCIUM WITH VITAMIN D 1 TABLET: 500; 200 TABLET, FILM COATED ORAL at 08:23

## 2018-11-01 RX ADMIN — SENNOSIDES AND DOCUSATE SODIUM 1 TABLET: 8.6; 5 TABLET ORAL at 21:13

## 2018-11-01 RX ADMIN — CEVIMELINE HYDROCHLORIDE 30 MG: 30 CAPSULE ORAL at 08:23

## 2018-11-01 RX ADMIN — ACETAMINOPHEN 650 MG: 325 TABLET, FILM COATED ORAL at 14:56

## 2018-11-01 RX ADMIN — CEVIMELINE HYDROCHLORIDE 30 MG: 30 CAPSULE ORAL at 14:56

## 2018-11-01 RX ADMIN — Medication: at 00:07

## 2018-11-01 RX ADMIN — SODIUM CHLORIDE: 9 INJECTION, SOLUTION INTRAVENOUS at 03:36

## 2018-11-01 RX ADMIN — ACETAMINOPHEN 325 MG: 325 TABLET, FILM COATED ORAL at 09:44

## 2018-11-01 RX ADMIN — LEVOTHYROXINE SODIUM 88 MCG: 88 TABLET ORAL at 08:23

## 2018-11-01 RX ADMIN — ATORVASTATIN CALCIUM 20 MG: 20 TABLET, FILM COATED ORAL at 21:13

## 2018-11-01 RX ADMIN — ACETAMINOPHEN 650 MG: 325 TABLET, FILM COATED ORAL at 21:13

## 2018-11-01 ASSESSMENT — ACTIVITIES OF DAILY LIVING (ADL)
ADLS_ACUITY_SCORE: 11

## 2018-11-01 NOTE — PLAN OF CARE
Problem: Patient Care Overview  Goal: Plan of Care/Patient Progress Review  Discharge Planner PT      PT: Orders received, eval completed, tx initiated. 78-year-old female with a history of GERD, coronary artery disease status post MI, hypertension, Sjogren's syndrome, sicca syndrome, and chronic low back pain who presents here for an elective L3-L4, L4-L5 laminectomies. POD1 L4-5 laminectomy and fusion.  Pt lives in an accessible apartment with her spouse. At baseline ambulates IND and is IND with ADLs. Occasionally would use SEC due to high pain.    Patient plan for discharge: Home  Current status: Educated on PT role/poc. Discussed and demonstrated spinal precautions. Min-A and trunk and LE for log roll. Demonstrated brace donning, pt able to perform with cues to technique. CGA sit<>stand. Tolerates static standing x 1 min and transfers without lightheaded/dizziness. Pt displays unsteadiness and requires min-A for short gait without AD. Amb 200' with IV pole using step-through gait. Pace increases with dizziness, no symptoms reported.  Barriers to return to prior living situation: None  Recommendations for discharge: Home with assist from spouse as needed.  Rationale for recommendations: Anticipate patient will progress well given PLOF and current functional status. Will benefit from ongoing skilled PT intervention to improve mobility status prior to returning home.       Entered by: Romario Willett 11/01/2018 12:28 PM

## 2018-11-01 NOTE — PROGRESS NOTES
"Ortho Rounding Note    S: Patient resting in bed, appearing comfortable, reports pain is controlled. Reports sitting up on edge of bed yesterday but had a bout of nausea. States she only has pain in her low back with movement.  She would like to transition to Tylenol only as she believes her nausea is stemming from her PCA. Denies CP, SOB, low extremity pain.     O:  Vital signs:   Blood pressure 132/67, pulse 60, temperature 97.9  F (36.6  C), temperature source Oral, resp. rate 15, height 1.657 m (5' 5.25\"), weight 60.5 kg (133 lb 4.8 oz), SpO2 100 %, not currently breastfeeding.  Estimated body mass index is 22.01 kg/(m^2) as calculated from the following:    Height as of this encounter: 1.657 m (5' 5.25\").    Weight as of this encounter: 60.5 kg (133 lb 4.8 oz).      Intake/Output Summary (Last 24 hours) at 11/01/18 0720  Last data filed at 11/01/18 0621   Gross per 24 hour   Intake             4384 ml   Output             3330 ml   Net             1054 ml       Drain intact  Dressings c/d/i  5/5 motor and SPLT in BL UE and LE    A:  POD #1 s/p L4-5 PSF with central L3-4, L4-5 laminectomies.     P: Patient is doing well. Will transition patient to Tylenol only and avoid narcotics - per patient request. Continue working with PT/OT, Plan for at least 1 more midnight.   General: Progressing well   Pain: Pain controlled on PCA but will transition to apap only - order placed   Act: up ad glenny, with therapy  DVT: Mech only  ID: routine postop abx to be completed 24 hours after surgery  Dispo: Plan for at least 1 more midnight.  Appreciate Medicine consult for medical management    Sree aClix PA-C    "

## 2018-11-01 NOTE — PLAN OF CARE
Patient A&Ox4. Ambulates Axi w/ WW, GB, and brace. +BS/gas, tolerating diet. Nelson out this am, has voided adequately since. Taking PRN tylenol and atarax for pain control. CMS intact. Dressing CDI. Will continue to monitor.

## 2018-11-01 NOTE — PLAN OF CARE
Problem: Patient Care Overview  Goal: Plan of Care/Patient Progress Review  Outcome: No Change  A&O x4. Soft BPs, BP medication held, otherwise VSS on 2LPM of O2. Capno in place. Dressing to spine is CDI. CMS intact. Hemovac in place with 100mL out on NOC shift. Nausea, taking clear liquids. IV zofran and reglan given. IVF at 100mL/hr. PCA dilaudid in place, not using extensively but needs a review or oral pain medications as she has an allergy listed for codeine, pt reports she has nausea with codeine. Dangled at bedside, became nauseous. Has a brace for OOB.  voiding in good amts via covarrubias catheter. Will continue to monitor.

## 2018-11-01 NOTE — CONSULTS
[facility]  Hospitalist Consult Note  November 1, 2018  Name: Ya Stahl    MRN: [id number]  YOB: 1940    Age: 78 year old  Date of admission: 10/31/2018  Primary care provider: Scarlett Spencer      Summary:  Patient is a pleasant 78-year-old female with a history of GERD, coronary artery disease status post MI, hypertension, Sjogren's syndrome, sicca syndrome, and chronic low back pain who presents here for an elective L3-L4, L4-L5 laminectomies.  We are asked to consult for  postoperative medical management.    Problem list/Plan:  1. Status post L4-L5 PSF with central L3-L4, L4-5 laminectomies: Doing well and pain appears to be controlled.  Notably narcotic naïve.  We will try to only work with Tylenol for now.  Dilaudid PCA has been discontinued.  Continue PT/OT per routine.    2. History of Sjogren's syndrome/sicca syndrome: Continue chronic evoxac and Restasis.  3. Hypertension: Continue Avapro  4. Hyperlipidemia: Continue Lipitor  5. Hypothyroidism: Continue Synthroid  6. GERD: Continue Protonix      All lab work and imaging data independently reviewed by myself    Prophylaxis;  Per orthopedics/Ambulation.     Code status: full code    Discharge: per orthopedics    Requesting physician: Dr. Aroldo Burdick   Reason for consultation: Postoperative medical management   HPI  Patient is a pleasant 78-year-old female with a history of GERD, coronary artery disease status post MI, hypertension, Sjogren's syndrome, sicca syndrome, and chronic low back pain who presents here for an elective L3-L4, L4-L5 laminectomies.  We are asked to consult for  postoperative medical management.  She is doing quite well and pain is otherwise controlled.  Notably, patient is narcotic naïve.  She currently denies any chest pain or shortness of breath.  She is otherwise doing pretty well and has no significant complaints.     Past Medical History:     Past Medical History:   Diagnosis Date      "Arthritis      Complication of anesthesia     \"hard time waking up / N & V\"     Coronary artery disease      Displacement of lumbar intervertebral disc without myelopathy      Gastro-oesophageal reflux disease      Hearing loss     has bilateral aids     Heart attack (H) 3/2016     History of angina      Hypertension      Low back pain      Numbness and tingling     down right leg (associated with back pain)     PONV (postoperative nausea and vomiting)      Sicca syndrome (H)      Sjogren's syndrome (H)     sees specialist; dentist     Unspecified hypothyroidism      Past Surgical History:     Past Surgical History:   Procedure Laterality Date     ARTHROSCOPY KNEE  10/5/2012    R Procedure: ARTHROSCOPY KNEE;  RIGHT KNEE ARTHROSCOPY, PARTIAL MEDIAL MENISCECTOMY;  Surgeon: Karli Zaragoza MD;  Location:  [facility]     BUNIONECTOMY  ~2010    L foot.      C NONSPECIFIC PROCEDURE  1976    D&C     CATARACT IOL, RT/LT Bilateral 07/2017     COLONOSCOPY N/A 1/17/2017    normal; no repeat Procedure: COLONOSCOPY;  Surgeon: Fan Giordano MD;  Location:  GI     ENDOSCOPIC RELEASE CARPAL TUNNEL  9/11/2012    R Procedure: ENDOSCOPIC RELEASE CARPAL TUNNEL;  Right Endoscopic carpal tunnel release, left ringer finger cortizon injection;  Surgeon: Anne Marie Catherine MD;  Location:  OR     ESOPHAGOSCOPY, GASTROSCOPY, DUODENOSCOPY (EGD), COMBINED N/A 12/26/2014    Procedure: COMBINED ESOPHAGOSCOPY, GASTROSCOPY, DUODENOSCOPY (EGD);  Surgeon: Fan Giordano MD;  Location:  GI     EXCISE EXOSTOSIS FOOT Left 12/1/2016    Procedure: EXCISE EXOSTOSIS FOOT;  Surgeon: Jody Gallagher, DPM, Pod;  Location: [facility] OR     GYN SURGERY  1978    ovaries removed     HEART CATH, ANGIOPLASTY  3/4/16    dz <40%     HYSTERECTOMY, PAP NO LONGER INDICATED  1977    Hysterectomy; benign     INJECT STEROID (LOCATION)  9/11/2012    Procedure: INJECT STEROID (LOCATION);;  Surgeon: Anne Marie Catherine MD;  Location:  OR     LAPAROSCOPIC CHOLECYSTECTOMY N/A " 4/7/2016    Procedure: LAPAROSCOPIC CHOLECYSTECTOMY;  Surgeon: Hans Medina MD;  Location: [facility] OR     ORTHOPEDIC SURGERY  ~2008    Right foot, bunionectomy      ORTHOPEDIC SURGERY Right 2014    karli for fx femur     RECONSTRUCT FOREFOOT WITH METATARSOPHALANGEAL (MTP) FUSION Left 4/28/2017    Procedure: RECONSTRUCT FOREFOOT WITH METATARSOPHALANGEAL (MTP) FUSION;  1. Resection arthroplasty, fifth metatarsophalangeal joint, left foot.   2. Irrigation and debridement of fifth metatarsophalangeal joint, left foot (excisional to the level of the bone).     ;  Surgeon: Fabián Phipps MD;  Location: [facility] OR     RIGHT HIP ORIF 4/1/2014       ROTATOR CUFF REPAIR RT/LT  ~1998    Lt shoulder; rotator cuff     SURGICAL HISTORY OF -   distant past    ablation for palpitations.     SURGICAL HISTORY OF -   June 2015    left carpal tunnel surgery     Social History:     Social History   Substance Use Topics     Smoking status: Never Smoker     Smokeless tobacco: Never Used     Alcohol use Yes      Comment: social; maybe a glass of wine or highball per week      Family History:  Family history reviewed. NO pertinent family history     Allergies:     Allergies   Allergen Reactions     Codeine      fatigue     Vicodin [Acetaminophen] Fatigue     Medications:     Prescriptions Prior to Admission   Medication Sig Dispense Refill Last Dose     Acetaminophen (TYLENOL PO) Take 1,500 mg by mouth daily as needed for mild pain or fever   Taking     alendronate (FOSAMAX) 70 MG tablet Take 1 tablet (70 mg) by mouth every 7 days Take with over 8 ounces water and stay upright for at least 30 minutes after dose.  Take at least 60 minutes before breakfast 12 tablet 3 Taking     atorvastatin (LIPITOR) 20 MG tablet Take 1 tablet (20 mg) by mouth daily 102 tablet 3 Taking     calcium citrate-vitamin D (CITRACAL) 315-250 MG-UNIT TABS per tablet Take 2 tablets by mouth every morning        calcium citrate-vitamin D (CITRACAL) 315-250 MG-UNIT  TABS per tablet Take 1 tablet by mouth every evening        Carboxymethylcellulose Sodium (REFRESH LIQUIGEL OP) Apply 1 drop to eye every evening 15 minutes after Restasis drops.   Taking     cevimeline (EVOXAC) 30 MG capsule Take 30 mg by mouth 3 times daily Reported on 3/6/2017   Taking     CycloSPORINE (RESTASIS OP) Apply 1 drop to eye every evening    Taking     DEXILANT 60 MG CPDR CR capsule 60 mg 2 times daily    Taking     HERBALS Cherry juice 1 tablespoon at night PRN   Taking     Hyprom-Naphaz-Polysorb-Zn Sulf (CLEAR EYES COMPLETE OP) Apply 1 drop to eye 3 times daily as needed   Taking     Irbesartan (AVAPRO PO) Take 150 mg by mouth every evening   Taking     levothyroxine (SYNTHROID/LEVOTHROID) 88 MCG tablet TAKE ONE TABLET BY MOUTH EVERY  tablet 3 Taking     MELATONIN PO Take 3 mg by mouth At Bedtime   Taking     Misc Natural Products (LUTEIN 20 PO) Take 1 tablet by mouth daily   Taking     mupirocin (BACTROBAN) 2 % nasal ointment Spray 1 g into both nostrils 2 times daily Apply small amount to each nostril 2 times a day for 7 days prior to surgery   Taking     nitroGLYcerin (NITROSTAT) 0.4 MG sublingual tablet Place 1 tablet (0.4 mg) under the tongue every 5 minutes as needed for chest pain 25 tablet 0 Taking     polyethylene glycol (MIRALAX) powder Take 1 capful by mouth 2 times daily In 8 ounces of liquid   Taking     ranitidine (ZANTAC) 300 MG tablet Take 300 mg by mouth At Bedtime    Taking     aspirin 81 MG EC tablet Take 1 tablet (81 mg) by mouth daily 1 tablet 0 Taking     Omega-3 Fatty Acids (OMEGA-3 FISH OIL PO) Take 1 g by mouth 2 times daily (with meals)    Taking       Review of Systems:   A Comprehensive greater than 10 system review of systems was carried out.  Pertinent positives and negatives are noted above.  Otherwise negative for contributory information.        Physical Exam:  Blood pressure 133/72, pulse 60, temperature 98.6  F (37  C), temperature source Oral, resp. rate 15,  "height 1.657 m (5' 5.25\"), weight 60.5 kg (133 lb 4.8 oz), SpO2 96 %, not currently breastfeeding.  Gen: Pleasant in no acute distress.  HEENT: NCAT. EOMI. PERRL.  Neck: Normal inspection. No bruit, JVD or thyromegaly.  Lungs: Normal respiratory effort. Clear to auscultation bilaterally with no crackles or wheezes.  Card: N s1s2. RRR. No M/R/G.  Peripheral pulses present and symmetric.   Skin: No rash. Warm to the touch  Extr: No edema. CMS intact  Psychiatric: Patient alert oriented ×3.  Normal affect  Neurologic: Cranial nerves II-XII are intact.  Sensation normal.  Motor strength 5/5    Data:   No results for input(s): WBC, HGB, HCT, MCV, PLT in the last 168 hours.  No results for input(s): NA, POTASSIUM, CHLORIDE, CO2, ANIONGAP, GLC, BUN, CR, GFRESTIMATED, GFRESTBLACK, CARMEN, MAG, PHOS, PROTTOTAL, ALBUMIN, BILITOTAL, ALKPHOS, AST, ALT in the last 168 hours.    Imaging:   Recent Results (from the past 24 hour(s))   XR Surgery KENNEY L/T 5 Min Fluoro    Narrative    This exam was marked as non-reportable because it will not be read by a   radiologist or a [facility] non-radiologist provider.             XR Lumbar Spine Port 2/3 Views    Narrative    This exam was marked as non-reportable because it will not be read by a   radiologist or a [facility] non-radiologist provider.                   Kyle Jackson MD Pager [phone]      "

## 2018-11-01 NOTE — PROGRESS NOTES
11/01/18 1000   Quick Adds   Type of Visit Initial PT Evaluation   Living Environment   Lives With spouse   Living Arrangements apartment   Home Accessibility no concerns   Number of Stairs to Enter Home 0   Number of Stairs Within Home 0   Living Environment Comment Lives with spouse in fully accessible home   Self-Care   Usual Activity Tolerance moderate   Current Activity Tolerance moderate   Equipment Currently Used at Home cane, straight  (Only occassionally to mange pain)   Activity/Exercise/Self-Care Comment Fully independent   Functional Level Prior   Ambulation 0-->independent   Transferring 0-->independent   Toileting 0-->independent   Bathing 0-->independent   Dressing 0-->independent   Fall history within last six months no   Which of the above functional risks had a recent onset or change? ambulation;transferring   Prior Functional Level Comment Fully independent   General Information   Onset of Illness/Injury or Date of Surgery - Date 10/31/18   Referring Physician Sree Calix, DEEPAK   Patient/Family Goals Statement Return home   Pertinent History of Current Problem (include personal factors and/or comorbidities that impact the POC) 78-year-old female with a history of GERD, coronary artery disease status post MI, hypertension, Sjogren's syndrome, sicca syndrome, and chronic low back pain who presents here for an elective L3-L4, L4-L5 laminectomies. POD1 L4-5 laminectomy and fusion   Precautions/Limitations fall precautions   Cognitive Status Examination   Orientation orientation to person, place and time   Level of Consciousness alert   Pain Assessment   Patient Currently in Pain Yes, see Vital Sign flowsheet   Posture    Posture Forward head position;Kyphosis   Range of Motion (ROM)   ROM Comment BLE BUE WFL   Strength   Strength Comments NT, BLE >3/5 functionally   Bed Mobility   Bed Mobility Comments Min-A supine<>sit   Transfer Skills   Transfer Comments CGA sit<>stand   Gait   Gait  "Comments CGA/min-A without AD   Balance   Balance Comments Requires UE support and SBA   Sensory Examination   Sensory Perception Comments Denies NT   Modality Interventions   Planned Modality Interventions Cryotherapy   General Therapy Interventions   Planned Therapy Interventions bed mobility training;gait training;neuromuscular re-education;strengthening;transfer training;progressive activity/exercise;home program guidelines;risk factor education   Clinical Impression   Criteria for Skilled Therapeutic Intervention yes, treatment indicated   PT Diagnosis Impaired gait and mobility   Influenced by the following impairments Pain, spinal precautions   Functional limitations due to impairments Decreased safety and IND with gait   Clinical Presentation Stable/Uncomplicated   Clinical Presentation Rationale Medically stable   Clinical Decision Making (Complexity) Low complexity   Therapy Frequency` 2 times/day   Predicted Duration of Therapy Intervention (days/wks) 3 days   Anticipated Discharge Disposition Home with Assist   Risk & Benefits of therapy have been explained Yes   Patient, Family & other staff in agreement with plan of care Yes   [facility] TM \"6 Clicks\"   2016, Trustees of [facility], under license to [employer].  All rights reserved.   6 Clicks Short Forms Basic Mobility Inpatient Short Form   [facility]  \"6 Clicks\" V.2 Basic Mobility Inpatient Short Form   1. Turning from your back to your side while in a flat bed without using bedrails? 3 - A Little   2. Moving from lying on your back to sitting on the side of a flat bed without using bedrails? 3 - A Little   3. Moving to and from a bed to a chair (including a wheelchair)? 3 - A Little   4. Standing up from a chair using your arms (e.g., wheelchair, or bedside chair)? 3 - A Little   5. To walk in hospital room? 3 - A Little   6. Climbing 3-5 steps with a railing? 3 - A Little   Basic Mobility Raw Score (Score out " of 24.Lower scores equate to lower levels of function) 18   Total Evaluation Time   Total Evaluation Time (Minutes) 6

## 2018-11-02 ENCOUNTER — APPOINTMENT (OUTPATIENT)
Dept: PHYSICAL THERAPY | Facility: CLINIC | Age: 78
DRG: 460 | End: 2018-11-02
Attending: ORTHOPAEDIC SURGERY
Payer: MEDICARE

## 2018-11-02 ENCOUNTER — APPOINTMENT (OUTPATIENT)
Dept: OCCUPATIONAL THERAPY | Facility: CLINIC | Age: 78
DRG: 460 | End: 2018-11-02
Attending: ORTHOPAEDIC SURGERY
Payer: MEDICARE

## 2018-11-02 LAB — GLUCOSE BLDC GLUCOMTR-MCNC: 130 MG/DL (ref 70–99)

## 2018-11-02 PROCEDURE — 99232 SBSQ HOSP IP/OBS MODERATE 35: CPT | Performed by: INTERNAL MEDICINE

## 2018-11-02 PROCEDURE — 00000146 ZZHCL STATISTIC GLUCOSE BY METER IP

## 2018-11-02 PROCEDURE — 97165 OT EVAL LOW COMPLEX 30 MIN: CPT | Mod: GO | Performed by: REHABILITATION PRACTITIONER

## 2018-11-02 PROCEDURE — 12000000 ZZH R&B MED SURG/OB

## 2018-11-02 PROCEDURE — 97535 SELF CARE MNGMENT TRAINING: CPT | Mod: GO | Performed by: REHABILITATION PRACTITIONER

## 2018-11-02 PROCEDURE — A9270 NON-COVERED ITEM OR SERVICE: HCPCS | Mod: GY | Performed by: INTERNAL MEDICINE

## 2018-11-02 PROCEDURE — 97530 THERAPEUTIC ACTIVITIES: CPT | Mod: GP | Performed by: PHYSICAL THERAPY ASSISTANT

## 2018-11-02 PROCEDURE — 25000132 ZZH RX MED GY IP 250 OP 250 PS 637: Mod: GY | Performed by: PHYSICIAN ASSISTANT

## 2018-11-02 PROCEDURE — 97116 GAIT TRAINING THERAPY: CPT | Mod: GP | Performed by: PHYSICAL THERAPY ASSISTANT

## 2018-11-02 PROCEDURE — 40000133 ZZH STATISTIC OT WARD VISIT: Performed by: REHABILITATION PRACTITIONER

## 2018-11-02 PROCEDURE — 25000132 ZZH RX MED GY IP 250 OP 250 PS 637: Mod: GY | Performed by: INTERNAL MEDICINE

## 2018-11-02 PROCEDURE — 40000193 ZZH STATISTIC PT WARD VISIT: Performed by: PHYSICAL THERAPY ASSISTANT

## 2018-11-02 PROCEDURE — A9270 NON-COVERED ITEM OR SERVICE: HCPCS | Mod: GY | Performed by: PHYSICIAN ASSISTANT

## 2018-11-02 RX ORDER — AMOXICILLIN 250 MG
2 CAPSULE ORAL 2 TIMES DAILY PRN
Qty: 20 TABLET | Refills: 0 | Status: SHIPPED | OUTPATIENT
Start: 2018-11-02 | End: 2018-12-21

## 2018-11-02 RX ADMIN — CYCLOSPORINE 1 DROP: 0.5 EMULSION OPHTHALMIC at 20:18

## 2018-11-02 RX ADMIN — CEVIMELINE HYDROCHLORIDE 30 MG: 30 CAPSULE ORAL at 20:18

## 2018-11-02 RX ADMIN — ATORVASTATIN CALCIUM 20 MG: 20 TABLET, FILM COATED ORAL at 20:18

## 2018-11-02 RX ADMIN — SENNOSIDES AND DOCUSATE SODIUM 1 TABLET: 8.6; 5 TABLET ORAL at 08:58

## 2018-11-02 RX ADMIN — CEVIMELINE HYDROCHLORIDE 30 MG: 30 CAPSULE ORAL at 06:33

## 2018-11-02 RX ADMIN — SENNOSIDES AND DOCUSATE SODIUM 1 TABLET: 8.6; 5 TABLET ORAL at 20:18

## 2018-11-02 RX ADMIN — IRBESARTAN 150 MG: 75 TABLET ORAL at 20:17

## 2018-11-02 RX ADMIN — RANITIDINE 300 MG: 150 TABLET ORAL at 20:21

## 2018-11-02 RX ADMIN — ACETAMINOPHEN 650 MG: 325 TABLET, FILM COATED ORAL at 10:25

## 2018-11-02 RX ADMIN — LEVOTHYROXINE SODIUM 88 MCG: 88 TABLET ORAL at 06:31

## 2018-11-02 RX ADMIN — ACETAMINOPHEN 650 MG: 325 TABLET, FILM COATED ORAL at 02:23

## 2018-11-02 RX ADMIN — OYSTER SHELL CALCIUM WITH VITAMIN D 1 TABLET: 500; 200 TABLET, FILM COATED ORAL at 06:31

## 2018-11-02 RX ADMIN — OYSTER SHELL CALCIUM WITH VITAMIN D 1 TABLET: 500; 200 TABLET, FILM COATED ORAL at 17:50

## 2018-11-02 RX ADMIN — OYSTER SHELL CALCIUM WITH VITAMIN D 1 TABLET: 500; 200 TABLET, FILM COATED ORAL at 11:46

## 2018-11-02 RX ADMIN — PANTOPRAZOLE SODIUM 40 MG: 40 TABLET, DELAYED RELEASE ORAL at 20:18

## 2018-11-02 RX ADMIN — ACETAMINOPHEN 650 MG: 325 TABLET, FILM COATED ORAL at 20:18

## 2018-11-02 RX ADMIN — VITAMIN D, TAB 1000IU (100/BT) 1000 UNITS: 25 TAB at 08:58

## 2018-11-02 RX ADMIN — PANTOPRAZOLE SODIUM 40 MG: 40 TABLET, DELAYED RELEASE ORAL at 08:58

## 2018-11-02 RX ADMIN — CEVIMELINE HYDROCHLORIDE 30 MG: 30 CAPSULE ORAL at 14:44

## 2018-11-02 RX ADMIN — ACETAMINOPHEN 650 MG: 325 TABLET, FILM COATED ORAL at 06:31

## 2018-11-02 ASSESSMENT — PAIN DESCRIPTION - DESCRIPTORS: DESCRIPTORS: ACHING

## 2018-11-02 ASSESSMENT — ACTIVITIES OF DAILY LIVING (ADL)
ADLS_ACUITY_SCORE: 11

## 2018-11-02 NOTE — PLAN OF CARE
Problem: Patient Care Overview  Goal: Plan of Care/Patient Progress Review  OT- eval completed and treatment initiated. Patient is a 78-year-old female with a history of GERD, coronary artery disease status post MI, hypertension, Sjogren's syndrome, sicca syndrome, and chronic low back pain who presents here for an elective L3-L4, L4-L5 laminectomies. POD1 L4-5 laminectomy and fusion. Prior to admission, patient was living with spouse in [facility]. Spouse is able to A with ADL's and IADL's as needed.    Discharge Planner OT   Patient plan for discharge: home  Current status: Educated in and provided spine handouts for spine precautions, proper body mechanics, EC/WS techniques, advancement of activity following surgery, car transfers, driving readiness and AE recommendations, throughout education, patient was engaged in instruction and verbalized understanding. After review and with needed verbal cues to use proper technique, patient was SBA for bed mobility, CGA, fww for sit<>stand, min A to don brace, CGA, fww to ambulate to chair.   Barriers to return to prior living situation: none anticipate from OT standpoint  Recommendations for discharge: home with spouse A with ADL's and IADL's  Rationale for recommendations: anticipate patient will meet needed goals for safe return home with spouse. Will continue with OT for ADL's, AE needs.       Entered by: Birdie Zepeda 11/02/2018 9:50 AM

## 2018-11-02 NOTE — PROGRESS NOTES
"               [facility]  Hospitalist Progress Note  Name: Ya Stahl    MRN: [id number]  YOB: 1940    Age: 78 year old  Date of admission: 10/31/2018  Primary care provider: Scarlett Spencer      Reason for Stay (Diagnosis): Low back surgery         Assessment and Plan:      Summary of Stay:  Patient is a pleasant 78-year-old female with a history of GERD, coronary artery disease status post MI, hypertension, Sjogren's syndrome, sicca syndrome, and chronic low back pain who presents here for an elective L3-L4, L4-L5 laminectomies.  We are asked to consult for postoperative medical management.     Problem list/Plan:  1. Status post L4-L5 PSF with central L3-L4, L4-5 laminectomies: Doing well and pain appears to be controlled.  Notably narcotic naïve.  Only on Tylenol for pain control.  2. History of Sjogren's syndrome/sicca syndrome: Continue chronic evoxac and Restasis.  3. Hypertension: Continue Avapro  4. Hyperlipidemia: Continue Lipitor  5. Hypothyroidism: Continue Synthroid  6. GERD: Continue Protonix    DVT Prophylaxis: Defer to primary service  Code Status: Full Code  Discharge Dispo: Per orthopedics  Estimated Disch Date / # of Days until Disch: Per orthopedics        Interval History (Subjective):      Pain controlled with just Tylenol.  Otherwise, no new complaints         Physical Exam:      Vital signs:  Temp: 99.4  F (37.4  C) Temp src: Oral BP: 112/65 Pulse: 72 Heart Rate: 73 Resp: 16 SpO2: 98 % O2 Device: None (Room air) Oxygen Delivery: 3 LPM Height: 165.7 cm (5' 5.25\") Weight: 60.5 kg (133 lb 4.8 oz)  Estimated body mass index is 22.01 kg/(m^2) as calculated from the following:    Height as of this encounter: 1.657 m (5' 5.25\").    Weight as of this encounter: 60.5 kg (133 lb 4.8 oz).    I/O last 3 completed shifts:  In: 1200 [P.O.:1200]  Out: 1750 [Urine:1600; Drains:150]  Vitals:    10/31/18 0713   Weight: 60.5 kg (133 lb 4.8 oz)       Constitutional: Awake, alert, " cooperative, no apparent distress   Respiratory: Nl work of breathing. Clear to auscultation bilaterally, no crackles or wheezing   Cardiovascular: Regular rate and rhythm, normal S1 and S2, and no murmur noted       Skin: No rashes, no cyanosis, dry to touch   Neuro: CN 2-12 intact, no localizing weakness   Extremities: No edema, normal range of motion   HEENT Normocephalic, atraumatic, normal nasal turbinates; oropharynx clear   Neck Supple; nl inspection; trachea midline; no thryomegaly   Psychiatric: A+O x3. Normal affect          Medications:      All current medications were reviewed with changes reflected in problem list.         Data:      All new lab and imaging data was reviewed.   Labs:  No results for input(s): WBC, HGB, HCT, MCV, PLT in the last 168 hours.  No results for input(s): NA, POTASSIUM, CHLORIDE, CO2, ANIONGAP, GLC, BUN, CR, GFRESTIMATED, GFRESTBLACK, CARMEN, MAG, PHOS, PROTTOTAL, ALBUMIN, BILITOTAL, ALKPHOS, AST, ALT in the last 168 hours.   Imaging:   No results found for this or any previous visit (from the past 24 hour(s)).    Kyle Jackson -[phone]

## 2018-11-02 NOTE — PROGRESS NOTES
11/02/18 0819   Quick Adds   Type of Visit Initial Occupational Therapy Evaluation   Living Environment   Lives With spouse   Living Arrangements condominium  (senior condo)   Home Accessibility no concerns   Number of Stairs to Enter Home 0   Number of Stairs Within Home 0   Transportation Available car;family or friend will provide   Living Environment Comment Lives with spouse in fully accessible home   Self-Care   Usual Activity Tolerance moderate   Current Activity Tolerance moderate   Regular Exercise (hoping to return to club after recovery )   Equipment Currently Used at Home cane, straight   Activity/Exercise/Self-Care Comment Patient has borrowed fww, has 1 elevated toilet and 1 standard toilet. Has alos borrowed a toilet safety frame and shower chair. Uses a walk in shower, has limited grab bars in shower.   Functional Level Prior   Ambulation 0-->independent   Transferring 0-->independent   Toileting 0-->independent   Bathing 0-->independent   Dressing 0-->independent   Eating 0-->independent   Communication 0-->understands/communicates without difficulty   Swallowing 0-->swallows foods/liquids without difficulty   Cognition 0 - no cognition issues reported   Fall history within last six months no   Which of the above functional risks had a recent onset or change? ambulation;transferring;toileting;bathing;dressing   General Information   Onset of Illness/Injury or Date of Surgery - Date 10/31/18   Referring Physician Sree Calix PA-C   Patient/Family Goals Statement to return home   Additional Occupational Profile Info/Pertinent History of Current Problem 78-year-old female with a history of GERD, coronary artery disease status post MI, hypertension, Sjogren's syndrome, sicca syndrome, and chronic low back pain who presents here for an elective L3-L4, L4-L5 laminectomies. POD1 L4-5 laminectomy and fusion   Precautions/Limitations spinal precautions  (brace when OOB)   General Observations patient  was in bed and agreeable   Cognitive Status Examination   Orientation orientation to person, place and time   Level of Consciousness alert   Able to Follow Commands WNL/WFL   Personal Safety (Cognitive) WNL/WFL   Memory intact   Visual Perception   Visual Perception Wears glasses   Sensory Examination   Sensory Quick Adds No deficits were identified   Sensory Comments pain and tingling in  R LE and foot has resolved   Integumentary/Edema   Integumentary/Edema no deficits were identifed   Posture   Posture not impaired   Range of Motion (ROM)   ROM Comment has torn R RTC, unable to reach as high as L an dis slightly weaker the L UE   Hand Strength   Hand Strength Comments intact   Muscle Tone Assessment   Muscle Tone Quick Adds No deficits were identified   Coordination   Upper Extremity Coordination No deficits were identified   Transfer Skill: Bed to Chair/Chair to Bed   Level of [place]: Bed to Chair (treatment initiated-defer to OT daily note for details)   Transfer Skill: Sit to Stand   Level of [place]: Sit/Stand contact guard   Physical Assist/Nonphysical Assist: Sit/Stand supervision;verbal cues   Transfer Skill: Sit to Stand weight-bearing as tolerated   Assistive Device for Transfer: Sit/Stand rolling walker   Balance   Balance Comments LOB was not noted-defer to OT daily note for details   Upper Body Dressing   Level of [place]: Dress Upper Body (treatment initiated-defer to OT daily for details)   Eating/Self Feeding   Level of [place]: Eating independent   Instrumental Activities of Daily Living (IADL)   IADL Comments spouse able to complete as needed   Activities of Daily Living Analysis   Impairments Contributing to Impaired Activities of Daily Living balance impaired;pain;post surgical precautions;ROM decreased;strength decreased   General Therapy Interventions   Planned Therapy Interventions ADL retraining;progressive activity/exercise;transfer training   Clinical Impression  "  Criteria for Skilled Therapeutic Interventions Met yes, treatment indicated   OT Diagnosis decreased ADL's and IADL's   Influenced by the following impairments balance impaired;pain;post surgical precautions;ROM decreased;strength decreased   Assessment of Occupational Performance 5 or more Performance Deficits   Identified Performance Deficits balance impaired;pain;post surgical precautions;ROM decreased;strength decreased- dresing, toileting, bathing, household chores, driving, community and functional mobility   Clinical Decision Making (Complexity) Low complexity   Therapy Frequency daily   Predicted Duration of Therapy Intervention (days/wks) 2 days   Anticipated Discharge Disposition Home   Risks and Benefits of Treatment have been explained. Yes   Patient, Family & other staff in agreement with plan of care Yes   [facility] TM \"6 Clicks\"   2016, Trustees of [facility], under license to [employer].  All rights reserved.   6 Clicks Short Forms Daily Activity Inpatient Short Form   [facility]  \"6 Clicks\" Daily Activity Inpatient Short Form   1. Putting on and taking off regular lower body clothing? 2 - A Lot   2. Bathing (including washing, rinsing, drying)? 2 - A Lot   3. Toileting, which includes using toilet, bedpan or urinal? 3 - A Little   4. Putting on and taking off regular upper body clothing? 3 - A Little   5. Taking care of personal grooming such as brushing teeth? 3 - A Little   6. Eating meals? 4 - None   Daily Activity Raw Score (Score out of 24.Lower scores equate to lower levels of function) 17   Total Evaluation Time   Total Evaluation Time (Minutes) 10     "

## 2018-11-02 NOTE — PLAN OF CARE
Problem: Patient Care Overview  Goal: Plan of Care/Patient Progress Review  Patient A&Ox4. VSS. Ambulates SBA w/ GB and brace. Dressing CDI. CMS intact. +BS/gas, tolerating diet. Voiding in adequate amounts. Taking PRN tylenol for pain control, currently rating pain 2/10. Plan is to discharge to home tomorrow with . Will continue to monitor.

## 2018-11-02 NOTE — PROGRESS NOTES
"Ortho Rounding Note    S: Patient sitting up in bed reading magazine, appears comfortable. Reports pain is controlled with Tylenol and but does state she has pain at the incision site with transitional movements. Has been working with PT and OT and states she is not having pain in her legs with standing or sitting. Reports eating a full breakfast this morning and would like to stay one more night to work with therapy. Denies CP or SOB.     O:  Vital signs:   Blood pressure 112/61, pulse 72, temperature 98.7  F (37.1  C), resp. rate 16, height 1.657 m (5' 5.25\"), weight 60.5 kg (133 lb 4.8 oz), SpO2 95 %, not currently breastfeeding.  Estimated body mass index is 22.01 kg/(m^2) as calculated from the following:    Height as of this encounter: 1.657 m (5' 5.25\").    Weight as of this encounter: 60.5 kg (133 lb 4.8 oz).      Intake/Output Summary (Last 24 hours) at 11/02/18 0943  Last data filed at 11/02/18 0552   Gross per 24 hour   Intake             1280 ml   Output             1470 ml   Net             -190 ml         Drain has been pulled  Dressings c/d/i  5/5 motor and SPLT in BL UE and LE    A:  POD #2 s/p L4-5 PSF with central L3-4, L4-5 laminectomies.     P:  General: Patient is making progress in recovery. She is ambulating without a walker, pain is controlled, and she is tolerating food. Patient would like to stay one more midnight. Plan for discharge tomorrow if medically stable.   Pain: Controlled with only Tylenol.   Act: up ad glenny, with therapy  DVT: Mech only  Dispo: OK to DC once passes PT, medically stable, tolerating food, urinating  Appreciate Medicine consult for medical management    Sree Calix PA-C     "

## 2018-11-02 NOTE — PLAN OF CARE
Problem: Patient Care Overview  Goal: Plan of Care/Patient Progress Review  Outcome: Improving  A&O x4. Soft BPs, Avapro held, temp 99.3, otherwise VSS. LS CTA all fields. BS active x4. +flatus. Dressing to spine is CDI. Hemovac removed this AM.  [place] regular well. up with A1 and walker with brace. PO tylenol managing pain. voiding in good amts. Likely discharge home today with . Will continue to monitor.

## 2018-11-02 NOTE — PLAN OF CARE
Problem: Patient Care Overview  Goal: Plan of Care/Patient Progress Review  Outcome: Improving  Assumed care of Pt for 4 hrs. Pt A&Ox4, VSS, CMS intact, incision CDI, up with SBA with gait belt and brace, tolerated walking in hallway x1. Voiding appropriately, good appetite, PRN Tylenol for pain. Plan to discharge home tomorrow.

## 2018-11-02 NOTE — OP NOTE
Procedure Date: 10/31/2018      PREOPERATIVE DIAGNOSES:   1.  Lumbar radiculopathy.   2.  Spinal stenosis, spinal stenosis L3-L4.   3.  Spinal stenosis L4-L5.   4.  L4-L5 degenerative spondylolisthesis with degenerative disk disease.      POSTOPERATIVE DIAGNOSES:     1.  Lumbar radiculopathy.   2.  Spinal stenosis, spinal stenosis L3-L4.   3.  Spinal stenosis L4-L5.   4.  L4-L5 degenerative spondylolisthesis with degenerative disk disease.      PROCEDURES:   1.  L4-L5 posterolateral spinal fusion.   2.  Posterior instrumentation using bilateral pedicle screw and karli construct, L4-L5.   3.  Central laminectomy, L3.   4.  Central laminectomy, L4.   5.  Central laminectomy, L5.   6.  Local autograft bone for posterolateral fusion purposes.   7.  Crushed cancellous allograft bone for posterolateral fusion purposes.   8.  eReplacements O-arm optical tracking.      SURGEON:  Aroldo Burdick MD.      ASSISTANT:  Sree Calix PA-C.      ANESTHESIA:  General endotracheal without complications.      COMPLICATIONS:  None.      DRAINS:  One Hemovac drain placed prior to closure and tied to the skin with a nylon stitch.      ESTIMATED BLOOD LOSS:  200 mL.      FINDINGS:  Full decompression L3 through L5 with a successful posterolateral instrumented fusion, L4-L5.  Full decompression centrally as planned.      OPERATIVE INDICATIONS:  Ms. Stahl is a 78-year-old female [facility] with symptoms of lumbar radiculopathy.  She was noted to have spinal stenosis at L3-L4 and L4-L5.  In addition, standing x-rays revealed degenerative spondylolisthesis at L4-L5.  She did not have instability at L3-L4.  Given the lack of improvement with conservative treatment, I discussed the above-mentioned procedure as a possible means to improve or alleviate her lower extremity symptoms.  We discussed the risks, benefits and alternatives to this type of procedure.  Based on the fact that her radicular symptoms failed to  improve over a period of time with conservative care, she elected to proceed with the above-mentioned procedure.  Consent was offered and signed.  Preoperative H&P was performed and she elected to proceed.      The morning of the procedure, she was seen in the preoperative area.  All questions were answered.  I signed her lumbar region indicating L3 through L5 and we discussed the fusion at the L4-L5 level.  Consent was signed by both parties and we continued back to the OR.  In the OR, she was successfully sedated, and an ET tube was placed.  She was rolled prone onto four poster frame on a Betito table.  Bony prominences were well padded.  Eyes were free of compression.  SCDs were in place.  Shoulders and elbows were at 90 degrees with the shoulder slightly flexed forward.  The midline was marked.  The lumbar region was then prepped and draped in a standard fashion.  A timeout was performed, and IV antibiotics were administered.      The midline at approximately the L4-L5 segment was infiltrated with Marcaine mixed with epinephrine.  A #10 blade was used to create a midline incision.  Dissection was taken down to the lumbar fascia, which was split in the midline.  Spinous process was exposed and marked with a Kocher clamp.  This was identified as L3 on lateral imaging and verified with Radiology.  We continued by advancing our exposure subperiosteally down exposing L3 through L5.  The L3-L4 facet joints were preserved while the L4-L5 facet joints were decorticated and all capsular tissue was removed.  The exposure was taken out to the lateral aspect of the transverse process at L4 and L5.  A spinous process clamp was placed on the spinous process of L2 (based on the fact that we planned on taking L3 off with the laminectomy) and a spin was performed using the O-arm.      We then moved forward by marking our starting points for bilateral pedicle screws at L4 and L5.  These were marked with a passive planar probe  using the Stealth system.  A match tip bur was used to bella our starting holes.  A guided thoracic awl was then advanced through the pedicle into the anterior vertebral body.  All 4 holes were produced properly.  Each hole was sounded with a small ball-tip probe and filled with Gelfoam for hemostasis purposes.  Once this was completed for all 4 pedicle screws we then moved forward with fusion.      The bilateral L4 and L5 transverse processes as well as the remaining lateral facet joint was decorticated using a match tip bur.  The gutters were then packed with approximately 20 mL of a combination of Allograft autograft bone on each side.  It should be noted that prior to this the spinous processes and portions of the lamina had been taken down and cleaned of soft tissue on the back table as well as put through a bone mill and added to 30 mL crushed cancellous allograft portion.  This combination of bone graft was used in the bilateral gutters.      Once the posterolateral fusion was completed, we then continued on to instrumentation.  We placed our 4 pedicle screws successfully.  Today we used Globus CREO screws, all 6.5 mm in diameter.  The L4 screws were 50 mm in length and the L5 screws were 40 mm in length.  I did opt to use hydroxyapatite coated screws based on her history of osteoporosis (noting a history of osteoporotic hip fracture).  All 4 screws were advanced successfully to solid endpoints.  Once the screws were placed I then placed 2 lordotic 35 mm rods and fastened them with the standard CREO end caps, 4 in total.  Each was final tightened with an anti-torque wrench.  This essentially completed our posterolateral instrumented fusion process.      Lastly, we moved on to perform our laminectomy.  The spinous processes and portions of the lamina had already been taken down.  We continued this process with a Leksell rongeur as well as punch Kerrisons while protecting the neurologic elements with cottonoids.   In the end, we performed a very significant laminectomy at L4-L5 and L3-L4.  The laminectomy was not taken up too far obviously at the L3-L4 level based on the nonfusion of that level today.  However, we did perform a very nice decompression throughout L3 through L5.  I was able to palpate out the bilateral foramen, L3, L4, L5, and I was able to visualize the exiting nerve roots, especially down at the L5 nerve roots bilaterally.  This essentially completed our procedure.  Final x-rays were obtained in both projections noting excellent placement of her hardware at L4-L5.  No complications were witnessed today.  No CSF.  No excessive bleeding.  All hardware appears to be properly placed.      Medtronic O-arm was necessary for placement of our screws properly and safely today.  Sree Calix PA-C was absolutely necessary for the safety and efficiency this case today.      The wound was copiously irrigated and suctioned.  The wound was then closed in layers including the lumbar fascia, subcutaneous tissue and skin.  The wound was then cleaned and dried.  Dermabond was applied followed by sterile dressings.  It should be noted that we did place a medium Hemovac drain deep to the lumbar fascia taken out a small poke hole next to incision and tied to the skin with a nylon stitch.  With dressings in place the drapes were taken down.  She was then moved back into supine position onto a hospital cart and brought to the PACU in stable condition.         AGNIESZKA MAGANA MD             D: 10/31/2018   T: 2018   MT: LUIS      Name:     PAT GLORIA   MRN:      [id number]        Account:        [id number]   :      1940           Procedure Date: 10/31/2018      Document: [id number]       cc: Agnieszka Magana MD

## 2018-11-02 NOTE — PLAN OF CARE
Problem: Patient Care Overview  Goal: Plan of Care/Patient Progress Review  Discharge Planner PT   Patient plan for discharge: home not sure but hoping for tomorrow  Current status: gait with no device some scissoring in pattern noted up and down stairs with 1 rail basic tranfers with S only  Barriers to return to prior living situation: none  Recommendations for discharge:  Home with assist from spouse as needed per plan established by the PT.    Rationale for recommendations: anticipate will met goals in next session.       Entered by: Birdie Rojas 11/02/2018 9:22 AM         PT- PM- goals are all met recommend to PT for discharge. Planned home tomorrow.

## 2018-11-03 ENCOUNTER — APPOINTMENT (OUTPATIENT)
Dept: OCCUPATIONAL THERAPY | Facility: CLINIC | Age: 78
DRG: 460 | End: 2018-11-03
Attending: ORTHOPAEDIC SURGERY
Payer: MEDICARE

## 2018-11-03 VITALS
DIASTOLIC BLOOD PRESSURE: 75 MMHG | TEMPERATURE: 98.1 F | SYSTOLIC BLOOD PRESSURE: 104 MMHG | HEART RATE: 72 BPM | HEIGHT: 65 IN | RESPIRATION RATE: 16 BRPM | BODY MASS INDEX: 22.21 KG/M2 | OXYGEN SATURATION: 93 % | WEIGHT: 133.3 LBS

## 2018-11-03 PROCEDURE — 97535 SELF CARE MNGMENT TRAINING: CPT | Mod: GO

## 2018-11-03 PROCEDURE — 25000132 ZZH RX MED GY IP 250 OP 250 PS 637: Mod: GY | Performed by: PHYSICIAN ASSISTANT

## 2018-11-03 PROCEDURE — 40000133 ZZH STATISTIC OT WARD VISIT

## 2018-11-03 PROCEDURE — A9270 NON-COVERED ITEM OR SERVICE: HCPCS | Mod: GY | Performed by: PHYSICIAN ASSISTANT

## 2018-11-03 PROCEDURE — A9270 NON-COVERED ITEM OR SERVICE: HCPCS | Mod: GY | Performed by: INTERNAL MEDICINE

## 2018-11-03 PROCEDURE — 25000132 ZZH RX MED GY IP 250 OP 250 PS 637: Mod: GY | Performed by: INTERNAL MEDICINE

## 2018-11-03 RX ADMIN — OYSTER SHELL CALCIUM WITH VITAMIN D 1 TABLET: 500; 200 TABLET, FILM COATED ORAL at 08:54

## 2018-11-03 RX ADMIN — ACETAMINOPHEN 650 MG: 325 TABLET, FILM COATED ORAL at 10:26

## 2018-11-03 RX ADMIN — LEVOTHYROXINE SODIUM 88 MCG: 88 TABLET ORAL at 08:54

## 2018-11-03 RX ADMIN — PANTOPRAZOLE SODIUM 40 MG: 40 TABLET, DELAYED RELEASE ORAL at 08:54

## 2018-11-03 RX ADMIN — VITAMIN D, TAB 1000IU (100/BT) 1000 UNITS: 25 TAB at 08:54

## 2018-11-03 RX ADMIN — CEVIMELINE HYDROCHLORIDE 30 MG: 30 CAPSULE ORAL at 08:54

## 2018-11-03 RX ADMIN — ACETAMINOPHEN 650 MG: 325 TABLET, FILM COATED ORAL at 03:31

## 2018-11-03 ASSESSMENT — ACTIVITIES OF DAILY LIVING (ADL)
ADLS_ACUITY_SCORE: 11

## 2018-11-03 NOTE — PLAN OF CARE
Reviewed discharge instructions and medications with patient and . Questions answered. Patient discharged to home with discharge instructions, medications (none, patient already has all medications she needs at home), and belongings at this time.

## 2018-11-03 NOTE — PLAN OF CARE
Problem: Patient Care Overview  Goal: Plan of Care/Patient Progress Review  Discharge Planner OT   Patient plan for discharge: Home  Current status: Pt able to verbalize 2/3 spinal precautions, educated on 3/3 and reviewed ways to implement and maintain during functional tasks. Pt completed bed mobility supine <> sit EOB with log roll technique with SBA and increased time. Pt re-educated on LB dressing compensatory technique and LH AE, able to thread underwear/pants with reacher with set up/SBA, don up over hips with SBA; UB dressing with set up while seated including brace. Pt completed sit > stand from EOB x3 trials with SBA, increased time. Pt ambulated in room/hallway with no assistive device and supervision, no balance deficits identified. Pt educated on safe toilet transfer technique and toileting, completed transfer and toileting tasks with supervision - pt with questions regarding hygiene technique, educated and pt able to return demo to maintain spinal precautions. 2/10 pain reported in back during session. Education provided on walk in shower technique, able to transfer in/out x2 trials with SBA. Ongoing education provided on home safety modifications/recommendations, DME/AE, car transfer technique and safe activities post spinal surgery, verbalized understanding. Pt with no further questions/concerns.   Barriers to return to prior living situation: None  Recommendations for discharge: Home with spouse A as needed for ADLs/IADLs  Rationale for recommendations: Anticipate pt will continue to progress to return to indep PLOF. Pt with good return demo of compensatory techniques. Pt has support of spouse at home as needed.        Entered by: Daly Quintana 11/03/2018 10:09 AM         Occupational Therapy Discharge Summary    Reason for therapy discharge:    Discharged to home.  All goals and outcomes met, no further needs identified.    Progress towards therapy goal(s). See goals on Care Plan in [facility] electronic  health record for goal details.  Goals met    Therapy recommendation(s):    No further skilled OT needs.

## 2018-11-03 NOTE — PLAN OF CARE
Problem: Laminectomy/Foraminotomy/Discectomy (Adult)  Goal: Signs and Symptoms of Listed Potential Problems Will be Absent, Minimized or Managed (Laminectomy/Foraminotomy/Discectomy)  Signs and symptoms of listed potential problems will be absent, minimized or managed by discharge/transition of care (reference Laminectomy/Foraminotomy/Discectomy (Adult) CPG).   Outcome: No Change  VSS on RA. Pt resting in bed between cares, up with A1, walker and GB. Pain managed with prn tylenol, rest observed after administration. PIV SL, tolerating diet with PO meds. Bed alarms in use, pt not attempting to exit bed. Alert and oriented, able to make needs known. Continue to monitor.

## 2018-11-03 NOTE — PROGRESS NOTES
[facility]    Orthopedics Progress Note     Assessment & Plan   Ya Stahl is a 78 year old female who was admitted on 10/31/2018 for L4-5 PSF with central L3-4, L4-5 laminectomies. Plan is to discharge home today. Follow-up per Dr. Burdick.       Disposition Plan   Expected discharge: Today, recommended to prior living arrangement.     Entered: Evelyn Peralta 11/03/2018, 8:53 AM   Information in the above section will display in the discharge planner report.    SARAH Farley    Interval History   Patient states pain is well-controlled today. She denies adverse effects to pain medication.     Physical Exam   Temp: 98.1  F (36.7  C) Temp src: Oral BP: 104/75   Heart Rate: 73 Resp: 16 SpO2: 93 % O2 Device: None (Room air)    Vitals:    10/31/18 0713   Weight: 60.5 kg (133 lb 4.8 oz)     Vital Signs with Ranges  Temp:  [98.1  F (36.7  C)-99.4  F (37.4  C)] 98.1  F (36.7  C)  Heart Rate:  [70-73] 73  Resp:  [14-16] 16  BP: (104-136)/(46-75) 104/75  SpO2:  [93 %-98 %] 93 %  I/O last 3 completed shifts:  In: 1380 [P.O.:1380]  Out: 1000 [Urine:1000]    Dressing c/d/i. Calves soft. NV intact distally.     Medications     sodium chloride 100 mL/hr at 11/01/18 0336       atorvastatin  20 mg Oral QPM     calcium carbonate 500 mg-vitamin D 200 units  1 tablet Oral TID AC     carboxymethylcellulose  1 drop Both Eyes QPM     cevimeline  30 mg Oral TID     cholecalciferol  1,000 Units Oral Daily     cycloSPORINE  1 drop Ophthalmic QPM     irbesartan (AVAPRO) tablet 150 mg  150 mg Oral QPM     levothyroxine  88 mcg Oral Daily     pantoprazole  40 mg Oral BID     ranitidine  300 mg Oral At Bedtime     senna-docusate  1 tablet Oral BID    Or     senna-docusate  2 tablet Oral BID     sodium chloride (PF)  3 mL Intracatheter Q8H       Data   No lab results found in last 7 days.

## 2018-11-05 ENCOUNTER — TELEPHONE (OUTPATIENT)
Dept: FAMILY MEDICINE | Facility: CLINIC | Age: 78
End: 2018-11-05

## 2018-11-05 NOTE — TELEPHONE ENCOUNTER
Please contact patient for In-patient follow up.  [phone] (home) none (work)    Visit date: 11/3/18  Diagnosis listed:Stenosis Radiculapathy Ddd, S/P Lumbar fusion  Number of visits in past 12 months:0/1

## 2018-11-05 NOTE — TELEPHONE ENCOUNTER
ED / Discharge Outreach Protocol    Patient Contact    Attempt # 1    Was call answered?  No.  Left message on voicemail with information to call me back.    Sara STREETER, Triage RN

## 2018-11-05 NOTE — TELEPHONE ENCOUNTER
"Hospital/TCU/ED for chronic condition Discharge Protocol    \"Hi, my name is Sara QUARLESMary Alice Galan, a registered nurse, and I am calling from [facility].  I am calling to follow up and see how things are going for you after your recent emergency visit/hospital/TCU stay.\"    Tell me how you are doing now that you are home?\" Only taking 1 tyl for pain q 6 hrs. Her pain is nothing like it was before the surgery. Is walking (3708 steps so far today) daily. She does a little bit, gets tired, rests and repeats. Wears brace whenever she is up and out of bed.       Discharge Instructions    \"Let's review your discharge instructions.  What is/are the follow-up recommendations?  Pt. Response: cover bandages over incision when showering, no lifting, bending or twisting, f/u with Dr. Burdick in 2 weeks, wear brace whenever out of bed, walk daily and rest when tired.    \"Has an appointment with your primary care provider been scheduled?\"   Yes. (confirm)    \"When you see the provider, I would recommend that you bring your medications with you.\"    Medications    \"Tell me what changed about your medicines when you discharged?\"    Changes to chronic meds?    0-1    \"What questions do you have about your medications?\"    None     New diagnoses of heart failure, COPD, diabetes, or MI?    No              Medication reconciliation completed? Yes  Was MTM referral placed (*Make sure to put transitions as reason for referral)?   No    Call Summary    \"What questions or concerns do you have about your recent visit and your follow-up care?\"     none    \"If you have questions or things don't continue to improve, we encourage you contact us through the main clinic number (give number).  Even if the clinic is not open, triage nurses are available 24/7 to help you.     We would like you to know that our clinic has extended hours (provide information).  We also have urgent care (provide details on closest location and hours/contact " "info)\"      \"Thank you for your time and take care!\"    Sara STREETER, Triage RN           "

## 2018-11-13 ENCOUNTER — OFFICE VISIT (OUTPATIENT)
Dept: FAMILY MEDICINE | Facility: CLINIC | Age: 78
End: 2018-11-13
Payer: COMMERCIAL

## 2018-11-13 VITALS
SYSTOLIC BLOOD PRESSURE: 118 MMHG | BODY MASS INDEX: 23.01 KG/M2 | HEART RATE: 70 BPM | DIASTOLIC BLOOD PRESSURE: 68 MMHG | OXYGEN SATURATION: 98 % | HEIGHT: 65 IN | TEMPERATURE: 98.2 F | WEIGHT: 138.1 LBS | RESPIRATION RATE: 16 BRPM

## 2018-11-13 DIAGNOSIS — D47.2 MGUS (MONOCLONAL GAMMOPATHY OF UNKNOWN SIGNIFICANCE): ICD-10-CM

## 2018-11-13 DIAGNOSIS — Z09 HOSPITAL DISCHARGE FOLLOW-UP: Primary | ICD-10-CM

## 2018-11-13 DIAGNOSIS — Z98.1 S/P LUMBAR FUSION: ICD-10-CM

## 2018-11-13 DIAGNOSIS — R60.9 EDEMA, UNSPECIFIED TYPE: ICD-10-CM

## 2018-11-13 DIAGNOSIS — D75.89 MACROCYTOSIS: ICD-10-CM

## 2018-11-13 PROCEDURE — 99495 TRANSJ CARE MGMT MOD F2F 14D: CPT | Performed by: FAMILY MEDICINE

## 2018-11-13 NOTE — PROGRESS NOTES
SUBJECTIVE:   Ya Stahl is a 78 year old female who presents to clinic today for the following health issues:          Hospital Follow-up Visit:    Hospital/Nursing Home/IP Rehab Facility: [facility]  Date of Admission: 10/31/2018  Date of Discharge: 11/03/2018  Reason(s) for Admission: S/P lumbar fusion- Has noticed the bilateral feet have been swollen x 3 days.            Problems taking medications regularly:  None       Medication changes since discharge: Oscal and vitamin D       Problems adhering to non-medication therapy:  None    Summary of hospitalization:  Discharge summary unavailable  Reviewed several of the hospital notes.   Diagnostic Tests/Treatments reviewed.  Follow up needed: nothing noted that I could see.   Other Healthcare Providers Involved in Patient s Care:         Surgical follow-up appointment - this Friday  Update since discharge: stable.     Post Discharge Medication Reconciliation: discharge medications reconciled, continue medications without change.  Plan of care communicated with patient and family     Coding guidelines for this visit:  Type of Medical   Decision Making Face-to-Face Visit       within 7 Days of discharge Face-to-Face Visit        within 14 days of discharge   Moderate Complexity [id number]   High Complexity [id number]                  Problem list and histories reviewed & adjusted, as indicated.  Additional history:     See under ROS     Patient Active Problem List   Diagnosis     Hypothyroidism     Chondrodermatitis nodularis helicis     Grave's disease     Bile reflux gastritis     Hyperlipidemia LDL goal <70     Sjogren's disease (H)     HL (hearing loss)     Mixed incontinence     Health Care Home     Chronic cough     Impaired fasting glucose     Status post-operative repair of hip fracture, RIGHT     Constipation     Pubic ramus fracture (H)     Osteoporosis     Gastroesophageal reflux disease without esophagitis     AMI (acute myocardial  infarction) (H)     ASCVD (arteriosclerotic cardiovascular disease)     Macrocytosis     Monoclonal paraproteinemia     MGUS (monoclonal gammopathy of unknown significance)     Epigastric pain     Hypertension goal BP (blood pressure) < 140/90     Complication of anesthesia     S/P lumbar fusion       Current Outpatient Prescriptions   Medication Sig Dispense Refill     alendronate (FOSAMAX) 70 MG tablet Take 1 tablet (70 mg) by mouth every 7 days Take with over 8 ounces water and stay upright for at least 30 minutes after dose.  Take at least 60 minutes before breakfast 12 tablet 3     atorvastatin (LIPITOR) 20 MG tablet Take 1 tablet (20 mg) by mouth daily 102 tablet 3     calcium carbonate 500 mg-vitamin D 200 units (OSCAL WITH D;OYSTER SHELL CALCIUM) 500-200 MG-UNIT per tablet Take 1 tablet by mouth 3 times daily (before meals) 90 tablet 3     Carboxymethylcellulose Sodium (REFRESH LIQUIGEL OP) Apply 1 drop to eye every evening 15 minutes after Restasis drops.       cevimeline (EVOXAC) 30 MG capsule Take 30 mg by mouth 3 times daily Reported on 3/6/2017       cholecalciferol 1000 units TABS Take 1,000 Units by mouth 2 times daily 30 tablet 3     CycloSPORINE (RESTASIS OP) Apply 1 drop to eye every evening        DEXILANT 60 MG CPDR CR capsule 60 mg 2 times daily        HERBALS Cherry juice 1 tablespoon at night PRN       Hyprom-Naphaz-Polysorb-Zn Sulf (CLEAR EYES COMPLETE OP) Apply 1 drop to eye 3 times daily as needed       Irbesartan (AVAPRO PO) Take 150 mg by mouth every evening       levothyroxine (SYNTHROID/LEVOTHROID) 88 MCG tablet TAKE ONE TABLET BY MOUTH EVERY  tablet 3     MELATONIN PO Take 3 mg by mouth At Bedtime       Misc Natural Products (LUTEIN 20 PO) Take 1 tablet by mouth daily       mupirocin (BACTROBAN) 2 % nasal ointment Spray 1 g into both nostrils 2 times daily Apply small amount to each nostril 2 times a day for 7 days prior to surgery       nitroGLYcerin (NITROSTAT) 0.4 MG  "sublingual tablet Place 1 tablet (0.4 mg) under the tongue every 5 minutes as needed for chest pain 25 tablet 0     polyethylene glycol (MIRALAX) powder Take 1 capful by mouth 2 times daily In 8 ounces of liquid       ranitidine (ZANTAC) 300 MG tablet Take 300 mg by mouth At Bedtime        senna-docusate (SENOKOT-S;PERICOLACE) 8.6-50 MG per tablet Take 2 tablets by mouth 2 times daily as needed for constipation 20 tablet 0     aspirin 81 MG tablet Take 81 mg by mouth daily       Omega-3 Fatty Acids (OMEGA-3 FISH OIL PO) Take 1 g by mouth 2 times daily (with meals)            Reviewed and updated as needed this visit by clinical staff       Reviewed and updated as needed this visit by Provider         ROS:  CONSTITUTIONAL:NEGATIVE for fever, chills, change in weight  RESP:NEGATIVE for significant cough or SOB  CV: NEGATIVE for chest pain, palpitations or peripheral edema  MUSCULOSKELETAL: notes some swelling. Denies any radicular pain or numbness.   PSYCHIATRIC: NEGATIVE for changes in mood or affect    Feeling well except some swelling.  Sits a lot.   She notes her therapy is to walk. She does go for a walk a couple times per day.       Will see back surgeon on Friday.    She is needing to change insurance and will no longer be coming here.    OBJECTIVE:     /68 (BP Location: Right arm, Cuff Size: Adult Regular)  Pulse 70  Temp 98.2  F (36.8  C) (Oral)  Resp 16  Ht 5' 5.25\" (1.657 m)  Wt 138 lb 1.6 oz (62.6 kg)  SpO2 98%  BMI 22.81 kg/m2  Body mass index is 22.81 kg/(m^2).  GENERAL APPEARANCE: alert and no distress  RESP: lungs clear to auscultation - no rales, rhonchi or wheezes  CV: regular rates and rhythm  MS: trace edema bilateral ankles.   SKIN: longitudinal incision along lumbar area appears clean and intact. Mild surrounding erythema.   PSYCH: mentation appears normal and affect normal/bright        ASSESSMENT/PLAN:     Hospital discharge follow-up      S/P lumbar fusion  Doing well.     Edema, " unspecified type  Discussed mechanical things. Encourage elevation. Discussed compression stockings.     Macrocytosis  Did communicate with her Hematologist. Recommended keeping her appointment in the Spring; no interim evaluation.  She will probably be changing health systems; encourage her to make an appointment with a new Hematologist. Suspect that they will be able to see our records, but otherwise, can send.     MGUS (monoclonal gammopathy of unknown significance)  As above.       She is needing to change insurance and will no longer be coming here.      Patient Instructions       You currently have an appointment with Hematology in April.    I do recommend that you connect with a Hematologist at .   Let us know if they need any records.    ---------------------------------------------------------    You can try some compression stockings.  Diabetic socks may have less compression and be easier to get on.    Keep your feet elevated when you are resting.          Scarlett Spencer MD, MD  [facility]

## 2018-11-13 NOTE — PATIENT INSTRUCTIONS
You currently have an appointment with Hematology in April.    I do recommend that you connect with a Hematologist at .   Let us know if they need any records.    ---------------------------------------------------------    You can try some compression stockings.  Diabetic socks may have less compression and be easier to get on.    Keep your feet elevated when you are resting.

## 2018-11-20 DIAGNOSIS — M81.0 OSTEOPOROSIS, UNSPECIFIED OSTEOPOROSIS TYPE, UNSPECIFIED PATHOLOGICAL FRACTURE PRESENCE: ICD-10-CM

## 2018-11-20 RX ORDER — ALENDRONATE SODIUM 70 MG/1
70 TABLET ORAL
Qty: 12 TABLET | Refills: 3 | Status: SHIPPED | OUTPATIENT
Start: 2018-11-20 | End: 2018-11-20

## 2018-11-20 RX ORDER — ALENDRONATE SODIUM 70 MG/1
70 TABLET ORAL
Qty: 12 TABLET | Refills: 1 | Status: SHIPPED | OUTPATIENT
Start: 2018-11-20 | End: 2021-01-05

## 2018-11-20 NOTE — TELEPHONE ENCOUNTER
"Alendronate 70 mg  Last Written Prescription Date:  12/19/17  Last Fill Quantity: 12,  # refills: 3   Last office visit: 11/13/2018 with prescribing provider:  Dr. Spencer   Future Office Visit:   Next 5 appointments (look out 90 days)     Dec 21, 2018  8:30 AM CST   PHYSICAL with Scalrett Spencer MD   [facility] ([address] [address] [phone]                   Requested Prescriptions   Pending Prescriptions Disp Refills     alendronate (FOSAMAX) 70 MG tablet 12 tablet 3     Sig: Take 1 tablet (70 mg) by mouth every 7 days Take with over 8 ounces water and stay upright for at least 30 minutes after dose.  Take at least 60 minutes before breakfast    Bisphosphonates Failed    11/20/2018  3:54 PM       Failed - Dexa on file within past 2 years    Please review last Dexa result.          Passed - Recent (12 mo) or future (30 days) visit within the authorizing provider's specialty    Patient had office visit in the last 12 months or has a visit in the next 30 days with authorizing provider or within the authorizing provider's specialty.  See \"Patient Info\" tab in inbasket, or \"Choose Columns\" in Meds & Orders section of the refill encounter.             Passed - Patient is age 18 or older       Passed - Normal serum creatinine on file within past 12 months    Recent Labs   Lab Test  10/25/18   1016   CR  0.74             Sarika GUPTA RN       "

## 2018-12-05 NOTE — DISCHARGE SUMMARY
"Discharge Summary    Ya Stahl MRN# [id number]   YOB: 1940 Age: 78 year old     Date of Admission:  10/31/2018  Date of Discharge:  11/3/2018 11:45 AM  Admitting Physician:  Aroldo Burdick MD  Discharge Physician:  Sree Calix PA-C  Discharging Service:  Orthopedics     Home clinic: [facility]  Primary Provider: Scarlett Spencer          Admission Diagnoses:   stenosis radiculapathy spondylothesis DDD  S/P lumbar fusion          Discharge Diagnosis:   Patient Active Problem List   Diagnosis     Hypothyroidism     Chondrodermatitis nodularis helicis     Grave's disease     Bile reflux gastritis     Hyperlipidemia LDL goal <70     Sjogren's disease (H)     HL (hearing loss)     Mixed incontinence     Health Care Home     Chronic cough     Impaired fasting glucose     Status post-operative repair of hip fracture, RIGHT     Constipation     Pubic ramus fracture (H)     Osteoporosis     Gastroesophageal reflux disease without esophagitis     AMI (acute myocardial infarction) (H)     ASCVD (arteriosclerotic cardiovascular disease)     Macrocytosis     Monoclonal paraproteinemia     MGUS (monoclonal gammopathy of unknown significance)     Epigastric pain     Hypertension goal BP (blood pressure) < 140/90     Complication of anesthesia     S/P lumbar fusion                Discharge Disposition:   Discharged to home           Condition on Discharge:   Discharge condition: Stable   Discharge vitals: Blood pressure 104/75, pulse 72, temperature 98.1  F (36.7  C), resp. rate 16, height 1.657 m (5' 5.25\"), weight 60.5 kg (133 lb 4.8 oz), SpO2 93 %, not currently breastfeeding.     Code status on discharge: Full Code           Procedures / Labs / Imaging:   No other procedures performed during this admission  L45 psf with decompression L3-L5          Medications Prior to Admission:     No prescriptions prior to admission.             Discharge Medications:     Discharge Medication List as of 11/3/2018 " 10:28 AM      START taking these medications    Details   calcium carbonate 500 mg-vitamin D 200 units (OSCAL WITH D;OYSTER SHELL CALCIUM) 500-200 MG-UNIT per tablet Take 1 tablet by mouth 3 times daily (before meals), Disp-90 tablet, R-3, Local Print      cholecalciferol 1000 units TABS Take 1,000 Units by mouth 2 times daily, Disp-30 tablet, R-3, E-Prescribe      senna-docusate (SENOKOT-S;PERICOLACE) 8.6-50 MG per tablet Take 2 tablets by mouth 2 times daily as needed for constipation, Disp-20 tablet, R-0, E-Prescribe         CONTINUE these medications which have NOT CHANGED    Details   atorvastatin (LIPITOR) 20 MG tablet Take 1 tablet (20 mg) by mouth daily, Disp-102 tablet, R-3, E-Prescribe      Carboxymethylcellulose Sodium (REFRESH LIQUIGEL OP) Apply 1 drop to eye every evening 15 minutes after Restasis drops., Historical      cevimeline (EVOXAC) 30 MG capsule Take 30 mg by mouth 3 times daily Reported on 3/6/2017, Historical      CycloSPORINE (RESTASIS OP) Apply 1 drop to eye every evening , Historical      DEXILANT 60 MG CPDR CR capsule 60 mg 2 times daily , RADHA, Historical      HERBALS Cherry juice 1 tablespoon at night PRN, Historical      Hyprom-Naphaz-Polysorb-Zn Sulf (CLEAR EYES COMPLETE OP) Apply 1 drop to eye 3 times daily as needed, Historical      Irbesartan (AVAPRO PO) Take 150 mg by mouth every evening, Historical      levothyroxine (SYNTHROID/LEVOTHROID) 88 MCG tablet TAKE ONE TABLET BY MOUTH EVERY DAY, Disp-102 tablet, R-3, E-Prescribe      MELATONIN PO Take 3 mg by mouth At Bedtime, Historical      Misc Natural Products (LUTEIN 20 PO) Take 1 tablet by mouth daily, Historical      mupirocin (BACTROBAN) 2 % nasal ointment Spray 1 g into both nostrils 2 times daily Apply small amount to each nostril 2 times a day for 7 days prior to surgeryHistorical      nitroGLYcerin (NITROSTAT) 0.4 MG sublingual tablet Place 1 tablet (0.4 mg) under the tongue every 5 minutes as needed for chest pain, Disp-25  tablet, R-0, E-Prescribe      Omega-3 Fatty Acids (OMEGA-3 FISH OIL PO) Take 1 g by mouth 2 times daily (with meals) , Historical      polyethylene glycol (MIRALAX) powder Take 1 capful by mouth 2 times daily In 8 ounces of liquid, Historical      ranitidine (ZANTAC) 300 MG tablet Take 300 mg by mouth At Bedtime , Historical      alendronate (FOSAMAX) 70 MG tablet Take 1 tablet (70 mg) by mouth every 7 days Take with over 8 ounces water and stay upright for at least 30 minutes after dose.  Take at least 60 minutes before breakfast, Disp-12 tablet, R-3, Local Print         STOP taking these medications       Acetaminophen (TYLENOL PO) Comments:   Reason for Stopping:         aspirin 81 MG EC tablet Comments:   Reason for Stopping:         calcium citrate-vitamin D (CITRACAL) 315-250 MG-UNIT TABS per tablet Comments:   Reason for Stopping:         calcium citrate-vitamin D (CITRACAL) 315-250 MG-UNIT TABS per tablet Comments:   Reason for Stopping:                     Consultations:   Pt/ot/hosp             Brief History of Illness:   Ya Stahl is a 78 year old female who was admitted for surgery listed above          Hospital Course:   Patient Active Problem List   Diagnosis     Hypothyroidism     Chondrodermatitis nodularis helicis     Grave's disease     Bile reflux gastritis     Hyperlipidemia LDL goal <70     Sjogren's disease (H)     HL (hearing loss)     Mixed incontinence     Health Care Home     Chronic cough     Impaired fasting glucose     Status post-operative repair of hip fracture, RIGHT     Constipation     Pubic ramus fracture (H)     Osteoporosis     Gastroesophageal reflux disease without esophagitis     AMI (acute myocardial infarction) (H)     ASCVD (arteriosclerotic cardiovascular disease)     Macrocytosis     Monoclonal paraproteinemia     MGUS (monoclonal gammopathy of unknown significance)     Epigastric pain     Hypertension goal BP (blood pressure) < 140/90     Complication of anesthesia      S/P lumbar fusion                  Significant Results:   None             Pending Results:   None           Discharge Instructions and Follow-Up:   Discharge diet: Regular   Discharge activity: Lifting restricted to 10 pounds   Discharge follow-up: Follow up with Dr. Burdick in 14  days   Outpatient therapy: None    Home Care agency: None    Supplies and equipment: None   Lines and drains: None    Wound care: As directed   Other instructions: Lumbar brace    Sree Calix PA-C

## 2018-12-20 NOTE — PATIENT INSTRUCTIONS
Preventive Health Recommendations    See your health care provider every year to    Review health changes.     Discuss preventive care.      Review your medicines if your doctor has prescribed any.      You no longer need a yearly Pap test unless you've had an abnormal Pap test in the past 10 years. If you have vaginal symptoms, such as bleeding or discharge, be sure to talk with your provider about a Pap test.      Every 1 to 2 years, have a mammogram.  If you are over 69, talk with your health care provider about whether or not you want to continue having screening mammograms.      Every 10 years, have a colonoscopy. Or, have a yearly FIT test (stool test). These exams will check for colon cancer.       Have a cholesterol test every 5 years, or more often if your doctor advises it.       Have a diabetes test (fasting glucose) every three years. If you are at risk for diabetes, you should have this test more often.       At age 65, have a bone density scan (DEXA) to check for osteoporosis (brittle bone disease).    Shots:    Get a flu shot each year.    Get a tetanus shot every 10 years.    Talk to your doctor about your pneumonia vaccines. There are now two you should receive - Pneumovax (PPSV 23) and Prevnar (PCV 13).    Talk to your pharmacist about the shingles vaccine.    Talk to your doctor about the hepatitis B vaccine.    Nutrition:     Eat at least 5 servings of fruits and vegetables each day.      Eat whole-grain bread, whole-wheat pasta and brown rice instead of white grains and rice.      Get adequate Calcium and Vitamin D.     Lifestyle    Exercise at least 150 minutes a week (30 minutes a day, 5 days a week). This will help you control your weight and prevent disease.      Limit alcohol to one drink per day.      No smoking.       Wear sunscreen to prevent skin cancer.       See your dentist twice a year for an exam and cleaning.      See your eye doctor every 1 to 2 years to screen for conditions  such as glaucoma, macular degeneration and cataracts.    Personalized Prevention Plan  You are due for the preventive services outlined below.  Your care team is available to assist you in scheduling these services.  If you have already completed any of these items, please share that information with your care team to update in your medical record.  Health Maintenance Due   Topic Date Due     FALL RISK ASSESSMENT  12/19/2018     Preventive Health Recommendations    See your health care provider every year to    Review health changes.     Discuss preventive care.      Review your medicines if your doctor has prescribed any.      You no longer need a yearly Pap test unless you've had an abnormal Pap test in the past 10 years. If you have vaginal symptoms, such as bleeding or discharge, be sure to talk with your provider about a Pap test.      Every 1 to 2 years, have a mammogram.  If you are over 69, talk with your health care provider about whether or not you want to continue having screening mammograms.      Every 10 years, have a colonoscopy. Or, have a yearly FIT test (stool test). These exams will check for colon cancer.       Have a cholesterol test every 5 years, or more often if your doctor advises it.       Have a diabetes test (fasting glucose) every three years. If you are at risk for diabetes, you should have this test more often.       At age 65, have a bone density scan (DEXA) to check for osteoporosis (brittle bone disease).    Shots:    Get a flu shot each year.    Get a tetanus shot every 10 years.    Talk to your doctor about your pneumonia vaccines. There are now two you should receive - Pneumovax (PPSV 23) and Prevnar (PCV 13).    Talk to your pharmacist about the shingles vaccine.    Talk to your doctor about the hepatitis B vaccine.    Nutrition:     Eat at least 5 servings of fruits and vegetables each day.      Eat whole-grain bread, whole-wheat pasta and brown rice instead of white grains and  rice.      Get adequate about Calcium and Vitamin D.     Lifestyle    Exercise at least 150 minutes a week (30 minutes a day, 5 days a week). This will help you control your weight and prevent disease.      Limit alcohol to one drink per day.      No smoking.       Wear sunscreen to prevent skin cancer.       See your dentist twice a year for an exam and cleaning.      See your eye doctor every 1 to 2 years to screen for conditions such as glaucoma, macular degeneration, cataracts, etc.    Personalized Prevention Plan  You are due for the preventive services outlined below.  Your care team is available to assist you in scheduling these services.  If you have already completed any of these items, please share that information with your care team to update in your medical record.    Health Maintenance Due   Topic Date Due     FALL RISK ASSESSMENT  12/19/2018         Check your blood pressure when you are dizzy to see if it is running low.  Get up or make position changes slowly.

## 2018-12-20 NOTE — PROGRESS NOTES
"SUBJECTIVE:   Ya Stahl is a 78 year old female who presents for Preventive Visit.      Are you in the first 12 months of your Medicare coverage?  No    Annual Wellness Visit     In general, how would you rate your overall health?  Fair    Frequency of exercise:  6-7 days/week    Duration of exercise:  30-45 minutes    Do you usually eat at least 4 servings of fruit and vegetables a day, include whole grains    & fiber and avoid regularly eating high fat or \"junk\" foods?  No    Taking medications regularly:  Yes    Medication side effects:  None    Ability to successfully perform activities of daily living:  No assistance needed    Home Safety:  No safety concerns identified    Hearing Impairment:  Need to ask people to speak up or repeat themselves, difficulty understanding speech on the telephone and no hearing concerns    In the past 6 months, have you been bothered by leaking of urine? Yes    In general, how would you rate your overall mental or emotional health?  Excellent    PHQ-2 Total Score: 0    Additional concerns today:  Yes    Do you feel safe in your environment? Yes    Do you have a Health Care Directive? Yes: Advance Directive has been received and scanned.      Fall risk  Fallen 2 or more times in the past year?: No  Any fall with injury in the past year?: No    Cognitive Screening   1) Repeat 3 items (Leader, Season, Table)    2) Clock draw: NORMAL  3) 3 item recall: Recalls 3 objects  Results: 3 items recalled: COGNITIVE IMPAIRMENT LESS LIKELY    Mini-CogTM Copyright JADE Hook. Licensed by the author for use in [facility]; reprinted with permission (eduar@.[facility]). All rights reserved.      Do you have sleep apnea, excessive snoring or daytime drowsiness?: no    Reviewed and updated as needed this visit by clinical staff         Reviewed and updated as needed this visit by Provider        Social History     Tobacco Use     Smoking status: Never Smoker     Smokeless tobacco: Never " "Used   Substance Use Topics     Alcohol use: Yes     Comment: social; maybe a glass of wine or highball per week        No flowsheet data found.        Current providers sharing in care for this patient include:   Patient Care Team:  Scarlett Spencer MD as PCP - General (Family Practice)  Scarlett Spencer MD as PCP - Assigned PCP    The following health maintenance items are reviewed in Epic and correct as of today:  Health Maintenance   Topic Date Due     FALL RISK ASSESSMENT  12/19/2018     TSH Q1 YEAR  08/08/2019     PHQ-2 Q1 YR  11/13/2019     DTAP/TDAP/TD IMMUNIZATION (3 - Td) 12/04/2022     LIPID SCREEN Q5 YR FEMALE (SYSTEM ASSIGNED)  12/19/2022     ADVANCE DIRECTIVE PLANNING Q5 YRS  11/01/2023     DEXA SCAN SCREENING (SYSTEM ASSIGNED)  Completed     INFLUENZA VACCINE  Completed     ZOSTER IMMUNIZATION  Completed     IPV IMMUNIZATION  Aged Out     MENINGITIS IMMUNIZATION  Aged Out       Patient Active Problem List   Diagnosis     Hypothyroidism     Chondrodermatitis nodularis helicis     Grave's disease     Bile reflux gastritis     Hyperlipidemia LDL goal <70     Sjogren's disease (H)     HL (hearing loss)     Mixed incontinence     Health Care Home     Chronic cough     Impaired fasting glucose     Status post-operative repair of hip fracture, RIGHT     Constipation     Pubic ramus fracture (H)     Osteoporosis     Gastroesophageal reflux disease without esophagitis     AMI (acute myocardial infarction) (H)     ASCVD (arteriosclerotic cardiovascular disease)     Macrocytosis     Monoclonal paraproteinemia     MGUS (monoclonal gammopathy of unknown significance)     Epigastric pain     Hypertension goal BP (blood pressure) < 140/90     Complication of anesthesia     S/P lumbar fusion       Past Medical History:   Diagnosis Date     Arthritis     osteoarthritis     Complication of anesthesia     \"hard time waking up / N & V\"     Coronary artery disease      Displacement of lumbar intervertebral disc without " myelopathy      Gastro-oesophageal reflux disease      Hearing loss     has bilateral aids     Heart attack (H) 3/2016     History of angina      Hypertension      Low back pain      Numbness and tingling     down right leg (associated with back pain)     PONV (postoperative nausea and vomiting)      Sicca syndrome (H)      Sjogren's syndrome (H)     sees specialist; dentist     Unspecified hypothyroidism        Past Surgical History:   Procedure Laterality Date     ARTHROSCOPY KNEE  10/5/2012    R Procedure: ARTHROSCOPY KNEE;  RIGHT KNEE ARTHROSCOPY, PARTIAL MEDIAL MENISCECTOMY;  Surgeon: Karli Zaragoza MD;  Location: [facility]     BUNIONECTOMY  ~2010    L foot.      C NONSPECIFIC PROCEDURE  1976    D&C     CATARACT IOL, RT/LT Bilateral 07/2017     COLONOSCOPY N/A 1/17/2017    normal; no repeat Procedure: COLONOSCOPY;  Surgeon: Fan Giordano MD;  Location:  GI     ENDOSCOPIC RELEASE CARPAL TUNNEL  9/11/2012    R Procedure: ENDOSCOPIC RELEASE CARPAL TUNNEL;  Right Endoscopic carpal tunnel release, left ringer finger cortizon injection;  Surgeon: Anne Marie Catherine MD;  Location:  OR     ESOPHAGOSCOPY, GASTROSCOPY, DUODENOSCOPY (EGD), COMBINED N/A 12/26/2014    Procedure: COMBINED ESOPHAGOSCOPY, GASTROSCOPY, DUODENOSCOPY (EGD);  Surgeon: Fan Giordano MD;  Location:  GI     EXCISE EXOSTOSIS FOOT Left 12/1/2016    Procedure: EXCISE EXOSTOSIS FOOT;  Surgeon: Jody Gallagher, DPM, Pod;  Location:  OR     GYN SURGERY  1978    ovaries removed     HEART CATH FYI  3/4/16    dz <40%     HYSTERECTOMY, PAP NO LONGER INDICATED  1977    Hysterectomy; benign     INJECT STEROID (LOCATION)  9/11/2012    Procedure: INJECT STEROID (LOCATION);;  Surgeon: Anne Marie Catherine MD;  Location:  OR     LAPAROSCOPIC CHOLECYSTECTOMY N/A 4/7/2016    Procedure: LAPAROSCOPIC CHOLECYSTECTOMY;  Surgeon: Hans Medina MD;  Location:  OR     OPTICAL TRACKING SYSTEM FUSION SPINE POSTERIOR LUMBAR TWO LEVELS N/A 10/31/2018     Procedure: Central L3-4 L4-5 lamenectomies L4-5 posterior instrumented fusion;  Surgeon: Aroldo Burdick MD;  Location: [facility] OR     ORTHOPEDIC SURGERY  ~    Right foot, bunionectomy      ORTHOPEDIC SURGERY Right     karli for fx femur     RECONSTRUCT FOREFOOT WITH METATARSOPHALANGEAL (MTP) FUSION Left 2017    Procedure: RECONSTRUCT FOREFOOT WITH METATARSOPHALANGEAL (MTP) FUSION;  1. Resection arthroplasty, fifth metatarsophalangeal joint, left foot.   2. Irrigation and debridement of fifth metatarsophalangeal joint, left foot (excisional to the level of the bone).     ;  Surgeon: Fabián Phipps MD;  Location: [facility] OR     RIGHT HIP ORIF 2014       ROTATOR CUFF REPAIR RT/LT  ~    Lt shoulder; rotator cuff     SURGICAL HISTORY OF -   distant past    ablation for palpitations.     SURGICAL HISTORY OF -   2015    left carpal tunnel surgery       Obstetric History       T3      L3     SAB2   TAB0   Ectopic0   Multiple0   Live Births0       # Outcome Date GA Lbr Conor/2nd Weight Sex Delivery Anes PTL Lv   6 SAB            5 SAB            4             3 Term            2 Term            1 Term                   Current Outpatient Medications   Medication Sig Dispense Refill     alendronate (FOSAMAX) 70 MG tablet Take 1 tablet (70 mg) by mouth every 7 days Take with over 8 ounces water and stay upright for at least 30 minutes after dose.  Take at least 60 minutes before breakfast 12 tablet 1     aspirin 81 MG tablet Take 81 mg by mouth daily       atorvastatin (LIPITOR) 20 MG tablet Take 1 tablet (20 mg) by mouth daily 102 tablet 3     calcium carbonate 500 mg-vitamin D 200 units (OSCAL WITH D;OYSTER SHELL CALCIUM) 500-200 MG-UNIT per tablet Take 1 tablet by mouth 3 times daily (before meals) 90 tablet 3     Carboxymethylcellulose Sodium (REFRESH LIQUIGEL OP) Apply 1 drop to eye every evening 15 minutes after Restasis drops.       cevimeline (EVOXAC) 30 MG capsule Take 30 mg by  mouth 3 times daily Reported on 3/6/2017       cholecalciferol 1000 units TABS Take 1,000 Units by mouth 2 times daily 30 tablet 3     CycloSPORINE (RESTASIS OP) Apply 1 drop to eye every evening        DEXILANT 60 MG CPDR CR capsule 60 mg 2 times daily        levothyroxine (SYNTHROID/LEVOTHROID) 88 MCG tablet TAKE ONE TABLET BY MOUTH EVERY  tablet 3     losartan (COZAAR) 100 MG tablet Take 1 tablet (100 mg) by mouth daily 102 tablet 0     MELATONIN PO Take 3 mg by mouth At Bedtime       Misc Natural Products (LUTEIN 20 PO) Take 1 tablet by mouth daily       nitroGLYcerin (NITROSTAT) 0.4 MG sublingual tablet Place 1 tablet (0.4 mg) under the tongue every 5 minutes as needed for chest pain 25 tablet 0     Omega-3 Fatty Acids (OMEGA-3 FISH OIL PO) Take 1 g by mouth 2 times daily (with meals)        polyethylene glycol (MIRALAX) powder Take 1 capful by mouth 2 times daily In 8 ounces of liquid       ranitidine (ZANTAC) 300 MG tablet Take 300 mg by mouth At Bedtime          Family History   Problem Relation Age of Onset     C.A.D. Mother 76     Cancer Mother         non Hodgekin's Lymphoma     Heart Disease Mother         enlarged heart     C.A.D. Father 59     Heart Disease Father         valve problem     Hypertension Brother      Neuropathy Brother      Heart Disease Brother         open heart to repair mitral valve.     Connective Tissue Disorder Daughter         scleroderma     Colon Cancer No family hx of        Social History     Tobacco Use     Smoking status: Never Smoker     Smokeless tobacco: Never Used   Substance Use Topics     Alcohol use: Yes     Comment: social; maybe a glass of wine or highball per week        Immunization History   Administered Date(s) Administered     HEPA 08/21/1996, 06/16/1997     Influenza (H1N1) 11/25/2009     Influenza (High Dose) 3 valent vaccine 10/06/2010, 10/17/2011, 09/10/2012, 10/03/2013, 01/01/2014, 09/17/2015, 10/18/2016, 10/05/2017, 10/03/2018     Influenza (IIV3)  "PF 12/20/2004, 11/07/2005, 10/25/2006, 10/15/2007, 11/10/2008, 10/07/2014     Pneumo Conj 13-V (2010&after) 12/10/2015     Pneumococcal 23 valent 11/24/2003, 04/02/2010     TD (ADULT, 7+) 09/06/2007     TDAP Vaccine (Adacel) 12/04/2012     Zoster vaccine recombinant adjuvanted (SHINGRIX) 06/13/2018, 09/10/2018     Zoster vaccine, live 12/18/2008         Review of Systems   Constitutional: Negative for chills and fever.   HENT: Negative for congestion, ear pain and hearing loss.    Eyes: Negative for pain.   Respiratory: Positive for cough (Baseline cough. Did see GI; he thought the PPI and ranitidine would get rid of it.  will see ENT next week. ).    Cardiovascular: Negative for chest pain.   Gastrointestinal: Negative for abdominal pain, constipation, diarrhea, heartburn and hematochezia.   Genitourinary: Positive for frequency (will leak if does not get to the bathroom fast enough.). Negative for genital sores and hematuria.   Musculoskeletal: Positive for arthralgias.   Neurological: Positive for dizziness (can sometimes be lightheaded. ). Negative for headaches.   Psychiatric/Behavioral: The patient is not nervous/anxious.      Many joints are painful, but mostly hands. Some swelling of fingers.   Anticipates seeing Rheumatology.    No back pain since surgery. Still recovering. Unable to lift or bend at this time.     Quit taking irbesartan; went back to losartan. She is taking 2 of the 50 mg.   She thought irbesartan might be causing some swelling.    She is on dexilant through Dr. Altaf Zhu; GI.     Notes fatigue since back surgery; around [facility].     Will go to Hematology in April.     OBJECTIVE:   /72 (BP Location: Right arm, Cuff Size: Adult Regular)   Pulse 70   Temp 98.4  F (36.9  C) (Oral)   Resp 16   Ht 1.657 m (5' 5.25\")   Wt 60.5 kg (133 lb 6.4 oz)   SpO2 98%   BMI 22.03 kg/m   Estimated body mass index is 22.03 kg/m  as calculated from the following:    Height as of this encounter: " "1.657 m (5' 5.25\").    Weight as of this encounter: 60.5 kg (133 lb 6.4 oz).  Physical Exam  GENERAL APPEARANCE: alert and no distress  EYES: Eyes grossly normal to inspection, PERRL and conjunctivae and sclerae normal  HENT: ear canals and TM's normal, nose and mouth without ulcers or lesions, oropharynx clear and oral mucous membranes moist  NECK: no adenopathy, no asymmetry, masses, or scars and thyroid normal to palpation  RESP: lungs clear to auscultation - no rales, rhonchi or wheezes  BREAST: normal without masses, tenderness or nipple discharge and no palpable axillary masses or adenopathy  CV: regular rates and rhythm, no peripheral edema and peripheral pulses strong  ABDOMEN: soft, nontender, no hepatosplenomegaly, no masses and bowel sounds normal  MS: no musculoskeletal defects are noted and gait is age appropriate without ataxia  SKIN: no suspicious lesions or rashes  NEURO: Normal strength and tone, sensory exam grossly normal, mentation intact and speech normal  PSYCH: mentation appears normal and affect normal/bright    TSH   Date Value Ref Range Status   08/08/2018 0.84 0.40 - 4.00 mU/L Final     Hemoglobin   Date Value Ref Range Status   10/25/2018 13.7 11.7 - 15.7 g/dL Final   08/08/2018 13.9 11.7 - 15.7 g/dL Final     Recent Labs   Lab Test 12/19/17  0751 12/13/16  0808  12/02/14  0722 12/05/13  0925   CHOL 178 157   < > 212* 213*   HDL 99 90   < > 93 85   LDL 60 43   < > 98 107   TRIG 97 121   < > 105 101   CHOLHDLRATIO  --   --   --  2.3 2.5    < > = values in this interval not displayed.     Lab Results   Component Value Date    A1C 6.3 12/19/2017    A1C 5.9 12/13/2016    A1C 5.8 12/02/2014    A1C 5.7 12/05/2013         Reviewed Rheumatology note 8/18.      ASSESSMENT / PLAN:     1. Encounter for routine adult health examination without abnormal findings      2. Fatigue, unspecified type  Will check following labs.   - TSH with free T4 reflex  - **CBC with platelets FUTURE anytime  - T4 " free  - T4 free    3. Hypertension goal BP (blood pressure) < 140/90  Meeting goal.  Will continue current medication. Discussed both are ARBs.  At this time, has gone with 100 mg.  She notes occasional dizziness; discussed checking her blood pressure. She does have a cuff at home. Would like to see if bp low. Discussed changing positions slowly.   - Comprehensive metabolic panel  - losartan (COZAAR) 100 MG tablet; Take 1 tablet (100 mg) by mouth daily  Dispense: 102 tablet; Refill: 0    4. Sjogren's syndrome, with unspecified organ involvement (H)  She will be seeing Rheumatologist soon.    5. Osteoarthritis of multiple joints, unspecified osteoarthritis type  Rheumatology has this diagnosis. She is noting some swelling in fingers. She will discuss with Rheumatology,    6. Chronic cough  Working with GI. Still present despite PPI. Sounds like she will be seeing ENT soon.     7. MGUS (monoclonal gammopathy of unknown significance)  Anticipates seeing Hematology in April. She notes she may need to change providers.     8. Hypothyroidism, unspecified type  Clinically euthyroid other than fatigue. Will check.   - TSH with free T4 reflex    9. Hyperlipidemia LDL goal <70    - Lipid panel reflex to direct LDL Fasting  - Comprehensive metabolic panel    10. Impaired fasting glucose    - Hemoglobin A1c  - Comprehensive metabolic panel    11. ASCVD (arteriosclerotic cardiovascular disease)  She has been stable.  Reviewed her chart and corrected surgical history; she has not had angioplasty. Did have angiogram; she did not recall this and wondered if they got her mixed up, but did review, including the informed consent with her signature.  - Lipid panel reflex to direct LDL Fasting  - Comprehensive metabolic panel    Due to insurance reasons, she will be transferring to [facility]. Anticipates seeing new MD in early January.     Anticipate mammogram at new doctor.      End of Life Planning:  Patient currently has an  "advanced directive: Yes.  Practitioner is supportive of decision.    COUNSELING:  Reviewed preventive health counseling, as reflected in patient instructions       Regular exercise       Healthy diet/nutrition       Vision screening       Dental care       Osteoporosis Prevention/Bone Health    BP Readings from Last 1 Encounters:   12/21/18 118/72     Estimated body mass index is 22.03 kg/m  as calculated from the following:    Height as of this encounter: 1.657 m (5' 5.25\").    Weight as of this encounter: 60.5 kg (133 lb 6.4 oz).      Weight management plan noted, stable and monitoring     reports that  has never smoked. she has never used smokeless tobacco.      Appropriate preventive services were discussed with this patient, including applicable screening as appropriate for cardiovascular disease, diabetes, osteopenia/osteoporosis, and glaucoma.  As appropriate for age/gender, discussed screening for colorectal cancer, prostate cancer, breast cancer, and cervical cancer. Checklist reviewing preventive services available has been given to the patient.    Reviewed patients plan of care and provided an AVS. The Basic Care Plan (routine screening as documented in Health Maintenance) for Ya meets the Care Plan requirement. This Care Plan has been established and reviewed with the Patient.    Counseling Resources:  ATP IV Guidelines  Pooled Cohorts Equation Calculator  Breast Cancer Risk Calculator  FRAX Risk Assessment  ICSI Preventive Guidelines  Dietary Guidelines for Americans, 2010  [employer]'s MyPlate  ASA Prophylaxis  Lung CA Screening    Scarlett Spencer MD, MD  [facility]  "

## 2018-12-21 ENCOUNTER — OFFICE VISIT (OUTPATIENT)
Dept: FAMILY MEDICINE | Facility: CLINIC | Age: 78
End: 2018-12-21
Payer: COMMERCIAL

## 2018-12-21 VITALS
HEIGHT: 65 IN | SYSTOLIC BLOOD PRESSURE: 118 MMHG | HEART RATE: 70 BPM | RESPIRATION RATE: 16 BRPM | WEIGHT: 133.4 LBS | TEMPERATURE: 98.4 F | DIASTOLIC BLOOD PRESSURE: 72 MMHG | OXYGEN SATURATION: 98 % | BODY MASS INDEX: 22.23 KG/M2

## 2018-12-21 DIAGNOSIS — E78.5 HYPERLIPIDEMIA LDL GOAL <70: ICD-10-CM

## 2018-12-21 DIAGNOSIS — D47.2 MGUS (MONOCLONAL GAMMOPATHY OF UNKNOWN SIGNIFICANCE): ICD-10-CM

## 2018-12-21 DIAGNOSIS — I10 HYPERTENSION GOAL BP (BLOOD PRESSURE) < 140/90: ICD-10-CM

## 2018-12-21 DIAGNOSIS — E03.9 HYPOTHYROIDISM, UNSPECIFIED TYPE: ICD-10-CM

## 2018-12-21 DIAGNOSIS — R73.01 IMPAIRED FASTING GLUCOSE: ICD-10-CM

## 2018-12-21 DIAGNOSIS — R53.83 FATIGUE, UNSPECIFIED TYPE: ICD-10-CM

## 2018-12-21 DIAGNOSIS — I25.10 ASCVD (ARTERIOSCLEROTIC CARDIOVASCULAR DISEASE): ICD-10-CM

## 2018-12-21 DIAGNOSIS — M35.00 SJOGREN'S SYNDROME, WITH UNSPECIFIED ORGAN INVOLVEMENT (H): ICD-10-CM

## 2018-12-21 DIAGNOSIS — Z00.00 ENCOUNTER FOR ROUTINE ADULT HEALTH EXAMINATION WITHOUT ABNORMAL FINDINGS: Primary | ICD-10-CM

## 2018-12-21 DIAGNOSIS — R05.3 CHRONIC COUGH: ICD-10-CM

## 2018-12-21 DIAGNOSIS — M15.9 OSTEOARTHRITIS OF MULTIPLE JOINTS, UNSPECIFIED OSTEOARTHRITIS TYPE: ICD-10-CM

## 2018-12-21 LAB
ALBUMIN SERPL-MCNC: 3.7 G/DL (ref 3.4–5)
ALP SERPL-CCNC: 58 U/L (ref 40–150)
ALT SERPL W P-5'-P-CCNC: 20 U/L (ref 0–50)
ANION GAP SERPL CALCULATED.3IONS-SCNC: 9 MMOL/L (ref 3–14)
AST SERPL W P-5'-P-CCNC: 20 U/L (ref 0–45)
BILIRUB SERPL-MCNC: 0.8 MG/DL (ref 0.2–1.3)
BUN SERPL-MCNC: 19 MG/DL (ref 7–30)
CALCIUM SERPL-MCNC: 9.5 MG/DL (ref 8.5–10.1)
CHLORIDE SERPL-SCNC: 102 MMOL/L (ref 94–109)
CHOLEST SERPL-MCNC: 173 MG/DL
CO2 SERPL-SCNC: 26 MMOL/L (ref 20–32)
CREAT SERPL-MCNC: 0.85 MG/DL (ref 0.52–1.04)
ERYTHROCYTE [DISTWIDTH] IN BLOOD BY AUTOMATED COUNT: 12.7 % (ref 10–15)
GFR SERPL CREATININE-BSD FRML MDRD: 66 ML/MIN/{1.73_M2}
GLUCOSE SERPL-MCNC: 110 MG/DL (ref 70–99)
HBA1C MFR BLD: 5.7 % (ref 0–5.6)
HCT VFR BLD AUTO: 40.5 % (ref 35–47)
HDLC SERPL-MCNC: 83 MG/DL
HGB BLD-MCNC: 13.3 G/DL (ref 11.7–15.7)
LDLC SERPL CALC-MCNC: 70 MG/DL
MCH RBC QN AUTO: 32.8 PG (ref 26.5–33)
MCHC RBC AUTO-ENTMCNC: 32.8 G/DL (ref 31.5–36.5)
MCV RBC AUTO: 100 FL (ref 78–100)
NONHDLC SERPL-MCNC: 90 MG/DL
PLATELET # BLD AUTO: 309 10E9/L (ref 150–450)
POTASSIUM SERPL-SCNC: 4.1 MMOL/L (ref 3.4–5.3)
PROT SERPL-MCNC: 7.1 G/DL (ref 6.8–8.8)
RBC # BLD AUTO: 4.06 10E12/L (ref 3.8–5.2)
SODIUM SERPL-SCNC: 137 MMOL/L (ref 133–144)
T4 FREE SERPL-MCNC: 1.01 NG/DL (ref 0.76–1.46)
TRIGL SERPL-MCNC: 98 MG/DL
TSH SERPL DL<=0.005 MIU/L-ACNC: 5.46 MU/L (ref 0.4–4)
WBC # BLD AUTO: 7.5 10E9/L (ref 4–11)

## 2018-12-21 PROCEDURE — 84439 ASSAY OF FREE THYROXINE: CPT | Performed by: FAMILY MEDICINE

## 2018-12-21 PROCEDURE — 83036 HEMOGLOBIN GLYCOSYLATED A1C: CPT | Performed by: FAMILY MEDICINE

## 2018-12-21 PROCEDURE — 84443 ASSAY THYROID STIM HORMONE: CPT | Performed by: FAMILY MEDICINE

## 2018-12-21 PROCEDURE — 99397 PER PM REEVAL EST PAT 65+ YR: CPT | Performed by: FAMILY MEDICINE

## 2018-12-21 PROCEDURE — 36415 COLL VENOUS BLD VENIPUNCTURE: CPT | Performed by: FAMILY MEDICINE

## 2018-12-21 PROCEDURE — 99213 OFFICE O/P EST LOW 20 MIN: CPT | Mod: 25 | Performed by: FAMILY MEDICINE

## 2018-12-21 PROCEDURE — 80053 COMPREHEN METABOLIC PANEL: CPT | Performed by: FAMILY MEDICINE

## 2018-12-21 PROCEDURE — 80061 LIPID PANEL: CPT | Performed by: FAMILY MEDICINE

## 2018-12-21 PROCEDURE — 85027 COMPLETE CBC AUTOMATED: CPT | Performed by: FAMILY MEDICINE

## 2018-12-21 RX ORDER — LOSARTAN POTASSIUM 100 MG/1
100 TABLET ORAL DAILY
Qty: 102 TABLET | Refills: 0 | Status: SHIPPED | OUTPATIENT
Start: 2018-12-21 | End: 2021-01-05

## 2018-12-21 ASSESSMENT — ENCOUNTER SYMPTOMS
DIZZINESS: 1
NERVOUS/ANXIOUS: 0
HEMATOCHEZIA: 0
COUGH: 1
HEARTBURN: 0
CONSTIPATION: 0
EYE PAIN: 0
ARTHRALGIAS: 1
FREQUENCY: 1
CHILLS: 0
HEMATURIA: 0
ABDOMINAL PAIN: 0
HEADACHES: 0
DIARRHEA: 0
FEVER: 0

## 2018-12-21 ASSESSMENT — MIFFLIN-ST. JEOR: SCORE: 1094.94

## 2018-12-21 ASSESSMENT — ACTIVITIES OF DAILY LIVING (ADL): CURRENT_FUNCTION: NO ASSISTANCE NEEDED

## 2018-12-21 NOTE — LETTER
[facility]  [address] [address] [facility] [address] [phone]        February 6, 2020    Ya Stahl  [address]  [place] [address]              Dear Ya Stahl    This is to remind you that your thyroid is due.    You may call our office at [phone]  to schedule an appointment.    Please disregard this notice if you have already had your labs drawn or made an appointment.        Sincerely,        Scarlett Spencer MD and [facility] lab staff

## 2018-12-21 NOTE — LETTER
December 31, 2018      Ya Stahl  [address]  [place] [address]        Dear Ms. Ya Stahl,    Enclosed is a copy of your recent results.    TSH stands for Thyroid Stimulating Hormone. It is the most sensitive thyroid test that we have. It goes in the opposite direction of the thyroid hormone level; if your thyroid is not producing sufficient thyroid hormone, it goes up to tell your thyroid to make more.    I did send a slightly higher dose of thyroid medication to your pharmacy. This should be rechecked in 6-8 weeks. I suspect that will be with your new provider.    The Hgb A1C is a reflection of your glucose control over the last 3 months.  In people who have diabetes, we like to see this under 7.0. We do consider someone to have diabetes if this is 6.5 or higher.   An HgbA1C greater than or equal to 5.7 is considered to be in the pre-diabetes range. It is important to eat healthy, get regular exercise and maintain a healthy weight.  We have been following this; it is better than last year.    Have a Happy and Healthy New Year!  I wish you and your  all the best!    Sincerely,     Scarlett Spencer MD

## 2018-12-30 ENCOUNTER — TELEPHONE (OUTPATIENT)
Dept: FAMILY MEDICINE | Facility: CLINIC | Age: 78
End: 2018-12-30

## 2018-12-30 RX ORDER — LEVOTHYROXINE SODIUM 100 UG/1
100 TABLET ORAL DAILY
Qty: 90 TABLET | Refills: 0 | Status: SHIPPED | OUTPATIENT
Start: 2018-12-30 | End: 2021-01-05

## 2018-12-31 NOTE — TELEPHONE ENCOUNTER
Please contact her.    I did go up on her thyroid medication slightly. I would recommend that this be rechecked in about 6 weeks or so.  She anticipates changing providers due to insurance reasons; she will probably follow up with new provider.    Thanks!

## 2019-01-03 NOTE — TELEPHONE ENCOUNTER
Levothyroxine now 100 mcg tablets, sent to [facility].     Attempt #2 to call patient to notify. No answer. LVM to return call.    Sara STREETER, Triage RN

## 2019-01-08 NOTE — TELEPHONE ENCOUNTER
Call to pt- she is understanding message from Dr. Spencer and let her new provider know. She has labs scheduled.     Will remove Dr. Spencer as PCP.     Sarika GUPTA RN

## 2019-03-18 DIAGNOSIS — E78.5 HYPERLIPIDEMIA LDL GOAL <70: ICD-10-CM

## 2019-03-18 DIAGNOSIS — I10 HYPERTENSION GOAL BP (BLOOD PRESSURE) < 140/90: ICD-10-CM

## 2019-03-18 NOTE — TELEPHONE ENCOUNTER
"Requested Prescriptions   Pending Prescriptions Disp Refills     losartan (COZAAR) 100 MG tablet [Pharmacy Med Name: LOSARTAN POTASSIUM 100MG TABS]  Last Written Prescription Date:  12/21/18  Last Fill Quantity: 102,  # refills: 0   Last office visit: 12/21/2018 with prescribing provider:  Scarlett Spencer MD   Future Office Visit:   Next 5 appointments (look out 90 days)    Apr 18, 2019  2:15 PM CDT  Return Visit with Vandana Kasper MD  [facility] ([facility]) [facility]  [address]  [place] [facility] [place] [facility] [address]  [phone]          102 tablet 0     Sig: TAKE ONE TABLET BY MOUTH EVERY DAY    Angiotensin-II Receptors Passed - 3/18/2019 10:59 AM       Passed - Blood pressure under 140/90 in past 12 months    BP Readings from Last 3 Encounters:   12/21/18 118/72   11/13/18 118/68   11/03/18 104/75                Passed - Recent (12 mo) or future (30 days) visit within the authorizing provider's specialty    Patient had office visit in the last 12 months or has a visit in the next 30 days with authorizing provider or within the authorizing provider's specialty.  See \"Patient Info\" tab in inbasket, or \"Choose Columns\" in Meds & Orders section of the refill encounter.             Passed - Medication is active on med list       Passed - Patient is age 18 or older       Passed - No active pregnancy on record       Passed - Normal serum creatinine on file in past 12 months    Recent Labs   Lab Test 12/21/18  0930   CR 0.85            Passed - Normal serum potassium on file in past 12 months    Recent Labs   Lab Test 12/21/18  0930   POTASSIUM 4.1                   Passed - No positive pregnancy test in past 12 months        atorvastatin (LIPITOR) 20 MG tablet [Pharmacy Med Name: ATORVASTATIN 20MG TABS]  Last Written Prescription Date:  12/19/17  Last Fill Quantity: 102,  # refills: 3   Last office visit: 12/21/2018 with prescribing provider:  " "Scarlett Spencer MD   Future Office Visit:   Next 5 appointments (look out 90 days)    Apr 18, 2019  2:15 PM CDT  Return Visit with Vandana Kasper MD  [facility] ([facility]) [facility]  [address]  [facility] [address]  [phone]          102 tablet 3     Sig: TAKE ONE TABLET BY MOUTH EVERY DAY    Statins Protocol Passed - 3/18/2019 10:59 AM       Passed - LDL on file in past 12 months    Recent Labs   Lab Test 12/21/18  0930   LDL 70            Passed - No abnormal creatine kinase in past 12 months    No lab results found.            Passed - Recent (12 mo) or future (30 days) visit within the authorizing provider's specialty    Patient had office visit in the last 12 months or has a visit in the next 30 days with authorizing provider or within the authorizing provider's specialty.  See \"Patient Info\" tab in inbasket, or \"Choose Columns\" in Meds & Orders section of the refill encounter.             Passed - Medication is active on med list       Passed - Patient is age 18 or older       Passed - No active pregnancy on record       Passed - No positive pregnancy test in past 12 months          "

## 2019-03-20 RX ORDER — ATORVASTATIN CALCIUM 20 MG/1
TABLET, FILM COATED ORAL
Qty: 102 TABLET | Refills: 3 | OUTPATIENT
Start: 2019-03-20

## 2019-03-20 RX ORDER — LOSARTAN POTASSIUM 100 MG/1
TABLET ORAL
Qty: 102 TABLET | Refills: 0 | OUTPATIENT
Start: 2019-03-20

## 2019-03-20 NOTE — TELEPHONE ENCOUNTER
At 12/21/18 ov, states pt is going to be changing to [facility] due to insurance.  Will deny to the pharmacy and advise to send the script there.

## 2019-06-03 ENCOUNTER — TRANSFERRED RECORDS (OUTPATIENT)
Dept: HEALTH INFORMATION MANAGEMENT | Facility: CLINIC | Age: 79
End: 2019-06-03

## 2019-06-07 ENCOUNTER — TELEPHONE (OUTPATIENT)
Dept: PHARMACY | Facility: CLINIC | Age: 79
End: 2019-06-07

## 2019-06-07 NOTE — TELEPHONE ENCOUNTER
Called pt to f/up but she is no longer a pt at [facility]. We will stop reaching out to the patient at this time.    Renae Feliciano, PharmD  Medication Therapy Management Resident, [facility]  Pager: [phone]

## 2019-10-24 ENCOUNTER — HOSPITAL ENCOUNTER (OUTPATIENT)
Dept: LAB | Facility: CLINIC | Age: 79
Discharge: HOME | End: 2019-10-24
Attending: ORTHOPAEDIC SURGERY | Admitting: ORTHOPAEDIC SURGERY
Payer: COMMERCIAL

## 2019-10-24 DIAGNOSIS — Z00.00 HEALTH MAINTENANCE EXAMINATION: Primary | ICD-10-CM

## 2019-10-24 LAB — HGB BLD-MCNC: 13.7 G/DL (ref 11.7–15.7)

## 2019-10-24 PROCEDURE — 36415 COLL VENOUS BLD VENIPUNCTURE: CPT | Performed by: ORTHOPAEDIC SURGERY

## 2019-10-24 PROCEDURE — 85018 HEMOGLOBIN: CPT | Performed by: ORTHOPAEDIC SURGERY

## 2019-10-28 ENCOUNTER — MEDICAL CORRESPONDENCE (OUTPATIENT)
Dept: HEALTH INFORMATION MANAGEMENT | Facility: CLINIC | Age: 79
End: 2019-10-28

## 2020-06-25 ENCOUNTER — AMBULATORY - HEALTHEAST (OUTPATIENT)
Dept: SURGERY | Facility: CLINIC | Age: 80
End: 2020-06-25

## 2020-06-25 DIAGNOSIS — Z11.59 ENCOUNTER FOR SCREENING FOR OTHER VIRAL DISEASES: ICD-10-CM

## 2020-07-22 ASSESSMENT — MIFFLIN-ST. JEOR
SCORE: 1022
SCORE: 1022

## 2020-07-29 ASSESSMENT — MIFFLIN-ST. JEOR
SCORE: 1044.68
SCORE: 1044.68

## 2020-08-01 ENCOUNTER — AMBULATORY - HEALTHEAST (OUTPATIENT)
Dept: FAMILY MEDICINE | Facility: CLINIC | Age: 80
End: 2020-08-01

## 2020-08-01 DIAGNOSIS — Z11.59 ENCOUNTER FOR SCREENING FOR OTHER VIRAL DISEASES: ICD-10-CM

## 2020-08-02 LAB
SARS-COV-2 PCR COMMENT: NORMAL
SARS-COV-2 RNA SPEC QL NAA+PROBE: NEGATIVE
SARS-COV-2 VIRUS SPECIMEN SOURCE: NORMAL

## 2020-08-03 ENCOUNTER — ANESTHESIA - HEALTHEAST (OUTPATIENT)
Dept: SURGERY | Facility: CLINIC | Age: 80
End: 2020-08-03

## 2020-08-04 ENCOUNTER — SURGERY - HEALTHEAST (OUTPATIENT)
Dept: SURGERY | Facility: CLINIC | Age: 80
End: 2020-08-04

## 2020-08-04 ENCOUNTER — HOSPITAL ENCOUNTER (INPATIENT)
Dept: MEDSURG UNIT | Facility: CLINIC | Age: 80
Discharge: HOME HEALTH | End: 2020-08-06
Attending: ORTHOPAEDIC SURGERY | Admitting: ORTHOPAEDIC SURGERY
Payer: COMMERCIAL

## 2020-08-04 DIAGNOSIS — Z96.641 STATUS POST TOTAL REPLACEMENT OF RIGHT HIP: ICD-10-CM

## 2020-08-04 DIAGNOSIS — D62 ANEMIA DUE TO BLOOD LOSS, ACUTE: ICD-10-CM

## 2020-08-04 DIAGNOSIS — I10 ESSENTIAL HYPERTENSION: ICD-10-CM

## 2020-08-04 LAB
ABO/RH(D): NORMAL
ANTIBODY SCREEN: NEGATIVE
HGB BLD-MCNC: 10.7 G/DL (ref 12–16)
INR PPP: 0.97 (ref 0.9–1.1)

## 2020-08-04 ASSESSMENT — MIFFLIN-ST. JEOR
SCORE: 1023.98

## 2020-08-05 LAB
ANION GAP SERPL CALCULATED.3IONS-SCNC: 9 MMOL/L (ref 5–18)
BUN SERPL-MCNC: 14 MG/DL (ref 8–28)
CALCIUM SERPL-MCNC: 9.2 MG/DL (ref 8.5–10.5)
CHLORIDE BLD-SCNC: 100 MMOL/L (ref 98–107)
CO2 SERPL-SCNC: 26 MMOL/L (ref 22–31)
CREAT SERPL-MCNC: 0.7 MG/DL (ref 0.6–1.1)
GFR SERPL CREATININE-BSD FRML MDRD: >60 ML/MIN/1.73M2
GLUCOSE BLD-MCNC: 107 MG/DL (ref 70–125)
HGB BLD-MCNC: 9 G/DL (ref 12–16)
POTASSIUM BLD-SCNC: 4.2 MMOL/L (ref 3.5–5)
POTASSIUM BLD-SCNC: 4.2 MMOL/L (ref 3.5–5)
SODIUM SERPL-SCNC: 135 MMOL/L (ref 136–145)

## 2020-08-05 ASSESSMENT — MIFFLIN-ST. JEOR
SCORE: 1022
SCORE: 1022

## 2020-08-06 ENCOUNTER — HOME CARE/HOSPICE - HEALTHEAST (OUTPATIENT)
Dept: HOME HEALTH SERVICES | Facility: HOME HEALTH | Age: 80
End: 2020-08-06

## 2020-08-06 LAB
HGB BLD-MCNC: 7.3 G/DL (ref 12–16)
HGB BLD-MCNC: 7.8 G/DL (ref 12–16)

## 2020-08-08 LAB
BLD PROD TYP BPU: NORMAL
BLOOD TYPE: 2800
CODING SYSTEM: NORMAL
COMPONENT (HISTORICAL CONVERSION): NORMAL
CROSSMATCH: NORMAL
STATUS (HISTORICAL CONVERSION): NORMAL
UNIT ABO/RH (HISTORICAL CONVERSION): NORMAL
UNIT NUMBER: NORMAL

## 2021-01-05 ENCOUNTER — OFFICE VISIT (OUTPATIENT)
Dept: FAMILY MEDICINE | Facility: CLINIC | Age: 81
End: 2021-01-05
Payer: COMMERCIAL

## 2021-01-05 ENCOUNTER — TRANSFERRED RECORDS (OUTPATIENT)
Dept: HEALTH INFORMATION MANAGEMENT | Facility: CLINIC | Age: 81
End: 2021-01-05

## 2021-01-05 VITALS
HEART RATE: 58 BPM | WEIGHT: 123.6 LBS | TEMPERATURE: 98.2 F | DIASTOLIC BLOOD PRESSURE: 68 MMHG | RESPIRATION RATE: 16 BRPM | BODY MASS INDEX: 21.1 KG/M2 | HEIGHT: 64 IN | OXYGEN SATURATION: 99 % | SYSTOLIC BLOOD PRESSURE: 120 MMHG

## 2021-01-05 DIAGNOSIS — R06.83 SNORING: ICD-10-CM

## 2021-01-05 DIAGNOSIS — Z00.00 ENCOUNTER FOR MEDICARE ANNUAL WELLNESS EXAM: Primary | ICD-10-CM

## 2021-01-05 DIAGNOSIS — R73.01 IMPAIRED FASTING GLUCOSE: ICD-10-CM

## 2021-01-05 DIAGNOSIS — Z12.31 ENCOUNTER FOR SCREENING MAMMOGRAM FOR BREAST CANCER: ICD-10-CM

## 2021-01-05 DIAGNOSIS — I10 HYPERTENSION GOAL BP (BLOOD PRESSURE) < 140/90: ICD-10-CM

## 2021-01-05 DIAGNOSIS — I25.10 ASCVD (ARTERIOSCLEROTIC CARDIOVASCULAR DISEASE): ICD-10-CM

## 2021-01-05 DIAGNOSIS — D47.2 MGUS (MONOCLONAL GAMMOPATHY OF UNKNOWN SIGNIFICANCE): ICD-10-CM

## 2021-01-05 DIAGNOSIS — E03.9 HYPOTHYROIDISM, UNSPECIFIED TYPE: ICD-10-CM

## 2021-01-05 DIAGNOSIS — K21.9 GASTROESOPHAGEAL REFLUX DISEASE, UNSPECIFIED WHETHER ESOPHAGITIS PRESENT: ICD-10-CM

## 2021-01-05 DIAGNOSIS — E78.5 HYPERLIPIDEMIA LDL GOAL <70: ICD-10-CM

## 2021-01-05 DIAGNOSIS — R68.2 DRY MOUTH: ICD-10-CM

## 2021-01-05 DIAGNOSIS — R53.83 FATIGUE, UNSPECIFIED TYPE: ICD-10-CM

## 2021-01-05 DIAGNOSIS — M79.604 RIGHT LEG PAIN: ICD-10-CM

## 2021-01-05 PROCEDURE — 99397 PER PM REEVAL EST PAT 65+ YR: CPT | Performed by: FAMILY MEDICINE

## 2021-01-05 PROCEDURE — 99214 OFFICE O/P EST MOD 30 MIN: CPT | Mod: 25 | Performed by: FAMILY MEDICINE

## 2021-01-05 RX ORDER — LANSOPRAZOLE 30 MG/1
30 CAPSULE, DELAYED RELEASE ORAL
Qty: 102 CAPSULE | Refills: 3 | Status: SHIPPED | OUTPATIENT
Start: 2021-01-05 | End: 2023-01-09

## 2021-01-05 RX ORDER — LOSARTAN POTASSIUM 100 MG/1
100 TABLET ORAL DAILY
Qty: 102 TABLET | Refills: 3 | Status: SHIPPED | OUTPATIENT
Start: 2021-01-05 | End: 2022-01-06

## 2021-01-05 RX ORDER — SPIRONOLACTONE 25 MG
1 TABLET ORAL DAILY
COMMUNITY
End: 2023-11-20

## 2021-01-05 RX ORDER — LEVOTHYROXINE SODIUM 100 UG/1
100 TABLET ORAL DAILY
COMMUNITY
End: 2021-01-28

## 2021-01-05 RX ORDER — CEVIMELINE HYDROCHLORIDE 30 MG/1
30 CAPSULE ORAL 3 TIMES DAILY
Qty: 270 CAPSULE | Refills: 3 | Status: SHIPPED | OUTPATIENT
Start: 2021-01-05 | End: 2022-01-06

## 2021-01-05 RX ORDER — LOSARTAN POTASSIUM 100 MG/1
100 TABLET ORAL
COMMUNITY
Start: 2020-01-03 | End: 2021-01-05

## 2021-01-05 RX ORDER — AMLODIPINE BESYLATE 5 MG/1
5 TABLET ORAL
COMMUNITY
Start: 2020-01-03 | End: 2021-01-05

## 2021-01-05 RX ORDER — LANSOPRAZOLE 30 MG/1
30 CAPSULE, DELAYED RELEASE ORAL
COMMUNITY
Start: 2020-01-13 | End: 2021-01-05

## 2021-01-05 RX ORDER — AMLODIPINE BESYLATE 5 MG/1
5 TABLET ORAL DAILY
Qty: 102 TABLET | Refills: 3 | Status: SHIPPED | OUTPATIENT
Start: 2021-01-05 | End: 2022-01-06

## 2021-01-05 RX ORDER — ATORVASTATIN CALCIUM 20 MG/1
20 TABLET, FILM COATED ORAL DAILY
Qty: 102 TABLET | Refills: 3 | Status: SHIPPED | OUTPATIENT
Start: 2021-01-05 | End: 2022-01-06

## 2021-01-05 ASSESSMENT — ENCOUNTER SYMPTOMS
DIARRHEA: 0
EYE PAIN: 1
JOINT SWELLING: 0
SORE THROAT: 0
NERVOUS/ANXIOUS: 0
HEARTBURN: 0
COUGH: 1
HEMATOCHEZIA: 0
NAUSEA: 0
SHORTNESS OF BREATH: 0
ABDOMINAL PAIN: 0
MYALGIAS: 0
PALPITATIONS: 0
PARESTHESIAS: 0
HEMATURIA: 0
ARTHRALGIAS: 1
FREQUENCY: 1
FEVER: 0
HEADACHES: 0
CONSTIPATION: 0
BREAST MASS: 0
DYSURIA: 0
CHILLS: 0
DIZZINESS: 0

## 2021-01-05 ASSESSMENT — MIFFLIN-ST. JEOR: SCORE: 1024.62

## 2021-01-05 ASSESSMENT — ACTIVITIES OF DAILY LIVING (ADL): CURRENT_FUNCTION: NO ASSISTANCE NEEDED

## 2021-01-05 NOTE — PATIENT INSTRUCTIONS
Patient Education   Personalized Prevention Plan  You are due for the preventive services outlined below.  Your care team is available to assist you in scheduling these services.  If you have already completed any of these items, please share that information with your care team to update in your medical record.  Health Maintenance Due   Topic Date Due     ANNUAL REVIEW OF HM ORDERS  1940     Hepatitis C Screening  05/04/1958     Annual Wellness Visit  12/21/2019     Thyroid Function Lab  12/21/2019     FALL RISK ASSESSMENT  12/21/2019     PHQ-2  01/01/2021

## 2021-01-05 NOTE — PROGRESS NOTES
"SUBJECTIVE:   Ya Stahl is a 80 year old female who presents for Preventive Visit.      Patient has been advised of split billing requirements and indicates understanding:    Are you in the first 12 months of your Medicare coverage?  No    Healthy Habits:     In general, how would you rate your overall health?  Good    Frequency of exercise:  4-5 days/week    Duration of exercise:  45-60 minutes    Do you usually eat at least 4 servings of fruit and vegetables a day, include whole grains    & fiber and avoid regularly eating high fat or \"junk\" foods?  Yes    Taking medications regularly:  Yes    Medication side effects:  None, No muscle aches and No significant flushing    Ability to successfully perform activities of daily living:  No assistance needed    Home Safety:  No safety concerns identified    Hearing Impairment:  No hearing concerns    In the past 6 months, have you been bothered by leaking of urine? Yes    In general, how would you rate your overall mental or emotional health?  Good      PHQ-2 Total Score: 0    Additional concerns today:  No    Do you feel safe in your environment? Yes    Have you ever done Advance Care Planning? (For example, a Health Directive, POLST, or a discussion with a medical provider or your loved ones about your wishes): Yes, patient states has an Advance Care Planning document and will bring a copy to the clinic.      Fall risk  Fallen 2 or more times in the past year?: No  Any fall with injury in the past year?: No    Cognitive Screening   1) Repeat 3 items (Leader, Season, Table)    2) Clock draw: NORMAL  3) 3 item recall: Recalls 3 objects  Results: NORMAL clock, 1-2 items recalled: COGNITIVE IMPAIRMENT LESS LIKELY    Mini-CogTM Copyright JADE Hook. Licensed by the author for use in [facility]; reprinted with permission (eduar@.[facility]). All rights reserved.      Do you have sleep apnea, excessive snoring or daytime drowsiness?: yes-excessive snoring, daytime " drowsiness    Reviewed and updated as needed this visit by clinical staff                 Reviewed and updated as needed this visit by Provider                Social History     Tobacco Use     Smoking status: Never Smoker     Smokeless tobacco: Never Used   Substance Use Topics     Alcohol use: Yes     Comment: social; maybe a glass of wine or highball per week          Alcohol Use 12/21/2018   Prescreen: >3 drinks/day or >7 drinks/week? No   Prescreen: >3 drinks/day or >7 drinks/week? -               Current providers sharing in care for this patient include:   Patient Care Team:  Scarlett Spencer MD as PCP - General (Family Medicine)  Scarlett Spencer MD as Assigned PCP  Renae Purvis [facility] as Pharmacist (Pharmacist)    The following health maintenance items are reviewed in Epic and correct as of today:  Health Maintenance   Topic Date Due     ANNUAL REVIEW OF HM ORDERS  1940     HEPATITIS C SCREENING  05/04/1959     MEDICARE ANNUAL WELLNESS VISIT  12/21/2019     TSH W/FREE T4 REFLEX  12/21/2019     FALL RISK ASSESSMENT  12/21/2019     PHQ-2  01/01/2021     DTAP/TDAP/TD IMMUNIZATION (3 - Td) 12/04/2022     ADVANCE CARE PLANNING  11/01/2023     LIPID  12/21/2023     DEXA  07/21/2029     INFLUENZA VACCINE  Completed     Pneumococcal Vaccine: 65+ Years  Completed     ZOSTER IMMUNIZATION  Completed     Pneumococcal Vaccine: Pediatrics (0 to 5 Years) and At-Risk Patients (6 to 64 Years)  Aged Out     IPV IMMUNIZATION  Aged Out     MENINGITIS IMMUNIZATION  Aged Out     HEPATITIS B IMMUNIZATION  Aged Out       Patient Active Problem List   Diagnosis     Hypothyroidism     Chondrodermatitis nodularis helicis     Grave's disease     Bile reflux gastritis     Hyperlipidemia LDL goal <70     Sjogren's disease (H)     HL (hearing loss)     Mixed incontinence     Health Care Home     Chronic cough     Impaired fasting glucose     Status post-operative repair of hip fracture, RIGHT     Constipation     Osteoporosis      "Gastroesophageal reflux disease without esophagitis     AMI (acute myocardial infarction) (H)     ASCVD (arteriosclerotic cardiovascular disease)     Macrocytosis     Monoclonal paraproteinemia     MGUS (monoclonal gammopathy of unknown significance)     Epigastric pain     Hypertension goal BP (blood pressure) < 140/90     Complication of anesthesia     S/P lumbar fusion       Past Medical History:   Diagnosis Date     Arthritis     osteoarthritis     Complication of anesthesia     \"hard time waking up / N & V\"     Coronary artery disease      Displacement of lumbar intervertebral disc without myelopathy      Gastro-oesophageal reflux disease      Hearing loss     has bilateral aids     Heart attack (H) 3/2016     History of angina      Hypertension      Low back pain      Numbness and tingling     down right leg (associated with back pain)     PONV (postoperative nausea and vomiting)      Sicca syndrome (H)      Sjogren's syndrome (H)     sees specialist; dentist     Unspecified hypothyroidism        Past Surgical History:   Procedure Laterality Date     ARTHROSCOPY KNEE  10/05/2012    R Procedure: ARTHROSCOPY KNEE;  RIGHT KNEE ARTHROSCOPY, PARTIAL MEDIAL MENISCECTOMY;  Surgeon: Karli Zaragoza MD;  Location: [facility]     AS TOTAL HIP ARTHROPLASTY Right 08/2020     BUNIONECTOMY  ~2010    L foot.      C TOTAL KNEE ARTHROPLASTY Right 01/2020     CATARACT IOL, RT/LT Bilateral 07/2017     COLONOSCOPY N/A 01/17/2017    normal; no repeat Procedure: COLONOSCOPY;  Surgeon: Fan Giordano MD;  Location:  GI     ENDOSCOPIC RELEASE CARPAL TUNNEL  09/11/2012    R Procedure: ENDOSCOPIC RELEASE CARPAL TUNNEL;  Right Endoscopic carpal tunnel release, left ringer finger cortizon injection;  Surgeon: Anne Marie Catherine MD;  Location:  OR     ESOPHAGOSCOPY, GASTROSCOPY, DUODENOSCOPY (EGD), COMBINED N/A 12/26/2014    Procedure: COMBINED ESOPHAGOSCOPY, GASTROSCOPY, DUODENOSCOPY (EGD);  Surgeon: Fan Giordano MD;  Location: " [facility]     EXCISE EXOSTOSIS FOOT Left 2016    Procedure: EXCISE EXOSTOSIS FOOT;  Surgeon: Jody Gallagher, DPM, Pod;  Location: [facility] OR     GYN SURGERY      ovaries removed     HEART CATH FYI  2016    dz <40%     HYSTERECTOMY, PAP NO LONGER INDICATED      Hysterectomy; benign     INJECT STEROID (LOCATION)  2012    Procedure: INJECT STEROID (LOCATION);;  Surgeon: Anne Marie Catherine MD;  Location:  OR     LAPAROSCOPIC CHOLECYSTECTOMY N/A 2016    Procedure: LAPAROSCOPIC CHOLECYSTECTOMY;  Surgeon: Hans Medina MD;  Location:  OR     OPTICAL TRACKING SYSTEM FUSION SPINE POSTERIOR LUMBAR TWO LEVELS N/A 10/31/2018    Procedure: Central L3-4 L4-5 lamenectomies L4-5 posterior instrumented fusion;  Surgeon: Aroldo Burdick MD;  Location:  OR     ORTHOPEDIC SURGERY  ~    Right foot, bunionectomy      ORTHOPEDIC SURGERY Right 2014    karli for fx femur     RECONSTRUCT FOREFOOT WITH METATARSOPHALANGEAL (MTP) FUSION Left 2017    Procedure: RECONSTRUCT FOREFOOT WITH METATARSOPHALANGEAL (MTP) FUSION;  1. Resection arthroplasty, fifth metatarsophalangeal joint, left foot.   2. Irrigation and debridement of fifth metatarsophalangeal joint, left foot (excisional to the level of the bone).     ;  Surgeon: Fabián Phipps MD;  Location:  OR     RIGHT HIP ORIF 2014       ROTATOR CUFF REPAIR RT/LT  ~    Lt shoulder; rotator cuff     SURGICAL HISTORY OF -   distant past    ablation for palpitations.     SURGICAL HISTORY OF -   2015    left carpal tunnel surgery     ZZC NONSPECIFIC PROCEDURE      D&C       OB History    Para Term  AB Living   6 4 3 1 2 3   SAB TAB Ectopic Multiple Live Births   2 0 0 0 0      # Outcome Date GA Lbr Conor/2nd Weight Sex Delivery Anes PTL Lv   6 SAB            5 SAB            4             3 Term            2 Term            1 Term                Current Outpatient Medications   Medication Sig Dispense Refill     amLODIPine  (NORVASC) 5 MG tablet Take 5 mg by mouth       aspirin 81 MG tablet Take 81 mg by mouth daily       atorvastatin (LIPITOR) 20 MG tablet Take 1 tablet (20 mg) by mouth daily 102 tablet 3     Biotin w/ Vitamins C & E (HAIR/SKIN/NAILS PO) Take by mouth daily       calcium carbonate 500 mg-vitamin D 200 units (OSCAL WITH D;OYSTER SHELL CALCIUM) 500-200 MG-UNIT per tablet Take 1 tablet by mouth 3 times daily (before meals) 90 tablet 3     Carboxymethylcellulose Sodium (REFRESH LIQUIGEL OP) Apply 1 drop to eye every evening 15 minutes after Restasis drops.       cevimeline (EVOXAC) 30 MG capsule Take 30 mg by mouth 3 times daily Reported on 3/6/2017       cholecalciferol 1000 units TABS Take 1,000 Units by mouth 2 times daily 30 tablet 3     CycloSPORINE (RESTASIS OP) Apply 1 drop to eye every evening        LANsoprazole (PREVACID) 30 MG DR capsule Take 30 mg by mouth       levothyroxine (SYNTHROID/LEVOTHROID) 100 MCG tablet Take 100 mcg by mouth daily       losartan (COZAAR) 100 MG tablet Take 100 mg by mouth       Lutein 20 MG CAPS Take by mouth daily       Misc Natural Products (LUTEIN 20 PO) Take 1 tablet by mouth daily       nitroGLYcerin (NITROSTAT) 0.4 MG sublingual tablet Place 1 tablet (0.4 mg) under the tongue every 5 minutes as needed for chest pain 25 tablet 0     Omega-3 Fatty Acids (OMEGA-3 FISH OIL PO) Take 1 g by mouth 2 times daily (with meals)        polyethylene glycol (MIRALAX) powder Take 1 capful by mouth 2 times daily In 8 ounces of liquid         Family History   Problem Relation Age of Onset     C.A.D. Mother 76     Cancer Mother         non Hodgekin's Lymphoma     Heart Disease Mother         enlarged heart     C.A.D. Father 59     Heart Disease Father         valve problem     Hypertension Brother      Neuropathy Brother      Heart Disease Brother         open heart to repair mitral valve.     Connective Tissue Disorder Daughter         scleroderma     Colon Cancer No family hx of        Social  History     Tobacco Use     Smoking status: Never Smoker     Smokeless tobacco: Never Used   Substance Use Topics     Alcohol use: Yes     Comment: social; maybe a glass of wine or highball per week        Immunization History   Administered Date(s) Administered     HEPA 08/21/1996, 06/16/1997     Influenza (H1N1) 11/25/2009     Influenza (High Dose) 3 valent vaccine 10/06/2010, 10/17/2011, 09/10/2012, 10/03/2013, 01/01/2014, 09/17/2015, 10/18/2016, 10/05/2017, 10/03/2018     Influenza (IIV3) PF 12/20/2004, 11/07/2005, 10/25/2006, 10/15/2007, 11/10/2008, 10/07/2014     Pneumo Conj 13-V (2010&after) 12/10/2015     Pneumococcal 23 valent 11/24/2003, 04/02/2010     TD (ADULT, 7+) 09/06/2007     TDAP Vaccine (Adacel) 12/04/2012     Zoster vaccine recombinant adjuvanted (SHINGRIX) 06/13/2018, 09/10/2018     Zoster vaccine, live 12/18/2008         Review of Systems   Constitutional: Negative for chills and fever.   HENT: Positive for hearing loss (New hearing aids.). Negative for congestion, ear pain and sore throat.    Eyes: Positive for pain (has eye appointment soon. they burn/hurt.) and visual disturbance.   Respiratory: Positive for cough (this has been for years.  notes voice gravelly; will make appointment with ENT). Negative for shortness of breath.    Cardiovascular: Negative for chest pain, palpitations and peripheral edema.   Gastrointestinal: Negative for abdominal pain, constipation, diarrhea, heartburn, hematochezia and nausea.   Breasts:  Negative for tenderness, breast mass and discharge.   Genitourinary: Positive for frequency (managing ok) and urgency (managing OK). Negative for dysuria, genital sores, hematuria, pelvic pain, vaginal bleeding and vaginal discharge.   Musculoskeletal: Positive for arthralgias. Negative for joint swelling and myalgias.   Skin: Negative for rash.   Neurological: Negative for dizziness, headaches and paresthesias.   Psychiatric/Behavioral: Negative for mood changes. The  "patient is not nervous/anxious.      Insurance has allowed her to return to ; she is pleased.    Thinking she has a pinched nerve in back.  Did get an injection a couple weeks ago that did not help.  Having pain in low back that does go to knee on her right leg. Down anterior thigh.   This has affected walking.     Thinks covid has taken a toll on her health; not going to the fitness club.     Had right knee and hip surgery in the past; hip has been real recent.    Did not see cardiologist last year.     Will get symptoms if missing PPI.    She notes her dentist is thinking she needs a sleep apnea test.  She is snoring.   She will wake up biting her cheek.  She does report not always feeling rested when she gets up.    OBJECTIVE:   /68 (BP Location: Right arm, Cuff Size: Adult Regular)   Pulse 58   Temp 98.2  F (36.8  C) (Oral)   Resp 16   Ht 1.632 m (5' 4.25\")   Wt 56.1 kg (123 lb 9.6 oz)   SpO2 99%   BMI 21.05 kg/m   Estimated body mass index is 21.05 kg/m  as calculated from the following:    Height as of this encounter: 1.632 m (5' 4.25\").    Weight as of this encounter: 56.1 kg (123 lb 9.6 oz).  Physical Exam  GENERAL APPEARANCE: healthy, alert and no distress  EYES: Eyes grossly normal to inspection, PERRL and conjunctivae and sclerae normal  HENT: ear canals and TM's normal, nose and mouth without ulcers or lesions, oropharynx clear and oral mucous membranes moist  NECK: no adenopathy, no asymmetry, masses, or scars and thyroid normal to palpation  RESP: lungs clear to auscultation - no rales, rhonchi or wheezes  BREAST: normal without masses, tenderness or nipple discharge and no palpable axillary masses or adenopathy  CV: regular rates and rhythm and no peripheral edema  ABDOMEN: soft, nontender, no hepatosplenomegaly, no masses and bowel sounds normal  MS: no musculoskeletal defects are noted and gait is age appropriate without ataxia  SKIN: no suspicious lesions or rashes  NEURO: Normal " strength and tone, sensory exam grossly normal, mentation intact and speech normal  PSYCH: mentation appears normal and affect normal/bright    Phone visit reviewed in Care Everywhere from Hematology profider:  ASSESSMENT AND PLAN:  Ya is a 78-year-old who comes today for evaluation of monoclonal gammopathy of undetermined significance which was diagnosed in 2017 with 0.1 g of IgM kappa.     This is a phone visit for Ya. She does not have any anemia, and her kidney function is also normal without any hypercalcemia or any other bony abnormalities, including fractures. She denies any neuropathic symptoms. Overall, her appetite and weight are stable, and she is doing well otherwise. She was followed by  hematologist in [place] for 0.1 g of IGM kappa with intermittent slight elevation of kappa/ratio in the setting of underlying Sjogrens. I discussed her recent labs, which show disappearance of the previously noted monoclonal protein, and her kappa slightly elevated at 2.7 with slight elevation of the ratio at 2.7 as well.Considering the disappearance of IGM and intermittent normal free light chain with slight elevation for several years, I dont think we need to monitor this. I discussed with her about following with her primary physician.        ASSESSMENT / PLAN:   1. Encounter for Medicare annual wellness exam  Reviewed screenings.     2. ASCVD (arteriosclerotic cardiovascular disease)  She has not seen Cardiology in some time.  Will refer for follow up. She does note some fatigue, but otherwise appears stable.   - CARDIOLOGY EVAL ADULT REFERRAL; Future  - Comprehensive metabolic panel; Future    3. Hypertension goal BP (blood pressure) < 140/90  Controlled. She will return for lab.   - Comprehensive metabolic panel; Future  - Albumin Random Urine Quantitative with Creat Ratio; Future  - amLODIPine (NORVASC) 5 MG tablet; Take 1 tablet (5 mg) by mouth daily  Dispense: 102 tablet; Refill: 3  - losartan  (COZAAR) 100 MG tablet; Take 1 tablet (100 mg) by mouth daily  Dispense: 102 tablet; Refill: 3    4. Hyperlipidemia LDL goal <70  She will return for lab   - Lipid panel reflex to direct LDL Fasting; Future  - Comprehensive metabolic panel; Future  - atorvastatin (LIPITOR) 20 MG tablet; Take 1 tablet (20 mg) by mouth daily  Dispense: 102 tablet; Refill: 3    5. MGUS (monoclonal gammopathy of unknown significance)  Reviewed note from Hematology in .  Will get following lab. Reaching out to previous Hematologist in our system; see phone message.   She has been stable/low risk; has been being monitored.   - CBC with platelets; Future  - Protein electrophoresis; Future    6. Gastroesophageal reflux disease, unspecified whether esophagitis present  She notes symptoms if she misses a day of this. Will provide ongoing PPI  - LANsoprazole (PREVACID) 30 MG DR capsule; Take 1 capsule (30 mg) by mouth every morning (before breakfast)  Dispense: 102 capsule; Refill: 3    7. Impaired fasting glucose  Working on lifestyle; will get following labs for monitoring. She plans to return when fasting.   - Comprehensive metabolic panel; Future  - Hemoglobin A1c; Future    8. Hypothyroidism, unspecified type  Suspect euthyroid.   Anticipate sending prescription to [facility] when we get lab.  - TSH with free T4 reflex; Future    9. Fatigue, unspecified type  Mild, but she also notes not able to do as much exercise.   Dentist has suggested sleep eval.  - SLEEP EVALUATION & MANAGEMENT REFERRAL - [facility]  [phone] (Age 18 and up); Future    10. Snoring  As above; dentist has suggested sleep eval.   - SLEEP EVALUATION & MANAGEMENT REFERRAL - [facility]  [phone] (Age 18 and up); Future    11. Dry mouth  Requesting me to refill; this was done.   - cevimeline (EVOXAC) 30 MG capsule; Take 1 capsule (30 mg) by mouth 3 times daily Reported on 3/6/2017  Dispense: 270 capsule;  "Refill: 3    12. Right leg pain  She is working with specialist.     13. Encounter for screening mammogram for breast cancer    - *MA Screening Digital Bilateral; Future      Patient has been advised of split billing requirements and indicates understanding:   COUNSELING:  Reviewed preventive health counseling, as reflected in patient instructions       Regular exercise       Healthy diet/nutrition       Vision screening       Dental care    Estimated body mass index is 21.05 kg/m  as calculated from the following:    Height as of this encounter: 1.632 m (5' 4.25\").    Weight as of this encounter: 56.1 kg (123 lb 9.6 oz).    Weight management plan noted, stable and monitoring    She reports that she has never smoked. She has never used smokeless tobacco.      Appropriate preventive services were discussed with this patient, including applicable screening as appropriate for cardiovascular disease, diabetes, osteopenia/osteoporosis, and glaucoma.  As appropriate for age/gender, discussed screening for colorectal cancer, prostate cancer, breast cancer, and cervical cancer. Checklist reviewing preventive services available has been given to the patient.    Reviewed patients plan of care and provided an AVS. The Basic Care Plan (routine screening as documented in Health Maintenance) for Ya meets the Care Plan requirement. This Care Plan has been established and reviewed with the Patient.    Counseling Resources:  ATP IV Guidelines  Pooled Cohorts Equation Calculator  Breast Cancer Risk Calculator  Breast Cancer: Medication to Reduce Risk  FRAX Risk Assessment  ICSI Preventive Guidelines  Dietary Guidelines for Americans, 2010  USDA's MyPlate  ASA Prophylaxis  Lung CA Screening    Scarlett Spencer MD, MD  [facility]    Identified Health Risks:  "

## 2021-01-11 ENCOUNTER — TELEPHONE (OUTPATIENT)
Dept: FAMILY MEDICINE | Facility: CLINIC | Age: 81
End: 2021-01-11

## 2021-01-11 DIAGNOSIS — D47.2 MGUS (MONOCLONAL GAMMOPATHY OF UNKNOWN SIGNIFICANCE): Primary | ICD-10-CM

## 2021-01-11 NOTE — TELEPHONE ENCOUNTER
Hello!    This patient has recently returned to .  She used to see you.  She has seen Hematology at [facility]. At the last phone visit (4/15/2020); it sounded like they were not recommending follow up other than with PCP, though prior to that had recommended annual follow up.    I do have an SPEP and CBC ordered.  I am wondering if you recommend anything else and if/when you might want to follow up with her?     Thanks so much!!!    Scarlett    Did receive response:  Vandana Kasper MD Homan, Mary C, MD   Caller: Unspecified (Today, 12:05 PM)             Vazquez Pantoja,     She has had stable SPEP (M-spike of 0.1) and light chains for several years.  I'm happy to keep following her if you or she would like.  However, she is also okay to follow-up with you, if you are comfortable and just send back to me as needed.  Would just repeat her SPEP and serum free light chains every 1-2 years.  Please feel free to refer her back to me if labs are trending up.     -Vandana

## 2021-01-19 DIAGNOSIS — E78.5 HYPERLIPIDEMIA LDL GOAL <70: ICD-10-CM

## 2021-01-19 DIAGNOSIS — D47.2 MGUS (MONOCLONAL GAMMOPATHY OF UNKNOWN SIGNIFICANCE): ICD-10-CM

## 2021-01-19 DIAGNOSIS — I25.10 ASCVD (ARTERIOSCLEROTIC CARDIOVASCULAR DISEASE): ICD-10-CM

## 2021-01-19 DIAGNOSIS — R73.01 IMPAIRED FASTING GLUCOSE: ICD-10-CM

## 2021-01-19 DIAGNOSIS — E03.9 HYPOTHYROIDISM, UNSPECIFIED TYPE: ICD-10-CM

## 2021-01-19 DIAGNOSIS — I10 HYPERTENSION GOAL BP (BLOOD PRESSURE) < 140/90: ICD-10-CM

## 2021-01-19 LAB
ALBUMIN SERPL-MCNC: 3.8 G/DL (ref 3.4–5)
ALP SERPL-CCNC: 71 U/L (ref 40–150)
ALT SERPL W P-5'-P-CCNC: 25 U/L (ref 0–50)
ANION GAP SERPL CALCULATED.3IONS-SCNC: 3 MMOL/L (ref 3–14)
AST SERPL W P-5'-P-CCNC: 19 U/L (ref 0–45)
BILIRUB SERPL-MCNC: 1.2 MG/DL (ref 0.2–1.3)
BUN SERPL-MCNC: 22 MG/DL (ref 7–30)
CALCIUM SERPL-MCNC: 8.9 MG/DL (ref 8.5–10.1)
CHLORIDE SERPL-SCNC: 104 MMOL/L (ref 94–109)
CHOLEST SERPL-MCNC: 175 MG/DL
CO2 SERPL-SCNC: 30 MMOL/L (ref 20–32)
CREAT SERPL-MCNC: 0.72 MG/DL (ref 0.52–1.04)
ERYTHROCYTE [DISTWIDTH] IN BLOOD BY AUTOMATED COUNT: 13.8 % (ref 10–15)
GFR SERPL CREATININE-BSD FRML MDRD: 79 ML/MIN/{1.73_M2}
GLUCOSE SERPL-MCNC: 107 MG/DL (ref 70–99)
HBA1C MFR BLD: 5.9 % (ref 0–5.6)
HCT VFR BLD AUTO: 38.7 % (ref 35–47)
HDLC SERPL-MCNC: 89 MG/DL
HGB BLD-MCNC: 12.5 G/DL (ref 11.7–15.7)
LDLC SERPL CALC-MCNC: 64 MG/DL
MCH RBC QN AUTO: 32.5 PG (ref 26.5–33)
MCHC RBC AUTO-ENTMCNC: 32.3 G/DL (ref 31.5–36.5)
MCV RBC AUTO: 101 FL (ref 78–100)
NONHDLC SERPL-MCNC: 86 MG/DL
PLATELET # BLD AUTO: 302 10E9/L (ref 150–450)
POTASSIUM SERPL-SCNC: 4 MMOL/L (ref 3.4–5.3)
PROT SERPL-MCNC: 7.1 G/DL (ref 6.8–8.8)
RBC # BLD AUTO: 3.85 10E12/L (ref 3.8–5.2)
SODIUM SERPL-SCNC: 137 MMOL/L (ref 133–144)
TRIGL SERPL-MCNC: 108 MG/DL
TSH SERPL DL<=0.005 MIU/L-ACNC: 3.02 MU/L (ref 0.4–4)
WBC # BLD AUTO: 9 10E9/L (ref 4–11)

## 2021-01-19 PROCEDURE — 84165 PROTEIN E-PHORESIS SERUM: CPT | Performed by: PATHOLOGY

## 2021-01-19 PROCEDURE — 99N1036 PR STATISTIC TOTAL PROTEIN: Performed by: FAMILY MEDICINE

## 2021-01-19 PROCEDURE — 80061 LIPID PANEL: CPT | Performed by: FAMILY MEDICINE

## 2021-01-19 PROCEDURE — 80053 COMPREHEN METABOLIC PANEL: CPT | Performed by: FAMILY MEDICINE

## 2021-01-19 PROCEDURE — 36415 COLL VENOUS BLD VENIPUNCTURE: CPT | Performed by: FAMILY MEDICINE

## 2021-01-19 PROCEDURE — 84443 ASSAY THYROID STIM HORMONE: CPT | Performed by: FAMILY MEDICINE

## 2021-01-19 PROCEDURE — 83883 ASSAY NEPHELOMETRY NOT SPEC: CPT | Performed by: FAMILY MEDICINE

## 2021-01-19 PROCEDURE — 83036 HEMOGLOBIN GLYCOSYLATED A1C: CPT | Performed by: FAMILY MEDICINE

## 2021-01-19 PROCEDURE — 82043 UR ALBUMIN QUANTITATIVE: CPT | Performed by: FAMILY MEDICINE

## 2021-01-19 PROCEDURE — 85027 COMPLETE CBC AUTOMATED: CPT | Performed by: FAMILY MEDICINE

## 2021-01-20 LAB
ALBUMIN SERPL ELPH-MCNC: 4.4 G/DL (ref 3.7–5.1)
ALPHA1 GLOB SERPL ELPH-MCNC: 0.3 G/DL (ref 0.2–0.4)
ALPHA2 GLOB SERPL ELPH-MCNC: 0.8 G/DL (ref 0.5–0.9)
B-GLOBULIN SERPL ELPH-MCNC: 0.8 G/DL (ref 0.6–1)
CREAT UR-MCNC: 94 MG/DL
GAMMA GLOB SERPL ELPH-MCNC: 0.9 G/DL (ref 0.7–1.6)
KAPPA LC UR-MCNC: 2.46 MG/DL (ref 0.33–1.94)
KAPPA LC/LAMBDA SER: 3.15 {RATIO} (ref 0.26–1.65)
LAMBDA LC SERPL-MCNC: 0.78 MG/DL (ref 0.57–2.63)
M PROTEIN SERPL ELPH-MCNC: 0.1 G/DL
MICROALBUMIN UR-MCNC: 16 MG/L
MICROALBUMIN/CREAT UR: 17.06 MG/G CR (ref 0–25)
PROT PATTERN SERPL ELPH-IMP: ABNORMAL

## 2021-01-28 ENCOUNTER — TELEPHONE (OUTPATIENT)
Dept: FAMILY MEDICINE | Facility: CLINIC | Age: 81
End: 2021-01-28

## 2021-01-28 DIAGNOSIS — E03.9 HYPOTHYROIDISM, UNSPECIFIED TYPE: Primary | ICD-10-CM

## 2021-01-28 RX ORDER — LEVOTHYROXINE SODIUM 100 UG/1
100 TABLET ORAL DAILY
Qty: 90 TABLET | Refills: 3 | Status: SHIPPED | OUTPATIENT
Start: 2021-01-28 | End: 2022-01-06

## 2021-01-28 NOTE — TELEPHONE ENCOUNTER
Ya is calling wanting to get her lab results from 1/19/21. Please call her back at [phone] ok to leave detailed message.       Krystal Cooper-

## 2021-01-29 ENCOUNTER — TELEPHONE (OUTPATIENT)
Dept: FAMILY MEDICINE | Facility: CLINIC | Age: 81
End: 2021-01-29

## 2021-01-29 DIAGNOSIS — D47.2 MGUS (MONOCLONAL GAMMOPATHY OF UNKNOWN SIGNIFICANCE): ICD-10-CM

## 2021-01-29 NOTE — TELEPHONE ENCOUNTER
Called patient and informed of below. Patient verbalized understanding.     Shanna Bowers RN on 1/29/2021 at 9:11 AM

## 2021-01-29 NOTE — TELEPHONE ENCOUNTER
Please call her...     Overall, things look good.  Will send a copy of labs to her with some comments.  You can read them to her if she would like.    However, I am also going to reach out to the Hematologist again as there is one test that I am uncertain how to interpret.     (staff message sent to Dr. Kasper).

## 2021-01-29 NOTE — TELEPHONE ENCOUNTER
Please let her know that I did hear from Dr. Kasper.   She thinks things are still looking relative low (good) and recommend rechecking in a year.     Thanks!    -------------------------------------------------            Vandana Kasper MD Homan, Mary C, MD             [facility],     Light chains went up a bit, but still relatively low, and everything else looks fine (hemoglobin, creatinine, M-spike, calcium).  I'd continue to monitor it as MGUS.  Would just continue doing annual labs as you are, and please feel free to reach out with questions anytime.       -Vandana    Previous Messages    ----- Message -----   From: Scarlett Spencer MD   Sent: 1/28/2021   7:41 PM CST   To: Vandana Kasper MD     Hello!     Sorry to ask you about her again so soon! I did do labs and was wondering if you could take a look at the following:   I was wondering if you could take a look at her light chains. I am not sure how to interpret them and they do look higher than previously.   Her M-spike on the SPEP  looks similar.     Let me know if you would like to see her; I am fine watching this if I know what to look for or can ask you questions...     Thanks so much!!   Scarlett

## 2021-02-09 ENCOUNTER — OFFICE VISIT (OUTPATIENT)
Dept: CARDIOLOGY | Facility: CLINIC | Age: 81
End: 2021-02-09
Attending: FAMILY MEDICINE
Payer: COMMERCIAL

## 2021-02-09 VITALS
BODY MASS INDEX: 21.95 KG/M2 | SYSTOLIC BLOOD PRESSURE: 120 MMHG | HEART RATE: 62 BPM | OXYGEN SATURATION: 98 % | WEIGHT: 128.6 LBS | DIASTOLIC BLOOD PRESSURE: 62 MMHG | HEIGHT: 64 IN

## 2021-02-09 DIAGNOSIS — I25.10 ASCVD (ARTERIOSCLEROTIC CARDIOVASCULAR DISEASE): ICD-10-CM

## 2021-02-09 PROCEDURE — 99207 PR NO DOCUMENTATION ON VISIT: CPT | Performed by: INTERNAL MEDICINE

## 2021-02-09 RX ORDER — ETODOLAC 400 MG
TABLET ORAL
COMMUNITY
Start: 2021-01-22 | End: 2022-05-16

## 2021-02-09 RX ORDER — GABAPENTIN 100 MG/1
CAPSULE ORAL
COMMUNITY
Start: 2021-01-28 | End: 2022-01-06

## 2021-02-09 ASSESSMENT — MIFFLIN-ST. JEOR: SCORE: 1047.33

## 2021-02-09 NOTE — LETTER
2/9/2021    [place]  [address] [place] [address]    RE: Ya Stahl       Dear Colleague,    I had the pleasure of seeing aY Stahl in the [facility].    HISTORY OF PRESENT ILLNESS:  Sjogren's causes both dry eyes and dry mouth. Multiple therapies including creams, drops and gels which help.     Orders this Visit:  No orders of the defined types were placed in this encounter.    Orders Placed This Encounter   Medications     etodolac (LODINE) 400 MG tablet     Sig: TAKE ONE TABLET BY MOUTH TWICE A DAY WITH MEALS. STOP OTHER NSAIDS.     gabapentin (NEURONTIN) 100 MG capsule     Sig: TAKE ONE CAPSULE BY MOUTH DURING THE DAY, AND FOUR CAPSULES AT BEDTIME     There are no discontinued medications.    Encounter Diagnosis   Name Primary?     ASCVD (arteriosclerotic cardiovascular disease)        CURRENT MEDICATIONS:  Current Outpatient Medications   Medication Sig Dispense Refill     amLODIPine (NORVASC) 5 MG tablet Take 1 tablet (5 mg) by mouth daily 102 tablet 3     aspirin 81 MG tablet Take 81 mg by mouth daily       atorvastatin (LIPITOR) 20 MG tablet Take 1 tablet (20 mg) by mouth daily 102 tablet 3     Biotin w/ Vitamins C & E (HAIR/SKIN/NAILS PO) Take by mouth daily       calcium carbonate 500 mg-vitamin D 200 units (OSCAL WITH D;OYSTER SHELL CALCIUM) 500-200 MG-UNIT per tablet Take 1 tablet by mouth 3 times daily (before meals) 90 tablet 3     Carboxymethylcellulose Sodium (REFRESH LIQUIGEL OP) Apply 1 drop to eye every evening 15 minutes after Restasis drops.       cevimeline (EVOXAC) 30 MG capsule Take 1 capsule (30 mg) by mouth 3 times daily Reported on 3/6/2017 270 capsule 3     cholecalciferol 1000 units TABS Take 1,000 Units by mouth 2 times daily 30 tablet 3     CycloSPORINE (RESTASIS OP) Apply 1 drop to eye every evening        etodolac (LODINE) 400 MG tablet TAKE ONE TABLET BY MOUTH TWICE A DAY WITH MEALS. STOP  "OTHER NSAIDS.       gabapentin (NEURONTIN) 100 MG capsule TAKE ONE CAPSULE BY MOUTH DURING THE DAY, AND FOUR CAPSULES AT BEDTIME       LANsoprazole (PREVACID) 30 MG DR capsule Take 1 capsule (30 mg) by mouth every morning (before breakfast) 102 capsule 3     levothyroxine (SYNTHROID/LEVOTHROID) 100 MCG tablet Take 1 tablet (100 mcg) by mouth daily 90 tablet 3     losartan (COZAAR) 100 MG tablet Take 1 tablet (100 mg) by mouth daily 102 tablet 3     Lutein 20 MG CAPS Take by mouth daily       Misc Natural Products (LUTEIN 20 PO) Take 1 tablet by mouth daily       nitroGLYcerin (NITROSTAT) 0.4 MG sublingual tablet Place 1 tablet (0.4 mg) under the tongue every 5 minutes as needed for chest pain 25 tablet 0     Omega-3 Fatty Acids (OMEGA-3 FISH OIL PO) Take 1 g by mouth 2 times daily (with meals)        polyethylene glycol (MIRALAX) powder Take 1 capful by mouth 2 times daily In 8 ounces of liquid         ALLERGIES     Allergies   Allergen Reactions     Codeine      fatigue     Vicodin [Acetaminophen] Fatigue       PAST MEDICAL, SURGICAL, FAMILY, SOCIAL HISTORY:  History was reviewed and updated as needed, see medical record.    Review of Systems:  A 12-point review of systems was completed, see medical record for detailed review of systems information.    Physical Exam:  Vitals: /62 (Cuff Size: Adult Regular)   Pulse 62   Ht 1.632 m (5' 4.25\")   Wt 58.3 kg (128 lb 9.6 oz)   SpO2 98%   BMI 21.90 kg/m      Constitutional:           Skin:           Head:           Eyes:           ENT:           Neck:           Chest:           Cardiac:                    Abdomen:           Vascular:                                        Extremities and Back:           Neurological:           ASSESSMENT: Stable bp ldl optimal; exercises       RECOMMENDATIONS: *  Continue present tx stop ntg  Follow-up prn       Recent Lab Results:  LIPID RESULTS:  Lab Results   Component Value Date    CHOL 175 01/19/2021    HDL 89 01/19/2021 "    LDL 64 01/19/2021    TRIG 108 01/19/2021    CHOLHDLRATIO 2.3 12/02/2014       LIVER ENZYME RESULTS:  Lab Results   Component Value Date    AST 19 01/19/2021    ALT 25 01/19/2021       CBC RESULTS:  Lab Results   Component Value Date    WBC 9.0 01/19/2021    RBC 3.85 01/19/2021    HGB 12.5 01/19/2021    HCT 38.7 01/19/2021     (H) 01/19/2021    MCH 32.5 01/19/2021    MCHC 32.3 01/19/2021    RDW 13.8 01/19/2021     01/19/2021       BMP RESULTS:  Lab Results   Component Value Date     01/19/2021    POTASSIUM 4.0 01/19/2021    CHLORIDE 104 01/19/2021    CO2 30 01/19/2021    ANIONGAP 3 01/19/2021     (H) 01/19/2021    BUN 22 01/19/2021    CR 0.72 01/19/2021    GFRESTIMATED 79 01/19/2021    GFRESTBLACK >90 01/19/2021    CARMEN 8.9 01/19/2021        A1C RESULTS:  Lab Results   Component Value Date    A1C 5.9 (H) 01/19/2021       INR RESULTS:  Lab Results   Component Value Date    INR 0.96 12/01/2016    INR 1.00 08/23/2016       We greatly appreciate the opportunity to be involved in the care of your patient, Ya Stahl.    Sincerely,  Venancio Montanez MD        Scarlett [place]  [address] [id number]

## 2021-02-09 NOTE — PROGRESS NOTES
HISTORY OF PRESENT ILLNESS:  Sjogren's causes both dry eyes and dry mouth. Multiple therapies including creams, drops and gels which help.     Orders this Visit:  No orders of the defined types were placed in this encounter.    Orders Placed This Encounter   Medications     etodolac (LODINE) 400 MG tablet     Sig: TAKE ONE TABLET BY MOUTH TWICE A DAY WITH MEALS. STOP OTHER NSAIDS.     gabapentin (NEURONTIN) 100 MG capsule     Sig: TAKE ONE CAPSULE BY MOUTH DURING THE DAY, AND FOUR CAPSULES AT BEDTIME     There are no discontinued medications.    Encounter Diagnosis   Name Primary?     ASCVD (arteriosclerotic cardiovascular disease)        CURRENT MEDICATIONS:  Current Outpatient Medications   Medication Sig Dispense Refill     amLODIPine (NORVASC) 5 MG tablet Take 1 tablet (5 mg) by mouth daily 102 tablet 3     aspirin 81 MG tablet Take 81 mg by mouth daily       atorvastatin (LIPITOR) 20 MG tablet Take 1 tablet (20 mg) by mouth daily 102 tablet 3     Biotin w/ Vitamins C & E (HAIR/SKIN/NAILS PO) Take by mouth daily       calcium carbonate 500 mg-vitamin D 200 units (OSCAL WITH D;OYSTER SHELL CALCIUM) 500-200 MG-UNIT per tablet Take 1 tablet by mouth 3 times daily (before meals) 90 tablet 3     Carboxymethylcellulose Sodium (REFRESH LIQUIGEL OP) Apply 1 drop to eye every evening 15 minutes after Restasis drops.       cevimeline (EVOXAC) 30 MG capsule Take 1 capsule (30 mg) by mouth 3 times daily Reported on 3/6/2017 270 capsule 3     cholecalciferol 1000 units TABS Take 1,000 Units by mouth 2 times daily 30 tablet 3     CycloSPORINE (RESTASIS OP) Apply 1 drop to eye every evening        etodolac (LODINE) 400 MG tablet TAKE ONE TABLET BY MOUTH TWICE A DAY WITH MEALS. STOP OTHER NSAIDS.       gabapentin (NEURONTIN) 100 MG capsule TAKE ONE CAPSULE BY MOUTH DURING THE DAY, AND FOUR CAPSULES AT BEDTIME       LANsoprazole (PREVACID) 30 MG DR capsule Take 1 capsule (30 mg) by mouth every morning (before breakfast) 102  "capsule 3     levothyroxine (SYNTHROID/LEVOTHROID) 100 MCG tablet Take 1 tablet (100 mcg) by mouth daily 90 tablet 3     losartan (COZAAR) 100 MG tablet Take 1 tablet (100 mg) by mouth daily 102 tablet 3     Lutein 20 MG CAPS Take by mouth daily       Misc Natural Products (LUTEIN 20 PO) Take 1 tablet by mouth daily       nitroGLYcerin (NITROSTAT) 0.4 MG sublingual tablet Place 1 tablet (0.4 mg) under the tongue every 5 minutes as needed for chest pain 25 tablet 0     Omega-3 Fatty Acids (OMEGA-3 FISH OIL PO) Take 1 g by mouth 2 times daily (with meals)        polyethylene glycol (MIRALAX) powder Take 1 capful by mouth 2 times daily In 8 ounces of liquid         ALLERGIES     Allergies   Allergen Reactions     Codeine      fatigue     Vicodin [Acetaminophen] Fatigue       PAST MEDICAL, SURGICAL, FAMILY, SOCIAL HISTORY:  History was reviewed and updated as needed, see medical record.    Review of Systems:  A 12-point review of systems was completed, see medical record for detailed review of systems information.    Physical Exam:  Vitals: /62 (Cuff Size: Adult Regular)   Pulse 62   Ht 1.632 m (5' 4.25\")   Wt 58.3 kg (128 lb 9.6 oz)   SpO2 98%   BMI 21.90 kg/m      Constitutional:           Skin:           Head:           Eyes:           ENT:           Neck:           Chest:           Cardiac:                    Abdomen:           Vascular:                                        Extremities and Back:           Neurological:           ASSESSMENT: Stable bp ldl optimal; exercises       RECOMMENDATIONS: *  Continue present tx stop ntg  Follow-up prn       Recent Lab Results:  LIPID RESULTS:  Lab Results   Component Value Date    CHOL 175 01/19/2021    HDL 89 01/19/2021    LDL 64 01/19/2021    TRIG 108 01/19/2021    CHOLHDLRATIO 2.3 12/02/2014       LIVER ENZYME RESULTS:  Lab Results   Component Value Date    AST 19 01/19/2021    ALT 25 01/19/2021       CBC RESULTS:  Lab Results   Component Value Date    WBC 9.0 " 01/19/2021    RBC 3.85 01/19/2021    HGB 12.5 01/19/2021    HCT 38.7 01/19/2021     (H) 01/19/2021    MCH 32.5 01/19/2021    MCHC 32.3 01/19/2021    RDW 13.8 01/19/2021     01/19/2021       BMP RESULTS:  Lab Results   Component Value Date     01/19/2021    POTASSIUM 4.0 01/19/2021    CHLORIDE 104 01/19/2021    CO2 30 01/19/2021    ANIONGAP 3 01/19/2021     (H) 01/19/2021    BUN 22 01/19/2021    CR 0.72 01/19/2021    GFRESTIMATED 79 01/19/2021    GFRESTBLACK >90 01/19/2021    CARMEN 8.9 01/19/2021        A1C RESULTS:  Lab Results   Component Value Date    A1C 5.9 (H) 01/19/2021       INR RESULTS:  Lab Results   Component Value Date    INR 0.96 12/01/2016    INR 1.00 08/23/2016       We greatly appreciate the opportunity to be involved in the care of your patient, Ya Stahl.    Sincerely,  Venancio Montanez MD      CC  Scarlett [place]  [id number] [address]

## 2021-02-25 ENCOUNTER — ANCILLARY PROCEDURE (OUTPATIENT)
Dept: MAMMOGRAPHY | Facility: CLINIC | Age: 81
End: 2021-02-25
Attending: FAMILY MEDICINE
Payer: COMMERCIAL

## 2021-02-25 DIAGNOSIS — Z12.31 ENCOUNTER FOR SCREENING MAMMOGRAM FOR BREAST CANCER: ICD-10-CM

## 2021-02-25 PROCEDURE — 77063 BREAST TOMOSYNTHESIS BI: CPT | Mod: TC | Performed by: RADIOLOGY

## 2021-02-25 PROCEDURE — 77067 SCR MAMMO BI INCL CAD: CPT | Mod: TC | Performed by: RADIOLOGY

## 2021-03-05 ENCOUNTER — TRANSFERRED RECORDS (OUTPATIENT)
Dept: HEALTH INFORMATION MANAGEMENT | Facility: CLINIC | Age: 81
End: 2021-03-05

## 2021-03-15 ENCOUNTER — TRANSFERRED RECORDS (OUTPATIENT)
Dept: HEALTH INFORMATION MANAGEMENT | Facility: CLINIC | Age: 81
End: 2021-03-15

## 2021-03-18 ENCOUNTER — TRANSFERRED RECORDS (OUTPATIENT)
Dept: HEALTH INFORMATION MANAGEMENT | Facility: CLINIC | Age: 81
End: 2021-03-18

## 2021-03-29 ENCOUNTER — TRANSFERRED RECORDS (OUTPATIENT)
Dept: HEALTH INFORMATION MANAGEMENT | Facility: CLINIC | Age: 81
End: 2021-03-29

## 2021-04-12 ENCOUNTER — TRANSFERRED RECORDS (OUTPATIENT)
Dept: HEALTH INFORMATION MANAGEMENT | Facility: CLINIC | Age: 81
End: 2021-04-12

## 2021-04-14 ENCOUNTER — MYC MEDICAL ADVICE (OUTPATIENT)
Dept: FAMILY MEDICINE | Facility: CLINIC | Age: 81
End: 2021-04-14

## 2021-04-14 NOTE — TELEPHONE ENCOUNTER
Called the pt to discuss.      Her hands shake and she feels like nervous.  When her thyroid has been off before she gets the shakes.  She also gets cold and has the chills.  It has been getting worse the last week.      Advised to be seen.  Appt scheduled.  Advised to be seen before if worse.

## 2021-04-16 ENCOUNTER — OFFICE VISIT (OUTPATIENT)
Dept: FAMILY MEDICINE | Facility: CLINIC | Age: 81
End: 2021-04-16
Payer: COMMERCIAL

## 2021-04-16 VITALS
SYSTOLIC BLOOD PRESSURE: 110 MMHG | DIASTOLIC BLOOD PRESSURE: 64 MMHG | TEMPERATURE: 98.2 F | OXYGEN SATURATION: 98 % | BODY MASS INDEX: 21.68 KG/M2 | WEIGHT: 127 LBS | HEART RATE: 61 BPM | RESPIRATION RATE: 14 BRPM | HEIGHT: 64 IN

## 2021-04-16 DIAGNOSIS — E03.9 HYPOTHYROIDISM, UNSPECIFIED TYPE: Primary | ICD-10-CM

## 2021-04-16 DIAGNOSIS — R25.1 TREMOR: ICD-10-CM

## 2021-04-16 LAB — TSH SERPL DL<=0.005 MIU/L-ACNC: 1.87 MU/L (ref 0.4–4)

## 2021-04-16 PROCEDURE — 99213 OFFICE O/P EST LOW 20 MIN: CPT | Performed by: PHYSICIAN ASSISTANT

## 2021-04-16 PROCEDURE — 36415 COLL VENOUS BLD VENIPUNCTURE: CPT | Performed by: PHYSICIAN ASSISTANT

## 2021-04-16 PROCEDURE — 84443 ASSAY THYROID STIM HORMONE: CPT | Performed by: PHYSICIAN ASSISTANT

## 2021-04-16 ASSESSMENT — MIFFLIN-ST. JEOR: SCORE: 1040.04

## 2021-04-16 NOTE — PROGRESS NOTES
Assessment & Plan     Hypothyroidism, unspecified type  Felt shaky, cold hands about 1 week ago, improving. No current tremor or cold sensation. No associated red flag symptoms. Normal vitals and exam today. Will check TSH as she reports similar symptoms in the past when her thyroid has been off.   - TSH with free T4 reflex    Tremor  Describes bilateral hand tremor about 1 week ago. This has improved over the past few days. No tremor noted today. Recommend monitoring and close follow-up if recurrent. ER if any red flag symptoms we discussed.     Risks and benefits of treatment plan discussed. Patient and/or parent acknowledges and agrees with plan of care, all questions answered.      Return in about 9 months (around 1/16/2022) for Preventive Physical Exam.    Jennifer Talavera PA-C  [facility] LOUISE Pandya is a 80 year old who presents for the following health issues    HPI     Hypothyroidism Follow-up/ hands shaky/ cold      Since last visit, patient describes the following symptoms: tremors. Weight stable, no hair loss, no skin changes, no constipation, no loose stools      How many servings of fruits and vegetables do you eat daily?  2-3    On average, how many sweetened beverages do you drink each day (Examples: soda, juice, sweet tea, etc.  Do NOT count diet or artificially sweetened beverages)?   1    How many days per week do you exercise enough to make your heart beat faster? 5    How many minutes a day do you exercise enough to make your heart beat faster? 60 or more    How many days per week do you miss taking your medication? 0    History of hypothyroidism, taking levothyroxine 100 mcg daily. She has not changed how she takes this or missed any doses.     Hands started being shaky 8 days ago. Both hands felt shaky and cold. This has improved over the past few days. She felt similarly in the past when her thyroid was off. She denies numbness/tingling, weakness, chest  "pain, shortness of breath, headache, vision changes, palpitations, lightheadedness, dizziness. No weight changes. No abdominal pain, nausea, vomiting, bowel or bladder changes. No fever or recent illness.     Review of Systems   Constitutional, HEENT, cardiovascular, pulmonary, gi and gu systems are negative, except as otherwise noted.      Objective    /64 (BP Location: Right arm, Patient Position: Sitting, Cuff Size: Adult Small)   Pulse 61   Temp 98.2  F (36.8  C) (Oral)   Resp 14   Ht 1.632 m (5' 4.25\")   Wt 57.6 kg (127 lb)   SpO2 98%   BMI 21.63 kg/m    Body mass index is 21.63 kg/m .  Physical Exam   GENERAL: healthy, alert and no distress  EYES: Eyes grossly normal to inspection, PERRL and conjunctivae and sclerae normal  HENT: ear canals and TM's normal, nose and mouth without ulcers or lesions  NECK: no adenopathy, no asymmetry, masses, or scars and thyroid normal to palpation  RESP: lungs clear to auscultation - no rales, rhonchi or wheezes  CV: regular rate and rhythm, normal S1 S2, no S3 or S4, no murmur, click or rub, no peripheral edema and peripheral pulses strong  MS: no gross musculoskeletal defects noted, no edema  NEURO: Normal strength and tone, sensory exam grossly normal, mentation intact, oriented times 3, speech normal, cranial nerves 2-12 intact, patellar DTR's normal and symmetric, gait normal, Romberg normal and rapid alternating movements normal. No resting or intention tremor noted.  PSYCH: mentation appears normal, affect normal/bright      "

## 2021-05-14 ENCOUNTER — TRANSFERRED RECORDS (OUTPATIENT)
Dept: HEALTH INFORMATION MANAGEMENT | Facility: CLINIC | Age: 81
End: 2021-05-14

## 2021-07-20 VITALS
HEIGHT: 64 IN | BODY MASS INDEX: 21.34 KG/M2 | HEIGHT: 64 IN | WEIGHT: 125 LBS | WEIGHT: 125 LBS | BODY MASS INDEX: 21.34 KG/M2

## 2021-07-20 NOTE — PLAN OF CARE
Problem: Pain  Goal: Patient's pain/discomfort is manageable  Outcome: Progressing   Pt pain has been well controlled, has denied pain. Ice therapy is being used and that is sufficient for the Pt  Problem: Potential for Neurovascular Impairment  Goal: Patient will maintain normal neurovascular function in the operative extremity as defined by  Outcome: Progressing   Sensation is intact, dorsiflexion and plantar flexion moderate, pedal pulses moderate. ROM limited.   Problem: Safety  Goal: Patient will be injury free during hospitalization  Outcome: Progressing   Pt denied any dizziness and felt better. Is hoping to go home today 8/6/2020 now that is has resolved.

## 2021-07-20 NOTE — PROGRESS NOTES
08/05/20 1349   Visit Specifics   Eval Type Inital eval   Inital OT Consult  08/05/20   Bed/Tabs/Pad Alarm Applied Not applicable   Treatment Time   ADL Training 15   General   Onset date 08/04/20   Chart Reviewed Yes   PT/OT Patient/Caregiver Stated Goals Go for walk without pain, get up in morning and do something before 12   Family/Caregiver Present Yes   Precautions   Total Hip Replacement Posterior approach;90 degree flexion   Home Living   Type of Home Apartment   Home Layout One level;Elevator   ADL Devices Reacher;Sock aid   Bathroom Shower/Tub Walk-in shower   Bathroom Toilet Raised   Bathroom Equipment Toilet raiser   Prior Status   Independent With All ADL's/IADL's   Lives With Spouse   Receives Help From Family   Device Used Yes   ADL   ADL Comments declined dressing as late in day now   Activity Tolerance   Endurance Fair   Balance   Sitting Balance Standby   Standing Balance CGA   Bed Mobility   Sit to Supine Min assist;With leg    Bed mobility comments positioned with pillow between legs for hip prec.   Functional Transfers   Functional Transfers Bed Transfers;Chair Transfers   Bed Transfers CGA   Chair Transfers CGA   Transfer Cues Safety;Correct hand placement   Transfer Deficits Increased effort;Fatigue;Weakness;Pain   Ambulation   Location To bed   Assistance CGA   Device Rolling Walker   Verbal Cues Safety   Cognition   Overall Cognitive Status WFL   Behavior    Behavior During Session Cooperative   Assessment   Assessment Decreased ADL status;Decreased funtional mobility   Prognosis Good   Treatment/Interventions ADL training;Functional transfer training   OT Frequency Daily   Goal Formulation Patient   Recommendations   OT Discharge Recommendation Home with family support/assistance   OT Care Plan REVIEWED DAILY Yes, goals remain appropriate   Handouts Given   (reviewed hip prec. with ADL's)

## 2021-07-20 NOTE — PROGRESS NOTES
Discharged to home with spouse. Stated understanding of discharge instructions. 8/6/2020 Sejal Angel RN

## 2021-07-20 NOTE — CONSULTS
Consultation - for hypertension and cad  Ya Gloria,  1940, MRN [id number]    [facility]  Right hip pain [M25.551]    PCP: Provider, No Primary Care, None   Code status:  Full Code       Extended Emergency Contact Information  Primary Emergency Contact: CARLITO GLORIA  Address: [address]           [place] [address] [place]  Mobile Phone: [phone]  Relation: Spouse  Secondary Emergency Contact: AME GLORIA  Mobile Phone: [phone]  Relation: Child       Assessment and Plan   80 years old lady with history of hypertension, dyslipidemia, hypothyroidism, coronary artery disease, MI in 2016, did not require intervention, MGUS, GERD, Sjogren's syndrome, had right total hip arthroplasty today.   mL. Right hip pain is stable.    Coronary artery disease with MI in 2016, did not require intervention  Stable and asymptomatic  Resume statin, nitroglycerin, aspirin    Essential hypertension  Norvasc 5 mg daily  Losartan 100 mg daily  Hold antihypertensive if systolic blood pressure is less than 110 as risk of postop hypotension    Dyslipidemia  Resume Lipitor 20 mg daily    Hypothyroidism  Resume levothyroxine 100 mcg daily    GERD  Resume PPI    Sjogren's syndrome  Resume Evoxac 30 mg 3 times a day    Status post right total hip arthroplasty  Resume routine postoperative care  Physical and Occupational Therapy  Use incentive spirometry frequently  DVT prophylaxis per orthopedics, aspirin 81 mg twice a day  Pain control with Tylenol 1000 mg 3 times a day, oxycodone 2.5 to 5 mg every 6 hours, 5 to 10 mg every 3 hours as needed, IV Dilaudid 0.3 to 0.6 mg every 3 hours as needed     Chief Complaint Status post total replacement of right hip       HPI   We have been requested by orthopedics surgery/ Charlie Juarez to evaluate Ya Gloria for hypertension and coronary artery disease. The patient  is a 80 y.o. year old female with history  of hypertension, dyslipidemia, hypothyroidism, coronary artery disease, MI in 2016, did not require intervention,MGUS, GERD, Sjogren's syndrome, had right total hip arthroplasty today.   mL. Had MI in 2016.  On statin and aspirin.  Patient is asymptomatic.  Norvasc 5 mg daily, losartan 100 mg daily for hypertension.  Blood pressure has been stable.  On Lipitor 20 mg daily for dyslipidemia.  On levothyroxine 100 mcg daily for hypothyroidism.  Taking PPI twice a day for GERD.  Remains asymptomatic.  Has Sjogren's syndrome.  On Evoxac 30 mg 3 times a day.  Has osteoarthritis.  Had right total hip arthroplasty today.  Right hip pain is stable.  Denies headache, chest pain, breathing difficulty, palpitation, nausea, vomiting, abdominal pain or urinary symptoms.  Does not have history of diabetes, bleeding or clotting disorder or sleep apnea.  Preop physical is reviewed.  History is provided by the patient.       Medical History  There are no active non-hospital problems to display for this patient.    Past Medical History:   Diagnosis Date     Arthritis      Disease of thyroid gland      GERD (gastroesophageal reflux disease)      History of anesthesia complications      Hypertension      Migraine headache      Myocardial infarction (H)      PONV (postoperative nausea and vomiting)      Sjogren's disease (H)     Surgical History  She  has a past surgical history that includes Hysterectomy; Femur fracture surgery; Shoulder surgery; and Carpal tunnel release.   Social History  Reviewed, and she  reports that she has never smoked. She has never used smokeless tobacco. She reports current alcohol use. She reports that she does not use drugs.   Allergies  Allergies   Allergen Reactions     Codeine Nausea And Vomiting and Dizziness     Morphine      Sick from any narcotic    Family History  Reviewed.  Mother had coronary artery disease, breast cancer.  Father had coronary artery disease.  Brother has hypertension and  asthma. Psychosocial Needs  Social History     Social History Narrative     Not on file     Additional psychosocial needs reviewed per nursing assessment.     Prior to Admission Medications   Medications Prior to Admission   Medication Sig Dispense Refill Last Dose     acetaminophen (TYLENOL) 500 MG tablet Take 500 mg by mouth every 6 (six) hours as needed for pain.   8/3/2020 at Unknown time     amLODIPine (NORVASC) 5 MG tablet Take 5 mg by mouth daily.   8/4/2020 at Unknown time     aspirin 81 MG EC tablet Take 81 mg by mouth daily.   7/26/2020     atorvastatin (LIPITOR) 20 MG tablet Take 20 mg by mouth at bedtime.   8/3/2020 at Unknown time     calcium carbonate (OS-CARMEN) 600 mg calcium (1,500 mg) tablet Take 600 mg by mouth 3 (three) times a day with meals.   7/28/2020 at Unknown time     cevimeline (EVOXAC) 30 mg capsule Take 30 mg by mouth 3 (three) times a day.   8/4/2020 at Unknown time     cholecalciferol, vitamin D3, 1,000 unit (25 mcg) tablet Take 1,000 Units by mouth daily.   7/28/2020     co-enzyme Q-10 30 mg capsule Take 30 mg by mouth 3 (three) times a day.   7/26/2020     cycloSPORINE (RESTASIS) 0.05 % ophthalmic emulsion Administer 1 drop to both eyes 2 (two) times a day.   7/28/2020     lansoprazole (PREVACID) 30 MG capsule Take 30 mg by mouth 2 (two) times a day.    8/4/2020 at am     levothyroxine (SYNTHROID, LEVOTHROID) 100 MCG tablet Take 100 mcg by mouth daily.   8/4/2020 at Unknown time     losartan (COZAAR) 100 MG tablet Take 100 mg by mouth at bedtime.    8/3/2020 at Unknown time     melatonin 3 mg Tab tablet Take 3 mg by mouth at bedtime as needed.   7/28/2020     multivitamin therapeutic tablet Take 1 tablet by mouth daily.   7/28/2020     nitroglycerin (NITROSTAT) 0.3 MG SL tablet Place 0.3 mg under the tongue every 5 (five) minutes as needed for chest pain.   never used     polyethylene glycol (MIRALAX) 17 gram packet Take 17 g by mouth 2 (two) times a day.   8/3/2020 at pm           Review of Systems:  Constitutional: afebrile  Eyes: negative for   visual disturbance  Ears, nose, mouth, throat, and face: negative for  nasal congestion, sore mouth, sore throat    Respiratory ROS: negative for - cough,   shortness of breath, sputum changes or wheezing    Cardiovascular ROS: negative for - chest pain, dyspnea on exertion, edema, palpitations   Gastrointestinal: negative for constipation, diarrhea,  nausea, reflux symptoms, vomiting    Genitourinary:negative for dysuria, frequency, hematuria   Hematologic/lymphatic: negative for easy bruising, lymphadenopathy    Musculoskeletal:negative for back pain, muscle weakness, myalgias, neck pain    Neurological: negative for dizziness, headaches,  vertigo and weakness   Behavioral/Psych: negative for anxiety, decreased appetite, depression    Endocrine: negative for diabetic symptoms including increased fatigue       Physical Exam:  Temp:  [97  F (36.1  C)-98.3  F (36.8  C)] 97  F (36.1  C)  Heart Rate:  [53-65] 65  Resp:  [14-24] 16  BP: (109-166)/(56-84) 129/70    General Appearance:    Alert, cooperative, no distress, appears stated age   Head:    Normocephalic, atraumatic   Eyes:    PERRL, conjunctiva  clear, EOM's intact    Nose:   Nares normal, mucosa normal, no drainage    Throat:   Lips, mucosa, gums normal   Neck:   Supple, symmetrical, no carotid   bruit or JVD   Back:     Symmetric, ROM normal, no CVA tenderness   Lungs:     Clear to auscultation bilaterally, respirations unlabored   Chest wall:    No tenderness or deformity   Heart:    Regular rate and rhythm, S1 and S2 normal, no murmur    Abdomen:     Soft, non-tender, bowel sounds active , no masses, no organomegaly   Extremities:   status post right total hip arthroplasty, no  edema   Pulses:   2+ and symmetric all extremities   Skin:   no rashes or lesions   Lymph nodes:   Cervical, supraclavicular, and axillary nodes normal   Neurologic:   CNII-XII intact.           Pertinent  Labs  Lab Results:    Lab Results   Component Value Date    HGB 10.7 (L) 08/04/2020   WBC 5.5, hemoglobin 13.3, platelet 253  Sodium 139, potassium 4.1, chloride 102, bicarb 28, BUN 20, creatinine 0.70, glucose 96     Pertinent Radiology  Radiology Results:   Right hip xray  Postoperative changes related to a right total hip replacement. There is no definitive evidence of an acute periprosthetic fracture. Remote fracture deformity of the right superior and inferior pubic rami. There is postoperative soft tissue gas about the hip replacement. No dislocation.       Kristi Montoya MD.  [facility] medicine service.

## 2021-07-20 NOTE — OP NOTE
DATE OF SERVICE: 8/4/2020    PREOPERATIVE DIAGNOSIS:   1.  Right hip Osteoarthritis  2.  Right hip retained cephalo-medullary nail    POSTOPERATIVE DIAGNOSIS:   1.  Right hip Osteoarthritis  2.  Right hip retained cephalo-medullary nail    PROCEDURE:   1.  Right total Hip Arthroplasty  2.  Removal right hip cephalo-medullary nail    SURGEON: Charlie Wolfe MD    ASSISTANT: Katelin Alex PA-C; DEEPAK assist was essential and required for all portions of the case, including patient positioning, soft tissue retraction, patient safety, assistance with closure of the wound, and postoperative care    ANESTHESIA: Spinal    EBL: 500 cc    COMPLICATIONS: None    IMPLANTS: Depuy Corail Revision Stem Size 14 STD, -2 36mm CoCr head, 56mm Gription Cup, +4mm 10 deg Poly liner    INDICATION FOR PROCEDURE: Ya Stahl is a pleasant 80 y.o. female with long standing Hip degenerative joint disease.  It failed to respond to conservative management.  The above procedure was recommended.  For full details please see my clinic notes.  The risks including, but not limited to, bleeding, infection, nerve injury, dvt, implant failure, implant wear, fracture, limb length inequality, anesthetic complications, heart attack, stroke, and even death were discussed.  Pt verbalized understanding.  Informed consent was obtained    DESCRIPTION OF PROCEDURE: The patient was seen and evaluated in the preop area. Discussion of the surgery and any further questions were answered with the patient and family using easily understandable non-medical terms. The correct site was identified with the patient, and I placed my initials at the surgical site. Pre-procedure verification was completed. Relevant information / documentation available, they were reviewed and properly matched to the patient.  I verified the consent was accurate and complete.  I verified that the proper surgical equipment and supplies were available. The patient was taken to the operating  room and placed in position according to the procedure in consideration with all extremities well padded. The patient was given successful spinal anesthesia.  The patient was then placed in the lateral decubitus position with the operative hip up on a standard operating room table with all extremities well padded.   All bony prominences were well padded and an axillary roll placed. Pelvis was secured with [employer] Universal Hip Positioner.  The operative  Hip was then prepped and draped in sterile fashion.  The leg was covered with an Ioban type drape. The patient received preoperative prophylactic antibiotics based on the patient s procedure, BMI, and allergy profile.  A Time Out was conducted just prior to starting procedure to verify the eight required elements: 1-patient identity, 2-consent accurate and complete, 3-position, 4-correct side/site marked (if applicable), 5-procedure, 6-relevant images / results properly labeled and displayed (if applicable), 7-antibiotics / irrigation fluids (if applicable), 8-safety precautions.     A standard mini posterior approach incision was made about the proximal femur.  Skin and subcutaneous tissues were incised with a scalpel.  Hemostasis was achieved with  electrocautery. The tensor fascia was split longitudinally with electrocautery.  Any bursa tissue was excised.  The gluteus samm muscle was split bluntly for approximately 3 cm.  A deep east/west retractor was placed.  The hip was internally rotated.  The Piriformis was identified and taken down and tagged, protecting the sciatic nerve.  The short external rotators and posterior hip capsule were taken down off the posterior femur.  They were tagged with #1 ethibond suture for later repair.  The hip was then dislocated without difficulty.     We then turned our attention to cephalo-medullary nail removal.  The hip abductors were split longitudinally from the tip of the greater trochanter.  The nail had been sunk quite  far into the greater trochanter.  Using osteotomes and rongeur we then identified the we debrided the bone down to the tip of the nail.  We then identified the lag screw on the lateral aspect of the femoral cortex.  Osteotomes were used to chisel bone away from the outer rim of the screw.  The appropriate screw removal tool was then placed and the screw was removed without complication.  We then made a small distal incision and identified the distal locking screw.  There was a significant amount of bone overgrown the screw.  Osteotomes were used to debride this.  We then used the appropriate screw removal tool and remove the screw without complication.  The slap and was then placed on the superior aspect of the nail.  The nail was then removed without complication.  Given the amount of bone overgrowing the nail, this process took approximately 90 minutes    The femoral neck was transected along a line determined on preoperative templating and  the acetabulum was exposed circumferentially.   Careful anterior, posterior and superior retractors were placed about the acetabulum.   Thr Acetabulum was sequentially reamed until excellent circumferential contact was achieved at 55 mm of reaming.   A 55 mm trial provided excellent rigid fit.  Any cysts were curetted free of soft tissue and were filled with bone graft harvested from the resected femoral head or last reamings. Graft compacted with reamer run in reverse.  A   56 mm shell was impacted into position and excellent fit achieved.  It was placed at approximately 45 deg of abduction and 20 deg of anteversion.   Provisional screws were placed if deemed necessary.   A trial poly was placed into the acetabular shell.  Attention was then turned to the proximal femur.  A canal finding broach was placed into the femur. We then began our broaching.  We broached up to a size 14 broach.  This broach had excellent proximal femur stability.  The appropriate neck was chosen based  on our pre operative templating and soft tissue tensioning.  Trial reductions were performed with different head/neck lengths.  With the -2 mm head, clinically the leg lengths were equal, I was able to flex the hip to 90 deg and internally rotate to 45 deg without and instability.  The hip could be externally rotated and extended and abducted without impingement, subluxation or dislocation.  An intraoperative xray was obtained to confirm excellent placement of the components.  The trial components were removed.  A hole eliminator was placed in the acetabulum, permanent liner locked into position after irrigating and drying the acetabular shell.  The  stem was impacted on the femur with firm and rigid fit achieved.  The head was then impacted on the cleansed and dried Rogers taper and hip re-reduced and range of motion confirmed. Excellent stability was achieved.  The hip was thoroughly irrigated with antibiotic saline, and inspection showed excellent hemostasis had been established. The posterior capsule was repaired to the posterior aspect of the greater trochanter through drill holes through bone with #1 Ethibond suture into the gluteus minimus proximally.  The tensor fascia and gluteus samm were reapproximated using #1 Vicryl and 0 Stratafix  suture.  The subcutaneous layer was closed with 2-0 Vicryl suture.  The skin was closed with 3-0 monocryl suture and dermabond.  A sterile dressing  and abductor pillow was placed onto the leg.  The patient was brought to the post op recovery area in stable condition.  The sponge and needle counts were correct at the end of the case.        POST OP PLAN:  Pain Control:  IV Narcotics, transition to oral narcotics as bowel function returns  Infection Prophylaxis:  IV Antibiotics as per allergy profile and BMI x 24 hrs   DVT Prophylaxis:   aspirin 81 mg p.o. twice daily x 1 month  PT/OT: Eval and treat.  WBAT with Walker.  POSTERIOR APPROACH PREACUTIONS  Medical Management:  Consult Primary Medical Service  Incision Care: Reinforce Dressing prn,  Remove on POD #3  Disposition:  Consult      @[facility]      COMMENT:  Charlie Wolfe    COMMENT:  Provider, No Primary Care

## 2021-07-20 NOTE — PLAN OF CARE
Problem: Occupational Therapy  Goal: OT Goals  Description: Patient will demonstrate the following by 8/8/2020, in order to maximize independence with ADL/IADL performance:     -Patient will be SBA with dressing him/herself with AE to abide by hip prec.    -Patient will be SBA with bed, chair, toilet, car, bed mobility, shower, and kitchen mobility.    Goals entered on 8/5/2020 by Nahomy Galeas OT     Outcome: Completed  Occupational Therapy Discharge Summary    Date of OT Discharge: 8/6/2020   Refer to daily doc flowsheet for equipment issued and current functional status.  Discharge Destination: Home with  and home PT  Discharge Comments: Goals partially met due to weakness /pain with bed mobility      8/6/2020 by Nahomy Galeas, OT

## 2021-07-20 NOTE — PLAN OF CARE
Problem: Pain  Goal: Patient's pain/discomfort is manageable  Outcome: Progressing   Patient reports minimal pain to her RLE. Reports 0-1/10. Tolerable with use of scheduled Tylenol and ice packs.     Problem: Impaired Physical Mobility  Goal: Mobility is maintained at optimal level for the patient at discharge  Outcome: Progressing   Up with SBA with gait belt and wheeled walker. Patient did get dizzy/lightheaded with PT this AM. BP 99/54, Recovered after laying down. IV fluid bolus. Tolerated therapy this afternoon.

## 2021-07-20 NOTE — PROGRESS NOTES
RCAT Treatment Plan    Patient Score: 2    Patient Acuity: 1    Clinical Indication for Therapy: Prevent atelectasis    Therapy Ordered: IS q1 wa    Assessment Summary: IS teaching by RN. Pt stated she reached her goal of 1500ml. Instructed pt to do her IS q1 wa. She had no questions for RT.       Juan Luis New, LRT  8/4/2020

## 2021-07-20 NOTE — ANESTHESIA PREPROCEDURE EVALUATION
Anesthesia Evaluation      History of anesthetic complications (ponv)     Airway   Mallampati: II   Pulmonary - negative ROS and normal exam                          Cardiovascular - normal exam  Exercise tolerance: > or = 4 METS  (+) hypertension well controlled, , hypercholesterolemia,     Past MI: in history, but patient denies.  ECG reviewed (SB first degree AV block)  Rhythm: regular  Rate: normal,         Neuro/Psych - negative ROS     Endo/Other    (+) hypothyroidism,      Comments: sjogren's    GI/Hepatic/Renal    (+) GERD,        Other findings: Results for PAT GLORIA (MRN [id number]) as of 8/4/2020 06:32    8/1/2020 11:53  SARS-CoV-2 Virus Specimen Source: Nasopharyngeal  SARS-CoV-2 PCR Result: NEGATIVE        Dental - normal exam                        Anesthesia Plan  Planned anesthetic: spinal  SAB  Propofol infusion    ASA 2     Anesthetic plan and risks discussed with: patient    Post-op plan: routine recovery

## 2021-07-20 NOTE — PLAN OF CARE
Patient alert and oriented. VSS.  Tolerating diet. Scoring pain at 1/10 throughout the shift. Right hip dressing C/D/I. CMS intact. Ambulated in halls twice this shift; denies dizziness. Voiding without concerns. Calls appropriately.     Problem: Potential for Falls  Goal: Patient will remain free of falls  Outcome: Progressing     Problem: Pain  Goal: Patient's pain/discomfort is manageable  Outcome: Progressing     Problem: Safety  Goal: Patient will be injury free during hospitalization  Outcome: Progressing

## 2021-07-20 NOTE — DISCHARGE SUMMARY
ORTHOPEDIC DISCHARGE SUMMARY       Ya Stahl,  1940, MRN [id number]    Admission Date: 2020  Admission Diagnoses: Right hip pain [M25.551]     Discharge Date:  20    Post-operative Day:  2 Days Post-Op    Reason for Admission: The patient was admitted for the following:  RIGHT TOTAL HIP ARTHROPLASTY (Right), HARDWARE REMOVAL - LATERAL APPROACH (Right)    BRIEF HOSPITAL COURSE   This 80 y.o. female underwent the aforementioned procedure with Dr. Wolfe on 20. There were no intraoperative complications and the patient was transferred to the recovery room and later the orthopedic unit in stable condition. Once the patient reached the orthopedic floor our orthopedic pain protocol was implemented along with the following:    Anticoagulation Medications: Aspirin   Therapy: Physical Therapy and Occupational Therapy  Activity: WBAT  Bracing: None    Consultations during Admission: Hospitalist service for medical management     COMPLICATIONS/SIGNIFICANT FINDINGS    Presyncopal episodes, quickly resolved with sitting down and fluid bolus. No recurrence of symptoms.     Acute post op anemia. Continue ferrous sulfate and recheck hgb at PCP clinic in 1 week.     DISCHARGE INFORMATION   Condition at discharge: good  Discharge destination:Home with home PT and family assist  Patient was seen by myself on the date of discharge.    FOLLOW UP CARE   Follow up with orthopedics in 2 weeks or sooner should the need arise. Ortho will continue to manage pain control, post op anticoagulation and incision care.     Follow up with your PCP in 6-10 days (based on your readmission risk score) for hgb recheck, management of chronic medical problems and to evaluate for post op medical complications including constipation, nausea/vomiting, DVT/PE, anemia, changes in blood pressure, fevers/chills, urinary retention and atelectasis/pneumonia.     Subjective   Patient is doing well today. Pain is under good control with  tylenol only. Ambulating well. Feels tired, but no recurrence of lightheadedness. No complaints. Feels ready to go home.     Physical Exam   The patient is A&Ox3.  Sensation is intact.  Dorsiflexion and plantar flexion is intact.  Dorsalis pedis pulse intact.  Calves are soft and non-tender.   The incision is covered. Dressing C/D/I.    Vitals:    08/06/20 0749   BP: 122/60   Pulse: 70   Resp: 16   Temp: 97.2  F (36.2  C)   SpO2: 98%       Pertinent Results at Discharge     Hemoglobin   Date/Time Value Ref Range Status   08/06/2020 12:00 PM 7.8 (L) 12.0 - 16.0 g/dL Final   08/06/2020 06:04 AM 7.3 (L) 12.0 - 16.0 g/dL Final   08/05/2020 05:50 AM 9.0 (L) 12.0 - 16.0 g/dL Final     INR   Date/Time Value Ref Range Status   08/04/2020 06:18 AM 0.97 0.90 - 1.10 Final       Medications at Discharge      Ya Stahl   Home Medication Instructions NALLELY:[id number]    Printed on:08/06/20 1234   Medication Information                      acetaminophen (TYLENOL) 500 MG tablet  Take 2 tablets (1,000 mg total) by mouth 3 (three) times a day.             amLODIPine (NORVASC) 5 MG tablet  TO BE STARTED IN 3 DAY             aspirin 81 mg chewable tablet  Chew 1 tablet (81 mg total) 2 (two) times a day with meals.             atorvastatin (LIPITOR) 20 MG tablet  Take 20 mg by mouth at bedtime.             calcium carbonate (OS-CARMEN) 600 mg calcium (1,500 mg) tablet  Take 600 mg by mouth 3 (three) times a day with meals.             cevimeline (EVOXAC) 30 mg capsule  Take 30 mg by mouth 3 (three) times a day.             cholecalciferol, vitamin D3, 1,000 unit (25 mcg) tablet  Take 1,000 Units by mouth daily.             co-enzyme Q-10 30 mg capsule  Take 30 mg by mouth 3 (three) times a day.             cycloSPORINE (RESTASIS) 0.05 % ophthalmic emulsion  Administer 1 drop to both eyes 2 (two) times a day.             ferrous sulfate 325 (65 FE) MG tablet  Take 1 tablet (325 mg total) by mouth every other day.              lansoprazole (PREVACID) 30 MG capsule  Take 30 mg by mouth 2 (two) times a day.              levothyroxine (SYNTHROID, LEVOTHROID) 100 MCG tablet  Take 100 mcg by mouth daily.             losartan (COZAAR) 100 MG tablet  TO BE STARTED IN 5 DAY             melatonin 3 mg Tab tablet  Take 3 mg by mouth at bedtime as needed.             multivitamin therapeutic tablet  Take 1 tablet by mouth daily.             nitroglycerin (NITROSTAT) 0.3 MG SL tablet  Place 0.3 mg under the tongue every 5 (five) minutes as needed for chest pain.             polyethylene glycol (MIRALAX) 17 gram packet  Take 17 g by mouth 2 (two) times a day.                 Problem List   Principal Problem:    Status post total replacement of right hip  Active Problems:    Essential hypertension    Coronary artery disease due to lipid rich plaque    Dyslipidemia    Hypothyroidism, unspecified type    Acute blood loss anemia        Vicky Lane PA-C  Date: 8/6/2020  Time: 12:34 PM

## 2021-07-20 NOTE — PROGRESS NOTES
Discharge plan according to [facility]:       06/25/20 0001   Discharge Planning   Patient expects to be discharged to: Home   Living Arrangements Spouse/significant other   Type of Residence Private residence   Do you have stairs? No   What does your bathroom have? Walk-in shower   Support Systems Spouse/significant other

## 2021-07-20 NOTE — PROGRESS NOTES
LISANDRO received VM from [facility] indicating they do provide HC for [employer] insured, however they do not service the [facility].     CM sent referral to [facility]. Britton with [facility] returned call reporting their agency is not able to provide PT over the weekend, however referred out to [facility] who can see the patient tomorrow (within 48 hours from hospital discharge yesterday).    CM provided update to patient via phone. CM provided patient with HC agency name and contact information.

## 2021-07-20 NOTE — ANESTHESIA POSTPROCEDURE EVALUATION
Patient: Ya Stahl  Procedure(s):  TOTAL HIP ARTHROPLASTY (Right)  HARDWARE REMOVAL - LATERAL APPROACH (Right)  Anesthesia type: spinal    Patient location: PACU  Last vitals:   Vitals Value Taken Time   /70 8/4/2020 12:20 PM   Temp 36.1  C (97  F) 8/4/2020 12:20 PM   Pulse 65 8/4/2020 12:20 PM   Resp 16 8/4/2020 12:20 PM   SpO2 100 % 8/4/2020 12:20 PM     Post vital signs: stable  Level of consciousness: awake and responds to simple questions  Post-anesthesia pain: pain controlled  Post-anesthesia nausea and vomiting: no  Pulmonary: unassisted, return to baseline  Cardiovascular: stable and blood pressure at baseline  Hydration: adequate  Anesthetic events: no    QCDR Measures:  ASA# 11 - Harper-op Cardiac Arrest: ASA11B - Patient did NOT experience unanticipated cardiac arrest  ASA# 12 - Harper-op Mortality Rate: ASA12B - Patient did NOT die  ASA# 13 - PACU Re-Intubation Rate: ASA13B - Patient did NOT require a new airway mgmt  ASA# 10 - Composite Anes Safety: ASA10A - No serious adverse event    Additional Notes:

## 2021-07-20 NOTE — SIGNIFICANT EVENT
"Pt became dizzy, \"like she was going to pass out\", while walking in the hallway with PT.  She was lowered to a chair and laid back before any loss of consciousness.  VS are charted at 1020 with hypotension/bradycardia.  HR regular.  Pt denies chest pain, shortness of breath.  She felt better quickly after lying down.   "

## 2021-07-20 NOTE — PLAN OF CARE
"  Problem: Pain  Goal: Patient's pain/discomfort is manageable  Outcome: Progressing   Patient reports minimal pain. Patient does report \"sensitive\" to pain medications and does not want to take any narcotics if possible. Patient reports scheduled tylenol is sufficient at this time in managing pain.     Problem: Impaired Physical Mobility  Goal: Mobility is maintained at optimal level for the patient at discharge  Outcome: Progressing   Patient up and ambulated to the bathroom with assist of one with walker and gait belt. Tolerated well.   "

## 2021-07-20 NOTE — PROGRESS NOTES
[facility] MEDICINE PROGRESS NOTE      Identification/Summary: Ya Stahl is a 80 y.o. 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  POD 2.  Right hip pain is stable.  Had symptomatic hypotension yesterday now stable.  Will be discharging home today    Assessment and Plan:  Coronary artery disease with MI in 2016 without cardiac intervention  Stable and asymptomatic  Resume statin, nitroglycerin, aspirin    Essential hypertension  Symptomatic hypotension yesterday, resolved  Norvasc 5 mg daily, to be started in 3-day  Losartan 100 mg daily, to be started in 5-day  Follow-up with primary care physician in 2-week    Dyslipidemia  Resume Lipitor 20 mg daily    Hypothyroidism  Resume levothyroxine 100 mcg daily    GERD  Resume PPI    Sjogren's syndrome  Resume Evoxac 30 mg 3 times a day    Acute blood loss anemia  Hemoglobin 7.3 from 13.3  Sulfate every other day  Recheck hemoglobin, consider 1 units of packed RBC transfusion    Status post right total hip arthroplasty  Resume routine postoperative care  Physical and Occupational Therapy  Use incentive spirometry frequently  DVT prophylaxis per orthopedics, aspirin 81 mg twice a day  Pain control with Tylenol 1000 mg 3 times a day, oxycodone 2.5 to 5 mg every 6 hours, 5 to 10 mg every 3 hours as needed    Interval History/Subjective:  Resting comfortably.  Right hip pain is stable.  Felt lightheaded and dizzy with activity yesterday with low blood pressure.  Feeling much better today.    Review of systems  Rest of the review of system are negative except HPI.  Denies headache, chest pain, breathing difficulty, palpitation, nausea, vomiting, abdominal pain or urinary symptoms.    Physical Exam/Objective:  Vitals I/O   Temp:  [97.2  F (36.2  C)-98.8  F (37.1  C)] 97.2  F (36.2  C)  Heart Rate:   [62-70] 70  Resp:  [16-18] 16  BP: ()/(53-88) 122/60  SpO2:  [96 %-99 %] 98 %  I/O last 3 completed shifts:  In: 2240 [P.O.:1740; I.V.:500]  Out: 2450 [Urine:2450]   125 lb (56.7 kg)  Body mass index is 21.46 kg/m .    GENERAL:  Alert, appears comfortable, in no acute distress, appears stated age   HEAD:  Normocephalic, without obvious abnormality, atraumatic   EYES:  PERRL, conjunctiva  clear, no scleral icterus, EOM's intact   NOSE: Nares normal,  mucosa normal, no drainage   THROAT: Lips, mucosa,   gums normal, mouth moist   NECK: Supple, symmetrical, trachea midline   BACK:   Symmetric, no curvature, ROM normal   LUNGS:   Clear to auscultation bilaterally, no rales, rhonchi, or wheezing, symmetric chest rise on inhalation, respirations unlabored   CHEST WALL:  No tenderness or deformity   HEART:  Regular rate and rhythm, S1 and S2 normal, no murmur, rub, or gallop    ABDOMEN:   Soft, non-tender, bowel sounds active , no masses, no organomegaly, no rebound or guarding   EXTREMITIES: Status post right total hip arthroplasty    SKIN: No rashes   NEURO: Alert, oriented x3, moves all four extremities freely, non-focal   PSYCH: Cooperative, behavior is appropriate      Medications:   Personally Reviewed.    acetaminophen  1,000 mg Oral TID     aspirin  81 mg Oral BID with meals     atorvastatin  20 mg Oral QHS     cevimeline  30 mg Oral TID     cholecalciferol (vitamin D3)  1,000 Units Oral DAILY     cycloSPORINE  1 drop Both Eyes BID     docusate sodium  100 mg Oral BID     gabapentin  100 mg Oral QHS     levothyroxine  100 mcg Oral DAILY     omeprazole  20 mg Oral QAM AC     polyethylene glycol  17 g Oral DAILY     senna-docusate  1 tablet Oral BID     sodium chloride  3 mL Intravenous Line Care       Data reviewed today: I personally reviewed all new medications, labs, imaging/diagnostics reports over the past 24 hours.      Labs:  Results from last 7 days   Lab Units 08/06/20  0604 08/05/20  0505  08/04/20  1238   LN-HEMOGLOBIN g/dL 7.3* 9.0* 10.7*    and   Results from last 7 days   Lab Units 08/05/20  0550   LN-SODIUM mmol/L 135*   LN-POTASSIUM mmol/L 4.2  4.2   LN-CHLORIDE mmol/L 100   LN-CO2 mmol/L 26   LN-BLOOD UREA NITROGEN mg/dL 14   LN-CREATININE mg/dL 0.70   LN-CALCIUM mg/dL 9.2         Kristi Gates  [facility]

## 2021-07-20 NOTE — PLAN OF CARE
Problem: Physical Therapy  Goal: PT Goals  Description: Patient will demonstrate the following by 8/8/20, with CLARKE precautions, in order to maximize independence with functional mobility to facilitate safe discharge:   - Sit<>stand with FWW assistive device, Mod I  - Ambulate 150 feet with FWW assistive device, Mod I  - Independent with Home Exercise Program for CLARKE exercise with handout    Goals entered on 8/5/2020 by Tierra Ireland, PT, DPT      Outcome: Completed     Physical Therapy Discharge Summary    Date of PT Discharge: 8/6/2020   Recommended Equipment: FWW  Discharge Destination: Home  Discharge Comments: Pt has met goals above. Pt is discharging home with assist from her spouse as needed. Pt will continue with her HEP and receive Home PT for further rehabilitation.       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    Complications: none  Implants: * No implants in log *

## 2021-07-20 NOTE — PLAN OF CARE
Goal: Patient s discharge needs are met.    Outcome: Care Progression reviewed with Hospitalist, Care Manager, & Charge RN.    Discharge Disposition: Discussed and plan to discharge to: home    Planned Discharge Date: 8/5-8/6    Problem: Barriers to discharge include: POD #1    Transportation (Needs/Ride) : family    Care Management Narrative: Chart Assessment, anticipate pt will return home at discharge, do not anticipate any dc needs at this time.  Family will transport.  ANA Pagan  [facility]  8/5/2020   12:55 PM

## 2021-07-20 NOTE — PLAN OF CARE
Goal: Patient s discharge needs are met.  Outcome: Care Progression reviewed with Hospitalist, Care Manager.  Discharge Disposition: Discussed and plan to discharge to:  Home with assist () and HE HC services for PT  Planned Discharge Date:  Today  Problem: Barriers to discharge include:  Post op day 2, pain control, PT and OT   Transportation needs/Ride Time:  Family () to transport        CM updated PCP in Epic: Sarika Marc MD [facility]     Per  HC services, [facility] and [facility] cannot provide HC services due to insurance type.     CM met with patient. She reports insurance will only cover HC services from [facility]. CM sent referral to [facility] services.     She denies any additional discharge needs. Reports her  will provide the transportation home.

## 2021-07-20 NOTE — ANESTHESIA PROCEDURE NOTES
Spinal Block    Patient location during procedure: OR  Start time: 8/4/2020 7:47 AM  End time: 8/4/2020 7:49 AM  Reason for block: primary anesthetic    Staffing:  Performing  Anesthesiologist: Ernst Pierce MD    Preanesthetic Checklist  Completed: patient identified, risks, benefits, and alternatives discussed, timeout performed, consent obtained, at patient's request, airway assessed, oxygen available, suction available, emergency drugs available and hand hygiene performed  Spinal Block  Patient position: right lateral decubitus  Prep: ChloraPrep  Patient monitoring: blood pressure, continuous pulse ox, cardiac monitor and heart rate  Approach: midline  Location: L2-3  Injection technique: single-shot  Needle type: pencil-tip   Needle gauge: 24 G

## 2021-07-20 NOTE — PROGRESS NOTES
Pharmacist completed medication history with the patient while in the [facility].  Prior to admission (PTA) med list completed and updated in the electronic medical record (EMR).  Pharmacy Note - Admission Medication History  Pertinent Provider Information: none   ______________________________________________________________________  Prior To Admission (PTA) med list completed and updated in EMR.     PTA Med List   Medication Sig Last Dose     acetaminophen (TYLENOL) 500 MG tablet Take 500 mg by mouth every 6 (six) hours as needed for pain. 8/3/2020 at Unknown time     amLODIPine (NORVASC) 5 MG tablet Take 5 mg by mouth daily. 8/4/2020 at Unknown time     aspirin 81 MG EC tablet Take 81 mg by mouth daily. 7/26/2020     atorvastatin (LIPITOR) 20 MG tablet Take 20 mg by mouth at bedtime. 8/3/2020 at Unknown time     calcium carbonate (OS-CARMEN) 600 mg calcium (1,500 mg) tablet Take 600 mg by mouth 3 (three) times a day with meals. 7/28/2020 at Unknown time     cevimeline (EVOXAC) 30 mg capsule Take 30 mg by mouth 3 (three) times a day. 8/4/2020 at Unknown time     cholecalciferol, vitamin D3, 1,000 unit (25 mcg) tablet Take 1,000 Units by mouth daily. 7/28/2020     co-enzyme Q-10 30 mg capsule Take 30 mg by mouth 3 (three) times a day. 7/26/2020     cycloSPORINE (RESTASIS) 0.05 % ophthalmic emulsion Administer 1 drop to both eyes 2 (two) times a day. 7/28/2020     lansoprazole (PREVACID) 30 MG capsule Take 30 mg by mouth 2 (two) times a day.  8/4/2020 at am     levothyroxine (SYNTHROID, LEVOTHROID) 100 MCG tablet Take 100 mcg by mouth daily. 8/4/2020 at Unknown time     losartan (COZAAR) 100 MG tablet Take 100 mg by mouth at bedtime.  8/3/2020 at Unknown time     melatonin 3 mg Tab tablet Take 3 mg by mouth at bedtime as needed. 7/28/2020     multivitamin therapeutic tablet Take 1 tablet by mouth daily. 7/28/2020     nitroglycerin (NITROSTAT) 0.3 MG SL tablet Place 0.3 mg under the tongue every 5 (five) minutes as needed for  chest pain. never used     polyethylene glycol (MIRALAX) 17 gram packet Take 17 g by mouth 2 (two) times a day. 8/3/2020 at pm     [DISCONTINUED] levothyroxine (SYNTHROID, LEVOTHROID) 88 MCG tablet Take 88 mcg by mouth daily.        Information source(s): Patient and CareEverywhere/SureScripts  Patient was asked about OTC/herbal products specifically.  PTA med list reflects this.  Based on the pharmacist s assessment, the PTA med list information appears reliable  Allergies were reviewed, assessed, and updated with the patient.    Medications available for use during hospital stay: Restasis  Thank you for the opportunity to participate in the care of this patient.    The patient was asked about OTC/herbal products specifically and the PTA med list reflects the patient's response.    Allergies were reviewed and assessed with the patient, responses were updated in the EMR.    Thank you for the opportunity to participate in the care of this patient.    Junior Betancourt RPh     8/4/2020     7:00 AM

## 2021-07-20 NOTE — PROGRESS NOTES
Orthopedic Progress Note      Assessment: 1 Day Post-Op  S/P RIGHT TOTAL HIP ARTHROPLASTY, HARDWARE REMOVAL - LATERAL APPROACH    Plan:   - Pre-syncopal episode - continue to monitor closely. Tylenol only for pain, avoid opioids. IV toradol PRN available.   - Continue PT/OT  - Weightbearing status: WBAT  - Anticoagulation: ASA 81 PO BID in addition to SCDs, jaiden stockings and early ambulation.  - Discharge planning: Home with family assist hopefully tomorrow pending lightheadedness, therapy progress and overall clinical improvement.       Subjective:  Pain: mild  Nausea, Vomiting:  No  Lightheadedness, Dizziness:  No  Neuro:  Patient denies new onset numbness or paresthesias    Patient is doing okay today. She was walking with therapy when she became lightheaded and had to sit down. She is feeling better now that she is sitting and fluid bolus has been started. No other complaints.     Objective:  Vitals:    08/05/20 1150   BP: 114/60   Pulse: 69   Resp: 16   Temp: 97.5  F (36.4  C)   SpO2: 99%     The patient is A&Ox3. Appears comfortable.   Sensation is intact.  Dorsiflexion and plantar flexion is intact.  Dorsalis pedis pulse intact.  Calves are soft and non-tender. Negative Johanna's.  The incision is covered. Dressing C/D/I.      Pertinent Labs   Lab Results: personally reviewed.   Lab Results   Component Value Date    INR 0.97 08/04/2020     Lab Results   Component Value Date    HGB 9.0 (L) 08/05/2020     Lab Results   Component Value Date     (L) 08/05/2020    K 4.2 08/05/2020    K 4.2 08/05/2020     08/05/2020    CO2 26 08/05/2020         Report completed by:  Vicky Lane PA-C  Date: 8/5/2020  Time: 2:36 PM

## 2021-07-20 NOTE — PROGRESS NOTES
"Physical Therapy     08/05/20 1010   Visit Specifics   Eval Type Initial eval   Inital PT Consult 08/05/20   Bed/Tabs/Pad Alarm Applied Not applicable   Subjective Patient Comments \"I'm feeling good this morning\"   General   Onset date 08/04/20   Additional Pertinent History TKA in January 2020   Chart Reviewed Yes   Family/Caregiver Present Yes   Treatment Time   Gait Training 15   Precautions   Total Hip Replacement Posterior approach;90 degree flexion  (educated on precautions)   Weight Bearing Status WBAT   Home Living   Type of Home Apartment   Home Layout One level;Elevator   Mobility Equipment Walker-front wheeled;Cane   Prior Status   Independent With Functional mobility   Lives With Spouse   Receives Help From Family   Device Used Yes  (SPC)   Cognition   Overall Cognitive Status WFL   RLE Assessment   RLE Assessment   (decreased mobility s/p R CLARKE)   LLE Assessment   LLE Assessment WFL   Sensory   Sensory Impairment Light touch   Light Touch No apparent deficits   Transfer    Sit To Stand CGA;Verbal cues   Stand To Sit Max assist;Assist of 2;Verbal cues;Tactile cues   Transfer Comments cueing for hand placement and R LE positioning - rocks prior to standing   Ambulation    Weight Bearing Status WBAT   Distance (ft)  130   Assistance CGA;Maximum assistance   Assistive Device Rolling walker   Verbal Cues Device advancement;LE advancement;Posture;Breathing techniques;Safety;Sequencing   Quality of Gait/Comment pt became dizzy/lightheaded towards end of ambulation, pt assisted to sitting and reclined position, dizziness improved - RN present for vitals and to assist   Pattern Step through;Decreased pace;Flexed posture   Balance   Sitting Balance Independent   Standing Balance CGA   Plan   Treatment/Interventions Functional transfer training;Gait/stair training;Home exercise program;Strengthening/ROM   PT Frequency 2x/day   Assessment   Prognosis Excellent   Problem List Decreased mobility;Decreased strength "   Barriers to Discharge None   Recommendation   PT Discharge Recommendation Home with assist   PT Equipment Recommendation Rolling walker / FWW   Treatment Suggestions for Next Session CLARKE Protocol, gait   PT Care Plan REVIEWED DAILY Yes, goals remain appropriate   PT Handouts Given   (has HEP)

## 2021-07-20 NOTE — PLAN OF CARE
Problem: Pain  Goal: Patient's pain/discomfort is manageable  Outcome: Progressing   Pt has been given her PRN Acetaminophen for pain, she was having mild pain after ambulating. Pain has been well controlled with acetaminophen. Pt refuses her Oxycodone due to side effect of syncope.     Problem: Potential for Neurovascular Impairment  Goal: Patient will maintain normal neurovascular function in the operative extremity as defined by  Outcome: Progressing   Sensation is intact, dorsiflexion and plantar flexion strength moderate bilateral. Pulses strong. Limited ROM from her RLE.

## 2021-07-20 NOTE — TREATMENT PLAN
Orthopedic Surgery Pre-Op Plan: Ya Stahl  pre-op review. This is NOT an H&P   Surgeon: Dr. Wolfe    [place] [facility]: [facility]   Name of Surgery: Right Total Hip Arthroplasty   Date of Surgery: 8/4/20   H&P: Completed 7/20/20 by Dr. Chi at [facility]   History of ASA, NSAIDS, vitamin and/or herbal supplements within 10 days: Yes- on ASA 81 mg daily, Lutein, Germanium, Multiple Vitamin, Omega- 3, Naproxen- instructed to hold these meds for 1 week before surgery.   History of blood thinners: No    Plan:   1) Discharge Plan: Home with assist of Spouse. Please see Discharge Planning section near bottom of this note for further details.     2) Coronary Artery Disease: S/P NSTEMI in 3/2016: Coronary angiogram 3/4/2016: showed mild coronary artery disease with no hemodynamically significant or culprit lesions, no lesions over 40%. Did not require any stents. Completed 1 year of DAPT with clopidogrel and ASA 81- discontinuing clopidogrel after Cardiology approval 3/2017. Continues to do well with medical management of CAD. On ASA 81 mg and statin therapy. Denies any recent chest pain, YATES, palpitations or lightheadedness. Able to walk 3 blocks without difficulty except for hip pain. No concerns on EKG at pre-op per PCP.     3) HTN: BP's slightly elevated at 158/83 at pre-op exam. Per PCP- likely due to hip pain. On amlodipine and losartan. Instructed to hold these on the day of surgery by PCP.     4) History of Anesthesia Complications: patient reports some delayed emergence from anesthesia in past. Encouraged to discuss this further with anesthesia on the day of surgery.      5) Hypothyroidism: Stable on levothyroxine.     6) GERD: on lansoprazole     7) Sjogren's Syndrome: on cevimeline and Restasis eye drops.     Patient appears medically optimized for upcoming surgery, pending results of Covid-19 testing. I would recommend Hospitalist Consult to assist with medical management. Please  call me below with any questions on this patient.       Review of Systems Notable for: CAD-S/P NSTEMI in 2016-no stents needed, HTN, Hx of Delayed Emergence from Anesthesia, Hypothyroidism, GERD, Sjogren's Syndrome.     Past Medical History:   Past Medical History:   Diagnosis Date     Arthritis      Disease of thyroid gland      GERD (gastroesophageal reflux disease)      History of anesthesia complications     delayed emergence     Hypertension      Migraine headache      Myocardial infarction (H)      Sjogren's disease (H)      Past Surgical History:   Procedure Laterality Date     CARPAL TUNNEL RELEASE       FEMUR FRACTURE SURGERY       HYSTERECTOMY       SHOULDER SURGERY         Current Medications:  Patient's Medications   New Prescriptions    No medications on file   Previous Medications    ACETAMINOPHEN (TYLENOL) 500 MG TABLET    Take 500 mg by mouth every 6 (six) hours as needed for pain.    AMLODIPINE (NORVASC) 5 MG TABLET    Take 5 mg by mouth daily.    ASPIRIN 81 MG EC TABLET    Take 81 mg by mouth daily.    ATORVASTATIN (LIPITOR) 20 MG TABLET    Take 20 mg by mouth at bedtime.    CEVIMELINE (EVOXAC) 30 MG CAPSULE    Take 30 mg by mouth 3 (three) times a day.    CO-ENZYME Q-10 30 MG CAPSULE    Take 30 mg by mouth 3 (three) times a day.    LANSOPRAZOLE (PREVACID) 30 MG CAPSULE    Take 30 mg by mouth daily.    LEVOTHYROXINE (SYNTHROID, LEVOTHROID) 100 MCG TABLET    Take 100 mcg by mouth daily.    LOSARTAN (COZAAR) 100 MG TABLET    Take 100 mg by mouth at bedtime.     NITROGLYCERIN (NITROSTAT) 0.3 MG SL TABLET    Place 0.3 mg under the tongue every 5 (five) minutes as needed for chest pain.   Modified Medications    No medications on file   Discontinued Medications    LEVOTHYROXINE (SYNTHROID, LEVOTHROID) 88 MCG TABLET    Take 88 mcg by mouth daily.       ALLERGIES:  Allergies   Allergen Reactions     Codeine Nausea And Vomiting and Dizziness       Social History  Social History     Tobacco Use     Smoking  status: Never Smoker     Smokeless tobacco: Never Used   Substance Use Topics     Alcohol use: Yes     Comment: 1 beverage/month     Drug use: Never       Any Abnormal Recent Diagnostics? no      Discharge Planning:   Discharge plan according to [facility]:       06/25/20 0001   Discharge Planning   Patient expects to be discharged to: Home   Living Arrangements Spouse/significant other   Type of Residence Private residence   Do you have stairs? No   What does your bathroom have? Walk-in shower   Support Systems Spouse/significant other                   SINTIA Lam, CNP   Advanced Practice Nurse Navigator- Orthopedics  [employer]   Phone: [phone]

## 2021-07-20 NOTE — PROGRESS NOTES
[facility] MEDICINE PROGRESS NOTE      Identification/Summary: Ya Stahl is a 80 y.o. 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  POD 1.  Right hip pain is stable.  Blood pressure dropped at 99/44 daily then recovered quickly at 133/72 felt lightheaded and dizzy.    Assessment and Plan:  Coronary artery disease with MI in 2016 without cardiac intervention  Stable and asymptomatic  Resume statin, nitroglycerin, aspirin    Essential hypertension  Transient hypotension  Norvasc 5 mg daily, on hold  Losartan 100 mg daily, on hold  IV normal saline 500 mL fluid bolus    Dyslipidemia  Resume Lipitor 20 mg daily    Hypothyroidism  Resume levothyroxine 100 mcg daily    GERD  Resume PPI    Sjogren's syndrome  Resume Evoxac 30 mg 3 times a day    Acute blood loss anemia  Hemoglobin 9 from 13.3    Status post right total hip arthroplasty  Resume routine postoperative care  Physical and Occupational Therapy  Use incentive spirometry frequently  DVT prophylaxis per orthopedics, aspirin 81 mg twice a day  Pain control with Tylenol 1000 mg 3 times a day, oxycodone 2.5 to 5 mg every 6 hours, 5 to 10 mg every 3 hours as needed    Interval History/Subjective:  Resting comfortably.  Right hip pain is stable. Felt lightheaded and dizziness with activity, blood pressure was low at that time.  BP improved spontaneously.  Getting fluid bolus.  Denies headache, chest pain, breathing difficulty, palpitation, nausea, vomiting, abdominal pain or urinary symptoms.    Review of systems  Rest of the review of system are negative except HPI    Physical Exam/Objective:  Vitals I/O   Temp:  [97  F (36.1  C)-98.2  F (36.8  C)] 98.1  F (36.7  C)  Heart Rate:  [50-66] 63  Resp:  [14-24] 16  BP: ()/(54-89) 133/72  SpO2:  [93 %-100 %] 98 %  I/O last 3 completed  shifts:  In: 4722.3 [P.O.:1080; I.V.:3092.3; IV Piggyback:550]  Out: 3528 [Urine:3028; Blood:500]   125 lb 7 oz (56.9 kg)  Body mass index is 21.53 kg/m .    GENERAL:  Alert, appears comfortable, in no acute distress, appears stated age   HEAD:  Normocephalic, without obvious abnormality, atraumatic   EYES:  PERRL, conjunctiva  clear, no scleral icterus, EOM's intact   NOSE: Nares normal,  mucosa normal, no drainage   THROAT: Lips, mucosa,   gums normal, mouth moist   NECK: Supple, symmetrical, trachea midline   BACK:   Symmetric, no curvature, ROM normal   LUNGS:   Clear to auscultation bilaterally, no rales, rhonchi, or wheezing, symmetric chest rise on inhalation, respirations unlabored   CHEST WALL:  No tenderness or deformity   HEART:  Regular rate and rhythm, S1 and S2 normal, no murmur, rub, or gallop    ABDOMEN:   Soft, non-tender, bowel sounds active , no masses, no organomegaly, no rebound or guarding   EXTREMITIES: Status post right total hip arthroplasty, no   edema    SKIN: No rashes   NEURO: Alert, oriented x3, moves all four extremities freely, non-focal   PSYCH: Cooperative, behavior is appropriate      Medications:   Personally Reviewed.    acetaminophen  1,000 mg Oral TID     amLODIPine  5 mg Oral DAILY     aspirin  81 mg Oral BID with meals     atorvastatin  20 mg Oral QHS     cevimeline  30 mg Oral TID     cholecalciferol (vitamin D3)  1,000 Units Oral DAILY     cycloSPORINE  1 drop Both Eyes BID     docusate sodium  100 mg Oral BID     gabapentin  100 mg Oral QHS     levothyroxine  100 mcg Oral DAILY     losartan  100 mg Oral QHS     omeprazole  20 mg Oral QAM AC     polyethylene glycol  17 g Oral DAILY     senna-docusate  1 tablet Oral BID     sodium chloride 0.9%  500 mL Intravenous Once     sodium chloride  3 mL Intravenous Line Care       Data reviewed today: I personally reviewed all new medications, labs, imaging/diagnostics reports over the past 24 hours.      Labs:  Results from last 7  days   Lab Units 08/05/20  0550 08/04/20  1238   LN-HEMOGLOBIN g/dL 9.0* 10.7*    and   Results from last 7 days   Lab Units 08/05/20  0550   LN-SODIUM mmol/L 135*   LN-POTASSIUM mmol/L 4.2  4.2   LN-CHLORIDE mmol/L 100   LN-CO2 mmol/L 26   LN-BLOOD UREA NITROGEN mg/dL 14   LN-CREATININE mg/dL 0.70   LN-CALCIUM mg/dL 9.2         Kristi Montoya  Hospitalist  [facility]

## 2021-07-20 NOTE — ANESTHESIA CARE TRANSFER NOTE
Last vitals:   Vitals:    08/04/20 1106   BP: 111/56   Pulse: (!) 55   Resp: 22   Temp: 36.4  C (97.6  F)   SpO2: 100%     Patient's level of consciousness is awake  Spontaneous respirations: yes  Maintains airway independently: yes  Dentition unchanged: yes  Oropharynx: oropharynx clear of all foreign objects    QCDR Measures:  ASA# 20 - Surgical Safety Checklist: WHO surgical safety checklist completed prior to induction    PQRS# 430 - Adult PONV Prevention: 4558F - Pt received => 2 anti-emetic agents (different classes) preop & intraop  ASA# 8 - Peds PONV Prevention: NA - Not pediatric patient, not GA or 2 or more risk factors NOT present  PQRS# 424 - Harper-op Temp Management: 4559F - At least one body temp DOCUMENTED => 35.5C or 95.9F within required timeframe  PQRS# 426 - PACU Transfer Protocol: - Transfer of care checklist used  ASA# 14 - Acute Post-op Pain: ASA14B - Patient did NOT experience pain >= 7 out of 10

## 2021-11-02 ENCOUNTER — TRANSFERRED RECORDS (OUTPATIENT)
Dept: HEALTH INFORMATION MANAGEMENT | Facility: CLINIC | Age: 81
End: 2021-11-02
Payer: COMMERCIAL

## 2021-11-03 ENCOUNTER — TELEPHONE (OUTPATIENT)
Dept: FAMILY MEDICINE | Facility: CLINIC | Age: 81
End: 2021-11-03

## 2021-11-03 NOTE — TELEPHONE ENCOUNTER
It looks like the not says her cough was from lisinopril? Again, we need some sort of visit to change her BP meds. We need to review her chart, previous meds, current meds, all her labs etc. I have not seen her before. Ok for marlyn visit sooner, so we can discuss or follow up at her appt

## 2021-11-03 NOTE — TELEPHONE ENCOUNTER
Reason for Call:  Other prescription    Detailed comments: needs to get a non pre cursor medication,states the Losartan is making her cough to much.    Phone Number Patient can be reached at: Home number on file [phone] (home)    Best Time: any    Can we leave a detailed message on this number? YES    Call taken on 11/3/2021 at 11:30 AM by Shiresa H. Ormond

## 2021-11-03 NOTE — TELEPHONE ENCOUNTER
"Spoke with patient.  She states that she saw Dr. Alan with [facility] yesterday and was advised by her to get off the Losartan ASAP and that the cough was \"more than likely\" due to the Losartan.  She did a series of breathing tests at Dr. Alan's office and everything was normal.  She is going to be scheduled for a CT Scan of her lungs soon.  Dr. Alan advised her that she should not be on any ARB's or ACE's.    Patient was started on the Losartan 2/13/2018.  Here are the office notes:    Hypertension goal BP (blood pressure) < 140/90  Not at optimal control. Did well for quite some time p stopping lisinopril. Now going up.  Do not feel that her cough is ACEi induced cough; however, she is still undergoing evaluation/treatment for this so will not muddy the perez with that.  Will try losartan.   - losartan (COZAAR) 50 MG tablet; Take 1 tablet (50 mg) by mouth daily    Patient denies any SOB or tightness in her chest.  She does \"periodically\" have some lightheadedness.    Please review and advise further.    Johana Cisse RN    "

## 2021-11-03 NOTE — TELEPHONE ENCOUNTER
Recommend she discuss this at her follow up appt in the next week with Ellyn. Losartan usually does not cause a cough and it could be other causes.  I would rather a provider eval first

## 2021-11-12 ENCOUNTER — HOSPITAL ENCOUNTER (OUTPATIENT)
Dept: CT IMAGING | Facility: CLINIC | Age: 81
Discharge: HOME | End: 2021-11-12
Attending: INTERNAL MEDICINE | Admitting: INTERNAL MEDICINE
Payer: COMMERCIAL

## 2021-11-12 ENCOUNTER — VIRTUAL VISIT (OUTPATIENT)
Dept: FAMILY MEDICINE | Facility: CLINIC | Age: 81
End: 2021-11-12
Payer: COMMERCIAL

## 2021-11-12 DIAGNOSIS — M35.09: ICD-10-CM

## 2021-11-12 DIAGNOSIS — R05.9 COUGH: ICD-10-CM

## 2021-11-12 DIAGNOSIS — I10 HYPERTENSION GOAL BP (BLOOD PRESSURE) < 140/90: Primary | ICD-10-CM

## 2021-11-12 DIAGNOSIS — K22.4 DYSKINESIA OF ESOPHAGUS: ICD-10-CM

## 2021-11-12 PROCEDURE — 99214 OFFICE O/P EST MOD 30 MIN: CPT | Mod: GT | Performed by: FAMILY MEDICINE

## 2021-11-12 PROCEDURE — 71250 CT THORAX DX C-: CPT

## 2021-11-12 RX ORDER — METOPROLOL SUCCINATE 25 MG/1
25 TABLET, EXTENDED RELEASE ORAL DAILY
Qty: 90 TABLET | Refills: 1 | Status: SHIPPED | OUTPATIENT
Start: 2021-11-12 | End: 2022-01-06

## 2021-11-12 NOTE — PROGRESS NOTES
Ya is a 81 year old who is being evaluated via a billable video visit.      How would you like to obtain your AVS? MyChart  If the video visit is dropped, the invitation should be resent by: Text to cell phone: [phone]  Will anyone else be joining your video visit? No    Video Start Time: 9:44 AM    Seeing Rl, coughs all day and gets warn out. Up at night, to void, and then she coughs.  She has a CT scan of her lungs today.  Her GI doc sent her to Dr. Alan.   swallowing test done, on prevacid and pepcid, still coughing. EGD a few years ago, it was normal.  Coughing for 5-6 yrs, gotten worse the past yr or so, runny ose also, coughs nothing up. Losartan needs to be stopped, needs to change to off ace meds  Taking losartan 100mg, working well, in good control.    141/70 at home, pulse runs around 65    sjogrens in in remission- so that is good.  Assessment & Plan     Hypertension goal BP (blood pressure) < 140/90  Stop losartan, start toprol, cont norvasc. Rechecking BP in 1 week, if still high, raise to 50mg if pulse tolerates  - metoprolol succinate ER (TOPROL-XL) 25 MG 24 hr tablet; Take 1 tablet (25 mg) by mouth daily    Cough  Work up in process by SLOANE Alan today, stopping losartan in case it is the cause      Prescription drug management             Return in about 1 week (around 11/19/2021) for High blood pressure med check.    Devi Romano MD  [facility]    Janet Pandya is a 81 year old who presents for the following health issues     HPI     Hypertension Follow-up/Cough       Do you check your blood pressure regularly outside of the clinic? Yes     Are you following a low salt diet? No    Are your blood pressures ever more than 140 on the top number (systolic) OR more   than 90 on the bottom number (diastolic), for example 140/90? Yes      How many servings of fruits and vegetables do you eat daily?  2-3    On average, how many sweetened beverages do  you drink each day (Examples: soda, juice, sweet tea, etc.  Do NOT count diet or artificially sweetened beverages)?   0    How many days per week do you exercise enough to make your heart beat faster? 6 walks and [employer](3 days a week)    How many minutes a day do you exercise enough to make your heart beat faster? 60 or more    How many days per week do you miss taking your medication? 0  Has a CT Scan of lungs today scheduled.           Review of Systems   Constitutional, HEENT, cardiovascular, pulmonary, gi and gu systems are negative, except as otherwise noted.      Objective    Vitals - Patient Reported  Systolic (Patient Reported): (!) 141  Diastolic (Patient Reported): 78  Weight (Patient Reported): 59 kg (130 lb)  Temperature (Patient Reported): 98.1  F (36.7  C) (oral)  Pulse (Patient Reported): 68  Pain Score: No Pain (0)      Vitals:  No vitals were obtained today due to virtual visit.    Physical Exam   GENERAL: Healthy, alert and no distress  EYES: Eyes grossly normal to inspection.  No discharge or erythema, or obvious scleral/conjunctival abnormalities.  RESP: dry nearly continuous audible cough, or visible cyanosis.  No visible retractions or increased work of breathing.    SKIN: Visible skin clear. No significant rash, abnormal pigmentation or lesions.  NEURO: Cranial nerves grossly intact.  Mentation and speech appropriate for age.  PSYCH: Mentation appears normal, affect normal/bright, judgement and insight intact, normal speech and appearance well-groomed.                Video-Visit Details    Type of service:  Video Visit    Video End Time:10:03 AM    Originating Location (pt. Location): Home    Distant Location (provider location):  [facility]     Platform used for Video Visit: [employer]

## 2021-11-15 ENCOUNTER — OFFICE VISIT (OUTPATIENT)
Dept: FAMILY MEDICINE | Facility: CLINIC | Age: 81
End: 2021-11-15
Payer: COMMERCIAL

## 2021-11-15 ENCOUNTER — TELEPHONE (OUTPATIENT)
Dept: UROLOGY | Facility: CLINIC | Age: 81
End: 2021-11-15

## 2021-11-15 VITALS
BODY MASS INDEX: 22.84 KG/M2 | TEMPERATURE: 98.7 F | HEART RATE: 60 BPM | SYSTOLIC BLOOD PRESSURE: 138 MMHG | OXYGEN SATURATION: 100 % | RESPIRATION RATE: 16 BRPM | DIASTOLIC BLOOD PRESSURE: 76 MMHG | WEIGHT: 134.1 LBS

## 2021-11-15 DIAGNOSIS — R39.15 URINARY URGENCY: Primary | ICD-10-CM

## 2021-11-15 LAB
ALBUMIN UR-MCNC: NEGATIVE MG/DL
APPEARANCE UR: CLEAR
BILIRUB UR QL STRIP: NEGATIVE
COLOR UR AUTO: YELLOW
GLUCOSE UR STRIP-MCNC: NEGATIVE MG/DL
HGB UR QL STRIP: NEGATIVE
KETONES UR STRIP-MCNC: NEGATIVE MG/DL
LEUKOCYTE ESTERASE UR QL STRIP: NEGATIVE
NITRATE UR QL: NEGATIVE
PH UR STRIP: 5.5 [PH] (ref 5–7)
SP GR UR STRIP: >=1.03 (ref 1–1.03)
UROBILINOGEN UR STRIP-ACNC: 0.2 E.U./DL

## 2021-11-15 PROCEDURE — 99213 OFFICE O/P EST LOW 20 MIN: CPT | Performed by: NURSE PRACTITIONER

## 2021-11-15 PROCEDURE — 81003 URINALYSIS AUTO W/O SCOPE: CPT | Performed by: NURSE PRACTITIONER

## 2021-11-15 ASSESSMENT — PAIN SCALES - GENERAL: PAINLEVEL: EXTREME PAIN (8)

## 2021-11-15 NOTE — PROGRESS NOTES
Assessment & Plan     Urinary urgency  New, progressively worsening over the last few months.  Associated with sensation of incomplete bladder emptying and frequency.  Normal UA today.  Will have her follow-up with urology.   - UA Macro with Reflex to Micro and Culture - lab collect; Future  - UA Macro with Reflex to Micro and Culture - lab collect  - Adult Urology Referral; Future             Return in about 2 months (around 1/15/2022) for Preventive Visit, appointment already scheduled.    SINTIA An CNP  [facility] LOUISE Pandya is a 81 year old who presents for the following health issues     HPI     Genitourinary - Female  Onset/Duration: 6 months ago or so  Description:   Urinary urgency: YES  Painful urination (Dysuria): no           Frequency: YES  Blood in urine (Hematuria): no  Delay in urine (Hesitency): no    Intensity: severe, 8/10  Progression of Symptoms:  worsening  Accompanying Signs & Symptoms:  Fever/chills: no  Flank pain: YES- left side  Nausea and vomiting: no  Vaginal symptoms: none  Abdominal/Pelvic Pain: no  History:   History of frequent UTI s: YES  History of kidney stones: no  Sexually Active: no  Possibility of pregnancy: No    Patient also has to get up during the night to urinate.      Precipitating or alleviating factors: None  Therapies tried and outcome:  None.      Getting up a few times at night to go to the bathroom.   She is only urinating small amounts at a time and doesn't feel her bladder is emptying completely.   The last 4 months has been haivng urinary leakage requiring the use of a pad.  Pads are causing her skin to feel chapped.    Denies vaginal itching and discharge.   No abdominal pain.   Hx of constipation; taking miralax twice daily.  Having 1-3 BM's daily.   Rheumatology has told her Sjogren's is in remission.      Review of Systems   Constitutional, HEENT, cardiovascular, pulmonary, gi and gu systems are negative,  except as otherwise noted.      Objective    /76 (BP Location: Right arm, Patient Position: Sitting, Cuff Size: Adult Regular)   Pulse 60   Temp 98.7  F (37.1  C) (Oral)   Resp 16   Wt 60.8 kg (134 lb 1.6 oz)   SpO2 100%   BMI 22.84 kg/m    Body mass index is 22.84 kg/m .  Physical Exam   GENERAL: healthy, alert and no distress  ABDOMEN: soft, nontender, no hepatosplenomegaly, no masses and bowel sounds normal  BACK: no CVA tenderness    Results for orders placed or performed in visit on 11/15/21 (from the past 24 hour(s))   UA Macro with Reflex to Micro and Culture - lab collect    Specimen: Urine, Midstream   Result Value Ref Range    Color Urine Yellow Colorless, Straw, Light Yellow, Yellow    Appearance Urine Clear Clear    Glucose Urine Negative Negative mg/dL    Bilirubin Urine Negative Negative    Ketones Urine Negative Negative mg/dL    Specific Gravity Urine >=1.030 1.003 - 1.035    Blood Urine Negative Negative    pH Urine 5.5 5.0 - 7.0    Protein Albumin Urine Negative Negative mg/dL    Urobilinogen Urine 0.2 0.2, 1.0 E.U./dL    Nitrite Urine Negative Negative    Leukocyte Esterase Urine Negative Negative    Narrative    Microscopic not indicated

## 2021-11-15 NOTE — TELEPHONE ENCOUNTER
[facility] Call Center    Phone Message    May a detailed message be left on voicemail: yes     Reason for Call: Appointment Intake    Referring Provider Name: Ellyn Pool APRN CNP in [facility]    Diagnosis and/or Symptoms: frequency, incomplete emptying, leakage    Action Taken: Message routed to:  Other: ub uro    Travel Screening: Not Applicable

## 2021-11-15 NOTE — NURSING NOTE
"Chief Complaint   Patient presents with     Urinary Problem     Initial /76 (BP Location: Right arm, Patient Position: Sitting, Cuff Size: Adult Regular)   Pulse 60   Temp 98.7  F (37.1  C) (Oral)   Resp 16   Wt 60.8 kg (134 lb 1.6 oz)   SpO2 100%   BMI 22.84 kg/m   Estimated body mass index is 22.84 kg/m  as calculated from the following:    Height as of 4/16/21: 1.632 m (5' 4.25\").    Weight as of this encounter: 60.8 kg (134 lb 1.6 oz).  BP completed using cuff size regular long right arm    Lisa Magill, CMA    "

## 2021-11-19 ENCOUNTER — ALLIED HEALTH/NURSE VISIT (OUTPATIENT)
Dept: FAMILY MEDICINE | Facility: CLINIC | Age: 81
End: 2021-11-19
Payer: COMMERCIAL

## 2021-11-19 VITALS — DIASTOLIC BLOOD PRESSURE: 78 MMHG | SYSTOLIC BLOOD PRESSURE: 138 MMHG

## 2021-11-19 DIAGNOSIS — Z01.30 BP CHECK: Primary | ICD-10-CM

## 2021-11-19 PROCEDURE — 99207 PR NO CHARGE NURSE ONLY: CPT | Performed by: FAMILY MEDICINE

## 2021-11-19 NOTE — PROGRESS NOTES
Ya Stahl was evaluated at [facility] on November 19, 2021 at which time her blood pressure was:    BP Readings from Last 3 Encounters:   11/19/21 138/78   11/15/21 138/76   04/16/21 110/64     Pulse Readings from Last 3 Encounters:   11/15/21 60   04/16/21 61   02/09/21 62       Reviewed lifestyle modifications for blood pressure control and reduction: including making healthy food choices, managing weight, getting regular exercise, smoking cessation, reducing alcohol consumption, monitoring blood pressure regularly.     Symptoms: None    BP Goal:< 140/90 mmHg    BP Assessment:  BP at goal    Potential Reasons for BP too high: NA - Not applicable    BP Follow-Up Plan: Recheck BP in 6 months at pharmacy    Recommendation to Provider: none    Note completed by: Kristopher Orlando    Thank You   Larissa Diaz [facility]

## 2021-12-06 ENCOUNTER — VIRTUAL VISIT (OUTPATIENT)
Dept: UROLOGY | Facility: CLINIC | Age: 81
End: 2021-12-06
Payer: COMMERCIAL

## 2021-12-06 VITALS — WEIGHT: 130 LBS | BODY MASS INDEX: 21.66 KG/M2 | HEIGHT: 65 IN

## 2021-12-06 DIAGNOSIS — R39.15 URINARY URGENCY: Primary | ICD-10-CM

## 2021-12-06 DIAGNOSIS — N39.46 MIXED INCONTINENCE URGE AND STRESS (MALE)(FEMALE): ICD-10-CM

## 2021-12-06 PROCEDURE — 99203 OFFICE O/P NEW LOW 30 MIN: CPT | Mod: GT | Performed by: PHYSICIAN ASSISTANT

## 2021-12-06 ASSESSMENT — ENCOUNTER SYMPTOMS
CHILLS: 0
FREQUENCY: 1
SHORTNESS OF BREATH: 0
NAUSEA: 0
HEMATURIA: 0
CONSTIPATION: 1
DYSURIA: 0
VOMITING: 0
FEVER: 0

## 2021-12-06 ASSESSMENT — MIFFLIN-ST. JEOR: SCORE: 1060.56

## 2021-12-06 ASSESSMENT — PAIN SCALES - GENERAL: PAINLEVEL: NO PAIN (0)

## 2021-12-06 NOTE — PROGRESS NOTES
*PT WILL MEET YOU IN [facility]*    Ya is a 81 year old who is being evaluated via a billable video visit.      How would you like to obtain your AVS? Cheviahart  If the video visit is dropped, the invitation should be resent by: Text to cell phone: [phone]  Will anyone else be joining your video visit? No      Video-Visit Details    Type of service:  Video Visit    Video Start Time: 1443    Video End Time:1456    Originating Location (pt. Location): Home    Distant Location (provider location):  [facility] [place]     Platform used for Video Visit: Tapjoy     Subjective      REQUESTING PROVIDER   No ref. provider found     REASON FOR CONSULT   Urinary urgency and incontinence    HISTORY OF PRESENT ILLNESS   Ms. Stahl is a very pleasant 81-year-old , female, who presents today for further evaluation for urgency, urge incontinence, and stress incontinence.     She notes that her urinary urgency has started approximately 6 months ago.  She notes urgency and frequency.  She started wearing a pad due to urinary leakage, but this is causing irritation to the skin in the pelvic region and she discontinued this.  She notes urge incontinence where she is lost urine in her underpants.  She has begun to wear protective undergarments in the overnight hours due to difficulties with getting to the bathroom.  She can have nocturia anywhere from 2 times to every 2 hours.  She takes a small sip of water with her pills prior to bedtime otherwise restrict fluids.  She feels that she is only urinating small amounts when she goes to the bathroom.  She does not feel like she is emptying her bladder completely.  Recent urinalysis was normal.    Patient denies any hematuria or dysuria.  She does endorse some stress incontinence but indicates that the urgency and urge incontinence is worse.    She does have constipation that is managed with MiraLAX twice daily.    She does not have a pacemaker.  She has not  "trialed anything for this.  She does have a history of Sjogren's, which is currently noted to be in remission.  Intake includes water.  She denies any ingestion of pop, coffee, or tea.    The following portions of the patient's history were reviewed and updated as appropriate: allergies, current medications, past family history, past medical history, past social history, past surgical history and problem list.     REVIEW OF SYSTEMS   Review of Systems   Constitutional: Negative for chills and fever.   Respiratory: Negative for shortness of breath.    Cardiovascular: Negative for chest pain.   Gastrointestinal: Positive for constipation. Negative for nausea and vomiting.   Genitourinary: Positive for frequency and urgency. Negative for dysuria and hematuria.      Per HPI.     Patient Active Problem List   Diagnosis     Hypothyroidism     Chondrodermatitis nodularis helicis     Grave's disease     Bile reflux gastritis     Hyperlipidemia LDL goal <70     Sjogren's disease (H)     HL (hearing loss)     Mixed incontinence     Health Care Home     Chronic cough     Impaired fasting glucose     Status post-operative repair of hip fracture, RIGHT     Constipation     Osteoporosis     Gastroesophageal reflux disease, unspecified whether esophagitis present     AMI (acute myocardial infarction) (H)     ASCVD (arteriosclerotic cardiovascular disease)     Macrocytosis     Monoclonal paraproteinemia     MGUS (monoclonal gammopathy of unknown significance)     Epigastric pain     Hypertension goal BP (blood pressure) < 140/90     Complication of anesthesia     S/P lumbar fusion      Past Medical History:   Diagnosis Date     Arthritis     osteoarthritis     Arthritis      Complication of anesthesia     \"hard time waking up / N & V\"     Coronary artery disease      Disease of thyroid gland      Displacement of lumbar intervertebral disc without myelopathy      Gastro-oesophageal reflux disease      GERD (gastroesophageal reflux " disease)      Hearing loss     has bilateral aids     Heart attack (H) 3/2016     History of anesthesia complications     delayed emergence     History of angina      Hypertension      Hypertension      Low back pain      Migraine headache      Myocardial infarction (H)      Numbness and tingling     down right leg (associated with back pain)     PONV (postoperative nausea and vomiting)      PONV (postoperative nausea and vomiting)      Sicca syndrome (H)      Sjogren's disease (H)      Sjogren's syndrome (H)     sees specialist; dentist     Spider veins      Unspecified hypothyroidism       Past Surgical History:   Procedure Laterality Date     ARTHROSCOPY KNEE  10/05/2012    R Procedure: ARTHROSCOPY KNEE;  RIGHT KNEE ARTHROSCOPY, PARTIAL MEDIAL MENISCECTOMY;  Surgeon: Karli Zaragoza MD;  Location: [facility]     AS TOTAL HIP ARTHROPLASTY Right 08/2020     BLADDER SURGERY       BUNIONECTOMY  ~2010    L foot.      C TOTAL HIP ARTHROPLASTY Right 8/4/2020    Procedure: RIGHT TOTAL HIP ARTHROPLASTY;  Surgeon: Charlie Wolfe MD;  Location: [facility];  Service: Orthopedics     C TOTAL KNEE ARTHROPLASTY Right 01/2020     CATARACT IOL, RT/LT Bilateral 07/2017     COLONOSCOPY N/A 01/17/2017    normal; no repeat Procedure: COLONOSCOPY;  Surgeon: Fan Giordano MD;  Location:  GI     ENDOSCOPIC RELEASE CARPAL TUNNEL  09/11/2012    R Procedure: ENDOSCOPIC RELEASE CARPAL TUNNEL;  Right Endoscopic carpal tunnel release, left ringer finger cortizon injection;  Surgeon: Anne Marie Catherine MD;  Location:  OR     ESOPHAGOSCOPY, GASTROSCOPY, DUODENOSCOPY (EGD), COMBINED N/A 12/26/2014    Procedure: COMBINED ESOPHAGOSCOPY, GASTROSCOPY, DUODENOSCOPY (EGD);  Surgeon: Fan Giordano MD;  Location:  GI     EXCISE EXOSTOSIS FOOT Left 12/01/2016    Procedure: EXCISE EXOSTOSIS FOOT;  Surgeon: Jody Gallagher DPM, Pod;  Location:  OR     FEMUR FRACTURE SURGERY       GYN SURGERY  1978    ovaries removed     HEART CATH  FYI  03/04/2016    dz <40%     HIP HARDWARE REMOVAL Right 8/4/2020    Procedure: HARDWARE REMOVAL - LATERAL APPROACH;  Surgeon: Charlie Wolfe MD;  Location: [facility];  Service: Orthopedics     HYSTERECTOMY       HYSTERECTOMY, PAP NO LONGER INDICATED  1977    Hysterectomy; benign     INJECT STEROID (LOCATION)  09/11/2012    Procedure: INJECT STEROID (LOCATION);;  Surgeon: Anne Marie Catherine MD;  Location:  OR     LAPAROSCOPIC CHOLECYSTECTOMY N/A 04/07/2016    Procedure: LAPAROSCOPIC CHOLECYSTECTOMY;  Surgeon: Hans Medina MD;  Location:  OR     OPTICAL TRACKING SYSTEM FUSION SPINE POSTERIOR LUMBAR TWO LEVELS N/A 10/31/2018    Procedure: Central L3-4 L4-5 lamenectomies L4-5 posterior instrumented fusion;  Surgeon: Aroldo Burdick MD;  Location:  OR     ORTHOPEDIC SURGERY  ~2008    Right foot, bunionectomy      ORTHOPEDIC SURGERY Right 2014    karli for fx femur     RECONSTRUCT FOREFOOT WITH METATARSOPHALANGEAL (MTP) FUSION Left 04/28/2017    Procedure: RECONSTRUCT FOREFOOT WITH METATARSOPHALANGEAL (MTP) FUSION;  1. Resection arthroplasty, fifth metatarsophalangeal joint, left foot.   2. Irrigation and debridement of fifth metatarsophalangeal joint, left foot (excisional to the level of the bone).     ;  Surgeon: Fabián Phipps MD;  Location:  OR     RELEASE CARPAL TUNNEL       RIGHT HIP ORIF 4/1/2014       ROTATOR CUFF REPAIR RT/LT  ~1998    Lt shoulder; rotator cuff     SHOULDER SURGERY       SURGICAL HISTORY OF -   distant past    ablation for palpitations.     SURGICAL HISTORY OF -   06/2015    left carpal tunnel surgery     ZZC NONSPECIFIC PROCEDURE  1976    D&C      Social History:   Never smoker.     Objective      PHYSICAL EXAM   GENERAL: Healthy, alert and no distress  EYES: Eyes grossly normal to inspection.  No discharge or erythema, or obvious scleral/conjunctival abnormalities.  HENT: Normal cephalic/atraumatic.  External ears, nose and mouth without ulcers or lesions.  No nasal  drainage visible.  NECK: No asymmetry, visible masses or scars  RESP: No audible wheeze, cough, or visible cyanosis.  No visible retractions or increased work of breathing.    MS: No gross musculoskeletal defects noted.  Normal range of motion.  No visible edema.  SKIN: Visible skin clear. No significant rash, abnormal pigmentation or lesions.  NEURO: Cranial nerves grossly intact.  Mentation and speech appropriate for age.  PSYCH: Mentation appears normal, affect normal/bright, judgement and insight intact, normal speech and appearance well-groomed.     LABORATORY   Lab Results   Component Value Date    CR 0.72 01/19/2021      Recent Labs   Lab Test 11/15/21  1029 12/05/13  0846 11/20/13  1446   COLOR Yellow   < > Red   APPEARANCE Clear   < > Cloudy   URINEGLC Negative   < > Negative   URINEBILI Negative   < > Negative   URINEKETONE Negative   < > Negative   SG >=1.030   < > 1.025   UBLD Negative   < > Large*   URINEPH 5.5   < > 7.0   PROTEIN Negative   < > 100*   UROBILINOGEN 0.2   < > 0.2   NITRITE Negative   < > Negative   LEUKEST Negative   < > Moderate*   RBCU  --   --  >100*   WBCU  --   --  >100*    < > = values in this interval not displayed.     Assessment & Plan    1. Urinary urgency    2. Mixed incontinence urge and stress (male)(female)       I had the pleasure today meeting with Ms. Stahl to discuss her urgency, frequency, and urinary incontinence.  She actually has mixed urinary incontinence picture, but the urge incontinence is significantly worse.  She does not ingest a significant number of bladder irritants.    We discussed that we typically would treat urge incontinence first in a mixed incontinence picture of urge and stress incontinence. Typical treatments for urge incontinence include avoiding bladder irritants, biofeedback bladder retraining, overactive bladder medications such as oxybutynin, posterior tibial nerve stimulation (PTNS), Botox (which would require learning to perform clean  intermittent catheterization and place it works too well), and InterStim. We typically go through this in a stepwise fashion.     Possible side effects with overactive bladder medications that are anticholinergic medication include dry eyes, dry mouth, constipation, difficulties with sweating, and possible mental fogginess. These medications typically take 3-4 weeks to work. Side effects will typically show up prior to this. Myrbetriq is not in anticholinergic medication, but it is rather extensive even after insurance. Most common side effect with this is hypertension.     We discussed PTNS. This includes placement of needles in the posterior tibial nerve. This is once a week for 12 weeks. Patient does need to make sure they do not miss more than 1 session, or they will need to start over. It is 30 minutes at a time. We do not expect improvement until at least week 5-6, and some patients take longer. It does not work for all patients.     Botox is injected into the bladder. It typically takes 2 weeks before this takes effect. Average length of time per treatment that patients get relief for is approximately 6 months. Some patients get more lasting relief. We also discussed briefly the implantable device InterStim.     -Recommendation would be for a nurse visit for a postvoid residual to ensure that she is emptying her bladder completely.    -Would recommend pelvic floor physical therapy as this would help both types of urinary leakage.    -Follow-up in 3 to 4 months to assess progress and if we need to accelerate therapy.    -Briefly discussed overactive bladder medication, but would like to avoid anticholinergics given her history of Sjogren's which may be exacerbated by these.  Additionally, we discussed Myrbetriq, but given that there is no generic, this may be too expensive.    -Continue with MiraLAX twice daily to keep bowel movements soft and regular.    Signed by:     Erika Helms PA-C 12/6/2021 4:39 PM

## 2021-12-06 NOTE — LETTER
2021       RE: Ya Stahl  [address]  [facility] [address]     Dear Colleague,    Thank you for referring your patient, Ya Stahl, to the [facility] [place] [facility]. Please see a copy of my visit note below.    *PT WILL MEET YOU IN MYCHART*    Ya is a 81 year old who is being evaluated via a billable video visit.      How would you like to obtain your AVS? MyChart  If the video visit is dropped, the invitation should be resent by: Text to cell phone: [phone]  Will anyone else be joining your video visit? No      Video-Visit Details    Type of service:  Video Visit    Video Start Time: 1443    Video End Time:1456    Originating Location (pt. Location): Home    Distant Location (provider location):  [facility] [place]     Platform used for Video Visit: [employer]     Subjective      REQUESTING PROVIDER   No ref. provider found     REASON FOR CONSULT   Urinary urgency and incontinence    HISTORY OF PRESENT ILLNESS   Ms. Stahl is a very pleasant 81-year-old , female, who presents today for further evaluation for urgency, urge incontinence, and stress incontinence.     She notes that her urinary urgency has started approximately 6 months ago.  She notes urgency and frequency.  She started wearing a pad due to urinary leakage, but this is causing irritation to the skin in the pelvic region and she discontinued this.  She notes urge incontinence where she is lost urine in her underpants.  She has begun to wear protective undergarments in the overnight hours due to difficulties with getting to the bathroom.  She can have nocturia anywhere from 2 times to every 2 hours.  She takes a small sip of water with her pills prior to bedtime otherwise restrict fluids.  She feels that she is only urinating small amounts when she goes to the bathroom.  She does not feel like she is  emptying her bladder completely.  Recent urinalysis was normal.    Patient denies any hematuria or dysuria.  She does endorse some stress incontinence but indicates that the urgency and urge incontinence is worse.    She does have constipation that is managed with MiraLAX twice daily.    She does not have a pacemaker.  She has not trialed anything for this.  She does have a history of Sjogren's, which is currently noted to be in remission.  Intake includes water.  She denies any ingestion of pop, coffee, or tea.    The following portions of the patient's history were reviewed and updated as appropriate: allergies, current medications, past family history, past medical history, past social history, past surgical history and problem list.     REVIEW OF SYSTEMS   Review of Systems   Constitutional: Negative for chills and fever.   Respiratory: Negative for shortness of breath.    Cardiovascular: Negative for chest pain.   Gastrointestinal: Positive for constipation. Negative for nausea and vomiting.   Genitourinary: Positive for frequency and urgency. Negative for dysuria and hematuria.      Per HPI.     Patient Active Problem List   Diagnosis     Hypothyroidism     Chondrodermatitis nodularis helicis     Grave's disease     Bile reflux gastritis     Hyperlipidemia LDL goal <70     Sjogren's disease (H)     HL (hearing loss)     Mixed incontinence     Health Care Home     Chronic cough     Impaired fasting glucose     Status post-operative repair of hip fracture, RIGHT     Constipation     Osteoporosis     Gastroesophageal reflux disease, unspecified whether esophagitis present     AMI (acute myocardial infarction) (H)     ASCVD (arteriosclerotic cardiovascular disease)     Macrocytosis     Monoclonal paraproteinemia     MGUS (monoclonal gammopathy of unknown significance)     Epigastric pain     Hypertension goal BP (blood pressure) < 140/90     Complication of anesthesia     S/P lumbar fusion      Past Medical  "History:   Diagnosis Date     Arthritis     osteoarthritis     Arthritis      Complication of anesthesia     \"hard time waking up / N & V\"     Coronary artery disease      Disease of thyroid gland      Displacement of lumbar intervertebral disc without myelopathy      Gastro-oesophageal reflux disease      GERD (gastroesophageal reflux disease)      Hearing loss     has bilateral aids     Heart attack (H) 3/2016     History of anesthesia complications     delayed emergence     History of angina      Hypertension      Hypertension      Low back pain      Migraine headache      Myocardial infarction (H)      Numbness and tingling     down right leg (associated with back pain)     PONV (postoperative nausea and vomiting)      PONV (postoperative nausea and vomiting)      Sicca syndrome (H)      Sjogren's disease (H)      Sjogren's syndrome (H)     sees specialist; dentist     Spider veins      Unspecified hypothyroidism       Past Surgical History:   Procedure Laterality Date     ARTHROSCOPY KNEE  10/05/2012    R Procedure: ARTHROSCOPY KNEE;  RIGHT KNEE ARTHROSCOPY, PARTIAL MEDIAL MENISCECTOMY;  Surgeon: Karli Zaragoza MD;  Location: [facility] [place]     AS TOTAL HIP ARTHROPLASTY Right 08/2020     BLADDER SURGERY       BUNIONECTOMY  ~2010    L foot.      C TOTAL HIP ARTHROPLASTY Right 8/4/2020    Procedure: RIGHT TOTAL HIP ARTHROPLASTY;  Surgeon: Charlie Wolfe MD;  Location: [facility];  Service: Orthopedics     C TOTAL KNEE ARTHROPLASTY Right 01/2020     CATARACT IOL, RT/LT Bilateral 07/2017     COLONOSCOPY N/A 01/17/2017    normal; no repeat Procedure: COLONOSCOPY;  Surgeon: Fan Giordano MD;  Location:  GI     ENDOSCOPIC RELEASE CARPAL TUNNEL  09/11/2012    R Procedure: ENDOSCOPIC RELEASE CARPAL TUNNEL;  Right Endoscopic carpal tunnel release, left ringer finger cortizon injection;  Surgeon: Anne Marie Catherine MD;  Location:  OR     ESOPHAGOSCOPY, GASTROSCOPY, DUODENOSCOPY (EGD), COMBINED N/A 12/26/2014    " Procedure: COMBINED ESOPHAGOSCOPY, GASTROSCOPY, DUODENOSCOPY (EGD);  Surgeon: Fan Giordano MD;  Location:  GI     EXCISE EXOSTOSIS FOOT Left 12/01/2016    Procedure: EXCISE EXOSTOSIS FOOT;  Surgeon: Jody Gallagher, DPM, Pod;  Location:  OR     FEMUR FRACTURE SURGERY       GYN SURGERY  1978    ovaries removed     HEART CATH FYI  03/04/2016    dz <40%     HIP HARDWARE REMOVAL Right 8/4/2020    Procedure: HARDWARE REMOVAL - LATERAL APPROACH;  Surgeon: Charlie Wolfe MD;  Location: [facility] OR;  Service: Orthopedics     HYSTERECTOMY       HYSTERECTOMY, PAP NO LONGER INDICATED  1977    Hysterectomy; benign     INJECT STEROID (LOCATION)  09/11/2012    Procedure: INJECT STEROID (LOCATION);;  Surgeon: Anne Marie Catherine MD;  Location:  OR     LAPAROSCOPIC CHOLECYSTECTOMY N/A 04/07/2016    Procedure: LAPAROSCOPIC CHOLECYSTECTOMY;  Surgeon: Hans Medina MD;  Location:  OR     OPTICAL TRACKING SYSTEM FUSION SPINE POSTERIOR LUMBAR TWO LEVELS N/A 10/31/2018    Procedure: Central L3-4 L4-5 lamenectomies L4-5 posterior instrumented fusion;  Surgeon: Aroldo Burdick MD;  Location:  OR     ORTHOPEDIC SURGERY  ~2008    Right foot, bunionectomy      ORTHOPEDIC SURGERY Right 2014    karli for fx femur     RECONSTRUCT FOREFOOT WITH METATARSOPHALANGEAL (MTP) FUSION Left 04/28/2017    Procedure: RECONSTRUCT FOREFOOT WITH METATARSOPHALANGEAL (MTP) FUSION;  1. Resection arthroplasty, fifth metatarsophalangeal joint, left foot.   2. Irrigation and debridement of fifth metatarsophalangeal joint, left foot (excisional to the level of the bone).     ;  Surgeon: Fabián Phipps MD;  Location:  OR     RELEASE CARPAL TUNNEL       RIGHT HIP ORIF 4/1/2014       ROTATOR CUFF REPAIR RT/LT  ~1998    Lt shoulder; rotator cuff     SHOULDER SURGERY       SURGICAL HISTORY OF -   distant past    ablation for palpitations.     SURGICAL HISTORY OF -   06/2015    left carpal tunnel surgery     ZZC NONSPECIFIC PROCEDURE  1976     D&C      Social History:   Never smoker.     Objective      PHYSICAL EXAM   GENERAL: Healthy, alert and no distress  EYES: Eyes grossly normal to inspection.  No discharge or erythema, or obvious scleral/conjunctival abnormalities.  HENT: Normal cephalic/atraumatic.  External ears, nose and mouth without ulcers or lesions.  No nasal drainage visible.  NECK: No asymmetry, visible masses or scars  RESP: No audible wheeze, cough, or visible cyanosis.  No visible retractions or increased work of breathing.    MS: No gross musculoskeletal defects noted.  Normal range of motion.  No visible edema.  SKIN: Visible skin clear. No significant rash, abnormal pigmentation or lesions.  NEURO: Cranial nerves grossly intact.  Mentation and speech appropriate for age.  PSYCH: Mentation appears normal, affect normal/bright, judgement and insight intact, normal speech and appearance well-groomed.     LABORATORY   Lab Results   Component Value Date    CR 0.72 01/19/2021      Recent Labs   Lab Test 11/15/21  1029 12/05/13  0846 11/20/13  1446   COLOR Yellow   < > Red   APPEARANCE Clear   < > Cloudy   URINEGLC Negative   < > Negative   URINEBILI Negative   < > Negative   URINEKETONE Negative   < > Negative   SG >=1.030   < > 1.025   UBLD Negative   < > Large*   URINEPH 5.5   < > 7.0   PROTEIN Negative   < > 100*   UROBILINOGEN 0.2   < > 0.2   NITRITE Negative   < > Negative   LEUKEST Negative   < > Moderate*   RBCU  --   --  >100*   WBCU  --   --  >100*    < > = values in this interval not displayed.     Assessment & Plan    1. Urinary urgency    2. Mixed incontinence urge and stress (male)(female)       I had the pleasure today meeting with Ms. Stahl to discuss her urgency, frequency, and urinary incontinence.  She actually has mixed urinary incontinence picture, but the urge incontinence is significantly worse.  She does not ingest a significant number of bladder irritants.    We discussed that we typically would treat urge  incontinence first in a mixed incontinence picture of urge and stress incontinence. Typical treatments for urge incontinence include avoiding bladder irritants, biofeedback bladder retraining, overactive bladder medications such as oxybutynin, posterior tibial nerve stimulation (PTNS), Botox (which would require learning to perform clean intermittent catheterization and place it works too well), and InterStim. We typically go through this in a stepwise fashion.     Possible side effects with overactive bladder medications that are anticholinergic medication include dry eyes, dry mouth, constipation, difficulties with sweating, and possible mental fogginess. These medications typically take 3-4 weeks to work. Side effects will typically show up prior to this. Myrbetriq is not in anticholinergic medication, but it is rather extensive even after insurance. Most common side effect with this is hypertension.     We discussed PTNS. This includes placement of needles in the posterior tibial nerve. This is once a week for 12 weeks. Patient does need to make sure they do not miss more than 1 session, or they will need to start over. It is 30 minutes at a time. We do not expect improvement until at least week 5-6, and some patients take longer. It does not work for all patients.     Botox is injected into the bladder. It typically takes 2 weeks before this takes effect. Average length of time per treatment that patients get relief for is approximately 6 months. Some patients get more lasting relief. We also discussed briefly the implantable device InterStim.     -Recommendation would be for a nurse visit for a postvoid residual to ensure that she is emptying her bladder completely.    -Would recommend pelvic floor physical therapy as this would help both types of urinary leakage.    -Follow-up in 3 to 4 months to assess progress and if we need to accelerate therapy.    -Briefly discussed overactive bladder medication, but  would like to avoid anticholinergics given her history of Sjogren's which may be exacerbated by these.  Additionally, we discussed Myrbetriq, but given that there is no generic, this may be too expensive.    -Continue with MiraLAX twice daily to keep bowel movements soft and regular.    Signed by:     Erika Helms PA-C 12/6/2021 4:39 PM

## 2021-12-06 NOTE — PATIENT INSTRUCTIONS
-Recommendation would be for a nurse visit for a postvoid residual to ensure that she is emptying her bladder completely.    -Would recommend pelvic floor physical therapy as this would help both types of urinary leakage.   **This order will print in the [facility] Scheduling Office**    Physical Therapy available through:    *[facility]    Call one number to schedule at any of the above locations: [phone].    Your provider has referred you to: Physical Therapy at [facility] or [facility]    Indication/Reason for Referral: Women's Health  Onset of Illness: >6 months  Therapy Orders: Evaluate and Treat  Special Programs: None and Women's Health: Pelvic Dysfunction:   Pelvic Floor Weakness: Incontinence: Mixed, but urge is worse and  Biofeedback, E-Stim/TENS/TENS Rental if deemed appropriate by therapist, Exercise/HEP and Myofascial Release/Massage (internal)  Special Request: Exercise: Home Exercise Program  Manual Therapy: Myofascial Release/Massage (internal)  Modalities: As Indicated E-Stim/TENS    Additional Comments for the Therapist : Core strengthening    Please be aware that coverage of these services is subject to the terms and limitations of your health insurance plan.  Call member services at your health plan with any benefit or coverage questions.      Please bring the following to your appointment:    *Your personal calendar for scheduling future appointments  *Comfortable clothing    -Follow-up in 3 to 4 months to assess progress and if we need to accelerate therapy.    -Briefly discussed overactive bladder medication, but would like to avoid anticholinergics given her history of Sjogren's which may be exacerbated by these.  Additionally, we discussed Myrbetriq, but given that there is no generic, this may be too expensive.    -Continue with MiraLAX twice daily to keep bowel movements soft and regular.

## 2021-12-07 ENCOUNTER — ALLIED HEALTH/NURSE VISIT (OUTPATIENT)
Dept: FAMILY MEDICINE | Facility: CLINIC | Age: 81
End: 2021-12-07
Payer: COMMERCIAL

## 2021-12-07 ENCOUNTER — TELEPHONE (OUTPATIENT)
Dept: FAMILY MEDICINE | Facility: CLINIC | Age: 81
End: 2021-12-07

## 2021-12-07 VITALS — DIASTOLIC BLOOD PRESSURE: 64 MMHG | SYSTOLIC BLOOD PRESSURE: 120 MMHG | HEART RATE: 64 BPM

## 2021-12-07 DIAGNOSIS — Z01.30 BP CHECK: Primary | ICD-10-CM

## 2021-12-07 PROCEDURE — 99207 PR NO CHARGE NURSE ONLY: CPT

## 2021-12-07 NOTE — PROGRESS NOTES
Ya Stahl is a 81 year old patient who comes in today for a Blood Pressure check.  Initial BP:  /64 (BP Location: Right arm, Patient Position: Sitting, Cuff Size: Adult Regular)   Pulse 64      64  Disposition: results routed to provider    Nataliia Chester MA

## 2021-12-07 NOTE — TELEPHONE ENCOUNTER
Ya Stahl is a 81 year old patient who comes in today for a Blood Pressure check.  Initial BP:  /64 (BP Location: Right arm, Patient Position: Sitting, Cuff Size: Adult Regular)   Pulse 64      64  Disposition: results routed to provider    Nataliia Chester MA      BP Readings from Last 6 Encounters:   12/07/21 120/64   11/19/21 138/78   11/15/21 138/76   04/16/21 110/64   02/09/21 120/62   01/05/21 120/68       PLEASE ADVISE IF PATIENT NEEDS TO RETURN FOR ANOTHER BLOOD PRESSURE CHECK BEFORE PHYSICAL IN January VIA MY CHART.

## 2021-12-10 DIAGNOSIS — R39.15 URINARY URGENCY: Primary | ICD-10-CM

## 2021-12-14 ENCOUNTER — ALLIED HEALTH/NURSE VISIT (OUTPATIENT)
Dept: UROLOGY | Facility: CLINIC | Age: 81
End: 2021-12-14
Payer: COMMERCIAL

## 2021-12-14 DIAGNOSIS — R39.15 URINARY URGENCY: Primary | ICD-10-CM

## 2021-12-14 LAB — RESIDUAL VOLUME (RV) (EXTERNAL): 8

## 2021-12-14 PROCEDURE — 99207 PR NO CHARGE NURSE ONLY: CPT

## 2021-12-14 NOTE — PROGRESS NOTES
Ya Stahl comes into clinic today at the request of Erika MANRIQUEZ Ordering Provider for [facility]  This service provided today was under the supervising provider of the day Erika Manriquez, who was available if needed.  post void residual 8 ml  Follow up as planned.  Liliane Espinoza LPN

## 2021-12-23 ENCOUNTER — THERAPY VISIT (OUTPATIENT)
Dept: PHYSICAL THERAPY | Facility: CLINIC | Age: 81
End: 2021-12-23
Payer: COMMERCIAL

## 2021-12-23 DIAGNOSIS — M62.81 MUSCLE WEAKNESS (GENERALIZED): ICD-10-CM

## 2021-12-23 DIAGNOSIS — N39.46 MIXED INCONTINENCE: Primary | ICD-10-CM

## 2021-12-23 PROCEDURE — 97161 PT EVAL LOW COMPLEX 20 MIN: CPT | Mod: GP | Performed by: PHYSICAL THERAPIST

## 2021-12-23 PROCEDURE — 97112 NEUROMUSCULAR REEDUCATION: CPT | Mod: GP | Performed by: PHYSICAL THERAPIST

## 2021-12-23 PROCEDURE — 97535 SELF CARE MNGMENT TRAINING: CPT | Mod: GP | Performed by: PHYSICAL THERAPIST

## 2021-12-23 PROCEDURE — 97110 THERAPEUTIC EXERCISES: CPT | Mod: GP | Performed by: PHYSICAL THERAPIST

## 2021-12-23 NOTE — PROGRESS NOTES
Physical Therapy Initial Evaluation  Subjective:    Patient Health History  Ya Stahl being seen for Incontinence.     Date of Onset: 6 months ago.   Problem occurred: don't know   Pain is reported as 0/10 on pain scale.  General health as reported by patient is good.  Pertinent medical history includes: high blood pressure, thyroid problems, osteoarthritis and incontinence.   Red flags:  None as reported by patient.  Medical allergies: adhesives.   Surgeries include:  Orthopedic surgery. Other surgery history details: right knee and hip replacement; hysterectomy and bladder lift age 37.    Current medications:  Bone density and high blood pressure medication.    Current occupation is Retired.                     Therapist Generated HPI Evaluation  Problem details: Patient has chief complaint of urinary urge incontinence which started insidiously 6 months ago. She also complains of stress incontinence as well. She has 4-5 small to medium leaks/day. She tried using pads for protection but had to stop due to chapping from them. She voids every 1-2 hours during the day and every 2 hours at night. She drinks 4 large perez/day, and does not drink caffeine. Urge incontinence is the most bothersome to her, which she has with doing dishes, eating dinner and getting into her elevator in her apartment. [facility] showed complete emptying. History of 4 vaginal deliveries; hysterectomy and bladder lift at age 37 with excellent results. No pelvic pain. .         Type of problem:  Incontinence.      Condition occurred with:  Insidious onset.          Since onset symptoms are gradually worsening.  Symptoms are exacerbated by coughing and sneezing (urge)  and relieved by nothing.      Barriers include:  None as reported by patient.    Patient Health History  Ya Stahl being seen for Incontinence.     Date of Onset: 6 months ago.   Problem occurred: don't know   Pain is reported as 0/10 on pain scale.  General  health as reported by patient is good.  Pertinent medical history includes: high blood pressure, thyroid problems, osteoarthritis and incontinence.   Red flags:  None as reported by patient.  Medical allergies: adhesives.   Surgeries include:  Orthopedic surgery. Other surgery history details: right knee and hip replacement; hysterectomy and bladder lift age 37.    Current medications:  Bone density and high blood pressure medication.    Current occupation is Retired.                                       Objective:  System                                 Pelvic Dysfunction Evaluation:    Bladder/Pelvic Problems:    Storage Problem:  Frequency, urge incontinence and stress incontinence        Diagnostic Tests:        Post Void Residual:  Normal                      Pelvic Clock Exam:  normal                External Assessment:    Skin Condition:  Atrophic    Bearing Down/Coughing:  Normal  Tissue Symmetry:  Normal  Introitus:  Normal  Muscle Contraction/Perineal Mobility:  Slight lift, no urogential triangle descent  Internal Assessment:  Internal assessment pelvic: 3-/5 strength.  Sensory Exam:  Normal  Contraction/Grade:  Fair squeeze, definite lift (3)          SEMG Biofeedback:        Suraface electrode placement--Perianal:  Yes  Baseline EMG PM:  1.0 mv    Peak pelvic muscle contraction:  10 mv  Sustained contraction:  6 mv  EMG interpretation to fatigue:  3-5 seconds  Position:  Sitting and supineAdditional History:  Delivery History:  Vaginal delivery  Number of Pregnancies: 6  Number of Live Births: 4  Caffeine Consumption:  None                     General     ROS    Assessment/Plan:    Patient is a 81 year old female with pelvic complaints.    Patient has the following significant findings with corresponding treatment plan.                Diagnosis 1:  incontinence  Decreased strength - therapeutic exercise, therapeutic activities and home program  Impaired muscle performance - biofeedback and neuro  re-education  Decreased function - therapeutic activities and home program    Therapy Evaluation Codes:   1) History comprised of:   Personal factors that impact the plan of care:      None.    Comorbidity factors that impact the plan of care are:      None.     Medications impacting care: None.  2) Examination of Body Systems comprised of:   Body structures and functions that impact the plan of care:      Pelvis.   Activity limitations that impact the plan of care are:      Frequency, Stress incontinence and Urge incontinence.  3) Clinical presentation characteristics are:   Stable/Uncomplicated.  4) Decision-Making    Low complexity using standardized patient assessment instrument and/or measureable assessment of functional outcome.  Cumulative Therapy Evaluation is: Low complexity.    Previous and current functional limitations:  (See Goal Flow Sheet for this information)    Short term and Long term goals: (See Goal Flow Sheet for this information)     Communication ability:  Patient appears to be able to clearly communicate and understand verbal and written communication and follow directions correctly.  Treatment Explanation - The following has been discussed with the patient:   RX ordered/plan of care  Anticipated outcomes  Possible risks and side effects  This patient would benefit from PT intervention to resume normal activities.   Rehab potential is excellent.    Frequency:  2 X a month, once daily  Duration:  for 2 months  Discharge Plan:  Achieve all LTG.  Independent in home treatment program.  Reach maximal therapeutic benefit.    Please refer to the daily flowsheet for treatment today, total treatment time and time spent performing 1:1 timed codes.

## 2021-12-23 NOTE — PROGRESS NOTES
[facility]    OUTPATIENT Physical Therapy ORTHOPEDIC EVALUATION  PLAN OF TREATMENT FOR OUTPATIENT REHABILITATION  (COMPLETE FOR INITIAL CLAIMS ONLY)  Patient's Last Name, First Name, M.I.  YOB: 1940  Ya Stahl    Provider s Name:  [facility]   Medical Record No.  [id number]   Start of Care Date:  12/23/21   Onset Date:   12/23/21 (MD orders)   Type:     _X__PT   ___OT Medical Diagnosis:  No diagnosis found.     Treatment Diagnosis:  Mixed incontinence        Goals:                                                                             [facility]    OUTPATIENT Physical Therapy ORTHOPEDIC EVALUATION  PLAN OF TREATMENT FOR OUTPATIENT REHABILITATION  (COMPLETE FOR INITIAL CLAIMS ONLY)  Patient's Last Name, First Name, M.I.  YOB: 1940  Ya Stahl    Provider s Name:  [facility]   Medical Record No.  [id number]   Start of Care Date:  12/23/21   Onset Date:   12/23/21 (MD orders)   Type:     _X__PT   ___OT Medical Diagnosis:  No diagnosis found.     Treatment Diagnosis:  Mixed incontinence        Goals:     12/23/21 0700   Urinary Leakage   Previous Functional Level No problems   Current Functional Level Number of urinary leakage episodes in a day   Performance Level 4-5 small to medium   STG Target Performance Decrease urinary leakage episodes in a day to   Performance Level 2-3 small   Rationale continence throughout the day   Due Date 01/13/22   LTG Target Performance Decrease urinary leakage episodes in a week to   Performance Level 1-2 small   Rationale continence throughout the day   Due Date 02/17/22   Voiding Patterns   Previous Functional Level No problems   Current Functional Level Number of times patient voids during day;Number of times patient voids during night    Peformance level every 1-2 hours in day;every 2 hours at night   STG Target Performance Reduce number of times patient voids at night   Performance Level 3   Rationale to establish restorative sleep pattern   Due Date 01/13/22   LTG Target Performance Lengthen the interval of time between voids to   Performance Level every 2-3 hours   Rationale work toward normal voiding intervals to focus on ADLS, work, or school   Due Date 02/17/22       Therapy Frequency:  2x/month  Predicted Duration of Therapy Intervention:  2 months    Brielle Pearson PT                 I CERTIFY THE NEED FOR THESE SERVICES FURNISHED UNDER        THIS PLAN OF TREATMENT AND WHILE UNDER MY CARE     (Physician attestation of this document indicates review and certification of the therapy plan).                       Certification Date From:  12/23/21   Certification Date To:  03/22/22    Referring Provider:  Erika Helms    Initial Assessment        See Epic Evaluation SOC Date: 12/23/21                                                                   Therapy Frequency:  2x/month  Predicted Duration of Therapy Intervention:  2 months    Brielle Pearson PT                 I CERTIFY THE NEED FOR THESE SERVICES FURNISHED UNDER        THIS PLAN OF TREATMENT AND WHILE UNDER MY CARE     (Physician attestation of this document indicates review and certification of the therapy plan).                       Certification Date From:  12/23/21   Certification Date To:  03/22/22    Referring Provider:  Erika Helms    Initial Assessment        See Epic Evaluation SOC Date: 12/23/21

## 2022-01-06 ENCOUNTER — OFFICE VISIT (OUTPATIENT)
Dept: FAMILY MEDICINE | Facility: CLINIC | Age: 82
End: 2022-01-06
Payer: COMMERCIAL

## 2022-01-06 VITALS
RESPIRATION RATE: 20 BRPM | HEIGHT: 64 IN | HEART RATE: 54 BPM | SYSTOLIC BLOOD PRESSURE: 132 MMHG | WEIGHT: 135 LBS | BODY MASS INDEX: 23.05 KG/M2 | DIASTOLIC BLOOD PRESSURE: 68 MMHG | OXYGEN SATURATION: 99 % | TEMPERATURE: 98.1 F

## 2022-01-06 DIAGNOSIS — Z12.31 ENCOUNTER FOR SCREENING MAMMOGRAM FOR BREAST CANCER: ICD-10-CM

## 2022-01-06 DIAGNOSIS — M35.01 SJOGREN'S SYNDROME WITH KERATOCONJUNCTIVITIS SICCA (H): ICD-10-CM

## 2022-01-06 DIAGNOSIS — M81.0 SENILE OSTEOPOROSIS: ICD-10-CM

## 2022-01-06 DIAGNOSIS — E78.5 HYPERLIPIDEMIA LDL GOAL <70: ICD-10-CM

## 2022-01-06 DIAGNOSIS — E03.9 HYPOTHYROIDISM, UNSPECIFIED TYPE: ICD-10-CM

## 2022-01-06 DIAGNOSIS — R68.2 DRY MOUTH: ICD-10-CM

## 2022-01-06 DIAGNOSIS — M15.8 OTHER OSTEOARTHRITIS INVOLVING MULTIPLE JOINTS: ICD-10-CM

## 2022-01-06 DIAGNOSIS — I77.810 ASCENDING AORTA DILATION (H): ICD-10-CM

## 2022-01-06 DIAGNOSIS — I10 HYPERTENSION GOAL BP (BLOOD PRESSURE) < 140/90: ICD-10-CM

## 2022-01-06 DIAGNOSIS — Z00.00 ENCOUNTER FOR MEDICARE ANNUAL WELLNESS EXAM: Primary | ICD-10-CM

## 2022-01-06 LAB
ALBUMIN SERPL-MCNC: 3.8 G/DL (ref 3.4–5)
ALP SERPL-CCNC: 80 U/L (ref 40–150)
ALT SERPL W P-5'-P-CCNC: 24 U/L (ref 0–50)
ANION GAP SERPL CALCULATED.3IONS-SCNC: 2 MMOL/L (ref 3–14)
AST SERPL W P-5'-P-CCNC: 20 U/L (ref 0–45)
BILIRUB SERPL-MCNC: 1.1 MG/DL (ref 0.2–1.3)
BUN SERPL-MCNC: 26 MG/DL (ref 7–30)
CALCIUM SERPL-MCNC: 8.9 MG/DL (ref 8.5–10.1)
CHLORIDE BLD-SCNC: 101 MMOL/L (ref 94–109)
CHOLEST SERPL-MCNC: 160 MG/DL
CO2 SERPL-SCNC: 32 MMOL/L (ref 20–32)
CREAT SERPL-MCNC: 0.72 MG/DL (ref 0.52–1.04)
ERYTHROCYTE [DISTWIDTH] IN BLOOD BY AUTOMATED COUNT: 12.7 % (ref 10–15)
FASTING STATUS PATIENT QL REPORTED: YES
GFR SERPL CREATININE-BSD FRML MDRD: 84 ML/MIN/1.73M2
GLUCOSE BLD-MCNC: 113 MG/DL (ref 70–99)
HCT VFR BLD AUTO: 42.8 % (ref 35–47)
HDLC SERPL-MCNC: 99 MG/DL
HGB BLD-MCNC: 13.5 G/DL (ref 11.7–15.7)
LDLC SERPL CALC-MCNC: 49 MG/DL
MCH RBC QN AUTO: 31.8 PG (ref 26.5–33)
MCHC RBC AUTO-ENTMCNC: 31.5 G/DL (ref 31.5–36.5)
MCV RBC AUTO: 101 FL (ref 78–100)
NONHDLC SERPL-MCNC: 61 MG/DL
PLATELET # BLD AUTO: 278 10E3/UL (ref 150–450)
POTASSIUM BLD-SCNC: 4.1 MMOL/L (ref 3.4–5.3)
PROT SERPL-MCNC: 7.2 G/DL (ref 6.8–8.8)
RBC # BLD AUTO: 4.24 10E6/UL (ref 3.8–5.2)
SODIUM SERPL-SCNC: 135 MMOL/L (ref 133–144)
TRIGL SERPL-MCNC: 61 MG/DL
TSH SERPL DL<=0.005 MIU/L-ACNC: 1.39 MU/L (ref 0.4–4)
WBC # BLD AUTO: 8.1 10E3/UL (ref 4–11)

## 2022-01-06 PROCEDURE — 99397 PER PM REEVAL EST PAT 65+ YR: CPT | Performed by: FAMILY MEDICINE

## 2022-01-06 PROCEDURE — 80053 COMPREHEN METABOLIC PANEL: CPT | Performed by: FAMILY MEDICINE

## 2022-01-06 PROCEDURE — 84443 ASSAY THYROID STIM HORMONE: CPT | Performed by: FAMILY MEDICINE

## 2022-01-06 PROCEDURE — 80061 LIPID PANEL: CPT | Performed by: FAMILY MEDICINE

## 2022-01-06 PROCEDURE — 85027 COMPLETE CBC AUTOMATED: CPT | Performed by: FAMILY MEDICINE

## 2022-01-06 PROCEDURE — 99214 OFFICE O/P EST MOD 30 MIN: CPT | Mod: 25 | Performed by: FAMILY MEDICINE

## 2022-01-06 PROCEDURE — 36415 COLL VENOUS BLD VENIPUNCTURE: CPT | Performed by: FAMILY MEDICINE

## 2022-01-06 RX ORDER — METOPROLOL SUCCINATE 25 MG/1
25 TABLET, EXTENDED RELEASE ORAL DAILY
Qty: 90 TABLET | Refills: 3 | Status: SHIPPED | OUTPATIENT
Start: 2022-01-06 | End: 2023-01-11

## 2022-01-06 RX ORDER — CEVIMELINE HYDROCHLORIDE 30 MG/1
30 CAPSULE ORAL 3 TIMES DAILY
Qty: 270 CAPSULE | Refills: 3 | Status: SHIPPED | OUTPATIENT
Start: 2022-01-06 | End: 2022-11-07

## 2022-01-06 RX ORDER — FAMOTIDINE 40 MG/1
40 TABLET, FILM COATED ORAL DAILY
COMMUNITY
End: 2023-01-12

## 2022-01-06 RX ORDER — ATORVASTATIN CALCIUM 20 MG/1
20 TABLET, FILM COATED ORAL DAILY
Qty: 102 TABLET | Refills: 3 | Status: SHIPPED | OUTPATIENT
Start: 2022-01-06 | End: 2023-01-11

## 2022-01-06 RX ORDER — LEVOTHYROXINE SODIUM 100 UG/1
100 TABLET ORAL DAILY
Qty: 90 TABLET | Refills: 3 | Status: SHIPPED | OUTPATIENT
Start: 2022-01-06 | End: 2023-01-11

## 2022-01-06 RX ORDER — BENZONATATE 200 MG/1
200 CAPSULE ORAL 2 TIMES DAILY
COMMUNITY
Start: 2021-12-03 | End: 2023-05-15

## 2022-01-06 RX ORDER — AMLODIPINE BESYLATE 5 MG/1
5 TABLET ORAL DAILY
Qty: 102 TABLET | Refills: 3 | Status: SHIPPED | OUTPATIENT
Start: 2022-01-06 | End: 2022-07-14

## 2022-01-06 ASSESSMENT — ENCOUNTER SYMPTOMS
PALPITATIONS: 0
DYSURIA: 0
FREQUENCY: 1
SHORTNESS OF BREATH: 0
EYE PAIN: 0
BREAST MASS: 0
CHILLS: 0
ARTHRALGIAS: 1
DIZZINESS: 0
CONSTIPATION: 0
WEAKNESS: 0
PARESTHESIAS: 0
MYALGIAS: 0
JOINT SWELLING: 0
DIARRHEA: 0
COUGH: 0
SORE THROAT: 0
HEADACHES: 0
NERVOUS/ANXIOUS: 0
HEMATURIA: 0
ABDOMINAL PAIN: 0
HEMATOCHEZIA: 0
NAUSEA: 0
HEARTBURN: 0
FEVER: 0

## 2022-01-06 ASSESSMENT — MIFFLIN-ST. JEOR: SCORE: 1067.36

## 2022-01-06 ASSESSMENT — ACTIVITIES OF DAILY LIVING (ADL): CURRENT_FUNCTION: NO ASSISTANCE NEEDED

## 2022-01-06 ASSESSMENT — PAIN SCALES - GENERAL: PAINLEVEL: MODERATE PAIN (4)

## 2022-01-06 NOTE — PATIENT INSTRUCTIONS
Patient Education   Personalized Prevention Plan  You are due for the preventive services outlined below.  Your care team is available to assist you in scheduling these services.  If you have already completed any of these items, please share that information with your care team to update in your medical record.  Health Maintenance Due   Topic Date Due     ANNUAL REVIEW OF HM ORDERS  Never done     PHQ-2  01/01/2022     FALL RISK ASSESSMENT  01/05/2022

## 2022-01-06 NOTE — PROGRESS NOTES
"medSUBJECTIVE:   Ya Stahl is a 81 year old female who presents for Preventive Visit.      Patient has been advised of split billing requirements and indicates understanding: Yes   Are you in the first 12 months of your Medicare coverage?  No    Healthy Habits:     In general, how would you rate your overall health?  Good    Frequency of exercise:  6-7 days/week    Duration of exercise:  45-60 minutes    Do you usually eat at least 4 servings of fruit and vegetables a day, include whole grains    & fiber and avoid regularly eating high fat or \"junk\" foods?  Yes    Taking medications regularly:  Yes    Medication side effects:  Not applicable    Ability to successfully perform activities of daily living:  No assistance needed    Home Safety:  No safety concerns identified    Hearing Impairment:  Need to ask people to speak up or repeat themselves and difficulty understanding soft or whispered speech    In the past 6 months, have you been bothered by leaking of urine?  No    In general, how would you rate your overall mental or emotional health?  Excellent      PHQ-2 Total Score: 0    Additional concerns today:  No    Do you feel safe in your environment? Yes    Bladder problems, seeing urologist, and PT to help, it is helping.  Have you ever done Advance Care Planning? (For example, a Health Directive, POLST, or a discussion with a medical provider or your loved ones about your wishes): Yes, patient states has an Advance Care Planning document and will bring a copy to the clinic.     HTN-stopped the losartan-cough is 50% better. Taking metoprolol    Ya is seen today for follow-up of her hypothyroidism,   She has been doing well, noting no tremor, insomnia, hair loss or changes in skin texture. She continues to take her meds as directed, without adverse reactions or side effects.  She would like it checked again, gets dizzy and jittery, she says this is new and wondering if dose is right? She went from " , and back and forth,   TSH   Date Value Ref Range Status   04/16/2021 1.87 0.40 - 4.00 mU/L Final      Patient presents for a recheck of hyperlipidemia.  The patient is not currently following a low fat diet plan and is following a regular exercise plan.  LDL Cholesterol Calculated   Date Value Ref Range Status   01/19/2021 64 <100 mg/dL Final     Comment:     Desirable:       <100 mg/dl   takes lipitor 20mg      Sjogren and seeing regularly, does not have rheumatoid arthritis. Does have OA in her hands and it does hurt. Taking medications regularly and is working, still with dry eyes and dry mouth. restasis for the eyes, refresh. evoxac is also taken per rheum    GI work up DrGanz, taking pepcid and prevacid    Cardiology in the past, does not see them regularly, not for years., hx of MI, used to be on plavix and aspirin but now just aspirin per cards recommendations    Fall risk  Fallen 2 or more times in the past year?: No  Any fall with injury in the past year?: No    Cognitive Screening   1) Repeat 3 items (Leader, Season, Table)    2) Clock draw: NORMAL  3) 3 item recall: Recalls 3 objects  Results: 3 items recalled: COGNITIVE IMPAIRMENT LESS LIKELY    Mini-CogTM Copyright S [employer]. Licensed by the author for use in [facility]; reprinted with permission (eduar@.[facility]). All rights reserved.          Reviewed and updated as needed this visit by clinical staff  Tobacco  Allergies  Meds   Med Hx  Surg Hx  Fam Hx  Soc Hx       Reviewed and updated as needed this visit by Provider               Social History     Tobacco Use     Smoking status: Never Smoker     Smokeless tobacco: Never Used   Substance Use Topics     Alcohol use: Yes     Comment: social; maybe a glass of wine or highball per week          Alcohol Use 1/6/2022   Prescreen: >3 drinks/day or >7 drinks/week? No   Prescreen: >3 drinks/day or >7 drinks/week? -           Hypertension Follow-up      Do you check your blood pressure  regularly outside of the clinic? No     Are you following a low salt diet? Yes    Are your blood pressures ever more than 140 on the top number (systolic) OR more   than 90 on the bottom number (diastolic), for example 140/90?       Current providers sharing in care for this patient include:   Patient Care Team:  Devi Romano MD as PCP - General (Family Medicine)  Venancio Montanez MD as Assigned Heart and Vascular Provider  Devi Romano MD as Assigned PCP  Erika Helms PA-C as Assigned Surgical Provider    The following health maintenance items are reviewed in Epic and correct as of today:  Health Maintenance Due   Topic Date Due     ANNUAL REVIEW OF HM ORDERS  Never done     FALL RISK ASSESSMENT  01/05/2022     BP Readings from Last 3 Encounters:   01/06/22 132/68   12/07/21 120/64   11/19/21 138/78    Wt Readings from Last 3 Encounters:   01/06/22 61.2 kg (135 lb)   12/06/21 59 kg (130 lb)   11/15/21 60.8 kg (134 lb 1.6 oz)                  Recent Labs   Lab Test 04/16/21  1153 01/19/21  0825 08/05/20  0550 12/21/18  0930 10/25/18  1016 02/01/18  1725 12/19/17  0751   A1C  --  5.9*  --  5.7*  --   --  6.3*   LDL  --  64  --  70  --   --  60   HDL  --  89  --  83  --   --  99   TRIG  --  108  --  98  --   --  97   ALT  --  25  --  20 25   < > 46   CR  --  0.72 0.70 0.85 0.74   < > 0.75   GFRESTIMATED  --  79 >60 66 76   < > 75   GFRESTBLACK  --  >90 >60 76 >90   < > >90   POTASSIUM  --  4.0 4.2  4.2 4.1 4.0   < > 4.1   TSH 1.87 3.02  --  5.46*  --    < > 4.82*    < > = values in this interval not displayed.      Mammogram Screening: Mammogram Screening - Patient over age 75, has elected to continue with screening.      Pertinent mammograms are reviewed under the imaging tab.    Review of Systems   Constitutional: Negative for chills and fever.   HENT: Positive for hearing loss. Negative for congestion, ear pain and sore throat.    Eyes: Negative for pain and visual  "disturbance.   Respiratory: Negative for cough and shortness of breath.    Cardiovascular: Negative for chest pain, palpitations and peripheral edema.   Gastrointestinal: Negative for abdominal pain, constipation, diarrhea, heartburn, hematochezia and nausea.   Breasts:  Negative for tenderness, breast mass and discharge.   Genitourinary: Positive for frequency and urgency. Negative for dysuria, genital sores, hematuria, pelvic pain, vaginal bleeding and vaginal discharge.   Musculoskeletal: Positive for arthralgias. Negative for joint swelling and myalgias.   Skin: Negative for rash.   Neurological: Negative for dizziness, weakness, headaches and paresthesias.   Psychiatric/Behavioral: Negative for mood changes. The patient is not nervous/anxious.          OBJECTIVE:   /68 (BP Location: Right arm, Patient Position: Sitting, Cuff Size: Adult Regular)   Pulse 54   Temp 98.1  F (36.7  C) (Oral)   Resp 20   Ht 1.626 m (5' 4\")   Wt 61.2 kg (135 lb)   SpO2 99%   BMI 23.17 kg/m   Estimated body mass index is 23.17 kg/m  as calculated from the following:    Height as of this encounter: 1.626 m (5' 4\").    Weight as of this encounter: 61.2 kg (135 lb).  Physical Exam  GENERAL: healthy, alert and no distress  EYES: Eyes grossly normal to inspection,  and conjunctivae and sclerae normal  HENT: ear canals and TM's normal, nose and mouth without ulcers or lesions  NECK: no adenopathy, no asymmetry, masses, or scars and thyroid normal to palpation  RESP: lungs clear to auscultation - no rales, rhonchi or wheezes  CV: regular rate and rhythm, normal S1 S2, , no peripheral edema   ABDOMEN: soft, nontender, no hepatosplenomegaly, no masses and bowel sounds normal  MS: hands with enlarged joints, DIP, PIP gross musculoskeletal defects noted, no edema  SKIN: no suspicious lesions or rashes  NEURO: Normal strength and tone, mentation intact and speech normal  PSYCH: mentation appears normal, affect " normal/bright    Diagnostic Test Results:  Labs reviewed in Epic    ASSESSMENT / PLAN:   (Z00.00) Encounter for Medicare annual wellness exam  (primary encounter diagnosis)  Comment: normal exam and reviewed questions  Plan: due for labs    (I77.810) Ascending aorta dilation (H)  Comment: pt was unaware, will follow with yearly echo  Plan: answered questions and added to problem list, BP control is essential    (R68.2) Dry mouth  Comment: cont same, per rheum  Plan: cevimeline (EVOXAC) 30 MG capsule            (E03.9) Hypothyroidism, unspecified type  Comment: refilled, labs not due, pt with sx, will order  Plan: levothyroxine (SYNTHROID/LEVOTHROID) 100 MCG         tablet, TSH with free T4 reflex            (M35.01) Sjogren's syndrome with keratoconjunctivitis sicca (H)  Comment: stable, see above  Plan: cont same    (E78.5) Hyperlipidemia LDL goal <70  Comment: fasting , refilled med, cont same  Plan: Lipid panel reflex to direct LDL Fasting,         Comprehensive metabolic panel (BMP + Alb, Alk         Phos, ALT, AST, Total. Bili, TP), atorvastatin         (LIPITOR) 20 MG tablet            (I10) Hypertension goal BP (blood pressure) < 140/90  Comment: controlled, cont same, off losartan, will with dry cough, but improving from before  Plan: Comprehensive metabolic panel (BMP + Alb, Alk         Phos, ALT, AST, Total. Bili, TP), CBC with         platelets, amLODIPine (NORVASC) 5 MG tablet,         metoprolol succinate ER (TOPROL-XL) 25 MG 24 hr        tablet            (M15.8) Other osteoarthritis involving multiple joints  Comment: will get OTC  Plan: diclofenac (VOLTAREN) 1 % topical gel            (M81.0) Senile osteoporosis  Comment: agrees, taking vit d and calcium, getting exercise  Plan: DX Hip/Pelvis/Spine            (Z12.31) Encounter for screening mammogram for breast cancer  Comment: pt wanting to continue mammograms  Plan: *MA Screening Digital Bilateral              Patient has been advised of split  "billing requirements and indicates understanding: Yes  COUNSELING:  Reviewed preventive health counseling, as reflected in patient instructions       Regular exercise       Healthy diet/nutrition       Bladder control       Osteoporosis prevention/bone health    Estimated body mass index is 23.17 kg/m  as calculated from the following:    Height as of this encounter: 1.626 m (5' 4\").    Weight as of this encounter: 61.2 kg (135 lb).        She reports that she has never smoked. She has never used smokeless tobacco.      Appropriate preventive services were discussed with this patient, including applicable screening as appropriate for cardiovascular disease, diabetes, osteopenia/osteoporosis, and glaucoma.  As appropriate for age/gender, discussed screening for colorectal cancer, prostate cancer, breast cancer, and cervical cancer. Checklist reviewing preventive services available has been given to the patient.    Reviewed patients plan of care and provided an AVS. The Intermediate Care Plan ( asthma action plan, low back pain action plan, and migraine action plan) for Ya meets the Care Plan requirement. This Care Plan has been established and reviewed with the Patient.    Counseling Resources:  ATP IV Guidelines  Pooled Cohorts Equation Calculator  Breast Cancer Risk Calculator  Breast Cancer: Medication to Reduce Risk  FRAX Risk Assessment  ICSI Preventive Guidelines  Dietary Guidelines for Americans, 2010  [employer] MyPlate  ASA Prophylaxis  Lung CA Screening    Devi Romano MD  [facility]    Identified Health Risks:  "

## 2022-01-25 ENCOUNTER — THERAPY VISIT (OUTPATIENT)
Dept: PHYSICAL THERAPY | Facility: CLINIC | Age: 82
End: 2022-01-25
Attending: PHYSICIAN ASSISTANT
Payer: COMMERCIAL

## 2022-01-25 DIAGNOSIS — M62.81 MUSCLE WEAKNESS (GENERALIZED): ICD-10-CM

## 2022-01-25 DIAGNOSIS — R39.15 URINARY URGENCY: ICD-10-CM

## 2022-01-25 DIAGNOSIS — N39.46 MIXED INCONTINENCE URGE AND STRESS (MALE)(FEMALE): ICD-10-CM

## 2022-01-25 PROCEDURE — 97110 THERAPEUTIC EXERCISES: CPT | Mod: GP | Performed by: PHYSICAL THERAPIST

## 2022-01-25 PROCEDURE — 97112 NEUROMUSCULAR REEDUCATION: CPT | Mod: GP | Performed by: PHYSICAL THERAPIST

## 2022-02-14 ENCOUNTER — ANCILLARY PROCEDURE (OUTPATIENT)
Dept: BONE DENSITY | Facility: CLINIC | Age: 82
End: 2022-02-14
Attending: FAMILY MEDICINE
Payer: COMMERCIAL

## 2022-02-14 ENCOUNTER — ANCILLARY PROCEDURE (OUTPATIENT)
Dept: BONE DENSITY | Facility: CLINIC | Age: 82
End: 2022-02-14
Payer: COMMERCIAL

## 2022-02-14 DIAGNOSIS — M81.0 SENILE OSTEOPOROSIS: ICD-10-CM

## 2022-02-14 PROCEDURE — 77081 DXA BONE DENSITY APPENDICULR: CPT | Mod: 59 | Performed by: INTERNAL MEDICINE

## 2022-02-14 PROCEDURE — 77080 DXA BONE DENSITY AXIAL: CPT | Performed by: INTERNAL MEDICINE

## 2022-03-08 PROBLEM — M62.81 MUSCLE WEAKNESS (GENERALIZED): Status: RESOLVED | Noted: 2021-12-23 | Resolved: 2022-03-08

## 2022-03-08 NOTE — PROGRESS NOTES
Subjective:  HPI  Physical Exam                    Objective:  System    Physical Exam    General     ROS    Assessment/Plan:    DISCHARGE REPORT    Progress reporting period is from 12/23/2021 to 1/25/2022.     SUBJECTIVE  Subjective: Patient reports that she has some good days with minimal leaking, but other days can leak more often.   Current Pain level: 0/10   Initial Pain level: 0/10   Changes in function: Yes, see goal flow sheet for change in function   Adverse reactions: None.    OBJECTIVE  Objective: Added biofeedback and reviewed all exercises. Good awareness of Kegel exercise, but has difficulty maintaining contraction. Better control after biofeedback session.      ASSESSMENT/PLAN    STG/LTGs have been met or progress has been made towards goals:  Yes (See Goal flow sheet completed today.)  Assessment of Progress: The patient's condition is improving.  The patient's condition has potential to improve.  Self Management Plans:  Patient is independent in a home treatment program.  Patient is independent in self management of symptoms.    Ya continues to require the following intervention to meet STG and LTG's: PT intervention is no longer required to meet STG/LTG.    Recommendations:  This patient is ready to be discharged from therapy and continue their home treatment program.    Please refer to the daily flowsheet for treatment today, total treatment time and time spent performing 1:1 timed codes.

## 2022-03-18 ENCOUNTER — VIRTUAL VISIT (OUTPATIENT)
Dept: FAMILY MEDICINE | Facility: CLINIC | Age: 82
End: 2022-03-18
Payer: COMMERCIAL

## 2022-03-18 DIAGNOSIS — I77.819 AORTIC DILATATION (H): ICD-10-CM

## 2022-03-18 DIAGNOSIS — R73.09 ELEVATED GLUCOSE: ICD-10-CM

## 2022-03-18 DIAGNOSIS — Q78.2 SEVERE OSTEOPETROSIS: Primary | ICD-10-CM

## 2022-03-18 DIAGNOSIS — I10 HYPERTENSION GOAL BP (BLOOD PRESSURE) < 140/90: ICD-10-CM

## 2022-03-18 DIAGNOSIS — I25.10 ASCVD (ARTERIOSCLEROTIC CARDIOVASCULAR DISEASE): ICD-10-CM

## 2022-03-18 LAB
HBA1C MFR BLD: 6.1 % (ref 0–5.6)
PTH-INTACT SERPL-MCNC: 38 PG/ML (ref 18–80)

## 2022-03-18 PROCEDURE — 83036 HEMOGLOBIN GLYCOSYLATED A1C: CPT | Performed by: FAMILY MEDICINE

## 2022-03-18 PROCEDURE — 36415 COLL VENOUS BLD VENIPUNCTURE: CPT | Performed by: FAMILY MEDICINE

## 2022-03-18 PROCEDURE — 99214 OFFICE O/P EST MOD 30 MIN: CPT | Mod: GT | Performed by: FAMILY MEDICINE

## 2022-03-18 PROCEDURE — 83970 ASSAY OF PARATHORMONE: CPT | Performed by: FAMILY MEDICINE

## 2022-03-18 PROCEDURE — 82306 VITAMIN D 25 HYDROXY: CPT | Performed by: FAMILY MEDICINE

## 2022-03-18 NOTE — PROGRESS NOTES
Ya is a 81 year old who is being evaluated via a billable video visit.      How would you like to obtain your AVS? MyChart  If the video visit is dropped, the invitation should be resent by: Text to cell phone: [phone]  Will anyone else be joining your video visit? No    Video Start Time: 11:24 AM    Assessment & Plan     Severe osteopetrosis  Discussed concerns, pt is working out and taking supplements, will check labs and place referral  - Vitamin D Deficiency; Future  - Parathyroid Hormone Intact; Future  - Adult Endocrinology  Referral; Future    Elevated glucose  Recheck labs  - Hemoglobin A1c; Future    ASCVD (arteriosclerotic cardiovascular disease)  Hx of this and seen in past 2/2021 with cards,   - Adult Cardiology Eval  Referral; Future    Aortic dilatation (H)  Echo needed, but also appt with cardiology, will place  - Adult Cardiology Eval  Referral; Future    Hypertension goal BP (blood pressure) < 140/90  No change today  In med , pt to check home BP daily and let me know in 1 week or so, if still high  - Adult Cardiology Eval  Referral; Future    Ordering of each unique test  Prescription drug management  30 minutes spent on the date of the encounter doing chart review, history and exam, documentation and further activities per the note       Regular exercise    Return in about 6 months (around 9/18/2022) for High blood pressure med check.    Devi Romano MD  [facility] LOUISE Pandya is a 81 year old who presents for the following health issues     History of Present Illness       Reason for visit:  She requested a visit after my Dexa    She eats 2-3 servings of fruits and vegetables daily.She consumes 0 sweetened beverage(s) daily.She exercises with enough effort to increase her heart rate 30 to 60 minutes per day.  She exercises with enough effort to increase her heart rate 6 days per week.   She is taking  "medications regularly.     Dexa results 2/14/2022  Severe osteoporosis on the basis of hip fracture.      Comparisons from different scanners that have not been cross calibrated, are not necessarily valid. Such a comparison has been performed here; one should interpret with caution.   There has been probably no significant cahnge in bone density of the hip(s). The forearm was not included on the previous study so comparison is not possible.       Recommendations include ensuring adequate Calcium and Vitamin D.     The current NOF Guidelines recommend treatment for patients with prior hip or vertebral fracture, T-score -2.5 or below, or 10 year risk of any major osteoporotic fracture >20% or 10 year risk of hip fracture >3%, as calculated using the FRAX calculator (www.shef.ac.uk/FRAX or you can google \"FRAX\").       Based on these guidelines, treatment (in addition to calcium and vitamin D) is recommended for this patient, after ruling out other causes of osteoporosis.  This is meant as an aid to clinical decision making; one must still use clinical judgement.      taking calcium with vit D twice daily and taking a separate vit D3x 2000 international unit(s) twice daily  Osteobiflx, glucosamine/chrondrotin twice daily  Goes to [employer] 5 days per week, seeing a , working on her upper body and lower body, lifting weights and walks the other days  Fosamax x 5 yrs and went off it.     Would like to be called with lab results.          Review of Systems   Constitutional, HEENT, cardiovascular, pulmonary, gi and gu systems are negative, except as otherwise noted.      Objective    Vitals - Patient Reported  Systolic (Patient Reported): (!) 143  Diastolic (Patient Reported): 81  Weight (Patient Reported): 59 kg (130 lb)  Pulse (Patient Reported): 53  Pain Score: No Pain (0)      Vitals:  No vitals were obtained today due to virtual visit.    Physical Exam   GENERAL: Healthy, alert and no distress  EYES: Eyes " grossly normal to inspection.  No discharge or erythema, or obvious scleral/conjunctival abnormalities.  RESP: No audible wheeze, cough, or visible cyanosis.  No visible retractions or increased work of breathing.    SKIN: Visible skin clear. No significant rash, abnormal pigmentation or lesions.  NEURO: Cranial nerves grossly intact.  Mentation and speech appropriate for age.  PSYCH: Mentation appears normal, affect normal/bright, judgement and insight intact, normal speech and appearance well-groomed.                Video-Visit Details    Type of service:  Video Visit    Video End Time:11:49 AM    Originating Location (pt. Location): Home    Distant Location (provider location):  [facility] [employer]     Platform used for Video Visit: MaxCD[employer]

## 2022-03-21 LAB — DEPRECATED CALCIDIOL+CALCIFEROL SERPL-MC: 55 UG/L (ref 20–75)

## 2022-03-22 ENCOUNTER — ANCILLARY PROCEDURE (OUTPATIENT)
Dept: MAMMOGRAPHY | Facility: CLINIC | Age: 82
End: 2022-03-22
Attending: FAMILY MEDICINE
Payer: COMMERCIAL

## 2022-03-22 DIAGNOSIS — Z12.31 ENCOUNTER FOR SCREENING MAMMOGRAM FOR BREAST CANCER: ICD-10-CM

## 2022-03-22 PROCEDURE — 77063 BREAST TOMOSYNTHESIS BI: CPT | Mod: TC | Performed by: RADIOLOGY

## 2022-03-22 PROCEDURE — 77067 SCR MAMMO BI INCL CAD: CPT | Mod: TC | Performed by: RADIOLOGY

## 2022-04-04 ENCOUNTER — OFFICE VISIT (OUTPATIENT)
Dept: UROLOGY | Facility: CLINIC | Age: 82
End: 2022-04-04
Payer: COMMERCIAL

## 2022-04-04 VITALS
SYSTOLIC BLOOD PRESSURE: 134 MMHG | WEIGHT: 132 LBS | HEIGHT: 64 IN | DIASTOLIC BLOOD PRESSURE: 74 MMHG | BODY MASS INDEX: 22.53 KG/M2

## 2022-04-04 DIAGNOSIS — R39.15 URINARY URGENCY: Primary | ICD-10-CM

## 2022-04-04 DIAGNOSIS — N39.46 MIXED INCONTINENCE URGE AND STRESS (MALE)(FEMALE): ICD-10-CM

## 2022-04-04 LAB
ALBUMIN UR-MCNC: NEGATIVE MG/DL
APPEARANCE UR: CLEAR
BILIRUB UR QL STRIP: NEGATIVE
COLOR UR AUTO: YELLOW
GLUCOSE UR STRIP-MCNC: NEGATIVE MG/DL
HGB UR QL STRIP: NEGATIVE
KETONES UR STRIP-MCNC: NEGATIVE MG/DL
LEUKOCYTE ESTERASE UR QL STRIP: NEGATIVE
NITRATE UR QL: NEGATIVE
PH UR STRIP: 5.5 [PH] (ref 5–7)
RESIDUAL VOLUME (RV) (EXTERNAL): 25
SP GR UR STRIP: 1.02 (ref 1–1.03)
UROBILINOGEN UR STRIP-ACNC: 0.2 E.U./DL

## 2022-04-04 PROCEDURE — 51798 US URINE CAPACITY MEASURE: CPT | Performed by: PHYSICIAN ASSISTANT

## 2022-04-04 PROCEDURE — 99213 OFFICE O/P EST LOW 20 MIN: CPT | Mod: 25 | Performed by: PHYSICIAN ASSISTANT

## 2022-04-04 PROCEDURE — 81003 URINALYSIS AUTO W/O SCOPE: CPT | Mod: QW | Performed by: PHYSICIAN ASSISTANT

## 2022-04-04 RX ORDER — MIRABEGRON 25 MG/1
25 TABLET, FILM COATED, EXTENDED RELEASE ORAL DAILY
Qty: 30 TABLET | Refills: 0 | Status: SHIPPED | OUTPATIENT
Start: 2022-04-04 | End: 2022-04-29

## 2022-04-04 RX ORDER — DOXYCYCLINE 50 MG/1
50 CAPSULE ORAL DAILY
COMMUNITY
Start: 2022-03-31 | End: 2022-10-13

## 2022-04-04 ASSESSMENT — PAIN SCALES - GENERAL: PAINLEVEL: NO PAIN (0)

## 2022-04-04 ASSESSMENT — ENCOUNTER SYMPTOMS
DYSURIA: 0
HEMATURIA: 0

## 2022-04-04 NOTE — PROGRESS NOTES
Subjective      CHIEF COMPLAINT/REASON FOR VISIT   Follow up urinary incontience     HISTORY OF PRESENT ILLNESS   Ms. Stahl is a very pleasant 81-year-old female, who presents today for follow-up regarding urinary urgency, and mixed urinary incontinence.  The urinary urgency had started less than a year ago.  She also had frequency and mixed urinary incontinence.  Her last postvoid residual was 8 mL in December.    Patient started pelvic floor physical therapy.  She is essentially done with this.  She notes that she is doing the exercises when she remembers at home.  She will often have 4-5 good days and then will have a bad day where she is dripping all the time.  Denies any hematuria or dysuria.  Bowel movements are good.    Of note, patient does have Sjogren's syndrome.    Postvoid residual today is 25 mL.  Urinalysis has no concern for infection or abnormalities.    The following portions of the patient's history were reviewed and updated as appropriate: allergies, current medications, past family history, past medical history, past social history, past surgical history, and problem list.     REVIEW OF SYSTEMS   Review of Systems   Genitourinary: Positive for urgency. Negative for dysuria and hematuria.      Per HPI.     Patient Active Problem List   Diagnosis     Hypothyroidism     Chondrodermatitis nodularis helicis     Grave's disease     Bile reflux gastritis     Hyperlipidemia LDL goal <70     Sjogren's disease (H)     HL (hearing loss)     Mixed incontinence     Health Care Home     Chronic cough     Impaired fasting glucose     Status post-operative repair of hip fracture, RIGHT     Constipation     Senile osteoporosis     Gastroesophageal reflux disease, unspecified whether esophagitis present     AMI (acute myocardial infarction) (H)     ASCVD (arteriosclerotic cardiovascular disease)     Macrocytosis     Monoclonal paraproteinemia     MGUS (monoclonal gammopathy of unknown significance)     Epigastric  "pain     Hypertension goal BP (blood pressure) < 140/90     Complication of anesthesia     S/P lumbar fusion     Ascending aorta dilation (H)     Other osteoarthritis involving multiple joints     Dry mouth     Elevated glucose     Severe osteopetrosis     Aortic dilatation (H)      Past Medical History:   Diagnosis Date     Arthritis      Complication of anesthesia     \"hard time waking up / N & V\"     Coronary artery disease      Disease of thyroid gland      Displacement of lumbar intervertebral disc without myelopathy      Gastro-oesophageal reflux disease      GERD (gastroesophageal reflux disease)      Hearing loss     has bilateral aids     Heart attack (H) 03/2016     History of anesthesia complications     delayed emergence     History of angina      Hypertension      Low back pain      Migraine headache      Myocardial infarction (H)      Numbness and tingling     down right leg (associated with back pain)     PONV (postoperative nausea and vomiting)      Sicca syndrome (H)      Sjogren's disease (H)      Sjogren's syndrome (H)     sees specialist; dentist     Spider veins      Unspecified hypothyroidism       Objective      PHYSICAL EXAM   /74   Ht 1.626 m (5' 4\")   Wt 59.9 kg (132 lb)   BMI 22.66 kg/m     Physical Exam  Constitutional:       Appearance: Normal appearance.   HENT:      Head: Normocephalic.      Nose: Nose normal.   Pulmonary:      Effort: Pulmonary effort is normal.   Abdominal:      General: There is no distension.   Neurological:      General: No focal deficit present.      Mental Status: She is alert and oriented to person, place, and time.   Psychiatric:         Mood and Affect: Mood normal.         Behavior: Behavior normal.         LABORATORY     Recent Labs   Lab Test 04/04/22  1459   COLOR Yellow   APPEARANCE Clear   URINEGLC Negative   URINEBILI Negative   URINEKETONE Negative   SG 1.020   UBLD Negative   URINEPH 5.5   PROTEIN Negative   UROBILINOGEN 0.2   NITRITE " Negative   LEUKEST Negative     TESTING    PVR: 25 mL    Assessment & Plan    1. Urinary urgency    2. Mixed incontinence urge and stress (male)(female)      I had pleasure today meeting with Ms. Stahl to discuss follow-up for her urinary incontinence and urgency.  She is having several days where she does well, but then will have a day or 2 where her symptoms are reoccurring and she has incontinence during the daytime.  She does note that she has not been as good about doing her exercises at home.    -Would recommend being good about continuing with follow-up for physical therapy exercises at home now that formal pelvic floor physical therapy has completed.  If she does not continue with exercises, things will typically go back to where they were.    -We will plan on trialing a medication at this time.  Given her Sjogren's, would like to avoid anticholinergics.  We will plan on starting Myrbetriq 25 mg once daily.  We discussed that this may be expensive.  If it is too expensive, she is going to trial this.    -Patient will let me know in approximately month how she is doing with the medication and pelvic floor physical therapy exercises.  If still having issues, could consider posterior tibial nerve stimulation, Botox, cystoscopy, or urodynamic study.  If she is not that bothered her, she can also continue with observation and she is emptying her bladder well.    Signed by:       Erika Helms PA-C 4/4/2022 3:28 PM

## 2022-04-04 NOTE — LETTER
4/4/2022       RE: Ya Stahl  [address]  [place] [address]     Dear Colleague,    Thank you for referring your patient, Ya Stahl, to the [facility] [place] [facility]. Please see a copy of my visit note below.    Subjective      CHIEF COMPLAINT/REASON FOR VISIT   Follow up urinary incontience     HISTORY OF PRESENT ILLNESS   Ms. Stahl is a very pleasant 81-year-old female, who presents today for follow-up regarding urinary urgency, and mixed urinary incontinence.  The urinary urgency had started less than a year ago.  She also had frequency and mixed urinary incontinence.  Her last postvoid residual was 8 mL in December.    Patient started pelvic floor physical therapy.  She is essentially done with this.  She notes that she is doing the exercises when she remembers at home.  She will often have 4-5 good days and then will have a bad day where she is dripping all the time.  Denies any hematuria or dysuria.  Bowel movements are good.    Of note, patient does have Sjogren's syndrome.    Postvoid residual today is 25 mL.  Urinalysis has no concern for infection or abnormalities.    The following portions of the patient's history were reviewed and updated as appropriate: allergies, current medications, past family history, past medical history, past social history, past surgical history, and problem list.     REVIEW OF SYSTEMS   Review of Systems   Genitourinary: Positive for urgency. Negative for dysuria and hematuria.      Per HPI.     Patient Active Problem List   Diagnosis     Hypothyroidism     Chondrodermatitis nodularis helicis     Grave's disease     Bile reflux gastritis     Hyperlipidemia LDL goal <70     Sjogren's disease (H)     HL (hearing loss)     Mixed incontinence     Health Care Home     Chronic cough     Impaired fasting glucose     Status post-operative repair of hip fracture, RIGHT  "    Constipation     Senile osteoporosis     Gastroesophageal reflux disease, unspecified whether esophagitis present     AMI (acute myocardial infarction) (H)     ASCVD (arteriosclerotic cardiovascular disease)     Macrocytosis     Monoclonal paraproteinemia     MGUS (monoclonal gammopathy of unknown significance)     Epigastric pain     Hypertension goal BP (blood pressure) < 140/90     Complication of anesthesia     S/P lumbar fusion     Ascending aorta dilation (H)     Other osteoarthritis involving multiple joints     Dry mouth     Elevated glucose     Severe osteopetrosis     Aortic dilatation (H)      Past Medical History:   Diagnosis Date     Arthritis      Complication of anesthesia     \"hard time waking up / N & V\"     Coronary artery disease      Disease of thyroid gland      Displacement of lumbar intervertebral disc without myelopathy      Gastro-oesophageal reflux disease      GERD (gastroesophageal reflux disease)      Hearing loss     has bilateral aids     Heart attack (H) 03/2016     History of anesthesia complications     delayed emergence     History of angina      Hypertension      Low back pain      Migraine headache      Myocardial infarction (H)      Numbness and tingling     down right leg (associated with back pain)     PONV (postoperative nausea and vomiting)      Sicca syndrome (H)      Sjogren's disease (H)      Sjogren's syndrome (H)     sees specialist; dentist     Spider veins      Unspecified hypothyroidism       Objective      PHYSICAL EXAM   /74   Ht 1.626 m (5' 4\")   Wt 59.9 kg (132 lb)   BMI 22.66 kg/m     Physical Exam  Constitutional:       Appearance: Normal appearance.   HENT:      Head: Normocephalic.      Nose: Nose normal.   Pulmonary:      Effort: Pulmonary effort is normal.   Abdominal:      General: There is no distension.   Neurological:      General: No focal deficit present.      Mental Status: She is alert and oriented to person, place, and time. "   Psychiatric:         Mood and Affect: Mood normal.         Behavior: Behavior normal.         LABORATORY     Recent Labs   Lab Test 04/04/22  1459   COLOR Yellow   APPEARANCE Clear   URINEGLC Negative   URINEBILI Negative   URINEKETONE Negative   SG 1.020   UBLD Negative   URINEPH 5.5   PROTEIN Negative   UROBILINOGEN 0.2   NITRITE Negative   LEUKEST Negative     TESTING    PVR: 25 mL    Assessment & Plan    1. Urinary urgency    2. Mixed incontinence urge and stress (male)(female)      I had pleasure today meeting with Ms. Stahl to discuss follow-up for her urinary incontinence and urgency.  She is having several days where she does well, but then will have a day or 2 where her symptoms are reoccurring and she has incontinence during the daytime.  She does note that she has not been as good about doing her exercises at home.    -Would recommend being good about continuing with follow-up for physical therapy exercises at home now that formal pelvic floor physical therapy has completed.  If she does not continue with exercises, things will typically go back to where they were.    -We will plan on trialing a medication at this time.  Given her Sjogren's, would like to avoid anticholinergics.  We will plan on starting Myrbetriq 25 mg once daily.  We discussed that this may be expensive.  If it is too expensive, she is going to trial this.    -Patient will let me know in approximately month how she is doing with the medication and pelvic floor physical therapy exercises.  If still having issues, could consider posterior tibial nerve stimulation, Botox, cystoscopy, or urodynamic study.  If she is not that bothered her, she can also continue with observation and she is emptying her bladder well.    Signed by:       Erika Helms PA-C 4/4/2022 3:28 PM

## 2022-04-04 NOTE — PATIENT INSTRUCTIONS
Continue working on your pelvic floor PT exercises.  You will need to keep up on these to continue to see benefit.    Will trial adding on Myrbetriq 25 mg once daily.  The most common side effect is increasing blood pressure. It will take about 3-4 weeks to see full effect.    Let me know by MyChart or phone [phone] how your are doing.  If you would like to continue the Myrbetriq, then we can send in to your mail order pharmacy.

## 2022-04-04 NOTE — NURSING NOTE
Chief Complaint   Patient presents with     Urinary urgency     Pt here for follow up     Mixed incontinence     PVR: 25 mL    Radha Mckeon CMA

## 2022-04-15 RX ORDER — LOSARTAN POTASSIUM 100 MG
TABLET ORAL
Qty: 102 TABLET | OUTPATIENT
Start: 2022-04-15

## 2022-04-26 ENCOUNTER — VIRTUAL VISIT (OUTPATIENT)
Dept: FAMILY MEDICINE | Facility: CLINIC | Age: 82
End: 2022-04-26
Payer: COMMERCIAL

## 2022-04-26 DIAGNOSIS — U07.1 CLINICAL DIAGNOSIS OF COVID-19: Primary | ICD-10-CM

## 2022-04-26 PROCEDURE — 99214 OFFICE O/P EST MOD 30 MIN: CPT | Mod: TEL | Performed by: NURSE PRACTITIONER

## 2022-04-26 NOTE — PROGRESS NOTES
"  Ya is a 81 year old who is being evaluated via a billable telephone visit.      What phone number would you like to be contacted at? [phone]  How would you like to obtain your AVS? MyChart    Assessment & Plan     There are no diagnoses linked to this encounter.    Reviewed chart for 10 minutes.    COVID-19 positive patient.  Encounter for consideration of medication intervention. Patient does qualify for a prescription. Full discussion with patient including medication options, risks and benefits. Potential drug interactions reviewed with patient.   Treatment Planned Paxlovid, Rx sent to [facility] [phone] [address] [facility], [address] [address]    Hours:  Mon-Fri: 8:00a - 6:00p  Sat-Sun: 8:00a - 4:00p    Drive-thru services available.  Temporary change to home medications:   Hold atorvastatin for 8 days.  Decrease dose of amlodipine to 2.5mg for 8 days.    Estimated body mass index is 22.66 kg/m  as calculated from the following:    Height as of 4/4/22: 1.626 m (5' 4\").    Weight as of 4/4/22: 59.9 kg (132 lb).  GFR Estimate   Date Value Ref Range Status   01/06/2022 84 >60 mL/min/1.73m2 Final     Comment:     Effective December 21, 2021 eGFRcr in adults is calculated using the 2021 CKD-EPI creatinine equation which includes age and gender (Nikki et al., NEJM, DOI: 10.1056/HNFSpu4069290)   01/19/2021 79 >60 mL/min/[1.73_m2] Final     Comment:     Non  GFR Calc  Starting 12/18/2018, serum creatinine based estimated GFR (eGFR) will be   calculated using the Chronic Kidney Disease Epidemiology Collaboration   (CKD-EPI) equation.       Lab Results   Component Value Date    [id number] NEGATIVE 08/01/2020       No follow-ups on file.    SINTIA Carmona Ra, CNP  [facility] [place]    Janet Pandya is a 81 year old who presents for the following health issues     HPI       COVID-19 Symptom Review  How many days ago did " "these symptoms start? 4/24/2022. She took a home test on Sunday and was positive.    Are any of the following symptoms significant for you?  New or worsening difficulty breathing? Yes    Please describe what kind of difficulty you are having breathing:No dyspnea, or dyspnea at patients normal baseline.     Worsening cough? Yes, it's a dry cough.     Fever or chills? No    Headache: YES- yesterday only    Sore throat: YES- scratchy     Chest pain: YES-  \"my chest hurts\"        Diarrhea: no    Body aches? No    Taking in food and fluids.  No vomiting.  No fever.  Dry cough.  No issues breathing.  Some chest pain, mild- believes it is sore due to cough.  No LE edema.    What treatments has patient tried? Acetaminophen and Decongestant - oral   Does patient live in a nursing home, group home, or shelter? no  Does patient have a way to get food/medications during quarantined? Yes, I have a friend or family member who can help me.           MASSBP Score 4/26/2022   Age Greater than or equal to 65 years 2   BMI greater than or equal to 35 kg/m2 0   Has Diabetes Mellitus 0   Has Chronic Kidney Disease 0   Has Cardiovascular Disease and 55 years or older 2   Has Chronic Respiratory Disease and 55 years or older 0   Has Hypertension and 55 years or older 1   Is Immunocompromised 0   Is Pregnant 0   Member of BIPOC community (Black/, /, ,  or , or  or [place] Native)  0   MASSBP Score 5   Has the patient had a positive COVID test outside our system?  Yes   What day did symptoms start?  4/24/2022         Review of Systems   Constitutional, HEENT, cardiovascular, pulmonary, gi and gu systems are negative, except as otherwise noted.      Objective    Vitals - Patient Reported  Pain Score: Mild Pain (2)      Vitals:  No vitals were obtained today due to virtual visit.    Physical Exam   healthy, alert and no distress  PSYCH: Alert and oriented times " 3; coherent speech, normal   rate and volume, able to articulate logical thoughts, able   to abstract reason, no tangential thoughts, no hallucinations   or delusions  Her affect is normal  RESP: No cough, no audible wheezing, able to talk in full sentences  Remainder of exam unable to be completed due to telephone visits                Phone call duration: 18 minutes

## 2022-04-27 ENCOUNTER — APPOINTMENT (OUTPATIENT)
Dept: GENERAL RADIOLOGY | Facility: CLINIC | Age: 82
End: 2022-04-27
Attending: EMERGENCY MEDICINE
Payer: COMMERCIAL

## 2022-04-27 ENCOUNTER — HOSPITAL ENCOUNTER (EMERGENCY)
Facility: CLINIC | Age: 82
Discharge: HOME | End: 2022-04-27
Attending: EMERGENCY MEDICINE | Admitting: EMERGENCY MEDICINE
Payer: COMMERCIAL

## 2022-04-27 VITALS
DIASTOLIC BLOOD PRESSURE: 95 MMHG | SYSTOLIC BLOOD PRESSURE: 181 MMHG | RESPIRATION RATE: 20 BRPM | OXYGEN SATURATION: 95 % | HEART RATE: 57 BPM | TEMPERATURE: 98.4 F

## 2022-04-27 DIAGNOSIS — U07.1 COVID-19: ICD-10-CM

## 2022-04-27 LAB
ANION GAP SERPL CALCULATED.3IONS-SCNC: 3 MMOL/L (ref 3–14)
ATRIAL RATE - MUSE: 58 BPM
BASOPHILS # BLD AUTO: 0 10E3/UL (ref 0–0.2)
BASOPHILS NFR BLD AUTO: 1 %
BUN SERPL-MCNC: 17 MG/DL (ref 7–30)
CALCIUM SERPL-MCNC: 8.9 MG/DL (ref 8.5–10.1)
CHLORIDE BLD-SCNC: 103 MMOL/L (ref 94–109)
CO2 SERPL-SCNC: 30 MMOL/L (ref 20–32)
CREAT SERPL-MCNC: 0.63 MG/DL (ref 0.52–1.04)
DIASTOLIC BLOOD PRESSURE - MUSE: NORMAL MMHG
EOSINOPHIL # BLD AUTO: 0 10E3/UL (ref 0–0.7)
EOSINOPHIL NFR BLD AUTO: 1 %
ERYTHROCYTE [DISTWIDTH] IN BLOOD BY AUTOMATED COUNT: 13.9 % (ref 10–15)
GFR SERPL CREATININE-BSD FRML MDRD: 89 ML/MIN/1.73M2
GLUCOSE BLD-MCNC: 109 MG/DL (ref 70–99)
HCT VFR BLD AUTO: 41.8 % (ref 35–47)
HGB BLD-MCNC: 13.5 G/DL (ref 11.7–15.7)
IMM GRANULOCYTES # BLD: 0 10E3/UL
IMM GRANULOCYTES NFR BLD: 1 %
INTERPRETATION ECG - MUSE: NORMAL
LYMPHOCYTES # BLD AUTO: 1.1 10E3/UL (ref 0.8–5.3)
LYMPHOCYTES NFR BLD AUTO: 25 %
MCH RBC QN AUTO: 32.4 PG (ref 26.5–33)
MCHC RBC AUTO-ENTMCNC: 32.3 G/DL (ref 31.5–36.5)
MCV RBC AUTO: 100 FL (ref 78–100)
MONOCYTES # BLD AUTO: 0.8 10E3/UL (ref 0–1.3)
MONOCYTES NFR BLD AUTO: 19 %
NEUTROPHILS # BLD AUTO: 2.2 10E3/UL (ref 1.6–8.3)
NEUTROPHILS NFR BLD AUTO: 53 %
NRBC # BLD AUTO: 0 10E3/UL
NRBC BLD AUTO-RTO: 0 /100
P AXIS - MUSE: 70 DEGREES
PLATELET # BLD AUTO: 241 10E3/UL (ref 150–450)
POTASSIUM BLD-SCNC: 3.9 MMOL/L (ref 3.4–5.3)
PR INTERVAL - MUSE: 216 MS
QRS DURATION - MUSE: 90 MS
QT - MUSE: 416 MS
QTC - MUSE: 408 MS
R AXIS - MUSE: -41 DEGREES
RBC # BLD AUTO: 4.17 10E6/UL (ref 3.8–5.2)
SODIUM SERPL-SCNC: 136 MMOL/L (ref 133–144)
SYSTOLIC BLOOD PRESSURE - MUSE: NORMAL MMHG
T AXIS - MUSE: 62 DEGREES
TROPONIN I SERPL HS-MCNC: 13 NG/L
VENTRICULAR RATE- MUSE: 58 BPM
WBC # BLD AUTO: 4.2 10E3/UL (ref 4–11)

## 2022-04-27 PROCEDURE — 99285 EMERGENCY DEPT VISIT HI MDM: CPT | Mod: 25

## 2022-04-27 PROCEDURE — 85018 HEMOGLOBIN: CPT | Performed by: EMERGENCY MEDICINE

## 2022-04-27 PROCEDURE — 36415 COLL VENOUS BLD VENIPUNCTURE: CPT | Performed by: EMERGENCY MEDICINE

## 2022-04-27 PROCEDURE — 71046 X-RAY EXAM CHEST 2 VIEWS: CPT

## 2022-04-27 PROCEDURE — 80048 BASIC METABOLIC PNL TOTAL CA: CPT | Performed by: EMERGENCY MEDICINE

## 2022-04-27 PROCEDURE — 84484 ASSAY OF TROPONIN QUANT: CPT | Performed by: EMERGENCY MEDICINE

## 2022-04-27 PROCEDURE — 93005 ELECTROCARDIOGRAM TRACING: CPT

## 2022-04-27 ASSESSMENT — ENCOUNTER SYMPTOMS
DIARRHEA: 0
SHORTNESS OF BREATH: 0
VOMITING: 0
COUGH: 1
FEVER: 0

## 2022-04-27 NOTE — DISCHARGE INSTRUCTIONS

## 2022-04-27 NOTE — ED PROVIDER NOTES
History   Chief Complaint:  Cough       HPI   Ya Stahl is a 81 year old female with history of CAD, MI, and GERD who presents with a cough. The patient reports that she developed a cough and sore throat several days ago and tested positive for COVID at home 3 days ago. She followed up with her primary care provider who prescribed her Paxlovid. She has now noticed increased congestion and rattling in her chest. She denies shortness of breath, fever, chest pain, vomiting, or diarrhea, and is eating and drinking normally. She is COVID vaccinated x4.    Review of Systems   Constitutional: Negative for fever.   HENT: Positive for congestion.    Respiratory: Positive for cough. Negative for shortness of breath.    Cardiovascular: Negative for chest pain.   Gastrointestinal: Negative for diarrhea and vomiting.   All other systems reviewed and are negative.    Allergies:  Codeine  Gabapentin  Vicodin [Acetaminophen]    Medications:  Amlodipine  Aspirin  Lipitor  Tessalon  Cevimeline  Cyclosporine  Monodox  Pepcid  Lodine  Synthroid  Metoprolol  Lutein  Myrbetriq  Paxlovid  Nitroglycerin   Miralax    Past Medical History:     Arthritis  CAD  GERD  MI  Hypothyroidism  Angina  Migraine  Sjogren's disease  ASCVD  Grave's disease  Chondrodermatitis nodularis helicis  Osteoporosis  Macrocytosis  Monoclonal paraproteinemia  Monoclonal gammopathy   Ascending aorta dilation      Past Surgical History:    Arthroscopy knee  Bladder surgery  Bunionectomy  Cataract  Colonoscopy  Carpal tunnel   EGD  Excise exostosis foot   Oophorectomy  Hysterectomy  Heart catheter  Cholecystectomy  Laminectomies  Femur karli  Forefoot reconstruction  Right hip ORIF  Rotator cuff repair  D&C  Right hip arthroplasty  Knee arthroplasty    Family History:    Mother: CAD, non-Hodgekin's lymphoma  Father: CAD  Brother: hypertension, neuropathy, heart disease    Social History:  Patient presents with her     Physical Exam     Patient Vitals for  the past 24 hrs:   BP Temp Temp src Pulse Resp SpO2   04/27/22 0545 (!) 181/95 -- -- 57 -- 95 %   04/27/22 0530 -- -- -- -- -- 96 %   04/27/22 0500 -- -- -- -- -- 95 %   04/27/22 0445 -- -- -- -- -- 96 %   04/27/22 0430 -- -- -- -- -- 96 %   04/27/22 0415 -- -- -- -- -- 96 %   04/27/22 0353 (!) 204/106 98.4  F (36.9  C) Temporal 62 20 98 %       Physical Exam  General: Patient is awake, alert and interactive when I enter the room  Head: The scalp, face, and head appear normal  Eyes: The pupils are equal, round, and reactive to light. Conjunctivae and sclerae are normal  ENT: External acoustic canals are normal. The oropharynx is normal without erythema. Uvula is in the midline  Neck: Normal range of motion.   CV: Regular rate and rhythm.   Resp: Lungs are clear without wheezes or rales. No respiratory distress.   GI: Abdomen is soft, no rigidity, guarding, or rebound. No distension. No tenderness to palpation in any quadrant.     MS: Normal tone. Joints grossly normal without effusions. No asymmetric leg swelling, calf or thigh tenderness.    Skin: No rash or lesions noted. Normal capillary refill noted  Neuro: Speech is normal and fluent. Face is symmetric. Moving all extremities.   Psych:  Normal affect.  Appropriate interactions.      Emergency Department Course   ECG  ECG obtained at 0418, ECG read at 0423  Sinus bradycardia with 1st degree AV block with premature supraventricular complexes  Left axis deviation   Abnormal ECG   No significant change as compared to prior, dated 10/31/18.  Rate 58 bpm. TN interval 216 ms. QRS duration 90 ms. QT/QTc 416/408 ms. P-R-T axes 70 -41 62.     Imaging:  XR Chest 2 Views   Final Result   IMPRESSION: No evidence of active cardiopulmonary disease.            Report per radiology    Laboratory:  Labs Ordered and Resulted from Time of ED Arrival to Time of ED Departure   BASIC METABOLIC PANEL - Abnormal       Result Value    Sodium 136      Potassium 3.9      Chloride 103       Carbon Dioxide (CO2) 30      Anion Gap 3      Urea Nitrogen 17      Creatinine 0.63      Calcium 8.9      Glucose 109 (*)     GFR Estimate 89     TROPONIN I - Normal    Troponin I High Sensitivity 13     CBC WITH PLATELETS AND DIFFERENTIAL    WBC Count 4.2      RBC Count 4.17      Hemoglobin 13.5      Hematocrit 41.8            MCH 32.4      MCHC 32.3      RDW 13.9      Platelet Count 241      % Neutrophils 53      % Lymphocytes 25      % Monocytes 19      % Eosinophils 1      % Basophils 1      % Immature Granulocytes 1      NRBCs per 100 WBC 0      Absolute Neutrophils 2.2      Absolute Lymphocytes 1.1      Absolute Monocytes 0.8      Absolute Eosinophils 0.0      Absolute Basophils 0.0      Absolute Immature Granulocytes 0.0      Absolute NRBCs 0.0            Emergency Department Course:       Reviewed:  I reviewed nursing notes, vitals, past medical history and Care Everywhere    Assessments:  0400 I obtained history and examined the patient as noted above.   0531 I rechecked the patient and explained findings.     Disposition:  The patient was discharged to home.     Impression & Plan     Medical Decision Making:  Ya Stahl is a 81 year old female who presents for evaluation of cough and fatigue. She tested positive for COVID at home this weekend. She presents to the ED to ensure that she does not have a concomitant pneumonia.  On initial evaluation she is not in respiratory distress and does not display any hypoxia.  Chest x-ray was clear without any evidence of complication.  Patient is otherwise very well-appearing and has been compliant with her Paxlovid prescription.  Given that the patient is otherwise hemodynamically stable without significant hypoxia, I do not believe that the patient requires admission here today. They are at risk for pneumonia but no signs of this are detected on today's visit. Return to the ED for high fevers > 103 for more than 48 hours more, increasing productive  cough, shortness of breath, or confusion.  There is no signs of serious bacterial infection such as bacteremia, meningitis, UTI/pyelonephritis, strep pharyngitis, etc. I discussed my findings and plan with the patient and they are amenable at this time.  All questions were answered and patient will be discharged home in stable condition.        Diagnosis:    ICD-10-CM    1. COVID-19  U07.1        Discharge Medications:  Discharge Medication List as of 4/27/2022  5:42 AM          Scribe Disclosure:  I, Ale Dowd, am serving as a scribe at 4:00 AM on 4/27/2022 to document services personally performed by Bryant Pinto MD based on my observations and the provider's statements to me.            Bryant Pinto MD  04/27/22 [id number]

## 2022-04-27 NOTE — ED TRIAGE NOTES
Pt arrives to ED for cough and thinks she has pneumonia. Pt +COVID Sunday. Began PO antiviral last night. Cannot take BP meds because of PO antiviral.

## 2022-04-29 DIAGNOSIS — R39.15 URINARY URGENCY: ICD-10-CM

## 2022-04-29 RX ORDER — MIRABEGRON 25 MG/1
25 TABLET, FILM COATED, EXTENDED RELEASE ORAL DAILY
Qty: 30 TABLET | Refills: 10 | Status: SHIPPED | OUTPATIENT
Start: 2022-04-29 | End: 2023-01-03

## 2022-05-02 ENCOUNTER — DOCUMENTATION ONLY (OUTPATIENT)
Dept: CARDIOLOGY | Facility: CLINIC | Age: 82
End: 2022-05-02

## 2022-05-02 ENCOUNTER — VIRTUAL VISIT (OUTPATIENT)
Dept: ENDOCRINOLOGY | Facility: CLINIC | Age: 82
End: 2022-05-02
Attending: FAMILY MEDICINE
Payer: COMMERCIAL

## 2022-05-02 DIAGNOSIS — M81.8 IDIOPATHIC OSTEOPOROSIS: ICD-10-CM

## 2022-05-02 DIAGNOSIS — Q78.2 SEVERE OSTEOPETROSIS: ICD-10-CM

## 2022-05-02 DIAGNOSIS — Z92.29 HISTORY OF DRUG THERAPY: ICD-10-CM

## 2022-05-02 DIAGNOSIS — M81.0 SENILE OSTEOPOROSIS: Primary | ICD-10-CM

## 2022-05-02 PROCEDURE — 99204 OFFICE O/P NEW MOD 45 MIN: CPT | Mod: GT | Performed by: INTERNAL MEDICINE

## 2022-05-02 ASSESSMENT — ENCOUNTER SYMPTOMS
EYE WATERING: 1
TROUBLE SWALLOWING: 0
EYE PAIN: 0
STIFFNESS: 0
NECK MASS: 0
EYE REDNESS: 1
HOARSE VOICE: 1
SORE THROAT: 0
MYALGIAS: 0
NECK PAIN: 0
HEMATURIA: 0
FLANK PAIN: 0
MUSCLE CRAMPS: 1
EYE IRRITATION: 1
JOINT SWELLING: 0
SMELL DISTURBANCE: 0
SINUS PAIN: 0
MUSCLE WEAKNESS: 0
DYSURIA: 0
DIFFICULTY URINATING: 0
ARTHRALGIAS: 0
SINUS CONGESTION: 0
BACK PAIN: 1
DOUBLE VISION: 1
TASTE DISTURBANCE: 1

## 2022-05-02 NOTE — PROGRESS NOTES
Noted pt was positive for COVID 4/24/22. ED notes from 4/27/22 stating COVID positive test.     Pt will schedule visit with  after 10 day period. NAS García

## 2022-05-02 NOTE — PATIENT INSTRUCTIONS
[facility]  Dr Funes, Endocrinology Department    [facility]   [address] [facility] [address]  [place] [address]  Appointment [phone]  Fax: [phone]  [place]: Monday - Thursday      Plan for PROLIA.    DEXA scan in 1 year (May 2023) if covered by insurance or in 2024 (2 years from the last bone density scan).  Typically bone density scan is covered every 2 years by insurance.  Please check if insurance can cover bone density scan in 1 year after starting Prolia.    PROLIA Scheduling information:  It will be done in clinic.   You need to make nurse only appointment. (call [facility]: [phone] or [place]:[phone] and schedule Nurse only appointment).  PROLIA is every 6 months injection- so please schedule accordingly.  It is recommended NOT to miss or delay PROLIA injections.    The pt was advised to  Maintain an adequate calcium and vitamin D intake and to supplement vitamin D if needed to maintain serum levels of 25 hydroxy D (25 OH D) between 30-60 ng/ml.  Limit alcohol intake to no more than 2 servings per day.  Limit caffeine intake.  Maintain an active lifestyle including weight-bearing exercises for at least 30 mins daily.  Take measures to reduce the risk of falling.    You should get 1200 mg/day calcium in divided doses of no more than 500 mg/dose.  This INCLUDES what is in your food as well as what is in any supplements you may be taking.    Dietary sources of calcium:: These also contain vitamin D  Milk                            8 oz            300 mg calcium  Yogurt                          1 cup           400 mg calcium   Hard cheese                     1.5 oz          300 mg  Cottage cheese                  2 cup           300 mg  Orange juice with Calcium       8 oz            300 mg  Low fat dairy sources are recommended      You should get 30 minutes of moderate weight bearing exercise on most days of the week .  Weight  bearing exercise includes such things as walking, jogging, hiking, dancing.  You should also get Strength training 2 or more times/week in addition to other weight -being exercise. Strength training uses weight or resistance beyond that seen in everyday activities -(pilates, weight training with free weights, weight machines or resistance bands)    All possible major side effects of Denosumab (PROLIA) were discussed including risk for atypical femur fractures, hypersensitivity, low calcium levels, osteonecrosis of jaw (which can manifest as jaw pain, osteomyelitis, osteitis, bone erosion, tooth/periodontal infection, toothache, gingival ulceration/erosion). Other s/e include but not limited to Hypertension, Headache and peripheral edema. I also told her to let her dentist lnow about the medication if plan for major dental procedure (currently no plans for dental procedure)  Indication: Prolia  (denosumab) is a prescription medicine used to treat osteoporosis in patients who:   Are at high risk for fracture, meaning patients who have had a fracture related to osteoporosis, or who have multiple risk factors for fracture   Cannot use another osteoporosis medicine or other osteoporosis medicines did not work well   The timeline for early/late injections would be 4 weeks early and any time after the 6 month bella. If a patient receives their injection late, then the subsequent injection would be 6 months from the date that they actually received the injection

## 2022-05-02 NOTE — PROGRESS NOTES
Ya Stahl is being evaluated via a billable video visit.    How would you like to obtain your AVS? Mail a copy  For the video visit, send the invitation by: Text to cell phone: [phone]  Will anyone else be joining your video visit? No

## 2022-05-02 NOTE — LETTER
"    5/2/2022         RE: Ya Stahl  [address]  [facility] [address]        Dear Colleague,    Thank you for referring your patient, Ya Stahl, to the [facility]. Please see a copy of my visit note below.    Ya Stahl is being evaluated via a billable video visit.    How would you like to obtain your AVS? Mail a copy  For the video visit, send the invitation by: Text to cell phone: [phone]  Will anyone else be joining your video visit? No      THIS IS A VIDEO VISIT:    Phone call visit/virtual visit encounter:    Name of patient: Ya Stahl    Date of encounter: 5/2/2022    Time of start of video visit: 11:02    Video started: 11:19    Video ended: 11:44    Provider location: working from home/ [facility]    Patient location: patients home.    Mode of transmission: video/ Doximity    Verbal consent: obtained before starting visit. Pt is agreeable.      The patient has been notified of following:      \"This VIDEO visit will be conducted via a call between you and your physician/provider. We have found that certain health care needs can be provided without the need for a physical exam.  This service lets us provide the care you need with a short phone conversation.  If a prescription is necessary we can send it directly to your pharmacy.  If lab work is needed we can place an order for that and you can then stop by our lab to have the test done at a later time.     With new updates with corona virus patient might be billed as clinic visit.     If during the course of the call the physician/provider feels a telephone visit is not appropriate, you will not be charged for this service.\"      Past medical history, social history, family history, allergy and medications were reviewed and updated as appropriate.  Reviewed pertinent labs, notes, imaging studies personally.    Name: Ya Stahl  Date: 5/2/2022  Seen in consultation with " Devi Romano for osteoporosis.     HPI:  Ya Stahl is a 81 year old female who presents for the evaluation of osteoporosis.   has a past medical history of Arthritis, Complication of anesthesia, Coronary artery disease, Disease of thyroid gland, Displacement of lumbar intervertebral disc without myelopathy, Gastro-oesophageal reflux disease, GERD (gastroesophageal reflux disease), Hearing loss, Heart attack (H) (03/2016), History of anesthesia complications, History of angina, Hypertension, Low back pain, Migraine headache, Myocardial infarction (H), Numbness and tingling, PONV (postoperative nausea and vomiting), Sicca syndrome (H), Sjogren's disease (H), Sjogren's syndrome (H), Spider veins, and Unspecified hypothyroidism.    DEXA 2006- normal BMD  DEXA 2011, 2014- Osteopenia  DEXA 2/2022: osteoporosis    Diagnosed with osteoporosis in 2/2022 based on DEXA scan.  DEXA 2/2022: Severe osteoporosis on the basis of hip fracture. There has been probably no significant cahnge in bone density of the hip(s). The forearm was not included on the previous study so comparison is not possible.    The spine is not acceptable for evaluation due to previous surgical changes.    The right femur is not acceptable for evaluation due to previous surgical repair    Previous medication- Fosamax x 5 yrs and went off it.   (8/2014-1/2021)    GERD: On PPI- under well controled.  Dental; OK. No upcoming dental procedure. Has gum disease.    Smoke:No  Family History:mother  Menstrual history/Birthing: s/p menopause at age 37. No HRT  Fractures: Fracture of right femur and pelvis at age 74 from a fall.Treated surgically. S/p hip replacement. (Hip fracture history?  Not clear based on chart review)  Kidney stones: No  GI Surgery:s/p cholecystectomy  Duration of therapy:   Exercise:walks 3 miles/day and does weights 5 days a week.  Diet: takes boost every few days  Ca/Vitamin D: calcium 600 mg BID, vit D 4000 international  "unit(s)/day.  Alcohol:  No  Eating Disorder: No  Steroid Use:  No  PMH/PSH:  Past Medical History:   Diagnosis Date     Arthritis      Complication of anesthesia     \"hard time waking up / N & V\"     Coronary artery disease      Disease of thyroid gland      Displacement of lumbar intervertebral disc without myelopathy      Gastro-oesophageal reflux disease      GERD (gastroesophageal reflux disease)      Hearing loss     has bilateral aids     Heart attack (H) 03/2016     History of anesthesia complications     delayed emergence     History of angina      Hypertension      Low back pain      Migraine headache      Myocardial infarction (H)      Numbness and tingling     down right leg (associated with back pain)     PONV (postoperative nausea and vomiting)      Sicca syndrome (H)      Sjogren's disease (H)      Sjogren's syndrome (H)     sees specialist; dentist     Spider veins      Unspecified hypothyroidism      Past Surgical History:   Procedure Laterality Date     ARTHROSCOPY KNEE  10/05/2012    R Procedure: ARTHROSCOPY KNEE;  RIGHT KNEE ARTHROSCOPY, PARTIAL MEDIAL MENISCECTOMY;  Surgeon: Karli Zaragoza MD;  Location: [facility]     BLADDER SURGERY       BUNIONECTOMY  ~2010    L foot.      CATARACT IOL, RT/LT Bilateral 07/2017     COLONOSCOPY N/A 01/17/2017    normal; no repeat Procedure: COLONOSCOPY;  Surgeon: Fan Giordano MD;  Location:  GI     ENDOSCOPIC RELEASE CARPAL TUNNEL  09/11/2012    R Procedure: ENDOSCOPIC RELEASE CARPAL TUNNEL;  Right Endoscopic carpal tunnel release, left ringer finger cortizon injection;  Surgeon: Anne Marie Catherine MD;  Location:  OR     ESOPHAGOSCOPY, GASTROSCOPY, DUODENOSCOPY (EGD), COMBINED N/A 12/26/2014    Procedure: COMBINED ESOPHAGOSCOPY, GASTROSCOPY, DUODENOSCOPY (EGD);  Surgeon: Fan Giordano MD;  Location:  GI     EXCISE EXOSTOSIS FOOT Left 12/01/2016    Procedure: EXCISE EXOSTOSIS FOOT;  Surgeon: Jody Gallagher DPM, Pod;  Location:  OR     GYN SURGERY  " 1978    ovaries removed     HEART CATH FYI  03/04/2016    dz <40%     HIP HARDWARE REMOVAL Right 08/04/2020    Procedure: HARDWARE REMOVAL - LATERAL APPROACH;  Surgeon: Charlie Wolfe MD;  Location: [facility];  Service: Orthopedics     HYSTERECTOMY       INJECT STEROID (LOCATION)  09/11/2012    Procedure: INJECT STEROID (LOCATION);;  Surgeon: Anne Marie Catherine MD;  Location:  OR     LAPAROSCOPIC CHOLECYSTECTOMY N/A 04/07/2016    Procedure: LAPAROSCOPIC CHOLECYSTECTOMY;  Surgeon: Hans Medina MD;  Location:  OR     OPTICAL TRACKING SYSTEM FUSION SPINE POSTERIOR LUMBAR TWO LEVELS N/A 10/31/2018    Procedure: Central L3-4 L4-5 lamenectomies L4-5 posterior instrumented fusion;  Surgeon: Aroldo Burdick MD;  Location:  OR     ORTHOPEDIC SURGERY Right 2014    karli for fx femur     RECONSTRUCT FOREFOOT WITH METATARSOPHALANGEAL (MTP) FUSION Left 04/28/2017    Procedure: RECONSTRUCT FOREFOOT WITH METATARSOPHALANGEAL (MTP) FUSION;  1. Resection arthroplasty, fifth metatarsophalangeal joint, left foot.   2. Irrigation and debridement of fifth metatarsophalangeal joint, left foot (excisional to the level of the bone).     ;  Surgeon: Fabián Phipps MD;  Location:  OR     RELEASE CARPAL TUNNEL       RIGHT HIP ORIF 4/1/2014       ROTATOR CUFF REPAIR RT/LT  ~1998    Lt shoulder; rotator cuff     SURGICAL HISTORY OF -   distant past    ablation for palpitations.     SURGICAL HISTORY OF -   06/2015    left carpal tunnel surgery     [facility] NONSPECIFIC PROCEDURE  1976    D&C     [facility] TOTAL HIP ARTHROPLASTY Right 08/04/2020    Procedure: RIGHT TOTAL HIP ARTHROPLASTY;  Surgeon: Charlie Wolfe MD;  Location: [facility];  Service: Orthopedics     [facility] TOTAL KNEE ARTHROPLASTY Right 01/2020     Family Hx:  Family History   Problem Relation Age of Onset     C.A.D. Mother 76     Cancer Mother         non Hodgekin's Lymphoma     Heart Disease Mother         enlarged heart     C.A.D. Father 59     Heart Disease  Father         valve problem     Hypertension Brother      Neuropathy Brother      Heart Disease Brother         open heart to repair mitral valve.     Connective Tissue Disorder Daughter         scleroderma     Colon Cancer No family hx of        Social Hx:  Social History     Socioeconomic History     Marital status:      Spouse name: Not on file     Number of children: Not on file     Years of education: Not on file     Highest education level: Not on file   Occupational History     Not on file   Tobacco Use     Smoking status: Never Smoker     Smokeless tobacco: Never Used   Vaping Use     Vaping Use: Never used   Substance and Sexual Activity     Alcohol use: Yes     Comment: social; maybe a glass of wine or highball per week      Drug use: No     Sexual activity: Not Currently     Partners: Male   Other Topics Concern     Parent/sibling w/ CABG, MI or angioplasty before 65F 55M? Yes      Service Not Asked     Blood Transfusions Not Asked     Caffeine Concern No     Comment: 1 capp. daily     Occupational Exposure Not Asked     Hobby Hazards Not Asked     Sleep Concern No     Stress Concern Not Asked     Weight Concern Not Asked     Special Diet No     Back Care Not Asked     Exercise Yes     Comment: walks 3 miles 6 days per week , curves 3 days per week     Bike Helmet Not Asked     Seat Belt Not Asked     Self-Exams Not Asked   Social History Narrative    Dairy/d 0-1 servings/d.     Caffeine 0 servings/d    Exercise 6 x week walk and curves    Sunscreen used - Yes    Seatbelts used - Yes    Working smoke/CO detectors in the home - Yes    Guns stored in the home - Yes LOCKED    Self Breast Exams - sometimes    Self Testicular Exam - NA    Eye Exam up to date - Yes 4/2009    Dental Exam up to date - Yes 12/2009    Pap Smear up to date - Yes - Hysterectomy    Mammogram up to date - Yes 11/25/2009    PSA up to date - NA    Dexa Scan up to date - 2006    Flex Sig / Colonoscopy up to date - Yes  5/14/2008    Immunizations up to date -9-2007 tetanus    Abuse: Current or Past(Physical, Sexual or Emotional)- No    Do you feel safe in your environment - Yes    DAMION Shaffer [facility]    11/25/2009 updated     Social Determinants of Health     Financial Resource Strain: Not on file   Food Insecurity: Not on file   Transportation Needs: Not on file   Physical Activity: Not on file   Stress: Not on file   Social Connections: Not on file   Intimate Partner Violence: Not on file   Housing Stability: Not on file          MEDICATIONS:  has a current medication list which includes the following prescription(s): amlodipine, aspirin, atorvastatin, benzonatate, biotin w/ vitamins c & e, calcium carbonate-vitamin d, calcium carbonate-vitamin d, carboxymethylcellulose sodium, cevimeline, cholecalciferol, cyclosporine, doxycycline monohydrate, famotidine, lansoprazole, levothyroxine, lutein, metoprolol succinate er, mirabegron, misc natural products, nitroglycerin, omega-3 fatty acids, polyethylene glycol, and etodolac.    ROS     ROS: 10 point ROS neg other than the symptoms noted above in the HPI.    Physical Exam   VS: There were no vitals taken for this visit.  GENERAL: healthy, alert and no distress  EYES: Eyes grossly normal to inspection, conjunctivae and sclerae normal  ENT: no nose swelling, nasal discharge.  Thyroid: no apparent thyroid nodules  RESP: no audible wheeze, cough, or visible cyanosis.  No visible retractions or increased work of breathing.  Able to speak fully in complete sentences.  ABDO: not evaluated.  EXTREMITIES: no hand tremors.  NEURO: Cranial nerves grossly intact, mentation intact and speech normal  SKIN: No apparent skin lesions, rash or edema seen   PSYCH: mentation appears normal, affect normal/bright, judgement and insight intact, normal speech and appearance well-groomed    LABS:  BMP:  Last Basic Metabolic Panel:  Lab Results   Component Value Date     04/27/2022     01/19/2021       Lab Results   Component Value Date    POTASSIUM 3.9 04/27/2022    POTASSIUM 4.0 01/19/2021     Lab Results   Component Value Date    CHLORIDE 103 04/27/2022    CHLORIDE 104 01/19/2021     Lab Results   Component Value Date    CARMEN 8.9 04/27/2022    CARMEN 8.9 01/19/2021     Lab Results   Component Value Date    CO2 30 04/27/2022    CO2 30 01/19/2021     Lab Results   Component Value Date    BUN 17 04/27/2022    BUN 22 01/19/2021     Lab Results   Component Value Date    CR 0.63 04/27/2022    CR 0.72 01/19/2021     Lab Results   Component Value Date     04/27/2022     01/19/2021       TFTs:  Lab Results   Component Value Date    TSH 1.39 01/06/2022    TSH 1.87 04/16/2021       LFTs:  Liver Function Studies -   Recent Labs   Lab Test 01/06/22  1051   PROTTOTAL 7.2   ALBUMIN 3.8   BILITOTAL 1.1   ALKPHOS 80   AST 20   ALT 24       PTH:  ENDO CALCIUM LABS-[facility] Latest Ref Rng & Units 3/18/2022   PARATHYROID HORMONE INTACT 18 - 80 pg/mL 38       Vitamin D:  Vitamin D Deficiency Screening Results:  Lab Results   Component Value Date    VITDT 55 03/18/2022    VITDT 48 12/05/2013       BoneTurnOverMarkers:    DEXA:  DEXA 2/2022:  REFERENCE T-SCORES:       Normal                -1.0 and greater                                 Osteopenia         Between -1.0 and -2.5                                           Osteoporosis     -2.5 and less                                       RISK FACTORS:, Early menopause before age 45, Hip fracture, fractures of pelvis and foot     CURRENT TREATMENT:  Calcium with Vitamin D     FINDINGS:                Left Femoral Neck           T-score:  -0.6                       Forearm (radius 33%)      T-score:  -2.3  The spine is not acceptable for evaluation due to previous surgical changes.    The right femur is not acceptable for evaluation due to previous surgical repair.                           Left Total Hip BMD: 0.861   Previous: 0.882                       Forearm (radius 33%)  "BMD: 0.677  Previous: NA     Comparison is made to another DXA performed on a different Lunar machine on 7/21/2014.      IMPRESSION  Severe osteoporosis on the basis of hip fracture.      Comparisons from different scanners that have not been cross calibrated, are not necessarily valid. Such a comparison has been performed here; one should interpret with caution.   There has been probably no significant cahnge in bone density of the hip(s). The forearm was not included on the previous study so comparison is not possible.       Recommendations include ensuring adequate Calcium and Vitamin D.     The current NOF Guidelines recommend treatment for patients with prior hip or vertebral fracture, T-score -2.5 or below, or 10 year risk of any major osteoporotic fracture >20% or 10 year risk of hip fracture >3%, as calculated using the FRAX calculator (www.shef.ac.uk/FRAX or you can google \"FRAX\").       Based on these guidelines, treatment (in addition to calcium and vitamin D) is recommended for this patient, after ruling out other causes of osteoporosis.  This is meant as an aid to clinical decision making; one must still use clinical judgement.        All pertinent notes, labs, and images personally reviewed by me.     A/P  Ms.Sharon SAMRA Stahl is a 81 year old here for the evaluation of:    #1 Osteoporosis:  Risk factors for low bone density include personal history of fracture or family history,  advanced age, female,Estrogen deficiency,early menopause before age 45, Hip fracture, fractures of pelvis and foot.  She has been on Fosamax from 2820-9612.  DEXA 2/2022 as noted above consistent with severe osteoporosis?  History of GERD-under good control with PPI  No upcoming dental procedure.  Labs showing normal calcium, kidney function, vitamin D and PTH levels.  Plan:  Discussed diagnosis, pathophysiology, management and treatment options of condition with pt.  Discussed osteoporosis in general.  Discussed 2/2022 DEXA " scan results.  In the setting of osteoporosis history recommending to start Prolia.  Repeat DEXA scan in 1-2 years (based on insurance)  Follow-up with endocrinology in 1 year.  Recommend adequate calcium and vitamin D intake.  Discussed indications, risks and benefits of all medications prescribed, and answered questions to patient's satisfaction.    The pt was advised to    Maintain an adequate calcium and vitamin D intake and to supplement vitamin D if needed to maintain serum levels of 25 hydroxy D (25 OH D) between 30-60 ng/ml.    Limit alcohol intake to no more than 2 servings per day.    Limit caffeine intake.    Maintain an active lifestyle including weight-bearing exercises for at least 30 mins daily.    Take measures to reduce the risk of falling.    All possible major side effects of Denosumab (PROLIA) were discussed including risk for atypical femur fractures, hypersensitivity, low calcium levels, osteonecrosis of jaw (which can manifest as jaw pain, osteomyelitis, osteitis, bone erosion, tooth/periodontal infection, toothache, gingival ulceration/erosion). Other s/e include but not limited to Hypertension, Headache and peripheral edema. I also told her to let her dentist lnow about the medication if plan for major dental procedure (currently no plans for dental procedure)  Indication: Prolia  (denosumab) is a prescription medicine used to treat osteoporosis in patients who:     Are at high risk for fracture, meaning patients who have had a fracture related to osteoporosis, or who have multiple risk factors for fracture     Cannot use another osteoporosis medicine or other osteoporosis medicines did not work well   The timeline for early/late injections would be 4 weeks early and any time after the 6 month bella. If a patient receives their injection late, then the subsequent injection would be 6 months from the date that they actually received the injection        More than 50% of the time spent with Ms.  Favio on counseling / coordinating her care.      All questions were answered.  The patient indicates understanding of the above issues and agrees with the plan set forth.      Follow-up:  1 year    Mary Funes MD  Endocrinology  [facility]  CC: Devi Romano        Again, thank you for allowing me to participate in the care of your patient.        Sincerely,        Mary Funes MD

## 2022-05-02 NOTE — PROGRESS NOTES
"THIS IS A VIDEO VISIT:    Phone call visit/virtual visit encounter:    Name of patient: Ya Stahl    Date of encounter: 5/2/2022    Time of start of video visit: 11:02    Video started: 11:19    Video ended: 11:44    Provider location: working from home/ [facility]    Patient location: patients home.    Mode of transmission: video/ Doximity    Verbal consent: obtained before starting visit. Pt is agreeable.      The patient has been notified of following:      \"This VIDEO visit will be conducted via a call between you and your physician/provider. We have found that certain health care needs can be provided without the need for a physical exam.  This service lets us provide the care you need with a short phone conversation.  If a prescription is necessary we can send it directly to your pharmacy.  If lab work is needed we can place an order for that and you can then stop by our lab to have the test done at a later time.     With new updates with corona virus patient might be billed as clinic visit.     If during the course of the call the physician/provider feels a telephone visit is not appropriate, you will not be charged for this service.\"      Past medical history, social history, family history, allergy and medications were reviewed and updated as appropriate.  Reviewed pertinent labs, notes, imaging studies personally.    Name: Ya Stahl  Date: 5/2/2022  Seen in consultation with Devi Romano for osteoporosis.     HPI:  Ya Stahl is a 81 year old female who presents for the evaluation of osteoporosis.   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    DEXA 2006- normal BMD  DEXA 2011, 2014- Osteopenia  DEXA 2/2022: osteoporosis    Diagnosed with osteoporosis in 2/2022 based on DEXA scan.  DEXA 2/2022: Severe osteoporosis on the basis of hip fracture. There has been probably no significant cahnge in bone density of the hip(s). The forearm was not included on the previous study so comparison is not possible.    The spine is not acceptable for evaluation due to previous surgical changes.    The right femur is not acceptable for evaluation due to previous surgical repair    Previous medication- Fosamax x 5 yrs and went off it.   (8/2014-1/2021)    GERD: On PPI- under well controled.  Dental; OK. No upcoming dental procedure. Has gum disease.    Smoke:No  Family History:mother  Menstrual history/Birthing: s/p menopause at age 37. No HRT  Fractures: Fracture of right femur and pelvis at age 74 from a fall.Treated surgically. S/p hip replacement. (Hip fracture history?  Not clear based on chart review)  Kidney stones: No  GI Surgery:s/p cholecystectomy  Duration of therapy:   Exercise:walks 3 miles/day and does weights 5 days a week.  Diet: takes boost every few days  Ca/Vitamin D: calcium 600 mg BID, vit D 4000 international unit(s)/day.  Alcohol:  No  Eating Disorder: No  Steroid Use:  No  PMH/PSH:  Past Medical History:   Diagnosis Date     Arthritis      Complication of anesthesia     \"hard time waking up / N & V\"     Coronary artery disease      Disease of thyroid gland      Displacement of lumbar intervertebral disc without myelopathy      Gastro-oesophageal reflux disease      GERD (gastroesophageal reflux disease)      Hearing loss     has bilateral aids     Heart attack (H) 03/2016     History of anesthesia complications     delayed " emergence     History of angina      Hypertension      Low back pain      Migraine headache      Myocardial infarction (H)      Numbness and tingling     down right leg (associated with back pain)     PONV (postoperative nausea and vomiting)      Sicca syndrome (H)      Sjogren's disease (H)      Sjogren's syndrome (H)     sees specialist; dentist     Spider veins      Unspecified hypothyroidism      Past Surgical History:   Procedure Laterality Date     ARTHROSCOPY KNEE  10/05/2012    R Procedure: ARTHROSCOPY KNEE;  RIGHT KNEE ARTHROSCOPY, PARTIAL MEDIAL MENISCECTOMY;  Surgeon: Karli Zaragoza MD;  Location: [facility]     BLADDER SURGERY       BUNIONECTOMY  ~2010    L foot.      CATARACT IOL, RT/LT Bilateral 07/2017     COLONOSCOPY N/A 01/17/2017    normal; no repeat Procedure: COLONOSCOPY;  Surgeon: Fan Giordano MD;  Location:  GI     ENDOSCOPIC RELEASE CARPAL TUNNEL  09/11/2012    R Procedure: ENDOSCOPIC RELEASE CARPAL TUNNEL;  Right Endoscopic carpal tunnel release, left ringer finger cortizon injection;  Surgeon: Anne Marie Catherine MD;  Location:  OR     ESOPHAGOSCOPY, GASTROSCOPY, DUODENOSCOPY (EGD), COMBINED N/A 12/26/2014    Procedure: COMBINED ESOPHAGOSCOPY, GASTROSCOPY, DUODENOSCOPY (EGD);  Surgeon: Fan Giordano MD;  Location:  GI     EXCISE EXOSTOSIS FOOT Left 12/01/2016    Procedure: EXCISE EXOSTOSIS FOOT;  Surgeon: Jody Gallagher, DPM, Pod;  Location:  OR     GYN SURGERY  1978    ovaries removed     HEART CATH FYI  03/04/2016    dz <40%     HIP HARDWARE REMOVAL Right 08/04/2020    Procedure: HARDWARE REMOVAL - LATERAL APPROACH;  Surgeon: Charlie Wolfe MD;  Location: [facility];  Service: Orthopedics     HYSTERECTOMY       INJECT STEROID (LOCATION)  09/11/2012    Procedure: INJECT STEROID (LOCATION);;  Surgeon: Anne Marie Catherine MD;  Location:  OR     LAPAROSCOPIC CHOLECYSTECTOMY N/A 04/07/2016    Procedure: LAPAROSCOPIC CHOLECYSTECTOMY;  Surgeon: Hans Medina MD;   Location:  OR     OPTICAL TRACKING SYSTEM FUSION SPINE POSTERIOR LUMBAR TWO LEVELS N/A 10/31/2018    Procedure: Central L3-4 L4-5 lamenectomies L4-5 posterior instrumented fusion;  Surgeon: Aroldo Burdick MD;  Location:  OR     ORTHOPEDIC SURGERY Right 2014    karli for fx femur     RECONSTRUCT FOREFOOT WITH METATARSOPHALANGEAL (MTP) FUSION Left 04/28/2017    Procedure: RECONSTRUCT FOREFOOT WITH METATARSOPHALANGEAL (MTP) FUSION;  1. Resection arthroplasty, fifth metatarsophalangeal joint, left foot.   2. Irrigation and debridement of fifth metatarsophalangeal joint, left foot (excisional to the level of the bone).     ;  Surgeon: Fabián Phipps MD;  Location:  OR     RELEASE CARPAL TUNNEL       RIGHT HIP ORIF 4/1/2014       ROTATOR CUFF REPAIR RT/LT  ~1998    Lt shoulder; rotator cuff     SURGICAL HISTORY OF -   distant past    ablation for palpitations.     SURGICAL HISTORY OF -   06/2015    left carpal tunnel surgery     [facility] NONSPECIFIC PROCEDURE  1976    D&C     [facility] TOTAL HIP ARTHROPLASTY Right 08/04/2020    Procedure: RIGHT TOTAL HIP ARTHROPLASTY;  Surgeon: Charlie Wolfe MD;  Location: [facility];  Service: Orthopedics     [facility] TOTAL KNEE ARTHROPLASTY Right 01/2020     Family Hx:  Family History   Problem Relation Age of Onset     C.A.D. Mother 76     Cancer Mother         non Hodgekin's Lymphoma     Heart Disease Mother         enlarged heart     C.A.D. Father 59     Heart Disease Father         valve problem     Hypertension Brother      Neuropathy Brother      Heart Disease Brother         open heart to repair mitral valve.     Connective Tissue Disorder Daughter         scleroderma     Colon Cancer No family hx of        Social Hx:  Social History     Socioeconomic History     Marital status:      Spouse name: Not on file     Number of children: Not on file     Years of education: Not on file     Highest education level: Not on file   Occupational History     Not on file    Tobacco Use     Smoking status: Never Smoker     Smokeless tobacco: Never Used   Vaping Use     Vaping Use: Never used   Substance and Sexual Activity     Alcohol use: Yes     Comment: social; maybe a glass of wine or highball per week      Drug use: No     Sexual activity: Not Currently     Partners: Male   Other Topics Concern     Parent/sibling w/ CABG, MI or angioplasty before 65F 55M? Yes      Service Not Asked     Blood Transfusions Not Asked     Caffeine Concern No     Comment: 1 capp. daily     Occupational Exposure Not Asked     Hobby Hazards Not Asked     Sleep Concern No     Stress Concern Not Asked     Weight Concern Not Asked     Special Diet No     Back Care Not Asked     Exercise Yes     Comment: walks 3 miles 6 days per week , curves 3 days per week     Bike Helmet Not Asked     Seat Belt Not Asked     Self-Exams Not Asked   Social History Narrative    Dairy/d 0-1 servings/d.     Caffeine 0 servings/d    Exercise 6 x week walk and curves    Sunscreen used - Yes    Seatbelts used - Yes    Working smoke/CO detectors in the home - Yes    Guns stored in the home - Yes LOCKED    Self Breast Exams - sometimes    Self Testicular Exam - NA    Eye Exam up to date - Yes 4/2009    Dental Exam up to date - Yes 12/2009    Pap Smear up to date - Yes - Hysterectomy    Mammogram up to date - Yes 11/25/2009    PSA up to date - NA    Dexa Scan up to date - 2006    Flex Sig / Colonoscopy up to date - Yes 5/14/2008    Immunizations up to date -9-2007 tetanus    Abuse: Current or Past(Physical, Sexual or Emotional)- No    Do you feel safe in your environment - Yes    DAMION Shaffer [facility]    11/25/2009 updated     Social Determinants of Health     Financial Resource Strain: Not on file   Food Insecurity: Not on file   Transportation Needs: Not on file   Physical Activity: Not on file   Stress: Not on file   Social Connections: Not on file   Intimate Partner Violence: Not on file   Housing Stability: Not on file           MEDICATIONS:  has a current medication list which includes the following prescription(s): amlodipine, aspirin, atorvastatin, benzonatate, biotin w/ vitamins c & e, calcium carbonate-vitamin d, calcium carbonate-vitamin d, carboxymethylcellulose sodium, cevimeline, cholecalciferol, cyclosporine, doxycycline monohydrate, famotidine, lansoprazole, levothyroxine, lutein, metoprolol succinate er, mirabegron, misc natural products, nitroglycerin, omega-3 fatty acids, polyethylene glycol, and etodolac.    ROS     ROS: 10 point ROS neg other than the symptoms noted above in the HPI.    Physical Exam   VS: There were no vitals taken for this visit.  GENERAL: healthy, alert and no distress  EYES: Eyes grossly normal to inspection, conjunctivae and sclerae normal  ENT: no nose swelling, nasal discharge.  Thyroid: no apparent thyroid nodules  RESP: no audible wheeze, cough, or visible cyanosis.  No visible retractions or increased work of breathing.  Able to speak fully in complete sentences.  ABDO: not evaluated.  EXTREMITIES: no hand tremors.  NEURO: Cranial nerves grossly intact, mentation intact and speech normal  SKIN: No apparent skin lesions, rash or edema seen   PSYCH: mentation appears normal, affect normal/bright, judgement and insight intact, normal speech and appearance well-groomed    LABS:  BMP:  Last Basic Metabolic Panel:  Lab Results   Component Value Date     04/27/2022     01/19/2021      Lab Results   Component Value Date    POTASSIUM 3.9 04/27/2022    POTASSIUM 4.0 01/19/2021     Lab Results   Component Value Date    CHLORIDE 103 04/27/2022    CHLORIDE 104 01/19/2021     Lab Results   Component Value Date    CARMEN 8.9 04/27/2022    CARMEN 8.9 01/19/2021     Lab Results   Component Value Date    CO2 30 04/27/2022    CO2 30 01/19/2021     Lab Results   Component Value Date    BUN 17 04/27/2022    BUN 22 01/19/2021     Lab Results   Component Value Date    CR 0.63 04/27/2022    CR 0.72 01/19/2021      Lab Results   Component Value Date     04/27/2022     01/19/2021       TFTs:  Lab Results   Component Value Date    TSH 1.39 01/06/2022    TSH 1.87 04/16/2021       LFTs:  Liver Function Studies -   Recent Labs   Lab Test 01/06/22  1051   PROTTOTAL 7.2   ALBUMIN 3.8   BILITOTAL 1.1   ALKPHOS 80   AST 20   ALT 24       PTH:  ENDO CALCIUM LABS-UM Latest Ref Rng & Units 3/18/2022   PARATHYROID HORMONE INTACT 18 - 80 pg/mL 38       Vitamin D:  Vitamin D Deficiency Screening Results:  Lab Results   Component Value Date    VITDT 55 03/18/2022    VITDT 48 12/05/2013       BoneTurnOverMarkers:    DEXA:  DEXA 2/2022:  REFERENCE T-SCORES:       Normal                -1.0 and greater                                 Osteopenia         Between -1.0 and -2.5                                           Osteoporosis     -2.5 and less                                       RISK FACTORS:, Early menopause before age 45, Hip fracture, fractures of pelvis and foot     CURRENT TREATMENT:  Calcium with Vitamin D     FINDINGS:                Left Femoral Neck           T-score:  -0.6                       Forearm (radius 33%)      T-score:  -2.3  The spine is not acceptable for evaluation due to previous surgical changes.    The right femur is not acceptable for evaluation due to previous surgical repair.                           Left Total Hip BMD: 0.861   Previous: 0.882                       Forearm (radius 33%) BMD: 0.677  Previous: NA     Comparison is made to another DXA performed on a different [employer] machine on 7/21/2014.      IMPRESSION  Severe osteoporosis on the basis of hip fracture.      Comparisons from different scanners that have not been cross calibrated, are not necessarily valid. Such a comparison has been performed here; one should interpret with caution.   There has been probably no significant cahnge in bone density of the hip(s). The forearm was not included on the previous study so comparison is not  "possible.       Recommendations include ensuring adequate Calcium and Vitamin D.     The current NOF Guidelines recommend treatment for patients with prior hip or vertebral fracture, T-score -2.5 or below, or 10 year risk of any major osteoporotic fracture >20% or 10 year risk of hip fracture >3%, as calculated using the FRAX calculator (www.shef.ac.uk/FRAX or you can google \"FRAX\").       Based on these guidelines, treatment (in addition to calcium and vitamin D) is recommended for this patient, after ruling out other causes of osteoporosis.  This is meant as an aid to clinical decision making; one must still use clinical judgement.        All pertinent notes, labs, and images personally reviewed by me.     A/P  Ms.Sharon SAMRA Stahl is a 81 year old here for the evaluation of:    #1 Osteoporosis:  Risk factors for low bone density include personal history of fracture or family history,  advanced age, female,Estrogen deficiency,early menopause before age 45, Hip fracture, fractures of pelvis and foot.  She has been on Fosamax from 5130-9828.  DEXA 2/2022 as noted above consistent with severe osteoporosis?  History of GERD-under good control with PPI  No upcoming dental procedure.  Labs showing normal calcium, kidney function, vitamin D and PTH levels.  Plan:  Discussed diagnosis, pathophysiology, management and treatment options of condition with pt.  Discussed osteoporosis in general.  Discussed 2/2022 DEXA scan results.  In the setting of osteoporosis history recommending to start Prolia.  Repeat DEXA scan in 1-2 years (based on insurance)  Follow-up with endocrinology in 1 year.  Recommend adequate calcium and vitamin D intake.  Discussed indications, risks and benefits of all medications prescribed, and answered questions to patient's satisfaction.    The pt was advised to    Maintain an adequate calcium and vitamin D intake and to supplement vitamin D if needed to maintain serum levels of 25 hydroxy D (25 OH D) " between 30-60 ng/ml.    Limit alcohol intake to no more than 2 servings per day.    Limit caffeine intake.    Maintain an active lifestyle including weight-bearing exercises for at least 30 mins daily.    Take measures to reduce the risk of falling.    All possible major side effects of Denosumab (PROLIA) were discussed including risk for atypical femur fractures, hypersensitivity, low calcium levels, osteonecrosis of jaw (which can manifest as jaw pain, osteomyelitis, osteitis, bone erosion, tooth/periodontal infection, toothache, gingival ulceration/erosion). Other s/e include but not limited to Hypertension, Headache and peripheral edema. I also told her to let her dentist lnow about the medication if plan for major dental procedure (currently no plans for dental procedure)  Indication: Prolia  (denosumab) is a prescription medicine used to treat osteoporosis in patients who:     Are at high risk for fracture, meaning patients who have had a fracture related to osteoporosis, or who have multiple risk factors for fracture     Cannot use another osteoporosis medicine or other osteoporosis medicines did not work well   The timeline for early/late injections would be 4 weeks early and any time after the 6 month bella. If a patient receives their injection late, then the subsequent injection would be 6 months from the date that they actually received the injection        More than 50% of the time spent with Ms. Stahl on counseling / coordinating her care.      All questions were answered.  The patient indicates understanding of the above issues and agrees with the plan set forth.      Follow-up:  1 year    Mary Funes MD  Endocrinology  [facility]  CC: Devi Romano

## 2022-05-06 ENCOUNTER — ALLIED HEALTH/NURSE VISIT (OUTPATIENT)
Dept: NURSING | Facility: CLINIC | Age: 82
End: 2022-05-06
Payer: COMMERCIAL

## 2022-05-06 DIAGNOSIS — M81.8 IDIOPATHIC OSTEOPOROSIS: Primary | ICD-10-CM

## 2022-05-06 PROCEDURE — 96372 THER/PROPH/DIAG INJ SC/IM: CPT | Performed by: INTERNAL MEDICINE

## 2022-05-06 PROCEDURE — 99207 PR NO CHARGE NURSE ONLY: CPT

## 2022-05-06 NOTE — PROGRESS NOTES
Clinic Administered Medication Documentation          Prolia Documentation    Prior to injection, verified patient identity using patient's name and date of birth. Medication was administered. Please see MAR and medication order for additional information. Patient instructed to remain in clinic for 15 minutes and report any adverse reaction to staff immediately .    Indication: Prolia  (denosumab) is a prescription medicine used to treat osteoporosis in patients who:     Are at high risk for fracture, meaning patients who have had a fracture related to osteoporosis, or who have multiple risk factors for fracture.    Cannot use another osteoporosis medicine or other osteoporosis medicines did not work well.    The timeline for early/late injections would be 4 weeks early and any time after the 6 month bella. If a patient receives their injection late, then the subsequent injection would be 6 months from the date that they actually received the injection.    When was the last injection?  1 injection  Was the last injection at least 6 months ago? Yes  Has the prior authorization been completed?  Yes  Is there an active order (within the past 365 days) in the chart?  Yes  Patient denied having dental work involving the bone in the past 6 months?  Yes  Patient denies a plan to dental work involving the bone in the next 6 months? Yes    The following steps were completed to comply with the REMS program for Prolia:    Reviewed information in the Medication Guide and Patient Counseling Chart, including the serious risks of Prolia  and the symptoms of each risk.    Advised patient to seek prompt medical attention if they have signs or symptoms of any of the serious risks.    Provided each patient a copy of the Medication Guide and Patient Brochure.      Was entire vial of medication used? Yes  Vial/Syringe: Syringe  Expiration Date:  7/24  Was the medication not being billed by clinic? No

## 2022-05-15 ENCOUNTER — MYC MEDICAL ADVICE (OUTPATIENT)
Dept: FAMILY MEDICINE | Facility: CLINIC | Age: 82
End: 2022-05-15
Payer: COMMERCIAL

## 2022-05-15 DIAGNOSIS — M15.8 OTHER OSTEOARTHRITIS INVOLVING MULTIPLE JOINTS: Primary | ICD-10-CM

## 2022-05-16 RX ORDER — GABAPENTIN 100 MG/1
1 CAPSULE ORAL 3 TIMES DAILY
COMMUNITY
Start: 2022-03-30 | End: 2022-05-16

## 2022-05-16 RX ORDER — GABAPENTIN 100 MG/1
100 CAPSULE ORAL 3 TIMES DAILY
Qty: 90 CAPSULE | Refills: 0 | Status: SHIPPED | OUTPATIENT
Start: 2022-05-16 | End: 2022-06-14

## 2022-05-18 ENCOUNTER — MYC MEDICAL ADVICE (OUTPATIENT)
Dept: FAMILY MEDICINE | Facility: CLINIC | Age: 82
End: 2022-05-18
Payer: COMMERCIAL

## 2022-05-18 NOTE — TELEPHONE ENCOUNTER
"S-(situation): RN called to triage symptoms.     B-(background): Patient felt \"out of sorts\" yesterday. Patient vomited yellowish phlegm yesterday \"multiple times 5-7x\". Patient also experienced diarrhea 4-5x yesterday.     A-(assessment): Patient is \"feeling much better today\". Patient has no current symptoms.     R-(recommendations): Patient inquired about COVID testing. RN directed patient to [facility] to go through symptom  for COVID test order. Patient was also advised to complete home testing. RN advised patient to increase fluids and rest. RN advised to continue to monitor symptoms and call back if any new/worsening symptoms. Patient verbalized understanding. No further questions at this time.      Chirag MACKENZIE RN    "

## 2022-05-25 ENCOUNTER — ALLIED HEALTH/NURSE VISIT (OUTPATIENT)
Dept: FAMILY MEDICINE | Facility: CLINIC | Age: 82
End: 2022-05-25
Payer: COMMERCIAL

## 2022-05-25 VITALS — DIASTOLIC BLOOD PRESSURE: 58 MMHG | SYSTOLIC BLOOD PRESSURE: 110 MMHG

## 2022-05-25 DIAGNOSIS — Z01.30 BP CHECK: Primary | ICD-10-CM

## 2022-05-25 PROCEDURE — 99207 PR NO CHARGE NURSE ONLY: CPT | Performed by: FAMILY MEDICINE

## 2022-05-25 NOTE — PROGRESS NOTES
Ya Stahl was evaluated at [facility] on May 25, 2022 at which time her blood pressure was:    BP Readings from Last 3 Encounters:   05/25/22 110/58   04/27/22 (!) 181/95   04/04/22 134/74     Pulse Readings from Last 3 Encounters:   04/27/22 57   01/06/22 54   12/07/21 64       Reviewed lifestyle modifications for blood pressure control and reduction: including making healthy food choices, managing weight, getting regular exercise, smoking cessation, reducing alcohol consumption, monitoring blood pressure regularly.     Symptoms: None    BP Goal:< 140/90 mmHg    BP Assessment:  BP at goal    Potential Reasons for BP too high: NA - Not applicable    BP Follow-Up Plan: Recheck BP in 6 months at pharmacy    Recommendation to Provider: none    Note completed by: Kristopher Orlando    Thank You   Larissa Diaz [facility]

## 2022-06-12 DIAGNOSIS — M15.8 OTHER OSTEOARTHRITIS INVOLVING MULTIPLE JOINTS: ICD-10-CM

## 2022-06-13 NOTE — TELEPHONE ENCOUNTER
Routing refill request to provider for review/approval because:  Drug not on the [facility] refill protocol     Sejal Cleveland RN on 6/13/2022 at 8:29 AM

## 2022-06-14 RX ORDER — GABAPENTIN 100 MG/1
CAPSULE ORAL
Qty: 90 CAPSULE | Refills: 0 | Status: SHIPPED | OUTPATIENT
Start: 2022-06-14 | End: 2022-07-27

## 2022-06-30 ENCOUNTER — OFFICE VISIT (OUTPATIENT)
Dept: FAMILY MEDICINE | Facility: CLINIC | Age: 82
End: 2022-06-30
Payer: COMMERCIAL

## 2022-06-30 ENCOUNTER — TELEPHONE (OUTPATIENT)
Dept: FAMILY MEDICINE | Facility: CLINIC | Age: 82
End: 2022-06-30

## 2022-06-30 VITALS
TEMPERATURE: 98 F | BODY MASS INDEX: 23.34 KG/M2 | SYSTOLIC BLOOD PRESSURE: 104 MMHG | HEART RATE: 60 BPM | HEIGHT: 64 IN | WEIGHT: 136.7 LBS | DIASTOLIC BLOOD PRESSURE: 61 MMHG | OXYGEN SATURATION: 97 % | RESPIRATION RATE: 20 BRPM

## 2022-06-30 DIAGNOSIS — Z91.018 H/O FOOD ALLERGY: ICD-10-CM

## 2022-06-30 DIAGNOSIS — R22.1 THROAT SWELLING: Primary | ICD-10-CM

## 2022-06-30 DIAGNOSIS — R22.1 THROAT SWELLING: ICD-10-CM

## 2022-06-30 PROBLEM — I25.10 CORONARY ARTERY DISEASE DUE TO LIPID RICH PLAQUE: Status: ACTIVE | Noted: 2022-06-30

## 2022-06-30 PROBLEM — E78.5 DYSLIPIDEMIA: Status: ACTIVE | Noted: 2022-06-30

## 2022-06-30 PROBLEM — I25.83 CORONARY ARTERY DISEASE DUE TO LIPID RICH PLAQUE: Status: ACTIVE | Noted: 2022-06-30

## 2022-06-30 PROBLEM — Z96.641 STATUS POST TOTAL REPLACEMENT OF RIGHT HIP: Status: ACTIVE | Noted: 2020-08-04

## 2022-06-30 PROCEDURE — 99214 OFFICE O/P EST MOD 30 MIN: CPT | Performed by: STUDENT IN AN ORGANIZED HEALTH CARE EDUCATION/TRAINING PROGRAM

## 2022-06-30 RX ORDER — EPINEPHRINE 0.3 MG/.3ML
0.3 INJECTION SUBCUTANEOUS PRN
Qty: 2 UNITS | Refills: 0 | Status: SHIPPED | OUTPATIENT
Start: 2022-06-30 | End: 2022-06-30

## 2022-06-30 RX ORDER — EPINEPHRINE 0.3 MG/.3ML
0.3 INJECTION SUBCUTANEOUS PRN
Qty: 2 EACH | Refills: 0 | Status: SHIPPED | OUTPATIENT
Start: 2022-06-30

## 2022-06-30 RX ORDER — LEVOTHYROXINE SODIUM 100 UG/1
1 TABLET ORAL EVERY 24 HOURS
COMMUNITY
Start: 2021-09-13 | End: 2022-06-30

## 2022-06-30 NOTE — PROGRESS NOTES
Assessment & Plan     Throat swelling  H/O food allergy  For the past year, pt noting episodes of lip/tongue numbness/swelling occurring after ingestion of food with recently progressive symptoms. Last time during episode, had new symptom of difficulty swallowing which occurred about 1.5 weeks ago. Unclear food triggers as patient hasn't been paying close attention although she does know that wine is one of her triggers though thus has avoided since. Hx is concerning for food allergies leading to anaphylaxis thus will provide epi pen and refer to allergist for food testing. In meantime, discussed food diary and provided instructions and next steps for epi pen use.   - EPINEPHrine (ANY BX GENERIC EQUIV) 0.3 MG/0.3ML injection 2-pack  Dispense: 2 Units; Refill: 0  - Adult Allergy/Asthma Referral    Return in about 6 months (around 12/30/2022) for Routine preventive with Dr. Romano.    Shankar Eric MD  [facility] [place]  6/30/2022    Janet Pandya is a 82 year old, presenting for the following health issues:  Oral Swelling and Throat Problem (Patient is being seen for lip numbness and throat swelling.)      History of Present Illness       Reason for visit:  Allegery to something  Symptom onset:  More than a month  Symptoms include:  My lips get numb and my tongue swells after eating or drinking occasionly  Symptom intensity:  Moderate  Symptom progression:  Worsening    She eats 2-3 servings of fruits and vegetables daily.She consumes 1 sweetened beverage(s) daily.She exercises with enough effort to increase her heart rate 30 to 60 minutes per day.  She exercises with enough effort to increase her heart rate 5 days per week.   She is taking medications regularly.    Was seen at dentist office earlier this week and was told to be seen by doctor for ongoing symptoms of:    Oral symptoms  Has numbness of lips, tongue. Swelling of the tongue.   Occurring about once every couple of weeks. Occurs  "after eating.  Ongoing for the past year.  Not sure how long after eating that she develops symptoms  Is getting progressively worse, developed difficulty swallowing this last time (about 1.5 weeks ago).  Knows it is associated with wine.  Sometimes also noticing with bread - some but not sure which one.  Doesn't have problems with vegetables - does own antoinette.     No abdominal pain, N/V/D. No skin rashes.  No SOB, wheezing with these episodes.   So far, only noticing when eating or drinking. Not associated with season changes.     Does have Sjogren's and thought symptoms were associated with Sjogren's so never came in for this.    Review of Systems   See HPI for pertinent pos and neg      Objective    /61   Pulse 60   Temp 98  F (36.7  C) (Oral)   Resp 20   Ht 1.626 m (5' 4\")   Wt 62 kg (136 lb 11.2 oz)   SpO2 97%   BMI 23.46 kg/m    Body mass index is 23.46 kg/m .     Physical Exam   ..   GENERAL: healthy, alert and no distress  HEAD: Normocephalic, atraumatic.   EYES: PERRL. Normal conjunctivae, sclera.   ENT: Normal oropharynx, no lip or tongue swelling appreciated.   NECK: Supple. No lymphadenopathy appreciated. Trachea midline. Thyroid not enlarged, not TTP.  RESP: lungs clear to auscultation - no rales, rhonchi or wheezes  CV: regular rate and rhythm, normal S1 S2, no murmur, click, rub or gallop.   MSK: no gross musculoskeletal defects noted.  SKIN: no suspicious lesions or rashes. Few ecchymoses located on UEs.  EXT: Warm and well perfused.  NEURO: CNII-XII grossly intact. No focal deficits.  PSYCH: Groomed, dressed appropriately for weather. Normal mood with consistent affect.   "

## 2022-06-30 NOTE — TELEPHONE ENCOUNTER
The message was not sent electronically to the requested pharmacy. Contact the pharmacy about the new prescription.     Code: 900 - Transaction rejected   The MedicationPrescribed QuantityUnitOfMeasure Code: X67550 is invalid               Transmission failing, see alerts, failed again, routed to Dr Eric, can you resign?    Scarlett Alvarez RN, BSN  [facility]

## 2022-06-30 NOTE — PROGRESS NOTES
Resent epinephrine, transmission failed  Scarlett Alvarez, RN, BSN  [facility]

## 2022-07-14 ENCOUNTER — OFFICE VISIT (OUTPATIENT)
Dept: CARDIOLOGY | Facility: CLINIC | Age: 82
End: 2022-07-14
Attending: FAMILY MEDICINE
Payer: COMMERCIAL

## 2022-07-14 VITALS
SYSTOLIC BLOOD PRESSURE: 112 MMHG | WEIGHT: 140 LBS | BODY MASS INDEX: 23.9 KG/M2 | DIASTOLIC BLOOD PRESSURE: 62 MMHG | HEIGHT: 64 IN | HEART RATE: 55 BPM

## 2022-07-14 DIAGNOSIS — I10 HYPERTENSION GOAL BP (BLOOD PRESSURE) < 140/90: ICD-10-CM

## 2022-07-14 DIAGNOSIS — I77.819 AORTIC DILATATION (H): ICD-10-CM

## 2022-07-14 DIAGNOSIS — R60.9 EDEMA, UNSPECIFIED TYPE: Primary | ICD-10-CM

## 2022-07-14 DIAGNOSIS — I25.10 ASCVD (ARTERIOSCLEROTIC CARDIOVASCULAR DISEASE): ICD-10-CM

## 2022-07-14 PROCEDURE — 99214 OFFICE O/P EST MOD 30 MIN: CPT | Performed by: INTERNAL MEDICINE

## 2022-07-14 RX ORDER — POTASSIUM CHLORIDE 750 MG/1
10 TABLET, EXTENDED RELEASE ORAL 2 TIMES DAILY
Qty: 30 TABLET | Refills: 11 | Status: SHIPPED | OUTPATIENT
Start: 2022-07-14 | End: 2022-09-16

## 2022-07-14 RX ORDER — FUROSEMIDE 20 MG
20 TABLET ORAL DAILY
Qty: 30 TABLET | Refills: 11 | Status: SHIPPED | OUTPATIENT
Start: 2022-07-14 | End: 2022-11-09

## 2022-07-14 NOTE — PROGRESS NOTES
HISTORY OF PRESENT ILLNESS:  Mild exertional dyspnea. Feet swollen jennifer as day goes on.  Was switched from losartan to amlodipine 5. No change in cough no fever, purulent sputum     Orders this Visit:  No orders of the defined types were placed in this encounter.    No orders of the defined types were placed in this encounter.    There are no discontinued medications.    Encounter Diagnoses   Name Primary?     ASCVD (arteriosclerotic cardiovascular disease)      Aortic dilatation (H)      Hypertension goal BP (blood pressure) < 140/90        CURRENT MEDICATIONS:  Current Outpatient Medications   Medication Sig Dispense Refill     amLODIPine (NORVASC) 5 MG tablet Take 1 tablet (5 mg) by mouth daily 102 tablet 3     aspirin 81 MG tablet Take 81 mg by mouth daily       atorvastatin (LIPITOR) 20 MG tablet Take 1 tablet (20 mg) by mouth daily 102 tablet 3     benzonatate (TESSALON) 200 MG capsule take 1 capsule by oral route 1-2 times every day as needed for cough       Biotin w/ Vitamins C & E (HAIR/SKIN/NAILS PO) Take by mouth daily       calcium carbonate 500 mg-vitamin D 200 units (OSCAL WITH D;OYSTER SHELL CALCIUM) 500-200 MG-UNIT per tablet Take 1 tablet by mouth 3 times daily (before meals) 90 tablet 3     Calcium Carbonate-Vitamin D (CALCIUM 600+D PO)        Carboxymethylcellulose Sodium (REFRESH LIQUIGEL OP) Apply 1 drop to eye every evening 15 minutes after Restasis drops.       cevimeline (EVOXAC) 30 MG capsule Take 1 capsule (30 mg) by mouth 3 times daily Reported on 3/6/2017 270 capsule 3     cholecalciferol 1000 units TABS Take 1,000 Units by mouth 2 times daily 30 tablet 3     CycloSPORINE (RESTASIS OP) Apply 1 drop to eye every evening        doxycycline monohydrate (MONODOX) 50 MG capsule Take 50 mg by mouth daily       EPINEPHrine (ANY BX GENERIC EQUIV) 0.3 MG/0.3ML injection 2-pack Inject 0.3 mLs (0.3 mg) into the muscle as needed for anaphylaxis 2 each 0     famotidine (PEPCID) 40 MG tablet Take 40 mg  "by mouth daily       gabapentin (NEURONTIN) 100 MG capsule TAKE ONE CAPSULE BY MOUTH THREE TIMES A DAY 90 capsule 0     LANsoprazole (PREVACID) 30 MG DR capsule Take 1 capsule (30 mg) by mouth every morning (before breakfast) 102 capsule 3     levothyroxine (SYNTHROID/LEVOTHROID) 100 MCG tablet Take 1 tablet (100 mcg) by mouth daily 90 tablet 3     Lutein 20 MG CAPS Take by mouth daily       metoprolol succinate ER (TOPROL-XL) 25 MG 24 hr tablet Take 1 tablet (25 mg) by mouth daily 90 tablet 3     mirabegron (MYRBETRIQ) 25 MG 24 hr tablet Take 1 tablet (25 mg) by mouth daily 30 tablet 10     nitroGLYcerin (NITROSTAT) 0.4 MG sublingual tablet Place 1 tablet (0.4 mg) under the tongue every 5 minutes as needed for chest pain 25 tablet 0     Omega-3 Fatty Acids (OMEGA-3 FISH OIL PO) Take 1 g by mouth 2 times daily (with meals)        polyethylene glycol (MIRALAX) 17 GM/Dose powder Take 1 capful by mouth 2 times daily In 8 ounces of liquid         ALLERGIES     Allergies   Allergen Reactions     Codeine      fatigue     No Clinical Screening - See Comments Nausea and Vomiting     Vicodin [Acetaminophen] Fatigue     Adhesive Tape Rash       PAST MEDICAL, SURGICAL, FAMILY, SOCIAL HISTORY:  History was reviewed and updated as needed, see medical record.    Review of Systems:  A 12-point review of systems was completed, see medical record for detailed review of systems information.    Physical Exam:  Vitals: /62   Pulse 55   Ht 1.626 m (5' 4\")   Wt 63.5 kg (140 lb)   BMI 24.03 kg/m      Constitutional:           Skin:           Head:           Eyes:           ENT:           Neck:           Chest:           Cardiac:                    Abdomen:           Vascular:                                        Extremities and Back:           Neurological:           ASSESSMENT: ? Venous insufficiency, diastolic HF and or amlodipine may be causing fluid retention NO need for aortic intervention, will follow Aortic dimesions   "     RECOMMENDATIONS:   Stop amlodipine  Furosemide 20 daily  kcl 10 mequ daily  Follow-up bp wts in 2 weeks.  One year follow up TTE       Recent Lab Results:  LIPID RESULTS:  Lab Results   Component Value Date    CHOL 160 01/06/2022    CHOL 175 01/19/2021    HDL 99 01/06/2022    HDL 89 01/19/2021    LDL 49 01/06/2022    LDL 64 01/19/2021    TRIG 61 01/06/2022    TRIG 108 01/19/2021    CHOLHDLRATIO 2.3 12/02/2014       LIVER ENZYME RESULTS:  Lab Results   Component Value Date    AST 20 01/06/2022    AST 19 01/19/2021    ALT 24 01/06/2022    ALT 25 01/19/2021       CBC RESULTS:  Lab Results   Component Value Date    WBC 4.2 04/27/2022    WBC 9.0 01/19/2021    RBC 4.17 04/27/2022    RBC 3.85 01/19/2021    HGB 13.5 04/27/2022    HGB 12.5 01/19/2021    HCT 41.8 04/27/2022    HCT 38.7 01/19/2021     04/27/2022     (H) 01/19/2021    MCH 32.4 04/27/2022    MCH 32.5 01/19/2021    MCHC 32.3 04/27/2022    MCHC 32.3 01/19/2021    RDW 13.9 04/27/2022    RDW 13.8 01/19/2021     04/27/2022     01/19/2021       BMP RESULTS:  Lab Results   Component Value Date     04/27/2022     01/19/2021    POTASSIUM 3.9 04/27/2022    POTASSIUM 4.0 01/19/2021    CHLORIDE 103 04/27/2022    CHLORIDE 104 01/19/2021    CO2 30 04/27/2022    CO2 30 01/19/2021    ANIONGAP 3 04/27/2022    ANIONGAP 3 01/19/2021     (H) 04/27/2022     (H) 01/19/2021    BUN 17 04/27/2022    BUN 22 01/19/2021    CR 0.63 04/27/2022    CR 0.72 01/19/2021    GFRESTIMATED 89 04/27/2022    GFRESTIMATED 79 01/19/2021    GFRESTBLACK >90 01/19/2021    CARMEN 8.9 04/27/2022    CARMEN 8.9 01/19/2021        A1C RESULTS:  Lab Results   Component Value Date    A1C 6.1 (H) 03/18/2022    A1C 5.9 (H) 01/19/2021       INR RESULTS:  Lab Results   Component Value Date    INR 0.97 08/04/2020    INR 0.96 12/01/2016    INR 1.00 08/23/2016       We greatly appreciate the opportunity to be involved in the care of your patient, Ya CABRALES  Favio.    Sincerely,  Venancio Montanez MD      CC  Devi [place]  [id number] [place] [address]

## 2022-07-14 NOTE — PROGRESS NOTES
Service Date: 07/14/2022    HISTORY OF PRESENT ILLNESS:  Ya Stahl, an 82-year-old woman with nonobstructive coronary artery disease, hypothyroidism, sicca syndrome, cholelithiasis (status post laparoscopic cholecystectomy), and ascending aortic dilation was seen today at your request for followup.    I last evaluated the patient in 02/2021, at which time she was free of cardiac symptoms.  The patient has a persistent cough, and underwent a CT scan ordered by Dr. Alan late Fall 2021.   A CT scan showed no new infiltrates, but the radiologist noted that the patient's aorta was dilated, although there had been no change since her last CT.  You advised Cardiology followup. No etiology for  the cough was identified, but Dr. Alan advised stopping losartan and she currently takes amlodipine/Metoprolol xl.     Over the next several months, the patient noted swelling in her feet and mild exertional dyspnea without chest pain.    PAST MEDICAL HISTORY:    1.  Coronary artery disease.  a.  Previous coronary angiogram showing mild atheromatous change but no significant focal narrowing.  On statin therapy.  2.  Monoclonal gammopathy of uncertain significance -- most recent hemoglobin 13.5, platelet count 241,000.  3.  Dilated ascending aorta -- measures 4.4 on most recent CT scan.  4.  Sjogren's disease.  5.  Hypertension -- currently very well controlled.  6.  Hypothyroidism.  7.  History of gastroesophageal reflux.  8.  Cholelithiasis -- status post laparoscopic cholecystectomy.    PHYSICAL EXAMINATION:    GENERAL:  Exam today demonstrates a very pleasant, cooperative 82-year-old woman who appears comfortable at rest.  VITAL SIGNS:  Her blood pressure is 112/62, heart rate 55.  Her height is 1.63 meters, her weight is 63.5 kg, up approximately 8 pounds since seen in 04/2022.  RESPIRATORY:  Lungs are clear to percussion and auscultation.  CARDIOVASCULAR:  Shows a normal S1 with a normal S2.  There is no murmur, rub  or click.  Her pulses are symmetrical in the carotid, radial, brachial femoral, popliteal, dorsalis pedis areas.  She does have pedal edema present bilaterally at about 1 to 2+.    MEDICATIONS:    1.  Aspirin 81 daily.  2.  Atorvastatin 20 daily.  3.  Doxycycline 50 mg daily.  4.  Famotidine 20 daily.  5.  Gabapentin 100 mg daily.  6.  Lansoprazole 30 mg daily.  7.  Levothyroxine 100 mcg daily.  8.  Metoprolol ER 25 daily.  9.  Myrbetriq 25 mg daily.  10.  Nitroglycerin sublingual p.r.n.    LABORATORY STUDIES:  Most recent LDL cholesterol in January was 49.  Most recent potassium 3.9 with a creatinine of 0.63 in 04/2022.  Her most recent ECG in 04/2022 showed a sinus rhythm with normal axis and normal intervals and occasional PACs.    ASSESSMENT:  The patient's aortic dimensions are stable, and she at this point does not require aortic intervention but a repeat echo in about 1 year to make certain there is no change would be reasonable.    I suspect that her leg swelling may represent a combination of venous insufficiency, acute  diastolic heart failure, and a side effect from amlodipine.  Her weight is up approximately 8 pounds which also raises concern about diastolic heart failure. Her blood pressure is well controlled.   I would recommend an echo to reassess left ventricular systolic performance and make certain there has been no interim change in left ventricular performance   .  In the meantime, I would advise stopping amlodipine and beginning furosemide 20 mg daily/potassium 10 mEq daily.  I have instructed the patient to elevate her legs when she is seated. Compression stockings may be helpful. We will plan a followup visit to recheck her blood pressure and assess her response to diuretic therapy in about 2 weeks.      RECOMMENDATIONS:    1.  Discontinue amlodipine.  2.  Add furosemide 20 daily and potassium 10 mEq daily.  3.  Echocardiogram to assess left ventricular function.  4.  Followup visit with  advanced level practitioner in about 2 weeks.  5.  Would repeat an echo in about 1 year to reassess the aortic dimensions.    We greatly appreciate the opportunity to be involved in the care of your patient, Ya Gloria.    Venancio Montanez MD    cc:  Devi Romano MD  [address], [address]  [place] [address]    Venancio Montanez MD        D: 2022   T: 2022   MT:     Name:     YA GLORIA  MRN:      [id number]        Account:      [id number]   :      1940           Service Date: 2022       Document: [id number]

## 2022-07-14 NOTE — LETTER
7/14/2022    Devi Romano MD  [address] [place] [address]    RE: Ya Stahl       Dear Colleague,     I had the pleasure of seeing Ya Stahl in the [facility].  HISTORY OF PRESENT ILLNESS:  Mild exertional dyspnea. Feet swollen jennifer as day goes on.  Was switched from losartan to amlodipine 5. No change in cough no fever, purulent sputum     Orders this Visit:  No orders of the defined types were placed in this encounter.    No orders of the defined types were placed in this encounter.    There are no discontinued medications.    Encounter Diagnoses   Name Primary?     ASCVD (arteriosclerotic cardiovascular disease)      Aortic dilatation (H)      Hypertension goal BP (blood pressure) < 140/90        CURRENT MEDICATIONS:  Current Outpatient Medications   Medication Sig Dispense Refill     amLODIPine (NORVASC) 5 MG tablet Take 1 tablet (5 mg) by mouth daily 102 tablet 3     aspirin 81 MG tablet Take 81 mg by mouth daily       atorvastatin (LIPITOR) 20 MG tablet Take 1 tablet (20 mg) by mouth daily 102 tablet 3     benzonatate (TESSALON) 200 MG capsule take 1 capsule by oral route 1-2 times every day as needed for cough       Biotin w/ Vitamins C & E (HAIR/SKIN/NAILS PO) Take by mouth daily       calcium carbonate 500 mg-vitamin D 200 units (OSCAL WITH D;OYSTER SHELL CALCIUM) 500-200 MG-UNIT per tablet Take 1 tablet by mouth 3 times daily (before meals) 90 tablet 3     Calcium Carbonate-Vitamin D (CALCIUM 600+D PO)        Carboxymethylcellulose Sodium (REFRESH LIQUIGEL OP) Apply 1 drop to eye every evening 15 minutes after Restasis drops.       cevimeline (EVOXAC) 30 MG capsule Take 1 capsule (30 mg) by mouth 3 times daily Reported on 3/6/2017 270 capsule 3     cholecalciferol 1000 units TABS Take 1,000 Units by mouth 2 times daily 30 tablet 3     CycloSPORINE (RESTASIS OP) Apply 1 drop to eye every evening        doxycycline monohydrate (MONODOX) 50 MG capsule Take 50  "mg by mouth daily       EPINEPHrine (ANY BX GENERIC EQUIV) 0.3 MG/0.3ML injection 2-pack Inject 0.3 mLs (0.3 mg) into the muscle as needed for anaphylaxis 2 each 0     famotidine (PEPCID) 40 MG tablet Take 40 mg by mouth daily       gabapentin (NEURONTIN) 100 MG capsule TAKE ONE CAPSULE BY MOUTH THREE TIMES A DAY 90 capsule 0     LANsoprazole (PREVACID) 30 MG DR capsule Take 1 capsule (30 mg) by mouth every morning (before breakfast) 102 capsule 3     levothyroxine (SYNTHROID/LEVOTHROID) 100 MCG tablet Take 1 tablet (100 mcg) by mouth daily 90 tablet 3     Lutein 20 MG CAPS Take by mouth daily       metoprolol succinate ER (TOPROL-XL) 25 MG 24 hr tablet Take 1 tablet (25 mg) by mouth daily 90 tablet 3     mirabegron (MYRBETRIQ) 25 MG 24 hr tablet Take 1 tablet (25 mg) by mouth daily 30 tablet 10     nitroGLYcerin (NITROSTAT) 0.4 MG sublingual tablet Place 1 tablet (0.4 mg) under the tongue every 5 minutes as needed for chest pain 25 tablet 0     Omega-3 Fatty Acids (OMEGA-3 FISH OIL PO) Take 1 g by mouth 2 times daily (with meals)        polyethylene glycol (MIRALAX) 17 GM/Dose powder Take 1 capful by mouth 2 times daily In 8 ounces of liquid         ALLERGIES     Allergies   Allergen Reactions     Codeine      fatigue     No Clinical Screening - See Comments Nausea and Vomiting     Vicodin [Acetaminophen] Fatigue     Adhesive Tape Rash       PAST MEDICAL, SURGICAL, FAMILY, SOCIAL HISTORY:  History was reviewed and updated as needed, see medical record.    Review of Systems:  A 12-point review of systems was completed, see medical record for detailed review of systems information.    Physical Exam:  Vitals: /62   Pulse 55   Ht 1.626 m (5' 4\")   Wt 63.5 kg (140 lb)   BMI 24.03 kg/m      Constitutional:           Skin:           Head:           Eyes:           ENT:           Neck:           Chest:           Cardiac:                    Abdomen:           Vascular:                                    "     Extremities and Back:           Neurological:           ASSESSMENT: ? Venous insufficiency, diastolic HF and or amlodipine may be causing fluid retention NO need for aortic intervention, will follow Aortic dimesions       RECOMMENDATIONS:   Stop amlodipine  Furosemide 20 daily  kcl 10 mequ daily  Follow-up bp wts in 2 weeks.  One year follow up TTE       Recent Lab Results:  LIPID RESULTS:  Lab Results   Component Value Date    CHOL 160 01/06/2022    CHOL 175 01/19/2021    HDL 99 01/06/2022    HDL 89 01/19/2021    LDL 49 01/06/2022    LDL 64 01/19/2021    TRIG 61 01/06/2022    TRIG 108 01/19/2021    CHOLHDLRATIO 2.3 12/02/2014       LIVER ENZYME RESULTS:  Lab Results   Component Value Date    AST 20 01/06/2022    AST 19 01/19/2021    ALT 24 01/06/2022    ALT 25 01/19/2021       CBC RESULTS:  Lab Results   Component Value Date    WBC 4.2 04/27/2022    WBC 9.0 01/19/2021    RBC 4.17 04/27/2022    RBC 3.85 01/19/2021    HGB 13.5 04/27/2022    HGB 12.5 01/19/2021    HCT 41.8 04/27/2022    HCT 38.7 01/19/2021     04/27/2022     (H) 01/19/2021    MCH 32.4 04/27/2022    MCH 32.5 01/19/2021    MCHC 32.3 04/27/2022    MCHC 32.3 01/19/2021    RDW 13.9 04/27/2022    RDW 13.8 01/19/2021     04/27/2022     01/19/2021       BMP RESULTS:  Lab Results   Component Value Date     04/27/2022     01/19/2021    POTASSIUM 3.9 04/27/2022    POTASSIUM 4.0 01/19/2021    CHLORIDE 103 04/27/2022    CHLORIDE 104 01/19/2021    CO2 30 04/27/2022    CO2 30 01/19/2021    ANIONGAP 3 04/27/2022    ANIONGAP 3 01/19/2021     (H) 04/27/2022     (H) 01/19/2021    BUN 17 04/27/2022    BUN 22 01/19/2021    CR 0.63 04/27/2022    CR 0.72 01/19/2021    GFRESTIMATED 89 04/27/2022    GFRESTIMATED 79 01/19/2021    GFRESTBLACK >90 01/19/2021    CARMEN 8.9 04/27/2022    CARMEN 8.9 01/19/2021        A1C RESULTS:  Lab Results   Component Value Date    A1C 6.1 (H) 03/18/2022    A1C 5.9 (H) 01/19/2021       INR  RESULTS:  Lab Results   Component Value Date    INR 0.97 08/04/2020    INR 0.96 12/01/2016    INR 1.00 08/23/2016       We greatly appreciate the opportunity to be involved in the care of your patient, Ya Stahl.    Sincerely,  Venancio Montanez MD      CC  [place]  [address] [place] [address]                                                                       Service Date: 07/14/2022    HISTORY OF PRESENT ILLNESS:  Ya Stahl, an 82-year-old woman with nonobstructive coronary artery disease, hypothyroidism, sicca syndrome, cholelithiasis (status post laparoscopic cholecystectomy), and ascending aortic dilation was seen today at your request for followup.    I last evaluated the patient in 02/2021, at which time she was free of cardiac symptoms.  The patient has a persistent cough, and underwent a CT scan ordered by Dr. Alan late Fall 2021.   A CT scan showed no new infiltrates, but the radiologist noted that the patient's aorta was dilated, although there had been no change since her last CT.  You advised Cardiology followup. No etiology for  the cough was identified, but Dr. Alan advised stopping losartan and she currently takes amlodipine/Metoprolol xl.     Over the next several months, the patient noted swelling in her feet and mild exertional dyspnea without chest pain.    PAST MEDICAL HISTORY:    1.  Coronary artery disease.  a.  Previous coronary angiogram showing mild atheromatous change but no significant focal narrowing.  On statin therapy.  2.  Monoclonal gammopathy of uncertain significance -- most recent hemoglobin 13.5, platelet count 241,000.  3.  Dilated ascending aorta -- measures 4.4 on most recent CT scan.  4.  Sjogren's disease.  5.  Hypertension -- currently very well controlled.  6.  Hypothyroidism.  7.  History of gastroesophageal reflux.  8.  Cholelithiasis -- status post laparoscopic cholecystectomy.    PHYSICAL EXAMINATION:    GENERAL:   Exam today demonstrates a very pleasant, cooperative 82-year-old woman who appears comfortable at rest.  VITAL SIGNS:  Her blood pressure is 112/62, heart rate 55.  Her height is 1.63 meters, her weight is 63.5 kg, up approximately 8 pounds since seen in 04/2022.  RESPIRATORY:  Lungs are clear to percussion and auscultation.  CARDIOVASCULAR:  Shows a normal S1 with a normal S2.  There is no murmur, rub or click.  Her pulses are symmetrical in the carotid, radial, brachial femoral, popliteal, dorsalis pedis areas.  She does have pedal edema present bilaterally at about 1 to 2+.    MEDICATIONS:    1.  Aspirin 81 daily.  2.  Atorvastatin 20 daily.  3.  Doxycycline 50 mg daily.  4.  Famotidine 20 daily.  5.  Gabapentin 100 mg daily.  6.  Lansoprazole 30 mg daily.  7.  Levothyroxine 100 mcg daily.  8.  Metoprolol ER 25 daily.  9.  Myrbetriq 25 mg daily.  10.  Nitroglycerin sublingual p.r.n.    LABORATORY STUDIES:  Most recent LDL cholesterol in January was 49.  Most recent potassium 3.9 with a creatinine of 0.63 in 04/2022.  Her most recent ECG in 04/2022 showed a sinus rhythm with normal axis and normal intervals and occasional PACs.    ASSESSMENT:  The patient's aortic dimensions are stable, and she at this point does not require aortic intervention but a repeat echo in about 1 year to make certain there is no change would be reasonable.    I suspect that her leg swelling may represent a combination of venous insufficiency, acute  diastolic heart failure, and a side effect from amlodipine.  Her weight is up approximately 8 pounds which also raises concern about diastolic heart failure. Her blood pressure is well controlled.   I would recommend an echo to reassess left ventricular systolic performance and make certain there has been no interim change in left ventricular performance   .  In the meantime, I would advise stopping amlodipine and beginning furosemide 20 mg daily/potassium 10 mEq daily.  I have instructed the  patient to elevate her legs when she is seated. Compression stockings may be helpful. We will plan a followup visit to recheck her blood pressure and assess her response to diuretic therapy in about 2 weeks.      RECOMMENDATIONS:    1.  Discontinue amlodipine.  2.  Add furosemide 20 daily and potassium 10 mEq daily.  3.  Echocardiogram to assess left ventricular function.  4.  Followup visit with advanced level practitioner in about 2 weeks.  5.  Would repeat an echo in about 1 year to reassess the aortic dimensions.    We greatly appreciate the opportunity to be involved in the care of your patient, Ya Gloria.    Venancio Montanez MD    cc:  Devi Romano MD  [address], [address]  [place] [address]    Venancio Montanez MD        D: 2022   T: 2022   MT: [email or web address]    Name:     YA GLORIA  MRN:      [id number]        Account:      [id number]   :      1940           Service Date: 2022       Document: [id number]      Thank you for allowing me to participate in the care of your patient.      Sincerely,     Venancio Montanez MD      [facility]

## 2022-07-22 DIAGNOSIS — M15.8 OTHER OSTEOARTHRITIS INVOLVING MULTIPLE JOINTS: ICD-10-CM

## 2022-07-22 NOTE — TELEPHONE ENCOUNTER
Routing refill request to provider for review/approval because:  Drug not on the [facility] refill protocol     Sejal Cleveland RN on 7/22/2022 at 7:25 AM

## 2022-07-27 RX ORDER — GABAPENTIN 100 MG/1
CAPSULE ORAL
Qty: 90 CAPSULE | Refills: 0 | Status: SHIPPED | OUTPATIENT
Start: 2022-07-27 | End: 2022-11-07

## 2022-08-04 ENCOUNTER — HOSPITAL ENCOUNTER (OUTPATIENT)
Dept: CARDIOLOGY | Facility: CLINIC | Age: 82
Discharge: HOME | End: 2022-08-04
Attending: INTERNAL MEDICINE | Admitting: INTERNAL MEDICINE
Payer: COMMERCIAL

## 2022-08-04 DIAGNOSIS — I77.819 AORTIC DILATATION (H): ICD-10-CM

## 2022-08-04 DIAGNOSIS — R60.9 EDEMA, UNSPECIFIED TYPE: ICD-10-CM

## 2022-08-04 LAB — LVEF ECHO: NORMAL

## 2022-08-04 PROCEDURE — 93306 TTE W/DOPPLER COMPLETE: CPT

## 2022-08-04 PROCEDURE — 93306 TTE W/DOPPLER COMPLETE: CPT | Mod: 26 | Performed by: INTERNAL MEDICINE

## 2022-08-22 ENCOUNTER — OFFICE VISIT (OUTPATIENT)
Dept: ALLERGY | Facility: CLINIC | Age: 82
End: 2022-08-22
Attending: STUDENT IN AN ORGANIZED HEALTH CARE EDUCATION/TRAINING PROGRAM
Payer: COMMERCIAL

## 2022-08-22 VITALS
DIASTOLIC BLOOD PRESSURE: 80 MMHG | HEART RATE: 58 BPM | SYSTOLIC BLOOD PRESSURE: 150 MMHG | BODY MASS INDEX: 24.13 KG/M2 | OXYGEN SATURATION: 98 % | WEIGHT: 140.6 LBS

## 2022-08-22 DIAGNOSIS — T78.1XXA OTHER ADVERSE FOOD REACTIONS, NOT ELSEWHERE CLASSIFIED, INITIAL ENCOUNTER: Primary | ICD-10-CM

## 2022-08-22 DIAGNOSIS — R22.1 THROAT SWELLING: ICD-10-CM

## 2022-08-22 DIAGNOSIS — Z91.018 H/O FOOD ALLERGY: ICD-10-CM

## 2022-08-22 PROCEDURE — 99203 OFFICE O/P NEW LOW 30 MIN: CPT | Performed by: INTERNAL MEDICINE

## 2022-08-22 ASSESSMENT — ENCOUNTER SYMPTOMS
WHEEZING: 0
VOMITING: 0
MYALGIAS: 0
ARTHRALGIAS: 0
EYE REDNESS: 0
HEADACHES: 0
ADENOPATHY: 0
JOINT SWELLING: 0
DIARRHEA: 0
ACTIVITY CHANGE: 0
FACIAL SWELLING: 0
FEVER: 0
SHORTNESS OF BREATH: 0
CHILLS: 0
NAUSEA: 0
COUGH: 0
SINUS PRESSURE: 0
RHINORRHEA: 0
EYE ITCHING: 0
CHEST TIGHTNESS: 0
EYE DISCHARGE: 0

## 2022-08-22 NOTE — LETTER
"    8/22/2022         RE: Ya Stahl  [address]  [facility] [address]        Dear Colleague,    Thank you for referring your patient, Ya Stahl, to the [facility] [place]. Please see a copy of my visit note below.    Ya Stahl was seen in the Allergy Clinic at [facility].    Ya Stahl is a 82 year old female being seen today at the request of Dr. Eric in consultation for food allergy.    Over the last year she has noticed that bread and meat with preservatives will cause a tongue swollen sensation.  She has had some difficulty swallowing at one point.  The tongue swelling will last for approximately 30 minutes.  She use to make her own bread but since purchasing bread from the store she will notice increased symptoms.  When she buys bread that does not have preservatives she will not have any tongue or throat swelling sensation.  She also notices this with white wine.  She estimates that has occurred 3-4 times with white wine and 5-6 times with bread with preservatives.  Since avoiding these items she has not had any problems with tongue swelling.  She has noticed that at times it will be hard to talk, unrelated to tongue swelling.    Chief Complaint   Patient presents with     Allergy Consult     Patient states that when she eats breads that have preservatives in it and drinks wine she gets tongue swelling. She has not noticed symptoms with any other foods. She started noticing these symptoms within the past year. She doesn't take any daily allergy medication.        Past Medical History:   Diagnosis Date     Arthritis      Complication of anesthesia     \"hard time waking up / N & V\"     Coronary artery disease      Disease of thyroid gland      Displacement of lumbar intervertebral disc without myelopathy      Gastro-oesophageal reflux disease      GERD (gastroesophageal reflux disease)      Hearing loss     has bilateral " aids     Heart attack (H) 03/2016     History of anesthesia complications     delayed emergence     History of angina      Hypertension      Low back pain      Migraine headache      Myocardial infarction (H)      Numbness and tingling     down right leg (associated with back pain)     PONV (postoperative nausea and vomiting)      Sicca syndrome (H)      Sjogren's disease (H)      Sjogren's syndrome (H)     sees specialist; dentist     Spider veins      Unspecified hypothyroidism      Family History   Problem Relation Age of Onset     C.A.D. Mother 76     Cancer Mother         non Hodgekin's Lymphoma     Heart Disease Mother         enlarged heart     C.A.D. Father 59     Heart Disease Father         valve problem     Hypertension Brother      Neuropathy Brother      Heart Disease Brother         open heart to repair mitral valve.     Connective Tissue Disorder Daughter         scleroderma     Colon Cancer No family hx of      Past Surgical History:   Procedure Laterality Date     ARTHROSCOPY KNEE  10/05/2012    R Procedure: ARTHROSCOPY KNEE;  RIGHT KNEE ARTHROSCOPY, PARTIAL MEDIAL MENISCECTOMY;  Surgeon: Karli Zaragoza MD;  Location: [facility]     BLADDER SURGERY       BUNIONECTOMY  ~2010    L foot.      CATARACT IOL, RT/LT Bilateral 07/2017     COLONOSCOPY N/A 01/17/2017    normal; no repeat Procedure: COLONOSCOPY;  Surgeon: Fan Giordano MD;  Location:  GI     ENDOSCOPIC RELEASE CARPAL TUNNEL  09/11/2012    R Procedure: ENDOSCOPIC RELEASE CARPAL TUNNEL;  Right Endoscopic carpal tunnel release, left ringer finger cortizon injection;  Surgeon: Anne Marie Catherine MD;  Location:  OR     ESOPHAGOSCOPY, GASTROSCOPY, DUODENOSCOPY (EGD), COMBINED N/A 12/26/2014    Procedure: COMBINED ESOPHAGOSCOPY, GASTROSCOPY, DUODENOSCOPY (EGD);  Surgeon: Fan Giordano MD;  Location:  GI     EXCISE EXOSTOSIS FOOT Left 12/01/2016    Procedure: EXCISE EXOSTOSIS FOOT;  Surgeon: Jody Gallagher DPM, Pod;  Location:  OR      GYN SURGERY  1978    ovaries removed     HEART CATH FYI  03/04/2016    dz <40%     HIP HARDWARE REMOVAL Right 08/04/2020    Procedure: HARDWARE REMOVAL - LATERAL APPROACH;  Surgeon: Charlie Wolfe MD;  Location: [facility];  Service: Orthopedics     HYSTERECTOMY       INJECT STEROID (LOCATION)  09/11/2012    Procedure: INJECT STEROID (LOCATION);;  Surgeon: Anne Marie Catherine MD;  Location:  OR     LAPAROSCOPIC CHOLECYSTECTOMY N/A 04/07/2016    Procedure: LAPAROSCOPIC CHOLECYSTECTOMY;  Surgeon: Hans Medina MD;  Location:  OR     OPTICAL TRACKING SYSTEM FUSION SPINE POSTERIOR LUMBAR TWO LEVELS N/A 10/31/2018    Procedure: Central L3-4 L4-5 lamenectomies L4-5 posterior instrumented fusion;  Surgeon: Aroldo Burdick MD;  Location:  OR     ORTHOPEDIC SURGERY Right 2014    karli for fx femur     RECONSTRUCT FOREFOOT WITH METATARSOPHALANGEAL (MTP) FUSION Left 04/28/2017    Procedure: RECONSTRUCT FOREFOOT WITH METATARSOPHALANGEAL (MTP) FUSION;  1. Resection arthroplasty, fifth metatarsophalangeal joint, left foot.   2. Irrigation and debridement of fifth metatarsophalangeal joint, left foot (excisional to the level of the bone).     ;  Surgeon: Fabián Phipps MD;  Location:  OR     RELEASE CARPAL TUNNEL       RIGHT HIP ORIF 4/1/2014       ROTATOR CUFF REPAIR RT/LT  ~1998    Lt shoulder; rotator cuff     SURGICAL HISTORY OF -   distant past    ablation for palpitations.     SURGICAL HISTORY OF -   06/2015    left carpal tunnel surgery     [facility] NONSPECIFIC PROCEDURE  1976    D&C     [facility] TOTAL HIP ARTHROPLASTY Right 08/04/2020    Procedure: RIGHT TOTAL HIP ARTHROPLASTY;  Surgeon: Charlie Wolfe MD;  Location: [facility];  Service: Orthopedics     [facility] [place] TOTAL KNEE ARTHROPLASTY Right 01/2020       ENVIRONMENTAL HISTORY:   Pets inside the house include None.  Do you smoke cigarettes or other recreational drugs? No There is/are 0 smokers living in the house. The house does not have a damp basement.      SOCIAL HISTORY:   Ya is retired, previously employed as . She lives with her spouse.      Review of Systems   Constitutional: Negative for activity change, chills and fever.   HENT: Negative for congestion, dental problem, ear pain, facial swelling, nosebleeds, postnasal drip, rhinorrhea, sinus pressure and sneezing.    Eyes: Negative for discharge, redness and itching.   Respiratory: Negative for cough, chest tightness, shortness of breath and wheezing.    Cardiovascular: Negative for chest pain.   Gastrointestinal: Negative for diarrhea, nausea and vomiting.   Musculoskeletal: Negative for arthralgias, joint swelling and myalgias.   Skin: Negative for rash.   Neurological: Negative for headaches.   Hematological: Negative for adenopathy.   Psychiatric/Behavioral: Negative for behavioral problems and self-injury.         Current Outpatient Medications:      aspirin 81 MG tablet, Take 81 mg by mouth daily, Disp: , Rfl:      atorvastatin (LIPITOR) 20 MG tablet, Take 1 tablet (20 mg) by mouth daily, Disp: 102 tablet, Rfl: 3     benzonatate (TESSALON) 200 MG capsule, take 1 capsule by oral route 1-2 times every day as needed for cough, Disp: , Rfl:      Biotin w/ Vitamins C & E (HAIR/SKIN/NAILS PO), Take by mouth daily, Disp: , Rfl:      Calcium Carbonate-Vitamin D (CALCIUM 600+D PO), , Disp: , Rfl:      Carboxymethylcellulose Sodium (REFRESH LIQUIGEL OP), Apply 1 drop to eye every evening 15 minutes after Restasis drops., Disp: , Rfl:      cevimeline (EVOXAC) 30 MG capsule, Take 1 capsule (30 mg) by mouth 3 times daily Reported on 3/6/2017, Disp: 270 capsule, Rfl: 3     cholecalciferol 1000 units TABS, Take 1,000 Units by mouth 2 times daily, Disp: 30 tablet, Rfl: 3     CycloSPORINE (RESTASIS OP), Apply 1 drop to eye every evening , Disp: , Rfl:      doxycycline monohydrate (MONODOX) 50 MG capsule, Take 50 mg by mouth daily, Disp: , Rfl:      EPINEPHrine (ANY BX GENERIC EQUIV) 0.3 MG/0.3ML injection  2-pack, Inject 0.3 mLs (0.3 mg) into the muscle as needed for anaphylaxis, Disp: 2 each, Rfl: 0     famotidine (PEPCID) 40 MG tablet, Take 40 mg by mouth daily, Disp: , Rfl:      furosemide (LASIX) 20 MG tablet, Take 1 tablet (20 mg) by mouth daily, Disp: 30 tablet, Rfl: 11     gabapentin (NEURONTIN) 100 MG capsule, TAKE ONE CAPSULE BY MOUTH THREE TIMES A DAY, Disp: 90 capsule, Rfl: 0     LANsoprazole (PREVACID) 30 MG DR capsule, Take 1 capsule (30 mg) by mouth every morning (before breakfast), Disp: 102 capsule, Rfl: 3     levothyroxine (SYNTHROID/LEVOTHROID) 100 MCG tablet, Take 1 tablet (100 mcg) by mouth daily, Disp: 90 tablet, Rfl: 3     Lutein 20 MG CAPS, Take by mouth daily, Disp: , Rfl:      metoprolol succinate ER (TOPROL-XL) 25 MG 24 hr tablet, Take 1 tablet (25 mg) by mouth daily, Disp: 90 tablet, Rfl: 3     mirabegron (MYRBETRIQ) 25 MG 24 hr tablet, Take 1 tablet (25 mg) by mouth daily, Disp: 30 tablet, Rfl: 10     nitroGLYcerin (NITROSTAT) 0.4 MG sublingual tablet, Place 1 tablet (0.4 mg) under the tongue every 5 minutes as needed for chest pain, Disp: 25 tablet, Rfl: 0     Omega-3 Fatty Acids (OMEGA-3 FISH OIL PO), Take 1 g by mouth 2 times daily (with meals) , Disp: , Rfl:      polyethylene glycol (MIRALAX) 17 GM/Dose powder, Take 1 capful by mouth 2 times daily In 8 ounces of liquid, Disp: , Rfl:      potassium chloride ER (KLOR-CON M) 10 MEQ CR tablet, Take 1 tablet (10 mEq) by mouth 2 times daily, Disp: 30 tablet, Rfl: 11     calcium carbonate 500 mg-vitamin D 200 units (OSCAL WITH D;OYSTER SHELL CALCIUM) 500-200 MG-UNIT per tablet, Take 1 tablet by mouth 3 times daily (before meals), Disp: 90 tablet, Rfl: 3    Current Facility-Administered Medications:      denosumab (PROLIA) injection 60 mg, 60 mg, Subcutaneous, Q6 Months, Mary Funes MD, 60 mg at 05/06/22 0986  Allergies   Allergen Reactions     Codeine      fatigue     No Clinical Screening - See Comments Nausea and Vomiting      Vicodin [Acetaminophen] Fatigue     Adhesive Tape Rash         EXAM:   BP (!) 150/80   Pulse 58   Wt 63.8 kg (140 lb 9.6 oz)   SpO2 98%   BMI 24.13 kg/m      Physical Exam  Constitutional:       General: She is not in acute distress.     Appearance: Normal appearance. She is not ill-appearing.   HENT:      Head: Normocephalic and atraumatic.      Nose: Nose normal. No congestion or rhinorrhea.      Mouth/Throat:      Mouth: Mucous membranes are moist.      Pharynx: Oropharynx is clear. No posterior oropharyngeal erythema.   Eyes:      General:         Right eye: No discharge.         Left eye: No discharge.   Cardiovascular:      Rate and Rhythm: Normal rate and regular rhythm.      Heart sounds: Normal heart sounds.   Pulmonary:      Effort: Pulmonary effort is normal.      Breath sounds: Normal breath sounds. No wheezing or rhonchi.   Skin:     General: Skin is warm.      Findings: No erythema or rash.   Neurological:      General: No focal deficit present.      Mental Status: She is alert. Mental status is at baseline.   Psychiatric:         Mood and Affect: Mood normal.         Behavior: Behavior normal.       ASSESSMENT/PLAN:  Ya Stahl is a 82 year old female seen today for adverse food reaction.  Only seems to occur with bread with preservatives and white wine.  Unfortunately there is not a good test for preservatives.  She tolerates grapes plus no further evaluation can take place for the white line also.    She is wondering if she can tolerate other forms of alcohol.    1. Unable to skin test or blood test to preservatives  2. Take Zyrtec 10 mg as needed for tongue swelling and use the Epipen if not improving or affecting your breathing  3. Avoid bread with preservatives and white wine.  She may try beer or margaritas, which she would like to try.  I recommended try a small amount and then wait 15 minutes and then may further consume.  It is unlikely that alcohol is the trigger for her tongue  swelling based on her history.  In addition there is not a good blood or skin test for alcohol.    Follow-up as needed      Thank you for allowing me to participate in the care of Ya Stahl.      A total of 35 minutes was spent on the day of the encounter performing chart review, history and exam, documentation, and counseling and coordination of care as noted above.       Ye Waldrop MD  Allergy/Immunology  [facility]          Again, thank you for allowing me to participate in the care of your patient.        Sincerely,        Ye Waldrop MD

## 2022-08-22 NOTE — PATIENT INSTRUCTIONS
Unable to skin test or blood test to preservatives  Take Zyrtec 10 mg as needed for tongue swelling and use the Epipen if not improving or affecting your breathing  Avoid bread with preservatives and white wine

## 2022-08-22 NOTE — PROGRESS NOTES
"Ya Stahl was seen in the Allergy Clinic at [facility].    Ya Stahl is a 82 year old female being seen today at the request of Dr. Eric in consultation for food allergy.    Over the last year she has noticed that bread and meat with preservatives will cause a tongue swollen sensation.  She has had some difficulty swallowing at one point.  The tongue swelling will last for approximately 30 minutes.  She use to make her own bread but since purchasing bread from the store she will notice increased symptoms.  When she buys bread that does not have preservatives she will not have any tongue or throat swelling sensation.  She also notices this with white wine.  She estimates that has occurred 3-4 times with white wine and 5-6 times with bread with preservatives.  Since avoiding these items she has not had any problems with tongue swelling.  She has noticed that at times it will be hard to talk, unrelated to tongue swelling.    Chief Complaint   Patient presents with     Allergy Consult     Patient states that when she eats breads that have preservatives in it and drinks wine she gets tongue swelling. She has not noticed symptoms with any other foods. She started noticing these symptoms within the past year. She doesn't take any daily allergy medication.        Past Medical History:   Diagnosis Date     Arthritis      Complication of anesthesia     \"hard time waking up / N & V\"     Coronary artery disease      Disease of thyroid gland      Displacement of lumbar intervertebral disc without myelopathy      Gastro-oesophageal reflux disease      GERD (gastroesophageal reflux disease)      Hearing loss     has bilateral aids     Heart attack (H) 03/2016     History of anesthesia complications     delayed emergence     History of angina      Hypertension      Low back pain      Migraine headache      Myocardial infarction (H)      Numbness and tingling     down right leg (associated with back " pain)     PONV (postoperative nausea and vomiting)      Sicca syndrome (H)      Sjogren's disease (H)      Sjogren's syndrome (H)     sees specialist; dentist     Spider veins      Unspecified hypothyroidism      Family History   Problem Relation Age of Onset     C.A.D. Mother 76     Cancer Mother         non Hodgekin's Lymphoma     Heart Disease Mother         enlarged heart     C.A.D. Father 59     Heart Disease Father         valve problem     Hypertension Brother      Neuropathy Brother      Heart Disease Brother         open heart to repair mitral valve.     Connective Tissue Disorder Daughter         scleroderma     Colon Cancer No family hx of      Past Surgical History:   Procedure Laterality Date     ARTHROSCOPY KNEE  10/05/2012    R Procedure: ARTHROSCOPY KNEE;  RIGHT KNEE ARTHROSCOPY, PARTIAL MEDIAL MENISCECTOMY;  Surgeon: Karli Zaragoza MD;  Location: [facility]     BLADDER SURGERY       BUNIONECTOMY  ~2010    L foot.      CATARACT IOL, RT/LT Bilateral 07/2017     COLONOSCOPY N/A 01/17/2017    normal; no repeat Procedure: COLONOSCOPY;  Surgeon: Fan Giordano MD;  Location:  GI     ENDOSCOPIC RELEASE CARPAL TUNNEL  09/11/2012    R Procedure: ENDOSCOPIC RELEASE CARPAL TUNNEL;  Right Endoscopic carpal tunnel release, left ringer finger cortizon injection;  Surgeon: Anne Marie Catherine MD;  Location:  OR     ESOPHAGOSCOPY, GASTROSCOPY, DUODENOSCOPY (EGD), COMBINED N/A 12/26/2014    Procedure: COMBINED ESOPHAGOSCOPY, GASTROSCOPY, DUODENOSCOPY (EGD);  Surgeon: Fan Giordano MD;  Location:  GI     EXCISE EXOSTOSIS FOOT Left 12/01/2016    Procedure: EXCISE EXOSTOSIS FOOT;  Surgeon: Jody Gallagher DPM, Pod;  Location:  OR     GYN SURGERY  1978    ovaries removed     HEART CATH FYI  03/04/2016    dz <40%     HIP HARDWARE REMOVAL Right 08/04/2020    Procedure: HARDWARE REMOVAL - LATERAL APPROACH;  Surgeon: Charlie Wolfe MD;  Location: [facility];  Service: Orthopedics     HYSTERECTOMY        INJECT STEROID (LOCATION)  09/11/2012    Procedure: INJECT STEROID (LOCATION);;  Surgeon: Anne Marie Catherine MD;  Location:  OR     LAPAROSCOPIC CHOLECYSTECTOMY N/A 04/07/2016    Procedure: LAPAROSCOPIC CHOLECYSTECTOMY;  Surgeon: Hans Medina MD;  Location:  OR     OPTICAL TRACKING SYSTEM FUSION SPINE POSTERIOR LUMBAR TWO LEVELS N/A 10/31/2018    Procedure: Central L3-4 L4-5 lamenectomies L4-5 posterior instrumented fusion;  Surgeon: Aroldo Burdick MD;  Location:  OR     ORTHOPEDIC SURGERY Right 2014    karli for fx femur     RECONSTRUCT FOREFOOT WITH METATARSOPHALANGEAL (MTP) FUSION Left 04/28/2017    Procedure: RECONSTRUCT FOREFOOT WITH METATARSOPHALANGEAL (MTP) FUSION;  1. Resection arthroplasty, fifth metatarsophalangeal joint, left foot.   2. Irrigation and debridement of fifth metatarsophalangeal joint, left foot (excisional to the level of the bone).     ;  Surgeon: Fabián Phipps MD;  Location:  OR     RELEASE CARPAL TUNNEL       RIGHT HIP ORIF 4/1/2014       ROTATOR CUFF REPAIR RT/LT  ~1998    Lt shoulder; rotator cuff     SURGICAL HISTORY OF -   distant past    ablation for palpitations.     SURGICAL HISTORY OF -   06/2015    left carpal tunnel surgery     [facility] NONSPECIFIC PROCEDURE  1976    D&C     [facility] TOTAL HIP ARTHROPLASTY Right 08/04/2020    Procedure: RIGHT TOTAL HIP ARTHROPLASTY;  Surgeon: Charlie Wolfe MD;  Location: [facility];  Service: Orthopedics     [facility] TOTAL KNEE ARTHROPLASTY Right 01/2020       ENVIRONMENTAL HISTORY:   Pets inside the house include None.  Do you smoke cigarettes or other recreational drugs? No There is/are 0 smokers living in the house. The house does not have a damp basement.     SOCIAL HISTORY:   Ya is retired, previously employed as . She lives with her spouse.      Review of Systems   Constitutional: Negative for activity change, chills and fever.   HENT: Negative for congestion, dental problem, ear pain, facial swelling, nosebleeds,  postnasal drip, rhinorrhea, sinus pressure and sneezing.    Eyes: Negative for discharge, redness and itching.   Respiratory: Negative for cough, chest tightness, shortness of breath and wheezing.    Cardiovascular: Negative for chest pain.   Gastrointestinal: Negative for diarrhea, nausea and vomiting.   Musculoskeletal: Negative for arthralgias, joint swelling and myalgias.   Skin: Negative for rash.   Neurological: Negative for headaches.   Hematological: Negative for adenopathy.   Psychiatric/Behavioral: Negative for behavioral problems and self-injury.         Current Outpatient Medications:      aspirin 81 MG tablet, Take 81 mg by mouth daily, Disp: , Rfl:      atorvastatin (LIPITOR) 20 MG tablet, Take 1 tablet (20 mg) by mouth daily, Disp: 102 tablet, Rfl: 3     benzonatate (TESSALON) 200 MG capsule, take 1 capsule by oral route 1-2 times every day as needed for cough, Disp: , Rfl:      Biotin w/ Vitamins C & E (HAIR/SKIN/NAILS PO), Take by mouth daily, Disp: , Rfl:      Calcium Carbonate-Vitamin D (CALCIUM 600+D PO), , Disp: , Rfl:      Carboxymethylcellulose Sodium (REFRESH LIQUIGEL OP), Apply 1 drop to eye every evening 15 minutes after Restasis drops., Disp: , Rfl:      cevimeline (EVOXAC) 30 MG capsule, Take 1 capsule (30 mg) by mouth 3 times daily Reported on 3/6/2017, Disp: 270 capsule, Rfl: 3     cholecalciferol 1000 units TABS, Take 1,000 Units by mouth 2 times daily, Disp: 30 tablet, Rfl: 3     CycloSPORINE (RESTASIS OP), Apply 1 drop to eye every evening , Disp: , Rfl:      doxycycline monohydrate (MONODOX) 50 MG capsule, Take 50 mg by mouth daily, Disp: , Rfl:      EPINEPHrine (ANY BX GENERIC EQUIV) 0.3 MG/0.3ML injection 2-pack, Inject 0.3 mLs (0.3 mg) into the muscle as needed for anaphylaxis, Disp: 2 each, Rfl: 0     famotidine (PEPCID) 40 MG tablet, Take 40 mg by mouth daily, Disp: , Rfl:      furosemide (LASIX) 20 MG tablet, Take 1 tablet (20 mg) by mouth daily, Disp: 30 tablet, Rfl: 11      gabapentin (NEURONTIN) 100 MG capsule, TAKE ONE CAPSULE BY MOUTH THREE TIMES A DAY, Disp: 90 capsule, Rfl: 0     LANsoprazole (PREVACID) 30 MG DR capsule, Take 1 capsule (30 mg) by mouth every morning (before breakfast), Disp: 102 capsule, Rfl: 3     levothyroxine (SYNTHROID/LEVOTHROID) 100 MCG tablet, Take 1 tablet (100 mcg) by mouth daily, Disp: 90 tablet, Rfl: 3     Lutein 20 MG CAPS, Take by mouth daily, Disp: , Rfl:      metoprolol succinate ER (TOPROL-XL) 25 MG 24 hr tablet, Take 1 tablet (25 mg) by mouth daily, Disp: 90 tablet, Rfl: 3     mirabegron (MYRBETRIQ) 25 MG 24 hr tablet, Take 1 tablet (25 mg) by mouth daily, Disp: 30 tablet, Rfl: 10     nitroGLYcerin (NITROSTAT) 0.4 MG sublingual tablet, Place 1 tablet (0.4 mg) under the tongue every 5 minutes as needed for chest pain, Disp: 25 tablet, Rfl: 0     Omega-3 Fatty Acids (OMEGA-3 FISH OIL PO), Take 1 g by mouth 2 times daily (with meals) , Disp: , Rfl:      polyethylene glycol (MIRALAX) 17 GM/Dose powder, Take 1 capful by mouth 2 times daily In 8 ounces of liquid, Disp: , Rfl:      potassium chloride ER (KLOR-CON M) 10 MEQ CR tablet, Take 1 tablet (10 mEq) by mouth 2 times daily, Disp: 30 tablet, Rfl: 11     calcium carbonate 500 mg-vitamin D 200 units (OSCAL WITH D;OYSTER SHELL CALCIUM) 500-200 MG-UNIT per tablet, Take 1 tablet by mouth 3 times daily (before meals), Disp: 90 tablet, Rfl: 3    Current Facility-Administered Medications:      denosumab (PROLIA) injection 60 mg, 60 mg, Subcutaneous, Q6 Months, Mary Funes MD, 60 mg at 05/06/22 0915  Allergies   Allergen Reactions     Codeine      fatigue     No Clinical Screening - See Comments Nausea and Vomiting     Vicodin [Acetaminophen] Fatigue     Adhesive Tape Rash         EXAM:   BP (!) 150/80   Pulse 58   Wt 63.8 kg (140 lb 9.6 oz)   SpO2 98%   BMI 24.13 kg/m      Physical Exam  Constitutional:       General: She is not in acute distress.     Appearance: Normal appearance. She is not  ill-appearing.   HENT:      Head: Normocephalic and atraumatic.      Nose: Nose normal. No congestion or rhinorrhea.      Mouth/Throat:      Mouth: Mucous membranes are moist.      Pharynx: Oropharynx is clear. No posterior oropharyngeal erythema.   Eyes:      General:         Right eye: No discharge.         Left eye: No discharge.   Cardiovascular:      Rate and Rhythm: Normal rate and regular rhythm.      Heart sounds: Normal heart sounds.   Pulmonary:      Effort: Pulmonary effort is normal.      Breath sounds: Normal breath sounds. No wheezing or rhonchi.   Skin:     General: Skin is warm.      Findings: No erythema or rash.   Neurological:      General: No focal deficit present.      Mental Status: She is alert. Mental status is at baseline.   Psychiatric:         Mood and Affect: Mood normal.         Behavior: Behavior normal.       ASSESSMENT/PLAN:  Ya Stahl is a 82 year old female seen today for adverse food reaction.  Only seems to occur with bread with preservatives and white wine.  Unfortunately there is not a good test for preservatives.  She tolerates grapes plus no further evaluation can take place for the white line also.    She is wondering if she can tolerate other forms of alcohol.    1. Unable to skin test or blood test to preservatives  2. Take Zyrtec 10 mg as needed for tongue swelling and use the Epipen if not improving or affecting your breathing  3. Avoid bread with preservatives and white wine.  She may try beer or margaritas, which she would like to try.  I recommended try a small amount and then wait 15 minutes and then may further consume.  It is unlikely that alcohol is the trigger for her tongue swelling based on her history.  In addition there is not a good blood or skin test for alcohol.    Follow-up as needed      Thank you for allowing me to participate in the care of Ya Stahl.      A total of 35 minutes was spent on the day of the encounter performing chart review,  history and exam, documentation, and counseling and coordination of care as noted above.       Ye Waldrop MD  Allergy/Immunology  [facility]

## 2022-09-16 ENCOUNTER — OFFICE VISIT (OUTPATIENT)
Dept: FAMILY MEDICINE | Facility: CLINIC | Age: 82
End: 2022-09-16
Payer: COMMERCIAL

## 2022-09-16 VITALS
SYSTOLIC BLOOD PRESSURE: 126 MMHG | WEIGHT: 138.8 LBS | RESPIRATION RATE: 18 BRPM | HEART RATE: 64 BPM | DIASTOLIC BLOOD PRESSURE: 74 MMHG | TEMPERATURE: 98 F | BODY MASS INDEX: 23.7 KG/M2 | HEIGHT: 64 IN | OXYGEN SATURATION: 98 %

## 2022-09-16 DIAGNOSIS — E87.6 HYPOKALEMIA: ICD-10-CM

## 2022-09-16 DIAGNOSIS — Z01.818 PREOPERATIVE EXAMINATION: Primary | ICD-10-CM

## 2022-09-16 DIAGNOSIS — M16.12 PRIMARY OSTEOARTHRITIS OF LEFT HIP: ICD-10-CM

## 2022-09-16 PROCEDURE — 99214 OFFICE O/P EST MOD 30 MIN: CPT | Performed by: FAMILY MEDICINE

## 2022-09-16 RX ORDER — POTASSIUM CHLORIDE 750 MG/1
10 TABLET, EXTENDED RELEASE ORAL 2 TIMES DAILY
Qty: 30 TABLET | Refills: 11 | Status: SHIPPED | OUTPATIENT
Start: 2022-09-16 | End: 2022-11-09

## 2022-09-16 ASSESSMENT — PAIN SCALES - GENERAL: PAINLEVEL: SEVERE PAIN (6)

## 2022-09-16 NOTE — PROGRESS NOTES
[facility]  [address] [facility] [address]  Phone: [phone]  Primary Provider: Devi Romano  Pre-op Performing Provider: JOAQUIN LIVINGSTON      PREOPERATIVE EVALUATION:  Today's date: 9/16/2022    Ya Stahl is a 82 year old female who presents for a preoperative evaluation.    Surgical Information:  Surgery/Procedure: Left total hip arthroplasty  Surgery Location: [facility]  Surgeon: Dr. Saab  Surgery Date: 10/014/2022  Time of Surgery: 9:00 AM  Where patient plans to recover: At home with family  Fax number for surgical facility:  Attn: Kiana Barnes    Type of Anesthesia Anticipated: General    Assessment and Plan    (Z01.818) Preoperative examination  (primary encounter diagnosis)  Comment: cleared for surgery without further eval  Plan:     (E87.6) Hypokalemia  Comment: refill  Plan: potassium chloride ER (KLOR-CON M) 10 MEQ CR         tablet            (M16.12) Primary osteoarthritis of left hip  Comment:   Plan:       RTC in 4m CPE    Joaquin Livingston MD      Subjective     HPI related to upcoming procedure: L hip OA, will be having hip replacement.  Feeling well, no acute concerns today.  Echo completed last month looks quite good.  EKG from 4/22 unchanged from 8/20.      Denies CP, palpitations, edema, dyspnea, HA, vision changes.        Preop Questions 9/14/2022   1. Have you ever had a heart attack or stroke? YES -    2. Have you ever had surgery on your heart or blood vessels, such as a stent placement, a coronary artery bypass, or surgery on an artery in your head, neck, heart, or legs? No   3. Do you have chest pain with activity? No   4. Do you have a history of  heart failure? No   5. Do you currently have a cold, bronchitis or symptoms of other infection? No   6. Do you have a cough, shortness of breath, or wheezing? YES - persistent cough, stable,   7. Do you or anyone in your family have previous history of  blood clots? No   8. Do you or does anyone in your family have a serious bleeding problem such as prolonged bleeding following surgeries or cuts? YES -    9. Have you ever had problems with anemia or been told to take iron pills? No   10. Have you had any abnormal blood loss such as black, tarry or bloody stools, or abnormal vaginal bleeding? No   11. Have you ever had a blood transfusion? No   12. Are you willing to have a blood transfusion if it is medically needed before, during, or after your surgery? Yes   13. Have you or any of your relatives ever had problems with anesthesia? YES - very sensitive and long recovery time to anesthesia   14. Do you have sleep apnea, excessive snoring or daytime drowsiness? No   15. Do you have any artifical heart valves or other implanted medical devices like a pacemaker, defibrillator, or continuous glucose monitor? No   16. Do you have artificial joints? YES - R hip, R knee   17. Are you allergic to latex? No   18. Is there any chance that you may be pregnant? -       Health Care Directive:  Patient does not have a Health Care Directive or Living Will:     Preoperative Review of :   reviewed - no record of controlled substances prescribed.      Status of Chronic Conditions:  CAD - Patient has a longstanding history of moderate-severe CAD. Patient denies recent chest pain or NTG use, denies exercise induced dyspnea or PND. Last Stress test ECHO - 8/22, EKG 4/22.     HYPERLIPIDEMIA - Patient has a long history of significant Hyperlipidemia requiring medication for treatment with recent good control. Patient reports no problems or side effects with the medication.     HYPERTENSION - Patient has longstanding history of HTN , currently denies any symptoms referable to elevated blood pressure. Specifically denies chest pain, palpitations, dyspnea, orthopnea, PND or peripheral edema. Blood pressure readings have been in normal range. Current medication regimen is as listed  "below. Patient denies any side effects of medication.       Review of Systems  CONSTITUTIONAL: NEGATIVE for fever, chills, change in weight  INTEGUMENTARY/SKIN: NEGATIVE for worrisome rashes, moles or lesions  EYES: NEGATIVE for vision changes or irritation  ENT/MOUTH: NEGATIVE for ear, mouth and throat problems  RESP: NEGATIVE for significant cough or SOB  CV: NEGATIVE for chest pain, palpitations or peripheral edema  GI: NEGATIVE for nausea, abdominal pain, heartburn, or change in bowel habits  : NEGATIVE for frequency, dysuria, or hematuria  MUSCULOSKELETAL: NEGATIVE for significant arthralgias or myalgia  NEURO: NEGATIVE for weakness, dizziness or paresthesias  ENDOCRINE: NEGATIVE for temperature intolerance, skin/hair changes  HEME: NEGATIVE for bleeding problems  PSYCHIATRIC: NEGATIVE for changes in mood or affect    Patient Active Problem List    Diagnosis Date Noted     Coronary artery disease due to lipid rich plaque 06/30/2022     Priority: Medium     Dyslipidemia 06/30/2022     Priority: Medium     Idiopathic osteoporosis 05/02/2022     Priority: Medium     History of drug therapy 05/02/2022     Priority: Medium     Elevated glucose 03/18/2022     Priority: Medium     Severe osteopetrosis 03/18/2022     Priority: Medium     Ascending aorta dilation (H) 01/06/2022     Priority: Medium     Echo needed yearly       Other osteoarthritis involving multiple joints 01/06/2022     Priority: Medium     Dry mouth 01/06/2022     Priority: Medium     Status post total replacement of right hip 08/04/2020     Priority: Medium     S/P lumbar fusion 10/31/2018     Priority: Medium     Complication of anesthesia      Priority: Medium     \"hard time waking up / N & V\"       Hypertension goal BP (blood pressure) < 140/90 02/20/2018     Priority: Medium     Epigastric pain 10/19/2017     Priority: Medium     MGUS (monoclonal gammopathy of unknown significance) 01/14/2017     Priority: Medium      Would just repeat her SPEP " "and serum free light chains every 1-2 years.  Please feel free to refer her back to me if labs are trending up.   See phone message 1/11/2021 with Hematology    From staff message end of January 2021:  Light chains went up a bit, but still relatively low, and everything else looks fine (hemoglobin, creatinine, M-spike, calcium).  I'd continue to monitor it as MGUS.  Would just continue doing annual labs as you are, and please feel free to reach out with questions anytime.          Monoclonal paraproteinemia 01/12/2017     Priority: Medium     Macrocytosis 12/01/2016     Priority: Medium     ASCVD (arteriosclerotic cardiovascular disease) 03/09/2016     Priority: Medium     AMI (acute myocardial infarction) (H) 03/04/2016     Priority: Medium     Primary osteoarthritis of both hands 09/22/2015     Priority: Medium     Gastroesophageal reflux disease, unspecified whether esophagitis present 05/20/2015     Priority: Medium     Senile osteoporosis 08/12/2014     Priority: Medium     started alendronate Aug 2014       Status post-operative repair of hip fracture, RIGHT 04/01/2014     Priority: Medium     Impaired fasting glucose 12/09/2012     Priority: Medium     Chronic cough 09/10/2012     Priority: Medium     HL (hearing loss) 12/01/2011     Priority: Medium     Mixed incontinence 12/01/2011     Priority: Medium     Sjogren's disease (H) 06/02/2011     Priority: Medium     Hyperlipidemia LDL goal <70 10/31/2010     Priority: Medium     Bile reflux gastritis 04/15/2009     Priority: Medium     Grave's disease 09/08/2008     Priority: Medium     IMO update changed this record. Please review for accuracy       Hypothyroidism 11/01/2004     Priority: Medium     Problem list name updated by automated process. Provider to review        Past Medical History:   Diagnosis Date     Arthritis      Cerebral infarction (H) 2019     Complication of anesthesia     \"hard time waking up / N & V\"     Coronary artery disease      " Disease of thyroid gland      Displacement of lumbar intervertebral disc without myelopathy      Gastro-oesophageal reflux disease      GERD (gastroesophageal reflux disease)      Hearing loss     has bilateral aids     Heart attack (H) 03/2016     History of anesthesia complications     delayed emergence     History of angina      Hypertension      Low back pain      Migraine headache      Myocardial infarction (H)      Numbness and tingling     down right leg (associated with back pain)     PONV (postoperative nausea and vomiting)      Sicca syndrome (H)      Sjogren's disease (H)      Sjogren's syndrome (H)     sees specialist; dentist     Spider veins      Unspecified hypothyroidism      Past Surgical History:   Procedure Laterality Date     ARTHROSCOPY KNEE  10/05/2012    R Procedure: ARTHROSCOPY KNEE;  RIGHT KNEE ARTHROSCOPY, PARTIAL MEDIAL MENISCECTOMY;  Surgeon: Karli Zaragoza MD;  Location: [facility]     BACK SURGERY  About. 5 years ago    Fusion of 4&5 lumbar     BLADDER SURGERY       BUNIONECTOMY  ~2010    L foot.      CATARACT IOL, RT/LT Bilateral 07/2017     COLONOSCOPY N/A 01/17/2017    normal; no repeat Procedure: COLONOSCOPY;  Surgeon: Fan Giordano MD;  Location:  GI     ENDOSCOPIC RELEASE CARPAL TUNNEL  09/11/2012    R Procedure: ENDOSCOPIC RELEASE CARPAL TUNNEL;  Right Endoscopic carpal tunnel release, left ringer finger cortizon injection;  Surgeon: Anne Marie Catherine MD;  Location:  OR     ESOPHAGOSCOPY, GASTROSCOPY, DUODENOSCOPY (EGD), COMBINED N/A 12/26/2014    Procedure: COMBINED ESOPHAGOSCOPY, GASTROSCOPY, DUODENOSCOPY (EGD);  Surgeon: Fan Giordano MD;  Location:  GI     EXCISE EXOSTOSIS FOOT Left 12/01/2016    Procedure: EXCISE EXOSTOSIS FOOT;  Surgeon: Jody Gallagher DPM, Pod;  Location:  OR     GYN SURGERY  1978    ovaries removed     HEART CATH FYI  03/04/2016    dz <40%     HIP HARDWARE REMOVAL Right 08/04/2020    Procedure: HARDWARE REMOVAL - LATERAL APPROACH;   Surgeon: Charlie Wolfe MD;  Location: [facility];  Service: Orthopedics     HYSTERECTOMY       INJECT STEROID (LOCATION)  09/11/2012    Procedure: INJECT STEROID (LOCATION);;  Surgeon: Anne Marie Catherine MD;  Location:  OR     LAPAROSCOPIC CHOLECYSTECTOMY N/A 04/07/2016    Procedure: LAPAROSCOPIC CHOLECYSTECTOMY;  Surgeon: Hans Medina MD;  Location:  OR     OPTICAL TRACKING SYSTEM FUSION SPINE POSTERIOR LUMBAR TWO LEVELS N/A 10/31/2018    Procedure: Central L3-4 L4-5 lamenectomies L4-5 posterior instrumented fusion;  Surgeon: Aroldo Burdick MD;  Location:  OR     ORTHOPEDIC SURGERY Right 2014    karli for fx femur     RECONSTRUCT FOREFOOT WITH METATARSOPHALANGEAL (MTP) FUSION Left 04/28/2017    Procedure: RECONSTRUCT FOREFOOT WITH METATARSOPHALANGEAL (MTP) FUSION;  1. Resection arthroplasty, fifth metatarsophalangeal joint, left foot.   2. Irrigation and debridement of fifth metatarsophalangeal joint, left foot (excisional to the level of the bone).     ;  Surgeon: Fabián Phipps MD;  Location:  OR     RELEASE CARPAL TUNNEL       RIGHT HIP ORIF 4/1/2014       ROTATOR CUFF REPAIR RT/LT  ~1998    Lt shoulder; rotator cuff     SURGICAL HISTORY OF -   distant past    ablation for palpitations.     SURGICAL HISTORY OF -   06/2015    left carpal tunnel surgery     [facility] NONSPECIFIC PROCEDURE  1976    D&C     [facility] TOTAL HIP ARTHROPLASTY Right 08/04/2020    Procedure: RIGHT TOTAL HIP ARTHROPLASTY;  Surgeon: Charlie Wolfe MD;  Location: [facility];  Service: Orthopedics     [facility] TOTAL KNEE ARTHROPLASTY Right 01/2020     Current Outpatient Medications   Medication Sig Dispense Refill     aspirin 81 MG tablet Take 81 mg by mouth daily       atorvastatin (LIPITOR) 20 MG tablet Take 1 tablet (20 mg) by mouth daily 102 tablet 3     benzonatate (TESSALON) 200 MG capsule take 1 capsule by oral route 1-2 times every day as needed for cough       Biotin w/ Vitamins C & E (HAIR/SKIN/NAILS PO) Take by  mouth daily       calcium carbonate 500 mg-vitamin D 200 units (OSCAL WITH D;OYSTER SHELL CALCIUM) 500-200 MG-UNIT per tablet Take 1 tablet by mouth 3 times daily (before meals) 90 tablet 3     Calcium Carbonate-Vitamin D (CALCIUM 600+D PO)        Carboxymethylcellulose Sodium (REFRESH LIQUIGEL OP) Apply 1 drop to eye every evening 15 minutes after Restasis drops.       cevimeline (EVOXAC) 30 MG capsule Take 1 capsule (30 mg) by mouth 3 times daily Reported on 3/6/2017 270 capsule 3     cholecalciferol 1000 units TABS Take 1,000 Units by mouth 2 times daily 30 tablet 3     CycloSPORINE (RESTASIS OP) Apply 1 drop to eye every evening        doxycycline monohydrate (MONODOX) 50 MG capsule Take 50 mg by mouth daily       EPINEPHrine (ANY BX GENERIC EQUIV) 0.3 MG/0.3ML injection 2-pack Inject 0.3 mLs (0.3 mg) into the muscle as needed for anaphylaxis 2 each 0     famotidine (PEPCID) 40 MG tablet Take 40 mg by mouth daily       furosemide (LASIX) 20 MG tablet Take 1 tablet (20 mg) by mouth daily 30 tablet 11     gabapentin (NEURONTIN) 100 MG capsule TAKE ONE CAPSULE BY MOUTH THREE TIMES A DAY 90 capsule 0     LANsoprazole (PREVACID) 30 MG DR capsule Take 1 capsule (30 mg) by mouth every morning (before breakfast) 102 capsule 3     levothyroxine (SYNTHROID/LEVOTHROID) 100 MCG tablet Take 1 tablet (100 mcg) by mouth daily 90 tablet 3     Lutein 20 MG CAPS Take by mouth daily       metoprolol succinate ER (TOPROL-XL) 25 MG 24 hr tablet Take 1 tablet (25 mg) by mouth daily 90 tablet 3     mirabegron (MYRBETRIQ) 25 MG 24 hr tablet Take 1 tablet (25 mg) by mouth daily 30 tablet 10     nitroGLYcerin (NITROSTAT) 0.4 MG sublingual tablet Place 1 tablet (0.4 mg) under the tongue every 5 minutes as needed for chest pain 25 tablet 0     Omega-3 Fatty Acids (OMEGA-3 FISH OIL PO) Take 1 g by mouth 2 times daily (with meals)        polyethylene glycol (MIRALAX) 17 GM/Dose powder Take 1 capful by mouth 2 times daily In 8 ounces of  "liquid       potassium chloride ER (KLOR-CON M) 10 MEQ CR tablet Take 1 tablet (10 mEq) by mouth 2 times daily 30 tablet 11       Allergies   Allergen Reactions     Codeine      fatigue     No Clinical Screening - See Comments Nausea and Vomiting     Vicodin [Acetaminophen] Fatigue     Adhesive Tape Rash        Social History     Tobacco Use     Smoking status: Never Smoker     Smokeless tobacco: Never Used   Substance Use Topics     Alcohol use: Yes     Comment: Maybe 1x amonth       History   Drug Use No         Objective     /74 (BP Location: Right arm, Patient Position: Sitting, Cuff Size: Adult Regular)   Pulse 64   Temp 98  F (36.7  C) (Oral)   Resp 18   Ht 1.619 m (5' 3.75\")   Wt 63 kg (138 lb 12.8 oz)   SpO2 98%   BMI 24.01 kg/m      Physical Exam    GENERAL APPEARANCE: healthy, alert and no distress     EYES: EOMI, PERRL     HENT: ear canals and TM's normal and nose and mouth without ulcers or lesions     NECK: no adenopathy, no asymmetry, masses, or scars and thyroid normal to palpation     RESP: lungs clear to auscultation - no rales, rhonchi or wheezes     CV: regular rates and rhythm, normal S1 S2, no S3 or S4 and no murmur, click or rub     ABDOMEN:  soft, nontender, no HSM or masses and bowel sounds normal     MS: extremities normal- no gross deformities noted, no evidence of inflammation in joints, FROM in all extremities.     SKIN: no suspicious lesions or rashes     NEURO: Normal strength and tone, sensory exam grossly normal, mentation intact and speech normal     PSYCH: mentation appears normal. and affect normal/bright     LYMPHATICS: No cervical adenopathy    Recent Labs   Lab Test 04/27/22  0445 03/18/22  1215 01/06/22  1051 01/19/21  0825   HGB 13.5  --  13.5 12.5     --  278 302     --  135 137   POTASSIUM 3.9  --  4.1 4.0   CR 0.63  --  0.72 0.72   A1C  --  6.1*  --  5.9*        Diagnostics:  No labs were ordered during this visit.   No EKG this visit, completed in " the last 120 days.    Revised Cardiac Risk Index (RCRI):  The patient has the following serious cardiovascular risks for perioperative complications:   - Coronary Artery Disease (MI, positive stress test, angina, Qs on EKG) = 1 point     RCRI Interpretation: 1 point: Class II (low risk - 0.9% complication rate)           Signed Electronically by: Sekou Farrar MD  Copy of this evaluation report is provided to requesting physician.

## 2022-09-16 NOTE — H&P (VIEW-ONLY)
[facility]  [address] [facility] [address]  Phone: [phone]  Primary Provider: Devi Romano  Pre-op Performing Provider: JOAQUIN LIVINGSTON      PREOPERATIVE EVALUATION:  Today's date: 9/16/2022    Ya Stahl is a 82 year old female who presents for a preoperative evaluation.    Surgical Information:  Surgery/Procedure: Left total hip arthroplasty  Surgery Location: [facility]  Surgeon: Dr. Saab  Surgery Date: 10/014/2022  Time of Surgery: 9:00 AM  Where patient plans to recover: At home with family  Fax number for surgical facility:  Attn: Kiana Barnes    Type of Anesthesia Anticipated: General    Assessment and Plan    (Z01.818) Preoperative examination  (primary encounter diagnosis)  Comment: cleared for surgery without further eval  Plan:     (E87.6) Hypokalemia  Comment: refill  Plan: potassium chloride ER (KLOR-CON M) 10 MEQ CR         tablet            (M16.12) Primary osteoarthritis of left hip  Comment:   Plan:       RTC in 4m CPE    Joaquin Livingston MD      Subjective     HPI related to upcoming procedure: L hip OA, will be having hip replacement.  Feeling well, no acute concerns today.  Echo completed last month looks quite good.  EKG from 4/22 unchanged from 8/20.      Denies CP, palpitations, edema, dyspnea, HA, vision changes.        Preop Questions 9/14/2022   1. Have you ever had a heart attack or stroke? YES -    2. Have you ever had surgery on your heart or blood vessels, such as a stent placement, a coronary artery bypass, or surgery on an artery in your head, neck, heart, or legs? No   3. Do you have chest pain with activity? No   4. Do you have a history of  heart failure? No   5. Do you currently have a cold, bronchitis or symptoms of other infection? No   6. Do you have a cough, shortness of breath, or wheezing? YES - persistent cough, stable,   7. Do you or anyone in your family have previous history of  blood clots? No   8. Do you or does anyone in your family have a serious bleeding problem such as prolonged bleeding following surgeries or cuts? YES -    9. Have you ever had problems with anemia or been told to take iron pills? No   10. Have you had any abnormal blood loss such as black, tarry or bloody stools, or abnormal vaginal bleeding? No   11. Have you ever had a blood transfusion? No   12. Are you willing to have a blood transfusion if it is medically needed before, during, or after your surgery? Yes   13. Have you or any of your relatives ever had problems with anesthesia? YES - very sensitive and long recovery time to anesthesia   14. Do you have sleep apnea, excessive snoring or daytime drowsiness? No   15. Do you have any artifical heart valves or other implanted medical devices like a pacemaker, defibrillator, or continuous glucose monitor? No   16. Do you have artificial joints? YES - R hip, R knee   17. Are you allergic to latex? No   18. Is there any chance that you may be pregnant? -       Health Care Directive:  Patient does not have a Health Care Directive or Living Will:     Preoperative Review of :   reviewed - no record of controlled substances prescribed.      Status of Chronic Conditions:  CAD - Patient has a longstanding history of moderate-severe CAD. Patient denies recent chest pain or NTG use, denies exercise induced dyspnea or PND. Last Stress test ECHO - 8/22, EKG 4/22.     HYPERLIPIDEMIA - Patient has a long history of significant Hyperlipidemia requiring medication for treatment with recent good control. Patient reports no problems or side effects with the medication.     HYPERTENSION - Patient has longstanding history of HTN , currently denies any symptoms referable to elevated blood pressure. Specifically denies chest pain, palpitations, dyspnea, orthopnea, PND or peripheral edema. Blood pressure readings have been in normal range. Current medication regimen is as listed  "below. Patient denies any side effects of medication.       Review of Systems  CONSTITUTIONAL: NEGATIVE for fever, chills, change in weight  INTEGUMENTARY/SKIN: NEGATIVE for worrisome rashes, moles or lesions  EYES: NEGATIVE for vision changes or irritation  ENT/MOUTH: NEGATIVE for ear, mouth and throat problems  RESP: NEGATIVE for significant cough or SOB  CV: NEGATIVE for chest pain, palpitations or peripheral edema  GI: NEGATIVE for nausea, abdominal pain, heartburn, or change in bowel habits  : NEGATIVE for frequency, dysuria, or hematuria  MUSCULOSKELETAL: NEGATIVE for significant arthralgias or myalgia  NEURO: NEGATIVE for weakness, dizziness or paresthesias  ENDOCRINE: NEGATIVE for temperature intolerance, skin/hair changes  HEME: NEGATIVE for bleeding problems  PSYCHIATRIC: NEGATIVE for changes in mood or affect    Patient Active Problem List    Diagnosis Date Noted     Coronary artery disease due to lipid rich plaque 06/30/2022     Priority: Medium     Dyslipidemia 06/30/2022     Priority: Medium     Idiopathic osteoporosis 05/02/2022     Priority: Medium     History of drug therapy 05/02/2022     Priority: Medium     Elevated glucose 03/18/2022     Priority: Medium     Severe osteopetrosis 03/18/2022     Priority: Medium     Ascending aorta dilation (H) 01/06/2022     Priority: Medium     Echo needed yearly       Other osteoarthritis involving multiple joints 01/06/2022     Priority: Medium     Dry mouth 01/06/2022     Priority: Medium     Status post total replacement of right hip 08/04/2020     Priority: Medium     S/P lumbar fusion 10/31/2018     Priority: Medium     Complication of anesthesia      Priority: Medium     \"hard time waking up / N & V\"       Hypertension goal BP (blood pressure) < 140/90 02/20/2018     Priority: Medium     Epigastric pain 10/19/2017     Priority: Medium     MGUS (monoclonal gammopathy of unknown significance) 01/14/2017     Priority: Medium      Would just repeat her SPEP " "and serum free light chains every 1-2 years.  Please feel free to refer her back to me if labs are trending up.   See phone message 1/11/2021 with Hematology    From staff message end of January 2021:  Light chains went up a bit, but still relatively low, and everything else looks fine (hemoglobin, creatinine, M-spike, calcium).  I'd continue to monitor it as MGUS.  Would just continue doing annual labs as you are, and please feel free to reach out with questions anytime.          Monoclonal paraproteinemia 01/12/2017     Priority: Medium     Macrocytosis 12/01/2016     Priority: Medium     ASCVD (arteriosclerotic cardiovascular disease) 03/09/2016     Priority: Medium     AMI (acute myocardial infarction) (H) 03/04/2016     Priority: Medium     Primary osteoarthritis of both hands 09/22/2015     Priority: Medium     Gastroesophageal reflux disease, unspecified whether esophagitis present 05/20/2015     Priority: Medium     Senile osteoporosis 08/12/2014     Priority: Medium     started alendronate Aug 2014       Status post-operative repair of hip fracture, RIGHT 04/01/2014     Priority: Medium     Impaired fasting glucose 12/09/2012     Priority: Medium     Chronic cough 09/10/2012     Priority: Medium     HL (hearing loss) 12/01/2011     Priority: Medium     Mixed incontinence 12/01/2011     Priority: Medium     Sjogren's disease (H) 06/02/2011     Priority: Medium     Hyperlipidemia LDL goal <70 10/31/2010     Priority: Medium     Bile reflux gastritis 04/15/2009     Priority: Medium     Grave's disease 09/08/2008     Priority: Medium     IMO update changed this record. Please review for accuracy       Hypothyroidism 11/01/2004     Priority: Medium     Problem list name updated by automated process. Provider to review        Past Medical History:   Diagnosis Date     Arthritis      Cerebral infarction (H) 2019     Complication of anesthesia     \"hard time waking up / N & V\"     Coronary artery disease      " Disease of thyroid gland      Displacement of lumbar intervertebral disc without myelopathy      Gastro-oesophageal reflux disease      GERD (gastroesophageal reflux disease)      Hearing loss     has bilateral aids     Heart attack (H) 03/2016     History of anesthesia complications     delayed emergence     History of angina      Hypertension      Low back pain      Migraine headache      Myocardial infarction (H)      Numbness and tingling     down right leg (associated with back pain)     PONV (postoperative nausea and vomiting)      Sicca syndrome (H)      Sjogren's disease (H)      Sjogren's syndrome (H)     sees specialist; dentist     Spider veins      Unspecified hypothyroidism      Past Surgical History:   Procedure Laterality Date     ARTHROSCOPY KNEE  10/05/2012    R Procedure: ARTHROSCOPY KNEE;  RIGHT KNEE ARTHROSCOPY, PARTIAL MEDIAL MENISCECTOMY;  Surgeon: Karli Zaragoza MD;  Location: [facility]     BACK SURGERY  About. 5 years ago    Fusion of 4&5 lumbar     BLADDER SURGERY       BUNIONECTOMY  ~2010    L foot.      CATARACT IOL, RT/LT Bilateral 07/2017     COLONOSCOPY N/A 01/17/2017    normal; no repeat Procedure: COLONOSCOPY;  Surgeon: Fan Giordano MD;  Location:  GI     ENDOSCOPIC RELEASE CARPAL TUNNEL  09/11/2012    R Procedure: ENDOSCOPIC RELEASE CARPAL TUNNEL;  Right Endoscopic carpal tunnel release, left ringer finger cortizon injection;  Surgeon: Anne Marie Catherine MD;  Location:  OR     ESOPHAGOSCOPY, GASTROSCOPY, DUODENOSCOPY (EGD), COMBINED N/A 12/26/2014    Procedure: COMBINED ESOPHAGOSCOPY, GASTROSCOPY, DUODENOSCOPY (EGD);  Surgeon: Fan Giordano MD;  Location:  GI     EXCISE EXOSTOSIS FOOT Left 12/01/2016    Procedure: EXCISE EXOSTOSIS FOOT;  Surgeon: Jody Gallagher DPM, Pod;  Location:  OR     GYN SURGERY  1978    ovaries removed     HEART CATH FYI  03/04/2016    dz <40%     HIP HARDWARE REMOVAL Right 08/04/2020    Procedure: HARDWARE REMOVAL - LATERAL APPROACH;   Surgeon: Charlie Wolfe MD;  Location: [facility];  Service: Orthopedics     HYSTERECTOMY       INJECT STEROID (LOCATION)  09/11/2012    Procedure: INJECT STEROID (LOCATION);;  Surgeon: Anne Marie Catherine MD;  Location:  OR     LAPAROSCOPIC CHOLECYSTECTOMY N/A 04/07/2016    Procedure: LAPAROSCOPIC CHOLECYSTECTOMY;  Surgeon: Hans Medina MD;  Location:  OR     OPTICAL TRACKING SYSTEM FUSION SPINE POSTERIOR LUMBAR TWO LEVELS N/A 10/31/2018    Procedure: Central L3-4 L4-5 lamenectomies L4-5 posterior instrumented fusion;  Surgeon: Aroldo Burdick MD;  Location:  OR     ORTHOPEDIC SURGERY Right 2014    karli for fx femur     RECONSTRUCT FOREFOOT WITH METATARSOPHALANGEAL (MTP) FUSION Left 04/28/2017    Procedure: RECONSTRUCT FOREFOOT WITH METATARSOPHALANGEAL (MTP) FUSION;  1. Resection arthroplasty, fifth metatarsophalangeal joint, left foot.   2. Irrigation and debridement of fifth metatarsophalangeal joint, left foot (excisional to the level of the bone).     ;  Surgeon: Fabián Phipps MD;  Location:  OR     RELEASE CARPAL TUNNEL       RIGHT HIP ORIF 4/1/2014       ROTATOR CUFF REPAIR RT/LT  ~1998    Lt shoulder; rotator cuff     SURGICAL HISTORY OF -   distant past    ablation for palpitations.     SURGICAL HISTORY OF -   06/2015    left carpal tunnel surgery     [facility] NONSPECIFIC PROCEDURE  1976    D&C     [facility] TOTAL HIP ARTHROPLASTY Right 08/04/2020    Procedure: RIGHT TOTAL HIP ARTHROPLASTY;  Surgeon: Charlie Wolfe MD;  Location: [facility];  Service: Orthopedics     [facility] TOTAL KNEE ARTHROPLASTY Right 01/2020     Current Outpatient Medications   Medication Sig Dispense Refill     aspirin 81 MG tablet Take 81 mg by mouth daily       atorvastatin (LIPITOR) 20 MG tablet Take 1 tablet (20 mg) by mouth daily 102 tablet 3     benzonatate (TESSALON) 200 MG capsule take 1 capsule by oral route 1-2 times every day as needed for cough       Biotin w/ Vitamins C & E (HAIR/SKIN/NAILS PO) Take by  mouth daily       calcium carbonate 500 mg-vitamin D 200 units (OSCAL WITH D;OYSTER SHELL CALCIUM) 500-200 MG-UNIT per tablet Take 1 tablet by mouth 3 times daily (before meals) 90 tablet 3     Calcium Carbonate-Vitamin D (CALCIUM 600+D PO)        Carboxymethylcellulose Sodium (REFRESH LIQUIGEL OP) Apply 1 drop to eye every evening 15 minutes after Restasis drops.       cevimeline (EVOXAC) 30 MG capsule Take 1 capsule (30 mg) by mouth 3 times daily Reported on 3/6/2017 270 capsule 3     cholecalciferol 1000 units TABS Take 1,000 Units by mouth 2 times daily 30 tablet 3     CycloSPORINE (RESTASIS OP) Apply 1 drop to eye every evening        doxycycline monohydrate (MONODOX) 50 MG capsule Take 50 mg by mouth daily       EPINEPHrine (ANY BX GENERIC EQUIV) 0.3 MG/0.3ML injection 2-pack Inject 0.3 mLs (0.3 mg) into the muscle as needed for anaphylaxis 2 each 0     famotidine (PEPCID) 40 MG tablet Take 40 mg by mouth daily       furosemide (LASIX) 20 MG tablet Take 1 tablet (20 mg) by mouth daily 30 tablet 11     gabapentin (NEURONTIN) 100 MG capsule TAKE ONE CAPSULE BY MOUTH THREE TIMES A DAY 90 capsule 0     LANsoprazole (PREVACID) 30 MG DR capsule Take 1 capsule (30 mg) by mouth every morning (before breakfast) 102 capsule 3     levothyroxine (SYNTHROID/LEVOTHROID) 100 MCG tablet Take 1 tablet (100 mcg) by mouth daily 90 tablet 3     Lutein 20 MG CAPS Take by mouth daily       metoprolol succinate ER (TOPROL-XL) 25 MG 24 hr tablet Take 1 tablet (25 mg) by mouth daily 90 tablet 3     mirabegron (MYRBETRIQ) 25 MG 24 hr tablet Take 1 tablet (25 mg) by mouth daily 30 tablet 10     nitroGLYcerin (NITROSTAT) 0.4 MG sublingual tablet Place 1 tablet (0.4 mg) under the tongue every 5 minutes as needed for chest pain 25 tablet 0     Omega-3 Fatty Acids (OMEGA-3 FISH OIL PO) Take 1 g by mouth 2 times daily (with meals)        polyethylene glycol (MIRALAX) 17 GM/Dose powder Take 1 capful by mouth 2 times daily In 8 ounces of  "liquid       potassium chloride ER (KLOR-CON M) 10 MEQ CR tablet Take 1 tablet (10 mEq) by mouth 2 times daily 30 tablet 11       Allergies   Allergen Reactions     Codeine      fatigue     No Clinical Screening - See Comments Nausea and Vomiting     Vicodin [Acetaminophen] Fatigue     Adhesive Tape Rash        Social History     Tobacco Use     Smoking status: Never Smoker     Smokeless tobacco: Never Used   Substance Use Topics     Alcohol use: Yes     Comment: Maybe 1x amonth       History   Drug Use No         Objective     /74 (BP Location: Right arm, Patient Position: Sitting, Cuff Size: Adult Regular)   Pulse 64   Temp 98  F (36.7  C) (Oral)   Resp 18   Ht 1.619 m (5' 3.75\")   Wt 63 kg (138 lb 12.8 oz)   SpO2 98%   BMI 24.01 kg/m      Physical Exam    GENERAL APPEARANCE: healthy, alert and no distress     EYES: EOMI, PERRL     HENT: ear canals and TM's normal and nose and mouth without ulcers or lesions     NECK: no adenopathy, no asymmetry, masses, or scars and thyroid normal to palpation     RESP: lungs clear to auscultation - no rales, rhonchi or wheezes     CV: regular rates and rhythm, normal S1 S2, no S3 or S4 and no murmur, click or rub     ABDOMEN:  soft, nontender, no HSM or masses and bowel sounds normal     MS: extremities normal- no gross deformities noted, no evidence of inflammation in joints, FROM in all extremities.     SKIN: no suspicious lesions or rashes     NEURO: Normal strength and tone, sensory exam grossly normal, mentation intact and speech normal     PSYCH: mentation appears normal. and affect normal/bright     LYMPHATICS: No cervical adenopathy    Recent Labs   Lab Test 04/27/22  0445 03/18/22  1215 01/06/22  1051 01/19/21  0825   HGB 13.5  --  13.5 12.5     --  278 302     --  135 137   POTASSIUM 3.9  --  4.1 4.0   CR 0.63  --  0.72 0.72   A1C  --  6.1*  --  5.9*        Diagnostics:  No labs were ordered during this visit.   No EKG this visit, completed in " the last 120 days.    Revised Cardiac Risk Index (RCRI):  The patient has the following serious cardiovascular risks for perioperative complications:   - Coronary Artery Disease (MI, positive stress test, angina, Qs on EKG) = 1 point     RCRI Interpretation: 1 point: Class II (low risk - 0.9% complication rate)           Signed Electronically by: Sekou Farrar MD  Copy of this evaluation report is provided to requesting physician.

## 2022-09-19 ENCOUNTER — LAB (OUTPATIENT)
Dept: LAB | Facility: CLINIC | Age: 82
End: 2022-09-19
Payer: COMMERCIAL

## 2022-09-19 DIAGNOSIS — Z01.818 PREOP EXAMINATION: ICD-10-CM

## 2022-09-19 DIAGNOSIS — Z01.818 PREOP EXAMINATION: Primary | ICD-10-CM

## 2022-09-19 DIAGNOSIS — Z20.822 ENCOUNTER FOR LABORATORY TESTING FOR COVID-19 VIRUS: ICD-10-CM

## 2022-09-19 LAB
ERYTHROCYTE [DISTWIDTH] IN BLOOD BY AUTOMATED COUNT: 13.3 % (ref 10–15)
HCT VFR BLD AUTO: 41.5 % (ref 35–47)
HGB BLD-MCNC: 13.1 G/DL (ref 11.7–15.7)
MCH RBC QN AUTO: 31.2 PG (ref 26.5–33)
MCHC RBC AUTO-ENTMCNC: 31.6 G/DL (ref 31.5–36.5)
MCV RBC AUTO: 99 FL (ref 78–100)
PLATELET # BLD AUTO: 250 10E3/UL (ref 150–450)
RBC # BLD AUTO: 4.2 10E6/UL (ref 3.8–5.2)
WBC # BLD AUTO: 5.4 10E3/UL (ref 4–11)

## 2022-09-19 PROCEDURE — 85027 COMPLETE CBC AUTOMATED: CPT

## 2022-09-19 PROCEDURE — 36415 COLL VENOUS BLD VENIPUNCTURE: CPT

## 2022-09-19 PROCEDURE — 82565 ASSAY OF CREATININE: CPT

## 2022-09-20 LAB
CREAT SERPL-MCNC: 0.8 MG/DL (ref 0.52–1.04)
GFR SERPL CREATININE-BSD FRML MDRD: 74 ML/MIN/1.73M2

## 2022-09-22 ENCOUNTER — IMMUNIZATION (OUTPATIENT)
Dept: FAMILY MEDICINE | Facility: CLINIC | Age: 82
End: 2022-09-22
Payer: COMMERCIAL

## 2022-09-22 DIAGNOSIS — Z23 NEED FOR COVID-19 VACCINE: Primary | ICD-10-CM

## 2022-09-22 PROCEDURE — 0124A COVID-19,PF,PFIZER BOOSTER BIVALENT: CPT

## 2022-09-22 PROCEDURE — 91312 COVID-19,PF,PFIZER BOOSTER BIVALENT: CPT

## 2022-09-22 PROCEDURE — 99207 PR NO CHARGE NURSE ONLY: CPT

## 2022-09-22 NOTE — PROGRESS NOTES
Prior to immunization administration, verified patients identity using patient s name and date of birth. Please see Immunization Activity for additional information.     Screening Questionnaire for Adult Immunization    Are you sick today?   No   Do you have allergies to medications, food, a vaccine component or latex?   No   Have you ever had a serious reaction after receiving a vaccination?   No   Do you have a long-term health problem with heart, lung, kidney, or metabolic disease (e.g., diabetes), asthma, a blood disorder, no spleen, complement component deficiency, a cochlear implant, or a spinal fluid leak?  Are you on long-term aspirin therapy?   No   Do you have cancer, leukemia, HIV/AIDS, or any other immune system problem?   No   Do you have a parent, brother, or sister with an immune system problem?   No   In the past 3 months, have you taken medications that affect  your immune system, such as prednisone, other steroids, or anticancer drugs; drugs for the treatment of rheumatoid arthritis, Crohn s disease, or psoriasis; or have you had radiation treatments?   No   Have you had a seizure, or a brain or other nervous system problem?   No   During the past year, have you received a transfusion of blood or blood    products, or been given immune (gamma) globulin or antiviral drug?   No   For women: Are you pregnant or is there a chance you could become       pregnant during the next month?   No   Have you received any vaccinations in the past 4 weeks?   No     Immunization questionnaire answers were all negative.        Per orders of Dr. Shankar Eric, injection of Bivalent given by Nancy Ayoub MA. Patient instructed to remain in clinic for 15 minutes afterwards, and to report any adverse reaction to me immediately.       Screening performed by Nancy Ayoub MA on 9/22/2022 at 7:36 AM.

## 2022-10-10 ENCOUNTER — LAB (OUTPATIENT)
Dept: LAB | Facility: CLINIC | Age: 82
End: 2022-10-10
Payer: COMMERCIAL

## 2022-10-10 DIAGNOSIS — Z20.822 ENCOUNTER FOR LABORATORY TESTING FOR COVID-19 VIRUS: ICD-10-CM

## 2022-10-10 LAB — SARS-COV-2 RNA RESP QL NAA+PROBE: NEGATIVE

## 2022-10-10 PROCEDURE — U0005 INFEC AGEN DETEC AMPLI PROBE: HCPCS

## 2022-10-10 PROCEDURE — U0003 INFECTIOUS AGENT DETECTION BY NUCLEIC ACID (DNA OR RNA); SEVERE ACUTE RESPIRATORY SYNDROME CORONAVIRUS 2 (SARS-COV-2) (CORONAVIRUS DISEASE [COVID-19]), AMPLIFIED PROBE TECHNIQUE, MAKING USE OF HIGH THROUGHPUT TECHNOLOGIES AS DESCRIBED BY CMS-2020-01-R: HCPCS

## 2022-10-12 NOTE — PROGRESS NOTES
"  Pre-op Total Joint Patient Screening    1. Do you have a ride available to come to the hospital the day after your surgery by 8am with anticipated discharge of 11am? Y  2. What is the name of this person? Kirby \"Del\" (spouse)  3. Do you have a  set up after surgery? Y  4. Will your  be the same person that gives you a ride home after surgery? Y  5. Have you received the Joint Replacement Guidebook? Y  6. Do you have any questions about your guidebook? N  7. Have you activated your [employer] account? N  8. Have you signed up for MY Chart access? Y  "

## 2022-10-13 RX ORDER — AMLODIPINE BESYLATE 5 MG/1
5 TABLET ORAL DAILY
COMMUNITY
End: 2022-11-07

## 2022-10-13 NOTE — PROGRESS NOTES
PTA medications updated by Medication Scribe prior to surgery via phone call with patient (last doses completed by Nurse)     Medication history sources: Patient, Surescripts, H&P and Patient's home med list  In the past week, patient estimated taking medication this percent of the time: Greater than 90%  Adherence assessment: N/A Not Observed    Significant changes made to the medication list:  Patient reports no longer taking the following meds (med scribe removed from PTA med list): Doxycycline Monohydrate      Additional medication history information:   Patient was advised to bring: Retasis Eye Solution, Refresh Eye Drops    Medication reconciliation completed by provider prior to medication history? No    Time spent in this activity: 50 minutes    The information provided in this note is only as accurate as the sources available at the time of update(s)    Prior to Admission medications    Medication Sig Last Dose Taking? Auth Provider Long Term End Date   amLODIPine (NORVASC) 5 MG tablet Take 5 mg by mouth daily  at am Yes Reported, Patient No    aspirin 81 MG tablet Take 81 mg by mouth daily 10/5/2022 at pm Yes Reported, Patient No    atorvastatin (LIPITOR) 20 MG tablet Take 1 tablet (20 mg) by mouth daily 10/13/2022 at pm Yes Devi Romano MD Yes    benzonatate (TESSALON) 200 MG capsule Take 100 mg by mouth 2 times daily as needed for cough  at prn Yes Reported, Patient     Biotin w/ Vitamins C & E (HAIR/SKIN/NAILS PO) Take 1 tablet by mouth daily 10/5/2022 at am Yes Reported, Patient     Boswellia-Glucosamine-Vit D (OSTEO BI-FLEX ONE PER DAY PO) Take 1 capsule by mouth daily 10/5/2022 at am Yes Reported, Patient     calcium carbonate 500 mg-vitamin D 200 units (OSCAL WITH D;OYSTER SHELL CALCIUM) 500-200 MG-UNIT per tablet Take 1 tablet by mouth 3 times daily (before meals) 10/5/2022 at pm Yes Sree Long PA-C     Calcium Carbonate-Vitamin D (CALCIUM 600+D PO) Take 1 tablet by mouth  daily 10/5/2022 at am Yes Reported, Patient     Carboxymethylcellulose Sodium (REFRESH LIQUIGEL OP) Apply 1 drop to eye every evening 15 minutes after Restasis drops. 10/13/2022 at pm Yes Reported, Patient     cevimeline (EVOXAC) 30 MG capsule Take 1 capsule (30 mg) by mouth 3 times daily Reported on 3/6/2017  at am Yes Devi Romano MD     cholecalciferol 1000 units TABS Take 1,000 Units by mouth 2 times daily 10/5/2022 at pm Yes Sree Long PA-C     CycloSPORINE (RESTASIS OP) Place 1 drop into both eyes every evening 10/13/2022 at pm Yes Reported, Patient     ELDERBERRY PO Take 1 tablet by mouth daily 10/5/2022 at am Yes Reported, Patient     EPINEPHrine (ANY BX GENERIC EQUIV) 0.3 MG/0.3ML injection 2-pack Inject 0.3 mLs (0.3 mg) into the muscle as needed for anaphylaxis  at prn Yes Shankar Eric MD     famotidine (PEPCID) 40 MG tablet Take 40 mg by mouth daily 10/13/2022 at pm Yes Reported, Patient     furosemide (LASIX) 20 MG tablet Take 1 tablet (20 mg) by mouth daily 10/5/2022 at am Yes Venancio Montanez MD Yes    gabapentin (NEURONTIN) 100 MG capsule TAKE ONE CAPSULE BY MOUTH THREE TIMES A DAY  Patient taking differently: Take 100 mg by mouth 2 times daily  at am Yes Devi Romano MD Yes    LANsoprazole (PREVACID) 30 MG DR capsule Take 1 capsule (30 mg) by mouth every morning (before breakfast)  at am Yes Scarlett Spencer MD     levothyroxine (SYNTHROID/LEVOTHROID) 100 MCG tablet Take 1 tablet (100 mcg) by mouth daily  at am Yes Devi Romano MD Yes    Lutein 20 MG CAPS Take 1 capsule by mouth daily 10/5/2022 at am Yes Reported, Patient     metoprolol succinate ER (TOPROL-XL) 25 MG 24 hr tablet Take 1 tablet (25 mg) by mouth daily 10/13/2022 at pm Yes Devi Romano MD Yes    mirabegron (MYRBETRIQ) 25 MG 24 hr tablet Take 1 tablet (25 mg) by mouth daily  at am Yes Erika Helms PA-C     nitroGLYcerin (NITROSTAT) 0.4 MG sublingual tablet Place 1  tablet (0.4 mg) under the tongue every 5 minutes as needed for chest pain  at prn Yes Scarlett Spencer MD Yes    Omega-3 Fatty Acids (OMEGA-3 FISH OIL PO) Take 1 g by mouth 2 times daily (with meals)  10/5/2022 at pm Yes Reported, Patient     polyethylene glycol (MIRALAX) 17 GM/Dose powder Take 1 capful by mouth 2 times daily In 8 ounces of liquid 10/13/2022 at pm Yes Reported, Patient     potassium chloride ER (KLOR-CON M) 10 MEQ CR tablet Take 1 tablet (10 mEq) by mouth 2 times daily MORE THAN THREE WEEKS at UNKNOWN Yes Sekou Farrar MD       Medication history completed by:    Yousif Waddell CPhT  Medication [facility]

## 2022-10-14 ENCOUNTER — HOSPITAL ENCOUNTER (OUTPATIENT)
Facility: CLINIC | Age: 82
Discharge: HOME | End: 2022-10-15
Attending: ORTHOPAEDIC SURGERY | Admitting: ORTHOPAEDIC SURGERY
Payer: COMMERCIAL

## 2022-10-14 ENCOUNTER — APPOINTMENT (OUTPATIENT)
Dept: GENERAL RADIOLOGY | Facility: CLINIC | Age: 82
End: 2022-10-14
Attending: ORTHOPAEDIC SURGERY
Payer: COMMERCIAL

## 2022-10-14 ENCOUNTER — ANESTHESIA (OUTPATIENT)
Dept: SURGERY | Facility: CLINIC | Age: 82
End: 2022-10-14
Payer: COMMERCIAL

## 2022-10-14 ENCOUNTER — ANESTHESIA EVENT (OUTPATIENT)
Dept: SURGERY | Facility: CLINIC | Age: 82
End: 2022-10-14
Payer: COMMERCIAL

## 2022-10-14 ENCOUNTER — APPOINTMENT (OUTPATIENT)
Dept: PHYSICAL THERAPY | Facility: CLINIC | Age: 82
End: 2022-10-14
Attending: ORTHOPAEDIC SURGERY
Payer: COMMERCIAL

## 2022-10-14 DIAGNOSIS — Z96.642 STATUS POST TOTAL HIP REPLACEMENT, LEFT: Primary | ICD-10-CM

## 2022-10-14 LAB — POTASSIUM BLD-SCNC: 4.1 MMOL/L (ref 3.4–5.3)

## 2022-10-14 PROCEDURE — 258N000003 HC RX IP 258 OP 636: Performed by: ORTHOPAEDIC SURGERY

## 2022-10-14 PROCEDURE — 36415 COLL VENOUS BLD VENIPUNCTURE: CPT | Performed by: ANESTHESIOLOGY

## 2022-10-14 PROCEDURE — 258N000003 HC RX IP 258 OP 636: Performed by: ANESTHESIOLOGY

## 2022-10-14 PROCEDURE — 97110 THERAPEUTIC EXERCISES: CPT | Mod: GP

## 2022-10-14 PROCEDURE — 360N000084 HC SURGERY LEVEL 4 W/ FLUORO, PER MIN: Performed by: ORTHOPAEDIC SURGERY

## 2022-10-14 PROCEDURE — 999N000141 HC STATISTIC PRE-PROCEDURE NURSING ASSESSMENT: Performed by: ORTHOPAEDIC SURGERY

## 2022-10-14 PROCEDURE — 999N000179 XR SURGERY CARM FLUORO LESS THAN 5 MIN W STILLS

## 2022-10-14 PROCEDURE — 250N000013 HC RX MED GY IP 250 OP 250 PS 637: Performed by: STUDENT IN AN ORGANIZED HEALTH CARE EDUCATION/TRAINING PROGRAM

## 2022-10-14 PROCEDURE — 370N000017 HC ANESTHESIA TECHNICAL FEE, PER MIN: Performed by: ORTHOPAEDIC SURGERY

## 2022-10-14 PROCEDURE — 97116 GAIT TRAINING THERAPY: CPT | Mod: GP

## 2022-10-14 PROCEDURE — 258N000001 HC RX 258: Performed by: ORTHOPAEDIC SURGERY

## 2022-10-14 PROCEDURE — 84132 ASSAY OF SERUM POTASSIUM: CPT | Performed by: ANESTHESIOLOGY

## 2022-10-14 PROCEDURE — 250N000013 HC RX MED GY IP 250 OP 250 PS 637: Performed by: PHYSICIAN ASSISTANT

## 2022-10-14 PROCEDURE — 250N000025 HC SEVOFLURANE, PER MIN: Performed by: ORTHOPAEDIC SURGERY

## 2022-10-14 PROCEDURE — 710N000009 HC RECOVERY PHASE 1, LEVEL 1, PER MIN: Performed by: ORTHOPAEDIC SURGERY

## 2022-10-14 PROCEDURE — 258N000003 HC RX IP 258 OP 636: Performed by: NURSE ANESTHETIST, CERTIFIED REGISTERED

## 2022-10-14 PROCEDURE — 250N000011 HC RX IP 250 OP 636: Performed by: ORTHOPAEDIC SURGERY

## 2022-10-14 PROCEDURE — 250N000013 HC RX MED GY IP 250 OP 250 PS 637: Performed by: ORTHOPAEDIC SURGERY

## 2022-10-14 PROCEDURE — C1776 JOINT DEVICE (IMPLANTABLE): HCPCS | Performed by: ORTHOPAEDIC SURGERY

## 2022-10-14 PROCEDURE — C1713 ANCHOR/SCREW BN/BN,TIS/BN: HCPCS | Performed by: ORTHOPAEDIC SURGERY

## 2022-10-14 PROCEDURE — 272N000001 HC OR GENERAL SUPPLY STERILE: Performed by: ORTHOPAEDIC SURGERY

## 2022-10-14 PROCEDURE — 250N000009 HC RX 250: Performed by: NURSE ANESTHETIST, CERTIFIED REGISTERED

## 2022-10-14 PROCEDURE — 250N000009 HC RX 250: Performed by: STUDENT IN AN ORGANIZED HEALTH CARE EDUCATION/TRAINING PROGRAM

## 2022-10-14 PROCEDURE — 99233 SBSQ HOSP IP/OBS HIGH 50: CPT | Mod: FS | Performed by: PHYSICIAN ASSISTANT

## 2022-10-14 PROCEDURE — 250N000011 HC RX IP 250 OP 636: Performed by: STUDENT IN AN ORGANIZED HEALTH CARE EDUCATION/TRAINING PROGRAM

## 2022-10-14 PROCEDURE — 250N000011 HC RX IP 250 OP 636: Performed by: NURSE ANESTHETIST, CERTIFIED REGISTERED

## 2022-10-14 PROCEDURE — 999N000063 XR PELVIS AND HIP PORTABLE LEFT 1 VIEW

## 2022-10-14 PROCEDURE — 97161 PT EVAL LOW COMPLEX 20 MIN: CPT | Mod: GP

## 2022-10-14 PROCEDURE — 250N000009 HC RX 250: Performed by: ORTHOPAEDIC SURGERY

## 2022-10-14 DEVICE — IMP SCR BONE CAN ACE 6.5X25MM 1217-25-500: Type: IMPLANTABLE DEVICE | Site: HIP | Status: FUNCTIONAL

## 2022-10-14 DEVICE — IMP SCR BONE CAN ACE 6.5X40MM 1217-40-500: Type: IMPLANTABLE DEVICE | Site: HIP | Status: FUNCTIONAL

## 2022-10-14 DEVICE — IMP SCR BONE CAN ACE 6.5X20MM 1217-20-500: Type: IMPLANTABLE DEVICE | Site: HIP | Status: FUNCTIONAL

## 2022-10-14 DEVICE — IMP CUP ACE PINNACLE 56MM 1217-22-056: Type: IMPLANTABLE DEVICE | Site: HIP | Status: FUNCTIONAL

## 2022-10-14 DEVICE — IMP HEAD FEMORAL DEPUY CERAMIC 36MM +5MM 136536320: Type: IMPLANTABLE DEVICE | Site: HIP | Status: FUNCTIONAL

## 2022-10-14 DEVICE — IMP APEX HOLE ELIMINATOR HIP DEPUY DURALOC 1246-03-000: Type: IMPLANTABLE DEVICE | Site: HIP | Status: FUNCTIONAL

## 2022-10-14 DEVICE — IMPLANTABLE DEVICE: Type: IMPLANTABLE DEVICE | Site: HIP | Status: FUNCTIONAL

## 2022-10-14 DEVICE — LINER ACETABULAR ALTRX NEUTRAL 36X56MM: Type: IMPLANTABLE DEVICE | Site: HIP | Status: FUNCTIONAL

## 2022-10-14 RX ORDER — METHOCARBAMOL 500 MG/1
500 TABLET, FILM COATED ORAL EVERY 6 HOURS PRN
Status: DISCONTINUED | OUTPATIENT
Start: 2022-10-14 | End: 2022-10-15 | Stop reason: HOSPADM

## 2022-10-14 RX ORDER — AMOXICILLIN 250 MG
1 CAPSULE ORAL 2 TIMES DAILY
Status: DISCONTINUED | OUTPATIENT
Start: 2022-10-14 | End: 2022-10-15 | Stop reason: HOSPADM

## 2022-10-14 RX ORDER — ACETAMINOPHEN 325 MG/1
975 TABLET ORAL EVERY 6 HOURS PRN
Qty: 100 TABLET | Refills: 0 | Status: ON HOLD | OUTPATIENT
Start: 2022-10-14 | End: 2023-09-20

## 2022-10-14 RX ORDER — FAMOTIDINE 20 MG/1
40 TABLET, FILM COATED ORAL DAILY
Status: DISCONTINUED | OUTPATIENT
Start: 2022-10-15 | End: 2022-10-15 | Stop reason: HOSPADM

## 2022-10-14 RX ORDER — HYDROMORPHONE HCL IN WATER/PF 6 MG/30 ML
0.2 PATIENT CONTROLLED ANALGESIA SYRINGE INTRAVENOUS
Status: DISCONTINUED | OUTPATIENT
Start: 2022-10-14 | End: 2022-10-15 | Stop reason: HOSPADM

## 2022-10-14 RX ORDER — HYDROMORPHONE HCL IN WATER/PF 6 MG/30 ML
0.4 PATIENT CONTROLLED ANALGESIA SYRINGE INTRAVENOUS
Status: DISCONTINUED | OUTPATIENT
Start: 2022-10-14 | End: 2022-10-15 | Stop reason: HOSPADM

## 2022-10-14 RX ORDER — ACETAMINOPHEN 325 MG/1
975 TABLET ORAL ONCE
Status: COMPLETED | OUTPATIENT
Start: 2022-10-14 | End: 2022-10-14

## 2022-10-14 RX ORDER — HYDROMORPHONE HCL IN WATER/PF 6 MG/30 ML
0.2 PATIENT CONTROLLED ANALGESIA SYRINGE INTRAVENOUS EVERY 5 MIN PRN
Status: DISCONTINUED | OUTPATIENT
Start: 2022-10-14 | End: 2022-10-14 | Stop reason: HOSPADM

## 2022-10-14 RX ORDER — PREGABALIN 150 MG/1
150 CAPSULE ORAL ONCE
Status: COMPLETED | OUTPATIENT
Start: 2022-10-14 | End: 2022-10-14

## 2022-10-14 RX ORDER — BISACODYL 10 MG
10 SUPPOSITORY, RECTAL RECTAL DAILY PRN
Status: DISCONTINUED | OUTPATIENT
Start: 2022-10-14 | End: 2022-10-15 | Stop reason: HOSPADM

## 2022-10-14 RX ORDER — GABAPENTIN 100 MG/1
100 CAPSULE ORAL 2 TIMES DAILY
Status: DISCONTINUED | OUTPATIENT
Start: 2022-10-14 | End: 2022-10-15 | Stop reason: HOSPADM

## 2022-10-14 RX ORDER — AMOXICILLIN 250 MG
1-2 CAPSULE ORAL 2 TIMES DAILY
Qty: 30 TABLET | Refills: 0 | Status: SHIPPED | OUTPATIENT
Start: 2022-10-14 | End: 2022-11-07

## 2022-10-14 RX ORDER — TRANEXAMIC ACID 650 MG/1
1950 TABLET ORAL ONCE
Status: COMPLETED | OUTPATIENT
Start: 2022-10-14 | End: 2022-10-14

## 2022-10-14 RX ORDER — PROPOFOL 10 MG/ML
INJECTION, EMULSION INTRAVENOUS PRN
Status: DISCONTINUED | OUTPATIENT
Start: 2022-10-14 | End: 2022-10-14

## 2022-10-14 RX ORDER — NALOXONE HYDROCHLORIDE 0.4 MG/ML
0.4 INJECTION, SOLUTION INTRAMUSCULAR; INTRAVENOUS; SUBCUTANEOUS
Status: DISCONTINUED | OUTPATIENT
Start: 2022-10-14 | End: 2022-10-15 | Stop reason: HOSPADM

## 2022-10-14 RX ORDER — DEXAMETHASONE SODIUM PHOSPHATE 4 MG/ML
INJECTION, SOLUTION INTRA-ARTICULAR; INTRALESIONAL; INTRAMUSCULAR; INTRAVENOUS; SOFT TISSUE PRN
Status: DISCONTINUED | OUTPATIENT
Start: 2022-10-14 | End: 2022-10-14

## 2022-10-14 RX ORDER — CARBOXYMETHYLCELLULOSE SODIUM 10 MG/ML
GEL OPHTHALMIC EVERY EVENING
Status: DISCONTINUED | OUTPATIENT
Start: 2022-10-14 | End: 2022-10-14

## 2022-10-14 RX ORDER — ONDANSETRON 2 MG/ML
4 INJECTION INTRAMUSCULAR; INTRAVENOUS EVERY 6 HOURS PRN
Status: DISCONTINUED | OUTPATIENT
Start: 2022-10-14 | End: 2022-10-15 | Stop reason: HOSPADM

## 2022-10-14 RX ORDER — BENZONATATE 100 MG/1
100 CAPSULE ORAL 2 TIMES DAILY PRN
Status: DISCONTINUED | OUTPATIENT
Start: 2022-10-14 | End: 2022-10-15 | Stop reason: HOSPADM

## 2022-10-14 RX ORDER — CEFAZOLIN SODIUM/WATER 2 G/20 ML
2 SYRINGE (ML) INTRAVENOUS
Status: COMPLETED | OUTPATIENT
Start: 2022-10-14 | End: 2022-10-14

## 2022-10-14 RX ORDER — NALOXONE HYDROCHLORIDE 0.4 MG/ML
0.2 INJECTION, SOLUTION INTRAMUSCULAR; INTRAVENOUS; SUBCUTANEOUS
Status: DISCONTINUED | OUTPATIENT
Start: 2022-10-14 | End: 2022-10-15 | Stop reason: HOSPADM

## 2022-10-14 RX ORDER — ONDANSETRON 2 MG/ML
INJECTION INTRAMUSCULAR; INTRAVENOUS PRN
Status: DISCONTINUED | OUTPATIENT
Start: 2022-10-14 | End: 2022-10-14

## 2022-10-14 RX ORDER — POLYETHYLENE GLYCOL 3350 17 G/17G
17 POWDER, FOR SOLUTION ORAL DAILY
Status: DISCONTINUED | OUTPATIENT
Start: 2022-10-15 | End: 2022-10-15 | Stop reason: HOSPADM

## 2022-10-14 RX ORDER — PANTOPRAZOLE SODIUM 40 MG/1
40 TABLET, DELAYED RELEASE ORAL
Status: DISCONTINUED | OUTPATIENT
Start: 2022-10-15 | End: 2022-10-15 | Stop reason: HOSPADM

## 2022-10-14 RX ORDER — CEVIMELINE HYDROCHLORIDE 30 MG/1
30 CAPSULE ORAL 3 TIMES DAILY
Status: DISCONTINUED | OUTPATIENT
Start: 2022-10-14 | End: 2022-10-15 | Stop reason: HOSPADM

## 2022-10-14 RX ORDER — CEFAZOLIN SODIUM 2 G/100ML
2 INJECTION, SOLUTION INTRAVENOUS EVERY 8 HOURS
Status: COMPLETED | OUTPATIENT
Start: 2022-10-14 | End: 2022-10-15

## 2022-10-14 RX ORDER — VANCOMYCIN HYDROCHLORIDE 1 G/20ML
INJECTION, POWDER, LYOPHILIZED, FOR SOLUTION INTRAVENOUS PRN
Status: DISCONTINUED | OUTPATIENT
Start: 2022-10-14 | End: 2022-10-14 | Stop reason: HOSPADM

## 2022-10-14 RX ORDER — FENTANYL CITRATE 50 UG/ML
INJECTION, SOLUTION INTRAMUSCULAR; INTRAVENOUS PRN
Status: DISCONTINUED | OUTPATIENT
Start: 2022-10-14 | End: 2022-10-14

## 2022-10-14 RX ORDER — SODIUM CHLORIDE, SODIUM LACTATE, POTASSIUM CHLORIDE, CALCIUM CHLORIDE 600; 310; 30; 20 MG/100ML; MG/100ML; MG/100ML; MG/100ML
INJECTION, SOLUTION INTRAVENOUS CONTINUOUS
Status: DISCONTINUED | OUTPATIENT
Start: 2022-10-14 | End: 2022-10-14 | Stop reason: HOSPADM

## 2022-10-14 RX ORDER — CEFAZOLIN SODIUM/WATER 2 G/20 ML
2 SYRINGE (ML) INTRAVENOUS SEE ADMIN INSTRUCTIONS
Status: DISCONTINUED | OUTPATIENT
Start: 2022-10-14 | End: 2022-10-14 | Stop reason: HOSPADM

## 2022-10-14 RX ORDER — FENTANYL CITRATE 0.05 MG/ML
25 INJECTION, SOLUTION INTRAMUSCULAR; INTRAVENOUS EVERY 5 MIN PRN
Status: DISCONTINUED | OUTPATIENT
Start: 2022-10-14 | End: 2022-10-14 | Stop reason: HOSPADM

## 2022-10-14 RX ORDER — KETOROLAC TROMETHAMINE 15 MG/ML
15 INJECTION, SOLUTION INTRAMUSCULAR; INTRAVENOUS EVERY 6 HOURS
Status: DISCONTINUED | OUTPATIENT
Start: 2022-10-14 | End: 2022-10-15 | Stop reason: HOSPADM

## 2022-10-14 RX ORDER — NEOSTIGMINE METHYLSULFATE 1 MG/ML
VIAL (ML) INJECTION PRN
Status: DISCONTINUED | OUTPATIENT
Start: 2022-10-14 | End: 2022-10-14

## 2022-10-14 RX ORDER — GLYCOPYRROLATE 0.2 MG/ML
INJECTION, SOLUTION INTRAMUSCULAR; INTRAVENOUS PRN
Status: DISCONTINUED | OUTPATIENT
Start: 2022-10-14 | End: 2022-10-14

## 2022-10-14 RX ORDER — LEVOTHYROXINE SODIUM 100 UG/1
100 TABLET ORAL DAILY
Status: DISCONTINUED | OUTPATIENT
Start: 2022-10-15 | End: 2022-10-15 | Stop reason: HOSPADM

## 2022-10-14 RX ORDER — ACETAMINOPHEN 325 MG/1
975 TABLET ORAL EVERY 8 HOURS
Status: DISCONTINUED | OUTPATIENT
Start: 2022-10-14 | End: 2022-10-15 | Stop reason: HOSPADM

## 2022-10-14 RX ORDER — CARBOXYMETHYLCELLULOSE SODIUM 10 MG/ML
GEL OPHTHALMIC EVERY EVENING
Status: DISCONTINUED | OUTPATIENT
Start: 2022-10-14 | End: 2022-10-15 | Stop reason: HOSPADM

## 2022-10-14 RX ORDER — ONDANSETRON 2 MG/ML
4 INJECTION INTRAMUSCULAR; INTRAVENOUS EVERY 30 MIN PRN
Status: DISCONTINUED | OUTPATIENT
Start: 2022-10-14 | End: 2022-10-14 | Stop reason: HOSPADM

## 2022-10-14 RX ORDER — METOPROLOL SUCCINATE 25 MG/1
25 TABLET, EXTENDED RELEASE ORAL EVERY EVENING
Status: DISCONTINUED | OUTPATIENT
Start: 2022-10-14 | End: 2022-10-15 | Stop reason: HOSPADM

## 2022-10-14 RX ORDER — CYCLOSPORINE 0.5 MG/ML
1 EMULSION OPHTHALMIC EVERY EVENING
Status: DISCONTINUED | OUTPATIENT
Start: 2022-10-14 | End: 2022-10-15 | Stop reason: HOSPADM

## 2022-10-14 RX ORDER — ONDANSETRON 4 MG/1
4 TABLET, ORALLY DISINTEGRATING ORAL EVERY 6 HOURS PRN
Status: DISCONTINUED | OUTPATIENT
Start: 2022-10-14 | End: 2022-10-15 | Stop reason: HOSPADM

## 2022-10-14 RX ORDER — CELECOXIB 200 MG/1
400 CAPSULE ORAL ONCE
Status: COMPLETED | OUTPATIENT
Start: 2022-10-14 | End: 2022-10-14

## 2022-10-14 RX ORDER — DIPHENHYDRAMINE HCL 12.5MG/5ML
12.5 LIQUID (ML) ORAL EVERY 6 HOURS PRN
Status: DISCONTINUED | OUTPATIENT
Start: 2022-10-14 | End: 2022-10-15 | Stop reason: HOSPADM

## 2022-10-14 RX ORDER — HYDROXYZINE HYDROCHLORIDE 10 MG/1
10 TABLET, FILM COATED ORAL EVERY 6 HOURS PRN
Status: DISCONTINUED | OUTPATIENT
Start: 2022-10-14 | End: 2022-10-15 | Stop reason: HOSPADM

## 2022-10-14 RX ORDER — ONDANSETRON 4 MG/1
4 TABLET, ORALLY DISINTEGRATING ORAL EVERY 30 MIN PRN
Status: DISCONTINUED | OUTPATIENT
Start: 2022-10-14 | End: 2022-10-14 | Stop reason: HOSPADM

## 2022-10-14 RX ORDER — PROPOFOL 10 MG/ML
INJECTION, EMULSION INTRAVENOUS CONTINUOUS PRN
Status: DISCONTINUED | OUTPATIENT
Start: 2022-10-14 | End: 2022-10-14

## 2022-10-14 RX ORDER — CALCIUM CARBONATE 500 MG/1
500 TABLET, CHEWABLE ORAL 4 TIMES DAILY PRN
Status: DISCONTINUED | OUTPATIENT
Start: 2022-10-14 | End: 2022-10-15 | Stop reason: HOSPADM

## 2022-10-14 RX ORDER — ATORVASTATIN CALCIUM 20 MG/1
20 TABLET, FILM COATED ORAL AT BEDTIME
Status: DISCONTINUED | OUTPATIENT
Start: 2022-10-14 | End: 2022-10-15 | Stop reason: HOSPADM

## 2022-10-14 RX ORDER — AMLODIPINE BESYLATE 5 MG/1
5 TABLET ORAL DAILY
Status: DISCONTINUED | OUTPATIENT
Start: 2022-10-15 | End: 2022-10-15 | Stop reason: HOSPADM

## 2022-10-14 RX ORDER — MAGNESIUM HYDROXIDE 1200 MG/15ML
LIQUID ORAL PRN
Status: DISCONTINUED | OUTPATIENT
Start: 2022-10-14 | End: 2022-10-14 | Stop reason: HOSPADM

## 2022-10-14 RX ORDER — PROCHLORPERAZINE MALEATE 5 MG
5 TABLET ORAL EVERY 6 HOURS PRN
Status: DISCONTINUED | OUTPATIENT
Start: 2022-10-14 | End: 2022-10-15 | Stop reason: HOSPADM

## 2022-10-14 RX ORDER — CELECOXIB 200 MG/1
200 CAPSULE ORAL DAILY
Qty: 42 CAPSULE | Refills: 0 | Status: SHIPPED | OUTPATIENT
Start: 2022-10-14 | End: 2023-04-10

## 2022-10-14 RX ORDER — SODIUM CHLORIDE, SODIUM LACTATE, POTASSIUM CHLORIDE, CALCIUM CHLORIDE 600; 310; 30; 20 MG/100ML; MG/100ML; MG/100ML; MG/100ML
INJECTION, SOLUTION INTRAVENOUS CONTINUOUS
Status: DISCONTINUED | OUTPATIENT
Start: 2022-10-14 | End: 2022-10-15 | Stop reason: HOSPADM

## 2022-10-14 RX ORDER — LIDOCAINE HYDROCHLORIDE 20 MG/ML
INJECTION, SOLUTION INFILTRATION; PERINEURAL PRN
Status: DISCONTINUED | OUTPATIENT
Start: 2022-10-14 | End: 2022-10-14

## 2022-10-14 RX ORDER — EPHEDRINE SULFATE 50 MG/ML
INJECTION, SOLUTION INTRAMUSCULAR; INTRAVENOUS; SUBCUTANEOUS PRN
Status: DISCONTINUED | OUTPATIENT
Start: 2022-10-14 | End: 2022-10-14

## 2022-10-14 RX ORDER — ACETAMINOPHEN 325 MG/1
650 TABLET ORAL EVERY 4 HOURS PRN
Status: DISCONTINUED | OUTPATIENT
Start: 2022-10-17 | End: 2022-10-15 | Stop reason: HOSPADM

## 2022-10-14 RX ORDER — LIDOCAINE 40 MG/G
CREAM TOPICAL
Status: DISCONTINUED | OUTPATIENT
Start: 2022-10-14 | End: 2022-10-15 | Stop reason: HOSPADM

## 2022-10-14 RX ADMIN — PHENYLEPHRINE HYDROCHLORIDE 0.5 MCG/KG/MIN: 10 INJECTION INTRAVENOUS at 10:01

## 2022-10-14 RX ADMIN — SENNOSIDES AND DOCUSATE SODIUM 1 TABLET: 50; 8.6 TABLET ORAL at 20:27

## 2022-10-14 RX ADMIN — SODIUM CHLORIDE, POTASSIUM CHLORIDE, SODIUM LACTATE AND CALCIUM CHLORIDE: 600; 310; 30; 20 INJECTION, SOLUTION INTRAVENOUS at 11:51

## 2022-10-14 RX ADMIN — ACETAMINOPHEN 975 MG: 325 TABLET, FILM COATED ORAL at 07:08

## 2022-10-14 RX ADMIN — ROCURONIUM BROMIDE 10 MG: 50 INJECTION, SOLUTION INTRAVENOUS at 10:15

## 2022-10-14 RX ADMIN — ATORVASTATIN CALCIUM 20 MG: 20 TABLET, FILM COATED ORAL at 21:58

## 2022-10-14 RX ADMIN — ROCURONIUM BROMIDE 40 MG: 50 INJECTION, SOLUTION INTRAVENOUS at 09:24

## 2022-10-14 RX ADMIN — Medication 2 G: at 09:28

## 2022-10-14 RX ADMIN — SODIUM CHLORIDE, POTASSIUM CHLORIDE, SODIUM LACTATE AND CALCIUM CHLORIDE: 600; 310; 30; 20 INJECTION, SOLUTION INTRAVENOUS at 07:48

## 2022-10-14 RX ADMIN — GABAPENTIN 100 MG: 100 CAPSULE ORAL at 20:26

## 2022-10-14 RX ADMIN — GLYCOPYRROLATE 0.4 MG: 0.2 INJECTION, SOLUTION INTRAMUSCULAR; INTRAVENOUS at 11:50

## 2022-10-14 RX ADMIN — SODIUM CHLORIDE, POTASSIUM CHLORIDE, SODIUM LACTATE AND CALCIUM CHLORIDE: 600; 310; 30; 20 INJECTION, SOLUTION INTRAVENOUS at 20:26

## 2022-10-14 RX ADMIN — Medication 10 MG: at 09:57

## 2022-10-14 RX ADMIN — METOPROLOL SUCCINATE 25 MG: 25 TABLET, EXTENDED RELEASE ORAL at 20:26

## 2022-10-14 RX ADMIN — FENTANYL CITRATE 25 MCG: 50 INJECTION, SOLUTION INTRAMUSCULAR; INTRAVENOUS at 10:27

## 2022-10-14 RX ADMIN — LIDOCAINE HYDROCHLORIDE 80 MG: 20 INJECTION, SOLUTION INFILTRATION; PERINEURAL at 09:23

## 2022-10-14 RX ADMIN — FENTANYL CITRATE 25 MCG: 50 INJECTION, SOLUTION INTRAMUSCULAR; INTRAVENOUS at 11:56

## 2022-10-14 RX ADMIN — FENTANYL CITRATE 25 MCG: 50 INJECTION, SOLUTION INTRAMUSCULAR; INTRAVENOUS at 10:07

## 2022-10-14 RX ADMIN — CEVIMELINE 30 MG: 30 CAPSULE ORAL at 17:53

## 2022-10-14 RX ADMIN — DEXAMETHASONE SODIUM PHOSPHATE 10 MG: 4 INJECTION, SOLUTION INTRA-ARTICULAR; INTRALESIONAL; INTRAMUSCULAR; INTRAVENOUS; SOFT TISSUE at 09:33

## 2022-10-14 RX ADMIN — FENTANYL CITRATE 50 MCG: 50 INJECTION, SOLUTION INTRAMUSCULAR; INTRAVENOUS at 09:23

## 2022-10-14 RX ADMIN — KETOROLAC TROMETHAMINE 15 MG: 15 INJECTION, SOLUTION INTRAMUSCULAR; INTRAVENOUS at 19:07

## 2022-10-14 RX ADMIN — PROPOFOL 80 MG: 10 INJECTION, EMULSION INTRAVENOUS at 09:23

## 2022-10-14 RX ADMIN — Medication 5 MG: at 09:49

## 2022-10-14 RX ADMIN — FENTANYL CITRATE 25 MCG: 50 INJECTION, SOLUTION INTRAMUSCULAR; INTRAVENOUS at 11:54

## 2022-10-14 RX ADMIN — Medication 5 MG: at 09:46

## 2022-10-14 RX ADMIN — CEVIMELINE 30 MG: 30 CAPSULE ORAL at 21:58

## 2022-10-14 RX ADMIN — CELECOXIB 400 MG: 200 CAPSULE ORAL at 07:08

## 2022-10-14 RX ADMIN — FENTANYL CITRATE 25 MCG: 50 INJECTION, SOLUTION INTRAMUSCULAR; INTRAVENOUS at 11:06

## 2022-10-14 RX ADMIN — NEOSTIGMINE METHYLSULFATE 3 MG: 1 INJECTION, SOLUTION INTRAVENOUS at 11:50

## 2022-10-14 RX ADMIN — FENTANYL CITRATE 25 MCG: 50 INJECTION, SOLUTION INTRAMUSCULAR; INTRAVENOUS at 10:13

## 2022-10-14 RX ADMIN — PREGABALIN 150 MG: 150 CAPSULE ORAL at 07:10

## 2022-10-14 RX ADMIN — ONDANSETRON 4 MG: 2 INJECTION INTRAMUSCULAR; INTRAVENOUS at 11:46

## 2022-10-14 RX ADMIN — ACETAMINOPHEN 975 MG: 325 TABLET, FILM COATED ORAL at 17:21

## 2022-10-14 RX ADMIN — CEFAZOLIN SODIUM 2 G: 2 INJECTION, SOLUTION INTRAVENOUS at 20:21

## 2022-10-14 RX ADMIN — TRANEXAMIC ACID 1950 MG: 650 TABLET ORAL at 07:08

## 2022-10-14 RX ADMIN — ONDANSETRON 4 MG: 4 TABLET, ORALLY DISINTEGRATING ORAL at 15:04

## 2022-10-14 RX ADMIN — PROPOFOL 30 MCG/KG/MIN: 10 INJECTION, EMULSION INTRAVENOUS at 09:34

## 2022-10-14 ASSESSMENT — ACTIVITIES OF DAILY LIVING (ADL)
ADLS_ACUITY_SCORE: 33
ADLS_ACUITY_SCORE: 29
FALL_HISTORY_WITHIN_LAST_SIX_MONTHS: NO
ADLS_ACUITY_SCORE: 27
ADLS_ACUITY_SCORE: 33

## 2022-10-14 NOTE — BRIEF OP NOTE
[facility]    Brief Operative Note    Pre-operative diagnosis: Left hip OA  Post-operative diagnosis same  Procedure: LEFT DIRECT ANTERIOR TOTAL HIP ARTHROPLASTY  Surgeon(s) and Role:     * Selvin Saab MD - Primary     * Nyla Lucas PA-C - Assisting     * ELEUTERIO Greene, MYRNA-C - Assisting  Anesthesia: General   Estimated blood loss: 300 ml  Drains:  None  Specimens: None  Findings:  Advanced OA  Complications: None    Plan: DC home POD1 w/family assist.  DVT prophylaxis w/ASA 325mg QD x6wks.    Implants:   Implant Name Type Inv. Item Serial No.  Lot No. LRB No. Used Action   IMP CUP ACE PINNACLE 56MM 1217- - [id number] Total Joint Component/Insert IMP CUP ACE PINNACLE 56MM 1217-  [facility]- [id number] Left 1 Implanted   IMP APEX HOLE ELIMINATOR HIP DEPUY DURALOC 1246- - [id number] Metallic Hardware/[place] IMP APEX HOLE ELIMINATOR HIP DEPUY DURALOC 1246-  [facility]- [id number] Left 1 Implanted   IMP SCR BONE CAN ACE 6.5X40MM 1217- - [id number] Metallic Hardware/[place] IMP SCR BONE CAN ACE 6.5X40MM 1217-  [employer] [facility]ARE [facility]- [id number] Left 1 Implanted   IMP SCR BONE CAN ACE 6.5X25MM 1217- - [id number] Metallic Hardware/[place] IMP SCR BONE CAN ACE 6.5X25MM 1217-  [employer] [facility] CARE [facility]- [id number] Left 1 Implanted   IMP SCR BONE CAN ACE 6.5X20MM 1217- - [id number] Metallic Hardware/[place] IMP SCR BONE CAN ACE 6.5X20MM 1217-  [employer] [facility]- [id number] Left 1 Implanted   LINER ACETABULAR ALTRX NEUTRAL [id number] - [id number] Total Joint Component/Insert LINER ACETABULAR ALTRX NEUTRAL [id number]  [employer] [facility] CARE [facility]- [id number] Left 1 Implanted   IMP STEM FEM DEPUY ACTIS COLLAR HI-OFFSET SZ 7MM 1010- - [id number] Total Joint Component/Insert IMP STEM FEM DEPUY ACTIS COLLAR HI-OFFSET SZ 7MM 1010-  &[facility]- [id number] Left 1 Implanted   IMP HEAD FEMORAL DEPUY CERAMIC 36MM +5MM  [id number] - [id number] Total Joint Component/Insert IMP HEAD FEMORAL DEPUY CERAMIC 36MM +5MM [id number]  [facility]- [id number] Left 1 Implanted

## 2022-10-14 NOTE — PROGRESS NOTES
10/14/22 2512   Appointment Info   Signing Clinician's Name / Credentials (PT) Alex Salazar DPT   Living Environment   People in Home spouse   Current Living Arrangements condominium   Home Accessibility no concerns   Transportation Anticipated family or friend will provide   Living Environment Comments Pt lives in apartment condo (for seniors). Pt lives with . Pt drives. Pt able to provide assistance, also neighbors stated they could help as needed.   Self-Care   Usual Activity Tolerance good   Current Activity Tolerance moderate   Regular Exercise Yes   Activity/Exercise Type walking;strength training   Exercise Amount/Frequency daily   Equipment Currently Used at Home none   Fall history within last six months no   Activity/Exercise/Self-Care Comment Pt is IND with functional mobility at baseline. Pt owns FWW. Pt walks daily and goes to fitness center ~5 x/week.   General Information   Onset of Illness/Injury or Date of Surgery 10/14/22   Referring Physician Selvin Saab MD   Patient/Family Therapy Goals Statement (PT) return home.   Pertinent History of Current Problem (include personal factors and/or comorbidities that impact the POC) Pt is 81 yo female s/p L CLARKE, POD #0   Existing Precautions/Restrictions fall   Weight-Bearing Status - LLE weight-bearing as tolerated   Cognition   Affect/Mental Status (Cognition) WFL   Orientation Status (Cognition) oriented x 4   Pain Assessment   Patient Currently in Pain No   Integumentary/Edema   Integumentary/Edema Comments dressing on L hip incision.   Posture    Posture Not impaired   Range of Motion (ROM)   Range of Motion ROM deficits secondary to surgical procedure   Strength (Manual Muscle Testing)   Strength (Manual Muscle Testing) strength is WFL;Able to perform L SLR   Strength Comments mild strength deficits, able to peform 1 SLR, but painful.   Bed Mobility   Bed Mobility supine-sit   Comment, (Bed Mobility) supine-sit with mod I, from  flat bed.   Transfers   Transfers sit-stand transfer   Comment, (Transfers) STS with FWW and CGA.   Gait/Stairs (Locomotion)   Comment, (Gait/Stairs) Pt ambulated 10' with FWW and CGA, antalgic gait with decreased R step length, decreased gait speed, and moderate use of UEs on walker.   Balance   Balance Comments good sitting EOB, good with FWW during standing and ambulation.   Sensory Examination   Sensory Perception Comments Pt reports having numbness and tingling in R LE. No changes noted.   Clinical Impression   Criteria for Skilled Therapeutic Intervention Yes, treatment indicated   PT Diagnosis (PT) impaired functional mobility   Influenced by the following impairments pain, decreased ROM, decreased strength   Functional limitations due to impairments difficulty with bed mobility, transfers, and ambulation   Clinical Presentation (PT Evaluation Complexity) Stable/Uncomplicated   Clinical Presentation Rationale clinical judgement   Clinical Decision Making (Complexity) low complexity   Planned Therapy Interventions (PT) bed mobility training;gait training;home exercise program;ROM (range of motion);strengthening;stretching;transfer training;progressive activity/exercise   Risk & Benefits of therapy have been explained evaluation/treatment results reviewed;care plan/treatment goals reviewed;risks/benefits reviewed;current/potential barriers reviewed;participants voiced agreement with care plan;participants included;patient   PT Total Evaluation Time   PT Eval, Low Complexity Minutes (54637) 10   Plan of Care Review   Plan of Care Reviewed With patient   Therapy Certification   Start of care date 10/14/22   Certification date from 10/14/22   Certification date to 10/21/22   Medical Diagnosis L CLARKE   Physical Therapy Goals   PT Frequency Daily   PT Predicted Duration/Target Date for Goal Attainment 10/21/22   PT Goals Bed Mobility;Transfers;Gait   PT: Bed Mobility Modified independent;Supine to/from sit   PT:  Transfers Supervision/stand-by assist;Sit to/from stand;Assistive device   PT: Gait Supervision/stand-by assist;Rolling walker;150 feet   Interventions   Interventions Quick Adds Gait Training;Therapeutic Activity;Therapeutic Procedure   Therapeutic Procedure/Exercise   Ther. Procedure: strength, endurance, ROM, flexibillity Minutes (90322) 10   Symptoms Noted During/After Treatment fatigue;increased pain   Treatment Detail/Skilled Intervention Pt performed x10 of the following CLARKE exercises for circulation, ROM, and strength: ankle pumps, quad sets, glut sets, heel slides, SAQ, and SLR. Pt provided handout and educated on HEP performance 2-3 x/day x10 reps each, exception with ankle pumps x10-20 per hour. Pt given verbal and tactile cues throughout for form and technique. Pt educated on LE elevation and icing to help with pain, inflammation, and ROM.   Therapeutic Activity   Therapeutic Activities: dynamic activities to improve functional performance Minutes (82482) 6   Symptoms Noted During/After Treatment Fatigue   Treatment Detail/Skilled Intervention Pt supine in bed and sleeping upon PT arrival. Pt agreeable to therapy. Once sitting EOB pt performed STS with FWW and SBA, cues on UE placement. Pt required use of BR, pt ambulated to BR with FWW and CGA, then given cues to complete pivot with walker and use grab bar for STS. Pt performed pericares independently. Pt in recliner at end of session, alarm on, all needs within reach.   Gait Training   Gait Training Minutes (18796) 8   Symptoms Noted During/After Treatment (Gait Training) fatigue   Treatment Detail/Skilled Intervention Pt ambulated 100' with FWW and CGA, cues on step to pattern leading with L LE, then pt progressed to step through with FWW and SBA. Pt given cues on upward gaze. Pt educated on proper fitting of walker and walker adjusted.   PT Discharge Planning   PT Plan review HEP, progress ambulation with FWW   PT Discharge Recommendation (DC Rec)    (defer to ortho rec.)   PT Rationale for DC Rec Pt is IND with functional mobility at baseline. Pt currently below baseline but moving well. Anticipate pt will be SBA or less with all mobility for d/c. Pt has supportive  and other friends in building who are able to provide assistance as needed.   PT Brief overview of current status bed mob w/ mod I, STS with FWW and SBA, ambulation with FWW and SBA.   Total Session Time   Timed Code Treatment Minutes 24   Total Session Time (sum of timed and untimed services) 34     [facility]  OUTPATIENT PHYSICAL THERAPY EVALUATION  PLAN OF TREATMENT FOR OUTPATIENT REHABILITATION  (COMPLETE FOR INITIAL CLAIMS ONLY)  Patient's Last Name, First Name, M.I.  YOB: 1940  Ya Stahl                        Provider's Name  [facility] Medical Record No.  [id number]                             Onset Date:  10/14/22   Start of Care Date:  10/14/22   Type:     _X_PT   ___OT   ___SLP Medical Diagnosis:  L CLARKE              PT Diagnosis:  impaired functional mobility Visits from SOC:  1     See note for plan of treatment, functional goals and certification details    I CERTIFY THE NEED FOR THESE SERVICES FURNISHED UNDER        THIS PLAN OF TREATMENT AND WHILE UNDER MY CARE     (Physician co-signature of this document indicates review and certification of the therapy plan).

## 2022-10-14 NOTE — CONSULTS
[facility]    Hospitalist Brief Consult Note    Assessment & Plan   Ya Stahl is a 82 year old female who was admitted on 10/14/2022.     Past medical history significant for OA, Idiopathic osteoporosis, CAD (mod-severe with history of MI), Diastolic dysfunction, HTN, HLP, Ascending aorta dilatation, Graves disease with hypothyroidism, GERD, Mixed incontinence, Sjogren/SICCA syndrome, MGUS, History of lumbar stenosis/spondylolisthesis with radiculopathy s/p fusion, History of CVA,  who underwent an elective left CLARKE    Hospitalist consult placed by Dr. Tejada for Ppst-op med rec and medical management - CAD, HLD, ascending aorta dilation, HTN, hx MI, GERD, MGUS, hypothyroidism following an elective left CLARKE.      OA of the left hip s/p left CLARKE  POD #0.   - Orthopedic Surgery is managing.   --Defer analgesic management, DVT prophylaxis, PT/OT.     --Defer resumption of PTA ASA 81 mg/d to Ortho.    - HGB check in the morning.    - Encourage utilization of incentive spirometer.     Idiopathic osteoporosis  - Hold PTA Calcium and vitamin D supplements and resume at discharge.    - Hold PTA Prolia injection and resume at discharge.      CAD (mod-severe) with history of MI 2016  Diastolic dysfunction  HTN  HLP  - Defer resumption of PTA ASA 81 mg/d to Ortho.    - Resumed on PTA atorvastatin 20 mg/d.    - Resumed on PTA amlodipine 5 mg/d (took morning of surgery) and Toprol-XL 25 mg every evening.  Hold parameters in place.    - Hold PTA lasix 20 mg/d and resume at discharge.      Iatrogenic hypokalemia  Due to PTA lasix and managed with PTA potassium supplement.    - Hold PTA supplement and resume at discharge.    - Monitor and replace per protocol.      Ascending aorta dilatation  No interventions at this time.  Continue outpatient surveillance.      Graves disease with hypothyroidism  - Resumed on PTA levothyroxine 100 mcg/d.      GERD  - Resumed on PTA Pepcid 40 mg/d and Prevacid 30  "mg/d.      Mixed incontinence  - Hold PTA mirabegron 25 mg/d and resume at discharge.      Sjogren/SICCA syndrome  - Resumed on PTA cevimeline 30 mg TID and PTA eye drops (patient can use home supply).      MGUS  Pre-op HGB of 13.1 and PLT of 250  - Monitor     History of lumbar stenosis/spondylolisthesis with radiculopathy s/p fusion  - Analgesic management per Ortho.      History of CVA (2019)  - ASA resumption deferred to Ortho.  - Statin and antihypertensive management as above.        Clinically Significant Risk Factors Present on Admission                            Diet: Advance Diet as Tolerated: Regular Diet Adult  Discharge Instruction - Regular Diet Adult     DVT Prophylaxis: Defer to primary service   Nelson Catheter: Not present  Central Lines: None  Cardiac Monitoring: None  Code Status: Full Code; discussed and confirmed with the patient.      Disposition Plan    Per Ortho.         The patient's care was discussed with the Bedside Nurse and Patient.      The patient has been discussed with Dr. Juarez, who agrees with the assessment and plan at this time.    Luis Salgado PA-C  [facility]  Securely message with the Vocera Web Console (learn more here)  Text page via [facility] Paging/Directory      Interval History   Patient was evaluated in the PACU.  Initially, we reviewed her medical history as well as her PTA medications.      A 10-point review of systems was completed.  Pertinent positives are listed below otherwise negative.      Upon questioning, patient stated she has felt tired.  She has had ongoing left hip pain as well as some right leg pain.  She stated she bruises easily.      -Data reviewed today: I reviewed all new labs and imaging results over the last 24 hours. I personally reviewed no images or EKG's today.    Physical Exam   /77 (BP Location: Right arm)   Pulse 56   Temp 97.9  F (36.6  C) (Axillary)   Resp 15   Ht 1.626 m (5' 4\")   Wt 63 kg " (139 lb)   SpO2 96%   BMI 23.86 kg/m        Constitutional: Awake, alert, cooperative, no apparent distress.    ENT: Normocephalic, without obvious abnormality, atraumatic, oral pharynx with dry mucus membranes, tonsils without erythema or exudates.  Eyes pupils are equal, round and reactive to light; extra occular movements intact.  Normal sclera.    Neck: Supple, symmetrical, trachea midline, no adenopathy.  Pulmonary: No increased work of breathing, good air exchange, clear to auscultation bilaterally, no crackles or wheezing.  Cardiovascular: Regular rate and rhythm, normal S1 and S2, no S3 or S4, and no murmur noted.  GI: Active bowel sounds, soft, non-distended, non-tender.    Skin/Integumen: Clear.  Neuro: CN II-XII grossly intact.  Upper extremities strength, coordination and sensation intact bilaterally.  Lower extremities assessment limited due to post-op state.  Patient able to wiggle all her toes.  Dorsal and plantar flexion even and intact bilaterally.    Psych:  Alert and oriented x 3. Normal affect.  Extremities: No lower extremity edema noted, and calves are non-tender to palpation bilaterally.         Medications     lactated ringers 25 mL/hr at 10/14/22 1151       acetaminophen  975 mg Oral Q8H     [START ON 10/15/2022] amLODIPine  5 mg Oral Daily     [START ON 10/15/2022] aspirin  325 mg Oral Daily     atorvastatin  20 mg Oral At Bedtime     Carboxymethylcellulose Sodium   Ophthalmic QPM     ceFAZolin  2 g Intravenous Q8H     cevimeline  30 mg Oral TID     cycloSPORINE  1 drop Both Eyes QPM     [START ON 10/15/2022] famotidine  40 mg Oral Daily     gabapentin  100 mg Oral BID     ketorolac  15 mg Intravenous Q6H     [START ON 10/15/2022] LANsoprazole  30 mg Oral QAM AC     [START ON 10/15/2022] levothyroxine  100 mcg Oral Daily     metoprolol succinate ER  25 mg Oral QPM     [START ON 10/15/2022] polyethylene glycol  17 g Oral Daily     senna-docusate  1 tablet Oral BID     sodium chloride (PF)   3 mL Intracatheter Q8H       Data   Recent Labs   Lab 10/14/22  0718   POTASSIUM 4.1       No results found for this or any previous visit (from the past 24 hour(s)).

## 2022-10-14 NOTE — ANESTHESIA PREPROCEDURE EVALUATION
"Anesthesia Pre-Procedure Evaluation    Patient: Ya Stahl   MRN: [id number] : 1940        Procedure : Procedure(s):  LEFT TOTAL HIP ARTHROPLASTY DIRECT ANTERIOR APPROACH          Past Medical History:   Diagnosis Date     Arthritis      Cerebral infarction (H) 2019     Complication of anesthesia     \"hard time waking up / N & V\"     Coronary artery disease      Disease of thyroid gland      Displacement of lumbar intervertebral disc without myelopathy      Gastro-oesophageal reflux disease      GERD (gastroesophageal reflux disease)      Hearing loss     has bilateral aids     Heart attack (H) 2016     History of anesthesia complications     delayed emergence     History of angina      Hypertension      Low back pain      Migraine headache      Myocardial infarction (H)      Numbness and tingling     down right leg (associated with back pain)     PONV (postoperative nausea and vomiting)      Sicca syndrome (H)      Sjogren's disease (H)      Sjogren's syndrome (H)     sees specialist; dentist     Spider veins      Unspecified hypothyroidism       Past Surgical History:   Procedure Laterality Date     ARTHROSCOPY KNEE  10/05/2012    R Procedure: ARTHROSCOPY KNEE;  RIGHT KNEE ARTHROSCOPY, PARTIAL MEDIAL MENISCECTOMY;  Surgeon: Karli Zaragoza MD;  Location: [facility]     BACK SURGERY  About. 5 years ago    Fusion of 4&5 lumbar     BLADDER SURGERY       BUNIONECTOMY  ~    L foot.      CATARACT IOL, RT/LT Bilateral 2017     COLONOSCOPY N/A 2017    normal; no repeat Procedure: COLONOSCOPY;  Surgeon: Fan Giordano MD;  Location:  GI     ENDOSCOPIC RELEASE CARPAL TUNNEL  2012    R Procedure: ENDOSCOPIC RELEASE CARPAL TUNNEL;  Right Endoscopic carpal tunnel release, left ringer finger cortizon injection;  Surgeon: Anne Marie Catherine MD;  Location:  OR     ESOPHAGOSCOPY, GASTROSCOPY, DUODENOSCOPY (EGD), COMBINED N/A 2014    Procedure: COMBINED ESOPHAGOSCOPY, GASTROSCOPY, " DUODENOSCOPY (EGD);  Surgeon: Fan Giordano MD;  Location:  GI     EXCISE EXOSTOSIS FOOT Left 12/01/2016    Procedure: EXCISE EXOSTOSIS FOOT;  Surgeon: Jody Gallagher DPM, Pod;  Location:  OR     GYN SURGERY  1978    ovaries removed     HEART CATH FYI  03/04/2016    dz <40%     HIP HARDWARE REMOVAL Right 08/04/2020    Procedure: HARDWARE REMOVAL - LATERAL APPROACH;  Surgeon: Charlie Wolfe MD;  Location: [facility] OR;  Service: Orthopedics     HYSTERECTOMY       INJECT STEROID (LOCATION)  09/11/2012    Procedure: INJECT STEROID (LOCATION);;  Surgeon: Anne Marie Catherine MD;  Location:  OR     LAPAROSCOPIC CHOLECYSTECTOMY N/A 04/07/2016    Procedure: LAPAROSCOPIC CHOLECYSTECTOMY;  Surgeon: Hans Medina MD;  Location:  OR     OPTICAL TRACKING SYSTEM FUSION SPINE POSTERIOR LUMBAR TWO LEVELS N/A 10/31/2018    Procedure: Central L3-4 L4-5 lamenectomies L4-5 posterior instrumented fusion;  Surgeon: Aroldo Burdick MD;  Location:  OR     ORTHOPEDIC SURGERY Right 2014    karli for fx femur     RECONSTRUCT FOREFOOT WITH METATARSOPHALANGEAL (MTP) FUSION Left 04/28/2017    Procedure: RECONSTRUCT FOREFOOT WITH METATARSOPHALANGEAL (MTP) FUSION;  1. Resection arthroplasty, fifth metatarsophalangeal joint, left foot.   2. Irrigation and debridement of fifth metatarsophalangeal joint, left foot (excisional to the level of the bone).     ;  Surgeon: Fabián Phipps MD;  Location:  OR     RELEASE CARPAL TUNNEL       RIGHT HIP ORIF 4/1/2014       ROTATOR CUFF REPAIR RT/LT  ~1998    Lt shoulder; rotator cuff     SURGICAL HISTORY OF -   distant past    ablation for palpitations.     SURGICAL HISTORY OF -   06/2015    left carpal tunnel surgery     [facility] NONSPECIFIC PROCEDURE  1976    D&C     [facility] TOTAL HIP ARTHROPLASTY Right 08/04/2020    Procedure: RIGHT TOTAL HIP ARTHROPLASTY;  Surgeon: Charlie Wolfe MD;  Location: [facility];  Service: Orthopedics     [facility] TOTAL KNEE ARTHROPLASTY Right 01/2020       Allergies   Allergen Reactions     Codeine      fatigue     Vicodin [Acetaminophen] Fatigue     Adhesive Tape Rash      Social History     Tobacco Use     Smoking status: Never     Smokeless tobacco: Never   Substance Use Topics     Alcohol use: Yes     Comment: Maybe 1x amonth      Wt Readings from Last 1 Encounters:   09/16/22 63 kg (138 lb 12.8 oz)        Anesthesia Evaluation            ROS/MED HX  ENT/Pulmonary:       Neurologic:     (+) CVA, date: 2019,     Cardiovascular:     (+) Dyslipidemia hypertension--CAD -past MI (2016) --Previous cardiac testing   Echo: Date: Results:  EF 60-65, no WMA, no significant valvular disease  Stress Test: Date: Results:    ECG Reviewed: Date: Results:    Cath: Date: Results:      METS/Exercise Tolerance: >4 METS    Hematologic: Comments: MGUS   (-) anemia   Musculoskeletal:   (+) arthritis,     GI/Hepatic:     (+) GERD,     Renal/Genitourinary:       Endo: Comment: Hx grave's disease    (+) thyroid problem, hypothyroidism,     Psychiatric/Substance Use:       Infectious Disease:       Malignancy:       Other:            Physical Exam    Airway        Mallampati: II   TM distance: > 3 FB   Neck ROM: full   Mouth opening: > 3 cm    Respiratory Devices and Support         Dental  no notable dental history         Cardiovascular          Rhythm and rate: regular and normal     Pulmonary   pulmonary exam normal                OUTSIDE LABS:  CBC:   Lab Results   Component Value Date    WBC 5.4 09/19/2022    WBC 4.2 04/27/2022    HGB 13.1 09/19/2022    HGB 13.5 04/27/2022    HCT 41.5 09/19/2022    HCT 41.8 04/27/2022     09/19/2022     04/27/2022     BMP:   Lab Results   Component Value Date     04/27/2022     01/06/2022    POTASSIUM 3.9 04/27/2022    POTASSIUM 4.1 01/06/2022    CHLORIDE 103 04/27/2022    CHLORIDE 101 01/06/2022    CO2 30 04/27/2022    CO2 32 01/06/2022    BUN 17 04/27/2022    BUN 26 01/06/2022    CR 0.80 09/19/2022    CR 0.63 04/27/2022      (H) 04/27/2022     (H) 01/06/2022     COAGS:   Lab Results   Component Value Date    INR 0.97 08/04/2020     POC:   Lab Results   Component Value Date     (H) 11/02/2018     HEPATIC:   Lab Results   Component Value Date    ALBUMIN 3.8 01/06/2022    PROTTOTAL 7.2 01/06/2022    ALT 24 01/06/2022    AST 20 01/06/2022    ALKPHOS 80 01/06/2022    BILITOTAL 1.1 01/06/2022     OTHER:   Lab Results   Component Value Date    A1C 6.1 (H) 03/18/2022    CARMEN 8.9 04/27/2022    MAG 1.8 12/05/2013    LIPASE 85 04/29/2008    TSH 1.39 01/06/2022    T4 1.01 12/21/2018    CRP <2.9 04/13/2017    SED 6 04/13/2017       Anesthesia Plan    ASA Status:  2   NPO Status:  NPO Appropriate    Anesthesia Type: General.     - Airway: ETT   Induction: Intravenous, Propofol.   Maintenance: Balanced.        Consents    Anesthesia Plan(s) and associated risks, benefits, and realistic alternatives discussed. Questions answered and patient/representative(s) expressed understanding.    - Discussed:     - Discussed with:  Patient         Postoperative Care    Pain management: IV analgesics.   PONV prophylaxis: Ondansetron (or other 5HT-3), Dexamethasone or Solumedrol, Background Propofol Infusion     Comments:                Charlie Solorio MD

## 2022-10-14 NOTE — ANESTHESIA PROCEDURE NOTES
Airway       Patient location during procedure: OR       Procedure Start/Stop Times: 10/14/2022 9:27 AM  Staff -        CRNA: Luis Vaughn APRN CRNA       Performed By: CRNA  Consent for Airway        Urgency: elective  Indications and Patient Condition       Indications for airway management: angelo-procedural       Induction type:intravenous       Mask difficulty assessment: 1 - vent by mask    Final Airway Details       Final airway type: endotracheal airway       Successful airway: ETT - single  Endotracheal Airway Details        ETT size (mm): 7.0       Cuffed: yes       Successful intubation technique: direct laryngoscopy       DL Blade Type: Wilcox 2       Grade View of Cords: 1       Adjucts: stylet       Position: Right       Measured from: gums/teeth       Secured at (cm): 21       Bite block used: None    Post intubation assessment        Placement verified by: capnometry, equal breath sounds and chest rise        Number of attempts at approach: 1       Number of other approaches attempted: 0       Secured with: pink tape       Ease of procedure: easy       Dentition: Intact and Unchanged    Medication(s) Administered   Medication Administration Time: 10/14/2022 9:27 AM

## 2022-10-14 NOTE — PROGRESS NOTES
Patient vital signs are at baseline: Yes  Patient able to ambulate as they were prior to admission or with assist devices provided by therapies during their stay:  Yes  Patient MUST void prior to discharge:  Yes  Patient able to tolerate oral intake:  Yes  Pain has adequate pain control using Oral analgesics:  Yes  Does patient have an identified :  Yes  Has goal D/C date and time been discussed with patient:  Yes     A & O x 4. Dressing is CDI. Will continue to monitor.

## 2022-10-14 NOTE — PROGRESS NOTES
SPIRITUAL HEALTH SERVICES  [facility] Main PACU  PRE-SURGICAL VISIT    I visited with Felipeemerson Pandya and her .    I offered spiritual and emotional support and said a prayer.    SH remains available.    Rev Vandana Jain  Associate   Spiritual Health Phone Line [phone]  Spiritual Health Pager [phone]

## 2022-10-14 NOTE — ANESTHESIA POSTPROCEDURE EVALUATION
Patient: Ya Stahl    Procedure: Procedure(s):  LEFT TOTAL HIP ARTHROPLASTY DIRECT ANTERIOR APPROACH       Anesthesia Type:  General    Note:  Disposition: Inpatient   Postop Pain Control: Uneventful            Sign Out: Well controlled pain   PONV: No   Neuro/Psych: Uneventful            Sign Out: Acceptable/Baseline neuro status   Airway/Respiratory: Uneventful            Sign Out: Acceptable/Baseline resp. status   CV/Hemodynamics: Uneventful            Sign Out: Acceptable CV status   Other NRE: NONE   DID A NON-ROUTINE EVENT OCCUR? No           Last vitals:  Vitals Value Taken Time   /68 10/14/22 1330   Temp 36.7  C (98  F) 10/14/22 1340   Pulse 50 10/14/22 1340   Resp 17 10/14/22 1340   SpO2 96 % 10/14/22 1340   Vitals shown include unvalidated device data.    Electronically Signed By: Charlie Solorio MD  October 14, 2022  2:41 PM   This note is for the purpose of making the H&P performed in clinic within the last 30 days available in the hospital encounter.

## 2022-10-14 NOTE — ANESTHESIA CARE TRANSFER NOTE
Patient: Ya Stahl    Procedure: Procedure(s):  LEFT TOTAL HIP ARTHROPLASTY DIRECT ANTERIOR APPROACH       Diagnosis: Primary osteoarthritis of left hip [M16.12]  Degenerative joint disease (DJD) of hip [M16.9]  Diagnosis Additional Information: No value filed.    Anesthesia Type:   General     Note:    Oropharynx: oropharynx clear of all foreign objects and spontaneously breathing  Level of Consciousness: drowsy  Oxygen Supplementation: face mask  Level of Supplemental Oxygen (L/min / FiO2): 6  Independent Airway: airway patency satisfactory and stable  Dentition: dentition unchanged  Vital Signs Stable: post-procedure vital signs reviewed and stable  Report to RN Given: handoff report given  Patient transferred to: PACU  Comments: Neuromuscular blockade reversed after TOF 4/4, spontaneous respirations, adequate tidal volumes, followed commands to voice, oropharynx suctioned with soft flexible catheter, extubated atraumatically, extubated with suction, airway patent after extubation.  Oxygen via facemask at 6 liters per minute to PACU. Oxygen tubing connected to wall O2 in PACU, SpO2, NiBP, and EKG monitors and alarms on and functioning, Nuzhat Hugger warmer connected to patient gown, report on patient's clinical status given to PACU RN, RN questions answered.     Handoff Report: Identifed the Patient, Identified the Reponsible Provider, Reviewed the pertinent medical history, Discussed the surgical course, Reviewed Intra-OP anesthesia mangement and issues during anesthesia, Set expectations for post-procedure period and Allowed opportunity for questions and acknowledgement of understanding      Vitals:  Vitals Value Taken Time   BP     Temp     Pulse 64 10/14/22 1219   Resp 18 10/14/22 1219   SpO2 99 % 10/14/22 1219   Vitals shown include unvalidated device data.    Electronically Signed By: SINTIA Ramos CRNA  October 14, 2022  12:20 PM

## 2022-10-15 ENCOUNTER — APPOINTMENT (OUTPATIENT)
Dept: PHYSICAL THERAPY | Facility: CLINIC | Age: 82
End: 2022-10-15
Attending: ORTHOPAEDIC SURGERY
Payer: COMMERCIAL

## 2022-10-15 VITALS
SYSTOLIC BLOOD PRESSURE: 121 MMHG | BODY MASS INDEX: 23.73 KG/M2 | DIASTOLIC BLOOD PRESSURE: 55 MMHG | WEIGHT: 139 LBS | RESPIRATION RATE: 16 BRPM | OXYGEN SATURATION: 97 % | TEMPERATURE: 98.3 F | HEART RATE: 55 BPM | HEIGHT: 64 IN

## 2022-10-15 LAB
ANION GAP SERPL CALCULATED.3IONS-SCNC: 9 MMOL/L (ref 3–14)
BUN SERPL-MCNC: 19 MG/DL (ref 7–30)
CALCIUM SERPL-MCNC: 8.4 MG/DL (ref 8.5–10.1)
CHLORIDE BLD-SCNC: 99 MMOL/L (ref 94–109)
CO2 SERPL-SCNC: 24 MMOL/L (ref 20–32)
CREAT SERPL-MCNC: 0.65 MG/DL (ref 0.52–1.04)
FASTING STATUS PATIENT QL REPORTED: YES
GFR SERPL CREATININE-BSD FRML MDRD: 88 ML/MIN/1.73M2
GLUCOSE BLD-MCNC: 121 MG/DL (ref 70–99)
GLUCOSE BLD-MCNC: 121 MG/DL (ref 70–99)
HGB BLD-MCNC: 10.4 G/DL (ref 11.7–15.7)
POTASSIUM BLD-SCNC: 4.3 MMOL/L (ref 3.4–5.3)
SODIUM SERPL-SCNC: 132 MMOL/L (ref 133–144)

## 2022-10-15 PROCEDURE — 97116 GAIT TRAINING THERAPY: CPT | Mod: GP

## 2022-10-15 PROCEDURE — 82947 ASSAY GLUCOSE BLOOD QUANT: CPT | Performed by: INTERNAL MEDICINE

## 2022-10-15 PROCEDURE — 97110 THERAPEUTIC EXERCISES: CPT | Mod: GP

## 2022-10-15 PROCEDURE — 85018 HEMOGLOBIN: CPT | Performed by: ORTHOPAEDIC SURGERY

## 2022-10-15 PROCEDURE — 999N000111 HC STATISTIC OT IP EVAL DEFER: Performed by: OCCUPATIONAL THERAPIST

## 2022-10-15 PROCEDURE — 36415 COLL VENOUS BLD VENIPUNCTURE: CPT | Performed by: ORTHOPAEDIC SURGERY

## 2022-10-15 PROCEDURE — 82310 ASSAY OF CALCIUM: CPT | Performed by: PHYSICIAN ASSISTANT

## 2022-10-15 PROCEDURE — 250N000011 HC RX IP 250 OP 636: Performed by: ORTHOPAEDIC SURGERY

## 2022-10-15 PROCEDURE — 250N000013 HC RX MED GY IP 250 OP 250 PS 637: Performed by: ORTHOPAEDIC SURGERY

## 2022-10-15 PROCEDURE — 250N000013 HC RX MED GY IP 250 OP 250 PS 637: Performed by: PHYSICIAN ASSISTANT

## 2022-10-15 RX ORDER — TRAMADOL HYDROCHLORIDE 50 MG/1
50 TABLET ORAL EVERY 6 HOURS PRN
Qty: 30 TABLET | Refills: 0 | Status: SHIPPED | OUTPATIENT
Start: 2022-10-15 | End: 2022-10-15

## 2022-10-15 RX ADMIN — CEFAZOLIN SODIUM 2 G: 2 INJECTION, SOLUTION INTRAVENOUS at 03:53

## 2022-10-15 RX ADMIN — ASPIRIN 325 MG: 325 TABLET, COATED ORAL at 08:46

## 2022-10-15 RX ADMIN — FAMOTIDINE 40 MG: 20 TABLET ORAL at 08:46

## 2022-10-15 RX ADMIN — CEVIMELINE 30 MG: 30 CAPSULE ORAL at 08:46

## 2022-10-15 RX ADMIN — KETOROLAC TROMETHAMINE 15 MG: 15 INJECTION, SOLUTION INTRAMUSCULAR; INTRAVENOUS at 06:59

## 2022-10-15 RX ADMIN — SENNOSIDES AND DOCUSATE SODIUM 1 TABLET: 50; 8.6 TABLET ORAL at 08:46

## 2022-10-15 RX ADMIN — AMLODIPINE BESYLATE 5 MG: 5 TABLET ORAL at 08:47

## 2022-10-15 RX ADMIN — KETOROLAC TROMETHAMINE 15 MG: 15 INJECTION, SOLUTION INTRAMUSCULAR; INTRAVENOUS at 01:16

## 2022-10-15 RX ADMIN — POLYETHYLENE GLYCOL 3350 17 G: 17 POWDER, FOR SOLUTION ORAL at 08:46

## 2022-10-15 RX ADMIN — ACETAMINOPHEN 975 MG: 325 TABLET, FILM COATED ORAL at 08:46

## 2022-10-15 RX ADMIN — GABAPENTIN 100 MG: 100 CAPSULE ORAL at 08:47

## 2022-10-15 RX ADMIN — LEVOTHYROXINE SODIUM 100 MCG: 100 TABLET ORAL at 08:47

## 2022-10-15 RX ADMIN — PANTOPRAZOLE SODIUM 40 MG: 40 TABLET, DELAYED RELEASE ORAL at 06:58

## 2022-10-15 RX ADMIN — ACETAMINOPHEN 975 MG: 325 TABLET, FILM COATED ORAL at 00:34

## 2022-10-15 ASSESSMENT — ACTIVITIES OF DAILY LIVING (ADL)
ADLS_ACUITY_SCORE: 33

## 2022-10-15 NOTE — PROGRESS NOTES
Orthopedic Surgery  Ya Stahl  10/15/2022  Admit Date:  10/14/2022  POD 1 Day Post-Op  S/P Procedure(s):  LEFT TOTAL HIP ARTHROPLASTY DIRECT ANTERIOR APPROACH    Patient feels great, does not want any Tramadol.  Pain controlled.  Tolerating oral intake.  No events overnight. Discharge instructions reviewed.    Alert and orient to person, place, and time.  Vital Sign Ranges  Temperature Temp  Av.8  F (36.6  C)  Min: 95.8  F (35.4  C)  Max: 98.9  F (37.2  C)   Blood pressure Systolic (24hrs), Av , Min:100 , Max:153        Diastolic (24hrs), Av, Min:62, Max:86      Pulse Pulse  Av.5  Min: 46  Max: 64   Respirations Resp  Av.3  Min: 13  Max: 26   Pulse oximetry SpO2  Av.3 %  Min: 94 %  Max: 100 %       Left hip bulky dressing is clean, dry, and intact. Minimal erythema of the surrounding skin.  Bilateral calves are soft, non-tender.  left lower extremity is NVI.    Labs:  Recent Labs   Lab Test 10/14/22  0718 22  0445 22  1051   POTASSIUM 4.1 3.9 4.1     Recent Labs   Lab Test 10/15/22  0713 22  1615 22  0445   HGB 10.4* 13.1 13.5     Recent Labs   Lab Test 20  0618 16  0955 16  1153   INR 0.97 0.96 1.00     Recent Labs   Lab Test 22  1615 22  0445 22  1051    241 278       A/P  1. S/p left CLARKE (DAA)   Continue aspirin 325mg qd for DVT prophylaxis.     Mobilize with PT/OT WBAT.     Continue current pain regimen-tylenol alone.    2. Disposition   Anticipate d/c to home with  today.    Kjerstin L Foss, PA-C

## 2022-10-15 NOTE — PLAN OF CARE
OT: orders received and chart reviewed and met with pt for OT eval. Pt has been through a knee and hip surgery previously and has her  to assist her as needed with ADL/IADLs. Pt was able to demo full body dressing with use of reacher and mod I. Provided with some education for ADLs and shower transfers. Pt with no further skilled OT needs. Discharge OT

## 2022-10-15 NOTE — TREATMENT PLAN
Patient vital signs are at baseline: Yes  Patient able to ambulate as they were prior to admission or with assist devices provided by therapies during their stay:  Yes  Patient MUST void prior to discharge:  Yes  Patient able to tolerate oral intake:  Yes  Pain has adequate pain control using Oral analgesics:  Yes. Denies pain  Does patient have an identified :  Yes  Has goal D/C date and time been discussed with patient:  Yes     Pt is A & O x4. Dressing is CDI. Discharge instructions and meds were reviewed with and given to pt and spouse. Was discharged home.

## 2022-10-16 NOTE — OP NOTE
DATE OF SERVICE:  10/14/2022    SURGEON  RODDY BOBO M.D.    ASSISTANT  Nyla Lucas PA-C - Assisting  ELEUTERIO Greene, MYRNA-C - Assisting    PREOPERATIVE DIAGNOSIS   Left hip osteoarthritis, failed to respond to conservative management.    POSTOPERATIVE DIAGNOSIS   Left hip osteoarthritis, failed to respond to conservative management.    TITLE OF PROCEDURE   Left total hip arthroplasty, Depuy uncemented components, direct anterior approach.    PROCEDURE  The patient was brought to the operating room and after satisfactory anesthesia was placed on the [place] table. The left  lower extremity was then prepped and draped in the usual sterile fashion. Image intensification was utilized during the case for component positioning as well as leg length assessment. An incision was made just lateral to the ASIS and coursing toward the proximal femur. Dissection was carried down to the TFL. The fascia overlying the TFL was incised and dissection was carried down to the interval between TFL and rectus femoris. This was identified. A lateral cobra retractor was placed followed by a medial cobra around the femoral neck. The leash vessels, anterior circumflex, was identified and was coagulated. The underlying fascia was released. Dissection was carried down to the hip capsule. Capsule was identified and a capsulotomy was performed in a T-type fashion first along the intertrochanteric line and then secondarily up along the femoral neck and head, finally completed by releasing along the saddle of the trochanter. The capsular edges were tagged for later repair. The hip was then externally rotated and medial capsular release was performed such that the lesser trochanter could be palpated. Following this, the femoral neck was osteotomized as per the preoperative plan. The femoral head was removed with a corkscrew without difficulty. The acetabulum was exposed and was reamed sequentially up to 56 mm. This was reamed under both  direct visualization as well as the aid of image intensification. A 56 mm Eaton cup was impacted into place in approximately 40 to 45 degrees of abduction, and 15 degrees of anteversion. Three screws were placed and this gave excellent fixation. The 36 mm neutral liner was impacted into place.     Attention was then directed to the femur. The hook was placed underneath the proximal aspect of the femur between the trochanteric ridge and gluteus samm. The hip was then brought into external rotation, adduction, and extension. The femur was then carefully elevated using the appropriate releases off the inside of the greater trochanter. Once the femur was elevated, a starter broach was placed followed by sequential broaches. The broaches were performed up to size 7 Actis, which gave excellent torsinal as well as axial stability. Trial reduction was performed with a high offset +5mm head. The hip was reduced and with hip reduction the combined anteversion looked excellent. The hip was brought into full extension and external rotation. There was no evidence of instability. As well, x-rays were printed and compared with the opposite side and found to have good length and restoration of offset.  It was felt that an additional stem size could not be placed.   The hip was then dislocated and then the proximal femur was then brought back up into the proximal aspect of the wound. The real size 7 actis stem, high offset was impacted into place. Again this gave excellent torsion as well as axial stability. The real +5mm ceramic biolox head was impacted into place and the hip was reduced again. The image intensification confirmed excellent position of the components.   A 3 minute dilute betadine soak was performed.  The wound was thoroughly irrigated. One gram of vancomycin powder was placed deep and superficial prior to closure.  The capsule was closed with interrupted 0 Vicryl suture and tissues infiltrated with  toradol/marcaine mixture. The tensor fascia was closed with a running 0 Stratafix suture. The subcutaneous layer was closed with interrupted 2-0 Vicryl, 2-0 Stratafix, and 3-0 subcuticular monocryl was placed followed by a mesh dressing with skin glue. Sterile dressing was applied. The patient left the operating room in satisfactory condition. Patient received 1 gm of tranexamic acid pre-op.    A skilled first assistant was necessary for this procedure for assistance with patient positioning, prepping, draping, surgical visualization, performance of the repair, wound closure, and application of the dressing.

## 2022-10-25 ENCOUNTER — DOCUMENTATION ONLY (OUTPATIENT)
Dept: OTHER | Facility: CLINIC | Age: 82
End: 2022-10-25

## 2022-10-26 ENCOUNTER — MYC MEDICAL ADVICE (OUTPATIENT)
Dept: FAMILY MEDICINE | Facility: CLINIC | Age: 82
End: 2022-10-26

## 2022-10-26 DIAGNOSIS — E03.8 OTHER SPECIFIED HYPOTHYROIDISM: ICD-10-CM

## 2022-10-26 DIAGNOSIS — I10 HYPERTENSION GOAL BP (BLOOD PRESSURE) < 140/90: Primary | ICD-10-CM

## 2022-10-26 DIAGNOSIS — E78.5 HYPERLIPIDEMIA LDL GOAL <70: ICD-10-CM

## 2022-11-07 ENCOUNTER — VIRTUAL VISIT (OUTPATIENT)
Dept: PHARMACY | Facility: CLINIC | Age: 82
End: 2022-11-07
Attending: FAMILY MEDICINE
Payer: COMMERCIAL

## 2022-11-07 DIAGNOSIS — Z96.641 STATUS POST TOTAL REPLACEMENT OF RIGHT HIP: ICD-10-CM

## 2022-11-07 DIAGNOSIS — E87.6 HYPOKALEMIA: ICD-10-CM

## 2022-11-07 DIAGNOSIS — G47.00 INSOMNIA: Primary | ICD-10-CM

## 2022-11-07 DIAGNOSIS — I10 HYPERTENSION GOAL BP (BLOOD PRESSURE) < 140/90: ICD-10-CM

## 2022-11-07 DIAGNOSIS — Z78.9 TAKES DIETARY SUPPLEMENTS: ICD-10-CM

## 2022-11-07 DIAGNOSIS — E78.5 HYPERLIPIDEMIA LDL GOAL <70: ICD-10-CM

## 2022-11-07 DIAGNOSIS — R60.9 EDEMA: ICD-10-CM

## 2022-11-07 DIAGNOSIS — K21.9 GASTROESOPHAGEAL REFLUX DISEASE, UNSPECIFIED WHETHER ESOPHAGITIS PRESENT: ICD-10-CM

## 2022-11-07 DIAGNOSIS — E03.9 HYPOTHYROIDISM: ICD-10-CM

## 2022-11-07 DIAGNOSIS — Q78.2 SEVERE OSTEOPETROSIS: ICD-10-CM

## 2022-11-07 DIAGNOSIS — M15.8 OTHER OSTEOARTHRITIS INVOLVING MULTIPLE JOINTS: ICD-10-CM

## 2022-11-07 DIAGNOSIS — H04.123 DRY EYES: ICD-10-CM

## 2022-11-07 DIAGNOSIS — N39.46 MIXED INCONTINENCE: ICD-10-CM

## 2022-11-07 DIAGNOSIS — R68.2 DRY MOUTH: ICD-10-CM

## 2022-11-07 PROCEDURE — 99605 MTMS BY PHARM NP 15 MIN: CPT | Performed by: PHARMACIST

## 2022-11-07 PROCEDURE — 99607 MTMS BY PHARM ADDL 15 MIN: CPT | Performed by: PHARMACIST

## 2022-11-07 RX ORDER — DOXYCYCLINE 50 MG/1
50 CAPSULE ORAL DAILY
COMMUNITY
End: 2023-04-10

## 2022-11-07 RX ORDER — MULTIVIT-MIN/IRON/FOLIC ACID/K 18-600-40
1 CAPSULE ORAL 2 TIMES DAILY
COMMUNITY
End: 2023-11-20

## 2022-11-07 RX ORDER — PILOCARPINE HYDROCHLORIDE 5 MG/1
5 TABLET, FILM COATED ORAL 3 TIMES DAILY
COMMUNITY
End: 2023-08-18 | Stop reason: ALTCHOICE

## 2022-11-07 NOTE — PATIENT INSTRUCTIONS
"Recommendations from today's MTM visit:                                                    MTM (medication therapy management) is a service provided by a clinical pharmacist designed to help you get the most of out of your medicines.   Today we reviewed what your medicines are for, how to know if they are working, that your medicines are safe and how to make your medicine regimen as easy as possible.      1. Stop the Tylenol PM. This can worsen your dry eyes. You can take Tylenol 1000 mg three times daily  by at least 4 hours.  Repeat taking an dose before bed could help you sleep better since it has helped in the past.    2. You can try taking the melatonin 5 mg a few hours before bed to see if it works better for sleep.    3. Ask the cardiology provider if you need to be taking potassium and furosemide.    4. Follow up with Dr. Alan regarding your cough    5. Follow up with your GI provider and ask about if you need to be on lansoprazole long term or if you could try a famotidine twice daily and see if that works due to osteo. Also ask if there is any other testing that can be done to determine the cause of acid reflux. Ask about H. Pylori.    6. Separate the doxycycline and Hair/Skin/Nails - morning and evening.    7.  I did not find any other significant interactions between the medicines you take and supplements.  Its important to remember to take your levothyroxine consistently the same way each day so that the absorption is not varied which can cause fluctuations in your TSH level.    Follow-up: Return in about 1 year (around 11/7/2023).    It was great speaking with you today.  I value your experience and would be very thankful for your time in providing feedback in our clinic survey. In the next few days, you may receive an email or text message from [employer] with a link to a survey related to your  clinical pharmacist.\"     To schedule another MTM appointment, please call the clinic directly " or you may call the [facility] scheduling line at [phone] or toll-free at [phone].     My Clinical Pharmacist's contact information:                                                      Please feel free to contact me with any questions or concerns you have.      Nilda Busby, PharmD  Medication Therapy Management Pharmacist  [facility] - Monday and Wednesday 7:30 - 4:00  Pager: [phone]

## 2022-11-07 NOTE — LETTER
November 9, 2022  Ya Stahl  [address]  [place] [address]    Dear Ms. Stahl, [facility]     Thank you for talking with me on Nov 7, 2022 about your health and medications. As a follow-up to our conversation, I have included two documents:      1. Your Recommended To-Do List has steps you should take to get the best results from your medications.  2. Your Medication List will help you keep track of your medications and how to take them.    If you want to talk about these documents, please call Kati Busby RPH at phone: [phone], Monday-Friday 8-4:30pm.    I look forward to working with you and your doctors to make sure your medications work well for you.    Sincerely,  Kati Busby RPH  [facility] Pharmacist, [facility]

## 2022-11-07 NOTE — Clinical Note
I saw that her last calcium was slightly low and her next Prolia injection is coming up. Routing to you for review since you started Prolia for this patient.  Nilda Busby, PharmD Medication Therapy Management Pharmacist

## 2022-11-07 NOTE — LETTER
_  Medication List        Prepared on: Nov 7, 2022     Bring your Medication List when you go to the doctor, hospital, or   emergency room. And, share it with your family or caregivers.     Note any changes to how you take your medications.  Cross out medications when you no longer use them.    Medication How I take it Why I use it Prescriber   acetaminophen (TYLENOL) 325 MG tablet Take 3 tablets (975 mg) by mouth every 6 hours as needed for mild pain Status post total hip replacement, left Selvin Saab MD   aspirin (ASA) 325 MG EC tablet Take 1 tablet (325 mg) by mouth daily for 42 days Status post total hip replacement, left Selvin Saab MD   atorvastatin (LIPITOR) 20 MG tablet Take 1 tablet (20 mg) by mouth daily Hyperlipidemia LDL Goal <70 Devi Romano MD   benzonatate (TESSALON) 200 MG capsule Take 200 mg by mouth 2 times daily Cough Samantha Alan MD   Biotin w/ Vitamins C & E (HAIR/SKIN/NAILS PO) Take 1 tablet by mouth daily General Health   Self   Boswellia-Glucosamine-Vit D (OSTEO BI-FLEX ONE PER DAY PO) Take 1 capsule by mouth daily Arthritis Self   Calcium Carb-Cholecalciferol (SM CALCIUM/VITAMIN D) 600-800 MG-UNIT TABS Take 1 tablet by mouth 2 times daily Severe Osteopetrosis; Senile Osteoporosis; Idiopathic Osteoporosis; History of drug therapy Self   Carboxymethylcellulose Sodium (REFRESH LIQUIGEL OP) Apply 1 drop to eye every evening 15 minutes after Restasis drops. Dry eyes Self   celecoxib (CELEBREX) 200 MG capsule Take 1 capsule (200 mg) by mouth daily for 42 days Do not take within 6 hours of ibuprofen (MOTRIN, ADVIL) or ketorolac (TORADOL) if prescribed. Status post total hip replacement, left Selvin Saab MD   Coenzyme Q10 300 MG CAPS Take 1 capsule by mouth daily General Health   Self   CycloSPORINE (RESTASIS OP) Place 1 drop into both eyes 2 times daily Dry eyes Jolie Harrell MD   denosumab (PROLIA) injection 60 mg  Severe Osteopetrosis; Senile Osteoporosis; Idiopathic  Osteoporosis; History of drug therapy Mary Funes MD   doxycycline monohydrate (MONODOX) 50 MG capsule Take 50 mg by mouth daily Eyes Jolie Harrell MD   ELDERBERRY PO Take 1 tablet by mouth daily General Health   Self   EPINEPHrine (ANY BX GENERIC EQUIV) 0.3 MG/0.3ML injection 2-pack Inject 0.3 mLs (0.3 mg) into the muscle as needed for anaphylaxis Throat Swelling Shankar Eric MD   famotidine (PEPCID) 40 MG tablet Take 40 mg by mouth daily Gastroesophageal reflux disease, unspecified whether esophagitis present Altaf Zhu MD   furosemide (LASIX) 20 MG tablet Take 1 tablet (20 mg) by mouth daily Edema, unspecified type Venancio Montanez MD   LANsoprazole (PREVACID) 30 MG DR capsule Take 1 capsule (30 mg) by mouth every morning (before breakfast) Gastroesophageal reflux disease, unspecified whether esophagitis present Scarlett Spencer MD   levothyroxine (SYNTHROID/LEVOTHROID) 100 MCG tablet Take 1 tablet (100 mcg) by mouth daily Hypothyroidism, unspecified type Devi Romano MD   Lutein 20 MG CAPS Take 1 capsule by mouth daily General Health/Eyes   Self   melatonin 5 MG CAPS Take 5 mg by mouth daily Insomnia Self   metoprolol succinate ER (TOPROL-XL) 25 MG 24 hr tablet Take 1 tablet (25 mg) by mouth daily Hypertension Goal BP (Blood Pressure) < 140/90 Devi Romano MD   mirabegron (MYRBETRIQ) 25 MG 24 hr tablet Take 1 tablet (25 mg) by mouth daily Urinary Urgency Erika Helms PA-C   nitroGLYcerin (NITROSTAT) 0.4 MG sublingual tablet Place 1 tablet (0.4 mg) under the tongue every 5 minutes as needed for chest pain Acute Chest Pain Scarlett Spencer MD   Omega-3 Fatty Acids (OMEGA-3 FISH OIL PO) Take 1 g by mouth 2 times daily (with meals)  General Health   Self   pilocarpine (SALAGEN) 5 MG tablet Take 5 mg by mouth 3 times daily Dry Mouth Ya Frattalone   polyethylene glycol (MIRALAX) 17 GM/Dose powder Take 1 capful by mouth 2 times daily In 8 ounces of liquid Constipation  Self    potassium chloride ER (KLOR-CON M) 10 MEQ CR tablet Take 1 tablet (10 mEq) by mouth 2 times daily Hypokalemia Sekou Farrar MD   Vitamin D, Cholecalciferol, 50 MCG (2000 UT) CAPS Take 1 capsule by mouth 2 times daily General Health   Self         Add new medications, over-the-counter drugs, herbals, vitamins, or  minerals in the blank rows below.    Medication How I take it Why I use it Prescriber                          Allergies:      codeine; vicodin [acetaminophen]; adhesive tape        Side effects I have had:               Other Information:              My notes and questions:

## 2022-11-07 NOTE — PROGRESS NOTES
Cardiology Clinic Progress Note  Ya Stahl MRN# [id number]   YOB: 1940 Age: 82 year old     Reason For Visit:  4 month follow up   Primary Cardiologist:   Dr. Montanez           History of Presenting Illness:      Ya Stahl is a pleasant 82 year old patient who carries a past medical history significant for Non obstructive CAD, hypothyroidism, Sjogren's syndrome, and dilated ascending aorta.     She was last seen on 7/14/2022 for evaluation of LE edema. Amlodipine was discontinued and low dose lasix was initiated. She has since had a complete resolution of swelling. A follow up echocardiogram showed a normal EF 60-65%, no regional wall motion abnormalities. Ascending aortic dilation measuring 4.3 cm    On 10/14/2022, she underwent elective left total hip arthroplasty. Surgery was without complication and she discharged home the following day. Since her surgery she continues to have left groin pain with ambulation. She is planning to follow up with orthopedics later this week.     She returns to the office today for follow up. Overall, she is feeling well on a cardiac standpoint, denies chest pain, shortness of breath, PND, orthopnea, presyncope, syncope, edema, heart racing, or palpitations. Euvolemic upon exam.     Blood pressure is well controlled at 129/71  Last BMP showed a sodium of 132, potassium 4.3, BUN 19, Creatinine 0.65, GFR 88    Remains compliant with all medications.                   Assessment and Plan:       1. Leg edema - Resolved after stopping Amlodipine and starting low dose furosemide. Euvolemic upon exam. No evidence of heart failure or fluid overload. Follow up echocardiogram showed a normal EF 60-65%, no regional wall motion abnormalities.  Ok to stop daily lasix and potassium and use as needed for worsening swelling.    2. Dilated Ascending aorta - 4.3 cm ( unchanged from previous study).             Thank you for allowing me to participate in this delightful  "patient's care. I have recommended she follow up in 1 year with Dr. Montanez with echo prior .       Nuzhat Velasquez, APRN CNP         Review of Systems:     Review of Systems:  Skin:  not assessed     Eyes:  Positive for glasses  ENT:  Positive for hearing loss  Respiratory:  Positive for cough  Cardiovascular:    edema;Positive for  Gastroenterology: Negative    Genitourinary:  Negative    Musculoskeletal:  Positive for arthritis  Neurologic:  not assessed    Psychiatric:  Positive for sleep disturbances  Heme/Lymph/Imm:  Positive for allergies  Endocrine:  Positive for thyroid disorder              Physical Exam:     GEN:  In general, this is a well nourished elderly female in no acute distress.  HEENT:  Pupils equal, round. Sclerae nonicteric. Clear oropharynx. Mucous membranes moist.  NECK: Supple, no masses appreciated. Trachea midline. No JVD   C/V:  Regular rate and rhythm, No murmur, rub or gallop. No S3 or RV heave.   RESP: Respirations are unlabored. No use of accessory muscles. Clear to auscultation bilaterally without wheezing, rales, or rhonchi.  GI: Not assessed   EXTREM: No LE edema. No cyanosis or clubbing.  NEURO: Alert and oriented, cooperative. No obvious focal deficits.   PSYCH: Normal affect.  SKIN: Warm and dry. No rashes or petechiae appreciated.          Past Medical History:     Past Medical History:   Diagnosis Date     Arthritis      Cerebral infarction (H) 2019     Complication of anesthesia     \"hard time waking up / N & V\"     Coronary artery disease      Disease of thyroid gland      Displacement of lumbar intervertebral disc without myelopathy      Gastro-oesophageal reflux disease      GERD (gastroesophageal reflux disease)      Hearing loss     has bilateral aids     Heart attack (H) 03/2016     History of anesthesia complications     delayed emergence     History of angina      Hypertension      Low back pain      Migraine headache      Myocardial infarction (H)      Numbness and " tingling     down right leg (associated with back pain)     PONV (postoperative nausea and vomiting)      Sicca syndrome (H)      Sjogren's disease (H)      Sjogren's syndrome (H)     sees specialist; dentist     Spider veins      Unspecified hypothyroidism               Past Surgical History:     Past Surgical History:   Procedure Laterality Date     ARTHROPLASTY HIP ANTERIOR Left 10/14/2022    Procedure: LEFT TOTAL HIP ARTHROPLASTY DIRECT ANTERIOR APPROACH;  Surgeon: Selvin Saab MD;  Location:  OR     ARTHROSCOPY KNEE  10/05/2012    R Procedure: ARTHROSCOPY KNEE;  RIGHT KNEE ARTHROSCOPY, PARTIAL MEDIAL MENISCECTOMY;  Surgeon: Karli Zaragoza MD;  Location:  SD     BACK SURGERY  About. 5 years ago    Fusion of 4&5 lumbar     BLADDER SURGERY       BUNIONECTOMY  ~2010    L foot.      CATARACT IOL, RT/LT Bilateral 07/2017     COLONOSCOPY N/A 01/17/2017    normal; no repeat Procedure: COLONOSCOPY;  Surgeon: Fan Giordano MD;  Location:  GI     ENDOSCOPIC RELEASE CARPAL TUNNEL  09/11/2012    R Procedure: ENDOSCOPIC RELEASE CARPAL TUNNEL;  Right Endoscopic carpal tunnel release, left ringer finger cortizon injection;  Surgeon: Anne Marie Catherine MD;  Location:  OR     ESOPHAGOSCOPY, GASTROSCOPY, DUODENOSCOPY (EGD), COMBINED N/A 12/26/2014    Procedure: COMBINED ESOPHAGOSCOPY, GASTROSCOPY, DUODENOSCOPY (EGD);  Surgeon: Fan Giordano MD;  Location:  GI     EXCISE EXOSTOSIS FOOT Left 12/01/2016    Procedure: EXCISE EXOSTOSIS FOOT;  Surgeon: Jody Gallagher DPM, Pod;  Location:  OR     GYN SURGERY  1978    ovaries removed     HEART CATH FYI  03/04/2016    dz <40%     HIP HARDWARE REMOVAL Right 08/04/2020    Procedure: HARDWARE REMOVAL - LATERAL APPROACH;  Surgeon: Charlie Wolfe MD;  Location: [facility];  Service: Orthopedics     HYSTERECTOMY       INJECT STEROID (LOCATION)  09/11/2012    Procedure: INJECT STEROID (LOCATION);;  Surgeon: Anne Marie Catherine MD;  Location: [facility]      LAPAROSCOPIC CHOLECYSTECTOMY N/A 04/07/2016    Procedure: LAPAROSCOPIC CHOLECYSTECTOMY;  Surgeon: Hans Medina MD;  Location:  OR     OPTICAL TRACKING SYSTEM FUSION SPINE POSTERIOR LUMBAR TWO LEVELS N/A 10/31/2018    Procedure: Central L3-4 L4-5 lamenectomies L4-5 posterior instrumented fusion;  Surgeon: Aroldo Burdick MD;  Location:  OR     ORTHOPEDIC SURGERY Right 2014    karli for fx femur     RECONSTRUCT FOREFOOT WITH METATARSOPHALANGEAL (MTP) FUSION Left 04/28/2017    Procedure: RECONSTRUCT FOREFOOT WITH METATARSOPHALANGEAL (MTP) FUSION;  1. Resection arthroplasty, fifth metatarsophalangeal joint, left foot.   2. Irrigation and debridement of fifth metatarsophalangeal joint, left foot (excisional to the level of the bone).     ;  Surgeon: Fabián Phipps MD;  Location:  OR     RELEASE CARPAL TUNNEL       RIGHT HIP ORIF 4/1/2014       ROTATOR CUFF REPAIR RT/LT  ~1998    Lt shoulder; rotator cuff     SURGICAL HISTORY OF -   distant past    ablation for palpitations.     SURGICAL HISTORY OF -   06/2015    left carpal tunnel surgery     [facility] NONSPECIFIC PROCEDURE  1976    D&C     [facility] TOTAL HIP ARTHROPLASTY Right 08/04/2020    Procedure: RIGHT TOTAL HIP ARTHROPLASTY;  Surgeon: Charlie Wolfe MD;  Location: [facility];  Service: Orthopedics     [facility] TOTAL KNEE ARTHROPLASTY Right 01/2020              Allergies:   Codeine, Vicodin [acetaminophen], and Adhesive tape       Data:   All laboratory data reviewed:    LAST CHOLESTEROL:  Lab Results   Component Value Date    CHOL 160 01/06/2022    CHOL 175 01/19/2021     Lab Results   Component Value Date    HDL 99 01/06/2022    HDL 89 01/19/2021     Lab Results   Component Value Date    LDL 49 01/06/2022    LDL 64 01/19/2021     Lab Results   Component Value Date    TRIG 61 01/06/2022    TRIG 108 01/19/2021     Lab Results   Component Value Date    CHOLHDLRATIO 2.3 12/02/2014       LAST BMP:  Lab Results   Component Value Date     10/15/2022      01/19/2021      Lab Results   Component Value Date    POTASSIUM 4.3 10/15/2022    POTASSIUM 4.0 01/19/2021     Lab Results   Component Value Date    CHLORIDE 99 10/15/2022    CHLORIDE 104 01/19/2021     Lab Results   Component Value Date    CARMEN 8.4 10/15/2022    CARMEN 8.9 01/19/2021     Lab Results   Component Value Date    CO2 24 10/15/2022    CO2 30 01/19/2021     Lab Results   Component Value Date    BUN 19 10/15/2022    BUN 22 01/19/2021     Lab Results   Component Value Date    CR 0.65 10/15/2022    CR 0.72 01/19/2021     Lab Results   Component Value Date     10/15/2022     10/15/2022     01/19/2021       LAST CBC:  Lab Results   Component Value Date    WBC 5.4 09/19/2022    WBC 9.0 01/19/2021     Lab Results   Component Value Date    RBC 4.20 09/19/2022    RBC 3.85 01/19/2021     Lab Results   Component Value Date    HGB 10.4 10/15/2022    HGB 12.5 01/19/2021     Lab Results   Component Value Date    HCT 41.5 09/19/2022    HCT 38.7 01/19/2021     Lab Results   Component Value Date    MCV 99 09/19/2022     01/19/2021     Lab Results   Component Value Date    MCH 31.2 09/19/2022    MCH 32.5 01/19/2021     Lab Results   Component Value Date    MCHC 31.6 09/19/2022    MCHC 32.3 01/19/2021     Lab Results   Component Value Date    RDW 13.3 09/19/2022    RDW 13.8 01/19/2021     Lab Results   Component Value Date     09/19/2022     01/19/2021

## 2022-11-07 NOTE — PROGRESS NOTES
Medication Therapy Management (MTM) Encounter    ASSESSMENT:                            Medication Adherence/Access: No issues identified    Left total hip replacement/Osteoarthritis pain:  Stable    Osteoporosis: Patient is meeting RDI of calcium 1200mg/day. Patient is exceeding RDI of Vitamin D 1000 IU/day. Vitamin D is at goal 30-75ng/dL on current dose of vitamin D. Patient would benefit from continuing to follow with endocrinology for Prolia injections.  With her last calcium level being slightly low and her next Prolia injection coming up will route to Dr. Funes, the endocrinologist who started the Prolia injections to review her labs prior to her next injection.    Dry mouth/Dry eyes: Performed a drug interaction check between pilocarpine and the rest of her medications and found no interactions.  Educated patient on possible side effects of pilocarpine and to watch for these as she continues to increase the dose.  Asked her to reach out to her rheumatologist if side effects become an issue.    Hyperlipidemia: Patient is on moderate intensity statin which is indicated based on 2019 ACC/AHA guidelines for lipid management.     Insomnia: Patient would benefit from discontinuing Tylenol PM due to anticholinergic side effects.  Recommended she retry melatonin but take a few hours before bed to see if that works any better.  Also recommended using just Tylenol before bed as she had been doing in the past which seem to work.  Educated her on the max dose of Tylenol per 24 hours to ensure that she is not taking too much.    Hypertension/Edema/Hypokalemia: Patient is meeting blood pressure goal of < 140/90mmHg.  Since patient is following up with cardiology this week, recommended she ask about continued furosemide and potassium use and whether or not those are indicated for her to continue taking.    GERD: Current treatment is effective. Chronic PPI use places patient at an increased risk of C. Diff,  hypomagnesemia and lower bone mineral density, these risks were reviewed with the patient.  Patient would benefit from scheduling a follow-up with her GI provider to discuss long-term use of PPI and whether that is appropriate for her especially with her having severe osteoporosis.    Hypothyroidism: Stable. Last TSH is within normal limits.     Urinary Incontinence:  Stable.  Recommended she continue to follow with urology.    Supplements: Patient would benefit from  the hair skin nails vitamin from doxycycline to prevent reduced absorption.      PLAN:                            1. Stop the Tylenol PM. This can worsen your dry eyes. You can take Tylenol 1000 mg three times daily  by at least 4 hours.  Repeat taking an dose before bed could help you sleep better since it has helped in the past.    2. You can try taking the melatonin 5 mg a few hours before bed to see if it works better for sleep.    3. Ask the cardiology provider if you need to be taking potassium and furosemide.    4. Follow up with Dr. Alan regarding your cough    5. Follow up with your GI provider and ask about if you need to be on lansoprazole long term or if you could try a famotidine twice daily and see if that works due to osteo. Also ask if there is any other testing that can be done to determine the cause of acid reflux. Ask about H. Pylori.    6. Separate the doxycycline and Hair/Skin/Nails - morning and evening.    7.  I did not find any other significant interactions between the medicines you take and supplements.  Its important to remember to take your levothyroxine consistently the same way each day so that the absorption is not varied which can cause fluctuations in your TSH level.    Follow-up: Return in about 1 year (around 11/7/2023).    SUBJECTIVE/OBJECTIVE:                          Ya Stahl is a 82 year old female contacted via secure video for an initial visit. She was referred to me from Dr. Quinonez  Juan Antonio.      Reason for visit: medication review.    Allergies/ADRs: Reviewed in chart  Past Medical History: Reviewed in chart  Tobacco: She reports that she has never smoked. She has never used smokeless tobacco.  Alcohol: not currently using    Specialty Providers  Cardiologist: Dr. Montanez -   Rheumatologist: Dr. Ya Deng   Ophthalmologist: Dr. Harrell - [facility] in [facility] once a year.   Gastroenterologist: Dr. Alan at [facility] and Dr. Altaf Zhu at [facility]  Urologist: MITRA Garrido    Medication Adherence/Access: Patient uses pill box(es).  Patient takes medications 3 time(s) per day.   Per patient, misses medication 0 times per week.   Medication barriers: none.   The patient fills medications at [place]: NO, fills medications at [facility].    Left total hip replacement/Osteoarthritis pain:  Current medications include: acetaminophen 500 mg twice daily as needed (currently using daily), Osteo bi-flex once daily, celecoxib 200 mg 1 capsule daily and aspirin 325 mg once daily for 42 days post replacement.  Prior to her replacement she was taking 81 mg of aspirin daily for secondary prevention and she knows to continue this dose after she finishes the 325 mg daily for 42 days. Feels that recovery is going well and she no longer has back and knee pain now that she is had her hip replaced.  She has even been able to stop gabapentin which she took at one point for pain.    Osteoporosis: Current therapy includes: calcium 600/Vitamin D 800 IU twice daily with meals and vitamin D 2000 IU twice daily.  Patient reports she has been taking this amount of vitamin D for quite some time and her last vitamin D level was likely while on this dose.  Patient is not experiencing side effects.  DEXA History: 2/14/22  Risk factors: post-menopausal  chronic PPI use  Last vitamin D level:  Lab Results   Component Value Date    VITDT 55 03/18/2022    VITDT 48 12/05/2013     Dry mouth/dry eyes:  Current  medications include: pilocarpine 5 mg three times daily but she was told to start once daily and increase gradually to see how she tolerates. She plans to continue to increase. She had been taking cevimelin but stopped due to supply issue. She also uses refresh eyedrops in each eye as needed and Restasis 1 drop in both eyes twice daily for dry eye.    Hyperlipidemia: Current therapy includes atorvastatin 20mg daily.  Patient reports no significant myalgias or other side effects.    Recent Labs   Lab Test 01/06/22  1051 01/19/21  0825 12/10/15  0925 12/02/14  0722   CHOL 160 175   < > 212*   HDL 99 89   < > 93   LDL 49 64   < > 98   TRIG 61 108   < > 105   CHOLHDLRATIO  --   --   --  2.3    < > = values in this interval not displayed.     Insomnia: Current medications include: diphenhydramine/APAP 50/1000 mg at bedtime. She has been using this for about a week. She has not used this in the past for sleep. She had been taking melatonin 5 mg at bedtime but that wasn't working so she switched to the Tylenol PM.  She reports that she had been taking Tylenol prescription after she got home from the hospital and that seemed to help her sleep.  This was just a regular Tylenol 325 mg taking 3 before bed.    Hypertension/Edema/Hypokalemia: Current medications include furosemide 20 mg once daily in the morning and metoprolol ER 25 mg once daily at night. She also has potassium prescribed but she has not been taking this for at least a month (she wasn't sure she should be taking it). Patient does self-monitor blood pressure. Cant recall any readings.  Patient reports no current medication side effects.  BP Readings from Last 3 Encounters:   10/15/22 121/55   09/16/22 126/74   08/22/22 (!) 150/80     Last Comprehensive Metabolic Panel:  Sodium   Date Value Ref Range Status   10/15/2022 132 (L) 133 - 144 mmol/L Final   01/19/2021 137 133 - 144 mmol/L Final     Potassium   Date Value Ref Range Status   10/15/2022 4.3 3.4 - 5.3  mmol/L Final   01/19/2021 4.0 3.4 - 5.3 mmol/L Final     Chloride   Date Value Ref Range Status   10/15/2022 99 94 - 109 mmol/L Final   01/19/2021 104 94 - 109 mmol/L Final     Carbon Dioxide   Date Value Ref Range Status   01/19/2021 30 20 - 32 mmol/L Final     Carbon Dioxide (CO2)   Date Value Ref Range Status   10/15/2022 24 20 - 32 mmol/L Final     Anion Gap   Date Value Ref Range Status   10/15/2022 9 3 - 14 mmol/L Final   01/19/2021 3 3 - 14 mmol/L Final     Glucose   Date Value Ref Range Status   10/15/2022 121 (H) 70 - 99 mg/dL Final   10/15/2022 121 (H) 70 - 99 mg/dL Final   01/19/2021 107 (H) 70 - 99 mg/dL Final     Comment:     Fasting specimen     Urea Nitrogen   Date Value Ref Range Status   10/15/2022 19 7 - 30 mg/dL Final   01/19/2021 22 7 - 30 mg/dL Final     Creatinine   Date Value Ref Range Status   10/15/2022 0.65 0.52 - 1.04 mg/dL Final   01/19/2021 0.72 0.52 - 1.04 mg/dL Final     GFR Estimate   Date Value Ref Range Status   10/15/2022 88 >60 mL/min/1.73m2 Final     Comment:     Effective December 21, 2021 eGFRcr in adults is calculated using the 2021 CKD-EPI creatinine equation which includes age and gender (Nikki alvarez al., [place], DOI: 10.1056/NDJKmo7856557)   01/19/2021 79 >60 mL/min/[1.73_m2] Final     Comment:     Non  GFR Calc  Starting 12/18/2018, serum creatinine based estimated GFR (eGFR) will be   calculated using the Chronic Kidney Disease Epidemiology Collaboration   (CKD-EPI) equation.       Calcium   Date Value Ref Range Status   10/15/2022 8.4 (L) 8.5 - 10.1 mg/dL Final   01/19/2021 8.9 8.5 - 10.1 mg/dL Final     Bilirubin Total   Date Value Ref Range Status   01/06/2022 1.1 0.2 - 1.3 mg/dL Final   01/19/2021 1.2 0.2 - 1.3 mg/dL Final     Alkaline Phosphatase   Date Value Ref Range Status   01/06/2022 80 40 - 150 U/L Final   01/19/2021 71 40 - 150 U/L Final     ALT   Date Value Ref Range Status   01/06/2022 24 0 - 50 U/L Final   01/19/2021 25 0 - 50 U/L Final     AST    Date Value Ref Range Status   01/06/2022 20 0 - 45 U/L Final   01/19/2021 19 0 - 45 U/L Final       GERD: Current medications include: Prevacid (lansoprazole) 30 mg once daily and Pepcid (famotidine) 40 mg at bedtime once daily. No reported side effects but wondering if she should be taking these medications long term. Reports no hernia or stomach ulcers per patient reports. She is not sure if she has been tested for H. Pylori.    Hypothyroidism: Patient is taking levothyroxine 100 mcg daily. Patient is having the following symptoms: none.   TSH   Date Value Ref Range Status   01/06/2022 1.39 0.40 - 4.00 mU/L Final   04/16/2021 1.87 0.40 - 4.00 mU/L Final     Urinary Incontinence:  Current medications include: Myrbetriq 25 mg once daily.  She has done pelvic floor strengthening in the past.    Supplements: Currently taking coenzyme Q10 300 mg once daily, elderberry 1 tablet by mouth daily, Hair/skin/nails once daily and Lutein  20 mg once daily. No reported issues at this time.     Today's Vitals: There were no vitals taken for this visit.  ----------------  Post Discharge Medication Reconciliation Status: discharge medications reconciled, continue medications without change.    I spent 65 minutes with this patient today  (an extra 15 minutes was spent creating the Medication Action Plan). A copy of the visit note was provided to the patient's provider(s).    The patient was sent via [place] a summary of these recommendations.     Nilda Busby, PharmD  Medication Therapy Management Pharmacist    Telemedicine Visit Details  Type of service:  Telephone visit  Start Time: 9:40 AM  End Time: 10:45 AM  Originating Location (pt. Location): Home  Distant Location (provider location):  Off-site  Provider has received verbal consent for a visit from the patient? Yes     Medication Therapy Recommendations  Gastroesophageal reflux disease, unspecified whether esophagitis present    Current Medication: LANsoprazole (PREVACID)  30 MG DR capsule   Rationale: Undesirable effect - Adverse medication event - Safety   Recommendation: Change Medication - famotidine 20 MG tablet - twice daily   Status: Contact Provider - Awaiting Response         Insomnia    Current Medication: melatonin 5 MG CAPS   Rationale: Does not understand instructions - Adherence - Adherence   Recommendation: Provide Education   Status: Patient Agreed - Adherence/Education   Note: Take a  few hours before bed          Rationale: Unsafe medication for the patient - Adverse medication event - Safety   Recommendation: Discontinue Medication - TYLENOL 500 MG tablet   Status: Accepted - no CPA Needed   Note: Stop the tylenol pm and take 1000 mg of tylenol instead         Takes dietary supplements    Current Medication: Biotin w/ Vitamins C & E (HAIR/SKIN/NAILS PO)   Rationale: Incorrect administration - Adverse medication event - Safety   Recommendation: Provide Education   Status: Patient Agreed - Adherence/Education   Note: Seperate from doxycycline

## 2022-11-07 NOTE — LETTER
"Recommended To-Do List      Prepared on: Nov 7, 2022       You can get the best results from your medications by completing the items on this \"To-Do List.\"      Bring your To-Do List when you go to your doctor. And, share it with your family or caregivers.    My To-Do List:  What we talked about: What I should do:   Your acid reflux medicine    Follow up with your GI provider and ask about if you need to be on lansoprazole long term or if you could try a famotidine twice daily and see if that works due to osteoporosis.          What we talked about: What I should do:   The importance of taking your medication as intended    Education: Separate the doxycycline and Hair/Skin/Nails - morning and evening.           What we talked about: What I should do:   An issue with your medication    Stop taking Tylenol PM          What we talked about: What I should do:   An issue with your medication    Education: You can try taking the melatonin 5 mg a few hours before bed to see if it works better for sleep.           What we talked about: What I should do:                     "

## 2022-11-08 ENCOUNTER — ALLIED HEALTH/NURSE VISIT (OUTPATIENT)
Dept: NURSING | Facility: CLINIC | Age: 82
End: 2022-11-08
Payer: COMMERCIAL

## 2022-11-08 DIAGNOSIS — M81.0 SENILE OSTEOPOROSIS: Primary | ICD-10-CM

## 2022-11-08 PROCEDURE — 99207 PR NO CHARGE NURSE ONLY: CPT

## 2022-11-08 PROCEDURE — 96372 THER/PROPH/DIAG INJ SC/IM: CPT | Performed by: INTERNAL MEDICINE

## 2022-11-08 NOTE — PROGRESS NOTES
Clinic Administered Medication Documentation        Prolia Documentation    Prior to injection, verified patient identity using patient's name and date of birth. Medication was administered. Please see MAR and medication order for additional information. Patient instructed to remain in clinic for 15 minutes.    Indication: Prolia  (denosumab) is a prescription medicine used to treat osteoporosis in patients who:     Are at high risk for fracture, meaning patients who have had a fracture related to osteoporosis, or who have multiple risk factors for fracture.    Cannot use another osteoporosis medicine or other osteoporosis medicines did not work well.    The timeline for early/late injections would be 4 weeks early and any time after the 6 month bella. If a patient receives their injection late, then the subsequent injection would be 6 months from the date that they actually received the injection.    When was the last injection?  5/6/2022  Was the last injection at least 6 months ago? Yes  Has the prior authorization been completed?  Yes  Is there an active order (within the past 365 days) in the chart?  Yes  Patient denies any dental work involving the bone (e.g. tooth extraction or dental implants) in the past 4 weeks?  Yes  Patient denies plans for any dental work involving the bone (e.g. tooth extraction or dental implants) in the next 4 weeks? Yes    The following steps were completed to comply with the REMS program for Prolia:    Reviewed information in the Medication Guide and Patient Counseling Chart, including the serious risks of Prolia  and the symptoms of each risk.    Advised patient to seek prompt medical attention if they have signs or symptoms of any of the serious risks.    Provided each patient a copy of the Medication Guide and Patient Brochure.      Was entire vial of medication used? Yes  Vial/Syringe: Single dose vial  Expiration Date:  4/30/2025  Was this medication supplied by the patient?  No

## 2022-11-09 ENCOUNTER — OFFICE VISIT (OUTPATIENT)
Dept: CARDIOLOGY | Facility: CLINIC | Age: 82
End: 2022-11-09
Payer: COMMERCIAL

## 2022-11-09 VITALS
WEIGHT: 137.6 LBS | BODY MASS INDEX: 23.49 KG/M2 | HEART RATE: 60 BPM | DIASTOLIC BLOOD PRESSURE: 71 MMHG | HEIGHT: 64 IN | OXYGEN SATURATION: 98 % | SYSTOLIC BLOOD PRESSURE: 129 MMHG

## 2022-11-09 DIAGNOSIS — E87.6 HYPOKALEMIA: ICD-10-CM

## 2022-11-09 DIAGNOSIS — I77.810 ASCENDING AORTA DILATION (H): Primary | ICD-10-CM

## 2022-11-09 DIAGNOSIS — R60.9 EDEMA, UNSPECIFIED TYPE: ICD-10-CM

## 2022-11-09 PROCEDURE — 99214 OFFICE O/P EST MOD 30 MIN: CPT | Performed by: NURSE PRACTITIONER

## 2022-11-09 RX ORDER — POTASSIUM CHLORIDE 750 MG/1
10 TABLET, EXTENDED RELEASE ORAL PRN
Qty: 30 TABLET | Refills: 11
Start: 2022-11-09 | End: 2024-07-09

## 2022-11-09 RX ORDER — FUROSEMIDE 20 MG
20 TABLET ORAL PRN
Qty: 30 TABLET | Refills: 11
Start: 2022-11-09 | End: 2023-01-06

## 2022-11-09 NOTE — PATIENT INSTRUCTIONS
Thanks for participating in a office visit with the [facility] today.    Doing well on a cardiac standpoint  Reviewed echocardiogram showing stable heart function.  Swelling resolved after stopping amlodipine  Stop daily lasix and potassium, use as needed for recurrent swelling.   Continue on all other medications.     Follow up in 1 year with Dr. Montanez with echo prior.     Please call my nurse at  [phone] with any questions or concerns.    Scheduling phone number: [phone]  Reminder: Please bring in all current medications, over the counter supplements and vitamin bottles to your next appointment.

## 2022-11-09 NOTE — LETTER
11/9/2022    Devi Romano MD  [address]  [facility] [address]    RE: Ya Stahl       Dear Colleague,     I had the pleasure of seeing Ya Stahl in the [facility].  Cardiology Clinic Progress Note  Ya Stahl MRN# [id number]   YOB: 1940 Age: 82 year old     Reason For Visit:  4 month follow up   Primary Cardiologist:   Dr. Montanez           History of Presenting Illness:      Ya Stahl is a pleasant 82 year old patient who carries a past medical history significant for Non obstructive CAD, hypothyroidism, Sjogren's syndrome, and dilated ascending aorta.     She was last seen on 7/14/2022 for evaluation of LE edema. Amlodipine was discontinued and low dose lasix was initiated. She has since had a complete resolution of swelling. A follow up echocardiogram showed a normal EF 60-65%, no regional wall motion abnormalities. Ascending aortic dilation measuring 4.3 cm    On 10/14/2022, she underwent elective left total hip arthroplasty. Surgery was without complication and she discharged home the following day. Since her surgery she continues to have left groin pain with ambulation. She is planning to follow up with orthopedics later this week.     She returns to the office today for follow up. Overall, she is feeling well on a cardiac standpoint, denies chest pain, shortness of breath, PND, orthopnea, presyncope, syncope, edema, heart racing, or palpitations. Euvolemic upon exam.     Blood pressure is well controlled at 129/71  Last BMP showed a sodium of 132, potassium 4.3, BUN 19, Creatinine 0.65, GFR 88    Remains compliant with all medications.                   Assessment and Plan:       1. Leg edema - Resolved after stopping Amlodipine and starting low dose furosemide. Euvolemic upon exam. No evidence of heart failure or fluid overload. Follow up echocardiogram showed a normal EF 60-65%, no regional wall motion abnormalities.  Ok to stop daily  "lasix and potassium and use as needed for worsening swelling.    2. Dilated Ascending aorta - 4.3 cm ( unchanged from previous study).             Thank you for allowing me to participate in this delightful patient's care. I have recommended she follow up in 1 year with Dr. Montanez with echo prior .       Nuzhat Velasquez, APRN CNP         Review of Systems:     Review of Systems:  Skin:  not assessed     Eyes:  Positive for glasses  ENT:  Positive for hearing loss  Respiratory:  Positive for cough  Cardiovascular:    edema;Positive for  Gastroenterology: Negative    Genitourinary:  Negative    Musculoskeletal:  Positive for arthritis  Neurologic:  not assessed    Psychiatric:  Positive for sleep disturbances  Heme/Lymph/Imm:  Positive for allergies  Endocrine:  Positive for thyroid disorder              Physical Exam:     GEN:  In general, this is a well nourished elderly female in no acute distress.  HEENT:  Pupils equal, round. Sclerae nonicteric. Clear oropharynx. Mucous membranes moist.  NECK: Supple, no masses appreciated. Trachea midline. No JVD   C/V:  Regular rate and rhythm, No murmur, rub or gallop. No S3 or RV heave.   RESP: Respirations are unlabored. No use of accessory muscles. Clear to auscultation bilaterally without wheezing, rales, or rhonchi.  GI: Not assessed   EXTREM: No LE edema. No cyanosis or clubbing.  NEURO: Alert and oriented, cooperative. No obvious focal deficits.   PSYCH: Normal affect.  SKIN: Warm and dry. No rashes or petechiae appreciated.          Past Medical History:     Past Medical History:   Diagnosis Date     Arthritis      Cerebral infarction (H) 2019     Complication of anesthesia     \"hard time waking up / N & V\"     Coronary artery disease      Disease of thyroid gland      Displacement of lumbar intervertebral disc without myelopathy      Gastro-oesophageal reflux disease      GERD (gastroesophageal reflux disease)      Hearing loss     has bilateral aids     Heart attack " (H) 03/2016     History of anesthesia complications     delayed emergence     History of angina      Hypertension      Low back pain      Migraine headache      Myocardial infarction (H)      Numbness and tingling     down right leg (associated with back pain)     PONV (postoperative nausea and vomiting)      Sicca syndrome (H)      Sjogren's disease (H)      Sjogren's syndrome (H)     sees specialist; dentist     Spider veins      Unspecified hypothyroidism               Past Surgical History:     Past Surgical History:   Procedure Laterality Date     ARTHROPLASTY HIP ANTERIOR Left 10/14/2022    Procedure: LEFT TOTAL HIP ARTHROPLASTY DIRECT ANTERIOR APPROACH;  Surgeon: Selvin Saab MD;  Location:  OR     ARTHROSCOPY KNEE  10/05/2012    R Procedure: ARTHROSCOPY KNEE;  RIGHT KNEE ARTHROSCOPY, PARTIAL MEDIAL MENISCECTOMY;  Surgeon: Karli Zaragoza MD;  Location: [facility]     BACK SURGERY  About. 5 years ago    Fusion of 4&5 lumbar     BLADDER SURGERY       BUNIONECTOMY  ~2010    L foot.      CATARACT IOL, RT/LT Bilateral 07/2017     COLONOSCOPY N/A 01/17/2017    normal; no repeat Procedure: COLONOSCOPY;  Surgeon: Fan Giordano MD;  Location:  GI     ENDOSCOPIC RELEASE CARPAL TUNNEL  09/11/2012    R Procedure: ENDOSCOPIC RELEASE CARPAL TUNNEL;  Right Endoscopic carpal tunnel release, left ringer finger cortizon injection;  Surgeon: Anne Marei Catherine MD;  Location:  OR     ESOPHAGOSCOPY, GASTROSCOPY, DUODENOSCOPY (EGD), COMBINED N/A 12/26/2014    Procedure: COMBINED ESOPHAGOSCOPY, GASTROSCOPY, DUODENOSCOPY (EGD);  Surgeon: Fan Giordano MD;  Location:  GI     EXCISE EXOSTOSIS FOOT Left 12/01/2016    Procedure: EXCISE EXOSTOSIS FOOT;  Surgeon: Jody Gallagher DPM, Pod;  Location:  OR     GYN SURGERY  1978    ovaries removed     HEART CATH FYI  03/04/2016    dz <40%     HIP HARDWARE REMOVAL Right 08/04/2020    Procedure: HARDWARE REMOVAL - LATERAL APPROACH;  Surgeon: Charlie Wolfe MD;  Location:  [facility] OR;  Service: Orthopedics     HYSTERECTOMY       INJECT STEROID (LOCATION)  09/11/2012    Procedure: INJECT STEROID (LOCATION);;  Surgeon: Anne Marie Catherine MD;  Location:  OR     LAPAROSCOPIC CHOLECYSTECTOMY N/A 04/07/2016    Procedure: LAPAROSCOPIC CHOLECYSTECTOMY;  Surgeon: Hans Medina MD;  Location:  OR     OPTICAL TRACKING SYSTEM FUSION SPINE POSTERIOR LUMBAR TWO LEVELS N/A 10/31/2018    Procedure: Central L3-4 L4-5 lamenectomies L4-5 posterior instrumented fusion;  Surgeon: Aroldo Burdick MD;  Location:  OR     ORTHOPEDIC SURGERY Right 2014    karli for fx femur     RECONSTRUCT FOREFOOT WITH METATARSOPHALANGEAL (MTP) FUSION Left 04/28/2017    Procedure: RECONSTRUCT FOREFOOT WITH METATARSOPHALANGEAL (MTP) FUSION;  1. Resection arthroplasty, fifth metatarsophalangeal joint, left foot.   2. Irrigation and debridement of fifth metatarsophalangeal joint, left foot (excisional to the level of the bone).     ;  Surgeon: Fabián Phipps MD;  Location:  OR     RELEASE CARPAL TUNNEL       RIGHT HIP ORIF 4/1/2014       ROTATOR CUFF REPAIR RT/LT  ~1998    Lt shoulder; rotator cuff     SURGICAL HISTORY OF -   distant past    ablation for palpitations.     SURGICAL HISTORY OF -   06/2015    left carpal tunnel surgery     [facility] NONSPECIFIC PROCEDURE  1976    D&C     [facility] TOTAL HIP ARTHROPLASTY Right 08/04/2020    Procedure: RIGHT TOTAL HIP ARTHROPLASTY;  Surgeon: Charlie Wolfe MD;  Location: [facility];  Service: Orthopedics     [facility] TOTAL KNEE ARTHROPLASTY Right 01/2020              Allergies:   Codeine, Vicodin [acetaminophen], and Adhesive tape       Data:   All laboratory data reviewed:    LAST CHOLESTEROL:  Lab Results   Component Value Date    CHOL 160 01/06/2022    CHOL 175 01/19/2021     Lab Results   Component Value Date    HDL 99 01/06/2022    HDL 89 01/19/2021     Lab Results   Component Value Date    LDL 49 01/06/2022    LDL 64 01/19/2021     Lab Results   Component Value  Date    TRIG 61 01/06/2022    TRIG 108 01/19/2021     Lab Results   Component Value Date    CHOLHDLRATIO 2.3 12/02/2014       LAST BMP:  Lab Results   Component Value Date     10/15/2022     01/19/2021      Lab Results   Component Value Date    POTASSIUM 4.3 10/15/2022    POTASSIUM 4.0 01/19/2021     Lab Results   Component Value Date    CHLORIDE 99 10/15/2022    CHLORIDE 104 01/19/2021     Lab Results   Component Value Date    CARMEN 8.4 10/15/2022    CARMEN 8.9 01/19/2021     Lab Results   Component Value Date    CO2 24 10/15/2022    CO2 30 01/19/2021     Lab Results   Component Value Date    BUN 19 10/15/2022    BUN 22 01/19/2021     Lab Results   Component Value Date    CR 0.65 10/15/2022    CR 0.72 01/19/2021     Lab Results   Component Value Date     10/15/2022     10/15/2022     01/19/2021       LAST CBC:  Lab Results   Component Value Date    WBC 5.4 09/19/2022    WBC 9.0 01/19/2021     Lab Results   Component Value Date    RBC 4.20 09/19/2022    RBC 3.85 01/19/2021     Lab Results   Component Value Date    HGB 10.4 10/15/2022    HGB 12.5 01/19/2021     Lab Results   Component Value Date    HCT 41.5 09/19/2022    HCT 38.7 01/19/2021     Lab Results   Component Value Date    MCV 99 09/19/2022     01/19/2021     Lab Results   Component Value Date    MCH 31.2 09/19/2022    MCH 32.5 01/19/2021     Lab Results   Component Value Date    MCHC 31.6 09/19/2022    MCHC 32.3 01/19/2021     Lab Results   Component Value Date    RDW 13.3 09/19/2022    RDW 13.8 01/19/2021     Lab Results   Component Value Date     09/19/2022     01/19/2021       Thank you for allowing me to participate in the care of your patient.      Sincerely,     SINTIA [facility]  cc:   Referred Self,

## 2022-12-01 ENCOUNTER — VIRTUAL VISIT (OUTPATIENT)
Dept: FAMILY MEDICINE | Facility: CLINIC | Age: 82
End: 2022-12-01
Payer: COMMERCIAL

## 2022-12-01 DIAGNOSIS — I10 HYPERTENSION GOAL BP (BLOOD PRESSURE) < 140/90: ICD-10-CM

## 2022-12-01 DIAGNOSIS — U07.1 INFECTION DUE TO 2019 NOVEL CORONAVIRUS: Primary | ICD-10-CM

## 2022-12-01 PROCEDURE — 99213 OFFICE O/P EST LOW 20 MIN: CPT | Mod: GT | Performed by: INTERNAL MEDICINE

## 2022-12-01 NOTE — PROGRESS NOTES
Ya is a 82 year old who is being evaluated via a billable video visit.      How would you like to obtain your AVS? MyChart  If the video visit is dropped, the invitation should be resent by: Text to cell phone: [phone]  Will anyone else be joining your video visit? No        Assessment & Plan     1.  Infection due to SARS-CoV-2.  Patient interested in antiviral treatment.  Infectious COVID in May and taken Plaxlovid.  Wants to be treated this time as well.  Drug-drug interaction discussed.  Advised not to take atorvastatin for the 5 days that she is on Paxlovid.  Rebound COVID discussed.  2.  Hypertension.      Alejandro Patel MD  [facility]   Ya is a 82 year old, presenting for the following health issues:  Covid Concern      HPI       COVID-19 Symptom Review  How many days ago did these symptoms start? Has had a cough and runny nose is a recurrent issue.    Are any of the following symptoms significant for you?    New or worsening difficulty breathing? No    Worsening cough? Yes, it's a dry cough.     Fever or chills? Yes, I  had chills.    Headache: No    Sore throat: No    Chest pain: No    Diarrhea: No    Body aches? No    What treatments has patient tried? Took Tylenol this morning   Does patient live in a nursing home, group home, or shelter? No  Does patient have a way to get food/medications during quarantined? Yes, I have a friend or family member who can help me.    82-year-old lady with history of hypertension, hypothyroidism, Sjogren's presents with upper respiratory symptoms which include sore throat nasal congestion cough but no shortness of breath.  Some chills.  She tested positive today for COVID.  Her symptoms started maybe yesterday or today.        Review of Systems   Constitutional, HEENT, cardiovascular, pulmonary, GI, , musculoskeletal, neuro, skin, endocrine and psych systems are negative, except as otherwise noted.      Objective     Vitals - Patient Reported  Pain Score: No Pain (0)        Physical Exam   GENERAL: Healthy, alert and no distress  EYES: Eyes grossly normal to inspection.  No discharge or erythema, or obvious scleral/conjunctival abnormalities.  RESP: No audible wheeze, cough, or visible cyanosis.  No visible retractions or increased work of breathing.    SKIN: Visible skin clear. No significant rash, abnormal pigmentation or lesions.  NEURO: Cranial nerves grossly intact.  Mentation and speech appropriate for age.  PSYCH: Mentation appears normal, affect normal/bright, judgement and insight intact, normal speech and appearance well-groomed.                Video-Visit Details    Video Start Time: 5:32 PM    Type of service:  Video Visit    Video End Time:5:42 PM    Originating Location (pt. Location): Home        Distant Location (provider location):  On-site    Platform used for Video Visit: Magno

## 2022-12-20 NOTE — BRIEF OP NOTE
[facility] Brief Operative Note    Pre-operative diagnosis: L34 and L45 stenosis radiculapathy; L45 spondylothesis DDD   Post-operative diagnosis * No post-op diagnosis entered *  same   Procedure: Procedure(s):  Central L3-4 L4-5 lamenectomies L4-5 posterior instrumented fusion   Surgeon(s): Surgeon(s) and Role:     * Aroldo Burdick MD - Primary     * Sree Calix PA-C - Assisting   Estimated blood loss: 200 mL    Specimens: * No specimens in log *   Findings: No comp    Aroldo Burdick MD      Skin Substitute Injection Text: The defect edges were debeveled with a #15 scalpel blade.  Given the location of the defect, shape of the defect and the proximity to free margins a skin substitute micronized graft was deemed most appropriate.  The entire vial contents were admixed with 3.0ccs of sterile saline and then injected subcutaneously throughout the entire wound bed.

## 2023-01-03 DIAGNOSIS — R39.15 URINARY URGENCY: ICD-10-CM

## 2023-01-03 RX ORDER — MIRABEGRON 25 MG/1
25 TABLET, FILM COATED, EXTENDED RELEASE ORAL DAILY
Qty: 90 TABLET | Refills: 1 | Status: SHIPPED | OUTPATIENT
Start: 2023-01-03 | End: 2023-06-01

## 2023-01-06 DIAGNOSIS — R60.9 EDEMA, UNSPECIFIED TYPE: ICD-10-CM

## 2023-01-06 RX ORDER — FUROSEMIDE 20 MG
20 TABLET ORAL PRN
Qty: 90 TABLET | Refills: 3 | Status: SHIPPED | OUTPATIENT
Start: 2023-01-06 | End: 2023-01-09

## 2023-01-08 ENCOUNTER — MYC MEDICAL ADVICE (OUTPATIENT)
Dept: FAMILY MEDICINE | Facility: CLINIC | Age: 83
End: 2023-01-08

## 2023-01-08 DIAGNOSIS — K21.9 GASTROESOPHAGEAL REFLUX DISEASE, UNSPECIFIED WHETHER ESOPHAGITIS PRESENT: ICD-10-CM

## 2023-01-09 DIAGNOSIS — R60.9 EDEMA, UNSPECIFIED TYPE: ICD-10-CM

## 2023-01-09 DIAGNOSIS — E03.9 HYPOTHYROIDISM, UNSPECIFIED TYPE: ICD-10-CM

## 2023-01-09 DIAGNOSIS — E78.5 HYPERLIPIDEMIA LDL GOAL <70: ICD-10-CM

## 2023-01-09 DIAGNOSIS — I10 HYPERTENSION GOAL BP (BLOOD PRESSURE) < 140/90: ICD-10-CM

## 2023-01-09 RX ORDER — FUROSEMIDE 20 MG
20 TABLET ORAL DAILY PRN
Qty: 90 TABLET | Refills: 1 | Status: SHIPPED | OUTPATIENT
Start: 2023-01-09 | End: 2023-11-01

## 2023-01-09 NOTE — TELEPHONE ENCOUNTER
Fax from [facility] needing clarification on dosing for Rx for Furosemide sent 1/6/23. Rx states take Furosemide 20mg by mouth as needed. Reviewed chart, sent updated Rx showing take 20mg DAILY as needed per notes.     Birgit Perez RN, BSN  01/09/23 at 2:45 PM

## 2023-01-09 NOTE — TELEPHONE ENCOUNTER
Will forward to Dr. Romnao's partners as Dr. Romano is out of the office and the pt is taking it differently than prescribed.

## 2023-01-10 RX ORDER — LANSOPRAZOLE 30 MG/1
30 CAPSULE, DELAYED RELEASE ORAL
Qty: 90 CAPSULE | Refills: 0 | Status: SHIPPED | OUTPATIENT
Start: 2023-01-10 | End: 2023-03-21

## 2023-01-11 RX ORDER — METOPROLOL SUCCINATE 25 MG/1
25 TABLET, EXTENDED RELEASE ORAL DAILY
Qty: 90 TABLET | Refills: 0 | Status: SHIPPED | OUTPATIENT
Start: 2023-01-11 | End: 2023-03-21

## 2023-01-11 RX ORDER — ATORVASTATIN CALCIUM 20 MG/1
20 TABLET, FILM COATED ORAL DAILY
Qty: 102 TABLET | Refills: 0 | Status: SHIPPED | OUTPATIENT
Start: 2023-01-11 | End: 2023-03-21

## 2023-01-11 RX ORDER — LEVOTHYROXINE SODIUM 100 UG/1
100 TABLET ORAL DAILY
Qty: 90 TABLET | Refills: 0 | Status: SHIPPED | OUTPATIENT
Start: 2023-01-11 | End: 2023-03-21

## 2023-01-11 NOTE — TELEPHONE ENCOUNTER
Medication is being filled for 1 time refill only due to:  to get to scheduled appt   Next 5 appointments (look out 90 days)    Mar 07, 2023  9:00 AM  (Arrive by 8:40 AM)  Annual Wellness Visit with Devi Romano MD  [facility] ([facility] ) [address] [address] [phone]        Levothyroxine, metoprolol, atorvastatin    Sarika GUPTA RN

## 2023-02-16 ENCOUNTER — OFFICE VISIT (OUTPATIENT)
Dept: FAMILY MEDICINE | Facility: CLINIC | Age: 83
End: 2023-02-16
Payer: COMMERCIAL

## 2023-02-16 VITALS
BODY MASS INDEX: 23.56 KG/M2 | DIASTOLIC BLOOD PRESSURE: 74 MMHG | WEIGHT: 138 LBS | HEIGHT: 64 IN | HEART RATE: 50 BPM | RESPIRATION RATE: 15 BRPM | OXYGEN SATURATION: 98 % | TEMPERATURE: 98.5 F | SYSTOLIC BLOOD PRESSURE: 136 MMHG

## 2023-02-16 DIAGNOSIS — Z12.31 VISIT FOR SCREENING MAMMOGRAM: ICD-10-CM

## 2023-02-16 DIAGNOSIS — R73.03 PREDIABETES: ICD-10-CM

## 2023-02-16 DIAGNOSIS — N32.81 OVERACTIVE BLADDER: Primary | ICD-10-CM

## 2023-02-16 DIAGNOSIS — E05.00 GRAVES' DISEASE: ICD-10-CM

## 2023-02-16 DIAGNOSIS — I10 HYPERTENSION GOAL BP (BLOOD PRESSURE) < 140/90: ICD-10-CM

## 2023-02-16 DIAGNOSIS — R07.9 ACUTE CHEST PAIN: ICD-10-CM

## 2023-02-16 DIAGNOSIS — E78.5 HYPERLIPIDEMIA LDL GOAL <70: ICD-10-CM

## 2023-02-16 LAB
ANION GAP SERPL CALCULATED.3IONS-SCNC: 12 MMOL/L (ref 7–15)
BUN SERPL-MCNC: 19.1 MG/DL (ref 8–23)
CALCIUM SERPL-MCNC: 9.8 MG/DL (ref 8.8–10.2)
CHLORIDE SERPL-SCNC: 101 MMOL/L (ref 98–107)
CHOLEST SERPL-MCNC: 162 MG/DL
CREAT SERPL-MCNC: 0.76 MG/DL (ref 0.51–0.95)
DEPRECATED HCO3 PLAS-SCNC: 25 MMOL/L (ref 22–29)
ERYTHROCYTE [DISTWIDTH] IN BLOOD BY AUTOMATED COUNT: 13 % (ref 10–15)
GFR SERPL CREATININE-BSD FRML MDRD: 78 ML/MIN/1.73M2
GLUCOSE SERPL-MCNC: 116 MG/DL (ref 70–99)
HBA1C MFR BLD: 6.2 % (ref 0–5.6)
HCT VFR BLD AUTO: 41 % (ref 35–47)
HDLC SERPL-MCNC: 71 MG/DL
HGB BLD-MCNC: 12.8 G/DL (ref 11.7–15.7)
LDLC SERPL CALC-MCNC: 75 MG/DL
MCH RBC QN AUTO: 30.2 PG (ref 26.5–33)
MCHC RBC AUTO-ENTMCNC: 31.2 G/DL (ref 31.5–36.5)
MCV RBC AUTO: 97 FL (ref 78–100)
NONHDLC SERPL-MCNC: 91 MG/DL
PLATELET # BLD AUTO: 299 10E3/UL (ref 150–450)
POTASSIUM SERPL-SCNC: 4.6 MMOL/L (ref 3.4–5.3)
RBC # BLD AUTO: 4.24 10E6/UL (ref 3.8–5.2)
SODIUM SERPL-SCNC: 138 MMOL/L (ref 136–145)
TRIGL SERPL-MCNC: 80 MG/DL
TSH SERPL DL<=0.005 MIU/L-ACNC: 2.97 UIU/ML (ref 0.3–4.2)
WBC # BLD AUTO: 5.7 10E3/UL (ref 4–11)

## 2023-02-16 PROCEDURE — 99214 OFFICE O/P EST MOD 30 MIN: CPT | Performed by: NURSE PRACTITIONER

## 2023-02-16 PROCEDURE — 36415 COLL VENOUS BLD VENIPUNCTURE: CPT | Performed by: NURSE PRACTITIONER

## 2023-02-16 PROCEDURE — 84443 ASSAY THYROID STIM HORMONE: CPT | Performed by: NURSE PRACTITIONER

## 2023-02-16 PROCEDURE — 80048 BASIC METABOLIC PNL TOTAL CA: CPT | Performed by: NURSE PRACTITIONER

## 2023-02-16 PROCEDURE — 80061 LIPID PANEL: CPT | Performed by: NURSE PRACTITIONER

## 2023-02-16 PROCEDURE — 85027 COMPLETE CBC AUTOMATED: CPT | Performed by: NURSE PRACTITIONER

## 2023-02-16 PROCEDURE — 83036 HEMOGLOBIN GLYCOSYLATED A1C: CPT | Performed by: NURSE PRACTITIONER

## 2023-02-16 RX ORDER — ATORVASTATIN CALCIUM 20 MG/1
20 TABLET, FILM COATED ORAL DAILY
Qty: 102 TABLET | Refills: 1 | Status: CANCELLED | OUTPATIENT
Start: 2023-02-16

## 2023-02-16 RX ORDER — LANSOPRAZOLE 30 MG/1
30 CAPSULE, DELAYED RELEASE ORAL
Qty: 90 CAPSULE | Refills: 1 | Status: CANCELLED | OUTPATIENT
Start: 2023-02-16

## 2023-02-16 RX ORDER — METOPROLOL SUCCINATE 25 MG/1
25 TABLET, EXTENDED RELEASE ORAL DAILY
Qty: 90 TABLET | Refills: 1 | Status: CANCELLED | OUTPATIENT
Start: 2023-02-16

## 2023-02-16 RX ORDER — LEVOTHYROXINE SODIUM 100 UG/1
100 TABLET ORAL DAILY
Qty: 90 TABLET | Refills: 1 | Status: CANCELLED | OUTPATIENT
Start: 2023-02-16

## 2023-02-16 RX ORDER — FAMOTIDINE 40 MG/1
40 TABLET, FILM COATED ORAL DAILY
Qty: 90 TABLET | Refills: 1 | Status: CANCELLED | OUTPATIENT
Start: 2023-02-16

## 2023-02-16 RX ORDER — OXYBUTYNIN CHLORIDE 5 MG/1
5 TABLET, EXTENDED RELEASE ORAL DAILY
Qty: 30 TABLET | Refills: 0 | Status: SHIPPED | OUTPATIENT
Start: 2023-02-16 | End: 2023-04-10

## 2023-02-16 RX ORDER — NITROGLYCERIN 0.4 MG/1
0.4 TABLET SUBLINGUAL EVERY 5 MIN PRN
Qty: 25 TABLET | Refills: 0 | Status: SHIPPED | OUTPATIENT
Start: 2023-02-16

## 2023-02-16 ASSESSMENT — ENCOUNTER SYMPTOMS
FREQUENCY: 0
HEMATURIA: 0
SORE THROAT: 0
SHORTNESS OF BREATH: 0
ARTHRALGIAS: 1
PALPITATIONS: 0
FEVER: 0
ABDOMINAL PAIN: 0
NAUSEA: 0
HEARTBURN: 0
JOINT SWELLING: 0
HEADACHES: 0
DYSURIA: 0
BREAST MASS: 0
NERVOUS/ANXIOUS: 0
EYE PAIN: 1
DIARRHEA: 0
CHILLS: 0
CONSTIPATION: 0
DIZZINESS: 0
HEMATOCHEZIA: 0
WEAKNESS: 0
PARESTHESIAS: 0
COUGH: 1
MYALGIAS: 0

## 2023-02-16 ASSESSMENT — PAIN SCALES - GENERAL: PAINLEVEL: NO PAIN (0)

## 2023-02-16 ASSESSMENT — ACTIVITIES OF DAILY LIVING (ADL): CURRENT_FUNCTION: NO ASSISTANCE NEEDED

## 2023-02-16 NOTE — PROGRESS NOTES
Assessment & Plan     Overactive bladder  Chronic stable; will change medication d/t insurance coverage with mirabegron.     - oxybutynin ER (DITROPAN XL) 5 MG 24 hr tablet; Take 1 tablet (5 mg) by mouth daily    Acute chest pain  Chronic stable; patient has not used medication, was  and needed updated.     - nitroGLYcerin (NITROSTAT) 0.4 MG sublingual tablet; Place 1 tablet (0.4 mg) under the tongue every 5 minutes as needed for chest pain    Hypertension goal BP (blood pressure) < 140/90  Chronic stable; no changes will check routine lab work    - CBC with platelets; Future  - Basic metabolic panel  (Ca, Cl, CO2, Creat, Gluc, K, Na, BUN); Future      Hyperlipidemia LDL goal <70  Chronic stable; no changes will check routine lab work    - Lipid panel reflex to direct LDL Fasting; Future    Graves' disease  Chronic stable; no changes will check routine lab work    - TSH WITH FREE T4 REFLEX; Future    Prediabetes  Chronic stable; no changes will check routine lab work    - Hemoglobin A1c    Visit for screening mammogram  Routine screening.     - *MA Screening Digital Bilateral; Future      Return in about 8 weeks (around 4/10/2023) for Routine preventive.    SINTIA Vásquez CNP  [facility] LOUISE Pandya is a 82 year old, presenting for the following health issues:  Urinary Problem (Pt states Myrbetriq is no longer covered by insurance and is looking for an alternative. )      HPI     Pt states her Myrbetriq is not longer covered by insurance and is looking for an alternative.     Pt is also fasting for labs and has scheduled a wellness visit with a provider in early April.      Denies any complaints at this time states things are going well at home.     Review of Systems   Constitutional, HEENT, cardiovascular, pulmonary, gi and gu systems are negative, except as otherwise noted.      Objective    /74   Pulse 50   Temp 98.5  F (36.9  C) (Oral)   Resp 15   Ht  "1.626 m (5' 4\")   Wt 62.6 kg (138 lb)   SpO2 98%   BMI 23.69 kg/m    Body mass index is 23.69 kg/m .  Physical Exam   GENERAL: healthy, alert and no distress  RESP: lungs clear to auscultation - no rales, rhonchi or wheezes  CV: bradycardia, normal S1 S2, no S3 or S4, no murmur, click or rub, peripheral pulses strong and no peripheral edema  MS: no gross musculoskeletal defects noted, no edema                          "

## 2023-03-12 DIAGNOSIS — N32.81 OVERACTIVE BLADDER: ICD-10-CM

## 2023-03-18 DIAGNOSIS — K21.9 GASTROESOPHAGEAL REFLUX DISEASE, UNSPECIFIED WHETHER ESOPHAGITIS PRESENT: ICD-10-CM

## 2023-03-19 DIAGNOSIS — E03.9 HYPOTHYROIDISM, UNSPECIFIED TYPE: ICD-10-CM

## 2023-03-19 DIAGNOSIS — E78.5 HYPERLIPIDEMIA LDL GOAL <70: ICD-10-CM

## 2023-03-19 DIAGNOSIS — I10 HYPERTENSION GOAL BP (BLOOD PRESSURE) < 140/90: ICD-10-CM

## 2023-03-20 DIAGNOSIS — K21.9 GASTROESOPHAGEAL REFLUX DISEASE, UNSPECIFIED WHETHER ESOPHAGITIS PRESENT: ICD-10-CM

## 2023-03-20 NOTE — TELEPHONE ENCOUNTER
Routing refill request to provider for review/approval because:  Diagnosis osteoporosis  Has appointment next month  Scarlett Alvarez RN, BSN  [facility]

## 2023-03-21 RX ORDER — LEVOTHYROXINE SODIUM 100 UG/1
TABLET ORAL
Qty: 90 TABLET | Refills: 0 | Status: SHIPPED | OUTPATIENT
Start: 2023-03-21 | End: 2023-04-10

## 2023-03-21 RX ORDER — ATORVASTATIN CALCIUM 20 MG/1
TABLET, FILM COATED ORAL
Qty: 90 TABLET | Refills: 0 | Status: SHIPPED | OUTPATIENT
Start: 2023-03-21 | End: 2023-04-10

## 2023-03-21 RX ORDER — METOPROLOL SUCCINATE 25 MG/1
TABLET, EXTENDED RELEASE ORAL
Qty: 90 TABLET | Refills: 0 | Status: SHIPPED | OUTPATIENT
Start: 2023-03-21 | End: 2023-04-10

## 2023-03-21 RX ORDER — LANSOPRAZOLE 30 MG/1
CAPSULE, DELAYED RELEASE ORAL
Qty: 90 CAPSULE | Refills: 3 | Status: SHIPPED | OUTPATIENT
Start: 2023-03-21

## 2023-03-21 RX ORDER — FAMOTIDINE 40 MG/1
TABLET, FILM COATED ORAL
Qty: 90 TABLET | Refills: 0 | Status: SHIPPED | OUTPATIENT
Start: 2023-03-21 | End: 2023-05-23

## 2023-03-21 NOTE — TELEPHONE ENCOUNTER
Medication is being filled for 1 time refill only due to:  Patient needs to be seen because it has been more than one year since last visit.     Next 5 appointments (look out 90 days)    Apr 10, 2023  2:00 PM  (Arrive by 1:40 PM)  Annual Wellness Visit with Jennifer Talavera PA-C  [facility] ([facility] ) [address] [place] [address]  [phone]   May 15, 2023 10:00 AM  Nurse Only with Ri Rn  [facility] ([facility] ) [address]  [address]  [facility] [address]  [phone]        Rylee MANN RN, BSN

## 2023-03-26 ENCOUNTER — HEALTH MAINTENANCE LETTER (OUTPATIENT)
Age: 83
End: 2023-03-26

## 2023-04-04 ASSESSMENT — ENCOUNTER SYMPTOMS
PALPITATIONS: 0
ABDOMINAL PAIN: 0
EYE PAIN: 0
FEVER: 0
JOINT SWELLING: 0
WEAKNESS: 0
NAUSEA: 0
NERVOUS/ANXIOUS: 0
HEARTBURN: 0
COUGH: 1
SHORTNESS OF BREATH: 0
HEMATOCHEZIA: 0
HEMATURIA: 0
BREAST MASS: 0
MYALGIAS: 0
HEADACHES: 0
FREQUENCY: 1
ARTHRALGIAS: 1
PARESTHESIAS: 0
CONSTIPATION: 0
DIZZINESS: 0
CHILLS: 0
SORE THROAT: 0
DYSURIA: 0
DIARRHEA: 0

## 2023-04-04 ASSESSMENT — ACTIVITIES OF DAILY LIVING (ADL): CURRENT_FUNCTION: NO ASSISTANCE NEEDED

## 2023-04-10 ENCOUNTER — OFFICE VISIT (OUTPATIENT)
Dept: FAMILY MEDICINE | Facility: CLINIC | Age: 83
End: 2023-04-10
Payer: COMMERCIAL

## 2023-04-10 VITALS
SYSTOLIC BLOOD PRESSURE: 138 MMHG | HEART RATE: 52 BPM | RESPIRATION RATE: 14 BRPM | HEIGHT: 64 IN | WEIGHT: 145.3 LBS | OXYGEN SATURATION: 97 % | DIASTOLIC BLOOD PRESSURE: 79 MMHG | BODY MASS INDEX: 24.81 KG/M2 | TEMPERATURE: 97.1 F

## 2023-04-10 DIAGNOSIS — E78.5 HYPERLIPIDEMIA LDL GOAL <70: ICD-10-CM

## 2023-04-10 DIAGNOSIS — M19.042 PRIMARY OSTEOARTHRITIS OF BOTH HANDS: ICD-10-CM

## 2023-04-10 DIAGNOSIS — Z00.00 ENCOUNTER FOR MEDICARE ANNUAL WELLNESS EXAM: Primary | ICD-10-CM

## 2023-04-10 DIAGNOSIS — R26.89 BALANCE PROBLEMS: ICD-10-CM

## 2023-04-10 DIAGNOSIS — I77.810 ASCENDING AORTA DILATION (H): ICD-10-CM

## 2023-04-10 DIAGNOSIS — M35.01 SJOGREN'S SYNDROME WITH KERATOCONJUNCTIVITIS SICCA (H): ICD-10-CM

## 2023-04-10 DIAGNOSIS — M81.0 SENILE OSTEOPOROSIS: ICD-10-CM

## 2023-04-10 DIAGNOSIS — I10 HYPERTENSION GOAL BP (BLOOD PRESSURE) < 140/90: ICD-10-CM

## 2023-04-10 DIAGNOSIS — M19.041 PRIMARY OSTEOARTHRITIS OF BOTH HANDS: ICD-10-CM

## 2023-04-10 DIAGNOSIS — R13.10 DYSPHAGIA, UNSPECIFIED TYPE: ICD-10-CM

## 2023-04-10 DIAGNOSIS — E03.9 HYPOTHYROIDISM, UNSPECIFIED TYPE: ICD-10-CM

## 2023-04-10 PROCEDURE — G0439 PPPS, SUBSEQ VISIT: HCPCS | Performed by: PHYSICIAN ASSISTANT

## 2023-04-10 PROCEDURE — 99214 OFFICE O/P EST MOD 30 MIN: CPT | Mod: 25 | Performed by: PHYSICIAN ASSISTANT

## 2023-04-10 RX ORDER — LEVOTHYROXINE SODIUM 100 UG/1
100 TABLET ORAL DAILY
Qty: 90 TABLET | Refills: 2 | Status: SHIPPED | OUTPATIENT
Start: 2023-04-10 | End: 2023-06-20

## 2023-04-10 RX ORDER — ATORVASTATIN CALCIUM 20 MG/1
20 TABLET, FILM COATED ORAL DAILY
Qty: 90 TABLET | Refills: 2 | Status: SHIPPED | OUTPATIENT
Start: 2023-04-10 | End: 2023-09-19

## 2023-04-10 RX ORDER — METOPROLOL SUCCINATE 25 MG/1
25 TABLET, EXTENDED RELEASE ORAL DAILY
Qty: 90 TABLET | Refills: 2 | Status: SHIPPED | OUTPATIENT
Start: 2023-04-10 | End: 2023-11-01

## 2023-04-10 ASSESSMENT — ENCOUNTER SYMPTOMS
JOINT SWELLING: 0
SORE THROAT: 0
HEMATURIA: 0
HEADACHES: 0
DYSURIA: 0
HEARTBURN: 0
ABDOMINAL PAIN: 0
CONSTIPATION: 0
WEAKNESS: 0
CHILLS: 0
COUGH: 1
SHORTNESS OF BREATH: 0
NAUSEA: 0
NERVOUS/ANXIOUS: 0
ARTHRALGIAS: 1
HEMATOCHEZIA: 0
FEVER: 0
DIARRHEA: 0
DIZZINESS: 0
FREQUENCY: 1
EYE PAIN: 0
MYALGIAS: 0
PALPITATIONS: 0
PARESTHESIAS: 0
BREAST MASS: 0

## 2023-04-10 ASSESSMENT — PAIN SCALES - GENERAL: PAINLEVEL: NO PAIN (0)

## 2023-04-10 ASSESSMENT — ACTIVITIES OF DAILY LIVING (ADL): CURRENT_FUNCTION: NO ASSISTANCE NEEDED

## 2023-04-10 NOTE — PATIENT INSTRUCTIONS
Patient Education   Personalized Prevention Plan  You are due for the preventive services outlined below.  Your care team is available to assist you in scheduling these services.  If you have already completed any of these items, please share that information with your care team to update in your medical record.  Health Maintenance Due   Topic Date Due     Diptheria Tetanus Pertussis (DTAP/TDAP/TD) Vaccine (3 - Td or Tdap) 12/04/2022       Understanding USDA MyPlate  The USDA has guidelines to help you make healthy food choices. These are called MyPlate. MyPlate shows the food groups that make up healthy meals using the image of a place setting. Before you eat, think about the healthiest choices for what to put on your plate or in your cup or bowl. To learn more about building a healthy plate, visit www.ELVPHDplate.gov.     The food groups    Fruits. Any fruit or 100% fruit juice counts as part of the Fruit Group. Fruits may be fresh, canned, frozen, or dried, and may be whole, cut-up, or pureed. Make 1/2 of your plate fruits and vegetables.    Vegetables. Any vegetable or 100% vegetable juice counts as a member of the Vegetable Group. Vegetables may be fresh, frozen, canned, or dried. They can be served raw or cooked and may be whole, cut-up, or mashed. Make 1/2 of your plate fruits and vegetables.    Grains. All foods made from grains are part of the Grains Group. These include wheat, rice, oats, cornmeal, and barley. Grains are often used to make foods such as bread, pasta, oatmeal, cereal, tortillas, and grits. Grains should be no more than 1/4 of your plate. At least half of your grains should be whole grains.    Protein. This group includes meat, poultry, seafood, beans and peas, eggs, processed soy products (such as tofu), nuts (including nut butters), and seeds. Make protein choices no more than 1/4 of your plate. Meat and poultry choices should be lean or low fat.    Dairy. The Dairy Group includes all  fluid milk products and foods made from milk that contain calcium, such as yogurt and cheese. (Foods that have little calcium, such as cream, butter, and cream cheese, are not part of this group.) Most dairy choices should be low-fat or fat-free.    Oils. Oils aren't a food group, but they do contain essential nutrients. However it's important to watch your intake of oils. These are fats that are liquid at room temperature. They include canola, corn, olive, soybean, vegetable, and sunflower oil. Foods that are mainly oil include mayonnaise, certain salad dressings, and soft margarines. You likely already get your daily oil allowance from the foods you eat.  Things to limit  Eating healthy also means limiting these things in your diet:    Salt (sodium). Many processed foods have a lot of sodium. To keep sodium intake down, eat fresh vegetables, meats, poultry, and seafood when possible. Purchase low-sodium, reduced-sodium, or no-salt-added food products at the store. And don't add salt to your meals at home. Instead, season them with herbs and spices such as dill, oregano, cumin, and paprika. Or try adding flavor with lemon or lime zest and juice.    Saturated fat. Saturated fats are most often found in animal products such as beef, pork, and chicken. They are often solid at room temperature, such as butter. To reduce your saturated fat intake, choose leaner cuts of meat and poultry. And try healthier cooking methods such as grilling, broiling, roasting, or baking. For a simple lower-fat swap, use plain nonfat yogurt instead of mayonnaise when making potato salad or macaroni salad.    Added sugars. These are sugars added to foods. They are in foods such as ice cream, candy, soda, fruit drinks, sports drinks, energy drinks, cookies, pastries, jams, and syrups. Cut down on added sugars by sharing sweet treats with a family member or friend. You can also choose fruit for dessert, and drink water or other unsweetened  noreen Quach last reviewed this educational content on 6/1/2020 2000-2022 [employer]. All rights reserved. This information is not intended as a substitute for professional medical care. Always follow your healthcare professional's instructions.          Signs of Hearing Loss  Hearing loss is a problem shared by many people. In fact, it's one of the most common health problems, particularly as people age. Most people aged 65 and older have some hearing loss. By age 80, almost everyone does. Hearing loss often occurs slowly over the years. So, you may not realize your hearing has gotten worse.   When sudden hearing loss occurs, it's important to contact your healthcare provider right away. Your provider will do a medical exam and a hearing exam as soon as possible. This is to help find the cause and type of your sudden hearing loss. Based on your diagnosis, your healthcare provider will discuss possible treatments.      Hearing much better with one ear can be a sign of hearing loss.     Have your hearing checked  Call your healthcare provider if you:     Have to strain to hear normal conversation    Have to watch other people s faces very carefully to follow what they re saying    Need to ask people to repeat what they ve said    Often misunderstand what people are saying    Turn the volume of the television or radio up so high that others complain    Feel that people are mumbling when they re talking to you    Find that the effort to hear leaves you feeling tired and irritated    Notice, when using the phone, that you hear better with one ear than the other  [employer] last reviewed this educational content on 6/1/2022 2000-2022 [employer]. All rights reserved. This information is not intended as a substitute for professional medical care. Always follow your healthcare professional's instructions.          Urinary Incontinence, Female (Adult)   Urinary incontinence means  loss of bladder control. This problem affects many women, especially as they get older. If you have incontinence, you may be embarrassed to ask for help. But know that this problem can be treated.   Types of Incontinence  There are different types of incontinence. Two of the main types are described here. You can have more than one type.     Stress incontinence. With this type, urine leaks when pressure (stress) is put on the bladder. This may happen when you cough, sneeze, or laugh. Stress incontinence most often occurs because the pelvic floor muscles that support the bladder and urethra are weak. This can happen after pregnancy and vaginal childbirth or a hysterectomy. It can also be due to excess body weight or hormone changes.    Urge incontinence (also called overactive bladder). With this type, a sudden urge to urinate is felt often. This may happen even though there may not be much urine in the bladder. The need to urinate often during the night is common. Urge incontinence most often occurs because of bladder spasms. This may be due to bladder irritation or infection. Damage to bladder nerves or pelvic muscles, constipation, and certain medicines can also lead to urge incontinence.  Treatment depends on the cause. Further evaluation is needed to find the type you have. This will likely include an exam and certain tests. Based on the results, you and your healthcare provider can then plan treatment. Until a diagnosis is made, the home care tips below can help ease symptoms.   Home care    Do pelvic floor muscle exercises, if they are prescribed. The pelvic floor muscles help support the bladder and urethra. Many women find that their symptoms improve when doing special exercises that strengthen these muscles. To do the exercises, contract the muscles you would use to stop your stream of urine. But do this when you re not urinating. Hold for 10 seconds, then relax. Repeat 10 to 20 times in a row, at least 3  times a day. Your healthcare provider may give you other instructions for how to do the exercises and how often.    Keep a bladder diary. This helps track how often and how much you urinate over a set period of time. Bring this diary with you to your next visit with the provider. The information can help your provider learn more about your bladder problem.    Lose weight, if advised to by your provider. Extra weight puts pressure on the bladder. Your provider can help you create a weight-loss plan that s right for you. This may include exercising more and making certain diet changes.    Don't have foods and drinks that may irritate the bladder. These can include alcohol and caffeinated drinks.    Quit smoking. Smoking and other tobacco use can lead to a long-term (chronic) cough that strains the pelvic floor muscles. Smoking may also damage the bladder and urethra. Talk with your provider about treatments or methods you can use to quit smoking.    If drinking large amounts of fluid makes you have symptoms, you may be advised to limit your fluid intake. You may also be advised to drink most of your fluids during the day and to limit fluids at night.    If you re worried about urine leakage or accidents, you may wear absorbent pads to catch urine. Change the pads often. This helps reduce discomfort. It may also reduce the risk of skin or bladder infections.    Follow-up care  Follow up with your healthcare provider, or as directed. It may take some to find the right treatment for your problem. But healthy lifestyle changes can be made right away. These include such things as exercising on a regular basis, eating a healthy diet, losing weight (if needed), and quitting smoking. Your treatment plan may include special therapies or medicines. Certain procedures or surgery may also be options. Talk about any questions you have with your provider.   When to seek medical advice  Call the healthcare provider right away if any  of these occur:    Fever of 100.4 F (38 C) or higher, or as directed by your provider    Bladder pain or fullness    Belly swelling    Nausea or vomiting    Back pain    Weakness, dizziness, or fainting  [employer] last reviewed this educational content on 1/1/2020 2000-2022 [employer]. All rights reserved. This information is not intended as a substitute for professional medical care. Always follow your healthcare professional's instructions.

## 2023-04-10 NOTE — PROGRESS NOTES
"SUBJECTIVE:   Ya is a 82 year old who presents for Preventive Visit.      4/10/2023     1:43 PM   Additional Questions   Roomed by MR   Accompanied by FLORES         4/10/2023     1:43 PM   Patient Reported Additional Medications   Patient reports taking the following new medications FLORES   Patient has been advised of split billing requirements and indicates understanding: Yes  Are you in the first 12 months of your Medicare coverage?  No    Healthy Habits:     In general, how would you rate your overall health?  Good    Frequency of exercise:  4-5 days/week    Duration of exercise:  45-60 minutes    Do you usually eat at least 4 servings of fruit and vegetables a day, include whole grains    & fiber and avoid regularly eating high fat or \"junk\" foods?  No    Taking medications regularly:  Yes    Medication side effects:  None    Ability to successfully perform activities of daily living:  No assistance needed    Home Safety:  No safety concerns identified    Hearing Impairment:  Difficulty following a conversation in a noisy restaurant or crowded room, feel that people are mumbling or not speaking clearly, difficult to understand a speaker at a public meeting or Sikhism service and difficulty understanding soft or whispered speech    In the past 6 months, have you been bothered by leaking of urine? Yes    In general, how would you rate your overall mental or emotional health?  Excellent      PHQ-2 Total Score: 0    Additional concerns today:  Yes    CONCERNS  1. Swallowing/voice concerns. Feels like her voice seems more hoarse when she talks a lot. She thinks she needs to go to ENT. Feels like food gets caught in throat when swallowing and she coughs a lot. Sometimes even feels like she chokes on water. No pain with swallowing. She does have dry mouth, needs to drinks a lot of water with meals. She does have history of GERD, managed with PPI.    2. History of Sjogren's syndrome with keratoconjunctivitis sicca, " "taking Evoxac 1 pill 3 times daily. She was using restasis but stopped because of eye irritation. She is scheduling follow-up with eye doctor. Also history of primary osteoarthritis of both hands and mentions arthritis in her shoulders, knees. History of hip arthritis, s/p hip replacement.  Follows with rheumatology, last visit 7/7/22: \"Ya presents with h/o Sjogren's syndrome made >30 years ago. She has sicca symptoms, updated labs I 2021 showed negative RF, SS-A and SS-B Antibodies. 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 Her peripheral edema is likely due to venous insufficiency, will update renal/liver function and UA with labs today.\" For hand arthritis, rheumatology recommended tylenol up to 1000 mg every 8 hours and trial of arthritis gloves. She uses tylenol with minimal relief, has not tried arthritis gloves.    3. Wants referral for neurologist. Reports she doesn't feel like \"head is right\". She reports sometimes feeling dizzy, more so when she is overtired, but also at other times. She also feels like her balance is off and feels she is overall not as strong as she used to be. She does exercise every day at [employer] and says she does well with this. Denies falls. She has noticed some tremors at times - hands, chin quivering a few days ago when trying to take a nap. No focal weakness, numbness/tingling. No headache. No speech changes. She also feels like memory is not as good as it used to be - often will forget words or names, forget what she was talking about. She has noticed this over the past 6 months or so, gradually. No chest pain, shortness of breath, palpitations, syncope, edema.    4. Weight issues - struggling with weight gain. Exercises regularly. Does not particularly monitor diet, but says she tries to eat healthy. Mostly eating toast, potatoes, fruit and veggies.    5. Osteoporosis: she follows with endocrinology,  " L[facility]. She took fosamax in the past for 5 years (8/2014 - 1/2021). Now on Prolia injections. Due for follow-up with endocrinology in May 2023.    6. History of nonobstructive CAD, dilated ascending aorta. Follows with cardiology, last visit 11/9/22. At that time, echo showed dilated ascending aorta - 4.3 cm (unchanged from previous study). She is to follow-up with cardiology in 1 year with echo prior.    Have you ever done Advance Care Planning? (For example, a Health Directive, POLST, or a discussion with a medical provider or your loved ones about your wishes): No, advance care planning information given to patient to review.  Patient declined advance care planning discussion at this time.    Fall risk  Fallen 2 or more times in the past year?: No  Any fall with injury in the past year?: No    Cognitive Screening   1) Repeat 3 items (Leader, Season, Table)    2) Clock draw: NORMAL  3) 3 item recall: Recalls 3 objects  Results: 3 items recalled: COGNITIVE IMPAIRMENT LESS LIKELY    Mini-CogTM Copyright [employer]. Licensed by the author for use in [facility]; reprinted with permission (eduar@[facility]). All rights reserved.      Do you have sleep apnea, excessive snoring or daytime drowsiness?: Daytime drowsiness and CANT SLEEP. She is awake for hours    Reviewed and updated as needed this visit by clinical staff   Tobacco  Allergies  Meds  Problems  Med Hx  Surg Hx  Fam Hx          Reviewed and updated as needed this visit by Provider   Tobacco  Allergies  Meds  Problems  Med Hx  Surg Hx  Fam Hx         Social History     Tobacco Use     Smoking status: Never     Smokeless tobacco: Never   Vaping Use     Vaping status: Never Used     Passive vaping exposure: Yes   Substance Use Topics     Alcohol use: Yes     Comment: Maybe 1x amonth             4/4/2023    11:02 AM   Alcohol Use   Prescreen: >3 drinks/day or >7 drinks/week? No     Do you have a current opioid prescription? No  Do you use  any other controlled substances or medications that are not prescribed by a provider? None      Current providers sharing in care for this patient include:   Patient Care Team:  Devi Romano MD as PCP - General (Family Medicine)  Devi Romano MD as Assigned PCP  Erika Helms PA-C as Assigned Surgical Provider  Mary Funes MD as Hospitalist (Endocrinology, Diabetes, and Metabolism)  Mary Funes MD as Assigned Endocrinology Provider  Ye Waldrop MD as MD (Allergy & Immunology)  Shankar Eric MD as Referring Physician (Family Practice)  Ye Waldrop MD as Assigned Allergy Provider  Nuzhat Velasquez APRN CNP as Nurse Practitioner (Cardiovascular Disease)  Kati Busby RPH as Pharmacist (Pharmacist)  Nuzhat Velasquez APRN CNP as Assigned Heart and Vascular Provider  Kati Busby RPH as Assigned MTM Pharmacist  Kentrell Hess MD as MD (Neurology)  Sanchez Ho MD as MD (Otolaryngology)    The following health maintenance items are reviewed in Epic and correct as of today:  Health Maintenance   Topic Date Due     DTAP/TDAP/TD IMMUNIZATION (3 - Td or Tdap) 12/04/2022     DEXA  02/14/2024     TSH W/FREE T4 REFLEX  02/16/2024     MEDICARE ANNUAL WELLNESS VISIT  04/10/2024     ANNUAL REVIEW OF HM ORDERS  04/10/2024     FALL RISK ASSESSMENT  04/10/2024     ADVANCE CARE PLANNING  04/10/2028     PHQ-2 (once per calendar year)  Completed     INFLUENZA VACCINE  Completed     Pneumococcal Vaccine: 65+ Years  Completed     ZOSTER IMMUNIZATION  Completed     COVID-19 Vaccine  Completed     IPV IMMUNIZATION  Aged Out     MENINGITIS IMMUNIZATION  Aged Out     MAMMO SCREENING  Discontinued     Labs reviewed in EPIC  BP Readings from Last 3 Encounters:   04/10/23 138/79   02/16/23 136/74   11/09/22 129/71    Wt Readings from Last 3 Encounters:   04/10/23 65.9 kg (145 lb 4.8 oz)   02/16/23 62.6 kg (138 lb)   11/09/22 62.4 kg (137 lb 9.6 oz)                   Patient Active Problem List   Diagnosis     Hypothyroidism     Grave's disease     Bile reflux gastritis     Hyperlipidemia LDL goal <70     Sjogren's disease (H)     HL (hearing loss)     Mixed incontinence     Chronic cough     Impaired fasting glucose     Status post-operative repair of hip fracture, RIGHT     Senile osteoporosis     Gastroesophageal reflux disease, unspecified whether esophagitis present     ASCVD (arteriosclerotic cardiovascular disease)     Macrocytosis     Monoclonal paraproteinemia     MGUS (monoclonal gammopathy of unknown significance)     Epigastric pain     Hypertension goal BP (blood pressure) < 140/90     Complication of anesthesia     S/P lumbar fusion     Ascending aorta dilation (H)     Other osteoarthritis involving multiple joints     Dry mouth     Elevated glucose     Severe osteopetrosis     Idiopathic osteoporosis     History of drug therapy     Coronary artery disease due to lipid rich plaque     Dyslipidemia     Primary osteoarthritis of both hands     Status post total replacement of right hip     Past Surgical History:   Procedure Laterality Date     ARTHROPLASTY HIP ANTERIOR Left 10/14/2022    Procedure: LEFT TOTAL HIP ARTHROPLASTY DIRECT ANTERIOR APPROACH;  Surgeon: Selvin Saab MD;  Location:  OR     ARTHROSCOPY KNEE  10/05/2012    R Procedure: ARTHROSCOPY KNEE;  RIGHT KNEE ARTHROSCOPY, PARTIAL MEDIAL MENISCECTOMY;  Surgeon: Karli Zaragoza MD;  Location: [facility]     BACK SURGERY  About. 5 years ago    Fusion of 4&5 lumbar     BLADDER SURGERY       BUNIONECTOMY  ~2010    L foot.      CATARACT IOL, RT/LT Bilateral 07/2017     COLONOSCOPY N/A 01/17/2017    normal; no repeat Procedure: COLONOSCOPY;  Surgeon: Fan Giordano MD;  Location:  GI     ENDOSCOPIC RELEASE CARPAL TUNNEL  09/11/2012    R Procedure: ENDOSCOPIC RELEASE CARPAL TUNNEL;  Right Endoscopic carpal tunnel release, left ringer finger cortizon injection;  Surgeon: Anne Marie Catherine MD;   Location:  OR     ESOPHAGOSCOPY, GASTROSCOPY, DUODENOSCOPY (EGD), COMBINED N/A 12/26/2014    Procedure: COMBINED ESOPHAGOSCOPY, GASTROSCOPY, DUODENOSCOPY (EGD);  Surgeon: Fan Giordano MD;  Location:  GI     EXCISE EXOSTOSIS FOOT Left 12/01/2016    Procedure: EXCISE EXOSTOSIS FOOT;  Surgeon: Jody Gallagher, DPM, Pod;  Location:  OR     GYN SURGERY  1978    ovaries removed     HEART CATH FYI  03/04/2016    dz <40%     HIP HARDWARE REMOVAL Right 08/04/2020    Procedure: HARDWARE REMOVAL - LATERAL APPROACH;  Surgeon: Charlie Wolfe MD;  Location: [facility] OR;  Service: Orthopedics     HYSTERECTOMY       INJECT STEROID (LOCATION)  09/11/2012    Procedure: INJECT STEROID (LOCATION);;  Surgeon: Anne Marie Catherine MD;  Location:  OR     LAPAROSCOPIC CHOLECYSTECTOMY N/A 04/07/2016    Procedure: LAPAROSCOPIC CHOLECYSTECTOMY;  Surgeon: Hans Medina MD;  Location:  OR     OPTICAL TRACKING SYSTEM FUSION SPINE POSTERIOR LUMBAR TWO LEVELS N/A 10/31/2018    Procedure: Central L3-4 L4-5 lamenectomies L4-5 posterior instrumented fusion;  Surgeon: Aroldo Burdick MD;  Location:  OR     ORTHOPEDIC SURGERY Right 2014    karli for fx femur     RECONSTRUCT FOREFOOT WITH METATARSOPHALANGEAL (MTP) FUSION Left 04/28/2017    Procedure: RECONSTRUCT FOREFOOT WITH METATARSOPHALANGEAL (MTP) FUSION;  1. Resection arthroplasty, fifth metatarsophalangeal joint, left foot.   2. Irrigation and debridement of fifth metatarsophalangeal joint, left foot (excisional to the level of the bone).     ;  Surgeon: Fabián Phipps MD;  Location:  OR     RELEASE CARPAL TUNNEL       RIGHT HIP ORIF 4/1/2014       ROTATOR CUFF REPAIR RT/LT  ~1998    Lt shoulder; rotator cuff     SURGICAL HISTORY OF -   distant past    ablation for palpitations.     SURGICAL HISTORY OF -   06/2015    left carpal tunnel surgery     [facility] NONSPECIFIC PROCEDURE  1976    D&C     [facility] TOTAL HIP ARTHROPLASTY Right 08/04/2020    Procedure: RIGHT TOTAL HIP  ARTHROPLASTY;  Surgeon: Charlie Wolfe MD;  Location: [facility] OR;  Service: Orthopedics     [facility] TOTAL KNEE ARTHROPLASTY Right 01/2020       Social History     Tobacco Use     Smoking status: Never     Smokeless tobacco: Never   Vaping Use     Vaping status: Never Used     Passive vaping exposure: Yes   Substance Use Topics     Alcohol use: Yes     Comment: Maybe 1x amonth     Family History   Problem Relation Age of Onset     C.A.D. Mother 76     Cancer Mother         non Hodgekin's Lymphoma     Heart Disease Mother         enlarged heart     Breast Cancer Mother      C.A.D. Father 59     Heart Disease Father         valve problem     Hypertension Brother      Neuropathy Brother      Heart Disease Brother         open heart to repair mitral valve.     Connective Tissue Disorder Daughter         scleroderma     Colon Cancer No family hx of          Current Outpatient Medications   Medication Sig Dispense Refill     acetaminophen (TYLENOL) 325 MG tablet Take 3 tablets (975 mg) by mouth every 6 hours as needed for mild pain 100 tablet 0     atorvastatin (LIPITOR) 20 MG tablet Take 1 tablet (20 mg) by mouth daily 90 tablet 2     benzonatate (TESSALON) 200 MG capsule Take 200 mg by mouth 2 times daily       Boswellia-Glucosamine-Vit D (OSTEO BI-FLEX ONE PER DAY PO) Take 1 capsule by mouth daily       Calcium Carb-Cholecalciferol (SM CALCIUM/VITAMIN D) 600-800 MG-UNIT TABS Take 1 tablet by mouth 2 times daily       Carboxymethylcellulose Sodium (REFRESH LIQUIGEL OP) Apply 1 drop to eye every evening 15 minutes after Restasis drops.       Coenzyme Q10 300 MG CAPS Take 1 capsule by mouth daily       ELDERBERRY PO Take 1 tablet by mouth daily       EPINEPHrine (ANY BX GENERIC EQUIV) 0.3 MG/0.3ML injection 2-pack Inject 0.3 mLs (0.3 mg) into the muscle as needed for anaphylaxis 2 each 0     famotidine (PEPCID) 40 MG tablet TAKE 1 TABLET BY MOUTH DAILY 90 tablet 0     furosemide (LASIX) 20 MG tablet Take 1 tablet (20  mg) by mouth daily as needed (leg swelling) 90 tablet 1     LANsoprazole (PREVACID) 30 MG DR capsule TAKE 1 CAPSULE BY MOUTH IN THE  MORNING 90 capsule 3     levothyroxine (SYNTHROID/LEVOTHROID) 100 MCG tablet Take 1 tablet (100 mcg) by mouth daily 90 tablet 2     Lutein 20 MG CAPS Take 1 capsule by mouth daily       metoprolol succinate ER (TOPROL XL) 25 MG 24 hr tablet Take 1 tablet (25 mg) by mouth daily 90 tablet 2     mirabegron (MYRBETRIQ) 25 MG 24 hr tablet Take 1 tablet (25 mg) by mouth daily 90 tablet 1     nitroGLYcerin (NITROSTAT) 0.4 MG sublingual tablet Place 1 tablet (0.4 mg) under the tongue every 5 minutes as needed for chest pain 25 tablet 0     Omega-3 Fatty Acids (OMEGA-3 FISH OIL PO) Take 1 g by mouth 2 times daily (with meals)        pilocarpine (SALAGEN) 5 MG tablet Take 5 mg by mouth 3 times daily       polyethylene glycol (MIRALAX) 17 GM/Dose powder Take 1 capful by mouth 2 times daily In 8 ounces of liquid       potassium chloride ER (KLOR-CON M) 10 MEQ CR tablet Take 1 tablet (10 mEq) by mouth as needed for potassium supplementation (take in combination with lasix) 30 tablet 11     Vitamin D (Cholecalciferol) 50 MCG (2000 UT) CAPS Take 1 capsule by mouth 2 times daily       Allergies   Allergen Reactions     Codeine      fatigue     Vicodin [Acetaminophen] Fatigue     Adhesive Tape Rash     Recent Labs   Lab Test 02/16/23  0911 10/15/22  0713 04/27/22  0445 03/18/22  1215 01/06/22  1051 04/16/21  1153 01/19/21  0825 08/05/20  0550 12/21/18  0930 12/21/18  0930   A1C 6.2*  --   --  6.1*  --   --  5.9*  --   --  5.7*   LDL 75  --   --   --  49  --  64  --   --  70   HDL 71  --   --   --  99  --  89  --   --  83   TRIG 80  --   --   --  61  --  108  --   --  98   ALT  --   --   --   --  24  --  25  --   --  20   CR 0.76 0.65   < >  --  0.72  --  0.72 0.70   < > 0.85   GFRESTIMATED 78 88   < >  --  84  --  79 >60  --  66   GFRESTBLACK  --   --   --   --   --   --  >90 >60  --  76   POTASSIUM  "4.6 4.3   < >  --  4.1  --  4.0 4.2  4.2   < > 4.1   TSH 2.97  --   --   --  1.39   < > 3.02  --   --  5.46*    < > = values in this interval not displayed.      Mammogram Screening: Mammogram Screening - Patient over age 75, has elected to continue with screening.    Pertinent mammograms are reviewed under the imaging tab.    Review of Systems   Constitutional: Negative for chills and fever.   HENT: Positive for hearing loss. Negative for congestion, ear pain and sore throat.    Eyes: Negative for pain and visual disturbance.   Respiratory: Positive for cough. Negative for shortness of breath.    Cardiovascular: Negative for chest pain, palpitations and peripheral edema.   Gastrointestinal: Negative for abdominal pain, constipation, diarrhea, heartburn, hematochezia and nausea.   Breasts:  Negative for tenderness, breast mass and discharge.   Genitourinary: Positive for frequency and urgency. Negative for dysuria, genital sores, hematuria, pelvic pain, vaginal bleeding and vaginal discharge.   Musculoskeletal: Positive for arthralgias. Negative for joint swelling and myalgias.   Skin: Negative for rash.   Neurological: Negative for dizziness, weakness, headaches and paresthesias.   Psychiatric/Behavioral: Negative for mood changes. The patient is not nervous/anxious.        OBJECTIVE:   /79 (BP Location: Right arm, Patient Position: Sitting, Cuff Size: Adult Regular)   Pulse 52   Temp 97.1  F (36.2  C) (Oral)   Resp 14   Ht 1.619 m (5' 3.75\")   Wt 65.9 kg (145 lb 4.8 oz)   SpO2 97%   BMI 25.14 kg/m   Estimated body mass index is 25.14 kg/m  as calculated from the following:    Height as of this encounter: 1.619 m (5' 3.75\").    Weight as of this encounter: 65.9 kg (145 lb 4.8 oz).  Physical Exam  GENERAL: healthy, alert and no distress  EYES: Eyes grossly normal to inspection, PERRL and conjunctivae and sclerae normal  HENT: ear canals and TM's normal, nose and mouth without ulcers or lesions  NECK: " no adenopathy, no asymmetry, masses, or scars and thyroid normal to palpation  RESP: lungs clear to auscultation - no rales, rhonchi or wheezes  CV: regular rate and rhythm, normal S1 S2, no S3 or S4, no murmur, click or rub, no peripheral edema and peripheral pulses strong  ABDOMEN: soft, nontender, no hepatosplenomegaly, no masses and bowel sounds normal  MS: no gross musculoskeletal defects noted, no edema  SKIN: no suspicious lesions or rashes  NEURO: Normal strength and tone, mentation intact and speech normal  PSYCH: mentation appears normal, affect normal/bright    Diagnostic Test Results:  Labs reviewed in Epic    ASSESSMENT / PLAN:   Encounter for Medicare annual wellness exam  Reviewed personal and family history. Reviewed age appropriate screenings. Reviewed healthy BP and BMI ranges. Counseled on lifestyle modifications for optimal mental and physical health.  Discussed age-appropriate health maintenance. Recommended any needed vaccinations. Continue to focus on well balanced diet and exercise. She will get tdap at pharmacy.    Hyperlipidemia LDL goal <70  Chronic, stable.  - atorvastatin (LIPITOR) 20 MG tablet; Take 1 tablet (20 mg) by mouth daily    Hypothyroidism, unspecified type  Chronic, has had some weight gain but recent TSH was wnl.  - levothyroxine (SYNTHROID/LEVOTHROID) 100 MCG tablet; Take 1 tablet (100 mcg) by mouth daily    Hypertension goal BP (blood pressure) < 140/90  BP controlled.  - metoprolol succinate ER (TOPROL XL) 25 MG 24 hr tablet; Take 1 tablet (25 mg) by mouth daily    Dysphagia, unspecified type  Swallowing/voice concerns. Feels like her voice seems more hoarse when she talks a lot. She thinks she needs to go to ENT. Feels like food gets caught in throat when swallowing and she coughs a lot. Sometimes even feels like she chokes on water. No pain with swallowing. She does have dry mouth, needs to drinks a lot of water with meals. She does have history of GERD, managed with  "PPI. Further evaluation with ENT.  - Adult ENT  Referral; Future    Balance problems  Wants referral for neurologist. Reports she doesn't feel like \"head is right\". She reports sometimes feeling dizzy, more so when she is overtired, but also at other times. She also feels like her balance is off and feels she is overall not as strong as she used to be. She does exercise every day at [employer] and says she does well with this. Denies falls. She has noticed some tremors at times - hands, chin quivering a few days ago when trying to take a nap. No focal weakness, numbness/tingling. No headache. No speech changes. She also feels like memory is not as good as it used to be - often will forget words or names, forget what she was talking about. Cognitive screening normal today. She has noticed this over the past 6 months or so, gradually. No chest pain, shortness of breath, palpitations, syncope, edema. Could consider PT for balance, generalized strengthening; she would like to start with neurology.  - Adult Neurology  Referral; Future    Sjogren's syndrome with keratoconjunctivitis sicca (H)  Chronic, follows with rheumatology    Ascending aorta dilation (H)  History of nonobstructive CAD, dilated ascending aorta. Follows with cardiology, last visit 11/9/22. At that time, echo showed dilated ascending aorta - 4.3 cm (unchanged from previous study). She is to follow-up with cardiology in 1 year with echo prior.    Senile osteoporosis  She follows with endocrinology, Dr. Batista. She took fosamax in the past for 5 years (8/2014 - 1/2021). Now on Prolia injections. Due for follow-up with endocrinology in May 2023.     Primary osteoarthritis of both hands  Other osteoarthritis involving multiple joints  Chronic, follows with rheumatology. For hand arthritis, rheumatology recommended tylenol up to 1000 mg every 8 hours and trial of arthritis gloves. She uses tylenol with minimal relief, has not tried " arthritis gloves. Could also try topical voltaren gel. Consider referral for ortho or hand PT if she is interested, she declines at this time.    COUNSELING:  Reviewed preventive health counseling, as reflected in patient instructions        She reports that she has never smoked. She has never used smokeless tobacco.      Appropriate preventive services were discussed with this patient, including applicable screening as appropriate for cardiovascular disease, diabetes, osteopenia/osteoporosis, and glaucoma.  As appropriate for age/gender, discussed screening for colorectal cancer, prostate cancer, breast cancer, and cervical cancer. Checklist reviewing preventive services available has been given to the patient.    Reviewed patients plan of care and provided an AVS. The Basic Care Plan (routine screening as documented in Health Maintenance) for Ya meets the Care Plan requirement. This Care Plan has been established and reviewed with the Patient.          Jennifer Talavera PA-C  [facility]    Identified Health Risks:    I have reviewed Opioid Use Disorder and Substance Use Disorder risk factors and made any needed referrals.       The patient was counseled and encouraged to consider modifying their diet and eating habits. She was provided with information on recommended healthy diet options.  The patient was provided with written information regarding signs of hearing loss.  Information on urinary incontinence and treatment options given to patient.

## 2023-04-21 ENCOUNTER — ANCILLARY PROCEDURE (OUTPATIENT)
Dept: MAMMOGRAPHY | Facility: CLINIC | Age: 83
End: 2023-04-21
Attending: NURSE PRACTITIONER
Payer: COMMERCIAL

## 2023-04-21 DIAGNOSIS — Z12.31 VISIT FOR SCREENING MAMMOGRAM: ICD-10-CM

## 2023-04-21 PROCEDURE — 77067 SCR MAMMO BI INCL CAD: CPT | Mod: TC | Performed by: RADIOLOGY

## 2023-04-21 PROCEDURE — 77063 BREAST TOMOSYNTHESIS BI: CPT | Mod: TC | Performed by: RADIOLOGY

## 2023-05-02 ENCOUNTER — TELEPHONE (OUTPATIENT)
Dept: ENDOCRINOLOGY | Facility: CLINIC | Age: 83
End: 2023-05-02
Payer: COMMERCIAL

## 2023-05-02 NOTE — TELEPHONE ENCOUNTER
Patient has nurse appointment 5/15/23 for Prolia injection. Previous Prolia order , was ordered by Dr. Funes.     Last office visit with Dr. Funes: 22  Last Prolia injection: 22    Routed to Dr. Funes to review for updated orders.     Radha Owen RN  [facility]

## 2023-05-02 NOTE — TELEPHONE ENCOUNTER
Last visit 5/2/22  Ok to add on 3:30 on this Thursday for in person clinic visit.    For future:    Please note that schedule gets booked out fast and it is difficult to add on patients when schedule is full.  So it is highly advised that you make appointment ahead of time so that we can follow up on labs/imaging studies in timely manner. This will help us to serve you better.  Please call the clinic to make appropriate appointments.  Let us know if you have any questions or if you need any help with scheduling.

## 2023-05-03 NOTE — TELEPHONE ENCOUNTER
I called the pt, she has another appt tomorrow and cannot make the 330 appt. The pt is scheduled for prolia 5/15. Please advise on another time the pt can be seen prior to that day.

## 2023-05-08 ENCOUNTER — MYC MEDICAL ADVICE (OUTPATIENT)
Dept: ENDOCRINOLOGY | Facility: CLINIC | Age: 83
End: 2023-05-08
Payer: COMMERCIAL

## 2023-05-08 NOTE — TELEPHONE ENCOUNTER
appt aurelio.    Message sent via [employer].      Michelle Ibarra [facility]  [phone]

## 2023-05-09 ENCOUNTER — VIRTUAL VISIT (OUTPATIENT)
Dept: ENDOCRINOLOGY | Facility: CLINIC | Age: 83
End: 2023-05-09
Payer: COMMERCIAL

## 2023-05-09 ENCOUNTER — TELEPHONE (OUTPATIENT)
Dept: ENDOCRINOLOGY | Facility: CLINIC | Age: 83
End: 2023-05-09

## 2023-05-09 DIAGNOSIS — M81.0 SENILE OSTEOPOROSIS: Primary | ICD-10-CM

## 2023-05-09 DIAGNOSIS — M81.8 IDIOPATHIC OSTEOPOROSIS: ICD-10-CM

## 2023-05-09 PROCEDURE — 99214 OFFICE O/P EST MOD 30 MIN: CPT | Mod: VID | Performed by: INTERNAL MEDICINE

## 2023-05-09 ASSESSMENT — ENCOUNTER SYMPTOMS
MUSCLE WEAKNESS: 0
BACK PAIN: 1
EYE REDNESS: 1
DOUBLE VISION: 0
MYALGIAS: 1
MUSCLE CRAMPS: 0
EYE WATERING: 1
NECK PAIN: 0
ARTHRALGIAS: 0
JOINT SWELLING: 0
EYE IRRITATION: 1
STIFFNESS: 1
EYE PAIN: 1

## 2023-05-09 NOTE — TELEPHONE ENCOUNTER
Follow-up:  1 year     Mary Funes MD  Endocrinology    -----------------------------------------  Writer contacted patient to schedule follow up per chart notes above from provider, but patient was driving at the time of call. Writer notified her that a call will be placed back to her again to schedule follow up at another time. Patient verbalized agreement with this and did not verbalize any further questions at the time of call. Call ended after this so patient could safely drive.  Bren LEMUS Virtual Visit Facilitator 1:15 PM May 9, 2023

## 2023-05-09 NOTE — PATIENT INSTRUCTIONS
[facility]  Dr Funes, Endocrinology Department    [facility]   [address] [facility]  [place] [address]  Appointment [phone]  Fax: [phone]  [place]: Monday - Thursday      Plan to continue PROLIA.  Due for PROLIA in 2023, 2023.  DEXA  onwards (at [place])  Follow up after DEXA.    PROLIA Scheduling information:  It will be done in clinic.   You need to make nurse only appointment. (call [place]: [phone] or [place]:[phone] and schedule Nurse only appointment)  PROLIA is every 6 months injection- so please schedule accordingly.  It is recommended NOT to miss or delay PROLIA injections.    Possible major side effects of Denosumab (PROLIA) include risk for atypical femur fractures, hypersensitivity, low calcium levels, osteonecrosis of jaw (which can manifest as jaw pain, osteomyelitis, osteitis, bone erosion, tooth/periodontal infection, toothache, gingival ulceration/erosion). Other s/e include but not limited to Hypertension, Headache and peripheral edema.   Always let your dentist lnow about the medication if plan for major dental procedure.    Indication: Prolia  (denosumab) is a prescription medicine used to treat osteoporosis in patients who:   Are at high risk for fracture, meaning patients who have had a fracture related to osteoporosis, or who have multiple risk factors for fracture   Cannot use another osteoporosis medicine or other osteoporosis medicines did not work well   The timeline for early/late injections would be 4 weeks early and any time after the 6 month bella. If a patient receives their injection late, then the subsequent injection would be 6 months from the date that they actually received the injection    The pt was advised to  Maintain an adequate calcium and vitamin D intake and to supplement vitamin D if needed to maintain serum levels of 25 hydroxy D (25 OH D) between 30-60 ng/ml.  Limit alcohol  intake to no more than 2 servings per day.  Limit caffeine intake.  Maintain an active lifestyle including weight-bearing exercises for at least 30 mins daily.  Take measures to reduce the risk of falling.    You should get 1000- 1200 mg/day calcium in divided doses of no more than 500 mg/dose.  This INCLUDES what is in your food as well as what is in any supplements you may be taking.    Vit D about 800-1000 IU/day ( unless you have vit D deficiency- in that case higher dose)  Dietary sources of calcium:: These also contain vitamin D  Milk                            8 oz            300 mg calcium  Yogurt                          1 cup           400 mg calcium   Hard cheese                     1.5 oz          300 mg  Cottage cheese                  2 cup           300 mg  Orange juice with Calcium       8 oz            300 mg  Low fat dairy sources are recommended      You should get 30 minutes of moderate weight bearing exercise on most days of the week .  Weight bearing exercise includes such things as walking, jogging, hiking, dancing.  You should also get Strength training 2 or more times/week in addition to other weight -being exercise. Strength training uses weight or resistance beyond that seen in everyday activities -(pilates, weight training with free weights, weight machines or resistance bands)    Living with Osteoporosis: Preventing Fractures  If you have osteoporosis, you can do a lot to reduce its effect on your life. Knowing how to prevent fractures and spinal curvature can help you live more comfortably and safely with this disease.  Reducing your risk for fractures  The most common fracture sites in people with osteoporosis are the wrist, spine, and hip. These fractures are often caused by accidents and falls. All fractures are painful and may limit what you can do. But hip fractures are very serious. They often need surgery, and it can take months to recover. To reduce your risk for fractures:  Get  regular exercise. Try walking, swimming, or weight training.  Eat foods that are rich in calcium, or take calcium supplements.  Make your home safe to help avoid accidents.  Take extra precautions not to fall in risky areas, such as icy sidewalks.  Understanding spinal fractures  Your spine is made up of many bones called vertebrae. Osteoporosis can cause the vertebrae in your spine to collapse. As a result, your upper back may arch forward, creating a curvature. Spine fractures may also result from back strain and bad posture. You will also lose height. Your lower spine must then adjust to keep your body balanced. This can cause back pain. To prevent or lessen these spinal changes:  Practice good posture.  Use proper techniques if you need to lift heavy objects.  Do back exercises to help your posture.  Lie on your back when you have pain.  Ask your healthcare provider about these and other ways to help your spine.  [employer] last reviewed this educational content on 5/1/2018 2000-2021 [employer]. All rights reserved. This information is not intended as a substitute for professional medical care. Always follow your healthcare professional's instructions.          Living with Osteoporosis: Regular Exercise  If you have osteoporosis, exercise is vital for your health. It can prevent bone fractures and spine changes. It will slow bone loss. Exercise will strengthen your body. It can also be fun. A variety of exercises is best. See below for exercises that can help you. But before you start, talk with your healthcare provider to be sure these exercises are right for you.   Resistance exercises. These build muscle strength and maintain bone mass. They also make you less prone to injury. Exercises include lifting small weights, doing push-ups and sit-ups, using elastic exercise bands, and using weight machines.   Weight-bearing activities. These help your whole body. They also help you maintain bone  mass. Activities include walking, dancing, and housework.   Non-weight-bearing exercises. These help prevent back strain and pain. They do this by building the trunk and leg muscles. Exercises that help with flexibility can prevent falls. Examples include swimming, water exercise, and stretching.   Staying safe  Here are tips to stay safe:   Always check with your healthcare provider before starting any new exercise program.  Use weights only as instructed.  Stop any exercise that causes pain.  [employer]Well last reviewed this educational content on 5/1/2018 2000-2021 [employer]. All rights reserved. This information is not intended as a substitute for professional medical care. Always follow your healthcare professional's instructions.          Preventing Osteoporosis: Avoiding Bone Loss  Certain factors can speed up bone loss or decrease bone growth. For example, alcohol, cigarettes, and certain medicines reduce bone mass. Some foods make it hard for your body to absorb calcium.  Things to avoid  Here are things to avoid to help prevent osteoporosis:  Alcohol. This is toxic to bones. It is a major cause of bone loss. Heavy drinking can cause osteoporosis even if you have no other risk factors.  Smoking. This reduces bone mass. Smoking may also interfere with estrogen levels and cause early menopause.  Inactivity. Not being active makes your bones lose strength and become thinner. Over time, thin bones may break. Women who aren't active are at a high risk for osteoporosis.  Certain medicines. Some medicines, such as cortisone, increase bone loss. They also decrease bone growth. Ask your healthcare provider about any side effects of your medicines, and how to prevent them.  Protein-rich or salty foods. Eaten in large amounts, these foods may deplete calcium.  Caffeine. This increases calcium loss. People who drink a lot of coffee, tea, or soda lose more calcium than those who don't.  [employer] last  reviewed this educational content on 2018-2021 [employer]. All rights reserved. This information is not intended as a substitute for professional medical care. Always follow your healthcare professional's instructions.          Preventing Osteoporosis: Meeting Your Calcium Needs  Your body needs calcium to build and repair bones. But it can't make calcium on its own. That's why it's important to eat calcium-rich foods. Some foods are naturally rich in calcium. Others have calcium added (fortified). It's best to get calcium from the foods you eat. But if you can't get enough, you may want to take calcium supplements. To meet your daily calcium needs, try the foods listed below.               Dairy Fish & beans Other sources   Source   Calcium (mg) per serving   Source   Calcium (mg) per serving   Source   Calcium (mg) per serving   Low-fat yogurt, plain   415 mg/8 oz.   Sardines, Atlantic, canned, with bones   351 mg/3 oz.   Oatmeal, instant, fortified   215 mg/1 cup   Nonfat milk   302 mg/1 cup   [place], sockeye, canned, with bones   239 mg/3 oz.   Tofu made with calcium sulfate   204 mg/3 oz.   Low-fat milk   297 mg/1 cup   Soybeans, fresh, boiled   131 mg/1/2 cup   Collards   179 mg/1/2 cup   Swiss cheese   272 mg/1 oz.   White beans, cooked   81 mg/1/2 cup   English muffin, whole wheat   175 mg/1 muffin   Cheddar cheese   205 mg/1 oz.   Navy beans, cooked   79 mg/1/2 cup   Kale   90 mg/1/2 cup   Ice cream strawberry   79 mg/1/2 cup           Orange, navel   56 mg/1 medium   Note: Calcium levels may vary depending on brand and size.  Daily calcium needs  14 to 18 years old: 1,300 mg  19 to 30 years old: 1,000 mg  31 to 50 years old: 1,000 mg  51 to 70 years old, women: 1,200 mg  51 to 70 years old, men: 1,000 mg  Pregnant or nursin to 18 years old: 1,300 mg, 19 to 50 years old: 1,000 mg  Older than 70 (women and men): 1,200 mg   [employer]Well last reviewed this educational content on  5/1/2018 2000-2021 [employer]. All rights reserved. This information is not intended as a substitute for professional medical care. Always follow your healthcare professional's instructions.

## 2023-05-09 NOTE — LETTER
"    5/9/2023         RE: Ya Stahl  [address]  [facility] [address]        Dear Colleague,    Thank you for referring your patient, Ya Stahl, to the [facility]. Please see a copy of my visit note below.    THIS IS A VIDEO VISIT:    Phone call visit/virtual visit encounter:    Name of patient: Ya Stahl    Date of encounter: 5/9/2023    Time of start of video visit: 9:30    Video started: 9:39    Video ended: 9:45    Provider location: working from home/ [facility]    Patient location: patients home.    Mode of transmission: video/ Doximity    Verbal consent: obtained before starting visit. Pt is agreeable.      The patient has been notified of following:      \"This VIDEO visit will be conducted via a call between you and your physician/provider. We have found that certain health care needs can be provided without the need for a physical exam.  This service lets us provide the care you need with a short phone conversation.  If a prescription is necessary we can send it directly to your pharmacy.  If lab work is needed we can place an order for that and you can then stop by our lab to have the test done at a later time.     With new updates with corona virus patient might be billed as clinic visit.     If during the course of the call the physician/provider feels a telephone visit is not appropriate, you will not be charged for this service.\"      Past medical history, social history, family history, allergy and medications were reviewed and updated as appropriate.  Reviewed pertinent labs, notes, imaging studies personally.    Name: Ya Stahl  Date: 5/9/2023  Seen for f/u ofosteoporosis.     HPI:  Ya Stahl is a 81 year old female who presents for the evaluation of osteoporosis.   has a past medical history of Arthritis, Cerebral infarction (H) (2019), Complication of anesthesia, Coronary artery disease, Disease of thyroid gland, " Displacement of lumbar intervertebral disc without myelopathy, Gastro-oesophageal reflux disease, GERD (gastroesophageal reflux disease), Hearing loss, Heart attack (H) (03/2016), History of anesthesia complications, History of angina, Hypertension, Low back pain, Migraine headache, Myocardial infarction (H), Numbness and tingling, PONV (postoperative nausea and vomiting), Sicca syndrome (H), Sjogren's disease (H), Sjogren's syndrome (H), Spider veins, and Unspecified hypothyroidism.    DEXA 2006- normal BMD  DEXA 2011, 2014- Osteopenia  DEXA 2/2022: osteoporosis    Diagnosed with osteoporosis in 2/2022 based on DEXA scan.  DEXA 2/2022: Severe osteoporosis on the basis of hip fracture. There has been probably no significant cahnge in bone density of the hip(s). The forearm was not included on the previous study so comparison is not possible.    The spine is not acceptable for evaluation due to previous surgical changes.    The right femur is not acceptable for evaluation due to previous surgical repair    Previous medication- Fosamax x 5 yrs and went off it.   (8/2014-1/2021)  Started PROLIA 2022.    Prolia dates: (get at [facility])  5/2022 11/2022    Tolerating well.      GERD: On PPI- under well controled.  Dental; OK. No upcoming dental procedure. Has gum disease.    Smoke:No  Family History:mother  Menstrual history/Birthing: s/p menopause at age 37. No HRT  Fractures: Fracture of right femur and pelvis at age 74 from a fall.Treated surgically. S/p hip replacement. (Hip fracture history?  Not clear based on chart review)  Kidney stones: No  GI Surgery:s/p cholecystectomy  Duration of therapy:   Exercise:walks 3 miles/day and does weights 5 days a week.  Diet: takes boost every few days  Ca/Vitamin D: calcium 600 mg BID, vit D 4000 international unit(s)/day.  Alcohol:  No  Eating Disorder: No  Steroid Use:  No  PMH/PSH:  Past Medical History:   Diagnosis Date     Arthritis      Cerebral infarction (H) 2019  "    Complication of anesthesia     \"hard time waking up / N & V\"     Coronary artery disease      Disease of thyroid gland      Displacement of lumbar intervertebral disc without myelopathy      Gastro-oesophageal reflux disease      GERD (gastroesophageal reflux disease)      Hearing loss     has bilateral aids     Heart attack (H) 03/2016     History of anesthesia complications     delayed emergence     History of angina      Hypertension      Low back pain      Migraine headache      Myocardial infarction (H)      Numbness and tingling     down right leg (associated with back pain)     PONV (postoperative nausea and vomiting)      Sicca syndrome (H)      Sjogren's disease (H)      Sjogren's syndrome (H)     sees specialist; dentist     Spider veins      Unspecified hypothyroidism      Past Surgical History:   Procedure Laterality Date     ARTHROPLASTY HIP ANTERIOR Left 10/14/2022    Procedure: LEFT TOTAL HIP ARTHROPLASTY DIRECT ANTERIOR APPROACH;  Surgeon: Selvin Saab MD;  Location:  OR     ARTHROSCOPY KNEE  10/05/2012    R Procedure: ARTHROSCOPY KNEE;  RIGHT KNEE ARTHROSCOPY, PARTIAL MEDIAL MENISCECTOMY;  Surgeon: Karli Zaragoza MD;  Location: [facility]     BACK SURGERY  About. 5 years ago    Fusion of 4&5 lumbar     BLADDER SURGERY       BUNIONECTOMY  ~2010    L foot.      CATARACT IOL, RT/LT Bilateral 07/2017     COLONOSCOPY N/A 01/17/2017    normal; no repeat Procedure: COLONOSCOPY;  Surgeon: Fan Giordano MD;  Location:  GI     ENDOSCOPIC RELEASE CARPAL TUNNEL  09/11/2012    R Procedure: ENDOSCOPIC RELEASE CARPAL TUNNEL;  Right Endoscopic carpal tunnel release, left ringer finger cortizon injection;  Surgeon: Anne Marie Catherine MD;  Location:  OR     ESOPHAGOSCOPY, GASTROSCOPY, DUODENOSCOPY (EGD), COMBINED N/A 12/26/2014    Procedure: COMBINED ESOPHAGOSCOPY, GASTROSCOPY, DUODENOSCOPY (EGD);  Surgeon: Fan Giordano MD;  Location:  GI     EXCISE EXOSTOSIS FOOT Left 12/01/2016    Procedure: " EXCISE EXOSTOSIS FOOT;  Surgeon: Jody Gallagher DPM, Pod;  Location:  OR     GYN SURGERY  1978    ovaries removed     HEART CATH FYI  03/04/2016    dz <40%     HIP HARDWARE REMOVAL Right 08/04/2020    Procedure: HARDWARE REMOVAL - LATERAL APPROACH;  Surgeon: Charlie Wolfe MD;  Location: [facility];  Service: Orthopedics     HYSTERECTOMY       INJECT STEROID (LOCATION)  09/11/2012    Procedure: INJECT STEROID (LOCATION);;  Surgeon: Anne Marie Catherine MD;  Location:  OR     LAPAROSCOPIC CHOLECYSTECTOMY N/A 04/07/2016    Procedure: LAPAROSCOPIC CHOLECYSTECTOMY;  Surgeon: Hans Medina MD;  Location:  OR     OPTICAL TRACKING SYSTEM FUSION SPINE POSTERIOR LUMBAR TWO LEVELS N/A 10/31/2018    Procedure: Central L3-4 L4-5 lamenectomies L4-5 posterior instrumented fusion;  Surgeon: Aroldo Burdick MD;  Location:  OR     ORTHOPEDIC SURGERY Right 2014    karli for fx femur     RECONSTRUCT FOREFOOT WITH METATARSOPHALANGEAL (MTP) FUSION Left 04/28/2017    Procedure: RECONSTRUCT FOREFOOT WITH METATARSOPHALANGEAL (MTP) FUSION;  1. Resection arthroplasty, fifth metatarsophalangeal joint, left foot.   2. Irrigation and debridement of fifth metatarsophalangeal joint, left foot (excisional to the level of the bone).     ;  Surgeon: Fabián Phipps MD;  Location:  OR     RELEASE CARPAL TUNNEL       RIGHT HIP ORIF 4/1/2014       ROTATOR CUFF REPAIR RT/LT  ~1998    Lt shoulder; rotator cuff     SURGICAL HISTORY OF -   distant past    ablation for palpitations.     SURGICAL HISTORY OF -   06/2015    left carpal tunnel surgery     RUST NONSPECIFIC PROCEDURE  1976    D&C     RUST TOTAL HIP ARTHROPLASTY Right 08/04/2020    Procedure: RIGHT TOTAL HIP ARTHROPLASTY;  Surgeon: Charlie Wolfe MD;  Location: [facility];  Service: Orthopedics     RUST TOTAL KNEE ARTHROPLASTY Right 01/2020     Family Hx:  Family History   Problem Relation Age of Onset     C.A.D. Mother 76     Cancer Mother         non Hodgekin's  Lymphoma     Heart Disease Mother         enlarged heart     Breast Cancer Mother      C.A.D. Father 59     Heart Disease Father         valve problem     Hypertension Brother      Neuropathy Brother      Heart Disease Brother         open heart to repair mitral valve.     Connective Tissue Disorder Daughter         scleroderma     Colon Cancer No family hx of        Social Hx:  Social History     Socioeconomic History     Marital status:      Spouse name: Not on file     Number of children: Not on file     Years of education: Not on file     Highest education level: Not on file   Occupational History     Not on file   Tobacco Use     Smoking status: Never     Smokeless tobacco: Never   Vaping Use     Vaping status: Never Used     Passive vaping exposure: Yes   Substance and Sexual Activity     Alcohol use: Yes     Comment: Maybe 1x amonth     Drug use: No     Sexual activity: Not Currently     Partners: Male     Comment: Am to old   Other Topics Concern     Parent/sibling w/ CABG, MI or angioplasty before 65F 55M? No      Service Not Asked     Blood Transfusions Not Asked     Caffeine Concern No     Comment: 1 capp. daily     Occupational Exposure Not Asked     Hobby Hazards Not Asked     Sleep Concern No     Stress Concern Not Asked     Weight Concern Not Asked     Special Diet No     Back Care Not Asked     Exercise Yes     Comment: walks 3 miles 6 days per week , curves 3 days per week     Bike Helmet Not Asked     Seat Belt Not Asked     Self-Exams Not Asked   Social History Narrative    Dairy/d 0-1 servings/d.     Caffeine 0 servings/d    Exercise 6 x week walk and curves    Sunscreen used - Yes    Seatbelts used - Yes    Working smoke/CO detectors in the home - Yes    Guns stored in the home - Yes LOCKED    Self Breast Exams - sometimes    Self Testicular Exam - NA    Eye Exam up to date - Yes 4/2009    Dental Exam up to date - Yes 12/2009    Pap Smear up to date - Yes - Hysterectomy     Mammogram up to date - Yes 11/25/2009    PSA up to date - NA    Dexa Scan up to date - 2006    Flex Sig / Colonoscopy up to date - Yes 5/14/2008    Immunizations up to date -9-2007 tetanus    Abuse: Current or Past(Physical, Sexual or Emotional)- No    Do you feel safe in your environment - Yes    DAMION Shaffer, [facility]    11/25/2009 updated     Social Determinants of Health     Financial Resource Strain: Not on file   Food Insecurity: Not on file   Transportation Needs: Not on file   Physical Activity: Not on file   Stress: Not on file   Social Connections: Not on file   Intimate Partner Violence: Not on file   Housing Stability: Not on file          MEDICATIONS:  has a current medication list which includes the following prescription(s): acetaminophen, atorvastatin, benzonatate, boswellia-glucosamine-vit d, sm calcium/vitamin d, carboxymethylcellulose sodium, coenzyme q10, elderberry, epinephrine, famotidine, furosemide, lansoprazole, levothyroxine, lutein, metoprolol succinate er, mirabegron, nitroglycerin, omega-3 fatty acids, pilocarpine, polyethylene glycol, potassium chloride er, and vitamin d (cholecalciferol).    ROS     ROS: 10 point ROS neg other than the symptoms noted above in the HPI.    Physical Exam   VS: There were no vitals taken for this visit.  GENERAL: healthy, alert and no distress  EYES: Eyes grossly normal to inspection, conjunctivae and sclerae normal  ENT: no nose swelling, nasal discharge.  Thyroid: no apparent thyroid nodules  RESP: no audible wheeze, cough, or visible cyanosis.  No visible retractions or increased work of breathing.  Able to speak fully in complete sentences.  ABDO: not evaluated.  EXTREMITIES: no hand tremors.  NEURO: Cranial nerves grossly intact, mentation intact and speech normal  SKIN: No apparent skin lesions, rash or edema seen   PSYCH: mentation appears normal, affect normal/bright, judgement and insight intact, normal speech and appearance  well-groomed    LABS:  BMP:  Last Basic Metabolic Panel:  Lab Results   Component Value Date     04/27/2022     01/19/2021      Lab Results   Component Value Date    POTASSIUM 3.9 04/27/2022    POTASSIUM 4.0 01/19/2021     Lab Results   Component Value Date    CHLORIDE 103 04/27/2022    CHLORIDE 104 01/19/2021     Lab Results   Component Value Date    CARMEN 8.9 04/27/2022    CARMEN 8.9 01/19/2021     Lab Results   Component Value Date    CO2 30 04/27/2022    CO2 30 01/19/2021     Lab Results   Component Value Date    BUN 17 04/27/2022    BUN 22 01/19/2021     Lab Results   Component Value Date    CR 0.63 04/27/2022    CR 0.72 01/19/2021     Lab Results   Component Value Date     04/27/2022     01/19/2021       TFTs:  Lab Results   Component Value Date    TSH 1.39 01/06/2022    TSH 1.87 04/16/2021       LFTs:  Liver Function Studies -   Recent Labs   Lab Test 01/06/22  1051   PROTTOTAL 7.2   ALBUMIN 3.8   BILITOTAL 1.1   ALKPHOS 80   AST 20   ALT 24       PTH:  ENDO CALCIUM LABS-[facility] Latest Ref Rng & Units 3/18/2022   PARATHYROID HORMONE INTACT 18 - 80 pg/mL 38       Vitamin D:  Vitamin D Deficiency Screening Results:  Lab Results   Component Value Date    VITDT 55 03/18/2022    VITDT 48 12/05/2013       BoneTurnOverMarkers:    DEXA:  DEXA 2/2022:  REFERENCE T-SCORES:       Normal                -1.0 and greater                                 Osteopenia         Between -1.0 and -2.5                                           Osteoporosis     -2.5 and less                                       RISK FACTORS:, Early menopause before age 45, Hip fracture, fractures of pelvis and foot     CURRENT TREATMENT:  Calcium with Vitamin D     FINDINGS:                Left Femoral Neck           T-score:  -0.6                       Forearm (radius 33%)      T-score:  -2.3  The spine is not acceptable for evaluation due to previous surgical changes.    The right femur is not acceptable for evaluation due to  "previous surgical repair.                           Left Total Hip BMD: 0.861   Previous: 0.882                       Forearm (radius 33%) BMD: 0.677  Previous: NA     Comparison is made to another DXA performed on a different Lunar machine on 7/21/2014.      IMPRESSION  Severe osteoporosis on the basis of hip fracture.      Comparisons from different scanners that have not been cross calibrated, are not necessarily valid. Such a comparison has been performed here; one should interpret with caution.   There has been probably no significant cahnge in bone density of the hip(s). The forearm was not included on the previous study so comparison is not possible.       Recommendations include ensuring adequate Calcium and Vitamin D.     The current NOF Guidelines recommend treatment for patients with prior hip or vertebral fracture, T-score -2.5 or below, or 10 year risk of any major osteoporotic fracture >20% or 10 year risk of hip fracture >3%, as calculated using the FRAX calculator (www.shef.ac.uk/FRAX or you can google \"FRAX\").       Based on these guidelines, treatment (in addition to calcium and vitamin D) is recommended for this patient, after ruling out other causes of osteoporosis.  This is meant as an aid to clinical decision making; one must still use clinical judgement.        All pertinent notes, labs, and images personally reviewed by me.     A/P  Ms.Sharon SAMRA Stalh is a 81 year old here for the evaluation of:    #1 Osteoporosis:  Risk factors for low bone density include personal history of fracture or family history,  advanced age, female,Estrogen deficiency,early menopause before age 45, Hip fracture, fractures of pelvis and foot.  She has been on Fosamax from [id number].  DEXA 2/2022 as noted above consistent with severe osteoporosis?  History of GERD-under good control with PPI  No upcoming dental procedure.  Labs showing normal calcium, kidney function, vitamin D and PTH levels.  On PROLIA since " 5/2023. Tolerating well.  Plan:  Discussed diagnosis, pathophysiology, management and treatment options of condition with pt.  Discussed osteoporosis in general.  Discussed 2/2022 DEXA scan results.   Plan to continue PROLIA.  Due for PROLIA in 5/2023, 11/2023.  DEXA 2/204 onwards (at [place] [facility]).  Follow up after DEXA.    Discussed indications, risks and benefits of all medications prescribed, and answered questions to patient's satisfaction.    The pt was advised to    Maintain an adequate calcium and vitamin D intake and to supplement vitamin D if needed to maintain serum levels of 25 hydroxy D (25 OH D) between 30-60 ng/ml.    Limit alcohol intake to no more than 2 servings per day.    Limit caffeine intake.    Maintain an active lifestyle including weight-bearing exercises for at least 30 mins daily.    Take measures to reduce the risk of falling.    All possible major side effects of Denosumab (PROLIA) were discussed including risk for atypical femur fractures, hypersensitivity, low calcium levels, osteonecrosis of jaw (which can manifest as jaw pain, osteomyelitis, osteitis, bone erosion, tooth/periodontal infection, toothache, gingival ulceration/erosion). Other s/e include but not limited to Hypertension, Headache and peripheral edema. I also told her to let her dentist lnow about the medication if plan for major dental procedure (currently no plans for dental procedure)  Indication: Prolia  (denosumab) is a prescription medicine used to treat osteoporosis in patients who:     Are at high risk for fracture, meaning patients who have had a fracture related to osteoporosis, or who have multiple risk factors for fracture     Cannot use another osteoporosis medicine or other osteoporosis medicines did not work well   The timeline for early/late injections would be 4 weeks early and any time after the 6 month bella. If a patient receives their injection late, then the subsequent injection would be 6 months from  the date that they actually received the injection        More than 50% of the time spent with Ms. Stahl on counseling / coordinating her care.      All questions were answered.  The patient indicates understanding of the above issues and agrees with the plan set forth.      Follow-up:  1 year    Mary Funes MD  Endocrinology  [facility]  CC: Devi Romano    Answers for HPI/ROS submitted by the patient on 5/9/2023  General Symptoms: No  Skin Symptoms: No  HENT Symptoms: No  EYE SYMPTOMS: Yes  HEART SYMPTOMS: No  LUNG SYMPTOMS: No  INTESTINAL SYMPTOMS: No  URINARY SYMPTOMS: No  GYNECOLOGIC SYMPTOMS: No  BREAST SYMPTOMS: No  SKELETAL SYMPTOMS: Yes  BLOOD SYMPTOMS: No  NERVOUS SYSTEM SYMPTOMS: No  MENTAL HEALTH SYMPTOMS: No  Eye pain: Yes  Vision loss: No  Dry eyes: Yes  Watery eyes: Yes  Eye bulging: No  Double vision: No  Flashing of lights: No  Spots: No  Floaters: Yes  Redness: Yes  Crossed eyes: No  Tunnel Vision: No  Yellowing of eyes: No  Eye irritation: Yes  Back pain: Yes  Muscle aches: Yes  Neck pain: No  Swollen joints: No  Joint pain: No  Bone pain: No  Muscle cramps: No  Muscle weakness: No  Joint stiffness: Yes  Bone fracture: No          Again, thank you for allowing me to participate in the care of your patient.        Sincerely,        Mary Funes MD

## 2023-05-09 NOTE — PROGRESS NOTES
"THIS IS A VIDEO VISIT:    Phone call visit/virtual visit encounter:    Name of patient: Ya Stahl    Date of encounter: 5/9/2023    Time of start of video visit: 9:30    Video started: 9:39    Video ended: 9:45    Provider location: working from Bob White/ [facility]    Patient location: patients home.    Mode of transmission: video/ Doximity    Verbal consent: obtained before starting visit. Pt is agreeable.      The patient has been notified of following:      \"This VIDEO visit will be conducted via a call between you and your physician/provider. We have found that certain health care needs can be provided without the need for a physical exam.  This service lets us provide the care you need with a short phone conversation.  If a prescription is necessary we can send it directly to your pharmacy.  If lab work is needed we can place an order for that and you can then stop by our lab to have the test done at a later time.     With new updates with corona virus patient might be billed as clinic visit.     If during the course of the call the physician/provider feels a telephone visit is not appropriate, you will not be charged for this service.\"      Past medical history, social history, family history, allergy and medications were reviewed and updated as appropriate.  Reviewed pertinent labs, notes, imaging studies personally.    Name: Ya Stahl  Date: 5/9/2023  Seen for f/u ofosteoporosis.     HPI:  Ya Stahl is a 81 year old female who presents for the evaluation of osteoporosis.   has a past medical history of Arthritis, Cerebral infarction (H) (2019), Complication of anesthesia, Coronary artery disease, Disease of thyroid gland, Displacement of lumbar intervertebral disc without myelopathy, Gastro-oesophageal reflux disease, GERD (gastroesophageal reflux disease), Hearing loss, Heart attack (H) (03/2016), History of anesthesia complications, History of angina, Hypertension, Low back pain, " "Migraine headache, Myocardial infarction (H), Numbness and tingling, PONV (postoperative nausea and vomiting), Sicca syndrome (H), Sjogren's disease (H), Sjogren's syndrome (H), Spider veins, and Unspecified hypothyroidism.    DEXA 2006- normal BMD  DEXA 2011, 2014- Osteopenia  DEXA 2/2022: osteoporosis    Diagnosed with osteoporosis in 2/2022 based on DEXA scan.  DEXA 2/2022: Severe osteoporosis on the basis of hip fracture. There has been probably no significant cahnge in bone density of the hip(s). The forearm was not included on the previous study so comparison is not possible.    The spine is not acceptable for evaluation due to previous surgical changes.    The right femur is not acceptable for evaluation due to previous surgical repair    Previous medication- Fosamax x 5 yrs and went off it.   (8/2014-1/2021)  Started PROLIA 2022.    Prolia dates: (get at [facility])  5/2022 11/2022    Tolerating well.      GERD: On PPI- under well controled.  Dental; OK. No upcoming dental procedure. Has gum disease.    Smoke:No  Family History:mother  Menstrual history/Birthing: s/p menopause at age 37. No HRT  Fractures: Fracture of right femur and pelvis at age 74 from a fall.Treated surgically. S/p hip replacement. (Hip fracture history?  Not clear based on chart review)  Kidney stones: No  GI Surgery:s/p cholecystectomy  Duration of therapy:   Exercise:walks 3 miles/day and does weights 5 days a week.  Diet: takes boost every few days  Ca/Vitamin D: calcium 600 mg BID, vit D 4000 international unit(s)/day.  Alcohol:  No  Eating Disorder: No  Steroid Use:  No  PMH/PSH:  Past Medical History:   Diagnosis Date     Arthritis      Cerebral infarction (H) 2019     Complication of anesthesia     \"hard time waking up / N & V\"     Coronary artery disease      Disease of thyroid gland      Displacement of lumbar intervertebral disc without myelopathy      Gastro-oesophageal reflux disease      GERD (gastroesophageal reflux " disease)      Hearing loss     has bilateral aids     Heart attack (H) 03/2016     History of anesthesia complications     delayed emergence     History of angina      Hypertension      Low back pain      Migraine headache      Myocardial infarction (H)      Numbness and tingling     down right leg (associated with back pain)     PONV (postoperative nausea and vomiting)      Sicca syndrome (H)      Sjogren's disease (H)      Sjogren's syndrome (H)     sees specialist; dentist     Spider veins      Unspecified hypothyroidism      Past Surgical History:   Procedure Laterality Date     ARTHROPLASTY HIP ANTERIOR Left 10/14/2022    Procedure: LEFT TOTAL HIP ARTHROPLASTY DIRECT ANTERIOR APPROACH;  Surgeon: Selvin Saab MD;  Location:  OR     ARTHROSCOPY KNEE  10/05/2012    R Procedure: ARTHROSCOPY KNEE;  RIGHT KNEE ARTHROSCOPY, PARTIAL MEDIAL MENISCECTOMY;  Surgeon: Karli Zaragoza MD;  Location: [facility]     BACK SURGERY  About. 5 years ago    Fusion of 4&5 lumbar     BLADDER SURGERY       BUNIONECTOMY  ~2010    L foot.      CATARACT IOL, RT/LT Bilateral 07/2017     COLONOSCOPY N/A 01/17/2017    normal; no repeat Procedure: COLONOSCOPY;  Surgeon: Fan Giordano MD;  Location:  GI     ENDOSCOPIC RELEASE CARPAL TUNNEL  09/11/2012    R Procedure: ENDOSCOPIC RELEASE CARPAL TUNNEL;  Right Endoscopic carpal tunnel release, left ringer finger cortizon injection;  Surgeon: Anne Marie Catherine MD;  Location:  OR     ESOPHAGOSCOPY, GASTROSCOPY, DUODENOSCOPY (EGD), COMBINED N/A 12/26/2014    Procedure: COMBINED ESOPHAGOSCOPY, GASTROSCOPY, DUODENOSCOPY (EGD);  Surgeon: Fan Giordano MD;  Location:  GI     EXCISE EXOSTOSIS FOOT Left 12/01/2016    Procedure: EXCISE EXOSTOSIS FOOT;  Surgeon: Jody Gallagher DPM, Pod;  Location:  OR     GYN SURGERY  1978    ovaries removed     HEART CATH FYI  03/04/2016    dz <40%     HIP HARDWARE REMOVAL Right 08/04/2020    Procedure: HARDWARE REMOVAL - LATERAL APPROACH;  Surgeon:  Charlie Wolfe MD;  Location: [facility];  Service: Orthopedics     HYSTERECTOMY       INJECT STEROID (LOCATION)  09/11/2012    Procedure: INJECT STEROID (LOCATION);;  Surgeon: Anne Marie Catherine MD;  Location:  OR     LAPAROSCOPIC CHOLECYSTECTOMY N/A 04/07/2016    Procedure: LAPAROSCOPIC CHOLECYSTECTOMY;  Surgeon: Hans Medina MD;  Location:  OR     OPTICAL TRACKING SYSTEM FUSION SPINE POSTERIOR LUMBAR TWO LEVELS N/A 10/31/2018    Procedure: Central L3-4 L4-5 lamenectomies L4-5 posterior instrumented fusion;  Surgeon: Aroldo Burdick MD;  Location:  OR     ORTHOPEDIC SURGERY Right 2014    karli for fx femur     RECONSTRUCT FOREFOOT WITH METATARSOPHALANGEAL (MTP) FUSION Left 04/28/2017    Procedure: RECONSTRUCT FOREFOOT WITH METATARSOPHALANGEAL (MTP) FUSION;  1. Resection arthroplasty, fifth metatarsophalangeal joint, left foot.   2. Irrigation and debridement of fifth metatarsophalangeal joint, left foot (excisional to the level of the bone).     ;  Surgeon: Fabián Phipps MD;  Location:  OR     RELEASE CARPAL TUNNEL       RIGHT HIP ORIF 4/1/2014       ROTATOR CUFF REPAIR RT/LT  ~1998    Lt shoulder; rotator cuff     SURGICAL HISTORY OF -   distant past    ablation for palpitations.     SURGICAL HISTORY OF -   06/2015    left carpal tunnel surgery     [facility] NONSPECIFIC PROCEDURE  1976    D&C     [facility] TOTAL HIP ARTHROPLASTY Right 08/04/2020    Procedure: RIGHT TOTAL HIP ARTHROPLASTY;  Surgeon: Charlie Wolfe MD;  Location: [facility];  Service: Orthopedics     [facility] TOTAL KNEE ARTHROPLASTY Right 01/2020     Family Hx:  Family History   Problem Relation Age of Onset     C.A.D. Mother 76     Cancer Mother         non Hodgekin's Lymphoma     Heart Disease Mother         enlarged heart     Breast Cancer Mother      C.A.D. Father 59     Heart Disease Father         valve problem     Hypertension Brother      Neuropathy Brother      Heart Disease Brother         open heart to repair mitral  valve.     Connective Tissue Disorder Daughter         scleroderma     Colon Cancer No family hx of        Social Hx:  Social History     Socioeconomic History     Marital status:      Spouse name: Not on file     Number of children: Not on file     Years of education: Not on file     Highest education level: Not on file   Occupational History     Not on file   Tobacco Use     Smoking status: Never     Smokeless tobacco: Never   Vaping Use     Vaping status: Never Used     Passive vaping exposure: Yes   Substance and Sexual Activity     Alcohol use: Yes     Comment: Maybe 1x amonth     Drug use: No     Sexual activity: Not Currently     Partners: Male     Comment: Am to old   Other Topics Concern     Parent/sibling w/ CABG, MI or angioplasty before 65F 55M? No      Service Not Asked     Blood Transfusions Not Asked     Caffeine Concern No     Comment: 1 capp. daily     Occupational Exposure Not Asked     Hobby Hazards Not Asked     Sleep Concern No     Stress Concern Not Asked     Weight Concern Not Asked     Special Diet No     Back Care Not Asked     Exercise Yes     Comment: walks 3 miles 6 days per week , curves 3 days per week     Bike Helmet Not Asked     Seat Belt Not Asked     Self-Exams Not Asked   Social History Narrative    Dairy/d 0-1 servings/d.     Caffeine 0 servings/d    Exercise 6 x week walk and curves    Sunscreen used - Yes    Seatbelts used - Yes    Working smoke/CO detectors in the home - Yes    Guns stored in the home - Yes LOCKED    Self Breast Exams - sometimes    Self Testicular Exam - NA    Eye Exam up to date - Yes 4/2009    Dental Exam up to date - Yes 12/2009    Pap Smear up to date - Yes - Hysterectomy    Mammogram up to date - Yes 11/25/2009    PSA up to date - NA    Dexa Scan up to date - 2006    Flex Sig / Colonoscopy up to date - Yes 5/14/2008    Immunizations up to date -9-2007 tetanus    Abuse: Current or Past(Physical, Sexual or Emotional)- No    Do you feel safe  in your environment - Yes    DAMION Shaffer, [facility]    11/25/2009 updated     Social Determinants of Health     Financial Resource Strain: Not on file   Food Insecurity: Not on file   Transportation Needs: Not on file   Physical Activity: Not on file   Stress: Not on file   Social Connections: Not on file   Intimate Partner Violence: Not on file   Housing Stability: Not on file          MEDICATIONS:  has a current medication list which includes the following prescription(s): acetaminophen, atorvastatin, benzonatate, boswellia-glucosamine-vit d, sm calcium/vitamin d, carboxymethylcellulose sodium, coenzyme q10, elderberry, epinephrine, famotidine, furosemide, lansoprazole, levothyroxine, lutein, metoprolol succinate er, mirabegron, nitroglycerin, omega-3 fatty acids, pilocarpine, polyethylene glycol, potassium chloride er, and vitamin d (cholecalciferol).    ROS     ROS: 10 point ROS neg other than the symptoms noted above in the HPI.    Physical Exam   VS: There were no vitals taken for this visit.  GENERAL: healthy, alert and no distress  EYES: Eyes grossly normal to inspection, conjunctivae and sclerae normal  ENT: no nose swelling, nasal discharge.  Thyroid: no apparent thyroid nodules  RESP: no audible wheeze, cough, or visible cyanosis.  No visible retractions or increased work of breathing.  Able to speak fully in complete sentences.  ABDO: not evaluated.  EXTREMITIES: no hand tremors.  NEURO: Cranial nerves grossly intact, mentation intact and speech normal  SKIN: No apparent skin lesions, rash or edema seen   PSYCH: mentation appears normal, affect normal/bright, judgement and insight intact, normal speech and appearance well-groomed    LABS:  BMP:  Last Basic Metabolic Panel:  Lab Results   Component Value Date     04/27/2022     01/19/2021      Lab Results   Component Value Date    POTASSIUM 3.9 04/27/2022    POTASSIUM 4.0 01/19/2021     Lab Results   Component Value Date    CHLORIDE 103 04/27/2022     CHLORIDE 104 01/19/2021     Lab Results   Component Value Date    CARMEN 8.9 04/27/2022    CARMEN 8.9 01/19/2021     Lab Results   Component Value Date    CO2 30 04/27/2022    CO2 30 01/19/2021     Lab Results   Component Value Date    BUN 17 04/27/2022    BUN 22 01/19/2021     Lab Results   Component Value Date    CR 0.63 04/27/2022    CR 0.72 01/19/2021     Lab Results   Component Value Date     04/27/2022     01/19/2021       TFTs:  Lab Results   Component Value Date    TSH 1.39 01/06/2022    TSH 1.87 04/16/2021       LFTs:  Liver Function Studies -   Recent Labs   Lab Test 01/06/22  1051   PROTTOTAL 7.2   ALBUMIN 3.8   BILITOTAL 1.1   ALKPHOS 80   AST 20   ALT 24       PTH:  ENDO CALCIUM LABS-[facility] Latest Ref Rng & Units 3/18/2022   PARATHYROID HORMONE INTACT 18 - 80 pg/mL 38       Vitamin D:  Vitamin D Deficiency Screening Results:  Lab Results   Component Value Date    VITDT 55 03/18/2022    VITDT 48 12/05/2013       BoneTurnOverMarkers:    DEXA:  DEXA 2/2022:  REFERENCE T-SCORES:       Normal                -1.0 and greater                                 Osteopenia         Between -1.0 and -2.5                                           Osteoporosis     -2.5 and less                                       RISK FACTORS:, Early menopause before age 45, Hip fracture, fractures of pelvis and foot     CURRENT TREATMENT:  Calcium with Vitamin D     FINDINGS:                Left Femoral Neck           T-score:  -0.6                       Forearm (radius 33%)      T-score:  -2.3  The spine is not acceptable for evaluation due to previous surgical changes.    The right femur is not acceptable for evaluation due to previous surgical repair.                           Left Total Hip BMD: 0.861   Previous: 0.882                       Forearm (radius 33%) BMD: 0.677  Previous: NA     Comparison is made to another DXA performed on a different Nexidia machine on 7/21/2014.      IMPRESSION  Severe osteoporosis on the basis  "of hip fracture.      Comparisons from different scanners that have not been cross calibrated, are not necessarily valid. Such a comparison has been performed here; one should interpret with caution.   There has been probably no significant cahnge in bone density of the hip(s). The forearm was not included on the previous study so comparison is not possible.       Recommendations include ensuring adequate Calcium and Vitamin D.     The current NOF Guidelines recommend treatment for patients with prior hip or vertebral fracture, T-score -2.5 or below, or 10 year risk of any major osteoporotic fracture >20% or 10 year risk of hip fracture >3%, as calculated using the FRAX calculator (www.shef.ac.uk/FRAX or you can google \"FRAX\").       Based on these guidelines, treatment (in addition to calcium and vitamin D) is recommended for this patient, after ruling out other causes of osteoporosis.  This is meant as an aid to clinical decision making; one must still use clinical judgement.        All pertinent notes, labs, and images personally reviewed by me.     A/P  Ms.Sharon SAMRA Stahl is a 81 year old here for the evaluation of:    #1 Osteoporosis:  Risk factors for low bone density include personal history of fracture or family history,  advanced age, female,Estrogen deficiency,early menopause before age 46, Hip fracture, fractures of pelvis and foot.  She has been on Fosamax from 7933-7808.  DEXA 2/2022 as noted above consistent with severe osteoporosis?  History of GERD-under good control with PPI  No upcoming dental procedure.  Labs showing normal calcium, kidney function, vitamin D and PTH levels.  On PROLIA since 5/2023. Tolerating well.  Plan:  Discussed diagnosis, pathophysiology, management and treatment options of condition with pt.  Discussed osteoporosis in general.  Discussed 2/2022 DEXA scan results.   Plan to continue PROLIA.  Due for PROLIA in 5/2023, 11/2023.  DEXA 2/204 onwards (at [facility]).  Follow up " after DEXA.    Discussed indications, risks and benefits of all medications prescribed, and answered questions to patient's satisfaction.    The pt was advised to    Maintain an adequate calcium and vitamin D intake and to supplement vitamin D if needed to maintain serum levels of 25 hydroxy D (25 OH D) between 30-60 ng/ml.    Limit alcohol intake to no more than 2 servings per day.    Limit caffeine intake.    Maintain an active lifestyle including weight-bearing exercises for at least 30 mins daily.    Take measures to reduce the risk of falling.    All possible major side effects of Denosumab (PROLIA) were discussed including risk for atypical femur fractures, hypersensitivity, low calcium levels, osteonecrosis of jaw (which can manifest as jaw pain, osteomyelitis, osteitis, bone erosion, tooth/periodontal infection, toothache, gingival ulceration/erosion). Other s/e include but not limited to Hypertension, Headache and peripheral edema. I also told her to let her dentist lnow about the medication if plan for major dental procedure (currently no plans for dental procedure)  Indication: Prolia  (denosumab) is a prescription medicine used to treat osteoporosis in patients who:     Are at high risk for fracture, meaning patients who have had a fracture related to osteoporosis, or who have multiple risk factors for fracture     Cannot use another osteoporosis medicine or other osteoporosis medicines did not work well   The timeline for early/late injections would be 4 weeks early and any time after the 6 month bella. If a patient receives their injection late, then the subsequent injection would be 6 months from the date that they actually received the injection        More than 50% of the time spent with Ms. Stahl on counseling / coordinating her care.      All questions were answered.  The patient indicates understanding of the above issues and agrees with the plan set forth.      Follow-up:  1 year    Mary  MD Marin  Endocrinology  [facility]  CC: Devi Romano    Answers for HPI/ROS submitted by the patient on 5/9/2023  General Symptoms: No  Skin Symptoms: No  HENT Symptoms: No  EYE SYMPTOMS: Yes  HEART SYMPTOMS: No  LUNG SYMPTOMS: No  INTESTINAL SYMPTOMS: No  URINARY SYMPTOMS: No  GYNECOLOGIC SYMPTOMS: No  BREAST SYMPTOMS: No  SKELETAL SYMPTOMS: Yes  BLOOD SYMPTOMS: No  NERVOUS SYSTEM SYMPTOMS: No  MENTAL HEALTH SYMPTOMS: No  Eye pain: Yes  Vision loss: No  Dry eyes: Yes  Watery eyes: Yes  Eye bulging: No  Double vision: No  Flashing of lights: No  Spots: No  Floaters: Yes  Redness: Yes  Crossed eyes: No  Tunnel Vision: No  Yellowing of eyes: No  Eye irritation: Yes  Back pain: Yes  Muscle aches: Yes  Neck pain: No  Swollen joints: No  Joint pain: No  Bone pain: No  Muscle cramps: No  Muscle weakness: No  Joint stiffness: Yes  Bone fracture: No

## 2023-05-09 NOTE — NURSING NOTE
Is the patient currently in the state of [place]? YES    Visit mode:VIDEO    If the visit is dropped, the patient can be reconnected by: Patient notes text or email is fine to get her connected again if needed.    Will anyone else be joining the visit? NO      How would you like to obtain your AVS? MyChart    Are changes needed to the allergy or medication list? Unknown, patient declines reviewing medication/allergy list during rooming process today. Patient notes she has reviewed it before.    Reason for visit: Video Visit (Follow up)

## 2023-05-15 ENCOUNTER — ALLIED HEALTH/NURSE VISIT (OUTPATIENT)
Dept: NURSING | Facility: CLINIC | Age: 83
End: 2023-05-15
Payer: COMMERCIAL

## 2023-05-15 DIAGNOSIS — M81.8 IDIOPATHIC OSTEOPOROSIS: Primary | ICD-10-CM

## 2023-05-15 PROCEDURE — 99207 PR NO CHARGE NURSE ONLY: CPT

## 2023-05-15 PROCEDURE — 96372 THER/PROPH/DIAG INJ SC/IM: CPT | Performed by: INTERNAL MEDICINE

## 2023-05-15 NOTE — PROGRESS NOTES
Clinic Administered Medication Documentation      Prolia Documentation    Indication: Prolia  (denosumab) is a prescription medicine used to treat osteoporosis in patients who:     Are at high risk for fracture, meaning patients who have had a fracture related to osteoporosis, or who have multiple risk factors for fracture.    Cannot use another osteoporosis medicine or other osteoporosis medicines did not work well.    The timeline for early/late injections would be 4 weeks early and any time after the 6 month bella. If a patient receives their injection late, then the subsequent injection would be 6 months from the date that they actually received the injection.    When was the last injection?  22  Was the last injection at least 6 months ago? Yes  Has the prior authorization been completed?  Yes  Is there an active order (written within the past 365 days, with administrations remaining, not ) in the chart?  Yes  Patient denies any dental work involving the bone (e.g. tooth extraction or dental implants) in the past 4 weeks?  Yes  Patient denies plans for any dental work involving the bone (e.g. tooth extraction or dental implants) in the next 4 weeks? Yes    The following steps were completed to comply with the REMS program for Prolia:    Reviewed information in the Medication Guide and Patient Counseling Chart, including the serious risks of Prolia  and the symptoms of each risk.    Advised patient to seek prompt medical attention if they have signs or symptoms of any of the serious risks.    Provided each patient a copy of the Medication Guide and Patient Brochure.      Prior to injection, verified patient identity using patient's name and date of birth. Medication was administered. Please see MAR and medication order for additional information. Patient instructed to report any adverse reaction to staff immediately.    Vial/Syringe: Syringe  Was this medication supplied by the patient? No  Verified  that the patient has refills remaining in their prescription.

## 2023-05-21 DIAGNOSIS — K21.9 GASTROESOPHAGEAL REFLUX DISEASE, UNSPECIFIED WHETHER ESOPHAGITIS PRESENT: ICD-10-CM

## 2023-05-23 RX ORDER — FAMOTIDINE 40 MG/1
TABLET, FILM COATED ORAL
Qty: 90 TABLET | Refills: 2 | Status: SHIPPED | OUTPATIENT
Start: 2023-05-23 | End: 2023-09-19

## 2023-05-23 NOTE — TELEPHONE ENCOUNTER
Prescription approved per [facility] Refill Protocol.  Nga Huertas RN, BSN  [facility]

## 2023-06-01 DIAGNOSIS — R39.15 URINARY URGENCY: ICD-10-CM

## 2023-06-01 RX ORDER — MIRABEGRON 25 MG/1
25 TABLET, FILM COATED, EXTENDED RELEASE ORAL DAILY
Qty: 90 TABLET | Refills: 0 | Status: SHIPPED | OUTPATIENT
Start: 2023-06-01 | End: 2023-08-24 | Stop reason: DRUGHIGH

## 2023-06-10 RX ORDER — OXYBUTYNIN CHLORIDE 5 MG/1
TABLET, EXTENDED RELEASE ORAL
Qty: 30 TABLET | Refills: 0 | OUTPATIENT
Start: 2023-06-10

## 2023-06-13 ENCOUNTER — MYC MEDICAL ADVICE (OUTPATIENT)
Dept: FAMILY MEDICINE | Facility: CLINIC | Age: 83
End: 2023-06-13
Payer: COMMERCIAL

## 2023-06-14 ENCOUNTER — NURSE TRIAGE (OUTPATIENT)
Dept: FAMILY MEDICINE | Facility: CLINIC | Age: 83
End: 2023-06-14
Payer: COMMERCIAL

## 2023-06-14 NOTE — TELEPHONE ENCOUNTER
Called the pt.  Advised of below.  Offered an appt this morning.  Pt is unable to make an appt today.  She is at the [facility] with a friend today.  Appt scheduled for tomorrow.  Advised if worse before then to be seen more emergently.

## 2023-06-14 NOTE — TELEPHONE ENCOUNTER
"Nurse Triage SBAR    Is this a 2nd Level Triage? YES, LICENSED PRACTITIONER REVIEW IS REQUIRED    Situation:   Contacted patient to triage reported symptoms of shakiness, balance issues, dizziness (see Appiest message dated 6/13/23).    Background:   Patient states that she has had similar symptoms in the past and it was related to her thyroid levels \"being off\". Patient states that symptoms have been present x 2 weeks and are not worsening.    Assessment:   Patient reports intermittent lightheadedness, shakiness, and balance problems. Patient states that symptoms are mild currently. Last night symptoms were much worse. Patient does not check her BP at home. Patient denies chest pain, shortness of breath, or vertigo.    Protocol Recommended Disposition:   DISCUSS WITH PCP AND CALLBACK BY NURSE TODAY    Recommendation:   Advised patient that will review with PCP and return call with recommendations. Patient instructed to go to the ER for chest pain, shortness of breath, weakness, or worsening symptoms. Patient verbalized understanding and agrees with plan.    Routed to provider: Dr. Romano    Does the patient meet one of the following criteria for ADS visit consideration? 16+ years old, with an [facility] PCP     TIP  Providers, please consider if this condition is appropriate for management at one of our Acute and Diagnostic Services sites.     If patient is a good candidate, please use dotphrase <dot>triageresponse and select Refer to ADS to document.    Reason for Disposition    Taking a medicine that could cause dizziness (e.g., blood pressure medications, diuretics)    Additional Information    Negative: SEVERE difficulty breathing (e.g., struggling for each breath, speaks in single words)    Negative: Shock suspected (e.g., cold/pale/clammy skin, too weak to stand, low BP, rapid pulse)    Negative: Difficult to awaken or acting confused (e.g., disoriented, slurred speech)    Negative: Fainted, and still feels dizzy " afterwards    Negative: Overdose (accidental or intentional) of medications    Negative: New neurologic deficit that is present now: * Weakness of the face, arm, or leg on one side of the body * Numbness of the face, arm, or leg on one side of the body * Loss of speech or garbled speech    Negative: Heart beating < 50 beats per minute OR > 140 beats per minute    Negative: Sounds like a life-threatening emergency to the triager    Negative: Chest pain    Negative: Rectal bleeding, bloody stool, or tarry-black stool    Negative: Vomiting is main symptom    Negative: Diarrhea is main symptom    Negative: Headache is main symptom    Negative: Heat exhaustion suspected (i.e., dehydration from heat exposure)    Negative: Patient states that they are having an anxiety or panic attack    Negative: Dizziness from low blood sugar (i.e., < 60 mg/dl or 3.5 mmol/l)    Negative: SEVERE dizziness (e.g., unable to stand, requires support to walk, feels like passing out now)    Negative: SEVERE headache or neck pain    Negative: Spinning or tilting sensation (vertigo) present now and one or more stroke risk factors (i.e., hypertension, diabetes mellitus, prior stroke/TIA, heart attack, age over 60) (Exception: prior physician evaluation for this AND no different/worse than usual)    Negative: Neurologic deficit that was brief (now gone), ANY of the following:* Weakness of the face, arm, or leg on one side of the body* Numbness of the face, arm, or leg on one side of the body* Loss of speech or garbled speech    Negative: Loss of vision or double vision  (Exception: Similar to previous migraines.)    Negative: Extra heart beats OR irregular heart beating (i.e., 'palpitations')    Negative: Difficulty breathing    Negative: Drinking very little and has signs of dehydration (e.g., no urine > 12 hours, very dry mouth, very lightheaded)    Negative: Follows bleeding (e.g., stomach, rectum, vagina)  (Exception: Became dizzy from sight of  "small amount blood.)    Negative: Patient sounds very sick or weak to the triager    Negative: Lightheadedness (dizziness) present now, after 2 hours of rest and fluids    Negative: Spinning or tilting sensation (vertigo) present now    Negative: Fever > 103 F (39.4 C)    Negative: Fever > 100.0 F (37.8 C) and has diabetes mellitus or a weak immune system (e.g., HIV positive, cancer chemotherapy, organ transplant, splenectomy, chronic steroids)    Negative: MODERATE dizziness (e.g., interferes with normal activities) (Exception: dizziness caused by heat exposure, sudden standing, or poor fluid intake)    Negative: Vomiting occurs with dizziness    Negative: Patient wants to be seen    Answer Assessment - Initial Assessment Questions  1. DESCRIPTION: \"Describe your dizziness.\"      Lightheaded and off balance  2. LIGHTHEADED: \"Do you feel lightheaded?\" (e.g., somewhat faint, woozy, weak upon standing)      Sometimes feels lightheaded when standing.  3. VERTIGO: \"Do you feel like either you or the room is spinning or tilting?\" (i.e. vertigo)      No  4. SEVERITY: \"How bad is it?\"  \"Do you feel like you are going to faint?\" \"Can you stand and walk?\"    - MILD: Feels slightly dizzy, but walking normally.    - MODERATE: Feels unsteady when walking, but not falling; interferes with normal activities (e.g., school, work).    - SEVERE: Unable to walk without falling, or requires assistance to walk without falling; feels like passing out now.       Mild - occasional side step when balance problems  5. ONSET:  \"When did the dizziness begin?\"      Started about two weeks ago - not worsening.  6. AGGRAVATING FACTORS: \"Does anything make it worse?\" (e.g., standing, change in head position)      No.  7. HEART RATE: \"Can you tell me your heart rate?\" \"How many beats in 15 seconds?\"  (Note: not all patients can do this)        59  8. CAUSE: \"What do you think is causing the dizziness?\"      Patient thinks that it is related to her " "her thyroid being.  9. RECURRENT SYMPTOM: \"Have you had dizziness before?\" If Yes, ask: \"When was the last time?\" \"What happened that time?\"      Patient states that these symptoms happened before when thyroid levels were \"off\"  10. OTHER SYMPTOMS: \"Do you have any other symptoms?\" (e.g., fever, chest pain, vomiting, diarrhea, bleeding)        No.  11. PREGNANCY: \"Is there any chance you are pregnant?\" \"When was your last menstrual period?\"        No.    Protocols used: DIZZINESS-A-OH    Alice CLARKE RN, BSN, PHN    "

## 2023-06-15 ENCOUNTER — OFFICE VISIT (OUTPATIENT)
Dept: FAMILY MEDICINE | Facility: CLINIC | Age: 83
End: 2023-06-15
Payer: COMMERCIAL

## 2023-06-15 VITALS
RESPIRATION RATE: 12 BRPM | DIASTOLIC BLOOD PRESSURE: 80 MMHG | OXYGEN SATURATION: 98 % | BODY MASS INDEX: 23.95 KG/M2 | TEMPERATURE: 97.6 F | HEIGHT: 64 IN | SYSTOLIC BLOOD PRESSURE: 132 MMHG | HEART RATE: 55 BPM | WEIGHT: 140.3 LBS

## 2023-06-15 DIAGNOSIS — R42 LIGHTHEADEDNESS: Primary | ICD-10-CM

## 2023-06-15 DIAGNOSIS — E03.9 HYPOTHYROIDISM, UNSPECIFIED TYPE: ICD-10-CM

## 2023-06-15 LAB
ERYTHROCYTE [DISTWIDTH] IN BLOOD BY AUTOMATED COUNT: 13.2 % (ref 10–15)
HCT VFR BLD AUTO: 41.6 % (ref 35–47)
HGB BLD-MCNC: 13.6 G/DL (ref 11.7–15.7)
MCH RBC QN AUTO: 31.9 PG (ref 26.5–33)
MCHC RBC AUTO-ENTMCNC: 32.7 G/DL (ref 31.5–36.5)
MCV RBC AUTO: 98 FL (ref 78–100)
PLATELET # BLD AUTO: 290 10E3/UL (ref 150–450)
RBC # BLD AUTO: 4.26 10E6/UL (ref 3.8–5.2)
WBC # BLD AUTO: 9 10E3/UL (ref 4–11)

## 2023-06-15 PROCEDURE — 84439 ASSAY OF FREE THYROXINE: CPT | Performed by: FAMILY MEDICINE

## 2023-06-15 PROCEDURE — 99214 OFFICE O/P EST MOD 30 MIN: CPT | Performed by: FAMILY MEDICINE

## 2023-06-15 PROCEDURE — 85027 COMPLETE CBC AUTOMATED: CPT | Performed by: FAMILY MEDICINE

## 2023-06-15 PROCEDURE — 80048 BASIC METABOLIC PNL TOTAL CA: CPT | Performed by: FAMILY MEDICINE

## 2023-06-15 PROCEDURE — 36415 COLL VENOUS BLD VENIPUNCTURE: CPT | Performed by: FAMILY MEDICINE

## 2023-06-15 PROCEDURE — 84443 ASSAY THYROID STIM HORMONE: CPT | Performed by: FAMILY MEDICINE

## 2023-06-15 RX ORDER — PREDNISONE 10 MG/1
TABLET ORAL
COMMUNITY
Start: 2023-06-02 | End: 2023-09-15

## 2023-06-15 ASSESSMENT — ENCOUNTER SYMPTOMS
UNEXPECTED WEIGHT CHANGE: 0
LIGHT-HEADEDNESS: 1
CONSTITUTIONAL NEGATIVE: 1
TREMORS: 1

## 2023-06-15 ASSESSMENT — PAIN SCALES - GENERAL: PAINLEVEL: NO PAIN (0)

## 2023-06-15 NOTE — PROGRESS NOTES
Assessment and Plan    (R42) Lightheadedness  (primary encounter diagnosis)  Comment: sx not terribly similar, but pt feel this is siimilar to her previous hyperT episodes.  Checking also blood coutns (for anemais) and electrolytes nad BS  Plan: TSH with free T4 reflex, CBC with platelets,         Basic metabolic panel  (Ca, Cl, CO2, Creat,         Gluc, K, Na, BUN)            (E03.9) Hypothyroidism, unspecified type  Comment:   Plan: TSH with free T4 reflex              RTC in 2w prn    Sekou Farrar MD      Janet Pandya is a 83 year old, presenting for the following health issues:  Dizziness        6/15/2023     3:44 PM   Additional Questions   Roomed by MR   Accompanied by NA         6/15/2023     3:44 PM   Patient Reported Additional Medications   Patient reports taking the following new medications NA     History of Present Illness       Reason for visit:  Dizzy lightheaded heart palpations  Symptom onset:  3-7 days ago    She eats 2-3 servings of fruits and vegetables daily.She consumes 0 sweetened beverage(s) daily.She exercises with enough effort to increase her heart rate 60 or more minutes per day.  She exercises with enough effort to increase her heart rate 5 days per week.   She is taking medications regularly.       Dizziness  Onset/Duration: Its been about 7-10 days    Description:  Shakiness, lightheaded  Higher pulse around 70-90's when her normal is in the 50's    Do you feel faint: No  Does it feel like the surroundings (bed, room) are moving: No  Unsteady/off balance: YES  Have you passed out or fallen: No    Accompanying Signs & Symptoms:  Heart palpitations or chest pain: YES- Palpatations  Nausea, vomiting: No  Weakness or lack of coordination in arms or legs: No  Vision or speech changes: Her eyes hurt but no blurriness  Numbness or tingling: No  Ringing in ears (Tinnitus): YES, periodically  Hearing Loss: No  History:   Head trauma/concussion history: No  Previous similar symptoms:  "YES-   Patient states that she has had similar symptoms in the past and it was related to her thyroid levels \"being off\".      Recent bleeding history: No  Any new medications (BP?): No  Precipitating factors:   Worse with activity: YES  Worse with head movement: No  Alleviating factors:   Does staying in a fixed position give relief: YES  Therapies tried and outcome: None      Has felt like this in the past, and it's been when she was hyperthyroid.  Currently taking 100 mcg levothyroxine.  Noting that her HR has been jumping up higher than typical with activity.  Her baseline HR is in the 50s.  No change in BM.  No temperature intolerance.        Review of Systems   Constitutional: Negative.  Negative for unexpected weight change.   Endocrine: Negative for cold intolerance and heat intolerance.   Genitourinary: Negative.    Neurological: Positive for tremors and light-headedness.            Objective    /80 (BP Location: Right arm, Patient Position: Sitting, Cuff Size: Adult Regular)   Pulse 55   Temp 97.6  F (36.4  C) (Oral)   Resp 12   Ht 1.626 m (5' 4\")   Wt 63.6 kg (140 lb 4.8 oz)   SpO2 98%   BMI 24.08 kg/m    Body mass index is 24.08 kg/m .  Physical Exam  Vitals reviewed.   Eyes:      Conjunctiva/sclera: Conjunctivae normal.   Cardiovascular:      Rate and Rhythm: Normal rate and regular rhythm.      Heart sounds: Normal heart sounds.   Pulmonary:      Effort: Pulmonary effort is normal.      Breath sounds: Normal breath sounds.   Skin:     General: Skin is warm and dry.   Neurological:      Mental Status: She is alert and oriented to person, place, and time.      Motor: No tremor or abnormal muscle tone.            "

## 2023-06-16 ENCOUNTER — HOSPITAL ENCOUNTER (OUTPATIENT)
Dept: CT IMAGING | Facility: CLINIC | Age: 83
Discharge: HOME | End: 2023-06-16
Attending: INTERNAL MEDICINE | Admitting: INTERNAL MEDICINE
Payer: COMMERCIAL

## 2023-06-16 DIAGNOSIS — M35.02: ICD-10-CM

## 2023-06-16 DIAGNOSIS — J84.170 INTERSTITIAL LUNG DISEASE WITH PROGRESSIVE FIBROTIC PHENOTYPE IN DISEASES CLASSIFIED ELSEWHERE (H): ICD-10-CM

## 2023-06-16 DIAGNOSIS — K22.4 DYSKINESIA OF ESOPHAGUS: ICD-10-CM

## 2023-06-16 LAB
ANION GAP SERPL CALCULATED.3IONS-SCNC: 15 MMOL/L (ref 7–15)
BUN SERPL-MCNC: 27.1 MG/DL (ref 8–23)
CALCIUM SERPL-MCNC: 9.4 MG/DL (ref 8.8–10.2)
CHLORIDE SERPL-SCNC: 102 MMOL/L (ref 98–107)
CREAT SERPL-MCNC: 0.84 MG/DL (ref 0.51–0.95)
DEPRECATED HCO3 PLAS-SCNC: 21 MMOL/L (ref 22–29)
GFR SERPL CREATININE-BSD FRML MDRD: 69 ML/MIN/1.73M2
GLUCOSE SERPL-MCNC: 154 MG/DL (ref 70–99)
POTASSIUM SERPL-SCNC: 4.7 MMOL/L (ref 3.4–5.3)
SODIUM SERPL-SCNC: 138 MMOL/L (ref 136–145)
T4 FREE SERPL-MCNC: 1.9 NG/DL (ref 0.9–1.7)
TSH SERPL DL<=0.005 MIU/L-ACNC: 0.17 UIU/ML (ref 0.3–4.2)

## 2023-06-16 PROCEDURE — 71250 CT THORAX DX C-: CPT

## 2023-06-20 ENCOUNTER — TELEPHONE (OUTPATIENT)
Dept: FAMILY MEDICINE | Facility: CLINIC | Age: 83
End: 2023-06-20
Payer: COMMERCIAL

## 2023-06-20 DIAGNOSIS — E03.9 HYPOTHYROIDISM, UNSPECIFIED TYPE: ICD-10-CM

## 2023-06-20 RX ORDER — LEVOTHYROXINE SODIUM 88 UG/1
88 TABLET ORAL DAILY
Qty: 90 TABLET | Refills: 1 | Status: SHIPPED | OUTPATIENT
Start: 2023-06-20 | End: 2023-10-18

## 2023-06-27 ENCOUNTER — ALLIED HEALTH/NURSE VISIT (OUTPATIENT)
Dept: FAMILY MEDICINE | Facility: CLINIC | Age: 83
End: 2023-06-27
Payer: COMMERCIAL

## 2023-06-27 VITALS — SYSTOLIC BLOOD PRESSURE: 136 MMHG | DIASTOLIC BLOOD PRESSURE: 70 MMHG

## 2023-06-27 DIAGNOSIS — Z01.30 BP CHECK: Primary | ICD-10-CM

## 2023-06-27 PROCEDURE — 99207 PR NO CHARGE NURSE ONLY: CPT | Performed by: FAMILY MEDICINE

## 2023-06-27 NOTE — PROGRESS NOTES
Ya Stahl was evaluated at [facility] on June 27, 2023 at which time her blood pressure was:    BP Readings from Last 1 Encounters:   06/27/23 136/70     No data recorded      Reviewed lifestyle modifications for blood pressure control and reduction: including making healthy food choices, managing weight, getting regular exercise, smoking cessation, reducing alcohol consumption, monitoring blood pressure regularly.     Symptoms: None    BP Goal:< 140/90 mmHg    BP Assessment:  BP at goal    Potential Reasons for BP too high: NA - Not applicable    BP Follow-Up Plan: Recheck BP in 6 months at pharmacy    Recommendation to Provider: none    Note completed by: Kristopher Orlando RPH    Thank You   Larissa Diaz  [facility]

## 2023-08-07 ENCOUNTER — LAB (OUTPATIENT)
Dept: LAB | Facility: CLINIC | Age: 83
End: 2023-08-07
Payer: COMMERCIAL

## 2023-08-07 DIAGNOSIS — E03.9 HYPOTHYROIDISM, UNSPECIFIED TYPE: ICD-10-CM

## 2023-08-07 LAB — TSH SERPL DL<=0.005 MIU/L-ACNC: 2.49 UIU/ML (ref 0.3–4.2)

## 2023-08-07 PROCEDURE — 84443 ASSAY THYROID STIM HORMONE: CPT

## 2023-08-07 PROCEDURE — 36415 COLL VENOUS BLD VENIPUNCTURE: CPT

## 2023-08-18 ENCOUNTER — OFFICE VISIT (OUTPATIENT)
Dept: UROLOGY | Facility: CLINIC | Age: 83
End: 2023-08-18
Payer: COMMERCIAL

## 2023-08-18 VITALS
BODY MASS INDEX: 23.49 KG/M2 | WEIGHT: 141 LBS | DIASTOLIC BLOOD PRESSURE: 74 MMHG | HEIGHT: 65 IN | SYSTOLIC BLOOD PRESSURE: 106 MMHG

## 2023-08-18 DIAGNOSIS — M35.0C SJOGREN'S SYNDROME WITH DENTAL INVOLVEMENT (H): ICD-10-CM

## 2023-08-18 DIAGNOSIS — N39.41 URGE INCONTINENCE: ICD-10-CM

## 2023-08-18 DIAGNOSIS — R35.0 URINARY FREQUENCY: ICD-10-CM

## 2023-08-18 DIAGNOSIS — R39.15 URINARY URGENCY: Primary | ICD-10-CM

## 2023-08-18 LAB
ALBUMIN UR-MCNC: NEGATIVE MG/DL
APPEARANCE UR: CLEAR
BILIRUB UR QL STRIP: NEGATIVE
COLOR UR AUTO: YELLOW
GLUCOSE UR STRIP-MCNC: NEGATIVE MG/DL
HGB UR QL STRIP: NEGATIVE
KETONES UR STRIP-MCNC: NEGATIVE MG/DL
LEUKOCYTE ESTERASE UR QL STRIP: NEGATIVE
NITRATE UR QL: NEGATIVE
PH UR STRIP: 6 [PH] (ref 5–7)
RESIDUAL VOLUME (RV) (EXTERNAL): 7
SP GR UR STRIP: 1.01 (ref 1–1.03)
UROBILINOGEN UR STRIP-ACNC: 0.2 E.U./DL

## 2023-08-18 PROCEDURE — 99213 OFFICE O/P EST LOW 20 MIN: CPT | Mod: 25 | Performed by: PHYSICIAN ASSISTANT

## 2023-08-18 PROCEDURE — 51798 US URINE CAPACITY MEASURE: CPT | Performed by: PHYSICIAN ASSISTANT

## 2023-08-18 PROCEDURE — 81003 URINALYSIS AUTO W/O SCOPE: CPT | Mod: QW | Performed by: PHYSICIAN ASSISTANT

## 2023-08-18 RX ORDER — CEVIMELINE HYDROCHLORIDE 30 MG/1
30 CAPSULE ORAL 3 TIMES DAILY
COMMUNITY

## 2023-08-18 ASSESSMENT — ENCOUNTER SYMPTOMS
DYSURIA: 0
CHILLS: 0
SHORTNESS OF BREATH: 0
HEMATURIA: 0
FEVER: 0
NAUSEA: 0
VOMITING: 0
FREQUENCY: 1

## 2023-08-18 ASSESSMENT — PAIN SCALES - GENERAL: PAINLEVEL: NO PAIN (0)

## 2023-08-18 NOTE — NURSING NOTE
Chief Complaint   Patient presents with    Urinary urgency     Pt here for follow up      Pt states she has some weeks that are good and urinary symptoms are under control, and then will have weeks that are bad and leaks constantly. Pt states symptoms are currently good    PVR: 7 mL    Radha Mckeon, CMA

## 2023-08-18 NOTE — PROGRESS NOTES
Subjective      CHIEF COMPLAINT/REASON FOR VISIT   Follow up on urinary incontinence    HISTORY OF PRESENT ILLNESS   Ms. Stahl is very pleasant 83 year old year old female, who presents today for follow-up on urinary urgency, and frequency.  Patient has had urinary urgency for approximately 2 years.  She also has mixed urinary incontinence.  She has done pelvic floor physical therapy.  She has only been periodically doing the exercises as of recently.  She was trialed on Myrbetriq 25 mg.  She felt that this may have helped some.  She continues to have some good days and some bad days.  She will have possibly a week of good days, and then times where she is having significant leakage.  Denies any hematuria or dysuria.    Patient does have Sjogren's and already has significant dry eyes and dry mouth.  Urinalysis today has no sign of infection.  Postvoid residual 7 mL.    She is wondering about other options.    The following portions of the patient's history were reviewed and updated as appropriate: allergies, current medications, past family history, past medical history, past social history, past surgical history, and problem list.     REVIEW OF SYSTEMS   Review of Systems   Constitutional:  Negative for chills and fever.   Respiratory:  Negative for shortness of breath.    Cardiovascular:  Negative for chest pain.   Gastrointestinal:  Negative for nausea and vomiting.   Genitourinary:  Positive for frequency and urgency. Negative for dysuria and hematuria.      Per HPI.     Patient Active Problem List   Diagnosis    Hypothyroidism    Grave's disease    Bile reflux gastritis    Hyperlipidemia LDL goal <70    Sjogren's disease (H)    HL (hearing loss)    Mixed incontinence    Chronic cough    Impaired fasting glucose    Status post-operative repair of hip fracture, RIGHT    Senile osteoporosis    Gastroesophageal reflux disease, unspecified whether esophagitis present    ASCVD (arteriosclerotic cardiovascular  "disease)    Macrocytosis    Monoclonal paraproteinemia    MGUS (monoclonal gammopathy of unknown significance)    Epigastric pain    Hypertension goal BP (blood pressure) < 140/90    Complication of anesthesia    S/P lumbar fusion    Ascending aorta dilation (H)    Other osteoarthritis involving multiple joints    Dry mouth    Elevated glucose    Severe osteopetrosis    Idiopathic osteoporosis    History of drug therapy    Coronary artery disease due to lipid rich plaque    Dyslipidemia    Primary osteoarthritis of both hands    Status post total replacement of right hip      Past Medical History:   Diagnosis Date    Arthritis     Cerebral infarction (H) 2019    Complication of anesthesia     \"hard time waking up / N & V\"    Coronary artery disease     Disease of thyroid gland     Displacement of lumbar intervertebral disc without myelopathy     Gastro-oesophageal reflux disease     GERD (gastroesophageal reflux disease)     Hearing loss     has bilateral aids    Heart attack (H) 03/2016    History of anesthesia complications     delayed emergence    History of angina     Hypertension     Low back pain     Migraine headache     Myocardial infarction (H)     Numbness and tingling     down right leg (associated with back pain)    PONV (postoperative nausea and vomiting)     Sicca syndrome (H)     Sjogren's disease (H)     Sjogren's syndrome (H)     sees specialist; dentist    Spider veins     Unspecified hypothyroidism         Objective      PHYSICAL EXAM   /74   Ht 1.638 m (5' 4.5\")   Wt 64 kg (141 lb)   BMI 23.83 kg/m     Physical Exam  Constitutional:       Appearance: Normal appearance.   HENT:      Head: Normocephalic.   Eyes:      General: No scleral icterus.  Pulmonary:      Effort: Pulmonary effort is normal.   Skin:     Findings: No rash.   Neurological:      General: No focal deficit present.      Mental Status: She is alert and oriented to person, place, and time.   Psychiatric:         Mood and " Affect: Mood normal.         Behavior: Behavior normal.         LABORATORY     Recent Labs   Lab Test 08/18/23  1358   COLOR Yellow   APPEARANCE Clear   URINEGLC Negative   URINEBILI Negative   URINEKETONE Negative   SG 1.015   UBLD Negative   URINEPH 6.0   PROTEIN Negative   UROBILINOGEN 0.2   NITRITE Negative   LEUKEST Negative     TESTING    PVR: 7 mL    Assessment & Plan    1. Urinary urgency    2. Urinary frequency    3. Urge incontinence    4. Sjogren's syndrome with dental involvement (H)        I had the pleasure today of meeting with Ms. Stahl to discuss her urgency, frequency, and urge incontinence.  She has previously trialed pelvic floor physical therapy as well as Myrbetriq 25 mg.  We had discussed avoiding anticholinergic medications due to her Sjogren's.  Most common side effects include dry eyes, dry mouth, and constipation.  Patient already has severe dry mouth and dry eyes, which these medications would likely significantly worsen.    We discussed possible additional treatment options including increasing the doses of Myrbetriq, trying Gemtesa 75 mg daily, Botox, or posterior tibial nerve stimulation.    We discussed PTNS. This includes placement of needles in the posterior tibial nerve. This is once a week for 12 weeks. Patient does need to make sure they do not miss more than 1 session, or they will need to start over. It is 30 minutes at a time. We do not expect improvement until at least week 5-6, and some patients take longer. It does not work for all patients.     Botox is injected into the bladder. It typically takes 2 weeks before this takes effect. Average length of time per treatment that patients get relief for is approximately 6 months. Some patients get more lasting relief.     -Will have you increase Myrbetriq to 50 mg daily.  Trial this for 1-2 months to see if it will helps.    -Try to be more regular with your physical therapy exercises.    -If Myrbetriq 50 mg does not help, would  consider posterior tibial nerve stimulation, Gemtesa (doesn't look like it covered),  or possible Botox (would need to meet with Dr. Ardon).      -You would be most interested in posterior tibial nerve stimulation.  If you would like to try this, contact our office.    Signed by:       Erika Helms PA-C 8/18/2023 2:12 PM

## 2023-08-18 NOTE — PATIENT INSTRUCTIONS
Will have you increase Myrbetriq to 50 mg daily.  Trial this for 1-2 months to see if it will helps.    Try to be more regular with your physical therapy exercises.    If Myrbetriq 50 mg does not help, would consider posterior tibial nerve stimulation, Gemtesa (doesn't look like it covered),  or possible Botox (would need to meet with Dr. Ardon).      You would be most interested in  posterior tibial nerve stimulation.  If you would like to try this, contact our office.    Possible side effects with overactive bladder medications that are anticholinergic medication include dry eyes, dry mouth, constipation, difficulties with sweating, and possible mental fogginess. These medications typically take 3-4 weeks to work. Side effects will typically show up prior to this. Myrbetriq and Gemtesa are not anticholinergic medication, but it is rather extensive even after insurance.     We discussed PTNS. This includes placement of needles in the posterior tibial nerve. This is once a week for 12 weeks. Patient does need to make sure they do not miss more than 1 session, or they will need to start over. It is 30 minutes at a time. We do not expect improvement until at least week 5-6, and some patients take longer. It does not work for all patients.     Botox is injected into the bladder. It typically takes 2 weeks before this takes effect. Average length of time per treatment that patients get relief for is approximately 6 months. Some patients get more lasting relief. We also discussed briefly the implantable device InterStim.     Contact us in the interim with questions, concerns, or changes in symptomatology.  [phone]

## 2023-08-24 DIAGNOSIS — R39.15 URINARY URGENCY: Primary | ICD-10-CM

## 2023-08-24 RX ORDER — MIRABEGRON 50 MG/1
50 TABLET, EXTENDED RELEASE ORAL DAILY
Qty: 90 TABLET | Refills: 3 | Status: SHIPPED | OUTPATIENT
Start: 2023-08-24 | End: 2023-11-01

## 2023-08-28 ENCOUNTER — OFFICE VISIT (OUTPATIENT)
Dept: NEUROLOGY | Facility: CLINIC | Age: 83
End: 2023-08-28
Attending: PHYSICIAN ASSISTANT
Payer: COMMERCIAL

## 2023-08-28 DIAGNOSIS — R26.89 BALANCE PROBLEMS: ICD-10-CM

## 2023-08-28 PROCEDURE — 99205 OFFICE O/P NEW HI 60 MIN: CPT | Performed by: PSYCHIATRY & NEUROLOGY

## 2023-08-28 PROCEDURE — 99417 PROLNG OP E/M EACH 15 MIN: CPT | Performed by: PSYCHIATRY & NEUROLOGY

## 2023-08-28 NOTE — PATIENT INSTRUCTIONS
Your exam is mostly reassuring today, as I only note old sensory loss in your leg (right side lower) which could be from your old knee surgery.  Your cognitive screen was also reassuringly normal. Overall, given how quickly you describe you symptom occurring, and your slight steppage imbalance, you could have an MRI to look for posterior circulation infarction. If normal, then I would recommend continue balance physical therapy.

## 2023-08-28 NOTE — LETTER
"    8/28/2023         RE: Ya Stahl  [address]  [facility] [address]        Dear Colleague,    Thank you for referring your patient, Ya Stahl, to the [facility] NEUROLOGY CLINIC [facility]. Please see a copy of my visit note below.    [facility] Neurology Consultation    Ya Stahl MRN# [id number]   Age: 83 year old YOB: 1940     Requesting physician: Devi Bowers     Reason for Consultation: imbalance      History of Presenting Symptoms:   Ya Stahl is a 83 year old female who presents today for evaluation of imbalance.    The patient has a pertinent medical history of MGUS, grave's disease with hypothyroidism, HLD, Sjogren's disease, HLD, severe osteoporosis, CTS s/p release (b/l), CAD, and has had a spinal fusion (L3-5 laminectomy with L4-5 fusion in 2018).  The patient was seen with her PCP 4/10/2023 and requested a neurology referral due to her head not feeling right, feeling dizzy, feeling tired, and feeling like her balance was \"off\".  Follow up with her PCP 6/15/2023 revealed the patient to have continued light-headedness w/heart palpitations and it was thought this may relate to her prior HyperT episodes.    The patient feels that 6 months ago she started noticing she may drift left when walking. There wasn't necessarily an acute onset of this.  There are separate issues of feeling off balance at baseline and also feeling dizzy.  By dizzy, she describes that her head feels like it is constantly moving or turning.  She doesn't feel like she will pass out, but due to fear of falling, she often finds that she has to stop and lean on something or sit down.  After she sits for a few minute she feels better.  Her symptoms have improved with correction of her thyroid issues recently.  She has had events where she is sitting and these symptoms come over her, eventually leaving after a few minutes or so.  She hasn't " lost consciousness.     She is alone today. She describes having difficulty recalling things describing difficulty recalling a person's name, the topic of conversation or an item she want's to discuss (place, or event).  This has been present for 6 months now, but she feels it is worse over time.  Before this time, she wasn't really worried about her cognition and reports living independently (paying finances, managing schedule, managing medications).  She is living with her , his health is poor but only recently it was found that he has dementia or parkinson's.  She helps with his care.  She feels her sleep is terrible and has been for 3-4 months.     The patient did change her dose of metoprolol due to her blood pressure being to high, and occurred a year ago in October.   She also made a change in starting Myrbetriq just recently (2023) for urinary symptoms, but this hasn't necessarily changed her other dizzy/imbalance symptoms. She planning on following up with her cardiologist in 10/2023 to repeat an echocardiogram.    Upon chart review, [facility] for issues of her head shaking, on/off for six month.  She obtained an MRI brain which was suggestive for right cerebellar infarction (lacunar) and microvascular disease.  She was recommended to utilize propranolol for her head titubation tremor, but no follow up occurred after imaging.     Medications:   Atorvastatin  Cevimeline  Furosemide  Levothyroxine  Metoprolol  Mirabegron     Physical Exam:   General: Seated comfortably in no acute distress.  HEENT: No paracervical muscle tenderness or tightness.  Optic discs sharp and vasculature normal on funduscopic exam.   Skin: No rashes. Hands are with enlarged joints throughout, arthritic.   Neurologic:     Mental Status: Fully alert, attentive and oriented. Speech clear and fluent, no paraphasic errors.   MoCA:   - Visualspatial/executive: 5/5  - Namin/3  (calls rhino a stevie)  - Memory: 3/5 (recalls joanie and red with category clue)  - Attention: 6/6  - Language: 3/3  - Abstraction: 2/2  - Orientation: 6/6     Cranial Nerves: Visual fields intact. PERRL. EOMI with normal smooth pursuit. Facial sensation intact/symmetric. Facial movements symmetric. Hearing not formally tested but intact to conversation. Palate elevation symmetric, uvula midline. No dysarthria. Shoulder shrug strong bilaterally. Tongue protrusion midline.     Motor: head titubation (no-no) tremor at rest and with distraction along with slight involvement of facial musculature (pursing of lips, mostly lower. Occurs in tremor that is 4-5 Hz and low amplitude at rest but goes away with talking). Muscle tone normal throughout (no spasticity, rigidity). No pronator drift. Normal/symmetric rapid finger tapping. Strength 5/5 throughout upper and lower extremities.     Deep Tendon Reflexes: 2+/symmetric throughout upper extremities, patellar are 2+ b/l, but achilles are mute b/l. No clonus. Toes downgoing bilaterally.     Sensory: light touch and pinprick differentiation is correctly stated in legs, feet, and hands b/l. Vibration is mildly reduced on the right great toe and leg, but normal at mid-leg and knee. No issues of vibration reporting on left lower extremity. Proprioception intact throughout.       Coordination: Finger-nose-finger intact without dysmetria. Rapid alternating movements intact/symmetric with normal speed and rhythm.     Gait: Stands quickly with arms across chest, normal stance width. Starts and stops easily. Full arm swing. Stride is without crossing over, or list. Turns quickly but is hesitant to restart gait immediately. Tandem is poor, with catch noted on either foot as well as crossing over with her left at times.           Data: Pertinent prior to visit   Imaging:  MRI brain 4/13/2018:  - small lacunar infarction of right cerebellar hemisphere  - mild to moderate chronic  microvascular ischemic disease  - b/l mastoid effusions    Laboratory:  8/7/2023:  TSH was normal (2.49)    6/15/2023:  T4 elevated (1.90), TSH low (0.17)    5/11/2023:  Immunofixation showing IgM kappa monoclonal protein not seen on SPEP    2/16/2023:  A1c was 6.2 (elevated in range of 5.7-6.3 for 5 years).         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  Overall, her prior MRI findings to indicate she has vascular disease and an old right cerebellar infarction, so a concern about her acute change in imbalance (not necessarily seen on exam consistently) is made regarding new infarction or recrudesene of her old one. I think repeat MRI brain is needed, and if a new vascular insult is noted, then a vascular image would be needed along with updated stroke prophylaxis guidelines.    Her head titubation tremor is not consistent with a parkinsonian condition given her lack of other associated features.  Treatment could be discussed at our follow up appointment after MRI is done, but may include altering her beta blocker, trying primidone, or other alternative therapies if she so chose.    Her cognitive issues at this time seem subjective, and are likely worsened due to poor sleep and increased stress in her home.  She has a good MoCA as a baseline and this can be repeated in the future should these issues persist.     Plan:  MRI brain w/wout contrast    Follow up in Neurology clinic in 3-4 months, or should new concerns arise.    DANISH Laird D.O.   of Neurology    Total time today (89 min) in this patient encounter was spent on pre-charting, counseling and/or  coordination of care.  The patient is in agreement with this plan and has no further questions.      Again, thank you for allowing me to participate in the care of your patient.        Sincerely,        Chano Laird, DO

## 2023-08-28 NOTE — NURSING NOTE
Chief Complaint   Patient presents with    Consult     Balance problems  Referred by Jennifer Talavera PA-C Kena Gemeda, MA on 8/28/2023 at 10:52 AM

## 2023-08-28 NOTE — PROGRESS NOTES
"[facility] Neurology Consultation    Ya Stahl MRN# [id number]   Age: 83 year old YOB: 1940     Requesting physician: Devi Bowers     Reason for Consultation: imbalance      History of Presenting Symptoms:   Ya Stahl is a 83 year old female who presents today for evaluation of imbalance.    The patient has a pertinent medical history of MGUS, grave's disease with hypothyroidism, HLD, Sjogren's disease, HLD, severe osteoporosis, CTS s/p release (b/l), CAD, and has had a spinal fusion (L3-5 laminectomy with L4-5 fusion in 2018).  The patient was seen with her PCP 4/10/2023 and requested a neurology referral due to her head not feeling right, feeling dizzy, feeling tired, and feeling like her balance was \"off\".  Follow up with her PCP 6/15/2023 revealed the patient to have continued light-headedness w/heart palpitations and it was thought this may relate to her prior HyperT episodes.    The patient feels that 6 months ago she started noticing she may drift left when walking. There wasn't necessarily an acute onset of this.  There are separate issues of feeling off balance at baseline and also feeling dizzy.  By dizzy, she describes that her head feels like it is constantly moving or turning.  She doesn't feel like she will pass out, but due to fear of falling, she often finds that she has to stop and lean on something or sit down.  After she sits for a few minute she feels better.  Her symptoms have improved with correction of her thyroid issues recently.  She has had events where she is sitting and these symptoms come over her, eventually leaving after a few minutes or so.  She hasn't lost consciousness.     She is alone today. She describes having difficulty recalling things describing difficulty recalling a person's name, the topic of conversation or an item she want's to discuss (place, or event).  This has been present for 6 months now, but she feels it is worse over " time.  Before this time, she wasn't really worried about her cognition and reports living independently (paying finances, managing schedule, managing medications).  She is living with her , his health is poor but only recently it was found that he has dementia or parkinson's.  She helps with his care.  She feels her sleep is terrible and has been for 3-4 months.     The patient did change her dose of metoprolol due to her blood pressure being to high, and occurred a year ago in October.   She also made a change in starting Myrbetriq just recently (2023) for urinary symptoms, but this hasn't necessarily changed her other dizzy/imbalance symptoms. She planning on following up with her cardiologist in 10/2023 to repeat an echocardiogram.    Upon chart review, the patient was seen with [facility] 3/29/2018 for issues of her head shaking, on/off for six month.  She obtained an MRI brain which was suggestive for right cerebellar infarction (lacunar) and microvascular disease.  She was recommended to utilize propranolol for her head titubation tremor, but no follow up occurred after imaging.     Medications:   Atorvastatin  Cevimeline  Furosemide  Levothyroxine  Metoprolol  Mirabegron     Physical Exam:   General: Seated comfortably in no acute distress.  HEENT: No paracervical muscle tenderness or tightness.  Optic discs sharp and vasculature normal on funduscopic exam.   Skin: No rashes. Hands are with enlarged joints throughout, arthritic.   Neurologic:     Mental Status: Fully alert, attentive and oriented. Speech clear and fluent, no paraphasic errors.   MoCA:   - Visualspatial/executive:   - Namin/3 (calls rhino a hippo)  - Memory: 3/5 (recalls joanie and red with category clue)  - Attention:   - Language: 3/3  - Abstraction:   - Orientation:      Cranial Nerves: Visual fields intact. PERRL. EOMI with normal smooth pursuit. Facial sensation intact/symmetric. Facial movements  symmetric. Hearing not formally tested but intact to conversation. Palate elevation symmetric, uvula midline. No dysarthria. Shoulder shrug strong bilaterally. Tongue protrusion midline.     Motor: head titubation (no-no) tremor at rest and with distraction along with slight involvement of facial musculature (pursing of lips, mostly lower. Occurs in tremor that is 4-5 Hz and low amplitude at rest but goes away with talking). Muscle tone normal throughout (no spasticity, rigidity). No pronator drift. Normal/symmetric rapid finger tapping. Strength 5/5 throughout upper and lower extremities.     Deep Tendon Reflexes: 2+/symmetric throughout upper extremities, patellar are 2+ b/l, but achilles are mute b/l. No clonus. Toes downgoing bilaterally.     Sensory: light touch and pinprick differentiation is correctly stated in legs, feet, and hands b/l. Vibration is mildly reduced on the right great toe and leg, but normal at mid-leg and knee. No issues of vibration reporting on left lower extremity. Proprioception intact throughout.       Coordination: Finger-nose-finger intact without dysmetria. Rapid alternating movements intact/symmetric with normal speed and rhythm.     Gait: Stands quickly with arms across chest, normal stance width. Starts and stops easily. Full arm swing. Stride is without crossing over, or list. Turns quickly but is hesitant to restart gait immediately. Tandem is poor, with catch noted on either foot as well as crossing over with her left at times.           Data: Pertinent prior to visit   Imaging:  MRI brain 4/13/2018:  - small lacunar infarction of right cerebellar hemisphere  - mild to moderate chronic microvascular ischemic disease  - b/l mastoid effusions    Laboratory:  8/7/2023:  TSH was normal (2.49)    6/15/2023:  T4 elevated (1.90), TSH low (0.17)    5/11/2023:  Immunofixation showing IgM kappa monoclonal protein not seen on SPEP    2/16/2023:  A1c was 6.2 (elevated in range of 5.7-6.3  for 5 years).         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  Overall, her prior MRI findings to indicate she has vascular disease and an old right cerebellar infarction, so a concern about her acute change in imbalance (not necessarily seen on exam consistently) is made regarding new infarction or recrudesene of her old one. I think repeat MRI brain is needed, and if a new vascular insult is noted, then a vascular image would be needed along with updated stroke prophylaxis guidelines.    Her head titubation tremor is not consistent with a parkinsonian condition given her lack of other associated features.  Treatment could be discussed at our follow up appointment after MRI is done, but may include altering her beta blocker, trying primidone, or other alternative therapies if she so chose.    Her cognitive issues at this time seem subjective, and are likely worsened due to poor sleep and increased stress in her home.  She has a good MoCA as a baseline and this can be repeated in the future should these issues persist.     Plan:  MRI brain w/wout contrast    Follow up in Neurology clinic in 3-4 months, or should new concerns arise.    DANISH Laird D.O.   of Neurology    Total time today (89 min) in this patient encounter was spent on pre-charting, counseling and/or coordination of care.  The patient is in agreement with this plan and has no further questions.

## 2023-09-09 ENCOUNTER — HOSPITAL ENCOUNTER (OUTPATIENT)
Dept: MRI IMAGING | Facility: CLINIC | Age: 83
Discharge: HOME | End: 2023-09-09
Attending: PSYCHIATRY & NEUROLOGY | Admitting: PSYCHIATRY & NEUROLOGY
Payer: COMMERCIAL

## 2023-09-09 DIAGNOSIS — R26.89 BALANCE PROBLEMS: ICD-10-CM

## 2023-09-09 PROCEDURE — 70553 MRI BRAIN STEM W/O & W/DYE: CPT

## 2023-09-09 PROCEDURE — 255N000002 HC RX 255 OP 636: Performed by: PSYCHIATRY & NEUROLOGY

## 2023-09-09 PROCEDURE — A9585 GADOBUTROL INJECTION: HCPCS | Performed by: PSYCHIATRY & NEUROLOGY

## 2023-09-09 RX ORDER — GADOBUTROL 604.72 MG/ML
6 INJECTION INTRAVENOUS ONCE
Status: COMPLETED | OUTPATIENT
Start: 2023-09-09 | End: 2023-09-09

## 2023-09-09 RX ADMIN — GADOBUTROL 6 ML: 604.72 INJECTION INTRAVENOUS at 12:20

## 2023-09-15 ENCOUNTER — OFFICE VISIT (OUTPATIENT)
Dept: FAMILY MEDICINE | Facility: CLINIC | Age: 83
End: 2023-09-15
Payer: COMMERCIAL

## 2023-09-15 ENCOUNTER — TELEPHONE (OUTPATIENT)
Dept: FAMILY MEDICINE | Facility: CLINIC | Age: 83
End: 2023-09-15

## 2023-09-15 VITALS
BODY MASS INDEX: 24.4 KG/M2 | WEIGHT: 142.9 LBS | TEMPERATURE: 97.5 F | DIASTOLIC BLOOD PRESSURE: 77 MMHG | SYSTOLIC BLOOD PRESSURE: 134 MMHG | HEART RATE: 52 BPM | RESPIRATION RATE: 19 BRPM | HEIGHT: 64 IN | OXYGEN SATURATION: 97 %

## 2023-09-15 DIAGNOSIS — R10.32 ABDOMINAL PAIN, LEFT LOWER QUADRANT: Primary | ICD-10-CM

## 2023-09-15 LAB
ALBUMIN SERPL BCG-MCNC: 4.4 G/DL (ref 3.5–5.2)
ALBUMIN UR-MCNC: 30 MG/DL
ALP SERPL-CCNC: 55 U/L (ref 35–104)
ALT SERPL W P-5'-P-CCNC: 19 U/L (ref 0–50)
ANION GAP SERPL CALCULATED.3IONS-SCNC: 9 MMOL/L (ref 7–15)
APPEARANCE UR: CLEAR
AST SERPL W P-5'-P-CCNC: 27 U/L (ref 0–45)
BASOPHILS # BLD AUTO: 0 10E3/UL (ref 0–0.2)
BASOPHILS NFR BLD AUTO: 0 %
BILIRUB SERPL-MCNC: 1.4 MG/DL
BILIRUB UR QL STRIP: NEGATIVE
BUN SERPL-MCNC: 22.3 MG/DL (ref 8–23)
CALCIUM SERPL-MCNC: 9.4 MG/DL (ref 8.8–10.2)
CHLORIDE SERPL-SCNC: 100 MMOL/L (ref 98–107)
COLOR UR AUTO: YELLOW
CREAT SERPL-MCNC: 1.04 MG/DL (ref 0.51–0.95)
DEPRECATED HCO3 PLAS-SCNC: 27 MMOL/L (ref 22–29)
EGFRCR SERPLBLD CKD-EPI 2021: 53 ML/MIN/1.73M2
EOSINOPHIL # BLD AUTO: 0.2 10E3/UL (ref 0–0.7)
EOSINOPHIL NFR BLD AUTO: 2 %
ERYTHROCYTE [DISTWIDTH] IN BLOOD BY AUTOMATED COUNT: 13.7 % (ref 10–15)
GLUCOSE SERPL-MCNC: 115 MG/DL (ref 70–99)
GLUCOSE UR STRIP-MCNC: NEGATIVE MG/DL
HCT VFR BLD AUTO: 36.7 % (ref 35–47)
HGB BLD-MCNC: 12.1 G/DL (ref 11.7–15.7)
HGB UR QL STRIP: NEGATIVE
IMM GRANULOCYTES # BLD: 0 10E3/UL
IMM GRANULOCYTES NFR BLD: 0 %
KETONES UR STRIP-MCNC: NEGATIVE MG/DL
LEUKOCYTE ESTERASE UR QL STRIP: NEGATIVE
LYMPHOCYTES # BLD AUTO: 1.3 10E3/UL (ref 0.8–5.3)
LYMPHOCYTES NFR BLD AUTO: 17 %
MCH RBC QN AUTO: 31.8 PG (ref 26.5–33)
MCHC RBC AUTO-ENTMCNC: 33 G/DL (ref 31.5–36.5)
MCV RBC AUTO: 97 FL (ref 78–100)
MONOCYTES # BLD AUTO: 0.9 10E3/UL (ref 0–1.3)
MONOCYTES NFR BLD AUTO: 12 %
NEUTROPHILS # BLD AUTO: 5.3 10E3/UL (ref 1.6–8.3)
NEUTROPHILS NFR BLD AUTO: 69 %
NITRATE UR QL: NEGATIVE
PH UR STRIP: 5.5 [PH] (ref 5–7)
PLATELET # BLD AUTO: 239 10E3/UL (ref 150–450)
POTASSIUM SERPL-SCNC: 4.7 MMOL/L (ref 3.4–5.3)
PROT SERPL-MCNC: 7 G/DL (ref 6.4–8.3)
RBC # BLD AUTO: 3.8 10E6/UL (ref 3.8–5.2)
RBC #/AREA URNS AUTO: NORMAL /HPF
SODIUM SERPL-SCNC: 136 MMOL/L (ref 136–145)
SP GR UR STRIP: 1.02 (ref 1–1.03)
UROBILINOGEN UR STRIP-ACNC: 0.2 E.U./DL
WBC # BLD AUTO: 7.7 10E3/UL (ref 4–11)
WBC #/AREA URNS AUTO: NORMAL /HPF

## 2023-09-15 PROCEDURE — 81001 URINALYSIS AUTO W/SCOPE: CPT

## 2023-09-15 PROCEDURE — 36415 COLL VENOUS BLD VENIPUNCTURE: CPT

## 2023-09-15 PROCEDURE — 85025 COMPLETE CBC W/AUTO DIFF WBC: CPT

## 2023-09-15 PROCEDURE — 99213 OFFICE O/P EST LOW 20 MIN: CPT

## 2023-09-15 PROCEDURE — 80053 COMPREHEN METABOLIC PANEL: CPT

## 2023-09-15 ASSESSMENT — ENCOUNTER SYMPTOMS
FLANK PAIN: 0
FREQUENCY: 0
DIZZINESS: 0
MYALGIAS: 0
CONSTIPATION: 1
HEMATOCHEZIA: 0
NAUSEA: 0
ABDOMINAL PAIN: 1
FEVER: 0
DIFFICULTY URINATING: 0
HEMATURIA: 0
DIARRHEA: 0
DYSURIA: 0
LIGHT-HEADEDNESS: 0
ABDOMINAL DISTENTION: 0
CHILLS: 0
VOMITING: 0

## 2023-09-15 ASSESSMENT — PAIN SCALES - GENERAL: PAINLEVEL: SEVERE PAIN (6)

## 2023-09-15 NOTE — TELEPHONE ENCOUNTER
Called pt and relayed provider message below. Patient was given an opportunity to ask questions, verbalized understanding of plan, and is agreeable.    Cyndi NEVES RN      ----- Message from SINTIA Richter CNP sent at 9/15/2023  1:26 PM CDT -----  Labs stable. Creatinine a bit elevated, can trend at upcoming visits if necessary.     SINTIA Richter CNP

## 2023-09-15 NOTE — PROGRESS NOTES
Assessment & Plan     (R10.32) Abdominal pain, left lower quadrant  (primary encounter diagnosis)  Comment: unsure of etiology, patient does not have history of diverticulosis. Patient does have some constipation but this seems to be unlikely w/ her regular BM per history. Would like to get CT scan to evaluate pelvic organ and bowel etiology. If unremarkable possible that that this could entail MSK component. Labs collected today including CBC, CMP and UA. Will follow up w/ results when obtained and whether further direction necessary. Patient fully understands and is agreeable with plan of care, at this point patient will follow up as needed unless acute concerns arise in the meantime.  Plan: CT Abdomen Pelvis w/o Contrast, CBC with         platelets and differential, UA Macroscopic with        reflex to Microscopic and Culture - Clinic         Collect, Comprehensive metabolic panel (BMP +         Alb, Alk Phos, ALT, AST, Total. Bili, TP), UA         Microscopic with Reflex to Culture         28 minutes spent by me on the date of the encounter doing chart review, history and exam, documentation and further activities per the note         SINTIA Richter [facility]    Janet Pandya is a 83 year old, presenting for the following health issues:  Abdominal Pain (Left side)        9/15/2023     8:24 AM   Additional Questions   Roomed by Hailee Schofield       History of Present Illness       Reason for visit:  Pain inleft side  Symptom onset:  1-3 days ago    She eats 2-3 servings of fruits and vegetables daily.She consumes 0 sweetened beverage(s) daily.She exercises with enough effort to increase her heart rate 60 or more minutes per day.  She exercises with enough effort to increase her heart rate 5 days per week.   She is taking medications regularly.       Concern - Left side pain  Onset: 9/13/23  Description: PT reports pain on her side with activity or coughing.  Intensity:  "severe  Progression of Symptoms:  worsening  Previous history of similar problem: None  Precipitating factors:        Worsened by: Warm compress.   Alleviating factors:        Improved by: Cold compress  Therapies tried and outcome: PT tried warm and cold compress.     Was fine Wednesday  Went to [employer] dinner theater and started hurting when she got up, pain was increased  When you walk it hurts, coughing pain is severe  Not constipated, however has had to strain a bit more w/ BM - uses clear lax to help w/ constipation.    Review of Systems   Constitutional:  Negative for chills and fever.   Gastrointestinal:  Positive for abdominal pain and constipation. Negative for abdominal distention, diarrhea, hematochezia, nausea and vomiting.   Genitourinary:  Negative for decreased urine volume, difficulty urinating, dysuria, flank pain, frequency, hematuria and urgency.   Musculoskeletal:  Negative for myalgias.   Neurological:  Negative for dizziness, syncope and light-headedness.      Constitutional, cardiovascular, pulmonary, GI, , musculoskeletal systems are negative, except as otherwise noted.      Objective    /77 (BP Location: Right arm, Patient Position: Sitting, Cuff Size: Adult Regular)   Pulse 52   Temp 97.5  F (36.4  C) (Oral)   Resp 19   Ht 1.626 m (5' 4\")   Wt 64.8 kg (142 lb 14.4 oz)   LMP  (LMP Unknown)   SpO2 97%   BMI 24.53 kg/m    Body mass index is 24.53 kg/m .  Physical Exam  Vitals and nursing note reviewed.   Constitutional:       General: She is not in acute distress.     Appearance: Normal appearance. She is not ill-appearing.   Cardiovascular:      Rate and Rhythm: Normal rate.   Pulmonary:      Effort: Pulmonary effort is normal.   Abdominal:      General: Bowel sounds are normal. There is no distension.      Palpations: There is no mass.      Tenderness: There is abdominal tenderness in the left lower quadrant. There is guarding. There is no right CVA tenderness, left CVA " tenderness or rebound. Negative signs include Garay's sign and McBurney's sign.      Hernia: No hernia is present.          Comments: Patient has intense pain w/ light palpation at highlighted area.    Skin:     General: Skin is dry.   Neurological:      Mental Status: She is alert.   Psychiatric:         Mood and Affect: Mood normal.         Behavior: Behavior normal.         Thought Content: Thought content normal.         Judgment: Judgment normal.

## 2023-09-19 ENCOUNTER — APPOINTMENT (OUTPATIENT)
Dept: CT IMAGING | Facility: CLINIC | Age: 83
End: 2023-09-19
Attending: STUDENT IN AN ORGANIZED HEALTH CARE EDUCATION/TRAINING PROGRAM
Payer: COMMERCIAL

## 2023-09-19 ENCOUNTER — HOSPITAL ENCOUNTER (OUTPATIENT)
Facility: CLINIC | Age: 83
Setting detail: OBSERVATION
Discharge: HOME | End: 2023-09-20
Attending: EMERGENCY MEDICINE | Admitting: INTERNAL MEDICINE
Payer: COMMERCIAL

## 2023-09-19 DIAGNOSIS — N94.89 PELVIC HEMATOMA IN FEMALE: ICD-10-CM

## 2023-09-19 LAB
ALBUMIN SERPL BCG-MCNC: 4.5 G/DL (ref 3.5–5.2)
ALBUMIN UR-MCNC: NEGATIVE MG/DL
ALP SERPL-CCNC: 64 U/L (ref 35–104)
ALT SERPL W P-5'-P-CCNC: 15 U/L (ref 0–50)
ANION GAP SERPL CALCULATED.3IONS-SCNC: 11 MMOL/L (ref 7–15)
APPEARANCE UR: CLEAR
AST SERPL W P-5'-P-CCNC: 26 U/L (ref 0–45)
BASOPHILS # BLD AUTO: 0 10E3/UL (ref 0–0.2)
BASOPHILS NFR BLD AUTO: 0 %
BILIRUB SERPL-MCNC: 1.6 MG/DL
BILIRUB UR QL STRIP: NEGATIVE
BUN SERPL-MCNC: 23.2 MG/DL (ref 8–23)
CALCIUM SERPL-MCNC: 9.5 MG/DL (ref 8.8–10.2)
CHLORIDE SERPL-SCNC: 97 MMOL/L (ref 98–107)
COLOR UR AUTO: NORMAL
CREAT SERPL-MCNC: 0.93 MG/DL (ref 0.51–0.95)
DEPRECATED HCO3 PLAS-SCNC: 26 MMOL/L (ref 22–29)
EGFRCR SERPLBLD CKD-EPI 2021: 61 ML/MIN/1.73M2
EOSINOPHIL # BLD AUTO: 0.2 10E3/UL (ref 0–0.7)
EOSINOPHIL NFR BLD AUTO: 2 %
ERYTHROCYTE [DISTWIDTH] IN BLOOD BY AUTOMATED COUNT: 13.9 % (ref 10–15)
GLUCOSE SERPL-MCNC: 115 MG/DL (ref 70–99)
GLUCOSE UR STRIP-MCNC: NEGATIVE MG/DL
HCT VFR BLD AUTO: 31.9 % (ref 35–47)
HGB BLD-MCNC: 10.4 G/DL (ref 11.7–15.7)
HGB BLD-MCNC: 9.9 G/DL (ref 11.7–15.7)
HGB UR QL STRIP: NEGATIVE
IMM GRANULOCYTES # BLD: 0 10E3/UL
IMM GRANULOCYTES NFR BLD: 0 %
KETONES UR STRIP-MCNC: NEGATIVE MG/DL
LACTATE SERPL-SCNC: 1.2 MMOL/L (ref 0.7–2)
LEUKOCYTE ESTERASE UR QL STRIP: NEGATIVE
LIPASE SERPL-CCNC: 20 U/L (ref 13–60)
LYMPHOCYTES # BLD AUTO: 1.7 10E3/UL (ref 0.8–5.3)
LYMPHOCYTES NFR BLD AUTO: 23 %
MCH RBC QN AUTO: 32.1 PG (ref 26.5–33)
MCHC RBC AUTO-ENTMCNC: 32.6 G/DL (ref 31.5–36.5)
MCV RBC AUTO: 99 FL (ref 78–100)
MONOCYTES # BLD AUTO: 0.8 10E3/UL (ref 0–1.3)
MONOCYTES NFR BLD AUTO: 11 %
NEUTROPHILS # BLD AUTO: 4.6 10E3/UL (ref 1.6–8.3)
NEUTROPHILS NFR BLD AUTO: 64 %
NITRATE UR QL: NEGATIVE
NRBC # BLD AUTO: 0 10E3/UL
NRBC BLD AUTO-RTO: 0 /100
PH UR STRIP: 6 [PH] (ref 5–7)
PLATELET # BLD AUTO: 245 10E3/UL (ref 150–450)
POTASSIUM SERPL-SCNC: 4.8 MMOL/L (ref 3.4–5.3)
PROT SERPL-MCNC: 6.9 G/DL (ref 6.4–8.3)
RBC # BLD AUTO: 3.24 10E6/UL (ref 3.8–5.2)
RBC URINE: <1 /HPF
SODIUM SERPL-SCNC: 134 MMOL/L (ref 136–145)
SP GR UR STRIP: 1.03 (ref 1–1.03)
UROBILINOGEN UR STRIP-MCNC: NORMAL MG/DL
WBC # BLD AUTO: 7.4 10E3/UL (ref 4–11)
WBC URINE: <1 /HPF

## 2023-09-19 PROCEDURE — 250N000009 HC RX 250: Performed by: EMERGENCY MEDICINE

## 2023-09-19 PROCEDURE — 81001 URINALYSIS AUTO W/SCOPE: CPT | Performed by: STUDENT IN AN ORGANIZED HEALTH CARE EDUCATION/TRAINING PROGRAM

## 2023-09-19 PROCEDURE — 36415 COLL VENOUS BLD VENIPUNCTURE: CPT | Performed by: STUDENT IN AN ORGANIZED HEALTH CARE EDUCATION/TRAINING PROGRAM

## 2023-09-19 PROCEDURE — 36415 COLL VENOUS BLD VENIPUNCTURE: CPT | Performed by: EMERGENCY MEDICINE

## 2023-09-19 PROCEDURE — G0378 HOSPITAL OBSERVATION PER HR: HCPCS

## 2023-09-19 PROCEDURE — 80053 COMPREHEN METABOLIC PANEL: CPT | Performed by: STUDENT IN AN ORGANIZED HEALTH CARE EDUCATION/TRAINING PROGRAM

## 2023-09-19 PROCEDURE — 250N000011 HC RX IP 250 OP 636: Performed by: STUDENT IN AN ORGANIZED HEALTH CARE EDUCATION/TRAINING PROGRAM

## 2023-09-19 PROCEDURE — 85025 COMPLETE CBC W/AUTO DIFF WBC: CPT | Performed by: STUDENT IN AN ORGANIZED HEALTH CARE EDUCATION/TRAINING PROGRAM

## 2023-09-19 PROCEDURE — 83605 ASSAY OF LACTIC ACID: CPT | Performed by: STUDENT IN AN ORGANIZED HEALTH CARE EDUCATION/TRAINING PROGRAM

## 2023-09-19 PROCEDURE — 74177 CT ABD & PELVIS W/CONTRAST: CPT

## 2023-09-19 PROCEDURE — 85018 HEMOGLOBIN: CPT | Performed by: EMERGENCY MEDICINE

## 2023-09-19 PROCEDURE — 99285 EMERGENCY DEPT VISIT HI MDM: CPT | Mod: 25

## 2023-09-19 PROCEDURE — 99222 1ST HOSP IP/OBS MODERATE 55: CPT | Performed by: INTERNAL MEDICINE

## 2023-09-19 PROCEDURE — 83690 ASSAY OF LIPASE: CPT | Performed by: STUDENT IN AN ORGANIZED HEALTH CARE EDUCATION/TRAINING PROGRAM

## 2023-09-19 PROCEDURE — 96374 THER/PROPH/DIAG INJ IV PUSH: CPT | Mod: XU

## 2023-09-19 PROCEDURE — 250N000009 HC RX 250: Performed by: STUDENT IN AN ORGANIZED HEALTH CARE EDUCATION/TRAINING PROGRAM

## 2023-09-19 RX ORDER — TRANEXAMIC ACID 10 MG/ML
1 INJECTION, SOLUTION INTRAVENOUS ONCE
Status: COMPLETED | OUTPATIENT
Start: 2023-09-19 | End: 2023-09-19

## 2023-09-19 RX ORDER — IOPAMIDOL 755 MG/ML
120 INJECTION, SOLUTION INTRAVASCULAR ONCE
Status: COMPLETED | OUTPATIENT
Start: 2023-09-19 | End: 2023-09-19

## 2023-09-19 RX ORDER — HYDROMORPHONE HCL IN WATER/PF 6 MG/30 ML
0.2 PATIENT CONTROLLED ANALGESIA SYRINGE INTRAVENOUS
Status: DISCONTINUED | OUTPATIENT
Start: 2023-09-19 | End: 2023-09-20

## 2023-09-19 RX ADMIN — SODIUM CHLORIDE 61 ML: 9 INJECTION, SOLUTION INTRAVENOUS at 17:42

## 2023-09-19 RX ADMIN — TRANEXAMIC ACID 1 G: 10 INJECTION, SOLUTION INTRAVENOUS at 21:30

## 2023-09-19 RX ADMIN — IOPAMIDOL 71 ML: 755 INJECTION, SOLUTION INTRAVENOUS at 17:42

## 2023-09-19 ASSESSMENT — ACTIVITIES OF DAILY LIVING (ADL)
ADLS_ACUITY_SCORE: 35
ADLS_ACUITY_SCORE: 33
ADLS_ACUITY_SCORE: 35
ADLS_ACUITY_SCORE: 35

## 2023-09-19 NOTE — ED TRIAGE NOTES
PT reports that she noted last Wednesday she was sitting at a theatre and noted left groin pain, pt reports that the pain subsided and today was sitting with some friends and noted that the pain returned in the left groin area, no injury per pt. No changes in bladder but reports increased constipation. VSS and ABC's intact

## 2023-09-19 NOTE — ED PROVIDER NOTES
Rapid Assessment Note    History:   Ya Stahl is a 83 year old female who presents with left groin pain.  Patient had episode of left groin pain with standing up 1 week ago, improved for multiple days and then returned today when she was sitting with friends.  No trauma to the region.  No fever.  No nausea or vomiting.  Does have constipation but was able to stool today a small amount.    Exam:   General:  Alert, interactive  Cardiovascular:  Well perfused  Lungs:  No respiratory distress, no accessory muscle use  Neuro:  Moving all 4 extremities  Skin:  Warm, dry  Psych:  Normal affect  Abdomen: Focally tender in left lower quadrant.  Old appearing ecchymosis suprapubically, but tenderness is superior and lateral to this area.  No focal mass.    Plan of Care:   I evaluated the patient and developed an initial plan of care. I discussed this plan and explained that I, or one of my partners, would be returning to complete the evaluation.     Patient with groin pain.  Does have ecchymosis in the suprapubic region, however not focally tender over that region.  Patient declines any trauma to the area.  Ordered CT abdomen pelvis as well as labs.  Patient not requiring pain medications currently as she states it is managed.    9/19/2023  [employer]    Portions of this medical record were completed by a scribe. UPON MY REVIEW AND AUTHENTICATION BY ELECTRONIC SIGNATURE, this confirms (a) I performed the applicable clinical services, and (b) the record is accurate.        Niranjan Toussaint MD  09/19/23 [id number]

## 2023-09-20 VITALS
OXYGEN SATURATION: 93 % | TEMPERATURE: 98.8 F | SYSTOLIC BLOOD PRESSURE: 124 MMHG | RESPIRATION RATE: 16 BRPM | BODY MASS INDEX: 24.7 KG/M2 | DIASTOLIC BLOOD PRESSURE: 75 MMHG | HEIGHT: 64 IN | HEART RATE: 68 BPM | WEIGHT: 144.7 LBS

## 2023-09-20 LAB
ERYTHROCYTE [DISTWIDTH] IN BLOOD BY AUTOMATED COUNT: 14.1 % (ref 10–15)
HCT VFR BLD AUTO: 30.8 % (ref 35–47)
HGB BLD-MCNC: 10 G/DL (ref 11.7–15.7)
MCH RBC QN AUTO: 31.7 PG (ref 26.5–33)
MCHC RBC AUTO-ENTMCNC: 32.5 G/DL (ref 31.5–36.5)
MCV RBC AUTO: 98 FL (ref 78–100)
PLATELET # BLD AUTO: 244 10E3/UL (ref 150–450)
RBC # BLD AUTO: 3.15 10E6/UL (ref 3.8–5.2)
WBC # BLD AUTO: 6.4 10E3/UL (ref 4–11)

## 2023-09-20 PROCEDURE — 85018 HEMOGLOBIN: CPT | Performed by: INTERNAL MEDICINE

## 2023-09-20 PROCEDURE — G0378 HOSPITAL OBSERVATION PER HR: HCPCS

## 2023-09-20 PROCEDURE — 85041 AUTOMATED RBC COUNT: CPT | Performed by: INTERNAL MEDICINE

## 2023-09-20 PROCEDURE — 36415 COLL VENOUS BLD VENIPUNCTURE: CPT | Performed by: INTERNAL MEDICINE

## 2023-09-20 PROCEDURE — 99238 HOSP IP/OBS DSCHRG MGMT 30/<: CPT | Performed by: PHYSICIAN ASSISTANT

## 2023-09-20 RX ORDER — LEVOTHYROXINE SODIUM 88 UG/1
88 TABLET ORAL DAILY
Status: DISCONTINUED | OUTPATIENT
Start: 2023-09-20 | End: 2023-09-20 | Stop reason: HOSPADM

## 2023-09-20 RX ORDER — IPRATROPIUM BROMIDE 21 UG/1
2 SPRAY, METERED NASAL 3 TIMES DAILY PRN
Status: DISCONTINUED | OUTPATIENT
Start: 2023-09-20 | End: 2023-09-20 | Stop reason: HOSPADM

## 2023-09-20 RX ORDER — METOPROLOL SUCCINATE 25 MG/1
25 TABLET, EXTENDED RELEASE ORAL DAILY
Status: DISCONTINUED | OUTPATIENT
Start: 2023-09-20 | End: 2023-09-20 | Stop reason: HOSPADM

## 2023-09-20 RX ORDER — PANTOPRAZOLE SODIUM 40 MG/1
40 TABLET, DELAYED RELEASE ORAL EVERY MORNING
Status: DISCONTINUED | OUTPATIENT
Start: 2023-09-20 | End: 2023-09-20 | Stop reason: HOSPADM

## 2023-09-20 RX ORDER — NALOXONE HYDROCHLORIDE 0.4 MG/ML
0.4 INJECTION, SOLUTION INTRAMUSCULAR; INTRAVENOUS; SUBCUTANEOUS
Status: DISCONTINUED | OUTPATIENT
Start: 2023-09-20 | End: 2023-09-20 | Stop reason: HOSPADM

## 2023-09-20 RX ORDER — AMOXICILLIN 250 MG
1 CAPSULE ORAL 2 TIMES DAILY PRN
Status: DISCONTINUED | OUTPATIENT
Start: 2023-09-20 | End: 2023-09-20 | Stop reason: HOSPADM

## 2023-09-20 RX ORDER — NALOXONE HYDROCHLORIDE 0.4 MG/ML
0.2 INJECTION, SOLUTION INTRAMUSCULAR; INTRAVENOUS; SUBCUTANEOUS
Status: DISCONTINUED | OUTPATIENT
Start: 2023-09-20 | End: 2023-09-20 | Stop reason: HOSPADM

## 2023-09-20 RX ORDER — POLYETHYLENE GLYCOL 3350 17 G/17G
17 POWDER, FOR SOLUTION ORAL DAILY PRN
Status: DISCONTINUED | OUTPATIENT
Start: 2023-09-20 | End: 2023-09-20 | Stop reason: HOSPADM

## 2023-09-20 RX ORDER — ONDANSETRON 2 MG/ML
4 INJECTION INTRAMUSCULAR; INTRAVENOUS EVERY 6 HOURS PRN
Status: DISCONTINUED | OUTPATIENT
Start: 2023-09-20 | End: 2023-09-20 | Stop reason: HOSPADM

## 2023-09-20 RX ORDER — ACETAMINOPHEN 325 MG/1
975 TABLET ORAL 4 TIMES DAILY
Status: DISCONTINUED | OUTPATIENT
Start: 2023-09-20 | End: 2023-09-20 | Stop reason: HOSPADM

## 2023-09-20 RX ORDER — AMOXICILLIN 250 MG
2 CAPSULE ORAL 2 TIMES DAILY PRN
Status: DISCONTINUED | OUTPATIENT
Start: 2023-09-20 | End: 2023-09-20 | Stop reason: HOSPADM

## 2023-09-20 RX ORDER — ACETAMINOPHEN 500 MG
1000 TABLET ORAL EVERY 8 HOURS PRN
COMMUNITY

## 2023-09-20 RX ORDER — HYDROXYCHLOROQUINE SULFATE 200 MG/1
300 TABLET, FILM COATED ORAL
COMMUNITY
End: 2024-03-14

## 2023-09-20 RX ORDER — AMLODIPINE BESYLATE 5 MG/1
5 TABLET ORAL DAILY
Status: DISCONTINUED | OUTPATIENT
Start: 2023-09-20 | End: 2023-09-20 | Stop reason: HOSPADM

## 2023-09-20 RX ORDER — NITROGLYCERIN 0.4 MG/1
0.4 TABLET SUBLINGUAL EVERY 5 MIN PRN
Status: DISCONTINUED | OUTPATIENT
Start: 2023-09-20 | End: 2023-09-20 | Stop reason: HOSPADM

## 2023-09-20 RX ORDER — POLYETHYLENE GLYCOL 3350 17 G/17G
17 POWDER, FOR SOLUTION ORAL 2 TIMES DAILY
Status: DISCONTINUED | OUTPATIENT
Start: 2023-09-20 | End: 2023-09-20 | Stop reason: HOSPADM

## 2023-09-20 RX ORDER — ONDANSETRON 4 MG/1
4 TABLET, ORALLY DISINTEGRATING ORAL EVERY 6 HOURS PRN
Status: DISCONTINUED | OUTPATIENT
Start: 2023-09-20 | End: 2023-09-20 | Stop reason: HOSPADM

## 2023-09-20 ASSESSMENT — ACTIVITIES OF DAILY LIVING (ADL)
ADLS_ACUITY_SCORE: 35
ADLS_ACUITY_SCORE: 33

## 2023-09-20 NOTE — ED PROVIDER NOTES
History     Chief Complaint:  Groin Pain       The history is provided by the patient.      Ya Stahl is a 83 year old female on Aspirin with history of hypertension, heart attack  and coronary artery disease who presents with groin pain for the past week. Patient was at a dinner theater performance when she developed the sudden onset of pain upon standing.  Patient states that she wasn't able to walk due to the pain. On 09/14-09/15 she felt slightly better but was able to walk. Ya mentions that over the weekend her symptoms resolved. Today, she reports that this morning she woke up with pain and was limping. The pain then got worse throughout the day and she wasn't able to walk. Denies diarrhea or vomiting. Outside of this, patient states she has been having difficulty passing stool.       Independent Historian:   None - Patient Only    Review of External Notes:   None     Medications:    acetaminophen (TYLENOL) 325 MG tablet  Boswellia-Glucosamine-Vit D (OSTEO BI-FLEX ONE PER DAY PO)  Calcium Carb-Cholecalciferol (SM CALCIUM/VITAMIN D) 600-800 MG-UNIT TABS  Carboxymethylcellulose Sodium (REFRESH LIQUIGEL OP)  cevimeline (EVOXAC) 30 MG capsule  Coenzyme Q10 300 MG CAPS  ELDERBERRY PO  EPINEPHrine (ANY BX GENERIC EQUIV) 0.3 MG/0.3ML injection 2-pack  furosemide (LASIX) 20 MG tablet  ipratropium (ATROVENT) 0.03 % nasal spray  LANsoprazole (PREVACID) 30 MG DR capsule  levothyroxine (SYNTHROID/LEVOTHROID) 88 MCG tablet  Lutein 20 MG CAPS  metoprolol succinate ER (TOPROL XL) 25 MG 24 hr tablet  mirabegron (MYRBETRIQ) 50 MG 24 hr tablet  nitroGLYcerin (NITROSTAT) 0.4 MG sublingual tablet  Omega-3 Fatty Acids (OMEGA-3 FISH OIL PO)  polyethylene glycol (MIRALAX) 17 GM/Dose powder  potassium chloride ER (KLOR-CON M) 10 MEQ CR tablet  Vitamin D (Cholecalciferol) 50 MCG (2000 UT) CAPS        Past Medical History:    Arthritis  Cerebral infarction   Coronary artery disease  Disease of thyroid gland  Displacement of  lumbar intervertebral disc without myelopathy  Gastroesophageal reflux disease  Hearing loss  Heart attack   Angina  Hypertension  Migraine headache  Myocardial infarction   Sicca syndrome   Sjogren's disease  Spider veins  Hypothyroidism       Past Surgical History:    Past Surgical History:   Procedure Laterality Date    ARTHROPLASTY HIP ANTERIOR Left 10/14/2022    Procedure: LEFT TOTAL HIP ARTHROPLASTY DIRECT ANTERIOR APPROACH;  Surgeon: Selvin Saab MD;  Location:  OR    ARTHROSCOPY KNEE  10/05/2012    R Procedure: ARTHROSCOPY KNEE;  RIGHT KNEE ARTHROSCOPY, PARTIAL MEDIAL MENISCECTOMY;  Surgeon: Karli Zaragoza MD;  Location:  SD    BACK SURGERY  About. 5 years ago    Fusion of 4&5 lumbar    BLADDER SURGERY      BUNIONECTOMY  ~2010    L foot.     CATARACT IOL, RT/LT Bilateral 07/2017    COLONOSCOPY N/A 01/17/2017    normal; no repeat Procedure: COLONOSCOPY;  Surgeon: Fan Giordano MD;  Location:  GI    ENDOSCOPIC RELEASE CARPAL TUNNEL  09/11/2012    R Procedure: ENDOSCOPIC RELEASE CARPAL TUNNEL;  Right Endoscopic carpal tunnel release, left ringer finger cortizon injection;  Surgeon: Anne Marie Catherine MD;  Location:  OR    ESOPHAGOSCOPY, GASTROSCOPY, DUODENOSCOPY (EGD), COMBINED N/A 12/26/2014    Procedure: COMBINED ESOPHAGOSCOPY, GASTROSCOPY, DUODENOSCOPY (EGD);  Surgeon: Fan Giordano MD;  Location:  GI    EXCISE EXOSTOSIS FOOT Left 12/01/2016    Procedure: EXCISE EXOSTOSIS FOOT;  Surgeon: Jody Gallagher DPM, Pod;  Location:  OR    GYN SURGERY  1978    ovaries removed    HEART CATH FYI  03/04/2016    dz <40%    HIP HARDWARE REMOVAL Right 08/04/2020    Procedure: HARDWARE REMOVAL - LATERAL APPROACH;  Surgeon: Charlie Wolfe MD;  Location: [facility];  Service: Orthopedics    HYSTERECTOMY      INJECT STEROID (LOCATION)  09/11/2012    Procedure: INJECT STEROID (LOCATION);;  Surgeon: Anne Marie Catherine MD;  Location:  OR    LAPAROSCOPIC CHOLECYSTECTOMY N/A 04/07/2016     Procedure: LAPAROSCOPIC CHOLECYSTECTOMY;  Surgeon: Hans Medina MD;  Location:  OR    OPTICAL TRACKING SYSTEM FUSION SPINE POSTERIOR LUMBAR TWO LEVELS N/A 10/31/2018    Procedure: Central L3-4 L4-5 lamenectomies L4-5 posterior instrumented fusion;  Surgeon: Aroldo Burdick MD;  Location:  OR    ORTHOPEDIC SURGERY Right 2014    karli for fx femur    RECONSTRUCT FOREFOOT WITH METATARSOPHALANGEAL (MTP) FUSION Left 04/28/2017    Procedure: RECONSTRUCT FOREFOOT WITH METATARSOPHALANGEAL (MTP) FUSION;  1. Resection arthroplasty, fifth metatarsophalangeal joint, left foot.   2. Irrigation and debridement of fifth metatarsophalangeal joint, left foot (excisional to the level of the bone).     ;  Surgeon: Fabián Phipps MD;  Location:  OR    RELEASE CARPAL TUNNEL      RIGHT HIP ORIF 4/1/2014      ROTATOR CUFF REPAIR RT/LT  ~1998    Lt shoulder; rotator cuff    SURGICAL HISTORY OF -   distant past    ablation for palpitations.    SURGICAL HISTORY OF -   06/2015    left carpal tunnel surgery    [facility] NONSPECIFIC PROCEDURE  1976    D&C    [facility] TOTAL HIP ARTHROPLASTY Right 08/04/2020    Procedure: RIGHT TOTAL HIP ARTHROPLASTY;  Surgeon: Charlie Wolfe MD;  Location: [facility];  Service: Orthopedics    [facility] TOTAL KNEE ARTHROPLASTY Right 01/2020        Physical Exam   Patient Vitals for the past 24 hrs:   BP Temp Temp src Pulse Resp SpO2   09/19/23 2308 135/68 -- -- 63 -- --   09/19/23 2202 -- -- -- -- -- 97 %   09/19/23 2158 (!) 198/191 -- -- 66 -- --   09/19/23 2031 (!) 195/87 -- -- -- -- 99 %   09/19/23 1932 (!) 196/91 -- -- 58 -- 99 %   09/19/23 1532 (!) 185/99 97.7  F (36.5  C) Temporal 85 18 99 %        Physical Exam      HEENT:    Oropharynx is moist  Eyes:    Conjunctiva normal  Neck:     Supple, no meningismus.     CV:     Regular rate and rhythm.      No murmurs, rubs or gallops.     No lower extremity edema.  PULM:    Clear to auscultation bilateral.       No respiratory distress.      Good air  exchange.     No rales or wheezing.     No stridor.  ABD:    Soft, non-distended.       Mild-moderate focal abdominal tenderness in the left lower quadrant and midline low abdomen     Ecchymosis to the midline low abdomen and perineum     Bowel sounds normal.     No pulsatile masses.       No rebound, guarding or rigidity.     No CVA tenderness.   MSK:     Focal bony tenderness to the hip or pelvis  LYMPH:   No cervical lymphadenopathy.  NEURO:   Alert.  Good muscular tone, no atrophy.   Skin:    Warm, dry and intact.    Psych:    Mood is good and affect is appropriate.      Emergency Department Course       Imaging:  CT Abdomen Pelvis w Contrast   Final Result   IMPRESSION:       1.  Findings likely reflecting an ill-defined intramuscular hematoma within the left iliacus muscle. No evidence of active bleeding, within the limitations of the single portal venous phase technique.      2.  High density 3.1 cm collection within the right inguinal region, which is suboptimally evaluated due to surrounding streak artifact. This could potentially represents an additional hematoma. Consider correlation with ultrasound.      3.  No other acute abnormality, noting that evaluation of the pelvic structures is limited by streak artifact from bilateral hip arthroplasty hardware.      4.  Sigmoid colonic diverticulosis. No definite inflamed colonic diverticuli.         Report per radiology    Laboratory:  Labs Ordered and Resulted from Time of ED Arrival to Time of ED Departure   COMPREHENSIVE METABOLIC PANEL - Abnormal       Result Value    Sodium 134 (*)     Potassium 4.8      Chloride 97 (*)     Carbon Dioxide (CO2) 26      Anion Gap 11      Urea Nitrogen 23.2 (*)     Creatinine 0.93      Calcium 9.5      Glucose 115 (*)     Alkaline Phosphatase 64      AST 26      ALT 15      Protein Total 6.9      Albumin 4.5      Bilirubin Total 1.6 (*)     GFR Estimate 61     CBC WITH PLATELETS AND DIFFERENTIAL - Abnormal    WBC Count 7.4       RBC Count 3.24 (*)     Hemoglobin 10.4 (*)     Hematocrit 31.9 (*)     MCV 99      MCH 32.1      MCHC 32.6      RDW 13.9      Platelet Count 245      % Neutrophils 64      % Lymphocytes 23      % Monocytes 11      % Eosinophils 2      % Basophils 0      % Immature Granulocytes 0      NRBCs per 100 WBC 0      Absolute Neutrophils 4.6      Absolute Lymphocytes 1.7      Absolute Monocytes 0.8      Absolute Eosinophils 0.2      Absolute Basophils 0.0      Absolute Immature Granulocytes 0.0      Absolute NRBCs 0.0     HEMOGLOBIN - Abnormal    Hemoglobin 9.9 (*)    LIPASE - Normal    Lipase 20     LACTIC ACID WHOLE BLOOD - Normal    Lactic Acid 1.2     ROUTINE UA WITH MICROSCOPIC REFLEX TO CULTURE - Normal    Color Urine Straw      Appearance Urine Clear      Glucose Urine Negative      Bilirubin Urine Negative      Ketones Urine Negative      Specific Gravity Urine 1.026      Blood Urine Negative      pH Urine 6.0      Protein Albumin Urine Negative      Urobilinogen Urine Normal      Nitrite Urine Negative      Leukocyte Esterase Urine Negative      RBC Urine <1      WBC Urine <1            Emergency Department Course & Assessments:      Interventions:  Medications   HYDROmorphone (DILAUDID) injection 0.2 mg (0.2 mg Intravenous Not Given 9/19/23 2020)   iopamidol (ISOVUE-370) solution 120 mL (71 mLs Intravenous $Given 9/19/23 1742)   Saline CT scan flush (61 mLs Intravenous $Given 9/19/23 1742)   tranexamic acid 1 g in 100 mL NS IV bag (premix) (1 g Intravenous $Given 9/19/23 2130)        Assessments:  1943 I obtained history and examined the patient as noted above.     Independent Interpretation (X-rays, CTs, rhythm strip):  I independent reviewed CT scan of the abdomen which there is a left iliac hematoma    Consultations/Discussion of Management or Tests:  2000 I spoke with  from interventional radiology regarding the patient's presentation and plan of care.  2106 I spoke with Dr. Andino from the  hospitalist service regarding the patient's presentation, findings here in the ED, and plan of care.         Social Determinants of Health affecting care:   None    Disposition:  The patient was admitted to the hospital under the care of Dr. Andino .     Impression & Plan        Medical Decision Makin-year-old female presents with atraumatic onset of left pelvic/groin pain.  Ultimately patient was found to have a 7 x 3 x 5 cm left iliac is hematoma.  The exact cause is uncertain as patient has no coagulopathy nor on anticoagulants.  There was no active extravasation on CT scan.  Patient is hemodynamically stable.  Hemoglobin went from 10.4 to 9.9 on 3-hour recheck.  Patient given 1 gram of TXA.  I spoke with interventional radiology who agrees that there is no need for emergent IR intervention.  Patient will be transferred to a medical bed for pain management, serial hemoglobins.      Diagnosis:    ICD-10-CM    1. Pelvic hematoma in female  N94.89            Discharge Medications:  New Prescriptions    No medications on file          Scribe Disclosure:  I, Josef Peralta, am serving as a scribe at 8:08 PM on 2023 to document services personally performed by Braxton Figueredo MD based on my observations and the provider's statements to me.   2023   Braxton Figueredo MD Matthews, Jeremiah R, MD  23 0014

## 2023-09-20 NOTE — PLAN OF CARE
ROOM # 212-1    Living Situation (if not independent, order SW consult): Home with spouse  Facility name:  : spouse [facility]    Activity level at baseline: Ind  Activity level on admit: SBA    Who will be transporting you at discharge: family    Patient registered to observation; given Patient Bill of Rights; given the opportunity to ask questions about observation status and their plan of care.  Patient has been oriented to the observation room, bathroom and call light is in place.    Discussed discharge goals and expectations with patient/family.

## 2023-09-20 NOTE — PLAN OF CARE
PRIMARY DIAGNOSIS: ACUTE LEFT GROIN PAIN/ PELVIC HEMATOMA   OUTPATIENT/OBSERVATION GOALS TO BE MET BEFORE DISCHARGE:  1. Pain Status: Improved-controlled with oral pain medications.    2. Return to near baseline physical activity: Yes    3. Cleared for discharge by consultants (if involved): N/A    Discharge Planner Nurse   Safe discharge environment identified: Yes  Barriers to discharge: Yes       Entered by: LINNEA HERMOSILLO RN 09/20/2023    Vital signs:  Temp: 98.8  F (37.1  C) Temp src: Oral BP: 124/75 Pulse: 68   Resp: 16 SpO2: 93 % O2 Device: None (Room air)   Alert and oriented x4. Up to the bathroom SBA this morning. Pt reported pain improved. Bruising noted to the left side of groin/ pelvic area. Pt said only will take Tylenol for pain but not ready to take that yet. PT consulted. Will walk pt in [place] to see if they need PT eval. On regular diet. PIV saline locked. Will continue to monitor.      Please review provider order for any additional goals.   Nurse to notify provider when observation goals have been met and patient is ready for discharge.

## 2023-09-20 NOTE — ED NOTES
[facility]  ED Nurse Handoff Report    ED Chief complaint: Groin Pain  . ED Diagnosis:   Final diagnoses:   Pelvic hematoma in female       Allergies:   Allergies   Allergen Reactions    Morphine And Related      fatigue    Vicodin [Acetaminophen] Fatigue    Adhesive Tape Rash       Code Status: DNR / DNI    Activity level - Baseline/Home:  independent.  Activity Level - Current:   standby.   Lift room needed: No.   Bariatric: No   Needed: No   Isolation: No.   Infection: Not Applicable.     Respiratory status: Room air    Vital Signs (within 30 minutes):   Vitals:    09/19/23 1532 09/19/23 1932 09/19/23 2031 09/19/23 2158   BP: (!) 185/99 (!) 196/91 (!) 195/87 (!) 198/191   Pulse: 85 58  66   Resp: 18      Temp: 97.7  F (36.5  C)      TempSrc: Temporal      SpO2: 99% 99% 99%        Cardiac Rhythm:  ,      Pain level: 3  Patient confused: No.   Patient Falls Risk: nonskid shoes/slippers when out of bed, arm band in place, and patient and family education.   Elimination Status: Has voided     Patient Report - Initial Complaint: groin pain  Focused Assessment: Had left groin pain last Wednesday went away today was sitting visiting and she felt the pain in left groin. Denies any injury. Did have difficulty waling at home. CT showed hematoma in groin. Pain is less refuses any narcotics. Up to bathroom x2 with stand by assistance. Gait steady. Denies any other issues.    Abnormal Results:   Labs Ordered and Resulted from Time of ED Arrival to Time of ED Departure   COMPREHENSIVE METABOLIC PANEL - Abnormal       Result Value    Sodium 134 (*)     Potassium 4.8      Chloride 97 (*)     Carbon Dioxide (CO2) 26      Anion Gap 11      Urea Nitrogen 23.2 (*)     Creatinine 0.93      Calcium 9.5      Glucose 115 (*)     Alkaline Phosphatase 64      AST 26      ALT 15      Protein Total 6.9      Albumin 4.5      Bilirubin Total 1.6 (*)     GFR Estimate 61     CBC WITH PLATELETS AND DIFFERENTIAL  - Abnormal    WBC Count 7.4      RBC Count 3.24 (*)     Hemoglobin 10.4 (*)     Hematocrit 31.9 (*)     MCV 99      MCH 32.1      MCHC 32.6      RDW 13.9      Platelet Count 245      % Neutrophils 64      % Lymphocytes 23      % Monocytes 11      % Eosinophils 2      % Basophils 0      % Immature Granulocytes 0      NRBCs per 100 WBC 0      Absolute Neutrophils 4.6      Absolute Lymphocytes 1.7      Absolute Monocytes 0.8      Absolute Eosinophils 0.2      Absolute Basophils 0.0      Absolute Immature Granulocytes 0.0      Absolute NRBCs 0.0     HEMOGLOBIN - Abnormal    Hemoglobin 9.9 (*)    LIPASE - Normal    Lipase 20     LACTIC ACID WHOLE BLOOD - Normal    Lactic Acid 1.2     ROUTINE UA WITH MICROSCOPIC REFLEX TO CULTURE - Normal    Color Urine Straw      Appearance Urine Clear      Glucose Urine Negative      Bilirubin Urine Negative      Ketones Urine Negative      Specific Gravity Urine 1.026      Blood Urine Negative      pH Urine 6.0      Protein Albumin Urine Negative      Urobilinogen Urine Normal      Nitrite Urine Negative      Leukocyte Esterase Urine Negative      RBC Urine <1      WBC Urine <1          CT Abdomen Pelvis w Contrast   Final Result   IMPRESSION:       1.  Findings likely reflecting an ill-defined intramuscular hematoma within the left iliacus muscle. No evidence of active bleeding, within the limitations of the single portal venous phase technique.      2.  High density 3.1 cm collection within the right inguinal region, which is suboptimally evaluated due to surrounding streak artifact. This could potentially represents an additional hematoma. Consider correlation with ultrasound.      3.  No other acute abnormality, noting that evaluation of the pelvic structures is limited by streak artifact from bilateral hip arthroplasty hardware.      4.  Sigmoid colonic diverticulosis. No definite inflamed colonic diverticuli.          Treatments provided: TXA  Family Comments: Family aware that  patient will be admitted  OBS brochure/video discussed/provided to patient:  Yes  ED Medications:   Medications   HYDROmorphone (DILAUDID) injection 0.2 mg (0.2 mg Intravenous Not Given 9/19/23 2020)   iopamidol (ISOVUE-370) solution 120 mL (71 mLs Intravenous $Given 9/19/23 1742)   Saline CT scan flush (61 mLs Intravenous $Given 9/19/23 1742)   tranexamic acid 1 g in 100 mL NS IV bag (premix) (1 g Intravenous $Given 9/19/23 2130)       Drips infusing:  No  For the majority of the shift this patient was Green.   Interventions performed were none.    Sepsis treatment initiated: No    Cares/treatment/interventions/medications to be completed following ED care: None at this time    ED Nurse Name: Marjorie Jenkins RN  10:47 PM     RECEIVING UNIT ED HANDOFF REVIEW    Above ED Nurse Handoff Report was reviewed: Yes  Reviewed by: Maria Eugenia Liang RN on September 20, 2023 at 12:03 AM

## 2023-09-20 NOTE — PHARMACY-ADMISSION MEDICATION HISTORY
Pharmacist Admission Medication History    Admission medication history is complete. The information provided in this note is only as accurate as the sources available at the time of the update.    Medication reconciliation/reorder completed by provider prior to medication history? Yes    Information Source(s): Patient via in-person    Pertinent Information: Patient has been taking atorvastatin 20 mg at bedtime last dose 9/18 and famotidine 40 mg at bedtime last dose 9/18; both were discontinued on admission by admitting MD so are not included on the PTA list below.    Changes made to PTA medication list:  Added: Hydroxychloroquine 300 mg daily at noon-doesn't always take.  Deleted: MD removed Atorvastatin and Famotidine on admission.  Changed: tylenol from 3x 325 mg q6h prn to 2x 500 mg q8h prn; Refresh eye drops to q1hr prn.    Medication Affordability:  Not including over the counter (OTC) medications, was there a time in the past 3 months when you did not take your medications as prescribed because of cost?: No    Allergies reviewed with patient and updates made in EHR: yes. Pt has allergies listed as Morphine and related; and Vicodin. She does not want any narcotics.      Medication History Completed By: Denny Preston RP 9/20/2023 10:16 AM    Prior to Admission medications    Medication Sig Last Dose Taking? Auth Provider Long Term End Date   acetaminophen (TYLENOL) 500 MG tablet Take 1,000 mg by mouth every 8 hours as needed for mild pain 9/19/2023 at 1300 Yes Unknown, Entered By History No    Boswellia-Glucosamine-Vit D (OSTEO BI-FLEX ONE PER DAY PO) Take 1 capsule by mouth daily 9/19/2023 at AM Yes Reported, Patient     Calcium Carb-Cholecalciferol (SM CALCIUM/VITAMIN D) 600-800 MG-UNIT TABS Take 1 tablet by mouth 2 times daily 9/19/2023 at AM Yes Reported, Patient     Carboxymethylcellulose Sodium (REFRESH LIQUIGEL OP) Place 1 drop into both eyes every hour as needed (dry eyes) 9/19/2023 at PM Yes  Reported, Patient     cevimeline (EVOXAC) 30 MG capsule Take 30 mg by mouth 3 times daily 9/19/2023 at AM Yes Reported, Patient     Coenzyme Q10 300 MG CAPS Take 1 capsule by mouth daily 9/19/2023 at AM Yes Reported, Patient     ELDERBERRY PO Take 1 tablet by mouth daily 9/19/2023 at AM Yes Reported, Patient     EPINEPHrine (ANY BX GENERIC EQUIV) 0.3 MG/0.3ML injection 2-pack Inject 0.3 mLs (0.3 mg) into the muscle as needed for anaphylaxis  Yes Shankar Eric MD     furosemide (LASIX) 20 MG tablet Take 1 tablet (20 mg) by mouth daily as needed (leg swelling) several months ago Yes Nuzhat Velasquez APRN CNP Yes    hydroxychloroquine (PLAQUENIL) 200 MG tablet Take 300 mg by mouth daily (with lunch) 9/17/2023 at 1200 Yes Unknown, Entered By History     ipratropium (ATROVENT) 0.03 % nasal spray Spray 2 sprays into both nostrils 3 times daily as needed for rhinitis (runny nose) 9/19/2023 at AM Yes Sanchez Ho MD  12/12/23   LANsoprazole (PREVACID) 30 MG DR capsule TAKE 1 CAPSULE BY MOUTH IN THE  MORNING 9/19/2023 at AM Yes Devi Romano MD     levothyroxine (SYNTHROID/LEVOTHROID) 88 MCG tablet Take 1 tablet (88 mcg) by mouth daily 9/19/2023 at AM Yes Sekou Farrar MD Yes    Lutein 20 MG CAPS Take 1 capsule by mouth daily 9/19/2023 at AM Yes Reported, Patient     mirabegron (MYRBETRIQ) 50 MG 24 hr tablet Take 1 tablet (50 mg) by mouth daily 9/19/2023 at AM Yes Erika Helms PA-C No    nitroGLYcerin (NITROSTAT) 0.4 MG sublingual tablet Place 1 tablet (0.4 mg) under the tongue every 5 minutes as needed for chest pain  Yes Malinda Cha APRN CNP Yes    Omega-3 Fatty Acids (OMEGA-3 FISH OIL PO) Take 1 g by mouth 2 times daily (with meals)  9/19/2023 at AM Yes Reported, Patient     polyethylene glycol (MIRALAX) 17 GM/Dose powder Take 1 capful by mouth 2 times daily In 8 ounces of liquid 9/19/2023 at AM Yes Reported, Patient     potassium chloride ER (KLOR-CON M) 10 MEQ CR tablet Take 1  tablet (10 mEq) by mouth as needed for potassium supplementation (take in combination with lasix) Several months ago Yes Nuzhat Velasquez APRN CNP     Vitamin D (Cholecalciferol) 50 MCG (2000 UT) CAPS Take 1 capsule by mouth 2 times daily 9/19/2023 at AM Yes Reported, Patient     metoprolol succinate ER (TOPROL XL) 25 MG 24 hr tablet Take 1 tablet (25 mg) by mouth daily 9/18/2023 at HS  Jennifer Talavera PA-C Yes

## 2023-09-20 NOTE — PLAN OF CARE
"PRIMARY DIAGNOSIS: L groin pain  OUTPATIENT/OBSERVATION GOALS TO BE MET BEFORE DISCHARGE:  ADLs back to baseline: No    Activity and level of assistance: Up with standby assistance.    Pain status: Improved-controlled with oral pain medications.    Return to near baseline physical activity: Yes     Discharge Planner Nurse   Safe discharge environment identified: Yes  Barriers to discharge: Yes       Entered by: Maria Eugenia Liang RN 09/20/2023 4:50 AM  Pt AO x4. VSS on RA, LS clear, BS active. Pt up with SBA. Tolerating regular diet. PIV SL. Pain 2/10, denies need for pain meds. Hematoma noted to lower abdomen/pelvic region. Plan for PT consult. Will continue to monitor.   BP (!) 141/74 (BP Location: Left arm)   Pulse 61   Temp 98.2  F (36.8  C) (Oral)   Resp 16   Ht 1.626 m (5' 4\")   Wt 65.6 kg (144 lb 11.2 oz)   LMP  (LMP Unknown)   SpO2 97%   BMI 24.84 kg/m    Please review provider order for any additional goals.   Nurse to notify provider when observation goals have been met and patient is ready for discharge.  "

## 2023-09-20 NOTE — DISCHARGE SUMMARY
"[facility]  Hospitalist Discharge Summary      Date of Admission:  9/19/2023  Date of Discharge:  9/20/2023  Discharging Provider: Alice Driscoll PA-C  Discharge Service: Hospitalist Service    Discharge Diagnoses   Left groin pain  Atraumatic left ileacus hematoma    Clinically Significant Risk Factors          Follow-ups Needed After Discharge   Follow-up Appointments     Follow-up and recommended labs and tests       Follow up with PCP as needed. I would like you to have your liver   function tests including your bilirubin redrawn since this was mildly   elevated            Unresulted Labs Ordered in the Past 30 Days of this Admission       No orders found for last 31 day(s).            Discharge Disposition   Discharged to home  Condition at discharge: Stable    Hospital Course   Ya Stahl is a 83 year old female with history of hypothyroidism, Sjogren's disease, GERD, hyperlipidemia, history of migraines, constipation and history of diverticulosis who was admitted on 9/19/2023 with atraumatic left groin pain.    \"The story goes 5 days ago the patient was at the [facility] in dinner theater watching a show and developed left groin pain when she stood up to go to the bathroom.  By the end of the show she was unable to bear weight.  She did not feel a pop.  They were able to give her a wheelchair and get her into a car.  She did not seek attention at that time but did follow-up a few days later at [facility] at which point a CT scan was ordered but they were unable to get her in until later this week, Friday.  She came to the ER because her pain got too bad.  She is only on aspirin.  CT scan today shows a hematoma of the left Iliacus muscle.  She has ecchymosis over her pelvis.  Has not had issues with urinating or going to the bathroom but she does have a history of constipation.  She denies any trauma to the area at all\"    In the ED she was vitally stable. Her " hemoglobin was down to 9.9 after being 12.1 on 9/15. Lactic acid is normal at 1.2, her creatinine is normal as well. CT scan today shows a hematoma of the left Iliacus muscle. Emergency room did talk to interventional radiology and felt there was nothing to do at this point as there was no signs of active bleeding on the scan.   Admission was requested due to her being unable to walk.    After 1 night admission her pain was improved and she was able to ambulate without significant difficulty and request to go home.  She declined narcotics and will use Tylenol/icing.  She was told to not exercise for about 2 weeks.        Consultations This Hospital Stay   PHYSICAL THERAPY ADULT IP CONSULT    Code Status   No CPR- Do NOT Intubate    Time Spent on this Encounter   I, Alice Driscoll PA-C, personally saw the patient today and spent less than or equal to 30 minutes discharging this patient.       Alice Driscoll PA-C  [facility] OBSERVATION DEPT  [address] [place] [address]  Phone: [phone]  ______________________________________________________________________    Physical Exam   Vital Signs: Temp: 98.8  F (37.1  C) Temp src: Oral BP: 124/75 Pulse: 68   Resp: 16 SpO2: 93 % O2 Device: None (Room air)    Weight: 144 lbs 11.2 oz  General Appearance: Alert and orientated x 3  Respiratory: CTAB  Cardiovascular: RRR without murmur  GI: Bowel sounds are present without tenderness  Skin: Ecchymosis on mons pubis, non tender. No rashes or open sores        Primary Care Physician   Devi Romano    Discharge Orders      Reason for your hospital stay    You were admitted for left groin pain related to a hematoma (collection of blood) in the left ileacus muscle. This will resolve on it's own. You can use Tylenol and icing for pain.     Follow-up and recommended labs and tests     Follow up with PCP as needed. I would like you to have your liver function tests including  your bilirubin redrawn since this was mildly elevated     Activity    Your activity upon discharge: activity as tolerated, but I would avoid exercise for the next 2 weeks     Diet    Follow this diet upon discharge: Regular       Significant Results and Procedures   Most Recent 3 CBC's:  Recent Labs   Lab Test 09/20/23  0719 09/19/23 2021 09/19/23  1707 09/15/23  0912   WBC 6.4  --  7.4 7.7   HGB 10.0* 9.9* 10.4* 12.1   MCV 98  --  99 97     --  245 239     Most Recent 3 BMP's:  Recent Labs   Lab Test 09/19/23  1707 09/15/23  0912 06/15/23  1625   * 136 138   POTASSIUM 4.8 4.7 4.7   CHLORIDE 97* 100 102   CO2 26 27 21*   BUN 23.2* 22.3 27.1*   CR 0.93 1.04* 0.84   ANIONGAP 11 9 15   CARMEN 9.5 9.4 9.4   * 115* 154*   ,   Results for orders placed or performed during the hospital encounter of 09/19/23   CT Abdomen Pelvis w Contrast    Narrative    EXAM: CT ABDOMEN PELVIS W CONTRAST  LOCATION: [facility]  DATE: 9/19/2023    INDICATION: Left groin pain. Bruising to suprapubic region.  COMPARISON: CT abdomen pelvis 10/25/2017.  TECHNIQUE: CT scan of the abdomen and pelvis was performed following injection of IV contrast. Multiplanar reformats were obtained. Dose reduction techniques were used.  CONTRAST: 71 mL Isovue 370    FINDINGS:     LOWER CHEST: Normal.    HEPATOBILIARY: Status post cholecystectomy with unchanged mild biliary ductal ectasia. Cysts in the caudate and lateral left hepatic lobe are unchanged. No suspicious liver lesions.    PANCREAS: Normal.    SPLEEN: Normal.    ADRENAL GLANDS: Normal.    KIDNEYS/BLADDER: No urinary calculi or hydronephrosis. Distal ureters and urinary bladder are obscured by streak artifact.    BOWEL: No obstruction or inflammation. Appendix is not visualized; no inflammation at the base of the cecum. Scattered sigmoid colonic diverticuli. No clearly inflamed colonic diverticuli, noting that evaluation of bowel loops within the pelvis is    limited by streak artifact. Mild mesenteric edema within the pelvis.    LYMPH NODES: No enlarged lymph nodes.    VASCULATURE: Patent portal, splenic, and superior mesenteric veins. Mild aortobiiliac atherosclerosis. No abdominal aortic aneurysm.    PELVIC ORGANS: Absent uterus.    MUSCULOSKELETAL: Heterogeneous, asymmetrically thickened appearance of the left iliacus muscle measuring approximately 7.0 x 3.0 x 5.4 cm (3/#109, 4/#44), likely due to an ill-defined intramuscular hematoma. There is trace stranding of the fat within the   surrounding left retroperitoneum. No evidence of active bleeding, within the limitations of the single portal venous phases technique. No other retroperitoneal hematoma is identified. High-density 3.1 x 1.9 x 2.9 cm collection within the right inguinal   region (3/#163, 4/#14), suboptimally evaluated due to surrounding streak artifact. Diffusely demineralized bones. Multilevel degenerative change of the spine. L4-L5 posterior spinal fusion hardware. Bilateral hip arthroplasty hardware. Remote healed   fractures of the right inferior and superior pubic rami and bilateral sacral ala. No acute bony abnormality.      Impression    IMPRESSION:     1.  Findings likely reflecting an ill-defined intramuscular hematoma within the left iliacus muscle. No evidence of active bleeding, within the limitations of the single portal venous phase technique.    2.  High density 3.1 cm collection within the right inguinal region, which is suboptimally evaluated due to surrounding streak artifact. This could potentially represents an additional hematoma. Consider correlation with ultrasound.    3.  No other acute abnormality, noting that evaluation of the pelvic structures is limited by streak artifact from bilateral hip arthroplasty hardware.    4.  Sigmoid colonic diverticulosis. No definite inflamed colonic diverticuli.       Discharge Medications   Current Discharge Medication List        CONTINUE  these medications which have NOT CHANGED    Details   acetaminophen (TYLENOL) 500 MG tablet Take 1,000 mg by mouth every 8 hours as needed for mild pain      Boswellia-Glucosamine-Vit D (OSTEO BI-FLEX ONE PER DAY PO) Take 1 capsule by mouth daily      Calcium Carb-Cholecalciferol (SM CALCIUM/VITAMIN D) 600-800 MG-UNIT TABS Take 1 tablet by mouth 2 times daily      Carboxymethylcellulose Sodium (REFRESH LIQUIGEL OP) Place 1 drop into both eyes every hour as needed (dry eyes)      cevimeline (EVOXAC) 30 MG capsule Take 30 mg by mouth 3 times daily      Coenzyme Q10 300 MG CAPS Take 1 capsule by mouth daily      ELDERBERRY PO Take 1 tablet by mouth daily      EPINEPHrine (ANY BX GENERIC EQUIV) 0.3 MG/0.3ML injection 2-pack Inject 0.3 mLs (0.3 mg) into the muscle as needed for anaphylaxis  Qty: 2 each, Refills: 0    Associated Diagnoses: Throat swelling      furosemide (LASIX) 20 MG tablet Take 1 tablet (20 mg) by mouth daily as needed (leg swelling)  Qty: 90 tablet, Refills: 1    Associated Diagnoses: Edema, unspecified type      hydroxychloroquine (PLAQUENIL) 200 MG tablet Take 300 mg by mouth daily (with lunch)      ipratropium (ATROVENT) 0.03 % nasal spray Spray 2 sprays into both nostrils 3 times daily as needed for rhinitis (runny nose)  Qty: 30 mL, Refills: 3    Comments: 30 minutes before meals  Associated Diagnoses: Vasomotor rhinitis      LANsoprazole (PREVACID) 30 MG DR capsule TAKE 1 CAPSULE BY MOUTH IN THE  MORNING  Qty: 90 capsule, Refills: 3    Comments: Requesting 1 year supply  Associated Diagnoses: Gastroesophageal reflux disease, unspecified whether esophagitis present      levothyroxine (SYNTHROID/LEVOTHROID) 88 MCG tablet Take 1 tablet (88 mcg) by mouth daily  Qty: 90 tablet, Refills: 1    Associated Diagnoses: Hypothyroidism, unspecified type      Lutein 20 MG CAPS Take 1 capsule by mouth daily      mirabegron (MYRBETRIQ) 50 MG 24 hr tablet Take 1 tablet (50 mg) by mouth daily  Qty: 90 tablet,  Refills: 3    Associated Diagnoses: Urinary urgency      nitroGLYcerin (NITROSTAT) 0.4 MG sublingual tablet Place 1 tablet (0.4 mg) under the tongue every 5 minutes as needed for chest pain  Qty: 25 tablet, Refills: 0    Associated Diagnoses: Acute chest pain      Omega-3 Fatty Acids (OMEGA-3 FISH OIL PO) Take 1 g by mouth 2 times daily (with meals)       polyethylene glycol (MIRALAX) 17 GM/Dose powder Take 1 capful by mouth 2 times daily In 8 ounces of liquid      potassium chloride ER (KLOR-CON M) 10 MEQ CR tablet Take 1 tablet (10 mEq) by mouth as needed for potassium supplementation (take in combination with lasix)  Qty: 30 tablet, Refills: 11    Associated Diagnoses: Hypokalemia      Vitamin D (Cholecalciferol) 50 MCG (2000 UT) CAPS Take 1 capsule by mouth 2 times daily      metoprolol succinate ER (TOPROL XL) 25 MG 24 hr tablet Take 1 tablet (25 mg) by mouth daily  Qty: 90 tablet, Refills: 2    Comments: Profile Rx: patient will contact pharmacy when needed  Associated Diagnoses: Hypertension goal BP (blood pressure) < 140/90           Allergies   Allergies   Allergen Reactions    Morphine And Related      fatigue    Vicodin [Acetaminophen] Fatigue    Adhesive Tape Rash

## 2023-09-20 NOTE — H&P
[facility]    History and Physical - Hospitalist Service       Date of Admission:  9/19/2023  Primary Care Physician   Devi Romano  CONSULTANTS: None    Assessment & Plan      Ya Stahl is a 83 year old female admitted on 9/19/2023. She is here with left groin pain due to a Ward ES hematoma of the Iliacus muscle           Left Iliacus muscle hematoma/ acute blood loss anemia/ groin pain   Patient presents with a 5-day history of left groin pain after having developed pain when standing at the [place] dinner theater.  She is found to have a 3.1 cm collection, hematoma of the left iliac muscle on CT scan of the abdomen/pelvis.  Patient does not appear to be actively bleeding.  Unfortunate for her she is having a really hard time ambulating due to the pain.  IR at this point does not think there is anything to be done.  We will repeat hemoglobin just to make sure that she is not extravasating.  Current hemoglobin is 9.1.  We will repeat in the morning.  Patient needs pain control with scheduled Tylenol and will give oxycodone.  She should be seen by physical therapy and would need a walker to ambulate safely at home.  Goal is to get back home at discharge.    History of coronary disease/stroke/controlled hypertension   Given the patient's age and frailty her goal is to make sure she does not have side effects like orthostatic hypotension, weakness, and will certainly falls.  Unfortunately her blood pressure is quite elevated in the setting of pain.  She is on Toprol and she does take as needed Lasix for edema.  Her goal blood pressure really at this point should be less than 180.  I will add Norvasc as her blood pressure is too high.  Hopefully with pain control her blood pressure will come down    Hypothyroidism   Continue the patient on her home Synthroid    GERD   Patient currently is on both Pepcid and Prevacid for the last 3 years.  At this point the patient does not need  to be on both.  We will be stopping the patient's Pepcid    HyperLipidemia   Patient is on Lipitor at home.  Given the patient's age and frailty, the benefit of this medication is age group is not great and the risk far outweighs it.  At this point it is reasonable to stop the patient's statin.  Again we will to make sure that were not causing side effects.    Sjogren's disease/Sicca   Patient continues on eyedrops    History of migraines      Constipation   Patient will continue on a bowel regimen given the fact that she needs opioids    Diverticulosis     CT scan without any evidence of diverticulitis        4Ms:  Polypharmacy: Medications reviewed and at this point we will be stopping the patient's Pepcid as it is a duplicate medication with her Prevacid as well as stopping her Lipitor as the benefit of these medications are not necessary.  Unfortunately we will be starting the patient on Norvasc at this point due to her hypertension  Mentation:  SLUMS  as of 4/10/2014,  BIMS N/A,  CPT N/A, Capacity intact, patient is pretty sharp for her age and has the capacity to make her own medical decisions  Social support: Patient lives in a senior condominium with her .  They live on the third floor and they have both stairs as well as an elevator that can be used.  Patient has a daughter who  and has 3 sons.  She is a retired   Mobility: Normally gets around without aids but at this point with her pelvic pain would benefit from a walker  What is importmant: To be independent at home    Discussed plan of care with Dr. Figueredo who also spoke to IR  Diet:  Regular  DVT Prophylaxis: Low Risk/Ambulatory with no VTE prophylaxis indicated  Nelson Catheter: Not present  Lines: None     Cardiac Monitoring: None    RESTRAINTS: Not indicated  Code Status:  DNR/I, solitario this with the patient and she made it clear that she would not want heroics but would be okay with everything up to that point.          This  document was created using voice recognition technology.  Please excuse any typographical errors that may have occurred.  Please call with any questions.     Clinically Significant Risk Factors Present on Admission                  # Hypertension: Noted on problem list                 Disposition Plan      Expected Discharge Date: 09/20/2023                  Barry Andino MD  Hospitalist Service  [facility]  Securely message with [employer] (more info)  Text page via [employer] Paging/Directory     Length of stay: Anticipate less than 2 midnight hospitalization for evaluation and treatment of groin pain, continuous left iliacus muscle hematoma          ______________________________________________________________________    Chief Complaint   Left groin pain    History is obtained from the patient    History of Present Illness   Ya Stahl is a 83 year old female who has a history of hypertension, hypothyroidism, GERD, hyperlipidemia who presents with left groin pain.  The story goes 5 days ago the patient was at the [employer] in dinner theater watching a show and developed left groin pain when she stood up to go to the bathroom.  By the end of the show she was unable to bear weight.  She did not feel a pop.  They were able to give her a wheelchair and get her into a car.  She did not seek attention at that time but did follow-up a few days later at [facility] at which point a CT scan was ordered but they were unable to get her in until later this week, Friday.  She came to the ER because her pain got too bad.  She is only on aspirin.  CT scan today shows a hematoma of the left Iliacus muscle.  She has ecchymosis over her pelvis.  Has not had issues with urinating or going to the bathroom but she does have a history of constipation.  She denies any trauma to the area at all.  In the emergency room her hemoglobin was down to 9.9 after being 12.1 on 9/15.  Lactic acid is normal at 1.2,  "her creatinine is normal as well.  Patient really needs ongoing pain control she will be coming in to observation.  Emergency room did talk to interventional radiology and felt there was nothing to do at this point as there was no signs of active bleeding on the scan.      Past Medical History    Past Medical History:   Diagnosis Date    Arthritis     Cerebral infarction (H) 2019    Complication of anesthesia     \"hard time waking up / N & V\"    Coronary artery disease     Disease of thyroid gland     Displacement of lumbar intervertebral disc without myelopathy     Gastro-oesophageal reflux disease     GERD (gastroesophageal reflux disease)     Hearing loss     has bilateral aids    Heart attack (H) 03/2016    History of anesthesia complications     delayed emergence    History of angina     Hypertension     Low back pain     Migraine headache     Myocardial infarction (H)     Numbness and tingling     down right leg (associated with back pain)    PONV (postoperative nausea and vomiting)     Sicca syndrome (H)     Sjogren's disease (H)     Sjogren's syndrome (H)     sees specialist; dentist    Spider veins     Unspecified hypothyroidism        Past Surgical History   Past Surgical History:   Procedure Laterality Date    ARTHROPLASTY HIP ANTERIOR Left 10/14/2022    Procedure: LEFT TOTAL HIP ARTHROPLASTY DIRECT ANTERIOR APPROACH;  Surgeon: Selvin Saab MD;  Location:  OR    ARTHROSCOPY KNEE  10/05/2012    R Procedure: ARTHROSCOPY KNEE;  RIGHT KNEE ARTHROSCOPY, PARTIAL MEDIAL MENISCECTOMY;  Surgeon: Karli Zaragoza MD;  Location:  SD    BACK SURGERY  About. 5 years ago    Fusion of 4&5 lumbar    BLADDER SURGERY      BUNIONECTOMY  ~2010    L foot.     CATARACT IOL, RT/LT Bilateral 07/2017    COLONOSCOPY N/A 01/17/2017    normal; no repeat Procedure: COLONOSCOPY;  Surgeon: Fan Giordano MD;  Location:  GI    ENDOSCOPIC RELEASE CARPAL TUNNEL  09/11/2012    R Procedure: ENDOSCOPIC RELEASE CARPAL " TUNNEL;  Right Endoscopic carpal tunnel release, left ringer finger cortizon injection;  Surgeon: Anne Marie Catherine MD;  Location:  OR    ESOPHAGOSCOPY, GASTROSCOPY, DUODENOSCOPY (EGD), COMBINED N/A 12/26/2014    Procedure: COMBINED ESOPHAGOSCOPY, GASTROSCOPY, DUODENOSCOPY (EGD);  Surgeon: Fan Giordano MD;  Location:  GI    EXCISE EXOSTOSIS FOOT Left 12/01/2016    Procedure: EXCISE EXOSTOSIS FOOT;  Surgeon: Jody Gallagher DPM, Pod;  Location:  OR    GYN SURGERY  1978    ovaries removed    HEART CATH FYI  03/04/2016    dz <40%    HIP HARDWARE REMOVAL Right 08/04/2020    Procedure: HARDWARE REMOVAL - LATERAL APPROACH;  Surgeon: Charlie Wolfe MD;  Location: [facility];  Service: Orthopedics    HYSTERECTOMY      INJECT STEROID (LOCATION)  09/11/2012    Procedure: INJECT STEROID (LOCATION);;  Surgeon: Anne Marie Catherine MD;  Location:  OR    LAPAROSCOPIC CHOLECYSTECTOMY N/A 04/07/2016    Procedure: LAPAROSCOPIC CHOLECYSTECTOMY;  Surgeon: Hans Medina MD;  Location:  OR    OPTICAL TRACKING SYSTEM FUSION SPINE POSTERIOR LUMBAR TWO LEVELS N/A 10/31/2018    Procedure: Central L3-4 L4-5 lamenectomies L4-5 posterior instrumented fusion;  Surgeon: Aroldo Burdick MD;  Location:  OR    ORTHOPEDIC SURGERY Right 2014    karli for fx femur    RECONSTRUCT FOREFOOT WITH METATARSOPHALANGEAL (MTP) FUSION Left 04/28/2017    Procedure: RECONSTRUCT FOREFOOT WITH METATARSOPHALANGEAL (MTP) FUSION;  1. Resection arthroplasty, fifth metatarsophalangeal joint, left foot.   2. Irrigation and debridement of fifth metatarsophalangeal joint, left foot (excisional to the level of the bone).     ;  Surgeon: Fabián Phipps MD;  Location:  OR    RELEASE CARPAL TUNNEL      RIGHT HIP ORIF 4/1/2014      ROTATOR CUFF REPAIR RT/LT  ~1998    Lt shoulder; rotator cuff    SURGICAL HISTORY OF -   distant past    ablation for palpitations.    SURGICAL HISTORY OF -   06/2015    left carpal tunnel surgery    ZZC NONSPECIFIC  PROCEDURE  1976    D&C    [facility] TOTAL HIP ARTHROPLASTY Right 08/04/2020    Procedure: RIGHT TOTAL HIP ARTHROPLASTY;  Surgeon: Charlie Wolfe MD;  Location: [facility] OR;  Service: Orthopedics    [facility] TOTAL KNEE ARTHROPLASTY Right 01/2020       Prior to Admission Medications   Prior to Admission Medications   Prescriptions Last Dose Informant Patient Reported? Taking?   Boswellia-Glucosamine-Vit D (OSTEO BI-FLEX ONE PER DAY PO)  Self Yes No   Sig: Take 1 capsule by mouth daily   Calcium Carb-Cholecalciferol (SM CALCIUM/VITAMIN D) 600-800 MG-UNIT TABS   Yes No   Sig: Take 1 tablet by mouth 2 times daily   Carboxymethylcellulose Sodium (REFRESH LIQUIGEL OP)  Self Yes No   Sig: Apply 1 drop to eye every evening 15 minutes after Restasis drops.   Coenzyme Q10 300 MG CAPS   Yes No   Sig: Take 1 capsule by mouth daily   ELDERBERRY PO  Self Yes No   Sig: Take 1 tablet by mouth daily   EPINEPHrine (ANY BX GENERIC EQUIV) 0.3 MG/0.3ML injection 2-pack  Self No No   Sig: Inject 0.3 mLs (0.3 mg) into the muscle as needed for anaphylaxis   LANsoprazole (PREVACID) 30 MG DR capsule   No No   Sig: TAKE 1 CAPSULE BY MOUTH IN THE  MORNING   Lutein 20 MG CAPS  Self Yes No   Sig: Take 1 capsule by mouth daily   Omega-3 Fatty Acids (OMEGA-3 FISH OIL PO)  Self Yes No   Sig: Take 1 g by mouth 2 times daily (with meals)    Vitamin D (Cholecalciferol) 50 MCG (2000 UT) CAPS   Yes No   Sig: Take 1 capsule by mouth 2 times daily   acetaminophen (TYLENOL) 325 MG tablet   No No   Sig: Take 3 tablets (975 mg) by mouth every 6 hours as needed for mild pain   atorvastatin (LIPITOR) 20 MG tablet   No No   Sig: Take 1 tablet (20 mg) by mouth daily   cevimeline (EVOXAC) 30 MG capsule   Yes No   Sig: Take 30 mg by mouth 3 times daily   famotidine (PEPCID) 40 MG tablet   No No   Sig: TAKE 1 TABLET BY MOUTH DAILY   furosemide (LASIX) 20 MG tablet   No No   Sig: Take 1 tablet (20 mg) by mouth daily as needed (leg swelling)   ipratropium (ATROVENT)  0.03 % nasal spray   No No   Sig: Spray 2 sprays into both nostrils 3 times daily as needed for rhinitis (runny nose)   levothyroxine (SYNTHROID/LEVOTHROID) 88 MCG tablet   No No   Sig: Take 1 tablet (88 mcg) by mouth daily   metoprolol succinate ER (TOPROL XL) 25 MG 24 hr tablet   No No   Sig: Take 1 tablet (25 mg) by mouth daily   mirabegron (MYRBETRIQ) 50 MG 24 hr tablet   No No   Sig: Take 1 tablet (50 mg) by mouth daily   nitroGLYcerin (NITROSTAT) 0.4 MG sublingual tablet   No No   Sig: Place 1 tablet (0.4 mg) under the tongue every 5 minutes as needed for chest pain   polyethylene glycol (MIRALAX) 17 GM/Dose powder  Self Yes No   Sig: Take 1 capful by mouth 2 times daily In 8 ounces of liquid   potassium chloride ER (KLOR-CON M) 10 MEQ CR tablet   No No   Sig: Take 1 tablet (10 mEq) by mouth as needed for potassium supplementation (take in combination with lasix)      Facility-Administered Medications: None        Allergies   Allergies   Allergen Reactions    Morphine And Related      fatigue    Vicodin [Acetaminophen] Fatigue    Adhesive Tape Rash        REVIEW OF SYSTEMS:  A comprehensive review of systems was negative except for items noted in the HPI.  Patient currently denies any fever, chills, sweats, nausea, vomiting, diarrhea, shortness of breath, or chest pain.        Physical Exam   Vital Signs: Temp: 97.7  F (36.5  C) Temp src: Temporal BP: (!) 196/91 Pulse: 58   Resp: 18 SpO2: 99 % O2 Device: None (Room air)    Weight: 0 lbs 0 oz    General appearance: Patient is alert and oriented x3, quite spry for her age, no apparent distress, pleasant and conversing normally, speaking in full sentences, appears stated age  HEENT:  Atraumatic/normal cephalic, EOMI, Pupils equally round and reactive to light, sclera non-icteric, Mucous membranes are moist  NECK:  supple without bruit or lymphadenopathy  RESPIRATORY: Clear to auscultation bilateral, good air movement  CARDIOVASCULAR: Regular rate and rhythm,  normal S1/S2, no murmurs, rubs, or gallops present, peripheral pulses intact  GASTROINTESTINAL: Non-distended, non-tender, soft, bowel sounds present throughout, no mass or hepatosplenomegaly, does have ecchymosis over her pelvis and has tenderness to the left of that area to palpation  MUSCULOSKELETAL: without deformity, decreased range of motion of her left leg due to pain  SKIN:  Warm, dry, no rashes, no mottling  NEUROLOGIC:  Cranial nerves II-XII intact, without any focal deficits, strength 5/5 throughout  EXTREMITIES:  Moves all extremities, no clubbing, cyanosis, nor edema  :  Nelson not present         Data     I have personally reviewed the following data over the past 24 hrs:    7.4  \   9.9 (L)   / 245     134 (L) 97 (L) 23.2 (H) /  115 (H)   4.8 26 0.93 \     ALT: 15 AST: 26 AP: 64 TBILI: 1.6 (H)   ALB: 4.5 TOT PROTEIN: 6.9 LIPASE: 20     Procal: N/A CRP: N/A Lactic Acid: 1.2         Imaging:   Results for orders placed or performed during the hospital encounter of 09/19/23   CT Abdomen Pelvis w Contrast    Narrative    EXAM: CT ABDOMEN PELVIS W CONTRAST  LOCATION: [facility]  DATE: 9/19/2023    INDICATION: Left groin pain. Bruising to suprapubic region.  COMPARISON: CT abdomen pelvis 10/25/2017.  TECHNIQUE: CT scan of the abdomen and pelvis was performed following injection of IV contrast. Multiplanar reformats were obtained. Dose reduction techniques were used.  CONTRAST: 71 mL Isovue 370    FINDINGS:     LOWER CHEST: Normal.    HEPATOBILIARY: Status post cholecystectomy with unchanged mild biliary ductal ectasia. Cysts in the caudate and lateral left hepatic lobe are unchanged. No suspicious liver lesions.    PANCREAS: Normal.    SPLEEN: Normal.    ADRENAL GLANDS: Normal.    KIDNEYS/BLADDER: No urinary calculi or hydronephrosis. Distal ureters and urinary bladder are obscured by streak artifact.    BOWEL: No obstruction or inflammation. Appendix is not visualized; no  inflammation at the base of the cecum. Scattered sigmoid colonic diverticuli. No clearly inflamed colonic diverticuli, noting that evaluation of bowel loops within the pelvis is   limited by streak artifact. Mild mesenteric edema within the pelvis.    LYMPH NODES: No enlarged lymph nodes.    VASCULATURE: Patent portal, splenic, and superior mesenteric veins. Mild aortobiiliac atherosclerosis. No abdominal aortic aneurysm.    PELVIC ORGANS: Absent uterus.    MUSCULOSKELETAL: Heterogeneous, asymmetrically thickened appearance of the left iliacus muscle measuring approximately 7.0 x 3.0 x 5.4 cm (3/#109, 4/#44), likely due to an ill-defined intramuscular hematoma. There is trace stranding of the fat within the   surrounding left retroperitoneum. No evidence of active bleeding, within the limitations of the single portal venous phases technique. No other retroperitoneal hematoma is identified. High-density 3.1 x 1.9 x 2.9 cm collection within the right inguinal   region (3/#163, 4/#14), suboptimally evaluated due to surrounding streak artifact. Diffusely demineralized bones. Multilevel degenerative change of the spine. L4-L5 posterior spinal fusion hardware. Bilateral hip arthroplasty hardware. Remote healed   fractures of the right inferior and superior pubic rami and bilateral sacral ala. No acute bony abnormality.      Impression    IMPRESSION:     1.  Findings likely reflecting an ill-defined intramuscular hematoma within the left iliacus muscle. No evidence of active bleeding, within the limitations of the single portal venous phase technique.    2.  High density 3.1 cm collection within the right inguinal region, which is suboptimally evaluated due to surrounding streak artifact. This could potentially represents an additional hematoma. Consider correlation with ultrasound.    3.  No other acute abnormality, noting that evaluation of the pelvic structures is limited by streak artifact from bilateral hip  arthroplasty hardware.    4.  Sigmoid colonic diverticulosis. No definite inflamed colonic diverticuli.     Procedures: None  I have personally have reviewed the patient's most up to date radiologic exams, , labs, orders, and medications myself

## 2023-10-12 ENCOUNTER — HOSPITAL ENCOUNTER (OUTPATIENT)
Dept: GENERAL RADIOLOGY | Facility: CLINIC | Age: 83
Discharge: HOME | End: 2023-10-12
Attending: OTOLARYNGOLOGY
Payer: COMMERCIAL

## 2023-10-12 ENCOUNTER — HOSPITAL ENCOUNTER (OUTPATIENT)
Dept: SPEECH THERAPY | Facility: CLINIC | Age: 83
Discharge: HOME | End: 2023-10-12
Attending: OTOLARYNGOLOGY
Payer: COMMERCIAL

## 2023-10-12 DIAGNOSIS — R13.10 DYSPHAGIA, UNSPECIFIED TYPE: ICD-10-CM

## 2023-10-12 DIAGNOSIS — R05.3 CHRONIC COUGH: ICD-10-CM

## 2023-10-12 PROCEDURE — 92610 EVALUATE SWALLOWING FUNCTION: CPT | Mod: GN | Performed by: SPEECH-LANGUAGE PATHOLOGIST

## 2023-10-12 PROCEDURE — 92526 ORAL FUNCTION THERAPY: CPT | Mod: GN | Performed by: SPEECH-LANGUAGE PATHOLOGIST

## 2023-10-12 PROCEDURE — 74230 X-RAY XM SWLNG FUNCJ C+: CPT

## 2023-10-12 PROCEDURE — 92611 MOTION FLUOROSCOPY/SWALLOW: CPT | Mod: GN | Performed by: SPEECH-LANGUAGE PATHOLOGIST

## 2023-10-12 RX ORDER — BARIUM SULFATE 400 MG/ML
SUSPENSION ORAL ONCE
Status: DISCONTINUED | OUTPATIENT
Start: 2023-10-12 | End: 2023-10-12

## 2023-10-12 RX ORDER — BARIUM SULFATE 400 MG/ML
SUSPENSION ORAL ONCE
Status: COMPLETED | OUTPATIENT
Start: 2023-10-12 | End: 2023-10-12

## 2023-10-12 RX ADMIN — BARIUM SULFATE 30 ML: 400 SUSPENSION ORAL at 14:19

## 2023-10-12 NOTE — PROGRESS NOTES
ADDNEDUM: Updated Medicare Certification Dates Entered for [facility] Swallow Tx session after VFSS Only; Report re-sent for MD Fenton      SPEECH LANGUAGE PATHOLOGY CLINICAL SWALLOW EVALUATION AND VIDEOFLUOROSCOPIC SWALLOW STUDY REPORT    See electronic medical record for Abuse and Falls Screening details.    Subjective   Pt was alert and cooperative for today's session.  Pt reports a 3-4 month hx of increased swallowing difficulty.  Symptoms have included choking with food if its too dry and rough, coughing at times with thin liquids, globus sensation, and numbness of the lips and tongue at times.  Pt also reports an ongoing cough throughout the day that is longstanding without known source and variable hoarse voicing that seems to worsen later in the day.  Pt reports she has GERD symptoms, but that they are fairly well controlled with medications.  Pt states she has not had any SOB or pneumonia cases and her weight has remained stable.  Pt states she sometimes starts to drool, and she is not sure of the cause.    When asked about previous VFSS records found, pt did not recall having VFSS completed.    Date of onset: 10/12/23  Relevant medical history:   Sjogren's, GERD  Prior diagnostic imaging/testing results:      MRI 9/9/23:   1. No acute intracranial process.  2.  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  3.  Generalized brain atrophy and presumed microvascular ischemic changes as detailed above.  4.  Partial bilateral mastoid effusions.    VFSS 7/21/21:  Results reveal functional oral and pharyngeal phases of swallow. Oral phase of swallow is characterized by prolonged but thorough mastication, likely impacted by xerostomia. Onset of pharyngeal swallow is variable from the valleculae to pyriform sinuses. Patient with very mildly reduced laryngeal vestibular closure and very mildly reduced posterior pharyngeal wall contraction. Patient tolerating small single sips of thin liquids, mildly thick  liquids, pudding, and cookie boluses without penetration or aspiration. Patient with shallow, transient penetration with a larger single cup sip of thin liquid. Larger, consecutive cup sips of thin liquids and single straw sips of thin liquids led to consistent penetration which was again transient and never reached the vocal folds. No aspiration was observed. No oral or pharyngeal residue remained after any textures presented.      Patient with significant cough noted prior to start of the study. She immediately began coughing after shallow penetration observed with consecutive sips of thin liquids, which was prolonged and took a few minutes to fully resolve. This was in the absence of penetration reaching the vocal folds and penetration completely resolving, and no aspiration or pharyngeal residue. Patient also observed to cough after presentation of cookie bolus despite no penetration, aspiration, or pharyngeal/cervical esophageal residue. Patient continued to c/o feeling cookie sticking in her throat after she had completely swallowed it. Even after esophagram was completed, patient continued to c/o feeling the cookie sticking in her throat.      Recommend regular diet with thin liquids. Recommend oral intake in an upright position and patient remain in an upright position for 30 minutes after oral intake. Recommend patient moisten dry foods due to her xerostomia. Reviewed diet recommendations and safer swallow strategies with the patient, including moistening foods, position for oral intake, single smaller cup sips of liquids, pause after each bite/sip, focus while eating/no distractions. Patient verbalized understanding of information provided. No further SLP services are recommended at this time. Recommend patient follow up with referring physician to discuss results of VFSS and esophagram.           Patient goals for therapy:  To improve swallow function and decrease coughing          Objective     SWALLOW  EVALUTION    Current Diet/Method of Nutritional Intake: Regular diet and thin liquids      CLINICAL SWALLOW EVALUATION  Oral Motor Function:  Lingual tongue protrusion to the right at times, min-no labial asymmetry , slight decreased velar elevation on left; pt reports intermittent lip and tongue numbness; pt feels the tip of her tongue is numb currently; Strong cough, voicing was hoarse, cough and throat clearing were noted in conversation    Textures Trialed:   Clinical Swallow Eval: Thin Liquids  Mode of presentation: cup   Volume presented: sips x 2  Preparatory Phase: WFL  Oral Phase: WFL  Pharyngeal phase of swallow: reduction in laryngeal movement, repeated swallows   Diagnostic statement: Slight throat clearing after trials; then a delayed cough noted in conversation      ADDITIONAL EVAL COMPLETED TODAY : yes; rationale: Clinical: interview, oral motor exam, thin water trials; VFSS: assess pharyngeal phase function and aspiration risk    VIDEOFLUOROSCOPIC SWALLOW STUDY  Radiologist: Dr. Field  Views Taken: left lateral, A/P   Physical location of procedure: [facility]  [Observation noted - Pt produced a cough reflex x 2 during the study that was not related to penetration/aspiration]    VFSS textures trialed:   VFSS Eval: Thin Liquids  Mode of Presentation: cup, straw   Order of Presentation: 1, 2, 3, 4 (+ 2 trials not saved)  Preparatory Phase: poor bolus control  Oral Phase: premature pharyngeal entry  Bolus Location When Swallow Initiated: pyriforms  Pharyngeal Phase: impaired epiglottic movement, impaired pharyngoesophageal segment opening, impaired tongue base retraction  Rosenbeck's Penetration Aspiration Scale: 6 - contrast passes glottis, no subglottic residue remains (aspiration) with large sip of thin by straw  Response to Aspiration: Delayed cough  Diagnostic Statement: Curved epiglottis with decreased closure, tight UES with residue under and at the UES, moderate flash penetration of thin by cup x 1,  aspiration during large sip by straw, min-mild pharyngeal residue at times; use of a chin tuck with thin liquids by straw prevented penetration/aspiration       VFSS Eval: Mildly Thick Liquids  Mode of Presentation: cup   Order of Presentation: Not saved  Preparatory Phase: WFL  Oral Phase: WFL  Bolus Location When Swallow Initiated: posterior angle of ramus  Pharyngeal Phase: impaired epiglottic movement, impaired pharyngoesophageal segment opening, impaired tongue base retraction  Rosenbeck's Penetration Aspiration Scale: 1 - no aspiration, contrast does not enter airway  Diagnostic Statement:  Curved epiglottis with decreased closure, tight UES with residue under and at the UES    VFSS Eval: Purees  Mode of Presentation: spoon   Order of Presentation: 5  Preparatory Phase: WFL  Oral Phase: WFL  Bolus Location When Swallow Initiated: posterior angle of ramus  Pharyngeal Phase: impaired pharyngoesophageal segment opening  Rosenbeck s Penetration Aspiration Scale: 1 - no aspiration, contrast does not enter airway  Diagnostic Statement:  Curved epiglottis, tight UES with residue under and at the UES    VFSS Eval: Soft & Bite Sized  Mode of Presentation: spoon   Order of Presentation: Not saved  Preparatory Phase: WFL  Oral Phase: WFL  Bolus Location When Swallow Initiated: posterior angle of ramus  Pharyngeal Phase: impaired pharyngoesophageal segment opening  Rosenbeck s Penetration Aspiration Scale: 1 - no aspiration, contrast does not enter airway  Diagnostic Statement:  Curved epiglottis, tight UES with residue under and at the UES    VFSS Eval: Solids  Mode of Presentation: spoon   Order of Presentation: Not saved  Preparatory Phase: WFL  Oral Phase: WFL  Bolus Location When Swallow Initiated: posterior angle of ramus  Pharyngeal Phase: impaired pharyngoesophageal segment opening  Rosenbeck s Penetration Aspiration Scale: 1 - no aspiration, contrast does not enter airway  Diagnostic Statement:  Curved  epiglottis, tight UES with residue under and at the UES    VFSS Eval: Barium Tablet  Mode of Presentation: self-fed   Order of Presentation: 6  Preparatory Phase: WFL  Oral Phase: WFL  Bolus Location When Swallow Initiated: posterior angle of ramus  Pharyngeal Phase: WFL  Rosenbeck s Penetration Aspiration Scale: 1 - no aspiration, contrast does not enter airway  Diagnostic Statement: WFL with sip of thin water; no significant esophageal residue noted     ESOPHAGEAL PHASE OF SWALLOW  Tight UES with residue under and at the UES    SWALLOW ASSESSMENT CLINICAL IMPRESSIONS AND RATIONALE  Diet Consistency Recommendations: thin liquids (level 0), regular diet; pick soft food with added moisture/sauce/gravy  Recommended Feeding/Eating Techniques: small bolus size, no straws, chin tuck, double swallow, maintain upright posture during/after eating for 30 minutes  Medication Administration Recommendations: Meds with small sips of thin liquids, use chin tuck as needed    Assessment & Plan   CLINICAL IMPRESSIONS   Medical Diagnosis: Dysphagia  Treatment Diagnosis:   Mild oral-pharyngeal dysphagia  Impression/Assessment: Pt presents with mild oral-pharyngeal dysphagia per today's evaluations.  Deficits include report of intermittent lip and tongue numbness and drooling at times, min oral motor deficits during the exam, hoarse voicing, delayed swallows at times, mild impaired tongue base retraction, curved epiglottis with decreased closure at times , and a tight UES with residue under and at the UES.  Deficits resulted in min-mild to no pharyngeal residue, moderate flash penetration of thin by cup x 1, and aspiration of thin liquids x 1 during large sip by straw.  The chin tuck was successful at eliminating penetration/aspiration of thin liquids by straw.  Recommend pt continue a regular diet with self-selection of soft food items with added sauce/gravy and use of recommended safe swallow strategies.  Pt stated she is concerned  regarding her oral numbness and drooling as well as her difficulty swallowing and would like OP swallow Tx follow up.  Recommend OP clinical SLP swallow Tx to assess diet tolerance, train strategies, and provide pt with a home exercise program for oral-pharyngeal exercises.  Recommend pt follow up with MD regarding her oral motor nerve/neurological concerns and recent MRI.  Recommend consideration of a GI consult if increased esophageal dysphagia concerns arise.    PLAN OF CARE  Treatment Interventions: Swallowing dysfunction and/or oral function for feeding  Prognosis to achieve stated therapy goals is excellent   Rehab potential is impacted by: patient motivation  Short Term Goal: Pt will verbalize understanding of current deficits and recommended safe swallow strategies/precautions and OP clinical swallow Tx follow up.    Educated pt on current deficits, reviewed images, demonstrated use of strategies, cued pt for use of a chin tuck;  Pt verbalized understanding of education and desire for SLP OP clinical follow up as well as follow up with MD regarding her lip and tongue numbness and drooling at times.  Pt demonstrated good use of a chin tuck given min-mod cues.  Goal met 10/12/23 for [facility] swallow Tx  Target Date: 10/12/23  End Date: 10/12/23  Frequency of Treatment:  1x -met  Duration of Treatment:   1x- met    Recommended Referrals to Other Professionals: Speech Language Pathology - OP clinical SLP swallow eval and Tx  Education Assessment: Decreased recall of previous VFSS noted.  Video images and repeated information regarding recommendations were provided.  Continue OP clinical SLP swallow Tx also recommended to provide further education.     Risks and benefits of evaluation/treatment have been explained.   Patient/Family/caregiver agrees with Plan of Care.     Evaluation Time: 15 minute clinical swallow evaluation; 15 minute VFSS, 15 minute swallow Tx session       Signing Clinician: Birdie Watkins,  SLP    [facility]                                                                                   OUTPATIENT SPEECH LANGUAGE PATHOLOGY      PLAN OF TREATMENT FOR OUTPATIENT REHABILITATION   Patient's Last Name, First Name, Ya Turk YOB: 1940   Provider's Name   [facility]   Medical Record No.  [id number]     Onset Date:  10/12/23 Start of Care Date:  10/12/23     Medical Diagnosis:   Dysphagia      SLP Treatment Diagnosis:   Mild oral-pharyngeal dysphagia Plan of Treatment  Frequency/Duration:  1x  /   1x    Certification date from  10/12/23   To     10/12/23       See note for plan of treatment details and functional goals     Birdie Watkins, SLP                         I CERTIFY THE NEED FOR THESE SERVICES FURNISHED UNDER        THIS PLAN OF TREATMENT AND WHILE UNDER MY CARE     (Physician attestation of this document indicates review and certification of the therapy plan).                Referring Provider:  Sanchez Ho      Initial Assessment  See Epic Evaluation-

## 2023-10-12 NOTE — Clinical Note
Please see clinical swallow evaluation and VFSS report attached.  Recommend short term OP clinical swallow Tx follow up.  Please enter orders for continued SLP eval and Tx.   Thank you, Birdie Watkins MA [facility] SLP

## 2023-10-18 ENCOUNTER — HOSPITAL ENCOUNTER (OUTPATIENT)
Dept: CARDIOLOGY | Facility: CLINIC | Age: 83
Discharge: HOME | End: 2023-10-18
Attending: NURSE PRACTITIONER | Admitting: NURSE PRACTITIONER
Payer: COMMERCIAL

## 2023-10-18 ENCOUNTER — OFFICE VISIT (OUTPATIENT)
Dept: FAMILY MEDICINE | Facility: CLINIC | Age: 83
End: 2023-10-18
Payer: COMMERCIAL

## 2023-10-18 VITALS
BODY MASS INDEX: 24.86 KG/M2 | RESPIRATION RATE: 16 BRPM | WEIGHT: 145.6 LBS | OXYGEN SATURATION: 99 % | DIASTOLIC BLOOD PRESSURE: 83 MMHG | HEART RATE: 56 BPM | TEMPERATURE: 98.2 F | HEIGHT: 64 IN | SYSTOLIC BLOOD PRESSURE: 155 MMHG

## 2023-10-18 DIAGNOSIS — E03.9 HYPOTHYROIDISM, UNSPECIFIED TYPE: ICD-10-CM

## 2023-10-18 DIAGNOSIS — Z29.11 NEED FOR VACCINATION AGAINST RESPIRATORY SYNCYTIAL VIRUS: ICD-10-CM

## 2023-10-18 DIAGNOSIS — R60.9 EDEMA, UNSPECIFIED TYPE: ICD-10-CM

## 2023-10-18 DIAGNOSIS — R53.83 OTHER FATIGUE: Primary | ICD-10-CM

## 2023-10-18 DIAGNOSIS — I77.810 ASCENDING AORTA DILATION (H): ICD-10-CM

## 2023-10-18 LAB
ALBUMIN SERPL BCG-MCNC: 4.6 G/DL (ref 3.5–5.2)
ALP SERPL-CCNC: 60 U/L (ref 35–104)
ALT SERPL W P-5'-P-CCNC: 15 U/L (ref 0–50)
ANION GAP SERPL CALCULATED.3IONS-SCNC: 13 MMOL/L (ref 7–15)
AST SERPL W P-5'-P-CCNC: 26 U/L (ref 0–45)
BILIRUB SERPL-MCNC: 1 MG/DL
BUN SERPL-MCNC: 19.5 MG/DL (ref 8–23)
CALCIUM SERPL-MCNC: 9.5 MG/DL (ref 8.8–10.2)
CHLORIDE SERPL-SCNC: 99 MMOL/L (ref 98–107)
CREAT SERPL-MCNC: 0.84 MG/DL (ref 0.51–0.95)
DEPRECATED HCO3 PLAS-SCNC: 26 MMOL/L (ref 22–29)
EGFRCR SERPLBLD CKD-EPI 2021: 69 ML/MIN/1.73M2
ERYTHROCYTE [DISTWIDTH] IN BLOOD BY AUTOMATED COUNT: 14.7 % (ref 10–15)
FERRITIN SERPL-MCNC: 93 NG/ML (ref 11–328)
GLUCOSE SERPL-MCNC: 114 MG/DL (ref 70–99)
HCT VFR BLD AUTO: 38.7 % (ref 35–47)
HGB BLD-MCNC: 12.3 G/DL (ref 11.7–15.7)
HOLD SPECIMEN: NORMAL
IRON BINDING CAPACITY (ROCHE): 342 UG/DL (ref 240–430)
IRON SATN MFR SERPL: 25 % (ref 15–46)
IRON SERPL-MCNC: 85 UG/DL (ref 37–145)
LVEF ECHO: NORMAL
MCH RBC QN AUTO: 33.2 PG (ref 26.5–33)
MCHC RBC AUTO-ENTMCNC: 31.8 G/DL (ref 31.5–36.5)
MCV RBC AUTO: 105 FL (ref 78–100)
PLATELET # BLD AUTO: 305 10E3/UL (ref 150–450)
POTASSIUM SERPL-SCNC: 4.4 MMOL/L (ref 3.4–5.3)
PROT SERPL-MCNC: 7.1 G/DL (ref 6.4–8.3)
RBC # BLD AUTO: 3.7 10E6/UL (ref 3.8–5.2)
SODIUM SERPL-SCNC: 138 MMOL/L (ref 135–145)
TSH SERPL DL<=0.005 MIU/L-ACNC: 3.29 UIU/ML (ref 0.3–4.2)
VIT B12 SERPL-MCNC: 420 PG/ML (ref 232–1245)
WBC # BLD AUTO: 5.1 10E3/UL (ref 4–11)

## 2023-10-18 PROCEDURE — 83550 IRON BINDING TEST: CPT | Performed by: FAMILY MEDICINE

## 2023-10-18 PROCEDURE — 93306 TTE W/DOPPLER COMPLETE: CPT | Mod: 26 | Performed by: INTERNAL MEDICINE

## 2023-10-18 PROCEDURE — 99214 OFFICE O/P EST MOD 30 MIN: CPT | Performed by: FAMILY MEDICINE

## 2023-10-18 PROCEDURE — 82607 VITAMIN B-12: CPT | Performed by: FAMILY MEDICINE

## 2023-10-18 PROCEDURE — 84443 ASSAY THYROID STIM HORMONE: CPT | Performed by: FAMILY MEDICINE

## 2023-10-18 PROCEDURE — 85027 COMPLETE CBC AUTOMATED: CPT | Performed by: FAMILY MEDICINE

## 2023-10-18 PROCEDURE — 82728 ASSAY OF FERRITIN: CPT | Performed by: FAMILY MEDICINE

## 2023-10-18 PROCEDURE — 36415 COLL VENOUS BLD VENIPUNCTURE: CPT | Performed by: FAMILY MEDICINE

## 2023-10-18 PROCEDURE — 80053 COMPREHEN METABOLIC PANEL: CPT | Performed by: FAMILY MEDICINE

## 2023-10-18 PROCEDURE — 83540 ASSAY OF IRON: CPT | Performed by: FAMILY MEDICINE

## 2023-10-18 PROCEDURE — 93306 TTE W/DOPPLER COMPLETE: CPT

## 2023-10-18 RX ORDER — LEVOTHYROXINE SODIUM 88 UG/1
88 TABLET ORAL DAILY
Qty: 90 TABLET | Refills: 1 | Status: SHIPPED | OUTPATIENT
Start: 2023-10-18 | End: 2024-02-08

## 2023-10-18 RX ORDER — FUROSEMIDE 20 MG
20 TABLET ORAL DAILY PRN
Qty: 90 TABLET | Refills: 1 | Status: CANCELLED | OUTPATIENT
Start: 2023-10-18

## 2023-10-18 RX ORDER — RESPIRATORY SYNCYTIAL VIRUS VACCINE 120MCG/0.5
0.5 KIT INTRAMUSCULAR ONCE
Qty: 1 EACH | Refills: 0 | Status: CANCELLED | OUTPATIENT
Start: 2023-10-18 | End: 2023-10-18

## 2023-10-18 ASSESSMENT — ENCOUNTER SYMPTOMS: FATIGUE: 1

## 2023-10-18 NOTE — PROGRESS NOTES
Assessment & Plan     Other fatigue  Due for labs, will recheck, has been anemic, likely due to hematoma, pain has improved, pt is not yet taking iron  Follow up with ortho  - Vitamin B12; Future  - TSH with free T4 reflex; Future  - Comprehensive metabolic panel (BMP + Alb, Alk Phos, ALT, AST, Total. Bili, TP); Future  - CBC with Platelets and Reflex to Iron Studies; Future  - Vitamin B12  - TSH with free T4 reflex  - Comprehensive metabolic panel (BMP + Alb, Alk Phos, ALT, AST, Total. Bili, TP)  - CBC with Platelets and Reflex to Iron Studies  - Iron & Iron Binding Capacity  - Ferritin    Hypothyroidism, unspecified type  Rechecking labs, cont same for now  - levothyroxine (SYNTHROID/LEVOTHROID) 88 MCG tablet; Take 1 tablet (88 mcg) by mouth daily  - TSH with free T4 reflex; Future  - TSH with free T4 reflex    Edema, unspecified type  Takes lasix prn, no swelling noted today    Need for vaccination against respiratory syncytial virus  Recommend but needs to get this at pharmacy    Ordering of each unique test  Prescription drug management       MED REC REQUIRED  Post Medication Reconciliation Status:   Regular exercise, walking only until seen by ortho    Devi Romano MD  [facility]    Janet Pandya is a 83 year old, presenting for the following health issues:  Dizziness (Dizzy most in the afternoons (comes on) walking to the one side (no ear pain)) and Fatigue (Sometimes hard time getting out of bed)        10/18/2023     9:37 AM   Additional Questions   Roomed by radha patel       Fatigue  Associated symptoms include fatigue.   History of Present Illness       Reason for visit:  Just dont feel up to par    She eats 2-3 servings of fruits and vegetables daily.She consumes 0 sweetened beverage(s) daily.She exercises with enough effort to increase her heart rate 30 to 60 minutes per day.  She exercises with enough effort to increase her heart rate 4 days per  "week.   She is taking medications regularly.   history of hypothyroidism, Sjogren's disease, GERD, hyperlipidemia, history of migraines, constipation and history of diverticulosis who was admitted on 9/19/2023 with atraumatic left groin pain. Hematoma found with tendon tear  Atraumatic left ileacus hematoma , had a lot of pain, now resolved    Hx of MGUS and SPEP done 5/23 and normal    Hx of ascending aortic dilation and echo done 8/22, seeing cards for this, follow-up  echo is due today    Rheum doc, once yearly, given plaquenil , Sjogren and arthritis, eyes are still a problem, seeing eye doc soon    Bilt hip replacements, left side was painful afterward but that resolved, sees [facility]    Fatigue, sluggish, tires easily, and echo is pending today.    Osteoporosis on prolia twice yearly, done at [facility] [address]              Review of Systems   Constitutional:  Positive for fatigue.      CONSTITUTIONAL: NEGATIVE for fever, chills, change in weight  ENT/MOUTH: NEGATIVE for ear, mouth and throat problems  RESP: NEGATIVE for significant cough or SOB  CV: NEGATIVE for chest pain, palpitations or peripheral edema      Objective    BP (!) 168/84 (BP Location: Right arm, Patient Position: Sitting, Cuff Size: Adult Regular)   Pulse 56   Temp 98.2  F (36.8  C) (Oral)   Resp 16   Ht 1.626 m (5' 4\")   Wt 66 kg (145 lb 9.6 oz)   LMP  (LMP Unknown)   SpO2 99%   BMI 24.99 kg/m    Body mass index is 24.99 kg/m .  Physical Exam   GENERAL: healthy, alert and no distress  EYES: Eyes grossly normal to inspection,  and conjunctivae and sclerae normal  HENT: ear canals and TM's normal, nose and mouth without ulcers or lesions  NECK: no adenopathy, no asymmetry, masses, or scars and thyroid normal to palpation  RESP: lungs clear to auscultation - no rales, rhonchi or wheezes  CV: regular rates and rhythm and normal S1 S2,  ABDOMEN: soft, nontender, no hepatosplenomegaly, no masses and bowel sounds normal  MS: no gross " musculoskeletal defects noted, no edema  SKIN: no suspicious lesions or rashes  NEURO: Normal strength and tone, mentation intact and speech normal  PSYCH: mentation appears normal, affect normal/bright  Able to get up to table easily, gait is normal, able to cross legs  Reviewed labs from ED

## 2023-11-01 ENCOUNTER — OFFICE VISIT (OUTPATIENT)
Dept: FAMILY MEDICINE | Facility: CLINIC | Age: 83
End: 2023-11-01
Payer: COMMERCIAL

## 2023-11-01 VITALS
OXYGEN SATURATION: 99 % | HEART RATE: 67 BPM | BODY MASS INDEX: 24.79 KG/M2 | TEMPERATURE: 98.2 F | RESPIRATION RATE: 14 BRPM | WEIGHT: 145.2 LBS | DIASTOLIC BLOOD PRESSURE: 91 MMHG | HEIGHT: 64 IN | SYSTOLIC BLOOD PRESSURE: 174 MMHG

## 2023-11-01 DIAGNOSIS — M35.05 SJOGREN'S SYNDROME WITH INFLAMMATORY ARTHRITIS (H): ICD-10-CM

## 2023-11-01 DIAGNOSIS — I10 HYPERTENSION GOAL BP (BLOOD PRESSURE) < 140/90: ICD-10-CM

## 2023-11-01 DIAGNOSIS — R39.15 URINARY URGENCY: ICD-10-CM

## 2023-11-01 DIAGNOSIS — G47.00 PERSISTENT INSOMNIA: Primary | ICD-10-CM

## 2023-11-01 PROCEDURE — 90480 ADMN SARSCOV2 VAC 1/ONLY CMP: CPT | Performed by: FAMILY MEDICINE

## 2023-11-01 PROCEDURE — 99214 OFFICE O/P EST MOD 30 MIN: CPT | Performed by: FAMILY MEDICINE

## 2023-11-01 PROCEDURE — 91320 SARSCV2 VAC 30MCG TRS-SUC IM: CPT | Performed by: FAMILY MEDICINE

## 2023-11-01 RX ORDER — RESPIRATORY SYNCYTIAL VIRUS VACCINE 120MCG/0.5
0.5 KIT INTRAMUSCULAR ONCE
Qty: 1 EACH | Refills: 0 | Status: CANCELLED | OUTPATIENT
Start: 2023-11-01 | End: 2023-11-01

## 2023-11-01 RX ORDER — METOPROLOL SUCCINATE 50 MG/1
50 TABLET, EXTENDED RELEASE ORAL DAILY
Qty: 90 TABLET | Refills: 1 | Status: SHIPPED | OUTPATIENT
Start: 2023-11-01 | End: 2023-11-21

## 2023-11-01 NOTE — PROGRESS NOTES
Assessment & Plan     Persistent insomnia  Pt declined sleep meds, feels it is her bladder and  keeping her awake  - Med Therapy Management Referral    Urinary urgency  Discussed options, feels her bladder med is not working, ok to trial off it, let me know if wants to try another, but they may cause dry mouth, which is a big problem with her due to below dx  - Med Therapy Management Referral    Sjogren's syndrome with inflammatory arthritis (H24)  Stable, seeing rheum  - Med Therapy Management Referral    Hypertension goal BP (blood pressure) < 140/90  Not controlled, raise dose to 50mg, Follow up in 2 weeks as planned with cardiology  - Med Therapy Management Referral  - metoprolol succinate ER (TOPROL XL) 50 MG 24 hr tablet; Take 1 tablet (50 mg) by mouth daily    Ordering of each unique test  Prescription drug management  33 minutes spent by me on the date of the encounter doing chart review, history and exam, documentation and further activities per the note       Regular exercise    Devi Roamno MD  [facility]    Janet Pandya is a 83 year old, presenting for the following health issues:  Dizziness (No energy and light headed - doesn't happen every day)        11/1/2023    10:55 AM   Additional Questions   Roomed by radha patel       History of Present Illness       Reason for visit:  Follow up from 2weeks ago    She eats 2-3 servings of fruits and vegetables daily.She consumes 0 sweetened beverage(s) daily.She exercises with enough effort to increase her heart rate 20 to 29 minutes per day.  She exercises with enough effort to increase her heart rate 3 or less days per week.   She is taking medications regularly.       The hematoma caused her to be anemic, and the left groin and the leg pain went away finally. [facility] appt in Dec appt.    Sluggish but much better, would prefer more energy. Labs done last time.    Insomnia, on and off, gets 3 hrs per night  "and rests and naps during the day. Taking melatonin, if not taking it, will not sleep.  Has to get up often to void, 3-4 times.   Myrbetriq is not working, even on the 50mg dose, since aug, the only one she has tried, would like a different one. Her  is also not sleeping and keeping her awake, also taking melatonin 5mg nightly,  Has dry mouth and sjogrens    Patient presents for evaluation of hypertension.  She indicates that she is feeling well and denies any symptoms referable to her elevated blood pressure. Specifically denies chest pain, palpitations, dyspnea, orthopnea, PND or peripheral edema. Current medication regimen is as listed below. Patient denies any side effects of medication.    Not controlled at home, no headaches, but heart can beat hard or even faster?  Taking toprol 50mg               Review of Systems   Constitutional, HEENT, cardiovascular, pulmonary, GI, , musculoskeletal, neuro, skin, endocrine and psych systems are negative, except as otherwise noted.      Objective    BP (!) 174/91 (BP Location: Right arm, Patient Position: Sitting, Cuff Size: Adult Large)   Pulse 67   Temp 98.2  F (36.8  C) (Oral)   Resp 14   Ht 1.626 m (5' 4\")   Wt 65.9 kg (145 lb 3.2 oz)   LMP  (LMP Unknown)   SpO2 99%   BMI 24.92 kg/m    Body mass index is 24.92 kg/m .  Physical Exam   GENERAL: healthy, alert and no distress  EYES: Eyes grossly normal to inspection, PERRL and conjunctivae and sclerae normal  HENT: ear canals and TM's normal, nose and mouth without ulcers or lesions  NECK: no adenopathy, no asymmetry, masses, or scars and thyroid normal to palpation  RESP: lungs clear to auscultation - no rales, rhonchi or wheezes  CV: regular rate and rhythm, normal S1 S2, no S3 or S4, no murmur, click or rub, no peripheral edema and peripheral pulses strong  ABDOMEN: soft, nontender, no hepatosplenomegaly, no masses and bowel sounds normal  MS: no gross musculoskeletal defects noted, no edema  SKIN: no " suspicious lesions or rashes  NEURO: Normal strength and tone, mentation intact and speech normal  PSYCH: mentation appears normal, affect normal/bright

## 2023-11-01 NOTE — PROGRESS NOTES
Prior to immunization administration, verified patients identity using patient s name and date of birth. Please see Immunization Activity for additional information.     Screening Questionnaire for Adult Immunization    Are you sick today?   No   Do you have allergies to medications, food, a vaccine component or latex?   No   Have you ever had a serious reaction after receiving a vaccination?   No   Do you have a long-term health problem with heart, lung, kidney, or metabolic disease (e.g., diabetes), asthma, a blood disorder, no spleen, complement component deficiency, a cochlear implant, or a spinal fluid leak?  Are you on long-term aspirin therapy?   No   Do you have cancer, leukemia, HIV/AIDS, or any other immune system problem?   No   Do you have a parent, brother, or sister with an immune system problem?   No   In the past 3 months, have you taken medications that affect  your immune system, such as prednisone, other steroids, or anticancer drugs; drugs for the treatment of rheumatoid arthritis, Crohn s disease, or psoriasis; or have you had radiation treatments?   No   Have you had a seizure, or a brain or other nervous system problem?   No   During the past year, have you received a transfusion of blood or blood    products, or been given immune (gamma) globulin or antiviral drug?   No   For women: Are you pregnant or is there a chance you could become       pregnant during the next month?   No   Have you received any vaccinations in the past 4 weeks?   No     Immunization questionnaire answers were all negative.      Patient instructed to remain in clinic for 15 minutes afterwards, and to report any adverse reactions.     Screening performed by Melissa Rios CMA on 11/1/2023 at 12:14 PM.

## 2023-11-06 ENCOUNTER — HOSPITAL ENCOUNTER (EMERGENCY)
Facility: CLINIC | Age: 83
End: 2023-11-06
Payer: COMMERCIAL

## 2023-11-08 ENCOUNTER — OFFICE VISIT (OUTPATIENT)
Dept: SURGERY | Facility: CLINIC | Age: 83
End: 2023-11-08
Payer: COMMERCIAL

## 2023-11-08 ENCOUNTER — TELEPHONE (OUTPATIENT)
Dept: SURGERY | Facility: CLINIC | Age: 83
End: 2023-11-08

## 2023-11-08 VITALS — BODY MASS INDEX: 24.91 KG/M2 | WEIGHT: 145.1 LBS

## 2023-11-08 DIAGNOSIS — R13.10 DYSPHAGIA, UNSPECIFIED TYPE: Primary | ICD-10-CM

## 2023-11-08 PROCEDURE — 99204 OFFICE O/P NEW MOD 45 MIN: CPT | Performed by: SURGERY

## 2023-11-08 NOTE — LETTER
"    11/8/2023         RE: Ya Stahl  [address]  [facility] [address]        Dear Colleague,    Thank you for referring your patient, Ya Stahl, to the [facility] [place]. Please see a copy of my visit note below.    I, Ya Stahl, give verbal consent for a resident to be present in today's visit.     HPI: Ya Stahl is a 83 year old female referred to see me by Devi Romano for evaluation of gastroesophageal reflux disease.  She notes  a several year history of gastroesophageal reflux disease with a GERD HR Q OL of 25.  She does complain of spontaneous regurgitation when leaning forward.  She has had a longstanding evaluation by [facility] and has been on medications off and on for years.  She has a chronic cough for which she was told it was secondary to losartan.  However, she has been off her losartan for some time and continues to have a cough.  She had a previous pH probe done in the past which demonstrated a mildly elevated DeMeester score.  However, it was noted that there was only 1 day of information capture secondary to failure of the machine to work properly.  She is fairly miserable at present with her chronic cough.  She has an inability to lay flat at night.  Denies any waterbrash.    Allergies:Morphine and related, Vicodin [acetaminophen], and Adhesive tape    Past Medical History:   Diagnosis Date     Arthritis      Cerebral infarction (H) 2019     Complication of anesthesia     \"hard time waking up / N & V\"     Coronary artery disease      Disease of thyroid gland      Displacement of lumbar intervertebral disc without myelopathy      Gastro-oesophageal reflux disease      GERD (gastroesophageal reflux disease)      Hearing loss     has bilateral aids     Heart attack (H) 03/2016     History of anesthesia complications     delayed emergence     History of angina      Hypertension      " Low back pain      Migraine headache      Myocardial infarction (H)      Numbness and tingling     down right leg (associated with back pain)     PONV (postoperative nausea and vomiting)      Sicca syndrome (H24)      Sjogren's disease (H24)      Sjogren's syndrome (H24)     sees specialist; dentist     Spider veins      Unspecified hypothyroidism        Past Surgical History:   Procedure Laterality Date     ARTHROPLASTY HIP ANTERIOR Left 10/14/2022    Procedure: LEFT TOTAL HIP ARTHROPLASTY DIRECT ANTERIOR APPROACH;  Surgeon: Selvin Saab MD;  Location:  OR     ARTHROSCOPY KNEE  10/05/2012    R Procedure: ARTHROSCOPY KNEE;  RIGHT KNEE ARTHROSCOPY, PARTIAL MEDIAL MENISCECTOMY;  Surgeon: Karli Zaragoza MD;  Location:  SD     BACK SURGERY  About. 5 years ago    Fusion of 4&5 lumbar     BLADDER SURGERY       BUNIONECTOMY  ~2010    L foot.      CATARACT IOL, RT/LT Bilateral 07/2017     COLONOSCOPY N/A 01/17/2017    normal; no repeat Procedure: COLONOSCOPY;  Surgeon: Fan Giordano MD;  Location:  GI     ENDOSCOPIC RELEASE CARPAL TUNNEL  09/11/2012    R Procedure: ENDOSCOPIC RELEASE CARPAL TUNNEL;  Right Endoscopic carpal tunnel release, left ringer finger cortizon injection;  Surgeon: Anne Marie Catherine MD;  Location:  OR     ESOPHAGOSCOPY, GASTROSCOPY, DUODENOSCOPY (EGD), COMBINED N/A 12/26/2014    Procedure: COMBINED ESOPHAGOSCOPY, GASTROSCOPY, DUODENOSCOPY (EGD);  Surgeon: Fan Giordano MD;  Location:  GI     EXCISE EXOSTOSIS FOOT Left 12/01/2016    Procedure: EXCISE EXOSTOSIS FOOT;  Surgeon: Jody Gallagher DPM, Pod;  Location:  OR     GYN SURGERY  1978    ovaries removed     HEART CATH FYI  03/04/2016    dz <40%     HIP HARDWARE REMOVAL Right 08/04/2020    Procedure: HARDWARE REMOVAL - LATERAL APPROACH;  Surgeon: Charlie Wolfe MD;  Location: [facility];  Service: Orthopedics     HYSTERECTOMY       INJECT STEROID (LOCATION)  09/11/2012    Procedure: INJECT STEROID (LOCATION);;   Surgeon: Anne Marie Catherine MD;  Location:  OR     LAPAROSCOPIC CHOLECYSTECTOMY N/A 04/07/2016    Procedure: LAPAROSCOPIC CHOLECYSTECTOMY;  Surgeon: Hans Medina MD;  Location:  OR     OPTICAL TRACKING SYSTEM FUSION SPINE POSTERIOR LUMBAR TWO LEVELS N/A 10/31/2018    Procedure: Central L3-4 L4-5 lamenectomies L4-5 posterior instrumented fusion;  Surgeon: Aroldo Burdick MD;  Location:  OR     ORTHOPEDIC SURGERY Right 2014    karli for fx femur     RECONSTRUCT FOREFOOT WITH METATARSOPHALANGEAL (MTP) FUSION Left 04/28/2017    Procedure: RECONSTRUCT FOREFOOT WITH METATARSOPHALANGEAL (MTP) FUSION;  1. Resection arthroplasty, fifth metatarsophalangeal joint, left foot.   2. Irrigation and debridement of fifth metatarsophalangeal joint, left foot (excisional to the level of the bone).     ;  Surgeon: Fabián Phipps MD;  Location:  OR     RELEASE CARPAL TUNNEL       RIGHT HIP ORIF 4/1/2014       ROTATOR CUFF REPAIR RT/LT  ~1998    Lt shoulder; rotator cuff     SURGICAL HISTORY OF -   distant past    ablation for palpitations.     SURGICAL HISTORY OF -   06/2015    left carpal tunnel surgery     [facility] NONSPECIFIC PROCEDURE  1976    D&C     [facility] TOTAL HIP ARTHROPLASTY Right 08/04/2020    Procedure: RIGHT TOTAL HIP ARTHROPLASTY;  Surgeon: Charlie Wolfe MD;  Location: [facility];  Service: Orthopedics     [facility] TOTAL KNEE ARTHROPLASTY Right 01/2020       CURRENT MEDS:    Current Outpatient Medications:      acetaminophen (TYLENOL) 500 MG tablet, Take 1,000 mg by mouth every 8 hours as needed for mild pain, Disp: , Rfl:      Boswellia-Glucosamine-Vit D (OSTEO BI-FLEX ONE PER DAY PO), Take 1 capsule by mouth daily, Disp: , Rfl:      Calcium Carb-Cholecalciferol (SM CALCIUM/VITAMIN D) 600-800 MG-UNIT TABS, Take 1 tablet by mouth 2 times daily, Disp: , Rfl:      Carboxymethylcellulose Sodium (REFRESH LIQUIGEL OP), Place 1 drop into both eyes every hour as needed (dry eyes), Disp: , Rfl:      cevimeline  (EVOXAC) 30 MG capsule, Take 30 mg by mouth 3 times daily, Disp: , Rfl:      Coenzyme Q10 300 MG CAPS, Take 1 capsule by mouth daily, Disp: , Rfl:      ELDERBERRY PO, Take 1 tablet by mouth daily, Disp: , Rfl:      EPINEPHrine (ANY BX GENERIC EQUIV) 0.3 MG/0.3ML injection 2-pack, Inject 0.3 mLs (0.3 mg) into the muscle as needed for anaphylaxis, Disp: 2 each, Rfl: 0     hydroxychloroquine (PLAQUENIL) 200 MG tablet, Take 300 mg by mouth daily (with lunch), Disp: , Rfl:      ipratropium (ATROVENT) 0.03 % nasal spray, Spray 2 sprays into both nostrils 3 times daily as needed for rhinitis (runny nose), Disp: 30 mL, Rfl: 3     LANsoprazole (PREVACID) 30 MG DR capsule, TAKE 1 CAPSULE BY MOUTH IN THE  MORNING, Disp: 90 capsule, Rfl: 3     levothyroxine (SYNTHROID/LEVOTHROID) 88 MCG tablet, Take 1 tablet (88 mcg) by mouth daily, Disp: 90 tablet, Rfl: 1     Lutein 20 MG CAPS, Take 1 capsule by mouth daily, Disp: , Rfl:      metoprolol succinate ER (TOPROL XL) 50 MG 24 hr tablet, Take 1 tablet (50 mg) by mouth daily, Disp: 90 tablet, Rfl: 1     nitroGLYcerin (NITROSTAT) 0.4 MG sublingual tablet, Place 1 tablet (0.4 mg) under the tongue every 5 minutes as needed for chest pain, Disp: 25 tablet, Rfl: 0     Omega-3 Fatty Acids (OMEGA-3 FISH OIL PO), Take 1 g by mouth 2 times daily (with meals) , Disp: , Rfl:      polyethylene glycol (MIRALAX) 17 GM/Dose powder, Take 1 capful by mouth 2 times daily In 8 ounces of liquid, Disp: , Rfl:      potassium chloride ER (KLOR-CON M) 10 MEQ CR tablet, Take 1 tablet (10 mEq) by mouth as needed for potassium supplementation (take in combination with lasix), Disp: 30 tablet, Rfl: 11     Vitamin D (Cholecalciferol) 50 MCG (2000 UT) CAPS, Take 1 capsule by mouth 2 times daily, Disp: , Rfl:     Family History   Problem Relation Age of Onset     C.A.D. Mother 76     Cancer Mother         non Hodgekin's Lymphoma     Heart Disease Mother         enlarged heart     Breast Cancer Mother      KELLEY  Father 59     Heart Disease Father         valve problem     Hypertension Brother      Neuropathy Brother      Heart Disease Brother         open heart to repair mitral valve.     Connective Tissue Disorder Daughter         scleroderma     Colon Cancer No family hx of         reports that she has never smoked. She has never used smokeless tobacco. She reports current alcohol use. She reports that she does not use drugs.    Review of Systems:  The 12 system review is within normal limits except for as mentioned above.  General ROS: No complaints or constitutional symptoms  Ophthalmic ROS: No complaints of visual changes  Skin: No complaints or symptoms   Endocrine: No complaints or symptoms  Hematologic/Lymphatic: No symptoms or complaints  Psychiatric: No symptoms or complaints  Respiratory ROS: no cough, shortness of breath, or wheezing  Cardiovascular ROS: no chest pain or dyspnea on exertion  Gastrointestinal ROS: As per HPI  Genito-Urinary ROS: no dysuria, trouble voiding, or hematuria  Musculoskeletal ROS: no joint or muscle pain  Neurological ROS: no TIA or stroke symptoms      EXAM:  Wt 65.8 kg (145 lb 1.6 oz)   LMP  (LMP Unknown)   BMI 24.91 kg/m    GENERAL: Well developed female, No acute distress, pleasant and conversant   EYES: Pupils equal, round and reactive, no scleral icterus  ABDOMEN: Soft, non-tender, no masses, non-distended  SKIN: Pink, warm and dry, no obvious rashes or lesions   NEURO:No focal deficits, ambulatory  MUSCULOSKELETAL:No obvious deformities, no swelling, normal appearing      LABS:  Lab Results   Component Value Date    WBC 5.1 10/18/2023    WBC 9.0 01/19/2021    HGB 12.3 10/18/2023    HGB 12.5 01/19/2021    HCT 38.7 10/18/2023    HCT 38.7 01/19/2021     10/18/2023     01/19/2021     10/18/2023     01/19/2021     INR/Prothrombin Time  @LABRCNTIP(NA,K,CL,co2,bun,creatinine,labglom,glucose,calcium)@  Lab Results   Component Value Date    ALT 15 10/18/2023     AST 26 10/18/2023    ALKPHOS 60 10/18/2023       IMAGES:   Relevant images were reviewed and discussed with the patient.  Notable findings were: pH probe results noted, previous endoscopy notes noted    Assessment/Plan:   Ya Stahl is a 83 year old female with signs and symptoms consistent with gastroesophageal reflux disease.  I have explained the pathophysiology of GERD in detail as well as the surgical versus non-operative management strategies.      At this point we will proceed with continued initial work-up.  This will include a repeat endoscopy to see if there is any worsening cellular progression of her disease or an enlarging hiatal hernia.    Partha Cooper DO [facility]  [phone]  [facility] Department of Surgery      Again, thank you for allowing me to participate in the care of your patient.        Sincerely,        Partha Cooper DO

## 2023-11-08 NOTE — LETTER
Pre-op Physical: 11/9/2023 at 4:00 pm with SINTIA Vásquez CNP    Surgery Date: 11/28/2023     Location: [facility] [address] [place] [address] [address], [place] [address]    Approximate Arrival Time: 1:30 pm   (Unless instructed differently by the pre-op call nurse)     Post op Appointment: 12/14/2023 at   10:20 am  with  Dr. Cooper . [facility], [address] [place] [address] [address], [place] [address].    Pre-Surgical Tasks:     Schedule a pre-op physical with your primary care doctor if not internal to [facility].  If internal, we have scheduled this.   The pre-op physical must be 10-30 days before surgery and since it is required by anesthesia, your surgery will be cancelled if it's not done.      Review all medications with your primary care or prescribing physician; they will advise you which meds to stop and when, and when you can resume taking.  Certain medications like blood thinners and weight loss medications need to be stopped in advance of surgery to proceed safely.      Blood thinners including but not exclusive to drugs like Xarelto, Eliquis, Warfarin and Aspirin, should be stopped five days before surgery, if your prescribing provider agrees. Follow your provider's advice on stopping blood thinners because they know you best.  If you are unsure if your medication is a blood thinner, ask your prescribing provider.    Weight loss medications: There are multiple medications being used for weight management and diabetes today, and the list is growing.  Phentermine, Ozempic, Wegovy, Trulicity, and other similar medications need to be stopped one week before surgery to avoid being cancelled.  Victoza and Saxenda can be continued longer but must be stopped one full day before surgery.  Please ask your prescribing provider for advice.    Diabetic medications: in addition to the medications talked about above that are used for either weight loss or  diabetes, some people are on insulin that may require adjustment.  Please discuss managing diabetic medications with your prescribing doctor as these medications may require modification prior to surgery.       Fasting instructions will be provided by the pre-op nurse who will call you 1-3 days before surgery.  Typically, we advise normal food up to 8 hours before you arrive for surgery. Clear liquids only from then until 2 hours before you arrive surgery, then nothing at all by mouth.  The nurse will review your specific instructions with you at the call.      Smoking impacts your body's ability to heal properly so we advise patients to quit if possible before surgery.  Plastic Surgery patients are required to be nicotine free for at least 8 weeks before surgery.      You will need an adult to drive you home and stay with you 24 hours after surgery. Public transportation or Medical Van Services are not permitted.    Visitor restrictions are subject to change, please verify with the pre-op nurse when they call how many people are permitted to accompany you.    We always encourage you to notify your insurance any time you have medical tests or procedures scheduled including surgery. The number is usually right on the back of your insurance card. To obtain pricing for surgery, please call  [facility] [place] [facility] at [phone] or email [email or web address].        Call our office if you have any questions! Thank you!

## 2023-11-08 NOTE — PROGRESS NOTES
"HPI: Ya Stahl is a 83 year old female referred to see me by Devi Romano for evaluation of gastroesophageal reflux disease.  She notes  a several year history of gastroesophageal reflux disease with a GERD HR Q OL of 25.  She does complain of spontaneous regurgitation when leaning forward.  She has had a longstanding evaluation by [facility] and has been on medications off and on for years.  She has a chronic cough for which she was told it was secondary to losartan.  However, she has been off her losartan for some time and continues to have a cough.  She had a previous pH probe done in the past which demonstrated a mildly elevated DeMeester score.  However, it was noted that there was only 1 day of information capture secondary to failure of the machine to work properly.  She is fairly miserable at present with her chronic cough.  She has an inability to lay flat at night.  Denies any waterbrash.    Allergies:Morphine and related, Vicodin [acetaminophen], and Adhesive tape    Past Medical History:   Diagnosis Date    Arthritis     Cerebral infarction (H) 2019    Complication of anesthesia     \"hard time waking up / N & V\"    Coronary artery disease     Disease of thyroid gland     Displacement of lumbar intervertebral disc without myelopathy     Gastro-oesophageal reflux disease     GERD (gastroesophageal reflux disease)     Hearing loss     has bilateral aids    Heart attack (H) 03/2016    History of anesthesia complications     delayed emergence    History of angina     Hypertension     Low back pain     Migraine headache     Myocardial infarction (H)     Numbness and tingling     down right leg (associated with back pain)    PONV (postoperative nausea and vomiting)     Sicca syndrome (H24)     Sjogren's disease (H24)     Sjogren's syndrome (H24)     sees specialist; dentist    Spider veins     Unspecified hypothyroidism        Past Surgical History:   Procedure Laterality Date    " ARTHROPLASTY HIP ANTERIOR Left 10/14/2022    Procedure: LEFT TOTAL HIP ARTHROPLASTY DIRECT ANTERIOR APPROACH;  Surgeon: Selvin Saab MD;  Location:  OR    ARTHROSCOPY KNEE  10/05/2012    R Procedure: ARTHROSCOPY KNEE;  RIGHT KNEE ARTHROSCOPY, PARTIAL MEDIAL MENISCECTOMY;  Surgeon: Karli Zaragoza MD;  Location: [facility]    BACK SURGERY  About. 5 years ago    Fusion of 4&5 lumbar    BLADDER SURGERY      BUNIONECTOMY  ~2010    L foot.     CATARACT IOL, RT/LT Bilateral 07/2017    COLONOSCOPY N/A 01/17/2017    normal; no repeat Procedure: COLONOSCOPY;  Surgeon: Fan Giordano MD;  Location:  GI    ENDOSCOPIC RELEASE CARPAL TUNNEL  09/11/2012    R Procedure: ENDOSCOPIC RELEASE CARPAL TUNNEL;  Right Endoscopic carpal tunnel release, left ringer finger cortizon injection;  Surgeon: Anne Marie Catherine MD;  Location:  OR    ESOPHAGOSCOPY, GASTROSCOPY, DUODENOSCOPY (EGD), COMBINED N/A 12/26/2014    Procedure: COMBINED ESOPHAGOSCOPY, GASTROSCOPY, DUODENOSCOPY (EGD);  Surgeon: Fan Giordano MD;  Location:  GI    EXCISE EXOSTOSIS FOOT Left 12/01/2016    Procedure: EXCISE EXOSTOSIS FOOT;  Surgeon: Jody Gallagher DPM, Pod;  Location:  OR    GYN SURGERY  1978    ovaries removed    HEART CATH FYI  03/04/2016    dz <40%    HIP HARDWARE REMOVAL Right 08/04/2020    Procedure: HARDWARE REMOVAL - LATERAL APPROACH;  Surgeon: Charlie Wolfe MD;  Location: [facility];  Service: Orthopedics    HYSTERECTOMY      INJECT STEROID (LOCATION)  09/11/2012    Procedure: INJECT STEROID (LOCATION);;  Surgeon: Anne Marie Catherine MD;  Location:  OR    LAPAROSCOPIC CHOLECYSTECTOMY N/A 04/07/2016    Procedure: LAPAROSCOPIC CHOLECYSTECTOMY;  Surgeon: Hans Medina MD;  Location:  OR    OPTICAL TRACKING SYSTEM FUSION SPINE POSTERIOR LUMBAR TWO LEVELS N/A 10/31/2018    Procedure: Central L3-4 L4-5 lamenectomies L4-5 posterior instrumented fusion;  Surgeon: Aroldo Burdick MD;  Location:  OR    ORTHOPEDIC SURGERY Right  2014    karli for fx femur    RECONSTRUCT FOREFOOT WITH METATARSOPHALANGEAL (MTP) FUSION Left 04/28/2017    Procedure: RECONSTRUCT FOREFOOT WITH METATARSOPHALANGEAL (MTP) FUSION;  1. Resection arthroplasty, fifth metatarsophalangeal joint, left foot.   2. Irrigation and debridement of fifth metatarsophalangeal joint, left foot (excisional to the level of the bone).     ;  Surgeon: Fabián Phipps MD;  Location:  OR    RELEASE CARPAL TUNNEL      RIGHT HIP ORIF 4/1/2014      ROTATOR CUFF REPAIR RT/LT  ~1998    Lt shoulder; rotator cuff    SURGICAL HISTORY OF -   distant past    ablation for palpitations.    SURGICAL HISTORY OF -   06/2015    left carpal tunnel surgery    [facility] NONSPECIFIC PROCEDURE  1976    D&C    [facility] TOTAL HIP ARTHROPLASTY Right 08/04/2020    Procedure: RIGHT TOTAL HIP ARTHROPLASTY;  Surgeon: Charlie Wolfe MD;  Location: [facility];  Service: Orthopedics    [facility] TOTAL KNEE ARTHROPLASTY Right 01/2020       CURRENT MEDS:    Current Outpatient Medications:     acetaminophen (TYLENOL) 500 MG tablet, Take 1,000 mg by mouth every 8 hours as needed for mild pain, Disp: , Rfl:     Boswellia-Glucosamine-Vit D (OSTEO BI-FLEX ONE PER DAY PO), Take 1 capsule by mouth daily, Disp: , Rfl:     Calcium Carb-Cholecalciferol (SM CALCIUM/VITAMIN D) 600-800 MG-UNIT TABS, Take 1 tablet by mouth 2 times daily, Disp: , Rfl:     Carboxymethylcellulose Sodium (REFRESH LIQUIGEL OP), Place 1 drop into both eyes every hour as needed (dry eyes), Disp: , Rfl:     cevimeline (EVOXAC) 30 MG capsule, Take 30 mg by mouth 3 times daily, Disp: , Rfl:     Coenzyme Q10 300 MG CAPS, Take 1 capsule by mouth daily, Disp: , Rfl:     ELDERBERRY PO, Take 1 tablet by mouth daily, Disp: , Rfl:     EPINEPHrine (ANY BX GENERIC EQUIV) 0.3 MG/0.3ML injection 2-pack, Inject 0.3 mLs (0.3 mg) into the muscle as needed for anaphylaxis, Disp: 2 each, Rfl: 0    hydroxychloroquine (PLAQUENIL) 200 MG tablet, Take 300 mg by mouth daily (with  lunch), Disp: , Rfl:     ipratropium (ATROVENT) 0.03 % nasal spray, Spray 2 sprays into both nostrils 3 times daily as needed for rhinitis (runny nose), Disp: 30 mL, Rfl: 3    LANsoprazole (PREVACID) 30 MG DR capsule, TAKE 1 CAPSULE BY MOUTH IN THE  MORNING, Disp: 90 capsule, Rfl: 3    levothyroxine (SYNTHROID/LEVOTHROID) 88 MCG tablet, Take 1 tablet (88 mcg) by mouth daily, Disp: 90 tablet, Rfl: 1    Lutein 20 MG CAPS, Take 1 capsule by mouth daily, Disp: , Rfl:     metoprolol succinate ER (TOPROL XL) 50 MG 24 hr tablet, Take 1 tablet (50 mg) by mouth daily, Disp: 90 tablet, Rfl: 1    nitroGLYcerin (NITROSTAT) 0.4 MG sublingual tablet, Place 1 tablet (0.4 mg) under the tongue every 5 minutes as needed for chest pain, Disp: 25 tablet, Rfl: 0    Omega-3 Fatty Acids (OMEGA-3 FISH OIL PO), Take 1 g by mouth 2 times daily (with meals) , Disp: , Rfl:     polyethylene glycol (MIRALAX) 17 GM/Dose powder, Take 1 capful by mouth 2 times daily In 8 ounces of liquid, Disp: , Rfl:     potassium chloride ER (KLOR-CON M) 10 MEQ CR tablet, Take 1 tablet (10 mEq) by mouth as needed for potassium supplementation (take in combination with lasix), Disp: 30 tablet, Rfl: 11    Vitamin D (Cholecalciferol) 50 MCG (2000 UT) CAPS, Take 1 capsule by mouth 2 times daily, Disp: , Rfl:     Family History   Problem Relation Age of Onset    C.A.D. Mother 76    Cancer Mother         non Hodgekin's Lymphoma    Heart Disease Mother         enlarged heart    Breast Cancer Mother     C.A.D. Father 59    Heart Disease Father         valve problem    Hypertension Brother     Neuropathy Brother     Heart Disease Brother         open heart to repair mitral valve.    Connective Tissue Disorder Daughter         scleroderma    Colon Cancer No family hx of         reports that she has never smoked. She has never used smokeless tobacco. She reports current alcohol use. She reports that she does not use drugs.    Review of Systems:  The 12 system review is  within normal limits except for as mentioned above.  General ROS: No complaints or constitutional symptoms  Ophthalmic ROS: No complaints of visual changes  Skin: No complaints or symptoms   Endocrine: No complaints or symptoms  Hematologic/Lymphatic: No symptoms or complaints  Psychiatric: No symptoms or complaints  Respiratory ROS: no cough, shortness of breath, or wheezing  Cardiovascular ROS: no chest pain or dyspnea on exertion  Gastrointestinal ROS: As per HPI  Genito-Urinary ROS: no dysuria, trouble voiding, or hematuria  Musculoskeletal ROS: no joint or muscle pain  Neurological ROS: no TIA or stroke symptoms      EXAM:  Wt 65.8 kg (145 lb 1.6 oz)   LMP  (LMP Unknown)   BMI 24.91 kg/m    GENERAL: Well developed female, No acute distress, pleasant and conversant   EYES: Pupils equal, round and reactive, no scleral icterus  ABDOMEN: Soft, non-tender, no masses, non-distended  SKIN: Pink, warm and dry, no obvious rashes or lesions   NEURO:No focal deficits, ambulatory  MUSCULOSKELETAL:No obvious deformities, no swelling, normal appearing      LABS:  Lab Results   Component Value Date    WBC 5.1 10/18/2023    WBC 9.0 01/19/2021    HGB 12.3 10/18/2023    HGB 12.5 01/19/2021    HCT 38.7 10/18/2023    HCT 38.7 01/19/2021     10/18/2023     01/19/2021     10/18/2023     01/19/2021     INR/Prothrombin Time  @LABRCNTIP(NA,K,CL,co2,bun,creatinine,labglom,glucose,calcium)@  Lab Results   Component Value Date    ALT 15 10/18/2023    AST 26 10/18/2023    ALKPHOS 60 10/18/2023       IMAGES:   Relevant images were reviewed and discussed with the patient.  Notable findings were: pH probe results noted, previous endoscopy notes noted    Assessment/Plan:   Ya Stahl is a 83 year old female with signs and symptoms consistent with gastroesophageal reflux disease.  I have explained the pathophysiology of GERD in detail as well as the surgical versus non-operative management strategies.      At  this point we will proceed with continued initial work-up.  This will include a repeat endoscopy to see if there is any worsening cellular progression of her disease or an enlarging hiatal hernia.    Partha Cooper DO [facility]  [phone]  [facility] Department of Surgery

## 2023-11-09 ENCOUNTER — OFFICE VISIT (OUTPATIENT)
Dept: FAMILY MEDICINE | Facility: CLINIC | Age: 83
End: 2023-11-09
Payer: COMMERCIAL

## 2023-11-09 VITALS
BODY MASS INDEX: 24.92 KG/M2 | HEIGHT: 64 IN | SYSTOLIC BLOOD PRESSURE: 169 MMHG | RESPIRATION RATE: 12 BRPM | HEART RATE: 48 BPM | DIASTOLIC BLOOD PRESSURE: 85 MMHG | WEIGHT: 146 LBS | TEMPERATURE: 97.5 F | OXYGEN SATURATION: 100 %

## 2023-11-09 DIAGNOSIS — M35.05 SJOGREN'S SYNDROME WITH INFLAMMATORY ARTHRITIS (H): ICD-10-CM

## 2023-11-09 DIAGNOSIS — K29.60 BILE REFLUX GASTRITIS: ICD-10-CM

## 2023-11-09 DIAGNOSIS — R05.3 CHRONIC COUGH: ICD-10-CM

## 2023-11-09 DIAGNOSIS — E05.00 GRAVES' DISEASE: ICD-10-CM

## 2023-11-09 DIAGNOSIS — I25.10 ASCVD (ARTERIOSCLEROTIC CARDIOVASCULAR DISEASE): ICD-10-CM

## 2023-11-09 DIAGNOSIS — Z01.818 PREOP GENERAL PHYSICAL EXAM: Primary | ICD-10-CM

## 2023-11-09 DIAGNOSIS — I10 HYPERTENSION GOAL BP (BLOOD PRESSURE) < 140/90: ICD-10-CM

## 2023-11-09 PROBLEM — R13.10 DYSPHAGIA, UNSPECIFIED TYPE: Status: ACTIVE | Noted: 2023-11-08

## 2023-11-09 LAB
ERYTHROCYTE [DISTWIDTH] IN BLOOD BY AUTOMATED COUNT: 13.1 % (ref 10–15)
HCT VFR BLD AUTO: 40.5 % (ref 35–47)
HGB BLD-MCNC: 12.8 G/DL (ref 11.7–15.7)
MCH RBC QN AUTO: 32.6 PG (ref 26.5–33)
MCHC RBC AUTO-ENTMCNC: 31.6 G/DL (ref 31.5–36.5)
MCV RBC AUTO: 103 FL (ref 78–100)
PLATELET # BLD AUTO: 246 10E3/UL (ref 150–450)
RBC # BLD AUTO: 3.93 10E6/UL (ref 3.8–5.2)
WBC # BLD AUTO: 6.6 10E3/UL (ref 4–11)

## 2023-11-09 PROCEDURE — 36415 COLL VENOUS BLD VENIPUNCTURE: CPT | Performed by: NURSE PRACTITIONER

## 2023-11-09 PROCEDURE — 85027 COMPLETE CBC AUTOMATED: CPT | Performed by: NURSE PRACTITIONER

## 2023-11-09 PROCEDURE — 93000 ELECTROCARDIOGRAM COMPLETE: CPT | Performed by: NURSE PRACTITIONER

## 2023-11-09 PROCEDURE — 80048 BASIC METABOLIC PNL TOTAL CA: CPT | Performed by: NURSE PRACTITIONER

## 2023-11-09 PROCEDURE — 99214 OFFICE O/P EST MOD 30 MIN: CPT | Mod: 25 | Performed by: NURSE PRACTITIONER

## 2023-11-09 ASSESSMENT — PAIN SCALES - GENERAL: PAINLEVEL: NO PAIN (0)

## 2023-11-09 NOTE — PATIENT INSTRUCTIONS
Preparing for Your Surgery  Getting started  A nurse will call you to review your health history and instructions. They will give you an arrival time based on your scheduled surgery time. Please be ready to share:  Your doctor's clinic name and phone number  Your medical, surgical, and anesthesia history  A list of allergies and sensitivities  A list of medicines, including herbal treatments and over-the-counter drugs  Whether the patient has a legal guardian (ask how to send us the papers in advance)  Please tell us if you're pregnant--or if there's any chance you might be pregnant. Some surgeries may injure a fetus (unborn baby), so they require a pregnancy test. Surgeries that are safe for a fetus don't always need a test, and you can choose whether to have one.   If you have a child who's having surgery, please ask for a copy of Preparing for Your Child's Surgery.    Preparing for surgery  Within 10 to 30 days of surgery: Have a pre-op exam (sometimes called an H&P, or History and Physical). This can be done at a clinic or pre-operative center.  If you're having a , you may not need this exam. Talk to your care team.  At your pre-op exam, talk to your care team about all medicines you take. If you need to stop any medicines before surgery, ask when to start taking them again.  We do this for your safety. Many medicines can make you bleed too much during surgery. Some change how well surgery (anesthesia) drugs work.  Call your insurance company to let them know you're having surgery. (If you don't have insurance, call [phone].)  Call your clinic if there's any change in your health. This includes signs of a cold or flu (sore throat, runny nose, cough, rash, fever). It also includes a scrape or scratch near the surgery site.  If you have questions on the day of surgery, call your hospital or surgery center.  Eating and drinking guidelines  For your safety: Unless your surgeon tells you otherwise,  follow the guidelines below.  Eat and drink as usual until 8 hours before you arrive for surgery. After that, no food or milk.  Drink clear liquids until 2 hours before you arrive. These are liquids you can see through, like water, Gatorade, and Propel Water. They also include plain black coffee and tea (no cream or milk), candy, and breath mints. You can spit out gum when you arrive.  If you drink alcohol: Stop drinking it the night before surgery.  If your care team tells you to take medicine on the morning of surgery, it's okay to take it with a sip of water.  Preventing infection  Shower or bathe the night before and morning of your surgery. Follow the instructions your clinic gave you. (If no instructions, use regular soap.)  Don't shave or clip hair near your surgery site. We'll remove the hair if needed.  Don't smoke or vape the morning of surgery. You may chew nicotine gum up to 2 hours before surgery. A nicotine patch is okay.  Note: Some surgeries require you to completely quit smoking and nicotine. Check with your surgeon.  Your care team will make every effort to keep you safe from infection. We will:  Clean our hands often with soap and water (or an alcohol-based hand rub).  Clean the skin at your surgery site with a special soap that kills germs.  Give you a special gown to keep you warm. (Cold raises the risk of infection.)  Wear special hair covers, masks, gowns and gloves during surgery.  Give antibiotic medicine, if prescribed. Not all surgeries need antibiotics.  What to bring on the day of surgery  Photo ID and insurance card  Copy of your health care directive, if you have one  Glasses and hearing aids (bring cases)  You can't wear contacts during surgery  Inhaler and eye drops, if you use them (tell us about these when you arrive)  CPAP machine or breathing device, if you use them  A few personal items, if spending the night  If you have . . .  A pacemaker, ICD (cardiac defibrillator) or other  implant: Bring the ID card.  An implanted stimulator: Bring the remote control.  A legal guardian: Bring a copy of the certified (court-stamped) guardianship papers.  Please remove any jewelry, including body piercings. Leave jewelry and other valuables at home.  If you're going home the day of surgery  You must have a responsible adult drive you home. They should stay with you overnight as well.  If you don't have someone to stay with you, and you aren't safe to go home alone, we may keep you overnight. Insurance often won't pay for this.  After surgery  If it's hard to control your pain or you need more pain medicine, please call your surgeon's office.  Questions?   If you have any questions for your care team, list them here: _________________________________________________________________________________________________________________________________________________________________________ ____________________________________ ____________________________________ ____________________________________  For informational purposes only. Not to replace the advice of your health care provider. Copyright   2003, 2019 [employer]. All rights reserved. Clinically reviewed by Hannah Farias MD. [employer] 610480 - REV 12/22.    How to Take Your Medication Before Surgery  - HOLD (do not take) Aspirin for 5 days

## 2023-11-09 NOTE — PROGRESS NOTES
[facility]  [address] [facility] [address]  Phone: [phone]  Primary Provider: Devi Romano  Pre-op Performing Provider: NEERU SAM      PREOPERATIVE EVALUATION:  Today's date: 11/9/2023    Ya is a 83 year old female who presents for a preoperative evaluation.      11/9/2023     3:57 PM   Additional Questions   Roomed by Saira DAHL       Surgical Information:  Surgery/Procedure: ESOPHAGOGASTRODUODENOSCOPY, WITH BIOPSY   [place] [facility] [place]geon: Partha Cooper DO  Surgery Date: 11/28/23  Time of Surgery: afternoon  Where patient plans to recover: At home with family  Fax number for surgical facility: Note does not need to be faxed, will be available electronically in Epic.    Assessment & Plan     The proposed surgical procedure is considered LOW risk.    Preop general physical exam  Chronic cough  Bile reflux gastritis  Planned procedure     - Basic metabolic panel  (Ca, Cl, CO2, Creat, Gluc, K, Na, BUN); Future  - CBC with platelets; Future  - EKG 12-lead complete w/read - Clinics      Hypertension goal BP (blood pressure) < 140/90  Chronic unstable, recently increased dosing with PCP.    Grave's disease  ASCVD (arteriosclerotic cardiovascular disease)  Sjogren's syndrome with inflammatory arthritis (H24)  Chronic stable        - No identified additional risk factors other than previously addressed    Antiplatelet or Anticoagulation Medication Instructions:   - aspirin: Discontinue aspirin 7-10 days prior to procedure to reduce bleeding risk. It should be resumed postoperatively.     Additional Medication Instructions:  Patient is to take all scheduled medications on the day of surgery    RECOMMENDATION:  APPROVAL GIVEN to proceed with proposed procedure, without further diagnostic evaluation.        Subjective       HPI related to upcoming procedure: ESOPHAGOGASTRODUODENOSCOPY, WITH BIOPSY           11/9/2023     3:58 PM    Preop Questions   1. Have you ever had a heart attack or stroke? YES - minor, no stent placement   2. Have you ever had surgery on your heart or blood vessels, such as a stent placement, a coronary artery bypass, or surgery on an artery in your head, neck, heart, or legs? No   3. Do you have chest pain with activity? No   4. Do you have a history of  heart failure? No   5. Do you currently have a cold, bronchitis or symptoms of other infection? No   6. Do you have a cough, shortness of breath, or wheezing? YES - chronic cough   7. Do you or anyone in your family have previous history of blood clots? No   8. Do you or does anyone in your family have a serious bleeding problem such as prolonged bleeding following surgeries or cuts? No   9. Have you ever had problems with anemia or been told to take iron pills? No   10. Have you had any abnormal blood loss such as black, tarry or bloody stools, or abnormal vaginal bleeding? No   11. Have you ever had a blood transfusion? No   12. Are you willing to have a blood transfusion if it is medically needed before, during, or after your surgery? Yes   13. Have you or any of your relatives ever had problems with anesthesia? YES - naive    14. Do you have sleep apnea, excessive snoring or daytime drowsiness? No   15. Do you have any artifical heart valves or other implanted medical devices like a pacemaker, defibrillator, or continuous glucose monitor? No   16. Do you have artificial joints? YES - right knee and both hips   17. Are you allergic to latex? No     Health Care Directive:  Patient has a Health Care Directive on file      Preoperative Review of :   reviewed - no record of controlled substances prescribed.      BP Readings from Last 6 Encounters:   11/09/23 (!) 169/85   11/01/23 (!) 174/91   10/18/23 (!) 155/83   09/20/23 124/75   09/15/23 134/77   08/18/23 106/74        Review of Systems  CONSTITUTIONAL: NEGATIVE for fever, chills, change in  "weight  INTEGUMENTARY/SKIN: NEGATIVE for worrisome rashes, moles or lesions  EYES: NEGATIVE for vision changes or irritation  ENT/MOUTH: NEGATIVE for ear, mouth and throat problems  RESP: NEGATIVE for significant cough or SOB  CV: NEGATIVE for chest pain, palpitations or peripheral edema  GI: NEGATIVE for nausea, abdominal pain, heartburn, or change in bowel habits  : NEGATIVE for frequency, dysuria, or hematuria  MUSCULOSKELETAL: NEGATIVE for significant arthralgias or myalgia  NEURO: NEGATIVE for weakness, dizziness or paresthesias  ENDOCRINE: NEGATIVE for temperature intolerance, skin/hair changes  HEME: NEGATIVE for bleeding problems  PSYCHIATRIC: NEGATIVE for changes in mood or affect    Patient Active Problem List    Diagnosis Date Noted    Dysphagia, unspecified type 11/08/2023     Priority: Medium    Pelvic hematoma in female 09/19/2023     Priority: Medium    Coronary artery disease due to lipid rich plaque 06/30/2022     Priority: Medium    Dyslipidemia 06/30/2022     Priority: Medium    Idiopathic osteoporosis 05/02/2022     Priority: Medium    History of drug therapy 05/02/2022     Priority: Medium    Elevated glucose 03/18/2022     Priority: Medium    Severe osteopetrosis 03/18/2022     Priority: Medium    Ascending aorta dilation (H24) 01/06/2022     Priority: Medium     Echo needed yearly      Other osteoarthritis involving multiple joints 01/06/2022     Priority: Medium    Dry mouth 01/06/2022     Priority: Medium    Status post total replacement of right hip 08/04/2020     Priority: Medium    S/P lumbar fusion 10/31/2018     Priority: Medium    Complication of anesthesia      Priority: Medium     \"hard time waking up / N & V\"      Hypertension goal BP (blood pressure) < 140/90 02/20/2018     Priority: Medium    Epigastric pain 10/19/2017     Priority: Medium    MGUS (monoclonal gammopathy of unknown significance) 01/14/2017     Priority: Medium      Would just repeat her SPEP and serum free light " "chains every 1-2 years.  Please feel free to refer her back to me if labs are trending up.   See phone message 1/11/2021 with Hematology    From staff message end of January 2021:  Light chains went up a bit, but still relatively low, and everything else looks fine (hemoglobin, creatinine, M-spike, calcium).  I'd continue to monitor it as MGUS.  Would just continue doing annual labs as you are, and please feel free to reach out with questions anytime.         Monoclonal paraproteinemia 01/12/2017     Priority: Medium    Macrocytosis 12/01/2016     Priority: Medium    ASCVD (arteriosclerotic cardiovascular disease) 03/09/2016     Priority: Medium    Primary osteoarthritis of both hands 09/22/2015     Priority: Medium    Gastroesophageal reflux disease, unspecified whether esophagitis present 05/20/2015     Priority: Medium    Senile osteoporosis 08/12/2014     Priority: Medium     started alendronate Aug 2014      Status post-operative repair of hip fracture, RIGHT 04/01/2014     Priority: Medium    Impaired fasting glucose 12/09/2012     Priority: Medium    Chronic cough 09/10/2012     Priority: Medium    HL (hearing loss) 12/01/2011     Priority: Medium    Mixed incontinence 12/01/2011     Priority: Medium    Sjogren's disease (H24) 06/02/2011     Priority: Medium    Hyperlipidemia LDL goal <70 10/31/2010     Priority: Medium    Bile reflux gastritis 04/15/2009     Priority: Medium    Grave's disease 09/08/2008     Priority: Medium     IMO update changed this record. Please review for accuracy      Hypothyroidism 11/01/2004     Priority: Medium     Problem list name updated by automated process. Provider to review        Past Medical History:   Diagnosis Date    Arthritis     Cerebral infarction (H) 2019    Complication of anesthesia     \"hard time waking up / N & V\"    Coronary artery disease     Disease of thyroid gland     Displacement of lumbar intervertebral disc without myelopathy     Gastro-oesophageal " reflux disease     GERD (gastroesophageal reflux disease)     Hearing loss     has bilateral aids    Heart attack (H) 03/2016    History of anesthesia complications     delayed emergence    History of angina     Hypertension     Low back pain     Migraine headache     Myocardial infarction (H)     Numbness and tingling     down right leg (associated with back pain)    PONV (postoperative nausea and vomiting)     Sicca syndrome (H24)     Sjogren's disease (H24)     Sjogren's syndrome (H24)     sees specialist; dentist    Spider veins     Unspecified hypothyroidism      Past Surgical History:   Procedure Laterality Date    ARTHROPLASTY HIP ANTERIOR Left 10/14/2022    Procedure: LEFT TOTAL HIP ARTHROPLASTY DIRECT ANTERIOR APPROACH;  Surgeon: Selvin Saab MD;  Location:  OR    ARTHROSCOPY KNEE  10/05/2012    R Procedure: ARTHROSCOPY KNEE;  RIGHT KNEE ARTHROSCOPY, PARTIAL MEDIAL MENISCECTOMY;  Surgeon: Karli Zaragoza MD;  Location: [facility]    BACK SURGERY  About. 5 years ago    Fusion of 4&5 lumbar    BLADDER SURGERY      BUNIONECTOMY  ~2010    L foot.     CATARACT IOL, RT/LT Bilateral 07/2017    COLONOSCOPY N/A 01/17/2017    normal; no repeat Procedure: COLONOSCOPY;  Surgeon: Fan Giordano MD;  Location:  GI    ENDOSCOPIC RELEASE CARPAL TUNNEL  09/11/2012    R Procedure: ENDOSCOPIC RELEASE CARPAL TUNNEL;  Right Endoscopic carpal tunnel release, left ringer finger cortizon injection;  Surgeon: Anne Marie Catherine MD;  Location:  OR    ESOPHAGOSCOPY, GASTROSCOPY, DUODENOSCOPY (EGD), COMBINED N/A 12/26/2014    Procedure: COMBINED ESOPHAGOSCOPY, GASTROSCOPY, DUODENOSCOPY (EGD);  Surgeon: Fan Giordano MD;  Location:  GI    EXCISE EXOSTOSIS FOOT Left 12/01/2016    Procedure: EXCISE EXOSTOSIS FOOT;  Surgeon: Jody Gallagher, DPM, Pod;  Location:  OR    GYN SURGERY  1978    ovaries removed    HEART CATH FYI  03/04/2016    dz <40%    HIP HARDWARE REMOVAL Right 08/04/2020    Procedure: HARDWARE REMOVAL -  LATERAL APPROACH;  Surgeon: Charlie Wolfe MD;  Location: [facility];  Service: Orthopedics    HYSTERECTOMY      INJECT STEROID (LOCATION)  09/11/2012    Procedure: INJECT STEROID (LOCATION);;  Surgeon: Anne Marie Catherine MD;  Location:  OR    LAPAROSCOPIC CHOLECYSTECTOMY N/A 04/07/2016    Procedure: LAPAROSCOPIC CHOLECYSTECTOMY;  Surgeon: Hans Medina MD;  Location:  OR    OPTICAL TRACKING SYSTEM FUSION SPINE POSTERIOR LUMBAR TWO LEVELS N/A 10/31/2018    Procedure: Central L3-4 L4-5 lamenectomies L4-5 posterior instrumented fusion;  Surgeon: Aroldo Burdick MD;  Location:  OR    ORTHOPEDIC SURGERY Right 2014    karli for fx femur    RECONSTRUCT FOREFOOT WITH METATARSOPHALANGEAL (MTP) FUSION Left 04/28/2017    Procedure: RECONSTRUCT FOREFOOT WITH METATARSOPHALANGEAL (MTP) FUSION;  1. Resection arthroplasty, fifth metatarsophalangeal joint, left foot.   2. Irrigation and debridement of fifth metatarsophalangeal joint, left foot (excisional to the level of the bone).     ;  Surgeon: Fabián Phipps MD;  Location:  OR    RELEASE CARPAL TUNNEL      RIGHT HIP ORIF 4/1/2014      ROTATOR CUFF REPAIR RT/LT  ~1998    Lt shoulder; rotator cuff    SURGICAL HISTORY OF -   distant past    ablation for palpitations.    SURGICAL HISTORY OF -   06/2015    left carpal tunnel surgery    [facility] NONSPECIFIC PROCEDURE  1976    D&C    [facility] TOTAL HIP ARTHROPLASTY Right 08/04/2020    Procedure: RIGHT TOTAL HIP ARTHROPLASTY;  Surgeon: Charlie Wolfe MD;  Location: [facility];  Service: Orthopedics    [facility] TOTAL KNEE ARTHROPLASTY Right 01/2020     Current Outpatient Medications   Medication Sig Dispense Refill    acetaminophen (TYLENOL) 500 MG tablet Take 1,000 mg by mouth every 8 hours as needed for mild pain      Boswellia-Glucosamine-Vit D (OSTEO BI-FLEX ONE PER DAY PO) Take 1 capsule by mouth daily      Calcium Carb-Cholecalciferol (SM CALCIUM/VITAMIN D) 600-800 MG-UNIT TABS Take 1 tablet by mouth 2 times  daily      Carboxymethylcellulose Sodium (REFRESH LIQUIGEL OP) Place 1 drop into both eyes every hour as needed (dry eyes)      cevimeline (EVOXAC) 30 MG capsule Take 30 mg by mouth 3 times daily      Coenzyme Q10 300 MG CAPS Take 1 capsule by mouth daily      ELDERBERRY PO Take 1 tablet by mouth daily      EPINEPHrine (ANY BX GENERIC EQUIV) 0.3 MG/0.3ML injection 2-pack Inject 0.3 mLs (0.3 mg) into the muscle as needed for anaphylaxis 2 each 0    hydroxychloroquine (PLAQUENIL) 200 MG tablet Take 300 mg by mouth daily (with lunch)      ipratropium (ATROVENT) 0.03 % nasal spray Spray 2 sprays into both nostrils 3 times daily as needed for rhinitis (runny nose) 30 mL 3    LANsoprazole (PREVACID) 30 MG DR capsule TAKE 1 CAPSULE BY MOUTH IN THE  MORNING 90 capsule 3    levothyroxine (SYNTHROID/LEVOTHROID) 88 MCG tablet Take 1 tablet (88 mcg) by mouth daily 90 tablet 1    Lutein 20 MG CAPS Take 1 capsule by mouth daily      metoprolol succinate ER (TOPROL XL) 50 MG 24 hr tablet Take 1 tablet (50 mg) by mouth daily 90 tablet 1    nitroGLYcerin (NITROSTAT) 0.4 MG sublingual tablet Place 1 tablet (0.4 mg) under the tongue every 5 minutes as needed for chest pain 25 tablet 0    Omega-3 Fatty Acids (OMEGA-3 FISH OIL PO) Take 1 g by mouth 2 times daily (with meals)       polyethylene glycol (MIRALAX) 17 GM/Dose powder Take 1 capful by mouth 2 times daily In 8 ounces of liquid      potassium chloride ER (KLOR-CON M) 10 MEQ CR tablet Take 1 tablet (10 mEq) by mouth as needed for potassium supplementation (take in combination with lasix) 30 tablet 11    Vitamin D (Cholecalciferol) 50 MCG (2000 UT) CAPS Take 1 capsule by mouth 2 times daily         Allergies   Allergen Reactions    Morphine And Related      fatigue    Vicodin [Acetaminophen] Fatigue    Adhesive Tape Rash        Social History     Tobacco Use    Smoking status: Never    Smokeless tobacco: Never   Substance Use Topics    Alcohol use: Yes     Comment: Maybe 1x amonth  "    Family History   Problem Relation Age of Onset    C.A.D. Mother 76    Cancer Mother         non Hodgekin's Lymphoma    Heart Disease Mother         enlarged heart    Breast Cancer Mother     C.A.D. Father 59    Heart Disease Father         valve problem    Hypertension Brother     Neuropathy Brother     Heart Disease Brother         open heart to repair mitral valve.    Connective Tissue Disorder Daughter         scleroderma    Colon Cancer No family hx of      History   Drug Use No         Objective     BP (!) 169/85 (BP Location: Right arm, Patient Position: Sitting, Cuff Size: Adult Regular)   Pulse (!) 48   Temp 97.5  F (36.4  C) (Oral)   Resp 12   Ht 1.626 m (5' 4\")   Wt 66.2 kg (146 lb)   LMP  (LMP Unknown)   SpO2 100%   BMI 25.06 kg/m      Physical Exam    GENERAL APPEARANCE: healthy, alert and no distress     EYES: EOMI, PERRL     HENT: ear canals and TM's normal and nose and mouth without ulcers or lesions     NECK: no adenopathy, no asymmetry, masses, or scars and thyroid normal to palpation     RESP: lungs clear to auscultation - no rales, rhonchi or wheezes     CV: regular rates and rhythm, normal S1 S2, no S3 or S4 and no murmur, click or rub     MS: extremities normal- no gross deformities noted, no evidence of inflammation in joints, FROM in all extremities.     SKIN: no suspicious lesions or rashes     NEURO: Normal strength and tone, sensory exam grossly normal, mentation intact and speech normal     PSYCH: mentation appears normal. and affect normal/bright     LYMPHATICS: No cervical adenopathy    Recent Labs   Lab Test 10/18/23  1021 09/20/23  0719 09/19/23  2021 09/19/23  1707 06/15/23  1625 02/16/23  0911 04/27/22  0445 03/18/22  1215   HGB 12.3 10.0*   < > 10.4*   < > 12.8   < >  --     244  --  245   < > 299   < >  --      --   --  134*   < > 138   < >  --    POTASSIUM 4.4  --   --  4.8   < > 4.6   < >  --    CR 0.84  --   --  0.93   < > 0.76   < >  --    A1C  --   --  "  --   --   --  6.2*  --  6.1*    < > = values in this interval not displayed.        Diagnostics:  Labs pending at this time.  Results will be reviewed when available.   EKG: sinus bradycardia, normal axis, normal intervals, no acute ST/T changes c/w ischemia, no LVH by voltage criteria    Revised Cardiac Risk Index (RCRI):  The patient has the following serious cardiovascular risks for perioperative complications:   - Coronary Artery Disease (MI, positive stress test, angina, Qs on EKG) = 1 point     RCRI Interpretation: 1 point: Class II (low risk - 0.9% complication rate)         Signed Electronically by: SINTIA Vásquez CNP  Copy of this evaluation report is provided to requesting physician.

## 2023-11-09 NOTE — H&P (VIEW-ONLY)
[facility]  [address] [facility] [address]  Phone: [phone]  Primary Provider: Devi Romano  Pre-op Performing Provider: NEERU SAM      PREOPERATIVE EVALUATION:  Today's date: 11/9/2023    Ya is a 83 year old female who presents for a preoperative evaluation.      11/9/2023     3:57 PM   Additional Questions   Roomed by Saira DAHL       Surgical Information:  Surgery/Procedure: ESOPHAGOGASTRODUODENOSCOPY, WITH BIOPSY   [place] [facility] [place]geon: Partha Cooper DO  Surgery Date: 11/28/23  Time of Surgery: afternoon  Where patient plans to recover: At home with family  Fax number for surgical facility: Note does not need to be faxed, will be available electronically in Epic.    Assessment & Plan     The proposed surgical procedure is considered LOW risk.    Preop general physical exam  Chronic cough  Bile reflux gastritis  Planned procedure     - Basic metabolic panel  (Ca, Cl, CO2, Creat, Gluc, K, Na, BUN); Future  - CBC with platelets; Future  - EKG 12-lead complete w/read - Clinics      Hypertension goal BP (blood pressure) < 140/90  Chronic unstable, recently increased dosing with PCP.    Grave's disease  ASCVD (arteriosclerotic cardiovascular disease)  Sjogren's syndrome with inflammatory arthritis (H24)  Chronic stable        - No identified additional risk factors other than previously addressed    Antiplatelet or Anticoagulation Medication Instructions:   - aspirin: Discontinue aspirin 7-10 days prior to procedure to reduce bleeding risk. It should be resumed postoperatively.     Additional Medication Instructions:  Patient is to take all scheduled medications on the day of surgery    RECOMMENDATION:  APPROVAL GIVEN to proceed with proposed procedure, without further diagnostic evaluation.        Subjective       HPI related to upcoming procedure: ESOPHAGOGASTRODUODENOSCOPY, WITH BIOPSY           11/9/2023     3:58 PM    Preop Questions   1. Have you ever had a heart attack or stroke? YES - minor, no stent placement   2. Have you ever had surgery on your heart or blood vessels, such as a stent placement, a coronary artery bypass, or surgery on an artery in your head, neck, heart, or legs? No   3. Do you have chest pain with activity? No   4. Do you have a history of  heart failure? No   5. Do you currently have a cold, bronchitis or symptoms of other infection? No   6. Do you have a cough, shortness of breath, or wheezing? YES - chronic cough   7. Do you or anyone in your family have previous history of blood clots? No   8. Do you or does anyone in your family have a serious bleeding problem such as prolonged bleeding following surgeries or cuts? No   9. Have you ever had problems with anemia or been told to take iron pills? No   10. Have you had any abnormal blood loss such as black, tarry or bloody stools, or abnormal vaginal bleeding? No   11. Have you ever had a blood transfusion? No   12. Are you willing to have a blood transfusion if it is medically needed before, during, or after your surgery? Yes   13. Have you or any of your relatives ever had problems with anesthesia? YES - naive    14. Do you have sleep apnea, excessive snoring or daytime drowsiness? No   15. Do you have any artifical heart valves or other implanted medical devices like a pacemaker, defibrillator, or continuous glucose monitor? No   16. Do you have artificial joints? YES - right knee and both hips   17. Are you allergic to latex? No     Health Care Directive:  Patient has a Health Care Directive on file      Preoperative Review of :   reviewed - no record of controlled substances prescribed.      BP Readings from Last 6 Encounters:   11/09/23 (!) 169/85   11/01/23 (!) 174/91   10/18/23 (!) 155/83   09/20/23 124/75   09/15/23 134/77   08/18/23 106/74        Review of Systems  CONSTITUTIONAL: NEGATIVE for fever, chills, change in  "weight  INTEGUMENTARY/SKIN: NEGATIVE for worrisome rashes, moles or lesions  EYES: NEGATIVE for vision changes or irritation  ENT/MOUTH: NEGATIVE for ear, mouth and throat problems  RESP: NEGATIVE for significant cough or SOB  CV: NEGATIVE for chest pain, palpitations or peripheral edema  GI: NEGATIVE for nausea, abdominal pain, heartburn, or change in bowel habits  : NEGATIVE for frequency, dysuria, or hematuria  MUSCULOSKELETAL: NEGATIVE for significant arthralgias or myalgia  NEURO: NEGATIVE for weakness, dizziness or paresthesias  ENDOCRINE: NEGATIVE for temperature intolerance, skin/hair changes  HEME: NEGATIVE for bleeding problems  PSYCHIATRIC: NEGATIVE for changes in mood or affect    Patient Active Problem List    Diagnosis Date Noted    Dysphagia, unspecified type 11/08/2023     Priority: Medium    Pelvic hematoma in female 09/19/2023     Priority: Medium    Coronary artery disease due to lipid rich plaque 06/30/2022     Priority: Medium    Dyslipidemia 06/30/2022     Priority: Medium    Idiopathic osteoporosis 05/02/2022     Priority: Medium    History of drug therapy 05/02/2022     Priority: Medium    Elevated glucose 03/18/2022     Priority: Medium    Severe osteopetrosis 03/18/2022     Priority: Medium    Ascending aorta dilation (H24) 01/06/2022     Priority: Medium     Echo needed yearly      Other osteoarthritis involving multiple joints 01/06/2022     Priority: Medium    Dry mouth 01/06/2022     Priority: Medium    Status post total replacement of right hip 08/04/2020     Priority: Medium    S/P lumbar fusion 10/31/2018     Priority: Medium    Complication of anesthesia      Priority: Medium     \"hard time waking up / N & V\"      Hypertension goal BP (blood pressure) < 140/90 02/20/2018     Priority: Medium    Epigastric pain 10/19/2017     Priority: Medium    MGUS (monoclonal gammopathy of unknown significance) 01/14/2017     Priority: Medium      Would just repeat her SPEP and serum free light " "chains every 1-2 years.  Please feel free to refer her back to me if labs are trending up.   See phone message 1/11/2021 with Hematology    From staff message end of January 2021:  Light chains went up a bit, but still relatively low, and everything else looks fine (hemoglobin, creatinine, M-spike, calcium).  I'd continue to monitor it as MGUS.  Would just continue doing annual labs as you are, and please feel free to reach out with questions anytime.         Monoclonal paraproteinemia 01/12/2017     Priority: Medium    Macrocytosis 12/01/2016     Priority: Medium    ASCVD (arteriosclerotic cardiovascular disease) 03/09/2016     Priority: Medium    Primary osteoarthritis of both hands 09/22/2015     Priority: Medium    Gastroesophageal reflux disease, unspecified whether esophagitis present 05/20/2015     Priority: Medium    Senile osteoporosis 08/12/2014     Priority: Medium     started alendronate Aug 2014      Status post-operative repair of hip fracture, RIGHT 04/01/2014     Priority: Medium    Impaired fasting glucose 12/09/2012     Priority: Medium    Chronic cough 09/10/2012     Priority: Medium    HL (hearing loss) 12/01/2011     Priority: Medium    Mixed incontinence 12/01/2011     Priority: Medium    Sjogren's disease (H24) 06/02/2011     Priority: Medium    Hyperlipidemia LDL goal <70 10/31/2010     Priority: Medium    Bile reflux gastritis 04/15/2009     Priority: Medium    Grave's disease 09/08/2008     Priority: Medium     IMO update changed this record. Please review for accuracy      Hypothyroidism 11/01/2004     Priority: Medium     Problem list name updated by automated process. Provider to review        Past Medical History:   Diagnosis Date    Arthritis     Cerebral infarction (H) 2019    Complication of anesthesia     \"hard time waking up / N & V\"    Coronary artery disease     Disease of thyroid gland     Displacement of lumbar intervertebral disc without myelopathy     Gastro-oesophageal " reflux disease     GERD (gastroesophageal reflux disease)     Hearing loss     has bilateral aids    Heart attack (H) 03/2016    History of anesthesia complications     delayed emergence    History of angina     Hypertension     Low back pain     Migraine headache     Myocardial infarction (H)     Numbness and tingling     down right leg (associated with back pain)    PONV (postoperative nausea and vomiting)     Sicca syndrome (H24)     Sjogren's disease (H24)     Sjogren's syndrome (H24)     sees specialist; dentist    Spider veins     Unspecified hypothyroidism      Past Surgical History:   Procedure Laterality Date    ARTHROPLASTY HIP ANTERIOR Left 10/14/2022    Procedure: LEFT TOTAL HIP ARTHROPLASTY DIRECT ANTERIOR APPROACH;  Surgeon: Selvin Saab MD;  Location:  OR    ARTHROSCOPY KNEE  10/05/2012    R Procedure: ARTHROSCOPY KNEE;  RIGHT KNEE ARTHROSCOPY, PARTIAL MEDIAL MENISCECTOMY;  Surgeon: Karli Zaragoza MD;  Location: [facility]    BACK SURGERY  About. 5 years ago    Fusion of 4&5 lumbar    BLADDER SURGERY      BUNIONECTOMY  ~2010    L foot.     CATARACT IOL, RT/LT Bilateral 07/2017    COLONOSCOPY N/A 01/17/2017    normal; no repeat Procedure: COLONOSCOPY;  Surgeon: Fan Giordano MD;  Location:  GI    ENDOSCOPIC RELEASE CARPAL TUNNEL  09/11/2012    R Procedure: ENDOSCOPIC RELEASE CARPAL TUNNEL;  Right Endoscopic carpal tunnel release, left ringer finger cortizon injection;  Surgeon: Anne Marie Catherine MD;  Location:  OR    ESOPHAGOSCOPY, GASTROSCOPY, DUODENOSCOPY (EGD), COMBINED N/A 12/26/2014    Procedure: COMBINED ESOPHAGOSCOPY, GASTROSCOPY, DUODENOSCOPY (EGD);  Surgeon: Fan Giordano MD;  Location:  GI    EXCISE EXOSTOSIS FOOT Left 12/01/2016    Procedure: EXCISE EXOSTOSIS FOOT;  Surgeon: Jody Gallagher, DPM, Pod;  Location:  OR    GYN SURGERY  1978    ovaries removed    HEART CATH FYI  03/04/2016    dz <40%    HIP HARDWARE REMOVAL Right 08/04/2020    Procedure: HARDWARE REMOVAL -  LATERAL APPROACH;  Surgeon: Charlie Wolfe MD;  Location: [facility];  Service: Orthopedics    HYSTERECTOMY      INJECT STEROID (LOCATION)  09/11/2012    Procedure: INJECT STEROID (LOCATION);;  Surgeon: Anne Marie Catherine MD;  Location:  OR    LAPAROSCOPIC CHOLECYSTECTOMY N/A 04/07/2016    Procedure: LAPAROSCOPIC CHOLECYSTECTOMY;  Surgeon: Hans Medina MD;  Location:  OR    OPTICAL TRACKING SYSTEM FUSION SPINE POSTERIOR LUMBAR TWO LEVELS N/A 10/31/2018    Procedure: Central L3-4 L4-5 lamenectomies L4-5 posterior instrumented fusion;  Surgeon: Aroldo Burdick MD;  Location:  OR    ORTHOPEDIC SURGERY Right 2014    karli for fx femur    RECONSTRUCT FOREFOOT WITH METATARSOPHALANGEAL (MTP) FUSION Left 04/28/2017    Procedure: RECONSTRUCT FOREFOOT WITH METATARSOPHALANGEAL (MTP) FUSION;  1. Resection arthroplasty, fifth metatarsophalangeal joint, left foot.   2. Irrigation and debridement of fifth metatarsophalangeal joint, left foot (excisional to the level of the bone).     ;  Surgeon: Fabián Phipps MD;  Location:  OR    RELEASE CARPAL TUNNEL      RIGHT HIP ORIF 4/1/2014      ROTATOR CUFF REPAIR RT/LT  ~1998    Lt shoulder; rotator cuff    SURGICAL HISTORY OF -   distant past    ablation for palpitations.    SURGICAL HISTORY OF -   06/2015    left carpal tunnel surgery    [facility] NONSPECIFIC PROCEDURE  1976    D&C    [facility] TOTAL HIP ARTHROPLASTY Right 08/04/2020    Procedure: RIGHT TOTAL HIP ARTHROPLASTY;  Surgeon: Charlie Wolfe MD;  Location: [facility];  Service: Orthopedics    [facility] TOTAL KNEE ARTHROPLASTY Right 01/2020     Current Outpatient Medications   Medication Sig Dispense Refill    acetaminophen (TYLENOL) 500 MG tablet Take 1,000 mg by mouth every 8 hours as needed for mild pain      Boswellia-Glucosamine-Vit D (OSTEO BI-FLEX ONE PER DAY PO) Take 1 capsule by mouth daily      Calcium Carb-Cholecalciferol (SM CALCIUM/VITAMIN D) 600-800 MG-UNIT TABS Take 1 tablet by mouth 2 times  daily      Carboxymethylcellulose Sodium (REFRESH LIQUIGEL OP) Place 1 drop into both eyes every hour as needed (dry eyes)      cevimeline (EVOXAC) 30 MG capsule Take 30 mg by mouth 3 times daily      Coenzyme Q10 300 MG CAPS Take 1 capsule by mouth daily      ELDERBERRY PO Take 1 tablet by mouth daily      EPINEPHrine (ANY BX GENERIC EQUIV) 0.3 MG/0.3ML injection 2-pack Inject 0.3 mLs (0.3 mg) into the muscle as needed for anaphylaxis 2 each 0    hydroxychloroquine (PLAQUENIL) 200 MG tablet Take 300 mg by mouth daily (with lunch)      ipratropium (ATROVENT) 0.03 % nasal spray Spray 2 sprays into both nostrils 3 times daily as needed for rhinitis (runny nose) 30 mL 3    LANsoprazole (PREVACID) 30 MG DR capsule TAKE 1 CAPSULE BY MOUTH IN THE  MORNING 90 capsule 3    levothyroxine (SYNTHROID/LEVOTHROID) 88 MCG tablet Take 1 tablet (88 mcg) by mouth daily 90 tablet 1    Lutein 20 MG CAPS Take 1 capsule by mouth daily      metoprolol succinate ER (TOPROL XL) 50 MG 24 hr tablet Take 1 tablet (50 mg) by mouth daily 90 tablet 1    nitroGLYcerin (NITROSTAT) 0.4 MG sublingual tablet Place 1 tablet (0.4 mg) under the tongue every 5 minutes as needed for chest pain 25 tablet 0    Omega-3 Fatty Acids (OMEGA-3 FISH OIL PO) Take 1 g by mouth 2 times daily (with meals)       polyethylene glycol (MIRALAX) 17 GM/Dose powder Take 1 capful by mouth 2 times daily In 8 ounces of liquid      potassium chloride ER (KLOR-CON M) 10 MEQ CR tablet Take 1 tablet (10 mEq) by mouth as needed for potassium supplementation (take in combination with lasix) 30 tablet 11    Vitamin D (Cholecalciferol) 50 MCG (2000 UT) CAPS Take 1 capsule by mouth 2 times daily         Allergies   Allergen Reactions    Morphine And Related      fatigue    Vicodin [Acetaminophen] Fatigue    Adhesive Tape Rash        Social History     Tobacco Use    Smoking status: Never    Smokeless tobacco: Never   Substance Use Topics    Alcohol use: Yes     Comment: Maybe 1x amonth  "    Family History   Problem Relation Age of Onset    C.A.D. Mother 76    Cancer Mother         non Hodgekin's Lymphoma    Heart Disease Mother         enlarged heart    Breast Cancer Mother     C.A.D. Father 59    Heart Disease Father         valve problem    Hypertension Brother     Neuropathy Brother     Heart Disease Brother         open heart to repair mitral valve.    Connective Tissue Disorder Daughter         scleroderma    Colon Cancer No family hx of      History   Drug Use No         Objective     BP (!) 169/85 (BP Location: Right arm, Patient Position: Sitting, Cuff Size: Adult Regular)   Pulse (!) 48   Temp 97.5  F (36.4  C) (Oral)   Resp 12   Ht 1.626 m (5' 4\")   Wt 66.2 kg (146 lb)   LMP  (LMP Unknown)   SpO2 100%   BMI 25.06 kg/m      Physical Exam    GENERAL APPEARANCE: healthy, alert and no distress     EYES: EOMI, PERRL     HENT: ear canals and TM's normal and nose and mouth without ulcers or lesions     NECK: no adenopathy, no asymmetry, masses, or scars and thyroid normal to palpation     RESP: lungs clear to auscultation - no rales, rhonchi or wheezes     CV: regular rates and rhythm, normal S1 S2, no S3 or S4 and no murmur, click or rub     MS: extremities normal- no gross deformities noted, no evidence of inflammation in joints, FROM in all extremities.     SKIN: no suspicious lesions or rashes     NEURO: Normal strength and tone, sensory exam grossly normal, mentation intact and speech normal     PSYCH: mentation appears normal. and affect normal/bright     LYMPHATICS: No cervical adenopathy    Recent Labs   Lab Test 10/18/23  1021 09/20/23  0719 09/19/23  2021 09/19/23  1707 06/15/23  1625 02/16/23  0911 04/27/22  0445 03/18/22  1215   HGB 12.3 10.0*   < > 10.4*   < > 12.8   < >  --     244  --  245   < > 299   < >  --      --   --  134*   < > 138   < >  --    POTASSIUM 4.4  --   --  4.8   < > 4.6   < >  --    CR 0.84  --   --  0.93   < > 0.76   < >  --    A1C  --   --  "  --   --   --  6.2*  --  6.1*    < > = values in this interval not displayed.        Diagnostics:  Labs pending at this time.  Results will be reviewed when available.   EKG: sinus bradycardia, normal axis, normal intervals, no acute ST/T changes c/w ischemia, no LVH by voltage criteria    Revised Cardiac Risk Index (RCRI):  The patient has the following serious cardiovascular risks for perioperative complications:   - Coronary Artery Disease (MI, positive stress test, angina, Qs on EKG) = 1 point     RCRI Interpretation: 1 point: Class II (low risk - 0.9% complication rate)         Signed Electronically by: SINTIA Vásquez CNP  Copy of this evaluation report is provided to requesting physician.

## 2023-11-09 NOTE — TELEPHONE ENCOUNTER
Spoke with patient today regarding surgery scheduling     Went over details/instructions.    Surgery Letter sent via [employer]  (Please see LETTERS TAB in chart to retrieve a copy of this letter)

## 2023-11-10 LAB
ANION GAP SERPL CALCULATED.3IONS-SCNC: 10 MMOL/L (ref 7–15)
BUN SERPL-MCNC: 25.2 MG/DL (ref 8–23)
CALCIUM SERPL-MCNC: 9.6 MG/DL (ref 8.8–10.2)
CHLORIDE SERPL-SCNC: 102 MMOL/L (ref 98–107)
CREAT SERPL-MCNC: 0.79 MG/DL (ref 0.51–0.95)
DEPRECATED HCO3 PLAS-SCNC: 27 MMOL/L (ref 22–29)
EGFRCR SERPLBLD CKD-EPI 2021: 74 ML/MIN/1.73M2
GLUCOSE SERPL-MCNC: 94 MG/DL (ref 70–99)
POTASSIUM SERPL-SCNC: 4.5 MMOL/L (ref 3.4–5.3)
SODIUM SERPL-SCNC: 139 MMOL/L (ref 135–145)

## 2023-11-13 ENCOUNTER — TELEPHONE (OUTPATIENT)
Dept: ENDOCRINOLOGY | Facility: CLINIC | Age: 83
End: 2023-11-13
Payer: COMMERCIAL

## 2023-11-13 DIAGNOSIS — M81.0 SENILE OSTEOPOROSIS: Primary | ICD-10-CM

## 2023-11-13 NOTE — TELEPHONE ENCOUNTER
Looks like orders are placed by Anika.  Anything else needed?  If not then please close encounter.

## 2023-11-13 NOTE — TELEPHONE ENCOUNTER
[facility] Call Center    Phone Message    May a detailed message be left on voicemail: yes     Reason for Call: Other: Caller needs lab orders  placed to check levels before prolia injection  scheduled 11/29/2023     Action Taken: Other: ENDO    Travel Screening: Not Applicable

## 2023-11-13 NOTE — TELEPHONE ENCOUNTER
"Patient does not have a current order for Prolia. It appears the order was \"completed\" at the time writer gave the last injection in May. Order pended.    Lab appointment scheduled 11/21/23    Prolia appointment scheduled 11/29/23    Will need to route to CAM team once order is entered.   "

## 2023-11-20 ENCOUNTER — OFFICE VISIT (OUTPATIENT)
Dept: PHARMACY | Facility: CLINIC | Age: 83
End: 2023-11-20
Attending: FAMILY MEDICINE
Payer: COMMERCIAL

## 2023-11-20 ENCOUNTER — HOSPITAL ENCOUNTER (OUTPATIENT)
Dept: GENERAL RADIOLOGY | Facility: CLINIC | Age: 83
Discharge: HOME | End: 2023-11-20
Attending: SURGERY | Admitting: SURGERY
Payer: COMMERCIAL

## 2023-11-20 VITALS
SYSTOLIC BLOOD PRESSURE: 161 MMHG | HEART RATE: 48 BPM | DIASTOLIC BLOOD PRESSURE: 86 MMHG | WEIGHT: 143.1 LBS | BODY MASS INDEX: 24.56 KG/M2 | OXYGEN SATURATION: 97 %

## 2023-11-20 DIAGNOSIS — R68.2 DRY MOUTH: ICD-10-CM

## 2023-11-20 DIAGNOSIS — I10 HYPERTENSION GOAL BP (BLOOD PRESSURE) < 140/90: ICD-10-CM

## 2023-11-20 DIAGNOSIS — K21.9 GASTROESOPHAGEAL REFLUX DISEASE, UNSPECIFIED WHETHER ESOPHAGITIS PRESENT: ICD-10-CM

## 2023-11-20 DIAGNOSIS — N39.46 MIXED INCONTINENCE: ICD-10-CM

## 2023-11-20 DIAGNOSIS — R60.9 EDEMA, UNSPECIFIED TYPE: ICD-10-CM

## 2023-11-20 DIAGNOSIS — M15.8 OTHER OSTEOARTHRITIS INVOLVING MULTIPLE JOINTS: ICD-10-CM

## 2023-11-20 DIAGNOSIS — Z78.9 TAKES DIETARY SUPPLEMENTS: ICD-10-CM

## 2023-11-20 DIAGNOSIS — I25.10 ASCVD (ARTERIOSCLEROTIC CARDIOVASCULAR DISEASE): ICD-10-CM

## 2023-11-20 DIAGNOSIS — H04.123 DRY EYES: ICD-10-CM

## 2023-11-20 DIAGNOSIS — E78.5 HYPERLIPIDEMIA LDL GOAL <70: Primary | ICD-10-CM

## 2023-11-20 DIAGNOSIS — E03.9 HYPOTHYROIDISM, UNSPECIFIED TYPE: ICD-10-CM

## 2023-11-20 DIAGNOSIS — Q78.2 SEVERE OSTEOPETROSIS: ICD-10-CM

## 2023-11-20 DIAGNOSIS — E87.6 HYPOKALEMIA: ICD-10-CM

## 2023-11-20 DIAGNOSIS — R13.10 DYSPHAGIA, UNSPECIFIED TYPE: ICD-10-CM

## 2023-11-20 DIAGNOSIS — F51.01 PRIMARY INSOMNIA: ICD-10-CM

## 2023-11-20 PROCEDURE — 74220 X-RAY XM ESOPHAGUS 1CNTRST: CPT

## 2023-11-20 PROCEDURE — 99207 PR NO CHARGE LOS: CPT | Performed by: PHARMACIST

## 2023-11-20 RX ORDER — MIRABEGRON 50 MG/1
50 TABLET, EXTENDED RELEASE ORAL DAILY
COMMUNITY
End: 2024-04-16

## 2023-11-20 RX ORDER — CETIRIZINE HYDROCHLORIDE 10 MG/1
10 TABLET ORAL DAILY PRN
COMMUNITY

## 2023-11-20 RX ORDER — UBIDECARENONE 100 MG
100 CAPSULE ORAL DAILY
COMMUNITY

## 2023-11-20 RX ORDER — MECOBALAMIN 5000 MCG
1 TABLET,DISINTEGRATING ORAL EVERY EVENING
COMMUNITY

## 2023-11-20 RX ORDER — FAMOTIDINE 40 MG/1
40 TABLET, FILM COATED ORAL AT BEDTIME
COMMUNITY
End: 2023-12-21

## 2023-11-20 RX ORDER — BENZONATATE 200 MG/1
200 CAPSULE ORAL 2 TIMES DAILY
COMMUNITY
End: 2024-03-14

## 2023-11-20 RX ORDER — ASPIRIN 81 MG/1
81 TABLET ORAL DAILY
COMMUNITY

## 2023-11-20 RX ORDER — FAMOTIDINE 20 MG
1 TABLET ORAL 2 TIMES DAILY
COMMUNITY

## 2023-11-20 RX ORDER — ATORVASTATIN CALCIUM 20 MG/1
20 TABLET, FILM COATED ORAL DAILY
Qty: 90 TABLET | Refills: 0 | Status: SHIPPED | OUTPATIENT
Start: 2023-11-20 | End: 2024-01-26

## 2023-11-20 RX ORDER — GLUCOSAMINE/CHONDR SU A SOD 750-600 MG
1 TABLET ORAL DAILY
COMMUNITY

## 2023-11-20 NOTE — PROGRESS NOTES
Medication Therapy Management (MTM) Encounter    ASSESSMENT:                            Medication Adherence/Access: No issues identified    Left total hip replacement/Osteoarthritis pain:    Stable. Recommended she increase glucosamine to 1500 mg daily for most benefit.    Osteoporosis:   Patient is not meeting RDI of calcium 1200mg/day. Patient is exceeding RDI of Vitamin D 1000 IU/day. Vitamin D is at goal 30-75ng/dL on current dose of vitamin D. Patient would benefit from continuing to follow with endocrinology for Prolia injections.  She would also benefit from moving one of her calcium supplement to least calcium rich meal for better absorption to ensure she is getting 1200 mg daily.     Dry mouth/Dry eyes/Sjogren's:   Stable    Insomnia:   Stable    GERD:   Stable. Recommended continued follow-up with [facility].    Urinary Incontinence:    Stable.  Recommended she continue to follow with urology.    Supplements:   Stable    Hypertension/Edema/Hypokalemia:   Patient is not meeting blood pressure goal of < 140/90mmHg.  Since patient is following up with cardiology tomorrow will let cardiologist adjust blood pressure medications. Her pulse is on the low end. Should consider reducing metoprolol and adding additional medication.     Hyperlipidemia:   Stable    Hypothyroidism:   Stable. Last TSH is within normal limits.     Allergies:   Stable.    PLAN:                            Move one your calcium from breakfast to lunch to separate from your calcium rich foods at breakfast. We can only absorb so much calcium at once.   Get a new supply of nitroglycerin since it is open and it should be replaced 6 months after opening.   Increase Osto Bi-flex to one tablet twice daily to get most benefit.   Blood pressure was a little high today but patient is seeing cardiology tomorrow so will let him adjust blood pressure medications.     Follow-up: Return in about 1 year (around 11/20/2024).    SUBJECTIVE/OBJECTIVE:                           Ya Stahl is a 83 year old female coming in for a follow-up visit from 11/7/23. Patient was accompanied by her  Jadiel.      Reason for visit: annual medication review.    Allergies/ADRs: Reviewed in chart  Past Medical History: Reviewed in chart  Tobacco: She reports that she has never smoked. She has never used smokeless tobacco.  Alcohol: not currently using    Specialty Providers  Cardiologist: Dr. Montanez   Rheumatologist: Dr. Trey Aragon - [facility]  Ophthalmologist: Dr. [facility] once a year.   Gastroenterologist: Dr. Alan at [facility] and Dr. Altaf Zhu at [facility]  Urologist: Erika Helms, PAC  Pulmonologist: Dr. Samantha Alan    Medication Adherence/Access: Patient uses pill box(es).  Patient takes medications 3 time(s) per day.   Per patient, misses medication 0 times per week.   Medication barriers: none.   The patient fills medications at [facility]: NO, fills medications at [facility] and [facility].    Left total hip replacement/Osteoarthritis pain:    acetaminophen 500 mg twice daily as needed (currently using daily in the morning but not every day)  Osteo bi-flex one tablet (750 mg glucosamine) once daily    Reports no side effects.    Osteoporosis:  calcium 500/Vitamin D 600 IU twice daily with meals  vitamin D 2000 IU twice daily  Prolia every 6 months    Patient reports she has been taking this amount of vitamin D for quite some time and her last vitamin D level was likely while on this dose.  Patient is not experiencing side effects.  DEXA History: 2/14/22  Risk factors:   post-menopausal  chronic PPI use due to hiatal hernia   Vitamin D Deficiency Screening Results:  Lab Results   Component Value Date    VITDT 55 03/18/2022    VITDT 48 12/05/2013     Dry mouth/dry eyes/Sjogren's:   Cevimeline 30 mg 2-3 times daily  Refresh eyedrops in each eye as needed 2-3 times daily  Refresh eye gel at night    Patient reports this is working with no side  effects.    Insomnia:   Melatonin 5 mg once  daily    Reports this works well and no side effects.    Medication History: She had been taking melatonin 5 mg at bedtime but that wasn't working. She has also used Tylenol PM but at last visit I advised not to use.      GERD:  Prevacid (lansoprazole) 30 mg once daily in the morning  Pepcid (famotidine) 40 mg at bedtime once daily at bedtime - this was stopped in Sept during hospital stay but she continued.  She needs to take both to keep it controlled.    No side effects reported. She does follow with GI.    Urinary Incontinence:   Myrbetriq 50 mg once daily in the morning      She has done pelvic floor strengthening in the past. Feels this medicine is helpful.    Supplements:   coenzyme Q10 100 mg once daily  elderberry 1 tablet by mouth daily  Lutein  20 mg once daily  Fish oil 1 gm twice daily    No reported issues at this time.     Hypertension   Hypertension/CAD/Edema/Hypokalemia:  furosemide 20 mg once daily in the morning as needed and has not needed it in a long time.  metoprolol ER 25 mg twice daily - increased at primary care provider visit on 11/1/23 She was taking 50 mg once daily and when she saw Malinda Cha on 11/9 she was told to split the dose so now taking 25 mg twice daily.  Potassium chloride 10 mEq once daily ONLY when she takes furosemide and has not used in a while.  Aspirin 81 mg daily  Nitroglycerin as needed    Following up with cardiology 11/21/23    Patient reports no current medication side effects  Patient self monitors blood pressure.  Home BP monitoring 130-160/60-75 . Pulse not noted.       BP Readings from Last 3 Encounters:   11/21/23 (!) 144/80   11/20/23 (!) 161/86   11/09/23 (!) 169/85     Pulse Readings from Last 3 Encounters:   11/21/23 52   11/20/23 (!) 48   11/09/23 (!) 48     Potassium   Date Value Ref Range Status   11/09/2023 4.5 3.4 - 5.3 mmol/L Final   10/15/2022 4.3 3.4 - 5.3 mmol/L Final   01/19/2021 4.0 3.4 - 5.3 mmol/L  "Final     Hyperlipidemia   atorvastatin 20 mg daily - this was stopped by hospitalist during 9/20/23 hospital stay but patient never stopped. Per inpatient note \"Given the patient's age and frailty, the benefit of this medication is age group is not great and the risk far outweighs it.  At this point it is reasonable to stop the patient's statin.\" She does have CAD    Patient reports no significant myalgias or other side effects.       Recent Labs   Lab Test 02/16/23  0911 01/06/22  1051   CHOL 162 160   HDL 71 99   LDL 75 49   TRIG 80 61       Hypothyroidism      Levothyroxine 88 mcg daily.   Patient is having the following symptoms: none.      TSH   Date Value Ref Range Status   10/18/2023 3.29 0.30 - 4.20 uIU/mL Final   01/06/2022 1.39 0.40 - 4.00 mU/L Final   04/16/2021 1.87 0.40 - 4.00 mU/L Final     Allergies:   Ipratropium 0.03% nasal spray 2 sprays up to three times daily as needed.  Cetirizine  10 mg daily as needed.    Feels this works okay and no side effects noted.    Today's Vitals: BP (!) 161/86   Pulse (!) 48   Wt 143 lb 1.6 oz (64.9 kg)   LMP  (LMP Unknown)   SpO2 97%   BMI 24.56 kg/m    ----------------    I spent 60 minutes with this patient today. A copy of the visit note was provided to the patient's provider(s).    A summary of these recommendations was given to the patient.    Nilda Busby, PharmD  Medication Therapy Management Pharmacist     Medication Therapy Recommendations  Idiopathic osteoporosis    Current Medication: Calcium Carb-Cholecalciferol (SM CALCIUM/VITAMIN D) 600-800 MG-UNIT TABS   Rationale: Dose too low - Dosage too low - Effectiveness   Recommendation: Change Administration Time   Status: Accepted - no CPA Needed         Other osteoarthritis involving multiple joints    Current Medication: Boswellia-Glucosamine-Vit D (OSTEO BI-FLEX ONE PER DAY PO) (Discontinued)   Rationale: Dose too low - Dosage too low - Effectiveness   Recommendation: Increase Frequency   Status: " Accepted - no CPA Needed

## 2023-11-20 NOTE — PATIENT INSTRUCTIONS
"Recommendations from today's MTM visit:                                                    MTM (medication therapy management) is a service provided by a clinical pharmacist designed to help you get the most of out of your medicines.   Today we reviewed what your medicines are for, how to know if they are working, that your medicines are safe and how to make your medicine regimen as easy as possible.      Move one your calcium from breakfast to lunch to separate from your calcium rich foods at breakfast. We can only abosrob so much calcium at once.   Get a new supply of nitroglycerin since it is open and it should be replaced 6 months after opening.   Increase Osto Bi-flex to one tablet twice daily to get most benefit.   Information about buying supplements that are third party tested    Third party testing  Look for quality seals or third-party certifications.  There are several certification programs that independently review the manufacturing process for supplement makers.  These third-party certifiers only award their seal to companies that have passed rigorous examination. Look for a seal from [employer], , USP, [employer], Banned Substances Control Group, or another certification party that has tested the product. Seals or certifications are just one way that companies can invest in their brand, but the absence of a seal does not mean that a product is of poor quality.    Follow-up: Return in about 1 year (around 11/20/2024).    It was great speaking with you today.  I value your experience and would be very thankful for your time in providing feedback in our clinic survey. In the next few days, you may receive an email or text message from [employer] with a link to a survey related to your  clinical pharmacist.\"     To schedule another MTM appointment, please call the clinic directly or you may call the MTM scheduling line at [phone] or toll-free at [phone].     My Clinical " Pharmacist's contact information:                                                      Please feel free to contact me with any questions or concerns you have.      Nilda Busby PharmD  Medication Therapy Management Pharmacist  [facility] - Monday and Wednesday 7:30 - 4:00

## 2023-11-21 ENCOUNTER — OFFICE VISIT (OUTPATIENT)
Dept: CARDIOLOGY | Facility: CLINIC | Age: 83
End: 2023-11-21
Attending: NURSE PRACTITIONER
Payer: COMMERCIAL

## 2023-11-21 ENCOUNTER — LAB (OUTPATIENT)
Dept: LAB | Facility: CLINIC | Age: 83
End: 2023-11-21
Attending: INTERNAL MEDICINE
Payer: COMMERCIAL

## 2023-11-21 VITALS
BODY MASS INDEX: 23.9 KG/M2 | HEART RATE: 52 BPM | WEIGHT: 140 LBS | SYSTOLIC BLOOD PRESSURE: 144 MMHG | HEIGHT: 64 IN | OXYGEN SATURATION: 97 % | DIASTOLIC BLOOD PRESSURE: 80 MMHG

## 2023-11-21 DIAGNOSIS — M81.0 SENILE OSTEOPOROSIS: ICD-10-CM

## 2023-11-21 DIAGNOSIS — I10 HYPERTENSION GOAL BP (BLOOD PRESSURE) < 140/90: ICD-10-CM

## 2023-11-21 DIAGNOSIS — R60.9 EDEMA, UNSPECIFIED TYPE: Primary | ICD-10-CM

## 2023-11-21 DIAGNOSIS — I77.810 ASCENDING AORTA DILATION (H): ICD-10-CM

## 2023-11-21 LAB
CALCIUM SERPL-MCNC: 9.6 MG/DL (ref 8.8–10.2)
CREAT SERPL-MCNC: 0.96 MG/DL (ref 0.51–0.95)
EGFRCR SERPLBLD CKD-EPI 2021: 59 ML/MIN/1.73M2

## 2023-11-21 PROCEDURE — 99214 OFFICE O/P EST MOD 30 MIN: CPT | Performed by: INTERNAL MEDICINE

## 2023-11-21 PROCEDURE — 82565 ASSAY OF CREATININE: CPT

## 2023-11-21 PROCEDURE — 36415 COLL VENOUS BLD VENIPUNCTURE: CPT

## 2023-11-21 PROCEDURE — 82310 ASSAY OF CALCIUM: CPT

## 2023-11-21 RX ORDER — LISINOPRIL 10 MG/1
10 TABLET ORAL DAILY
Qty: 90 TABLET | Refills: 11 | Status: SHIPPED | OUTPATIENT
Start: 2023-11-21 | End: 2023-12-04

## 2023-11-21 RX ORDER — HYDROCHLOROTHIAZIDE 12.5 MG/1
12.5 TABLET ORAL DAILY
Qty: 90 TABLET | Refills: 11 | Status: SHIPPED | OUTPATIENT
Start: 2023-11-21 | End: 2024-02-08

## 2023-11-21 RX ORDER — METOPROLOL SUCCINATE 25 MG/1
25 TABLET, EXTENDED RELEASE ORAL DAILY
Qty: 90 TABLET | Refills: 11 | Status: SHIPPED | OUTPATIENT
Start: 2023-11-21

## 2023-11-21 NOTE — LETTER
11/21/2023    Devi Romano [place]  [address] [facility] [address]    RE: Ya Stahl       Dear Colleague,     I had the pleasure of seeing Ya Stahl in the [facility].  HISTORY OF PRESENT ILLNESS:  Ya Stahl a 83 year old female with          PAST MEDICAL HISTORY:    1.  Coronary artery disease.  a.  Previous coronary angiogram showing mild atheromatous change but no significant focal narrowing.  On statin therapy.  2.  Monoclonal gammopathy of uncertain significance -- most recent hemoglobin 13.5, platelet count 241,000.  3.  Dilated ascending aorta -- measures 4.4 on most recent CT scan.  4.  Sjogren's disease.  5.  Hypertension -- currently very well controlled.  6.  Hypothyroidism.  7.  History of gastroesophageal reflux.  8.  Cholelithiasis -- status post laparoscopic cholecystectomy.  Orders this Visit:  No orders of the defined types were placed in this encounter.    No orders of the defined types were placed in this encounter.    There are no discontinued medications.    Encounter Diagnoses   Name Primary?    Edema, unspecified type Yes    Ascending aorta dilation (H24)     Hypertension goal BP (blood pressure) < 140/90        CURRENT MEDICATIONS:  Current Outpatient Medications   Medication Sig Dispense Refill    acetaminophen (TYLENOL) 500 MG tablet Take 1,000 mg by mouth every 8 hours as needed for mild pain      aspirin 81 MG EC tablet Take 81 mg by mouth daily      atorvastatin (LIPITOR) 20 MG tablet Take 1 tablet (20 mg) by mouth daily 90 tablet 0    benzonatate (TESSALON) 200 MG capsule Take 200 mg by mouth 2 times daily      Calcium Carb-Cholecalciferol (SM CALCIUM/VITAMIN D) 600-800 MG-UNIT TABS Take 1 tablet by mouth 2 times daily Each tablet is 500 calcium and 600 Vit D      Carboxymethylcellulose Sodium (REFRESH LIQUIGEL OP) Place 1 drop into both eyes every hour as needed (dry eyes)      cetirizine (ZYRTEC) 10 MG tablet Take 10 mg by mouth daily  as needed for allergies      cevimeline (EVOXAC) 30 MG capsule Take 30 mg by mouth 3 times daily      co-enzyme Q-10 100 MG CAPS capsule Take 100 mg by mouth daily      ELDERBERRY PO Take 1 tablet by mouth daily      EPINEPHrine (ANY BX GENERIC EQUIV) 0.3 MG/0.3ML injection 2-pack Inject 0.3 mLs (0.3 mg) into the muscle as needed for anaphylaxis 2 each 0    famotidine (PEPCID) 40 MG tablet Take 40 mg by mouth at bedtime      hydroxychloroquine (PLAQUENIL) 200 MG tablet Take 300 mg by mouth daily (with lunch)      ipratropium (ATROVENT) 0.03 % nasal spray Spray 2 sprays into both nostrils 3 times daily as needed for rhinitis (runny nose) 30 mL 3    LANsoprazole (PREVACID) 30 MG DR capsule TAKE 1 CAPSULE BY MOUTH IN THE  MORNING 90 capsule 3    levothyroxine (SYNTHROID/LEVOTHROID) 88 MCG tablet Take 1 tablet (88 mcg) by mouth daily 90 tablet 1    Lutein-Zeaxanthin 25-5 MG CAPS Take 1 capsule by mouth daily      Melatonin 5 MG TBDP Take 1 tablet by mouth every evening      metoprolol succinate ER (TOPROL XL) 50 MG 24 hr tablet Take 1 tablet (50 mg) by mouth daily 90 tablet 1    mirabegron (MYRBETRIQ) 50 MG 24 hr tablet Take 50 mg by mouth daily      Misc Natural Products (OSTEO BI-FLEX TRIPLE STRENGTH PO) Take 2 tablets by mouth every morning      nitroGLYcerin (NITROSTAT) 0.4 MG sublingual tablet Place 1 tablet (0.4 mg) under the tongue every 5 minutes as needed for chest pain 25 tablet 0    Omega-3 Fatty Acids (OMEGA-3 FISH OIL PO) Take 1 g by mouth 2 times daily (with meals)       polyethylene glycol (MIRALAX) 17 GM/Dose powder Take 1 capful by mouth 2 times daily In 8 ounces of liquid      potassium chloride ER (KLOR-CON M) 10 MEQ CR tablet Take 1 tablet (10 mEq) by mouth as needed for potassium supplementation (take in combination with lasix) 30 tablet 11    Vitamin D, Cholecalciferol, 25 MCG (1000 UT) CAPS Take 1 capsule by mouth 2 times daily         ALLERGIES     Allergies   Allergen Reactions    Morphine And  "Related      fatigue    Vicodin [Acetaminophen] Fatigue    Adhesive Tape Rash       PAST MEDICAL, SURGICAL, FAMILY, SOCIAL HISTORY:  History was reviewed and updated as needed, see medical record.        Review of Systems:  A 12-point review of systems was completed, see medical record for detailed review of systems information.    Physical Exam:  Vitals: BP (!) 144/80 (BP Location: Right arm, Patient Position: Sitting, Cuff Size: Adult Regular)   Pulse 52   Ht 1.626 m (5' 4\")   Wt 63.5 kg (140 lb)   LMP  (LMP Unknown)   SpO2 97%   BMI 24.03 kg/m      Constitutional: No apparent distress    HEENT: pupils equal round reactive to light, thyroid normal size, JVP is normal    Chest: Clear to percussion and auscultation    Cardiac: Normal S1, normal S2 no S3, no murmur or click    Abdomen: Scaphoid.  Liver percusses to 6 cm spleen is not palpable aorta is not tender or enlarged    Extremitiies: no edema.    Neurological:  Cranial nerves II through XII are intact, strength equal and symmetrical, displays normal insight and judgment        ASSESSMENT: Ya Stahl is a 83 year old female with          We reviewed the benefits of a regular exercise program, a Mediterranean-style weight loss diet, and contacting us for any changes in between now and the time of her next visit.     RECOMMENDATIONS:   1) lisinopril 10 mg /hydrochlorothiazide 12.5 mg daily  2) check blood pressures at home  3) Decrease metoprolol xl to 25 mg daily in view of bradycardia  4) Follow up blood pressure and bmp with primary care MD  5) follow up with me in one year/ TTE          Recent Lab Results:  LIPID RESULTS:  Lab Results   Component Value Date    CHOL 162 02/16/2023    CHOL 175 01/19/2021    HDL 71 02/16/2023    HDL 89 01/19/2021    LDL 75 02/16/2023    LDL 64 01/19/2021    TRIG 80 02/16/2023    TRIG 108 01/19/2021    CHOLHDLRATIO 2.3 12/02/2014       LIVER ENZYME RESULTS:  Lab Results   Component Value Date    AST 26 10/18/2023    " AST 19 01/19/2021    ALT 15 10/18/2023    ALT 25 01/19/2021       CBC RESULTS:  Lab Results   Component Value Date    WBC 6.6 11/09/2023    WBC 9.0 01/19/2021    RBC 3.93 11/09/2023    RBC 3.85 01/19/2021    HGB 12.8 11/09/2023    HGB 12.5 01/19/2021    HCT 40.5 11/09/2023    HCT 38.7 01/19/2021     (H) 11/09/2023     (H) 01/19/2021    MCH 32.6 11/09/2023    MCH 32.5 01/19/2021    MCHC 31.6 11/09/2023    MCHC 32.3 01/19/2021    RDW 13.1 11/09/2023    RDW 13.8 01/19/2021     11/09/2023     01/19/2021       BMP RESULTS:  Lab Results   Component Value Date     11/09/2023     01/19/2021    POTASSIUM 4.5 11/09/2023    POTASSIUM 4.3 10/15/2022    POTASSIUM 4.0 01/19/2021    CHLORIDE 102 11/09/2023    CHLORIDE 99 10/15/2022    CHLORIDE 104 01/19/2021    CO2 27 11/09/2023    CO2 24 10/15/2022    CO2 30 01/19/2021    ANIONGAP 10 11/09/2023    ANIONGAP 9 10/15/2022    ANIONGAP 3 01/19/2021    GLC 94 11/09/2023     (H) 10/15/2022     (H) 10/15/2022     (H) 01/19/2021    BUN 25.2 (H) 11/09/2023    BUN 19 10/15/2022    BUN 22 01/19/2021    CR 0.79 11/09/2023    CR 0.72 01/19/2021    GFRESTIMATED 74 11/09/2023    GFRESTIMATED 79 01/19/2021    GFRESTBLACK >90 01/19/2021    CARMEN 9.6 11/09/2023    CARMEN 8.9 01/19/2021        A1C RESULTS:  Lab Results   Component Value Date    A1C 6.2 (H) 02/16/2023    A1C 5.9 (H) 01/19/2021       INR RESULTS:  Lab Results   Component Value Date    INR 0.97 08/04/2020    INR 0.96 12/01/2016    INR 1.00 08/23/2016       We greatly appreciate the opportunity to be involved in the care of your patient, Ya Stahl.    Sincerely,  Venancio Montanez MD      CC  Nuzhat Velasquez, APRN CNP  [address] [place] [address]                                                                       Thank you for allowing me to participate in the care of your patient.      Sincerely,     Venancio Montanez MD     [facility]  [facility]  cc:   Nuzhat Velasquez, APRN CNP  [address] [place] [address]

## 2023-11-21 NOTE — PROGRESS NOTES
HISTORY OF PRESENT ILLNESS:  Ya Stahl a 83 year old female with  with nonobstructive coronary artery disease, hypothyroidism, sicca syndrome, cholelithiasis (status post laparoscopic cholecystectomy), and ascending aortic dilation was seen today at your request for followup.     Since last seen, the patient denies orthopnea PND pedal edema or leg swelling.  She checks her blood pressure at home, and notes that it has been poorly controlled, frequently over 150/80.  The patient has not been on furosemide since it was stopped in November 2022.  During her recent visit with her primary care doctor, metoprolol ER was increased from 25 to 50 mg daily.  Heart rates currently running in the upper 40s.  She will undergo endoscopy in the near future for gastroesophageal reflux.      PAST MEDICAL HISTORY:    1.  Coronary artery disease.  a.  Previous coronary angiogram showing mild atheromatous change but no significant focal narrowing.  On statin therapy.  2.  Monoclonal gammopathy of uncertain significance -- most recent hemoglobin 13.5, platelet count 241,000.  3.  Dilated ascending aorta -- measures 4.4 on most recent CT scan.  4.  Sjogren's disease.  5.  Hypertension -- currently very well controlled.  6.  Hypothyroidism.  7.  History of gastroesophageal reflux.  8.  Cholelithiasis -- status post laparoscopic cholecystectomy        Orders this Visit:  No orders of the defined types were placed in this encounter.    No orders of the defined types were placed in this encounter.    There are no discontinued medications.    Encounter Diagnoses   Name Primary?    Edema, unspecified type Yes    Ascending aorta dilation (H24)     Hypertension goal BP (blood pressure) < 140/90        CURRENT MEDICATIONS:  Current Outpatient Medications   Medication Sig Dispense Refill    acetaminophen (TYLENOL) 500 MG tablet Take 1,000 mg by mouth every 8 hours as needed for mild pain      aspirin 81 MG EC tablet Take 81 mg by mouth daily       atorvastatin (LIPITOR) 20 MG tablet Take 1 tablet (20 mg) by mouth daily 90 tablet 0    benzonatate (TESSALON) 200 MG capsule Take 200 mg by mouth 2 times daily      Calcium Carb-Cholecalciferol (SM CALCIUM/VITAMIN D) 600-800 MG-UNIT TABS Take 1 tablet by mouth 2 times daily Each tablet is 500 calcium and 600 Vit D      Carboxymethylcellulose Sodium (REFRESH LIQUIGEL OP) Place 1 drop into both eyes every hour as needed (dry eyes)      cetirizine (ZYRTEC) 10 MG tablet Take 10 mg by mouth daily as needed for allergies      cevimeline (EVOXAC) 30 MG capsule Take 30 mg by mouth 3 times daily      co-enzyme Q-10 100 MG CAPS capsule Take 100 mg by mouth daily      ELDERBERRY PO Take 1 tablet by mouth daily      EPINEPHrine (ANY BX GENERIC EQUIV) 0.3 MG/0.3ML injection 2-pack Inject 0.3 mLs (0.3 mg) into the muscle as needed for anaphylaxis 2 each 0    famotidine (PEPCID) 40 MG tablet Take 40 mg by mouth at bedtime      hydroxychloroquine (PLAQUENIL) 200 MG tablet Take 300 mg by mouth daily (with lunch)      ipratropium (ATROVENT) 0.03 % nasal spray Spray 2 sprays into both nostrils 3 times daily as needed for rhinitis (runny nose) 30 mL 3    LANsoprazole (PREVACID) 30 MG DR capsule TAKE 1 CAPSULE BY MOUTH IN THE  MORNING 90 capsule 3    levothyroxine (SYNTHROID/LEVOTHROID) 88 MCG tablet Take 1 tablet (88 mcg) by mouth daily 90 tablet 1    Lutein-Zeaxanthin 25-5 MG CAPS Take 1 capsule by mouth daily      Melatonin 5 MG TBDP Take 1 tablet by mouth every evening      metoprolol succinate ER (TOPROL XL) 50 MG 24 hr tablet Take 1 tablet (50 mg) by mouth daily 90 tablet 1    mirabegron (MYRBETRIQ) 50 MG 24 hr tablet Take 50 mg by mouth daily      Misc Natural Products (OSTEO BI-FLEX TRIPLE STRENGTH PO) Take 2 tablets by mouth every morning      nitroGLYcerin (NITROSTAT) 0.4 MG sublingual tablet Place 1 tablet (0.4 mg) under the tongue every 5 minutes as needed for chest pain 25 tablet 0    Omega-3 Fatty Acids (OMEGA-3 FISH  "OIL PO) Take 1 g by mouth 2 times daily (with meals)       polyethylene glycol (MIRALAX) 17 GM/Dose powder Take 1 capful by mouth 2 times daily In 8 ounces of liquid      potassium chloride ER (KLOR-CON M) 10 MEQ CR tablet Take 1 tablet (10 mEq) by mouth as needed for potassium supplementation (take in combination with lasix) 30 tablet 11    Vitamin D, Cholecalciferol, 25 MCG (1000 UT) CAPS Take 1 capsule by mouth 2 times daily         ALLERGIES     Allergies   Allergen Reactions    Morphine And Related      fatigue    Vicodin [Acetaminophen] Fatigue    Adhesive Tape Rash       PAST MEDICAL, SURGICAL, FAMILY, SOCIAL HISTORY:  History was reviewed and updated as needed, see medical record.        Review of Systems:  A 12-point review of systems was completed, see medical record for detailed review of systems information.    Physical Exam:  Vitals: BP (!) 144/80 (BP Location: Right arm, Patient Position: Sitting, Cuff Size: Adult Regular)   Pulse 52   Ht 1.626 m (5' 4\")   Wt 63.5 kg (140 lb)   LMP  (LMP Unknown)   SpO2 97%   BMI 24.03 kg/m      Constitutional: No apparent distress    HEENT: pupils equal round reactive to light, thyroid normal size, JVP is normal    Chest: Clear to percussion and auscultation    Cardiac: Normal S1, normal S2 no S3, no murmur or click      Extremitiies: no edema.            ASSESSMENT: The patient's blood pressure is well above current ACC guideline target recommendations of 130/80 or less, especially in view of her ascending aortic dilatation.  Metoprolol is not a very potent antihypertensive agent and her current bradycardia would prohibit any further increase in his agent.  Since she experienced leg edema on amlodipine, I would recommend an ACE inhibitor or ARB agent along with a low-dose diuretic.  I have asked her to check with her primary care physician for a follow-up blood pressure check and BMP in the next 4 to 6 weeks.  I would advise a regular exercise program and a " Mediterranean style diet.      RECOMMENDATIONS:   1) lisinopril 10 mg /hydrochlorothiazide 12.5 mg daily  2) check blood pressures at home  3) Decrease metoprolol xl to 25 mg daily in view of bradycardia  4) Follow up blood pressure and bmp with primary care MD  5) follow up with me in one year/ TTE          Recent Lab Results:    I have reviewed her most recent echocardiogram that shows well-preserved left ventricular systolic performance and stable mild to moderate dilation of the ascending aorta measured 43 mm.  LIPID RESULTS:  Lab Results   Component Value Date    CHOL 162 02/16/2023    CHOL 175 01/19/2021    HDL 71 02/16/2023    HDL 89 01/19/2021    LDL 75 02/16/2023    LDL 64 01/19/2021    TRIG 80 02/16/2023    TRIG 108 01/19/2021    CHOLHDLRATIO 2.3 12/02/2014       LIVER ENZYME RESULTS:  Lab Results   Component Value Date    AST 26 10/18/2023    AST 19 01/19/2021    ALT 15 10/18/2023    ALT 25 01/19/2021       CBC RESULTS:  Lab Results   Component Value Date    WBC 6.6 11/09/2023    WBC 9.0 01/19/2021    RBC 3.93 11/09/2023    RBC 3.85 01/19/2021    HGB 12.8 11/09/2023    HGB 12.5 01/19/2021    HCT 40.5 11/09/2023    HCT 38.7 01/19/2021     (H) 11/09/2023     (H) 01/19/2021    MCH 32.6 11/09/2023    MCH 32.5 01/19/2021    MCHC 31.6 11/09/2023    MCHC 32.3 01/19/2021    RDW 13.1 11/09/2023    RDW 13.8 01/19/2021     11/09/2023     01/19/2021       BMP RESULTS:  Lab Results   Component Value Date     11/09/2023     01/19/2021    POTASSIUM 4.5 11/09/2023    POTASSIUM 4.3 10/15/2022    POTASSIUM 4.0 01/19/2021    CHLORIDE 102 11/09/2023    CHLORIDE 99 10/15/2022    CHLORIDE 104 01/19/2021    CO2 27 11/09/2023    CO2 24 10/15/2022    CO2 30 01/19/2021    ANIONGAP 10 11/09/2023    ANIONGAP 9 10/15/2022    ANIONGAP 3 01/19/2021    GLC 94 11/09/2023     (H) 10/15/2022     (H) 10/15/2022     (H) 01/19/2021    BUN 25.2 (H) 11/09/2023    BUN 19 10/15/2022     BUN 22 01/19/2021    CR 0.79 11/09/2023    CR 0.72 01/19/2021    GFRESTIMATED 74 11/09/2023    GFRESTIMATED 79 01/19/2021    GFRESTBLACK >90 01/19/2021    CARMEN 9.6 11/09/2023    CARMEN 8.9 01/19/2021        A1C RESULTS:  Lab Results   Component Value Date    A1C 6.2 (H) 02/16/2023    A1C 5.9 (H) 01/19/2021       INR RESULTS:  Lab Results   Component Value Date    INR 0.97 08/04/2020    INR 0.96 12/01/2016    INR 1.00 08/23/2016       We greatly appreciate the opportunity to be involved in the care of your patient, Ya Stahl.    Sincerely,  Venancio Montanez MD      CC  Nuzhat Velasquez, APRN CNP  [address] [place] [address]

## 2023-11-22 ENCOUNTER — TELEPHONE (OUTPATIENT)
Dept: FAMILY MEDICINE | Facility: CLINIC | Age: 83
End: 2023-11-22
Payer: COMMERCIAL

## 2023-11-22 NOTE — TELEPHONE ENCOUNTER
FYI - Status Update    Who is Calling: [facility] - [phone]  - Needs the order (pre op) to be closed.  Also needs to know If provider is okay with patient going into surgery.    Update: see above    Does caller want a call/response back: tana CABRALES

## 2023-11-27 ENCOUNTER — TELEPHONE (OUTPATIENT)
Dept: ENDOCRINOLOGY | Facility: CLINIC | Age: 83
End: 2023-11-27
Payer: COMMERCIAL

## 2023-11-27 ENCOUNTER — ANESTHESIA EVENT (OUTPATIENT)
Dept: SURGERY | Facility: AMBULATORY SURGERY CENTER | Age: 83
End: 2023-11-27
Payer: COMMERCIAL

## 2023-11-27 DIAGNOSIS — M81.8 IDIOPATHIC OSTEOPOROSIS: Primary | ICD-10-CM

## 2023-11-27 DIAGNOSIS — M81.0 SENILE OSTEOPOROSIS: ICD-10-CM

## 2023-11-27 DIAGNOSIS — Q78.2 SEVERE OSTEOPETROSIS: ICD-10-CM

## 2023-11-27 DIAGNOSIS — Z92.29 HISTORY OF DRUG THERAPY: ICD-10-CM

## 2023-11-27 NOTE — TELEPHONE ENCOUNTER
----- Message from Alyssa Greco sent at 11/27/2023  7:14 AM CST -----  Regarding: PROLIA DX  Mimi,    On the newest prolia order we need the dx m81.8 added as primary. Can you help me with that?    Thank you,  Alyssa

## 2023-11-28 ENCOUNTER — HOSPITAL ENCOUNTER (OUTPATIENT)
Facility: AMBULATORY SURGERY CENTER | Age: 83
Discharge: HOME | End: 2023-11-28
Attending: SURGERY
Payer: COMMERCIAL

## 2023-11-28 ENCOUNTER — ANESTHESIA (OUTPATIENT)
Dept: SURGERY | Facility: AMBULATORY SURGERY CENTER | Age: 83
End: 2023-11-28
Payer: COMMERCIAL

## 2023-11-28 VITALS
TEMPERATURE: 97.9 F | RESPIRATION RATE: 16 BRPM | BODY MASS INDEX: 23.9 KG/M2 | HEIGHT: 64 IN | DIASTOLIC BLOOD PRESSURE: 81 MMHG | WEIGHT: 140 LBS | HEART RATE: 43 BPM | SYSTOLIC BLOOD PRESSURE: 156 MMHG | OXYGEN SATURATION: 98 %

## 2023-11-28 DIAGNOSIS — R13.10 DYSPHAGIA, UNSPECIFIED TYPE: ICD-10-CM

## 2023-11-28 LAB — UPPER GI ENDOSCOPY: NORMAL

## 2023-11-28 RX ORDER — FENTANYL CITRATE 0.05 MG/ML
25 INJECTION, SOLUTION INTRAMUSCULAR; INTRAVENOUS
Status: DISCONTINUED | OUTPATIENT
Start: 2023-11-28 | End: 2023-11-29 | Stop reason: HOSPADM

## 2023-11-28 RX ORDER — PROPOFOL 10 MG/ML
INJECTION, EMULSION INTRAVENOUS PRN
Status: DISCONTINUED | OUTPATIENT
Start: 2023-11-28 | End: 2023-11-28

## 2023-11-28 RX ORDER — OXYCODONE HYDROCHLORIDE 5 MG/1
5 TABLET ORAL
Status: DISCONTINUED | OUTPATIENT
Start: 2023-11-28 | End: 2023-11-29 | Stop reason: HOSPADM

## 2023-11-28 RX ORDER — SODIUM CHLORIDE, SODIUM LACTATE, POTASSIUM CHLORIDE, CALCIUM CHLORIDE 600; 310; 30; 20 MG/100ML; MG/100ML; MG/100ML; MG/100ML
INJECTION, SOLUTION INTRAVENOUS CONTINUOUS
Status: DISCONTINUED | OUTPATIENT
Start: 2023-11-28 | End: 2023-11-29 | Stop reason: HOSPADM

## 2023-11-28 RX ORDER — PROPOFOL 10 MG/ML
INJECTION, EMULSION INTRAVENOUS CONTINUOUS PRN
Status: DISCONTINUED | OUTPATIENT
Start: 2023-11-28 | End: 2023-11-28

## 2023-11-28 RX ORDER — ACETAMINOPHEN 325 MG/1
975 TABLET ORAL
Status: DISCONTINUED | OUTPATIENT
Start: 2023-11-28 | End: 2023-11-29 | Stop reason: HOSPADM

## 2023-11-28 RX ORDER — ONDANSETRON 2 MG/ML
INJECTION INTRAMUSCULAR; INTRAVENOUS PRN
Status: DISCONTINUED | OUTPATIENT
Start: 2023-11-28 | End: 2023-11-28

## 2023-11-28 RX ORDER — LIDOCAINE HYDROCHLORIDE 20 MG/ML
INJECTION, SOLUTION INFILTRATION; PERINEURAL PRN
Status: DISCONTINUED | OUTPATIENT
Start: 2023-11-28 | End: 2023-11-28

## 2023-11-28 RX ORDER — OXYCODONE HYDROCHLORIDE 10 MG/1
10 TABLET ORAL
Status: DISCONTINUED | OUTPATIENT
Start: 2023-11-28 | End: 2023-11-29 | Stop reason: HOSPADM

## 2023-11-28 RX ORDER — ONDANSETRON 4 MG/1
4 TABLET, ORALLY DISINTEGRATING ORAL EVERY 30 MIN PRN
Status: DISCONTINUED | OUTPATIENT
Start: 2023-11-28 | End: 2023-11-29 | Stop reason: HOSPADM

## 2023-11-28 RX ORDER — LIDOCAINE 40 MG/G
CREAM TOPICAL
Status: DISCONTINUED | OUTPATIENT
Start: 2023-11-28 | End: 2023-11-29 | Stop reason: HOSPADM

## 2023-11-28 RX ORDER — ONDANSETRON 2 MG/ML
4 INJECTION INTRAMUSCULAR; INTRAVENOUS EVERY 30 MIN PRN
Status: DISCONTINUED | OUTPATIENT
Start: 2023-11-28 | End: 2023-11-29 | Stop reason: HOSPADM

## 2023-11-28 RX ADMIN — PROPOFOL 30 MG: 10 INJECTION, EMULSION INTRAVENOUS at 10:05

## 2023-11-28 RX ADMIN — LIDOCAINE HYDROCHLORIDE 2.5 ML: 20 INJECTION, SOLUTION INFILTRATION; PERINEURAL at 10:05

## 2023-11-28 RX ADMIN — PROPOFOL 100 MCG/KG/MIN: 10 INJECTION, EMULSION INTRAVENOUS at 10:05

## 2023-11-28 RX ADMIN — SODIUM CHLORIDE, SODIUM LACTATE, POTASSIUM CHLORIDE, CALCIUM CHLORIDE: 600; 310; 30; 20 INJECTION, SOLUTION INTRAVENOUS at 09:28

## 2023-11-28 RX ADMIN — ONDANSETRON 4 MG: 2 INJECTION INTRAMUSCULAR; INTRAVENOUS at 10:05

## 2023-11-28 NOTE — ANESTHESIA PREPROCEDURE EVALUATION
"Anesthesia Pre-Procedure Evaluation    Patient: Ya Stahl   MRN: [id number] : 1940        Procedure : Procedure(s):  ESOPHAGOGASTRODUODENOSCOPY, WITH BIOPSY          Past Medical History:   Diagnosis Date    Arthritis     Cerebral infarction (H) 2019    Complication of anesthesia     \"hard time waking up / N & V\"    Coronary artery disease     Disease of thyroid gland     Displacement of lumbar intervertebral disc without myelopathy     Gastro-oesophageal reflux disease     GERD (gastroesophageal reflux disease)     Hearing loss     has bilateral aids    Heart attack (H) 2016    History of anesthesia complications     delayed emergence    History of angina     Hypertension     Low back pain     Migraine headache     Myocardial infarction (H)     Numbness and tingling     down right leg (associated with back pain)    PONV (postoperative nausea and vomiting)     Sicca syndrome (H24)     Sjogren's disease (H24)     Sjogren's syndrome (H24)     sees specialist; dentist    Spider veins     Unspecified hypothyroidism       Past Surgical History:   Procedure Laterality Date    ARTHROPLASTY HIP ANTERIOR Left 10/14/2022    Procedure: LEFT TOTAL HIP ARTHROPLASTY DIRECT ANTERIOR APPROACH;  Surgeon: Selvin Saab MD;  Location:  OR    ARTHROSCOPY KNEE  10/05/2012    R Procedure: ARTHROSCOPY KNEE;  RIGHT KNEE ARTHROSCOPY, PARTIAL MEDIAL MENISCECTOMY;  Surgeon: Karli Zaragoza MD;  Location:  SD    BACK SURGERY  About. 5 years ago    Fusion of 4&5 lumbar    BLADDER SURGERY      BUNIONECTOMY  ~    L foot.     CATARACT IOL, RT/LT Bilateral 2017    COLONOSCOPY N/A 2017    normal; no repeat Procedure: COLONOSCOPY;  Surgeon: Fan Giordano MD;  Location:  GI    ENDOSCOPIC RELEASE CARPAL TUNNEL  2012    R Procedure: ENDOSCOPIC RELEASE CARPAL TUNNEL;  Right Endoscopic carpal tunnel release, left ringer finger cortizon injection;  Surgeon: Anne Marie Catherine MD;  Location:  OR    " ESOPHAGOSCOPY, GASTROSCOPY, DUODENOSCOPY (EGD), COMBINED N/A 12/26/2014    Procedure: COMBINED ESOPHAGOSCOPY, GASTROSCOPY, DUODENOSCOPY (EGD);  Surgeon: Fan Giordano MD;  Location:  GI    EXCISE EXOSTOSIS FOOT Left 12/01/2016    Procedure: EXCISE EXOSTOSIS FOOT;  Surgeon: Jody Gallagher DPM, Pod;  Location:  OR    GYN SURGERY  1978    ovaries removed    HEART CATH FYI  03/04/2016    dz <40%    HIP HARDWARE REMOVAL Right 08/04/2020    Procedure: HARDWARE REMOVAL - LATERAL APPROACH;  Surgeon: Charlie Wolfe MD;  Location: [facility] OR;  Service: Orthopedics    HYSTERECTOMY      INJECT STEROID (LOCATION)  09/11/2012    Procedure: INJECT STEROID (LOCATION);;  Surgeon: Anne Marie Catherine MD;  Location:  OR    LAPAROSCOPIC CHOLECYSTECTOMY N/A 04/07/2016    Procedure: LAPAROSCOPIC CHOLECYSTECTOMY;  Surgeon: Hans Medina MD;  Location:  OR    OPTICAL TRACKING SYSTEM FUSION SPINE POSTERIOR LUMBAR TWO LEVELS N/A 10/31/2018    Procedure: Central L3-4 L4-5 lamenectomies L4-5 posterior instrumented fusion;  Surgeon: Aroldo Burdick MD;  Location:  OR    ORTHOPEDIC SURGERY Right 2014    karli for fx femur    RECONSTRUCT FOREFOOT WITH METATARSOPHALANGEAL (MTP) FUSION Left 04/28/2017    Procedure: RECONSTRUCT FOREFOOT WITH METATARSOPHALANGEAL (MTP) FUSION;  1. Resection arthroplasty, fifth metatarsophalangeal joint, left foot.   2. Irrigation and debridement of fifth metatarsophalangeal joint, left foot (excisional to the level of the bone).     ;  Surgeon: Fabián Phipps MD;  Location:  OR    RELEASE CARPAL TUNNEL      RIGHT HIP ORIF 4/1/2014      ROTATOR CUFF REPAIR RT/LT  ~1998    Lt shoulder; rotator cuff    SURGICAL HISTORY OF -   distant past    ablation for palpitations.    SURGICAL HISTORY OF -   06/2015    left carpal tunnel surgery    [facility] NONSPECIFIC PROCEDURE  1976    D&C    Z TOTAL HIP ARTHROPLASTY Right 08/04/2020    Procedure: RIGHT TOTAL HIP ARTHROPLASTY;  Surgeon: Charlie Wolfe  MD JADE;  Location: [facility] OR;  Service: Orthopedics    ZC TOTAL KNEE ARTHROPLASTY Right 01/2020      Allergies   Allergen Reactions    Morphine And Related      fatigue    Vicodin [Acetaminophen] Fatigue    Adhesive Tape Rash      Social History     Tobacco Use    Smoking status: Never    Smokeless tobacco: Never   Substance Use Topics    Alcohol use: Not Currently     Comment: Maybe 1x amonth      Wt Readings from Last 1 Encounters:   11/28/23 63.5 kg (140 lb)        Anesthesia Evaluation   Pt has had prior anesthetic.     No history of anesthetic complications       ROS/MED HX  ENT/Pulmonary:  - neg pulmonary ROS     Neurologic:     (+)          CVA, date: 2019,                     Cardiovascular:     (+) Dyslipidemia hypertension- -  CAD - past MI (2016) - -                                 Previous cardiac testing   Echo: Date: Results:  EF 60-65, no WMA, no significant valvular disease  Stress Test:  Date: Results:    ECG Reviewed:  Date: Results:    Cath:  Date: Results:      METS/Exercise Tolerance: >4 METS    Hematologic: Comments: MGUS   (-) anemia   Musculoskeletal:   (+)  arthritis,             GI/Hepatic:     (+) GERD,                   Renal/Genitourinary:       Endo: Comment: Hx grave's disease    (+)          thyroid problem, hypothyroidism,           Psychiatric/Substance Use:       Infectious Disease:       Malignancy:       Other:            Physical Exam    Airway        Mallampati: II   TM distance: > 3 FB   Neck ROM: full   Mouth opening: > 3 cm    Respiratory Devices and Support         Dental       (+) Minor Abnormalities - some fillings, tiny chips      Cardiovascular          Rhythm and rate: regular     Pulmonary           breath sounds clear to auscultation           OUTSIDE LABS:  CBC:   Lab Results   Component Value Date    WBC 6.6 11/09/2023    WBC 5.1 10/18/2023    HGB 12.8 11/09/2023    HGB 12.3 10/18/2023    HCT 40.5 11/09/2023    HCT 38.7 10/18/2023     11/09/2023      10/18/2023     BMP:   Lab Results   Component Value Date     11/09/2023     10/18/2023    POTASSIUM 4.5 11/09/2023    POTASSIUM 4.4 10/18/2023    CHLORIDE 102 11/09/2023    CHLORIDE 99 10/18/2023    CO2 27 11/09/2023    CO2 26 10/18/2023    BUN 25.2 (H) 11/09/2023    BUN 19.5 10/18/2023    CR 0.96 (H) 11/21/2023    CR 0.79 11/09/2023    GLC 94 11/09/2023     (H) 10/18/2023     COAGS:   Lab Results   Component Value Date    INR 0.97 08/04/2020     POC:   Lab Results   Component Value Date     (H) 11/02/2018     HEPATIC:   Lab Results   Component Value Date    ALBUMIN 4.6 10/18/2023    PROTTOTAL 7.1 10/18/2023    ALT 15 10/18/2023    AST 26 10/18/2023    ALKPHOS 60 10/18/2023    BILITOTAL 1.0 10/18/2023     OTHER:   Lab Results   Component Value Date    LACT 1.2 09/19/2023    A1C 6.2 (H) 02/16/2023    CARMEN 9.6 11/21/2023    MAG 1.8 12/05/2013    LIPASE 20 09/19/2023    TSH 3.29 10/18/2023    T4 1.90 (H) 06/15/2023    CRP <2.9 04/13/2017    SED 6 04/13/2017       Anesthesia Plan    ASA Status:  3       Anesthesia Type: MAC.              Consents    Anesthesia Plan(s) and associated risks, benefits, and realistic alternatives discussed. Questions answered and patient/representative(s) expressed understanding.     - Discussed: Risks, Benefits and Alternatives for BOTH SEDATION and the PROCEDURE were discussed     - Discussed with:  Patient            Postoperative Care            Comments:

## 2023-11-28 NOTE — ANESTHESIA CARE TRANSFER NOTE
Patient: Ya Stahl    Procedure: Procedure(s):  ESOPHAGOGASTRODUODENOSCOPY, WITH BIOPSY       Diagnosis: Dysphagia, unspecified type [R13.10]  Diagnosis Additional Information: No value filed.    Anesthesia Type:   MAC     Note:    Oropharynx: oropharynx clear of all foreign objects  Level of Consciousness: drowsy  Oxygen Supplementation: face mask  Level of Supplemental Oxygen (L/min / FiO2): 6  Independent Airway: airway patency satisfactory and stable  Dentition: dentition unchanged  Vital Signs Stable: post-procedure vital signs reviewed and stable  Report to RN Given: handoff report given  Patient transferred to: Phase II    Handoff Report: Identifed the Patient, Identified the Reponsible Provider, Reviewed the pertinent medical history, Discussed the surgical course, Reviewed Intra-OP anesthesia mangement and issues during anesthesia, Set expectations for post-procedure period and Allowed opportunity for questions and acknowledgement of understanding      Vitals:  Vitals Value Taken Time   /69 11/28/23 1020   Temp 36.6  C (97.9  F) 11/28/23 1020   Pulse 45 11/28/23 1020   Resp 14 11/28/23 1020   SpO2 100 % 11/28/23 1020       Electronically Signed By: SINTIA Brown CRNA  November 28, 2023  10:22 AM

## 2023-11-28 NOTE — TELEPHONE ENCOUNTER
Not seen since early nov and it was not for a preop and do not know what appt they are talking about. She has no open encounters.

## 2023-11-28 NOTE — TELEPHONE ENCOUNTER
Pre-op is a closed encounter. That is all I have seen her for on 11/9/23. Unsure as to what you are requesting at this point.    SINTIA Vásquez CNP

## 2023-11-28 NOTE — ANESTHESIA POSTPROCEDURE EVALUATION
Patient: Ya Stahl    Procedure: Procedure(s):  ESOPHAGOGASTRODUODENOSCOPY, WITH BIOPSY       Anesthesia Type:  MAC    Note:  Disposition: Outpatient   Postop Pain Control: Uneventful            Sign Out: Well controlled pain   PONV: No   Neuro/Psych: Uneventful            Sign Out: Acceptable/Baseline neuro status   Airway/Respiratory: Uneventful            Sign Out: Acceptable/Baseline resp. status   CV/Hemodynamics: Uneventful            Sign Out: Acceptable CV status; No obvious hypovolemia; No obvious fluid overload   Other NRE:    DID A NON-ROUTINE EVENT OCCUR?            Last vitals:  Vitals Value Taken Time   /80 11/28/23 1032   Temp 97.9  F (36.6  C) 11/28/23 1020   Pulse 46 11/28/23 1043   Resp 16 11/28/23 1030   SpO2 98 % 11/28/23 1043   Vitals shown include unfiled device data.    Electronically Signed By: Saira Shah MD  November 28, 2023  11:24 AM

## 2023-11-28 NOTE — TELEPHONE ENCOUNTER
Encounter is closed.     RECOMMENDATION:  APPROVAL GIVEN to proceed with proposed procedure, without further diagnostic evaluation.    And that is in the note? Not sure what else they would need.     SINTIA Vásquez CNP

## 2023-11-28 NOTE — INTERVAL H&P NOTE
I have reviewed the surgical (or preoperative) H&P that is linked to this encounter, and examined the patient. There are no significant changes    Clinical Conditions Present on Arrival:  Clinically Significant Risk Factors Present on Admission                 # Drug Induced Platelet Defect: home medication list includes an antiplatelet medication     Plan for egd and biopsies    Romario Cooper [facility]  [phone]

## 2023-11-28 NOTE — TELEPHONE ENCOUNTER
See message below from Carly Mi.  Is it ok for the pt to go to surgery?  As well as encounter closed?

## 2023-11-29 ENCOUNTER — ALLIED HEALTH/NURSE VISIT (OUTPATIENT)
Dept: NURSING | Facility: CLINIC | Age: 83
End: 2023-11-29
Attending: INTERNAL MEDICINE
Payer: COMMERCIAL

## 2023-11-29 DIAGNOSIS — M81.8 IDIOPATHIC OSTEOPOROSIS: Primary | ICD-10-CM

## 2023-11-29 PROCEDURE — 96372 THER/PROPH/DIAG INJ SC/IM: CPT | Performed by: INTERNAL MEDICINE

## 2023-11-29 PROCEDURE — 99207 PR NO CHARGE NURSE ONLY: CPT

## 2023-11-29 NOTE — PROGRESS NOTES
Clinic Administered Medication Documentation      Prolia Documentation    Indication: Prolia  (denosumab) is a prescription medicine used to treat osteoporosis in patients who:   Are at high risk for fracture, meaning patients who have had a fracture related to osteoporosis, or who have multiple risk factors for fracture.  Cannot use another osteoporosis medicine or other osteoporosis medicines did not work well.  The timeline for early/late injections would be 4 weeks early and any time after the 6 month bella. If a patient receives their injection late, then the subsequent injection would be 6 months from the date that they actually received the injection.    When was the last injection?  5/15/23  Was the last injection at least 6 months ago? Yes  Has the prior authorization been completed?  Yes  Is there an active order (written within the past 365 days, with administrations remaining, not ) in the chart?  Yes  Patient denies any dental work involving the bone (e.g. tooth extraction or dental implants) in the past 4 weeks?  Yes  Patient denies plans for any dental work involving the bone (e.g. tooth extraction or dental implants) in the next 4 weeks? Yes    The following steps were completed to comply with the REMS program for Prolia:  Reviewed information in the Medication Guide and Patient Counseling Chart, including the serious risks of Prolia  and the symptoms of each risk.  Advised patient to seek prompt medical attention if they have signs or symptoms of any of the serious risks.  Provided each patient a copy of the Medication Guide and Patient Brochure.    Prior to injection, verified patient identity using patient's name and date of birth. Medication was administered. Please see MAR and medication order for additional information. Patient instructed to remain in clinic for 15 minutes and report any adverse reaction to staff immediately.    Vial/Syringe: Syringe  Was this medication supplied by the  patient? No  Verified that the patient has refills remaining in their prescription.

## 2023-12-04 DIAGNOSIS — I77.810 ASCENDING AORTA DILATION (H): ICD-10-CM

## 2023-12-04 DIAGNOSIS — I10 HYPERTENSION GOAL BP (BLOOD PRESSURE) < 140/90: ICD-10-CM

## 2023-12-04 DIAGNOSIS — R60.9 EDEMA, UNSPECIFIED TYPE: ICD-10-CM

## 2023-12-04 RX ORDER — LISINOPRIL 10 MG/1
10 TABLET ORAL DAILY
Qty: 90 TABLET | Refills: 3 | Status: SHIPPED | OUTPATIENT
Start: 2023-12-04 | End: 2024-02-08 | Stop reason: DRUGHIGH

## 2023-12-14 ENCOUNTER — OFFICE VISIT (OUTPATIENT)
Dept: SURGERY | Facility: CLINIC | Age: 83
End: 2023-12-14
Payer: COMMERCIAL

## 2023-12-14 VITALS — OXYGEN SATURATION: 97 % | HEIGHT: 64 IN | HEART RATE: 56 BPM | BODY MASS INDEX: 24.02 KG/M2

## 2023-12-14 DIAGNOSIS — R13.10 DYSPHAGIA, UNSPECIFIED TYPE: Primary | ICD-10-CM

## 2023-12-14 PROCEDURE — 99212 OFFICE O/P EST SF 10 MIN: CPT | Performed by: SURGERY

## 2023-12-14 ASSESSMENT — PAIN SCALES - GENERAL: PAINLEVEL: NO PAIN (0)

## 2023-12-14 NOTE — LETTER
"    12/14/2023         RE: Ya Stahl  [address]  [place] [facility] [place] [place] [address]        Dear Colleague,    Thank you for referring your patient, Ya Stahl, to the [facility] [place]. Please see a copy of my visit note below.     HPI: Ya Stahl is here for follow up after her EGD.  She continues to have a cough but no other complaints.    Allergies, Medications, Social History, Past Medical History and Past Surgical History were reviewed and are noted in the chart.    Pulse 56   Ht 1.626 m (5' 4.02\")   LMP  (LMP Unknown)   SpO2 97%   BMI 24.02 kg/m    Body mass index is 24.02 kg/m .      EXAM:   GENERAL: Appears well      Incision/Surgical Site 10/14/22 Left Hip (Active)       Assessment/Plan: Ya Stahl continues to have a chronic cough with an unclear etiology.  We discussed the results of the EGD as well as the biopsies which were relatively unremarkable.  It is unclear whether or not her reflux is actually the cause of her cough.  Based on her complaints this may be more of an oral pharyngeal muscular dysfunction.  I will refer her to speech pathology for video follow-up evaluation and ongoing medical treatment.  She may follow-up with me at any time if she has any further questions or concerns.      Romario Cooper DO, FACS  [facility]  [phone]      Again, thank you for allowing me to participate in the care of your patient.        Sincerely,        Partha Cooper, DO  "

## 2023-12-14 NOTE — PROGRESS NOTES
" HPI: Ya Stahl is here for follow up after her EGD.  She continues to have a cough but no other complaints.    Allergies, Medications, Social History, Past Medical History and Past Surgical History were reviewed and are noted in the chart.    Pulse 56   Ht 1.626 m (5' 4.02\")   LMP  (LMP Unknown)   SpO2 97%   BMI 24.02 kg/m    Body mass index is 24.02 kg/m .      EXAM:   GENERAL: Appears well      Incision/Surgical Site 10/14/22 Left Hip (Active)       Assessment/Plan: Ya Stahl continues to have a chronic cough with an unclear etiology.  We discussed the results of the EGD as well as the biopsies which were relatively unremarkable.  It is unclear whether or not her reflux is actually the cause of her cough.  Based on her complaints this may be more of an oral pharyngeal muscular dysfunction.  I will refer her to speech pathology for video follow-up evaluation and ongoing medical treatment.  She may follow-up with me at any time if she has any further questions or concerns.      Romario Cooper DO [facility]  [phone]  "

## 2023-12-19 DIAGNOSIS — R13.10 DYSPHAGIA, UNSPECIFIED TYPE: Primary | ICD-10-CM

## 2023-12-21 ENCOUNTER — MYC REFILL (OUTPATIENT)
Dept: FAMILY MEDICINE | Facility: CLINIC | Age: 83
End: 2023-12-21
Payer: COMMERCIAL

## 2023-12-21 DIAGNOSIS — K21.9 GASTROESOPHAGEAL REFLUX DISEASE, UNSPECIFIED WHETHER ESOPHAGITIS PRESENT: Primary | ICD-10-CM

## 2023-12-21 RX ORDER — FAMOTIDINE 40 MG/1
40 TABLET, FILM COATED ORAL AT BEDTIME
Qty: 90 TABLET | Refills: 1 | Status: SHIPPED | OUTPATIENT
Start: 2023-12-21 | End: 2024-06-05

## 2023-12-22 ENCOUNTER — ALLIED HEALTH/NURSE VISIT (OUTPATIENT)
Dept: FAMILY MEDICINE | Facility: CLINIC | Age: 83
End: 2023-12-22
Payer: COMMERCIAL

## 2023-12-22 VITALS — SYSTOLIC BLOOD PRESSURE: 136 MMHG | DIASTOLIC BLOOD PRESSURE: 74 MMHG

## 2023-12-22 DIAGNOSIS — Z01.30 BP CHECK: Primary | ICD-10-CM

## 2023-12-22 PROCEDURE — 99207 PR NO CHARGE NURSE ONLY: CPT | Performed by: FAMILY MEDICINE

## 2023-12-22 NOTE — PROGRESS NOTES
Ya Stahl was evaluated at [facility] on December 22, 2023 at which time her blood pressure was:    BP Readings from Last 1 Encounters:   12/22/23 136/74     No data recorded      Reviewed lifestyle modifications for blood pressure control and reduction: including making healthy food choices, managing weight, getting regular exercise, smoking cessation, reducing alcohol consumption, monitoring blood pressure regularly.     Symptoms: None    BP Goal:< 140/90 mmHg    BP Assessment:  BP at goal    Potential Reasons for BP too high: NA - Not applicable    BP Follow-Up Plan: Recheck BP in 6 months at pharmacy    Recommendation to Provider: none    Note completed by: Kristopher Orlando RPH     Thank You   Larissa Diaz  [facility]

## 2024-01-26 DIAGNOSIS — E78.5 HYPERLIPIDEMIA LDL GOAL <70: ICD-10-CM

## 2024-01-26 DIAGNOSIS — I25.10 ASCVD (ARTERIOSCLEROTIC CARDIOVASCULAR DISEASE): ICD-10-CM

## 2024-01-26 RX ORDER — ATORVASTATIN CALCIUM 20 MG/1
20 TABLET, FILM COATED ORAL DAILY
Qty: 90 TABLET | Refills: 0 | Status: SHIPPED | OUTPATIENT
Start: 2024-01-26 | End: 2024-02-08

## 2024-02-05 ENCOUNTER — TELEPHONE (OUTPATIENT)
Dept: NEUROLOGY | Facility: CLINIC | Age: 84
End: 2024-02-05
Payer: COMMERCIAL

## 2024-02-05 NOTE — TELEPHONE ENCOUNTER
Caller spoke with the pt, they are able to attend the virtual apt on 2/6/24 with Dr. Laird and [place].    2/5/24 BD

## 2024-02-06 ENCOUNTER — VIRTUAL VISIT (OUTPATIENT)
Dept: NEUROLOGY | Facility: CLINIC | Age: 84
End: 2024-02-06
Payer: COMMERCIAL

## 2024-02-06 VITALS — HEIGHT: 64 IN | BODY MASS INDEX: 23.9 KG/M2 | WEIGHT: 140 LBS

## 2024-02-06 DIAGNOSIS — R26.89 BALANCE PROBLEMS: Primary | ICD-10-CM

## 2024-02-06 DIAGNOSIS — G25.0 BENIGN ESSENTIAL TREMOR: ICD-10-CM

## 2024-02-06 PROCEDURE — 99443 PR PHYSICIAN TELEPHONE EVALUATION 21-30 MIN: CPT | Mod: 95 | Performed by: PSYCHIATRY & NEUROLOGY

## 2024-02-06 ASSESSMENT — PAIN SCALES - GENERAL: PAINLEVEL: NO PAIN (0)

## 2024-02-06 NOTE — PATIENT INSTRUCTIONS
I attached some information about a medication that you could consider using for your tremor.  We can go over this in greater detail at a future in-person appointment.

## 2024-02-06 NOTE — PROGRESS NOTES
[facility] Neurology Follow Up Visit    Ya Stahl MRN# [id number]   Age: 83 year old YOB: 1940     Brief history of symptoms: The patient was initially seen in neurologic consultation on 8/28/2023 for evaluation of imbalance. Please see the comprehensive neurologic consultation notes from those dates in the Epic records for details.     She has a pertinent history of MGUS, Sjogren's disease, CTS s/p b/l release, Spinal fusion (L3-5 laminectomy with L4-5 fusion in 2018).  At our visit, she had a MoCA of 2730.  Her prior imaging did show a right cerebellar lacunar infarction. Her exam showed a head titubation tremor with mixed tremor in the arms.  She was to repeat her MRI brain, and if no major findings were noted then consideration towards agents for tremor like propranolol or primidone was to be made.    Interval history:   - MRI brain 9/9/2023 showed her tiny chronic right cerebellar lacunar infarction again, but no other regional tumor/infarction was noted.    Today, the patient is on a phone today. She doesn't express new issues/symptoms.  She was understanding that her imaging was normal.  Her imbalance is still present, and she finds that this mostly occurs after she takes certain medications (melatonin, pepcid, lipitor).       Physical Exam:   telephone         Assessment and Plan:   Assessment:  Imbalance  Essential tremor    The patient's imaging is reassuringly without new infarction or signs of regional atrophy (cerebellar, brainstem, basal ganglia, or cortex).  At this point, her imbalance may be more medication or time of day related than from some direct neurological source.  I did ask her to return to clinic for a follow up regarding her tremor, which at this point is likely essential in nature and may respond to an agent like primidone.  However, given her age and the sedating/medication related effects of primidone, I would want to see her in-person before considering starting the  drug.     Plan:  Follow up in Neurology clinic in 3-6 months (in-person) or earlier as needed should new concerns arise.    DANISH Laird D.O.   of Neurology    Total time today (21 min) in this patient encounter was spent on pre-charting, counseling and/or coordination of care.

## 2024-02-06 NOTE — LETTER
2/6/2024       RE: Ya Stahl  [address]  [place] [address]       Dear Colleague,    Thank you for referring your patient, Ya Stahl, to the [facility]UR[facility] [place] at [facility]. Please see a copy of my visit note below.    [facility] Neurology Follow Up Visit    Ya Stahl MRN# [id number]   Age: 83 year old YOB: 1940     Brief history of symptoms: The patient was initially seen in neurologic consultation on 8/28/2023 for evaluation of imbalance. Please see the comprehensive neurologic consultation notes from those dates in the Epic records for details.     She has a pertinent history of MGUS, Sjogren's disease, CTS s/p b/l release, Spinal fusion (L3-5 laminectomy with L4-5 fusion in 2018).  At our visit, she had a MoCA of 2730.  Her prior imaging did show a right cerebellar lacunar infarction. Her exam showed a head titubation tremor with mixed tremor in the arms.  She was to repeat her MRI brain, and if no major findings were noted then consideration towards agents for tremor like propranolol or primidone was to be made.    Interval history:   - MRI brain 9/9/2023 showed her tiny chronic right cerebellar lacunar infarction again, but no other regional tumor/infarction was noted.    Today, the patient is on a phone today. She doesn't express new issues/symptoms.  She was understanding that her imaging was normal.  Her imbalance is still present, and she finds that this mostly occurs after she takes certain medications (melatonin, pepcid, lipitor).       Physical Exam:   telephone         Assessment and Plan:   Assessment:  Imbalance  Essential tremor    The patient's imaging is reassuringly without new infarction or signs of regional atrophy (cerebellar, brainstem, basal ganglia, or cortex).  At this point, her imbalance may be more medication or time of day related than from some direct  neurological source.  I did ask her to return to clinic for a follow up regarding her tremor, which at this point is likely essential in nature and may respond to an agent like primidone.  However, given her age and the sedating/medication related effects of primidone, I would want to see her in-person before considering starting the drug.     Plan:  Follow up in Neurology clinic in 3-6 months (in-person) or earlier as needed should new concerns arise.        Total time today (21 min) in this patient encounter was spent on pre-charting, counseling and/or coordination of care.         Again, thank you for allowing me to participate in the care of your patient.      Sincerely,    Chano Laird, DO

## 2024-02-06 NOTE — NURSING NOTE
Is the patient currently in the state of [place]? YES    Visit mode: VIDEO     If the visit is dropped, the patient can be reconnected by: VIDEO VISIT: Text to cell phone:   Telephone Information:   Mobile [phone]       Will anyone else be joining the visit? NO  (If patient encounters technical issues they should call [phone]    How would you like to obtain your AVS? MyChart    Are changes needed to the allergy or medication list? No    Reason for visit: Follow Up    Chandrika ARTIS

## 2024-02-07 ENCOUNTER — TELEPHONE (OUTPATIENT)
Dept: NEUROLOGY | Facility: CLINIC | Age: 84
End: 2024-02-07
Payer: COMMERCIAL

## 2024-02-07 NOTE — TELEPHONE ENCOUNTER
Left Voicemail (1st Attempt) and Sent Mychart (1st Attempt) for the patient to call back and schedule the following:    Appointment type: Return Neurology  Provider: Sisi  Return date: 7/6/24 or near  Specialty phone number: [phone]  Additional appointment(s) needed: NA  Additonal Notes: 5mo follow up, in person    Staice Donnelly on 2/7/2024 at 12:10 PM

## 2024-02-08 ENCOUNTER — OFFICE VISIT (OUTPATIENT)
Dept: FAMILY MEDICINE | Facility: CLINIC | Age: 84
End: 2024-02-08
Payer: COMMERCIAL

## 2024-02-08 VITALS
WEIGHT: 145.2 LBS | SYSTOLIC BLOOD PRESSURE: 138 MMHG | BODY MASS INDEX: 24.79 KG/M2 | RESPIRATION RATE: 14 BRPM | HEART RATE: 55 BPM | DIASTOLIC BLOOD PRESSURE: 72 MMHG | HEIGHT: 64 IN | OXYGEN SATURATION: 100 % | TEMPERATURE: 97.6 F

## 2024-02-08 DIAGNOSIS — I25.83 CORONARY ARTERY DISEASE DUE TO LIPID RICH PLAQUE: Primary | ICD-10-CM

## 2024-02-08 DIAGNOSIS — E03.9 HYPOTHYROIDISM, UNSPECIFIED TYPE: ICD-10-CM

## 2024-02-08 DIAGNOSIS — M35.05 SJOGREN'S SYNDROME WITH INFLAMMATORY ARTHRITIS (H): ICD-10-CM

## 2024-02-08 DIAGNOSIS — I77.810 ASCENDING AORTA DILATION (H): ICD-10-CM

## 2024-02-08 DIAGNOSIS — I25.10 CORONARY ARTERY DISEASE DUE TO LIPID RICH PLAQUE: Primary | ICD-10-CM

## 2024-02-08 DIAGNOSIS — I10 HYPERTENSION GOAL BP (BLOOD PRESSURE) < 140/90: ICD-10-CM

## 2024-02-08 DIAGNOSIS — I25.10 ASCVD (ARTERIOSCLEROTIC CARDIOVASCULAR DISEASE): ICD-10-CM

## 2024-02-08 DIAGNOSIS — E78.5 HYPERLIPIDEMIA LDL GOAL <70: ICD-10-CM

## 2024-02-08 PROCEDURE — 99214 OFFICE O/P EST MOD 30 MIN: CPT | Performed by: FAMILY MEDICINE

## 2024-02-08 RX ORDER — LISINOPRIL 20 MG/1
20 TABLET ORAL DAILY
Qty: 90 TABLET | Refills: 1 | Status: SHIPPED | OUTPATIENT
Start: 2024-02-08 | End: 2024-07-09 | Stop reason: ALTCHOICE

## 2024-02-08 RX ORDER — LEVOTHYROXINE SODIUM 88 UG/1
88 TABLET ORAL DAILY
Qty: 90 TABLET | Refills: 3 | Status: SHIPPED | OUTPATIENT
Start: 2024-02-08

## 2024-02-08 RX ORDER — RESPIRATORY SYNCYTIAL VIRUS VACCINE 120MCG/0.5
0.5 KIT INTRAMUSCULAR ONCE
Qty: 1 EACH | Refills: 0 | Status: CANCELLED | OUTPATIENT
Start: 2024-02-08 | End: 2024-02-08

## 2024-02-08 RX ORDER — ATORVASTATIN CALCIUM 20 MG/1
20 TABLET, FILM COATED ORAL DAILY
Qty: 90 TABLET | Refills: 3 | Status: SHIPPED | OUTPATIENT
Start: 2024-02-08

## 2024-02-08 ASSESSMENT — PAIN SCALES - GENERAL: PAINLEVEL: NO PAIN (0)

## 2024-02-08 NOTE — PROGRESS NOTES
Assessment & Plan     Coronary artery disease due to lipid rich plaque  Stable, seeing kyleigh    Sjogren's syndrome with inflammatory arthritis (H24)  On going, managed    Ascending aorta dilation (H24)  Followed by kyleigh, needs her BP controlled      Hypertension goal BP (blood pressure) < 140/90  Refilled, but raised to 20mg, stopped the hydrochlorothiazide due to urine issue, recheck in 2 weeks  - lisinopril (ZESTRIL) 20 MG tablet; Take 1 tablet (20 mg) by mouth daily    Hyperlipidemia LDL goal <70  Cont same  - atorvastatin (LIPITOR) 20 MG tablet; Take 1 tablet (20 mg) by mouth daily    ASCVD (arteriosclerotic cardiovascular disease)  Cont same, stable  - atorvastatin (LIPITOR) 20 MG tablet; Take 1 tablet (20 mg) by mouth daily    Hypothyroidism, unspecified type  Cont same  - levothyroxine (SYNTHROID/LEVOTHROID) 88 MCG tablet; Take 1 tablet (88 mcg) by mouth daily    Ordering of each unique test  Prescription drug management  30 minutes spent by me on the date of the encounter doing chart review, history and exam, documentation and further activities per the note        Work on weight loss  Regular exercise    Subjective   Ya is a 83 year old, presenting for the following health issues:  Coronary Artery Disease (Follow up from visit with Dr. Montanez) and Ear Problem (Was told her ears are plugged with wax/)        2/8/2024     9:09 AM   Additional Questions   Roomed by Soraya MUSTAFA CMA     HPI  Brother with salivary gland cancer    Vascular Disease Follow-up    How often do you take nitroglycerin? Never  Do you take an aspirin every day? Yes    Dr. Montanez' recommendations were:  Decrease metoprolol xl to 25 mg daily, lisinopril 10 mg /hydrochlorothiazide 12.5 mg daily, check blood pressures at home, follow up blood pressure and bmp with primary care MD    Other Concerns    When she went in for [facility] the provider said her right ear was completely plugged and her left was about 80% plugged. No pain or  "drainage. Not bothering her other than she cannot hear.    Covid in January, 3rd time, doing ok now.    Did fall Jan 1st, large hematoma on the right arm, concrete floor. Did hurt her knee on the left side. End of month has appt.     Prevacid every day, egd was normal, did choke the other day on fluids, and is doing a swallow test  ENT also    Neurology appt went well, balance is good, benign tremor          Review of Systems  Constitutional, HEENT, cardiovascular, pulmonary, GI, , musculoskeletal, neuro, skin, endocrine and psych systems are negative, except as otherwise noted.      Objective    BP (!) 147/76 (BP Location: Right arm, Patient Position: Sitting, Cuff Size: Adult Regular)   Pulse 55   Temp 97.6  F (36.4  C) (Temporal)   Resp 14   Ht 1.626 m (5' 4\")   Wt 65.9 kg (145 lb 3.2 oz)   LMP  (LMP Unknown)   SpO2 100%   BMI 24.92 kg/m    Body mass index is 24.92 kg/m .  Physical Exam   GENERAL: alert and no distress  EYES: Eyes grossly normal to inspection, and conjunctivae and sclerae normal  HENT: ear canals(old wax bilt, not blocking the canal) and TM's normal, nose and mouth without ulcers or lesions  NECK: no adenopathy, no asymmetry, masses, or scars  RESP: lungs clear to auscultation - no rales, rhonchi or wheezes  CV: regular rate and rhythm, normal S1 S2, no S3 or S4, no murmur, click or rub, no peripheral edema  ABDOMEN: soft, nontender, no hepatosplenomegaly, no masses and bowel sounds normal  MS: no gross musculoskeletal defects noted, no edema  Old hematoma noted on right forearm, no pain, firm mass  SKIN: no suspicious lesions or rashes  NEURO: Normal strength and tone, mentation intact and speech normal  PSYCH: mentation appears normal, affect normal/bright            Signed Electronically by: Devi Romano MD    "

## 2024-02-13 ENCOUNTER — THERAPY VISIT (OUTPATIENT)
Dept: SPEECH THERAPY | Facility: CLINIC | Age: 84
End: 2024-02-13
Attending: SURGERY
Payer: COMMERCIAL

## 2024-02-13 DIAGNOSIS — R05.3 CHRONIC COUGH: Primary | ICD-10-CM

## 2024-02-13 DIAGNOSIS — R49.0 DYSPHONIA: ICD-10-CM

## 2024-02-13 DIAGNOSIS — R13.10 DYSPHAGIA, UNSPECIFIED TYPE: ICD-10-CM

## 2024-02-13 PROCEDURE — 92526 ORAL FUNCTION THERAPY: CPT | Mod: GN | Performed by: SPEECH-LANGUAGE PATHOLOGIST

## 2024-02-13 PROCEDURE — 92610 EVALUATE SWALLOWING FUNCTION: CPT | Mod: GN | Performed by: SPEECH-LANGUAGE PATHOLOGIST

## 2024-02-13 NOTE — PROGRESS NOTES
SPEECH LANGUAGE PATHOLOGY EVALUATION    See electronic medical record for Abuse and Falls Screening details.    Subjective      Presenting condition or subjective complaint: Per appt with RAMIREZ Cooper DO on 12/14/23: Ya Stahl continues to have a chronic cough with an unclear etiology.  We discussed the results of the EGD as well as the biopsies which were relatively unremarkable.  It is unclear whether or not her reflux is actually the cause of her cough.  Based on her complaints this may be more of an oral pharyngeal muscular dysfunction. Results of OP VFSS 10/23:  Pt presents with mild oral-pharyngeal dysphagia per today's evaluations.  Deficits include report of intermittent lip and tongue numbness and drooling at times, min oral motor deficits during the exam, hoarse voicing, delayed swallows at times, mild impaired tongue base retraction, curved epiglottis with decreased closure at times , and a tight UES with residue under and at the UES.  Deficits resulted in min-mild to no pharyngeal residue, moderate flash penetration of thin by cup x 1, and aspiration of thin liquids x 1 during large sip by straw.  The chin tuck was successful at eliminating penetration/aspiration of thin liquids by straw.  Recommend pt continue a regular diet with self-selection of soft food items with added sauce/gravy and use of recommended safe swallow strategies.  Pt stated she is concerned regarding her oral numbness and drooling as well as her difficulty swallowing and would like OP swallow Tx follow up.  Recommend OP clinical SLP swallow Tx to assess diet tolerance, train strategies, and provide pt with a home exercise program for oral-pharyngeal exercises.  Recommend pt follow up with MD regarding her oral motor nerve/neurological concerns and recent MRI.  Recommend consideration of a GI consult if increased esophageal dysphagia concerns arise.     Date of onset: 12/19/23 (Date of MD orders.)    Relevant medical history: High  blood pressure   Dates & types of surgery: To numerous (per Pt response)    Prior diagnostic imaging/testing results: Please see EMR.       Prior therapy history for the same diagnosis, illness or injury: No      Living Environment  Social support: With a significant other or spouse   Help at home: None    Employment: No      Patient goals for therapy: Swallow easily and effectively.    Pain assessment: Pain denied  Pt reports lower lip and lingual numbness, avoids drinking wine d/t this.      Objective     SWALLOW EVALUTION  Dysphagia history: No concerns up until 1.5 yrs ago, Pt reports oral dryness, food sticking to the roof of her mouth and pocketing in cheeks as well as bilateral anterior loss of foods, reports using ~4 napkins/meal. Pt with Hx of GERD feels it is well controled with Pepsid at night and Omep. In the AM.   Current Diet/Method of Nutritional Intake: oral diet, thin liquids (level 0), regular diet        CLINICAL SWALLOW EVALUATION  Oral Motor Function: generally intact  Dentition: some missing teeth, Pt reports partial dentures (lower) not present today.   Secretion management: WFL  Oral labial function: WFL  Lingual function: WFL     Level of assist required for feeding: no assistance needed   Textures Trialed:   Clinical Swallow Eval: Thin Liquids  Mode of presentation: cup, self-fed   Volume presented: 1-2 tbs  Preparatory Phase: WFL  Oral Phase: premature pharyngeal entry, suspected   Pharyngeal phase of swallow: impaired, coughing/choking, repeated swallows, throat clearing, wet vocal quality after swallow   Strategies trialed during procedure: COCO SLP CLINICAL EVAL STRATEGIES: chin tuck, head turn to the left   Diagnostic statement: Pt demonstrates overt s/sx of aspiration/penetration as evidenced by profuse coughing, minimal improvements with reduced amts and chin tuck, best success with head turn Lt-delayed cough still present.     Clinical Swallow Eval: Purees  Mode of presentation:  spoon, self-fed   Volume presented: 1tbs   Preparatory Phase: WFL  Oral Phase: WFL, premature pharyngeal entry  Pharyngeal phase of swallow: impaired, coughing/choking, feeling of something stuck in throat, repeated swallows, throat clearing   Strategies trialed during procedure: COCO SLP CLINICAL EVAL STRATEGIES: head turn to the left   Diagnostic statement: Pt demonstrates overt s/sx of aspiration/penetration as evidenced by profuse coughing, mild to moderate improvements with Lt head turn.     Did not trial add. Textures d/t significant overt s/sx of aspiration/penetration noted during this assessment.        ESOPHAGEAL PHASE OF SWALLOW  no observed or reported concerns related to esophageal function     SWALLOW ASSESSMENT CLINICAL IMPRESSIONS AND RATIONALE  Diet Consistency Recommendations: oral diet, thin liquids (level 0), minced & moist (level 5)    Recommended Feeding/Eating Techniques: small bolus size, no straws, slow rate of intake, head turn to left, alternate food and liquid intake, double swallow   Medication Administration Recommendations: None, Pt does not report difficulty with medication intake. SLP provided education on use of head turn Lt PRN.   Instrumental Assessment Recommendations: VFSS (videofluoroscopic swallowing study)     Assessment & Plan   CLINICAL IMPRESSIONS   Medical Diagnosis: Dysphagia, unspecified type (R13.10    Treatment Diagnosis: Moderate to severe oropharyngeal dysphagia   Impression/Assessment: Pt is a 83 year old female with swallowing complaints. The following significant findings have been identified: impaired swallowing, characterized by poor AP movement, suspected poor oral bolus control, Pt reports of oral stasis, suspected premature pharyngeal entry and suspected incomplete pharyngeal clearance with overt s/x of aspiration/penetration on all texture trials even with compensatory strategies. Identified deficits interfere with their ability to consume an oral diet and  maintain nutrition as compared to previous level of function.    PLAN OF CARE  Treatment Interventions: Swallowing dysfunction and/or oral function for feeding    Prognosis to achieve stated therapy goals is fair   Rehab potential is impacted by: current level of function    Long Term Goals   SLP Goal 1  Goal Identifier: LTG [id number]  Goal Description: Patient will demonstrate use of 3 safe swallow strategies independently to tolerate the least restrictive diet without overt s/x of penetration/aspiration across 1 week of meals, per patient and family report.  Rationale: To maximize safety, ease and/or independence of oral intake  Target Date: 04/13/24  SLP Goal 2  Goal Identifier: [id number] [id number]  Goal Description: Patient will complete recommended oralpharyngeal exercises 10x each provided min to mod cues in order to improve swallow function for least restrictive diet without overt s/x of penetration/aspiration across 1 week of meals, per patient and family report.  Rationale: To maximize safety, ease and/or independence of oral intake  Target Date: 04/13/24      Frequency of Treatment: 1x/week  Duration of Treatment: 60 days     Recommended Referrals to Other Professionals:  OP VFSS   Education Assessment:   Learner/Method: Patient;Listening;Reading;Demonstration;No Barriers to Learning    Risks and benefits of evaluation/treatment have been explained.   Patient/Family/caregiver agrees with Plan of Care.     Evaluation Time:    SLP Eval: oral/pharyngeal swallow function, clinical minutes (82423): 30     Present: Not applicable     Signing Clinician: LATRICE Mercedes    [facility]                                                                                   OUTPATIENT SPEECH LANGUAGE PATHOLOGY      PLAN OF TREATMENT FOR OUTPATIENT REHABILITATION   Patient's Last Name, First Name, Ya Turk YOB: 1940   Provider's Name    [facility]   Medical Record No.  [id number]     Onset Date: 12/19/23 (Date of MD orders.) Start of Care Date: 02/13/24     Medical Diagnosis:  Dysphagia, unspecified type (R13.10      SLP Treatment Diagnosis: Moderate to severe oropharyngeal dysphagia  Plan of Treatment  Frequency/Duration: 1x/week  / 60 days     Certification date from 02/13/24   To 04/13/24          See note for plan of treatment details and functional goals     Karen Snell, SLP                         I CERTIFY THE NEED FOR THESE SERVICES FURNISHED UNDER        THIS PLAN OF TREATMENT AND WHILE UNDER MY CARE     (Physician attestation of this document indicates review and certification of the therapy plan).              Referring Provider:  Partha Cooper  PCP: Dr. EDINSON Romano MD     Initial Assessment  See Epic Evaluation- 02/13/24

## 2024-02-20 ENCOUNTER — ALLIED HEALTH/NURSE VISIT (OUTPATIENT)
Dept: FAMILY MEDICINE | Facility: CLINIC | Age: 84
End: 2024-02-20
Payer: COMMERCIAL

## 2024-02-20 ENCOUNTER — THERAPY VISIT (OUTPATIENT)
Dept: SPEECH THERAPY | Facility: CLINIC | Age: 84
End: 2024-02-20
Attending: SURGERY
Payer: COMMERCIAL

## 2024-02-20 VITALS — DIASTOLIC BLOOD PRESSURE: 78 MMHG | SYSTOLIC BLOOD PRESSURE: 146 MMHG

## 2024-02-20 DIAGNOSIS — R05.3 CHRONIC COUGH: ICD-10-CM

## 2024-02-20 DIAGNOSIS — R49.0 DYSPHONIA: ICD-10-CM

## 2024-02-20 DIAGNOSIS — R13.10 DYSPHAGIA, UNSPECIFIED TYPE: Primary | ICD-10-CM

## 2024-02-20 DIAGNOSIS — Z01.30 BP CHECK: Primary | ICD-10-CM

## 2024-02-20 PROCEDURE — 99207 PR NO CHARGE NURSE ONLY: CPT | Performed by: FAMILY MEDICINE

## 2024-02-20 PROCEDURE — 92524 BEHAVRAL QUALIT ANALYS VOICE: CPT | Mod: GN | Performed by: SPEECH-LANGUAGE PATHOLOGIST

## 2024-02-20 PROCEDURE — 92526 ORAL FUNCTION THERAPY: CPT | Mod: GN | Performed by: SPEECH-LANGUAGE PATHOLOGIST

## 2024-02-20 NOTE — PROGRESS NOTES
SPEECH LANGUAGE PATHOLOGY EVALUATION    See electronic medical record for Abuse and Falls Screening details.    Subjective      Presenting condition or subjective complaint: Per appt with RAMIREZ Cooper DO on 12/14/23: Ya Stahl continues to have a chronic cough with an unclear etiology.  We discussed the results of the EGD as well as the biopsies which were relatively unremarkable.  It is unclear whether or not her reflux is actually the cause of her cough.  Based on her complaints this may be more of an oral pharyngeal muscular dysfunction. Pt completed OP SLP clinical swallow evaluation on 2/13/24, Pt expressed concerns with vocal hoarsness on that day, SLP requested updated MD orders for continuation of care with evaluation and treatment of dysphonia.   Date of onset: 12/19/23 (Date of MD orders.)    Relevant medical history: High blood pressure , Sjogrens Syndrom  Dates & types of surgery: To numerous    Prior diagnostic imaging/testing results:   Clinical swallow evaluation completed 2/13/24.     Prior therapy history for the same diagnosis, illness or injury: No      Living Environment  Social support: With a significant other or spouse   Help at home: None    Employment: No      Patient goals for therapy: Swallow easily and effectively.    Pain assessment: Pain denied     Objective     PERSONAL RATING/VOICE USE  Avocational Voice Use: with family and friends.   Patient description of current voice quality: gravely.    Describe the amount of typical non-work voice use: very little    Describe the intensity of typical non-work voice use: soft and quiet       COUGH/THROAT CLEAR  Cough/throat clear characteristics: frequent, locus of cough sounds consistent with lower airway   Cough/throat clear triggers in evaluation: voice use, deep breathing, Pt with Hx of GERD.       HYPERFUNCTION  Visible tension:  none        VOICE PROFILE DURING CONVERSATION (1 min monologue & paragraph reading)  Voice quality: rough    Voice quality severity rating continuum: 4 - moderate   Breath control: shallow, poor respiratory/phonation coordination   Breath control severity rating continuum: 5 - mild-moderate  Voice Use/Effort: pinched/squeezed larynx   Voice Use/Effort severity rating continuum: 6 - mild  Pitch/Frequency Description: pitch breaks   Pitch/Frequency severity rating continuum: 5 - mild-moderate  Average dB: 68  Volume: too quiet   Volume severity rating continuum: 5 - mild-moderate  Neuromuscular Control: shaky, flutter   Neuromuscular Control severity rating continuum: 4 - moderate  Resonance: WNL   Resonance severity rating continuum: 7 - WNL    ADDUCTION/ABDUCTION FUNCTION  Laryngeal diadokinetic speed: sluggish, inconsistent.   Adduction/Abduction function scale: Age 66+, norm per sec: 4     VIBRATORY FUNCTION OF VOCAL FOLDS  Prolonged  ah  at mid pitch (sec):  68dB SPL for an average of 8 seconds  Prolonged  ah  at high pitch (sec):  70dB SPL for an average of 7 seconds  Prolonged  ah  at low pitch (sec):  67dB SPL for an average of 6.5 seconds  Vibratory Function of Vocal Folds Scale Norms: females 20 - 80 yrs: 10 - 22 secs     FUNCTIONAL LENGTHENERS/SHORTENERS (CT & TA muscles)  Pitch glides: limited range    Colby bridgett  (high/low): limited range    Assessment & Plan   CLINICAL IMPRESSIONS   Medical Diagnosis: Chronic cough (R05.3)  - Primary    Dysphagia, unspecified type (R13.10)    Dysphonia (R49.0)    Treatment Diagnosis: Moderate to severe oropharyngeal dysphagia, Moderate dysphonia   Impression/Assessment: Pt is a 83 year old female with vocal complaints. The following significant findings have been identified: impaired voicing, characterized by hoarse, rough, gravely voice with poor respiratory-phonatory coordination. Identified deficits interfere with their ability to communicate wants and needs as compared to previous level of function.    PLAN OF CARE  Treatment Interventions: Voice    Prognosis to achieve  stated therapy goals is good   Rehab potential is impacted by: comorbidities, patient awareness of deficits    Long Term Goals   SLP Goal 1  Goal Identifier: LTG [id number]  Goal Description: Patient will demonstrate use of 3 safe swallow strategies independently to tolerate the least restrictive diet without overt s/x of penetration/aspiration across 1 week of meals, per patient and family report.  Rationale: To maximize safety, ease and/or independence of oral intake  Target Date: 04/13/24  SLP Goal 2  Goal Identifier: LTG [id number]  Goal Description: Patient will complete recommended oralpharyngeal exercises 10x each provided min to mod cues in order to improve swallow function for least restrictive diet without overt s/x of penetration/aspiration across 1 week of meals, per patient and family report.  Rationale: To maximize safety, ease and/or independence of oral intake  Target Date: 04/13/24  SLP Goal 3  Goal Identifier: LTG 3-Voice  Goal Description: Pt will learn, demonstrate and impliment use of 2-3 vocal endurance and quality exercises given min cues from SLP to promote reduced laryngeal tension and improved vocal quality/volume for communication with her .  Rationale: To maximize functional communication within the home or community  Target Date: 04/13/24      Frequency of Treatment: 1x/week  Duration of Treatment: 60 days     Recommended Referrals to Other Professionals:  None at this time.   Education Assessment:   Learner/Method: Patient;Listening;Reading;Demonstration;No Barriers to Learning    Risks and benefits of evaluation/treatment have been explained.   Patient/Family/caregiver agrees with Plan of Care.     Evaluation Time:    Voice Minutes (50239): 15     Present: Not applicable     Signing Clinician: Karen Snell, SLP    [facility]                                                                                    OUTPATIENT SPEECH LANGUAGE PATHOLOGY      PLAN OF TREATMENT FOR OUTPATIENT REHABILITATION   Patient's Last Name, First Name, Ya Turk YOB: 1940   Provider's Name   [facility]   Medical Record No.  [id number]     Onset Date: 12/19/23 (Date of MD orders.) Start of Care Date: 02/13/24     Medical Diagnosis:  Chronic cough (R05.3)  - Primary    Dysphagia, unspecified type (R13.10)    Dysphonia (R49.0)      SLP Treatment Diagnosis: Moderate to severe oropharyngeal dysphagia, Moderate dysphonia  Plan of Treatment  Frequency/Duration: 1x/week  / 60 days     Certification date from 02/20/24   To 04/20/24          See note for plan of treatment details and functional goals     Karen Snell, SLP                         I CERTIFY THE NEED FOR THESE SERVICES FURNISHED UNDER        THIS PLAN OF TREATMENT AND WHILE UNDER MY CARE     (Physician attestation of this document indicates review and certification of the therapy plan).              Referring Provider:  Partha Cooper    Initial Assessment  See Epic Evaluation- 02/13/24

## 2024-02-20 NOTE — Clinical Note
Please notify patient that hepatitis A screening confirms that she is immune and does NOT have current infection. Routing message to PCP for review because BP checked at pharmacy is above goal. Recommended patient to follow-up with PCP.  PCP please close this encounter.

## 2024-02-20 NOTE — PROGRESS NOTES
Ya Stahl was evaluated at [facility] on February 20, 2024 at which time her blood pressure was:    BP Readings from Last 1 Encounters:   02/20/24 (!) 146/78     No data recorded      Reviewed lifestyle modifications for blood pressure control and reduction: including making healthy food choices, managing weight, getting regular exercise, smoking cessation, reducing alcohol consumption, monitoring blood pressure regularly.     Symptoms: None    BP Goal:< 140/90 mmHg    BP Assessment:  BP too high    Potential Reasons for BP too high: NA - Not applicable    BP Follow-Up Plan: Recheck BP in 30 days at pharmacy    Recommendation to Provider: Recheck blood pressure within 30 days.    Note completed by: Kristopher Orlando RPH     Thank You   Kristopher Orlando RPh [facility]

## 2024-02-23 NOTE — PROGRESS NOTES
Given her age, this number is ok. Please have her check it once per week, if she has a cuff at home.  Thank you

## 2024-02-27 ENCOUNTER — THERAPY VISIT (OUTPATIENT)
Dept: SPEECH THERAPY | Facility: CLINIC | Age: 84
End: 2024-02-27
Payer: COMMERCIAL

## 2024-02-27 DIAGNOSIS — R05.3 CHRONIC COUGH: ICD-10-CM

## 2024-02-27 DIAGNOSIS — R13.10 DYSPHAGIA, UNSPECIFIED TYPE: Primary | ICD-10-CM

## 2024-02-27 PROCEDURE — 92526 ORAL FUNCTION THERAPY: CPT | Mod: GN | Performed by: SPEECH-LANGUAGE PATHOLOGIST

## 2024-03-06 ENCOUNTER — THERAPY VISIT (OUTPATIENT)
Dept: SPEECH THERAPY | Facility: CLINIC | Age: 84
End: 2024-03-06
Payer: COMMERCIAL

## 2024-03-06 DIAGNOSIS — R05.3 CHRONIC COUGH: ICD-10-CM

## 2024-03-06 DIAGNOSIS — R49.0 DYSPHONIA: ICD-10-CM

## 2024-03-06 DIAGNOSIS — R13.10 DYSPHAGIA, UNSPECIFIED TYPE: Primary | ICD-10-CM

## 2024-03-06 PROCEDURE — 92507 TX SP LANG VOICE COMM INDIV: CPT | Mod: GN | Performed by: SPEECH-LANGUAGE PATHOLOGIST

## 2024-03-06 PROCEDURE — 92526 ORAL FUNCTION THERAPY: CPT | Mod: GN | Performed by: SPEECH-LANGUAGE PATHOLOGIST

## 2024-03-12 ENCOUNTER — THERAPY VISIT (OUTPATIENT)
Dept: SPEECH THERAPY | Facility: CLINIC | Age: 84
End: 2024-03-12
Payer: COMMERCIAL

## 2024-03-12 DIAGNOSIS — R05.3 CHRONIC COUGH: ICD-10-CM

## 2024-03-12 DIAGNOSIS — R49.0 DYSPHONIA: ICD-10-CM

## 2024-03-12 DIAGNOSIS — R13.10 DYSPHAGIA, UNSPECIFIED TYPE: Primary | ICD-10-CM

## 2024-03-12 PROCEDURE — 92507 TX SP LANG VOICE COMM INDIV: CPT | Mod: GN | Performed by: SPEECH-LANGUAGE PATHOLOGIST

## 2024-03-12 PROCEDURE — 92526 ORAL FUNCTION THERAPY: CPT | Mod: GN | Performed by: SPEECH-LANGUAGE PATHOLOGIST

## 2024-03-14 ENCOUNTER — OFFICE VISIT (OUTPATIENT)
Dept: ENDOCRINOLOGY | Facility: CLINIC | Age: 84
End: 2024-03-14
Payer: COMMERCIAL

## 2024-03-14 VITALS
SYSTOLIC BLOOD PRESSURE: 125 MMHG | BODY MASS INDEX: 23.73 KG/M2 | RESPIRATION RATE: 16 BRPM | OXYGEN SATURATION: 98 % | HEART RATE: 64 BPM | HEIGHT: 64 IN | TEMPERATURE: 97.3 F | DIASTOLIC BLOOD PRESSURE: 74 MMHG | WEIGHT: 139 LBS

## 2024-03-14 DIAGNOSIS — Q78.2 SEVERE OSTEOPETROSIS: ICD-10-CM

## 2024-03-14 DIAGNOSIS — Z92.29 HISTORY OF DRUG THERAPY: ICD-10-CM

## 2024-03-14 DIAGNOSIS — M81.8 IDIOPATHIC OSTEOPOROSIS: ICD-10-CM

## 2024-03-14 DIAGNOSIS — M81.0 SENILE OSTEOPOROSIS: Primary | ICD-10-CM

## 2024-03-14 PROCEDURE — 99214 OFFICE O/P EST MOD 30 MIN: CPT | Performed by: INTERNAL MEDICINE

## 2024-03-14 PROCEDURE — G2211 COMPLEX E/M VISIT ADD ON: HCPCS | Performed by: INTERNAL MEDICINE

## 2024-03-14 RX ORDER — BENZONATATE 200 MG/1
200 CAPSULE ORAL 3 TIMES DAILY PRN
COMMUNITY
End: 2024-07-09

## 2024-03-14 RX ORDER — NEOMYCIN SULFATE, POLYMYXIN B SULFATE, AND DEXAMETHASONE 3.5; 10000; 1 MG/G; [USP'U]/G; MG/G
OINTMENT OPHTHALMIC
COMMUNITY
Start: 2024-03-11 | End: 2024-07-09

## 2024-03-14 NOTE — PROGRESS NOTES
Name: Ya Stahl  Seen for f/u ofosteoporosis.     HPI:  Ya Stahl is a 83 year old female who presents for the evaluation of osteoporosis.   has a past medical history of Arthritis, Cerebral infarction (H) (2019), Complication of anesthesia, Coronary artery disease, Disease of thyroid gland, Displacement of lumbar intervertebral disc without myelopathy, Gastro-oesophageal reflux disease, GERD (gastroesophageal reflux disease), Hearing loss, Heart attack (H) (03/2016), History of anesthesia complications, History of angina, Hypertension, Low back pain, Migraine headache, Myocardial infarction (H), Numbness and tingling, PONV (postoperative nausea and vomiting), Sicca syndrome (H24), Sjogren's disease (H24), Sjogren's syndrome (H24), Spider veins, and Unspecified hypothyroidism.    DEXA 2006- normal BMD  DEXA 2011, 2014- Osteopenia  DEXA 2/2022: osteoporosis  Has upcoming DEXA appointment.    Diagnosed with osteoporosis in 2/2022 based on DEXA scan.  DEXA 2/2022: Severe osteoporosis on the basis of hip fracture. There has been probably no significant cahnge in bone density of the hip(s). The forearm was not included on the previous study so comparison is not possible.    The spine is not acceptable for evaluation due to previous surgical changes.    The right femur is not acceptable for evaluation due to previous surgical repair    Previous medication- Fosamax x 5 yrs and went off it.   (8/2014-1/2021)  Started PROLIA 2022.    Prolia dates: (get at [facility])  5/2022  11/2022  05/15/23   11/29/23   Has upcoming appointment scheduled for 5/2024.    Tolerating well.    In 1/2024-- she tripped and fall but no fractures.      GERD: On PPI- under well controled.  Dental; OK. No upcoming dental procedure. Has gum disease.    Smoke:No  Family History:mother  Menstrual history/Birthing: s/p menopause at age 37. No HRT  Fractures: Fracture of right femur and pelvis at age 74 from a fall.Treated surgically. S/p hip  "replacement. (Hip fracture history?  Not clear based on chart review)  Kidney stones: No  GI Surgery:s/p cholecystectomy  Duration of therapy:   Exercise:walks 3 miles/day and does weights 5 days a week.  Diet: takes boost every few days  Ca/Vitamin D: calcium 600 mg BID, vit D 4000 international unit(s)/day.  Alcohol:  No  Eating Disorder: No  Steroid Use:  No  PMH/PSH:  Past Medical History:   Diagnosis Date    Arthritis     Cerebral infarction (H) 2019    Complication of anesthesia     \"hard time waking up / N & V\"    Coronary artery disease     Disease of thyroid gland     Displacement of lumbar intervertebral disc without myelopathy     Gastro-oesophageal reflux disease     GERD (gastroesophageal reflux disease)     Hearing loss     has bilateral aids    Heart attack (H) 03/2016    History of anesthesia complications     delayed emergence    History of angina     Hypertension     Low back pain     Migraine headache     Myocardial infarction (H)     Numbness and tingling     down right leg (associated with back pain)    PONV (postoperative nausea and vomiting)     Sicca syndrome (H24)     Sjogren's disease (H24)     Sjogren's syndrome (H24)     sees specialist; dentist    Spider veins     Unspecified hypothyroidism      Past Surgical History:   Procedure Laterality Date    ARTHROPLASTY HIP ANTERIOR Left 10/14/2022    Procedure: LEFT TOTAL HIP ARTHROPLASTY DIRECT ANTERIOR APPROACH;  Surgeon: Selvin Saab MD;  Location:  OR    ARTHROSCOPY KNEE  10/05/2012    R Procedure: ARTHROSCOPY KNEE;  RIGHT KNEE ARTHROSCOPY, PARTIAL MEDIAL MENISCECTOMY;  Surgeon: Karli Zaragoza MD;  Location: [facility]    BACK SURGERY  About. 5 years ago    Fusion of 4&5 lumbar    BLADDER SURGERY      BUNIONECTOMY  ~2010    L foot.     CATARACT IOL, RT/LT Bilateral 07/2017    COLONOSCOPY N/A 01/17/2017    normal; no repeat Procedure: COLONOSCOPY;  Surgeon: Fan Giordano MD;  Location:  GI    ENDOSCOPIC RELEASE CARPAL TUNNEL  " 09/11/2012    R Procedure: ENDOSCOPIC RELEASE CARPAL TUNNEL;  Right Endoscopic carpal tunnel release, left ringer finger cortizon injection;  Surgeon: Anne Marie Catherine MD;  Location:  OR    ESOPHAGOSCOPY, GASTROSCOPY, DUODENOSCOPY (EGD), COMBINED N/A 12/26/2014    Procedure: COMBINED ESOPHAGOSCOPY, GASTROSCOPY, DUODENOSCOPY (EGD);  Surgeon: Fan Giordano MD;  Location:  GI    ESOPHAGOSCOPY, GASTROSCOPY, DUODENOSCOPY (EGD), COMBINED N/A 11/28/2023    Procedure: ESOPHAGOGASTRODUODENOSCOPY, WITH BIOPSY;  Surgeon: Partha Copoer, DO;  Location: [facility]    EXCISE EXOSTOSIS FOOT Left 12/01/2016    Procedure: EXCISE EXOSTOSIS FOOT;  Surgeon: Jody Gallagher, DPM, Pod;  Location:  OR    GYN SURGERY  1978    ovaries removed    HEART CATH FYI  03/04/2016    dz <40%    HIP HARDWARE REMOVAL Right 08/04/2020    Procedure: HARDWARE REMOVAL - LATERAL APPROACH;  Surgeon: Charlie Wolfe MD;  Location: [facility];  Service: Orthopedics    HYSTERECTOMY      INJECT STEROID (LOCATION)  09/11/2012    Procedure: INJECT STEROID (LOCATION);;  Surgeon: Anne Marie Catherine MD;  Location:  OR    LAPAROSCOPIC CHOLECYSTECTOMY N/A 04/07/2016    Procedure: LAPAROSCOPIC CHOLECYSTECTOMY;  Surgeon: Hans Medina MD;  Location:  OR    OPTICAL TRACKING SYSTEM FUSION SPINE POSTERIOR LUMBAR TWO LEVELS N/A 10/31/2018    Procedure: Central L3-4 L4-5 lamenectomies L4-5 posterior instrumented fusion;  Surgeon: Aroldo Burdick MD;  Location:  OR    ORTHOPEDIC SURGERY Right 2014    karli for fx femur    RECONSTRUCT FOREFOOT WITH METATARSOPHALANGEAL (MTP) FUSION Left 04/28/2017    Procedure: RECONSTRUCT FOREFOOT WITH METATARSOPHALANGEAL (MTP) FUSION;  1. Resection arthroplasty, fifth metatarsophalangeal joint, left foot.   2. Irrigation and debridement of fifth metatarsophalangeal joint, left foot (excisional to the level of the bone).     ;  Surgeon: Fabián Phipps MD;  Location:  OR    RELEASE CARPAL TUNNEL       RIGHT HIP ORIF 4/1/2014      ROTATOR CUFF REPAIR RT/LT  ~1998    Lt shoulder; rotator cuff    SURGICAL HISTORY OF -   distant past    ablation for palpitations.    SURGICAL HISTORY OF -   06/2015    left carpal tunnel surgery    [facility] NONSPECIFIC PROCEDURE  1976    D&C    [facility] TOTAL HIP ARTHROPLASTY Right 08/04/2020    Procedure: RIGHT TOTAL HIP ARTHROPLASTY;  Surgeon: Charlie Wolfe MD;  Location: [facility] OR;  Service: Orthopedics    [facility] TOTAL KNEE ARTHROPLASTY Right 01/2020     Family Hx:  Family History   Problem Relation Age of Onset    C.A.D. Mother 76    Cancer Mother         non Hodgekin's Lymphoma    Heart Disease Mother         enlarged heart    Breast Cancer Mother     C.A.D. Father 59    Heart Disease Father         valve problem    Hypertension Brother     Neuropathy Brother     Heart Disease Brother         open heart to repair mitral valve.    Connective Tissue Disorder Daughter         scleroderma    Colon Cancer No family hx of        Social Hx:  Social History     Socioeconomic History    Marital status:      Spouse name: Not on file    Number of children: Not on file    Years of education: Not on file    Highest education level: Not on file   Occupational History    Not on file   Tobacco Use    Smoking status: Never    Smokeless tobacco: Never   Vaping Use    Vaping Use: Never used   Substance and Sexual Activity    Alcohol use: Not Currently     Comment: Maybe 1x amonth    Drug use: No    Sexual activity: Not Currently     Partners: Male     Comment: Am to old   Other Topics Concern    Parent/sibling w/ CABG, MI or angioplasty before 65F 55M? No     Service Not Asked    Blood Transfusions Not Asked    Caffeine Concern No     Comment: 1 capp. daily    Occupational Exposure Not Asked    Hobby Hazards Not Asked    Sleep Concern No    Stress Concern Not Asked    Weight Concern Not Asked    Special Diet No    Back Care Not Asked    Exercise Yes     Comment: walks 3 miles 6 days  per week , curves 3 days per week    Bike Helmet Not Asked    Seat Belt Not Asked    Self-Exams Not Asked   Social History Narrative    Dairy/d 0-1 servings/d.     Caffeine 0 servings/d    Exercise 6 x week walk and curves    Sunscreen used - Yes    Seatbelts used - Yes    Working smoke/CO detectors in the home - Yes    Guns stored in the home - Yes LOCKED    Self Breast Exams - sometimes    Self Testicular Exam - NA    Eye Exam up to date - Yes 4/2009    Dental Exam up to date - Yes 12/2009    Pap Smear up to date - Yes - Hysterectomy    Mammogram up to date - Yes 11/25/2009    PSA up to date - NA    Dexa Scan up to date - 2006    Flex Sig / Colonoscopy up to date - Yes 5/14/2008    Immunizations up to date -9-2007 tetanus    Abuse: Current or Past(Physical, Sexual or Emotional)- No    Do you feel safe in your environment - Yes    DAMION Shaffer, [facility]    11/25/2009 updated     Social Determinants of Health     Financial Resource Strain: Low Risk  (10/18/2023)    Financial Resource Strain     Within the past 12 months, have you or your family members you live with been unable to get utilities (heat, electricity) when it was really needed?: No   Food Insecurity: Low Risk  (10/18/2023)    Food Insecurity     Within the past 12 months, did you worry that your food would run out before you got money to buy more?: No     Within the past 12 months, did the food you bought just not last and you didn t have money to get more?: No   Transportation Needs: Low Risk  (10/18/2023)    Transportation Needs     Within the past 12 months, has lack of transportation kept you from medical appointments, getting your medicines, non-medical meetings or appointments, work, or from getting things that you need?: No   Physical Activity: Not on file   Stress: Not on file   Social Connections: Not on file   Interpersonal Safety: Low Risk  (2/8/2024)    Interpersonal Safety     Do you feel physically and emotionally safe where you currently live?:  "Yes     Within the past 12 months, have you been hit, slapped, kicked or otherwise physically hurt by someone?: No     Within the past 12 months, have you been humiliated or emotionally abused in other ways by your partner or ex-partner?: No   Housing Stability: Low Risk  (10/18/2023)    Housing Stability     Do you have housing? : Yes     Are you worried about losing your housing?: No          MEDICATIONS:  has a current medication list which includes the following prescription(s): acetaminophen, aspirin, atorvastatin, sm calcium/vitamin d, carboxymethylcellulose sodium, cetirizine, cevimeline, co-enzyme q-10, elderberry, epinephrine, famotidine, lansoprazole, levothyroxine, lisinopril, lutein-zeaxanthin, melatonin, metoprolol succinate er, mirabegron, misc natural products, neomycin-polymyxin-dexamethasone, nitroglycerin, omega-3 fatty acids, polyethylene glycol, potassium chloride paul er, and vitamin d (cholecalciferol), and the following Facility-Administered Medications: denosumab and denosumab.    ROS     ROS: 10 point ROS neg other than the symptoms noted above in the HPI.    Physical Exam   VS: /74 (BP Location: Left arm, Patient Position: Chair, Cuff Size: Adult Regular)   Pulse 64   Temp 97.3  F (36.3  C) (Oral)   Resp 16   Ht 1.626 m (5' 4.02\")   Wt 63 kg (139 lb)   LMP  (LMP Unknown)   SpO2 98%   Breastfeeding No   BMI 23.85 kg/m    GENERAL: healthy, alert and no distress  EYES: Eyes grossly normal to inspection, conjunctivae and sclerae normal  ENT: no nose swelling, nasal discharge.  Thyroid: no apparent thyroid nodules  RESP: no audible wheeze, cough, or visible cyanosis.  No visible retractions or increased work of breathing.  Able to speak fully in complete sentences.  ABDO: not evaluated.  EXTREMITIES: no hand tremors.  NEURO: Cranial nerves grossly intact, mentation intact and speech normal  SKIN: No apparent skin lesions, rash or edema seen   PSYCH: mentation appears normal, " affect normal/bright, judgement and insight intact, normal speech and appearance well-groomed    LABS:  BMP:  Last Comprehensive Metabolic Panel:  Lab Results   Component Value Date     11/09/2023    POTASSIUM 4.5 11/09/2023    CHLORIDE 102 11/09/2023    CO2 27 11/09/2023    ANIONGAP 10 11/09/2023    GLC 94 11/09/2023    BUN 25.2 (H) 11/09/2023    CR 0.96 (H) 11/21/2023    GFRESTIMATED 59 (L) 11/21/2023    CARMEN 9.6 11/21/2023     TFTs:  TSH   Date Value Ref Range Status   10/18/2023 3.29 0.30 - 4.20 uIU/mL Final   01/06/2022 1.39 0.40 - 4.00 mU/L Final   04/16/2021 1.87 0.40 - 4.00 mU/L Final       PTH:  ENDO CALCIUM LABS-[facility] Latest Ref Rng & Units 3/18/2022   PARATHYROID HORMONE INTACT 18 - 80 pg/mL 38       Vitamin D:  Vitamin D Deficiency Screening Results:  Lab Results   Component Value Date    VITDT 55 03/18/2022    VITDT 48 12/05/2013       BoneTurnOverMarkers:    DEXA:  DEXA 2/2022:  REFERENCE T-SCORES:       Normal                -1.0 and greater                                 Osteopenia         Between -1.0 and -2.5                                           Osteoporosis     -2.5 and less                                       RISK FACTORS:, Early menopause before age 45, Hip fracture, fractures of pelvis and foot     CURRENT TREATMENT:  Calcium with Vitamin D     FINDINGS:                Left Femoral Neck           T-score:  -0.6                       Forearm (radius 33%)      T-score:  -2.3  The spine is not acceptable for evaluation due to previous surgical changes.    The right femur is not acceptable for evaluation due to previous surgical repair.                           Left Total Hip BMD: 0.861   Previous: 0.882                       Forearm (radius 33%) BMD: 0.677  Previous: NA     Comparison is made to another DXA performed on a different [employer] machine on 7/21/2014.      IMPRESSION  Severe osteoporosis on the basis of hip fracture.      Comparisons from different scanners that have not been  "cross calibrated, are not necessarily valid. Such a comparison has been performed here; one should interpret with caution.   There has been probably no significant cahnge in bone density of the hip(s). The forearm was not included on the previous study so comparison is not possible.       Recommendations include ensuring adequate Calcium and Vitamin D.     The current NOF Guidelines recommend treatment for patients with prior hip or vertebral fracture, T-score -2.5 or below, or 10 year risk of any major osteoporotic fracture >20% or 10 year risk of hip fracture >3%, as calculated using the FRAX calculator (www.shef.ac.uk/FRAX or you can google \"FRAX\").       Based on these guidelines, treatment (in addition to calcium and vitamin D) is recommended for this patient, after ruling out other causes of osteoporosis.  This is meant as an aid to clinical decision making; one must still use clinical judgement.        All pertinent notes, labs, and images personally reviewed by me.     A/P  Ms.Sharon SAMRA Stahl is a 83 year old here for the evaluation of:    #1 Osteoporosis:  Risk factors for low bone density include personal history of fracture or family history,  advanced age, female,Estrogen deficiency,early menopause before age 46, Hip fracture, fractures of pelvis and foot.  She has been on Fosamax from 3021-4069.  DEXA 2/2022 as noted above consistent with severe osteoporosis?  History of GERD-under good control with PPI  No upcoming dental procedure.  Labs showing normal calcium, kidney function, vitamin D and PTH levels.  On PROLIA since 5/2022. Tolerating well.  Plan:  Discussed diagnosis, pathophysiology, management and treatment options of condition with pt.  Discussed osteoporosis in general.  Plan to continue PROLIA.  Due for PROLIA in 5/2024, 11/2024.  DEXA as scheduled 3/2024.  Follow up in 1 year.    The pt was advised to  Maintain an adequate calcium and vitamin D intake and to supplement vitamin D if needed " to maintain serum levels of 25 hydroxy D (25 OH D) between 30-60 ng/ml.  Limit alcohol intake to no more than 2 servings per day.  Limit caffeine intake.  Maintain an active lifestyle including weight-bearing exercises for at least 30 mins daily.  Take measures to reduce the risk of falling.    All possible major side effects of Denosumab (PROLIA) were discussed including risk for atypical femur fractures, hypersensitivity, low calcium levels, osteonecrosis of jaw (which can manifest as jaw pain, osteomyelitis, osteitis, bone erosion, tooth/periodontal infection, toothache, gingival ulceration/erosion). Other s/e include but not limited to Hypertension, Headache and peripheral edema. I also told her to let her dentist lnow about the medication if plan for major dental procedure (currently no plans for dental procedure)  Indication: Prolia  (denosumab) is a prescription medicine used to treat osteoporosis in patients who:   Are at high risk for fracture, meaning patients who have had a fracture related to osteoporosis, or who have multiple risk factors for fracture   Cannot use another osteoporosis medicine or other osteoporosis medicines did not work well   The timeline for early/late injections would be 4 weeks early and any time after the 6 month bella. If a patient receives their injection late, then the subsequent injection would be 6 months from the date that they actually received the injection    Discussed indications, risks and benefits of all medications prescribed, and answered questions to patient's satisfaction.  The longitudinal plan of care for the diagnosis(es)/condition(s) as documented were addressed during this visit. Due to the added complexity in care, I will continue to support Ya in the subsequent management and with ongoing continuity of care.  All questions were answered.  The patient indicates understanding of the above issues and agrees with the plan set forth.      Follow-up:  As noted  in AVS    Mary Funes MD  [facility]  CC: Devi Romano

## 2024-03-14 NOTE — PATIENT INSTRUCTIONS
[facility]  Dr Funes, Endocrinology Department    [facility]   [address] [id number]  [place] [address]  Appointment [phone]  Fax: [phone]  [place]: Monday - Thursday      Please check the cost coverage and copay with insurance before recommended tests, services and medications (especially if new medications are prescribed).     If ordered, please get blood work done 1 week prior to your next appointment so they will be available to Dr. Funes at your visit.    Plan to continue PROLIA.  Due for PROLIA in 2024, 2024.  DEXA as scheduled.  Follow up in 1 year.    PROLIA Scheduling information:  It will be done in clinic.   You need to make nurse only appointment. (call [place]: [phone] or [place]:[phone] and schedule Nurse only appointment)  You will need labs few days prior to PROLIA (calcium labs)- please schedule lab appointment.  PROLIA is every 6 months injection- so please schedule accordingly.  It is recommended NOT to miss or delay PROLIA injections.    Possible major side effects of Denosumab (PROLIA) include risk for atypical femur fractures, hypersensitivity, low calcium levels, osteonecrosis of jaw (which can manifest as jaw pain, osteomyelitis, osteitis, bone erosion, tooth/periodontal infection, toothache, gingival ulceration/erosion). Other s/e include but not limited to Hypertension, Headache and peripheral edema.   Always let your dentist lnow about the medication if plan for major dental procedure.    Indication: Prolia  (denosumab) is a prescription medicine used to treat osteoporosis in patients who:   Are at high risk for fracture, meaning patients who have had a fracture related to osteoporosis, or who have multiple risk factors for fracture   Cannot use another osteoporosis medicine or other osteoporosis medicines did not work well   The timeline for early/late injections would be 4 weeks early and any time  after the 6 month bella. If a patient receives their injection late, then the subsequent injection would be 6 months from the date that they actually received the injection    The pt was advised to  Maintain an adequate calcium and vitamin D intake and to supplement vitamin D if needed to maintain serum levels of 25 hydroxy D (25 OH D) between 30-60 ng/ml.  Limit alcohol intake to no more than 2 servings per day.  Limit caffeine intake.  Maintain an active lifestyle including weight-bearing exercises for at least 30 mins daily.  Take measures to reduce the risk of falling.     You should get 1000- 1200 mg/day calcium in divided doses of no more than 500 mg/dose.  This INCLUDES what is in your food as well as what is in any supplements you may be taking.    Vit D about 800-1000 IU/day ( unless you have vit D deficiency- in that case higher dose)  Dietary sources of calcium:: These also contain vitamin D  Milk                            8 oz            300 mg calcium  Yogurt                          1 cup           400 mg calcium   Hard cheese                     1.5 oz          300 mg  Cottage cheese                  2 cup           300 mg  Orange juice with Calcium       8 oz            300 mg  Low fat dairy sources are recommended        You should get 30 minutes of moderate weight bearing exercise on most days of the week .  Weight bearing exercise includes such things as walking, jogging, hiking, dancing.  You should also get Strength training 2 or more times/week in addition to other weight -being exercise. Strength training uses weight or resistance beyond that seen in everyday activities -(pilates, weight training with free weights, weight machines or resistance bands)     Living with Osteoporosis: Preventing Fractures  If you have osteoporosis, you can do a lot to reduce its effect on your life. Knowing how to prevent fractures and spinal curvature can help you live more comfortably and safely with this  disease.  Reducing your risk for fractures  The most common fracture sites in people with osteoporosis are the wrist, spine, and hip. These fractures are often caused by accidents and falls. All fractures are painful and may limit what you can do. But hip fractures are very serious. They often need surgery, and it can take months to recover. To reduce your risk for fractures:  Get regular exercise. Try walking, swimming, or weight training.  Eat foods that are rich in calcium, or take calcium supplements.  Make your home safe to help avoid accidents.  Take extra precautions not to fall in risky areas, such as icy sidewalks.  Understanding spinal fractures  Your spine is made up of many bones called vertebrae. Osteoporosis can cause the vertebrae in your spine to collapse. As a result, your upper back may arch forward, creating a curvature. Spine fractures may also result from back strain and bad posture. You will also lose height. Your lower spine must then adjust to keep your body balanced. This can cause back pain. To prevent or lessen these spinal changes:  Practice good posture.  Use proper techniques if you need to lift heavy objects.  Do back exercises to help your posture.  Lie on your back when you have pain.  Ask your healthcare provider about these and other ways to help your spine.  [employer] last reviewed this educational content on 5/1/2018 2000-2021 [employer]. All rights reserved. This information is not intended as a substitute for professional medical care. Always follow your healthcare professional's instructions.          Living with Osteoporosis: Regular Exercise  If you have osteoporosis, exercise is vital for your health. It can prevent bone fractures and spine changes. It will slow bone loss. Exercise will strengthen your body. It can also be fun. A variety of exercises is best. See below for exercises that can help you. But before you start, talk with your healthcare provider  to be sure these exercises are right for you.   Resistance exercises. These build muscle strength and maintain bone mass. They also make you less prone to injury. Exercises include lifting small weights, doing push-ups and sit-ups, using elastic exercise bands, and using weight machines.   Weight-bearing activities. These help your whole body. They also help you maintain bone mass. Activities include walking, dancing, and housework.   Non-weight-bearing exercises. These help prevent back strain and pain. They do this by building the trunk and leg muscles. Exercises that help with flexibility can prevent falls. Examples include swimming, water exercise, and stretching.   Staying safe  Here are tips to stay safe:   Always check with your healthcare provider before starting any new exercise program.  Use weights only as instructed.  Stop any exercise that causes pain.  [employer] last reviewed this educational content on 5/1/2018 2000-2021 [employer]. All rights reserved. This information is not intended as a substitute for professional medical care. Always follow your healthcare professional's instructions.          Preventing Osteoporosis: Avoiding Bone Loss  Certain factors can speed up bone loss or decrease bone growth. For example, alcohol, cigarettes, and certain medicines reduce bone mass. Some foods make it hard for your body to absorb calcium.  Things to avoid  Here are things to avoid to help prevent osteoporosis:  Alcohol. This is toxic to bones. It is a major cause of bone loss. Heavy drinking can cause osteoporosis even if you have no other risk factors.  Smoking. This reduces bone mass. Smoking may also interfere with estrogen levels and cause early menopause.  Inactivity. Not being active makes your bones lose strength and become thinner. Over time, thin bones may break. Women who aren't active are at a high risk for osteoporosis.  Certain medicines. Some medicines, such as cortisone,  increase bone loss. They also decrease bone growth. Ask your healthcare provider about any side effects of your medicines, and how to prevent them.  Protein-rich or salty foods. Eaten in large amounts, these foods may deplete calcium.  Caffeine. This increases calcium loss. People who drink a lot of coffee, tea, or soda lose more calcium than those who don't.  [employer] last reviewed this educational content on 5/1/2018 2000-2021 [employer]. All rights reserved. This information is not intended as a substitute for professional medical care. Always follow your healthcare professional's instructions.          Preventing Osteoporosis: Meeting Your Calcium Needs  Your body needs calcium to build and repair bones. But it can't make calcium on its own. That's why it's important to eat calcium-rich foods. Some foods are naturally rich in calcium. Others have calcium added (fortified). It's best to get calcium from the foods you eat. But if you can't get enough, you may want to take calcium supplements. To meet your daily calcium needs, try the foods listed below.                          Dairy Fish & beans Other sources   Source   Calcium (mg) per serving   Source   Calcium (mg) per serving   Source   Calcium (mg) per serving   Low-fat yogurt, plain   415 mg/8 oz.   Sardines, Atlantic, canned, with bones   351 mg/3 oz.   Oatmeal, instant, fortified   215 mg/1 cup   Nonfat milk   302 mg/1 cup   Vista, sockeye, canned, with bones   239 mg/3 oz.   Tofu made with calcium sulfate   204 mg/3 oz.   Low-fat milk   297 mg/1 cup   Soybeans, fresh, boiled   131 mg/1/2 cup   Collards   179 mg/1/2 cup   Swiss cheese   272 mg/1 oz.   White beans, cooked   81 mg/1/2 cup   English muffin, whole wheat   175 mg/1 muffin   Cheddar cheese   205 mg/1 oz.   Navy beans, cooked   79 mg/1/2 cup   Kale   90 mg/1/2 cup   Ice cream strawberry   79 mg/1/2 cup           Orange, navel   56 mg/1 medium   Note: Calcium levels may vary  depending on brand and size.  Daily calcium needs  14 to 18 years old: 1,300 mg  19 to 30 years old: 1,000 mg  31 to 50 years old: 1,000 mg  51 to 70 years old, women: 1,200 mg  51 to 70 years old, men: 1,000 mg  Pregnant or nursin to 18 years old: 1,300 mg, 19 to 50 years old: 1,000 mg  Older than 70 (women and men): 1,200 mg   [employer] last reviewed this educational content on 2018-2021 [employer]. All rights reserved. This information is not intended as a substitute for professional medical care. Always follow your healthcare professional's instructions.

## 2024-03-14 NOTE — LETTER
3/14/2024         RE: Ya Stahl  [address]  [facility] [address]        Dear Colleague,    Thank you for referring your patient, Ya Stahl, to the [facility]. Please see a copy of my visit note below.    Name: Ya Stahl  Seen for f/u ofosteoporosis.     HPI:  Ya Stahl is a 83 year old female who presents for the evaluation of osteoporosis.   has a past medical history of Arthritis, Cerebral infarction (H) (2019), Complication of anesthesia, Coronary artery disease, Disease of thyroid gland, Displacement of lumbar intervertebral disc without myelopathy, Gastro-oesophageal reflux disease, GERD (gastroesophageal reflux disease), Hearing loss, Heart attack (H) (03/2016), History of anesthesia complications, History of angina, Hypertension, Low back pain, Migraine headache, Myocardial infarction (H), Numbness and tingling, PONV (postoperative nausea and vomiting), Sicca syndrome (H24), Sjogren's disease (H24), Sjogren's syndrome (H24), Spider veins, and Unspecified hypothyroidism.    DEXA 2006- normal BMD  DEXA 2011, 2014- Osteopenia  DEXA 2/2022: osteoporosis  Has upcoming DEXA appointment.    Diagnosed with osteoporosis in 2/2022 based on DEXA scan.  DEXA 2/2022: Severe osteoporosis on the basis of hip fracture. There has been probably no significant cahnge in bone density of the hip(s). The forearm was not included on the previous study so comparison is not possible.    The spine is not acceptable for evaluation due to previous surgical changes.    The right femur is not acceptable for evaluation due to previous surgical repair    Previous medication- Fosamax x 5 yrs and went off it.   (8/2014-1/2021)  Started PROLIA 2022.    Prolia dates: (get at [facility])  5/2022  11/2022  05/15/23   11/29/23   Has upcoming appointment scheduled for 5/2024.    Tolerating well.    In 1/2024-- she tripped and fall but no fractures.      GERD: On PPI-  "under well controled.  Dental; OK. No upcoming dental procedure. Has gum disease.    Smoke:No  Family History:mother  Menstrual history/Birthing: s/p menopause at age 37. No HRT  Fractures: Fracture of right femur and pelvis at age 74 from a fall.Treated surgically. S/p hip replacement. (Hip fracture history?  Not clear based on chart review)  Kidney stones: No  GI Surgery:s/p cholecystectomy  Duration of therapy:   Exercise:walks 3 miles/day and does weights 5 days a week.  Diet: takes boost every few days  Ca/Vitamin D: calcium 600 mg BID, vit D 4000 international unit(s)/day.  Alcohol:  No  Eating Disorder: No  Steroid Use:  No  PMH/PSH:  Past Medical History:   Diagnosis Date     Arthritis      Cerebral infarction (H) 2019     Complication of anesthesia     \"hard time waking up / N & V\"     Coronary artery disease      Disease of thyroid gland      Displacement of lumbar intervertebral disc without myelopathy      Gastro-oesophageal reflux disease      GERD (gastroesophageal reflux disease)      Hearing loss     has bilateral aids     Heart attack (H) 03/2016     History of anesthesia complications     delayed emergence     History of angina      Hypertension      Low back pain      Migraine headache      Myocardial infarction (H)      Numbness and tingling     down right leg (associated with back pain)     PONV (postoperative nausea and vomiting)      Sicca syndrome (H24)      Sjogren's disease (H24)      Sjogren's syndrome (H24)     sees specialist; dentist     Spider veins      Unspecified hypothyroidism      Past Surgical History:   Procedure Laterality Date     ARTHROPLASTY HIP ANTERIOR Left 10/14/2022    Procedure: LEFT TOTAL HIP ARTHROPLASTY DIRECT ANTERIOR APPROACH;  Surgeon: Selvin Saab MD;  Location:  OR     ARTHROSCOPY KNEE  10/05/2012    R Procedure: ARTHROSCOPY KNEE;  RIGHT KNEE ARTHROSCOPY, PARTIAL MEDIAL MENISCECTOMY;  Surgeon: Karli Zaragoza MD;  Location: [facility]     BACK SURGERY  " About. 5 years ago    Fusion of 4&5 lumbar     BLADDER SURGERY       BUNIONECTOMY  ~2010    L foot.      CATARACT IOL, RT/LT Bilateral 07/2017     COLONOSCOPY N/A 01/17/2017    normal; no repeat Procedure: COLONOSCOPY;  Surgeon: Fan Giordano MD;  Location:  GI     ENDOSCOPIC RELEASE CARPAL TUNNEL  09/11/2012    R Procedure: ENDOSCOPIC RELEASE CARPAL TUNNEL;  Right Endoscopic carpal tunnel release, left ringer finger cortizon injection;  Surgeon: Anne Marie Catherine MD;  Location:  OR     ESOPHAGOSCOPY, GASTROSCOPY, DUODENOSCOPY (EGD), COMBINED N/A 12/26/2014    Procedure: COMBINED ESOPHAGOSCOPY, GASTROSCOPY, DUODENOSCOPY (EGD);  Surgeon: Fan Giordano MD;  Location:  GI     ESOPHAGOSCOPY, GASTROSCOPY, DUODENOSCOPY (EGD), COMBINED N/A 11/28/2023    Procedure: ESOPHAGOGASTRODUODENOSCOPY, WITH BIOPSY;  Surgeon: Partha Cooper, DO;  Location: [facility]     EXCISE EXOSTOSIS FOOT Left 12/01/2016    Procedure: EXCISE EXOSTOSIS FOOT;  Surgeon: Jody Gallagher, DPM, Pod;  Location:  OR     GYN SURGERY  1978    ovaries removed     HEART CATH FYI  03/04/2016    dz <40%     HIP HARDWARE REMOVAL Right 08/04/2020    Procedure: HARDWARE REMOVAL - LATERAL APPROACH;  Surgeon: Charlie Wolfe MD;  Location: [facility];  Service: Orthopedics     HYSTERECTOMY       INJECT STEROID (LOCATION)  09/11/2012    Procedure: INJECT STEROID (LOCATION);;  Surgeon: Anne Marie Catherine MD;  Location:  OR     LAPAROSCOPIC CHOLECYSTECTOMY N/A 04/07/2016    Procedure: LAPAROSCOPIC CHOLECYSTECTOMY;  Surgeon: Hans Medina MD;  Location:  OR     OPTICAL TRACKING SYSTEM FUSION SPINE POSTERIOR LUMBAR TWO LEVELS N/A 10/31/2018    Procedure: Central L3-4 L4-5 lamenectomies L4-5 posterior instrumented fusion;  Surgeon: Aroldo Burdick MD;  Location:  OR     ORTHOPEDIC SURGERY Right 2014    karli for fx femur     RECONSTRUCT FOREFOOT WITH METATARSOPHALANGEAL (MTP) FUSION Left 04/28/2017    Procedure: RECONSTRUCT FOREFOOT  WITH METATARSOPHALANGEAL (MTP) FUSION;  1. Resection arthroplasty, fifth metatarsophalangeal joint, left foot.   2. Irrigation and debridement of fifth metatarsophalangeal joint, left foot (excisional to the level of the bone).     ;  Surgeon: Fabián Phipps MD;  Location: [facility] OR     RELEASE CARPAL TUNNEL       RIGHT HIP ORIF 4/1/2014       ROTATOR CUFF REPAIR RT/LT  ~1998    Lt shoulder; rotator cuff     SURGICAL HISTORY OF -   distant past    ablation for palpitations.     SURGICAL HISTORY OF -   06/2015    left carpal tunnel surgery     [facility] NONSPECIFIC PROCEDURE  1976    D&C     [facility] TOTAL HIP ARTHROPLASTY Right 08/04/2020    Procedure: RIGHT TOTAL HIP ARTHROPLASTY;  Surgeon: Charlie Wolfe MD;  Location: [facility];  Service: Orthopedics     [facility] TOTAL KNEE ARTHROPLASTY Right 01/2020     Family Hx:  Family History   Problem Relation Age of Onset     C.A.D. Mother 76     Cancer Mother         non Hodgekin's Lymphoma     Heart Disease Mother         enlarged heart     Breast Cancer Mother      C.A.D. Father 59     Heart Disease Father         valve problem     Hypertension Brother      Neuropathy Brother      Heart Disease Brother         open heart to repair mitral valve.     Connective Tissue Disorder Daughter         scleroderma     Colon Cancer No family hx of        Social Hx:  Social History     Socioeconomic History     Marital status:      Spouse name: Not on file     Number of children: Not on file     Years of education: Not on file     Highest education level: Not on file   Occupational History     Not on file   Tobacco Use     Smoking status: Never     Smokeless tobacco: Never   Vaping Use     Vaping Use: Never used   Substance and Sexual Activity     Alcohol use: Not Currently     Comment: Maybe 1x amonth     Drug use: No     Sexual activity: Not Currently     Partners: Male     Comment: Am to old   Other Topics Concern     Parent/sibling w/ CABG, MI or angioplasty before 65F  55M? No      Service Not Asked     Blood Transfusions Not Asked     Caffeine Concern No     Comment: 1 capp. daily     Occupational Exposure Not Asked     Hobby Hazards Not Asked     Sleep Concern No     Stress Concern Not Asked     Weight Concern Not Asked     Special Diet No     Back Care Not Asked     Exercise Yes     Comment: walks 3 miles 6 days per week , curves 3 days per week     Bike Helmet Not Asked     Seat Belt Not Asked     Self-Exams Not Asked   Social History Narrative    Dairy/d 0-1 servings/d.     Caffeine 0 servings/d    Exercise 6 x week walk and curves    Sunscreen used - Yes    Seatbelts used - Yes    Working smoke/CO detectors in the home - Yes    Guns stored in the home - Yes LOCKED    Self Breast Exams - sometimes    Self Testicular Exam - NA    Eye Exam up to date - Yes 4/2009    Dental Exam up to date - Yes 12/2009    Pap Smear up to date - Yes - Hysterectomy    Mammogram up to date - Yes 11/25/2009    PSA up to date - NA    Dexa Scan up to date - 2006    Flex Sig / Colonoscopy up to date - Yes 5/14/2008    Immunizations up to date -9-2007 tetanus    Abuse: Current or Past(Physical, Sexual or Emotional)- No    Do you feel safe in your environment - Yes    DAMION Shaffer, [facility]    11/25/2009 updated     Social Determinants of Health     Financial Resource Strain: Low Risk  (10/18/2023)    Financial Resource Strain      Within the past 12 months, have you or your family members you live with been unable to get utilities (heat, electricity) when it was really needed?: No   Food Insecurity: Low Risk  (10/18/2023)    Food Insecurity      Within the past 12 months, did you worry that your food would run out before you got money to buy more?: No      Within the past 12 months, did the food you bought just not last and you didn t have money to get more?: No   Transportation Needs: Low Risk  (10/18/2023)    Transportation Needs      Within the past 12 months, has lack of transportation kept you  "from medical appointments, getting your medicines, non-medical meetings or appointments, work, or from getting things that you need?: No   Physical Activity: Not on file   Stress: Not on file   Social Connections: Not on file   Interpersonal Safety: Low Risk  (2/8/2024)    Interpersonal Safety      Do you feel physically and emotionally safe where you currently live?: Yes      Within the past 12 months, have you been hit, slapped, kicked or otherwise physically hurt by someone?: No      Within the past 12 months, have you been humiliated or emotionally abused in other ways by your partner or ex-partner?: No   Housing Stability: Low Risk  (10/18/2023)    Housing Stability      Do you have housing? : Yes      Are you worried about losing your housing?: No          MEDICATIONS:  has a current medication list which includes the following prescription(s): acetaminophen, aspirin, atorvastatin, sm calcium/vitamin d, carboxymethylcellulose sodium, cetirizine, cevimeline, co-enzyme q-10, elderberry, epinephrine, famotidine, lansoprazole, levothyroxine, lisinopril, lutein-zeaxanthin, melatonin, metoprolol succinate er, mirabegron, misc natural products, neomycin-polymyxin-dexamethasone, nitroglycerin, omega-3 fatty acids, polyethylene glycol, potassium chloride paul er, and vitamin d (cholecalciferol), and the following Facility-Administered Medications: denosumab and denosumab.    ROS     ROS: 10 point ROS neg other than the symptoms noted above in the HPI.    Physical Exam   VS: /74 (BP Location: Left arm, Patient Position: Chair, Cuff Size: Adult Regular)   Pulse 64   Temp 97.3  F (36.3  C) (Oral)   Resp 16   Ht 1.626 m (5' 4.02\")   Wt 63 kg (139 lb)   LMP  (LMP Unknown)   SpO2 98%   Breastfeeding No   BMI 23.85 kg/m    GENERAL: healthy, alert and no distress  EYES: Eyes grossly normal to inspection, conjunctivae and sclerae normal  ENT: no nose swelling, nasal discharge.  Thyroid: no apparent thyroid " nodules  RESP: no audible wheeze, cough, or visible cyanosis.  No visible retractions or increased work of breathing.  Able to speak fully in complete sentences.  ABDO: not evaluated.  EXTREMITIES: no hand tremors.  NEURO: Cranial nerves grossly intact, mentation intact and speech normal  SKIN: No apparent skin lesions, rash or edema seen   PSYCH: mentation appears normal, affect normal/bright, judgement and insight intact, normal speech and appearance well-groomed    LABS:  BMP:  Last Comprehensive Metabolic Panel:  Lab Results   Component Value Date     11/09/2023    POTASSIUM 4.5 11/09/2023    CHLORIDE 102 11/09/2023    CO2 27 11/09/2023    ANIONGAP 10 11/09/2023    GLC 94 11/09/2023    BUN 25.2 (H) 11/09/2023    CR 0.96 (H) 11/21/2023    GFRESTIMATED 59 (L) 11/21/2023    CARMEN 9.6 11/21/2023     TFTs:  TSH   Date Value Ref Range Status   10/18/2023 3.29 0.30 - 4.20 uIU/mL Final   01/06/2022 1.39 0.40 - 4.00 mU/L Final   04/16/2021 1.87 0.40 - 4.00 mU/L Final       PTH:  ENDO CALCIUM LABS-[facility] Latest Ref Rng & Units 3/18/2022   PARATHYROID HORMONE INTACT 18 - 80 pg/mL 38       Vitamin D:  Vitamin D Deficiency Screening Results:  Lab Results   Component Value Date    VITDT 55 03/18/2022    VITDT 48 12/05/2013       BoneTurnOverMarkers:    DEXA:  DEXA 2/2022:  REFERENCE T-SCORES:       Normal                -1.0 and greater                                 Osteopenia         Between -1.0 and -2.5                                           Osteoporosis     -2.5 and less                                       RISK FACTORS:, Early menopause before age 45, Hip fracture, fractures of pelvis and foot     CURRENT TREATMENT:  Calcium with Vitamin D     FINDINGS:                Left Femoral Neck           T-score:  -0.6                       Forearm (radius 33%)      T-score:  -2.3  The spine is not acceptable for evaluation due to previous surgical changes.    The right femur is not acceptable for evaluation due to  "previous surgical repair.                           Left Total Hip BMD: 0.861   Previous: 0.882                       Forearm (radius 33%) BMD: 0.677  Previous: NA     Comparison is made to another DXA performed on a different Lunar machine on 7/21/2014.      IMPRESSION  Severe osteoporosis on the basis of hip fracture.      Comparisons from different scanners that have not been cross calibrated, are not necessarily valid. Such a comparison has been performed here; one should interpret with caution.   There has been probably no significant cahnge in bone density of the hip(s). The forearm was not included on the previous study so comparison is not possible.       Recommendations include ensuring adequate Calcium and Vitamin D.     The current NOF Guidelines recommend treatment for patients with prior hip or vertebral fracture, T-score -2.5 or below, or 10 year risk of any major osteoporotic fracture >20% or 10 year risk of hip fracture >3%, as calculated using the FRAX calculator (www.shef.ac.uk/FRAX or you can google \"FRAX\").       Based on these guidelines, treatment (in addition to calcium and vitamin D) is recommended for this patient, after ruling out other causes of osteoporosis.  This is meant as an aid to clinical decision making; one must still use clinical judgement.        All pertinent notes, labs, and images personally reviewed by me.     A/P  Ms.Sharon SAMRA Stahl is a 83 year old here for the evaluation of:    #1 Osteoporosis:  Risk factors for low bone density include personal history of fracture or family history,  advanced age, female,Estrogen deficiency,early menopause before age 45, Hip fracture, fractures of pelvis and foot.  She has been on Fosamax from 8044-2204.  DEXA 2/2022 as noted above consistent with severe osteoporosis?  History of GERD-under good control with PPI  No upcoming dental procedure.  Labs showing normal calcium, kidney function, vitamin D and PTH levels.  On PROLIA since " 5/2022. Tolerating well.  Plan:  Discussed diagnosis, pathophysiology, management and treatment options of condition with pt.  Discussed osteoporosis in general.  Plan to continue PROLIA.  Due for PROLIA in 5/2024, 11/2024.  DEXA as scheduled 3/2024.  Follow up in 1 year.    The pt was advised to  Maintain an adequate calcium and vitamin D intake and to supplement vitamin D if needed to maintain serum levels of 25 hydroxy D (25 OH D) between 30-60 ng/ml.  Limit alcohol intake to no more than 2 servings per day.  Limit caffeine intake.  Maintain an active lifestyle including weight-bearing exercises for at least 30 mins daily.  Take measures to reduce the risk of falling.    All possible major side effects of Denosumab (PROLIA) were discussed including risk for atypical femur fractures, hypersensitivity, low calcium levels, osteonecrosis of jaw (which can manifest as jaw pain, osteomyelitis, osteitis, bone erosion, tooth/periodontal infection, toothache, gingival ulceration/erosion). Other s/e include but not limited to Hypertension, Headache and peripheral edema. I also told her to let her dentist lnow about the medication if plan for major dental procedure (currently no plans for dental procedure)  Indication: Prolia  (denosumab) is a prescription medicine used to treat osteoporosis in patients who:   Are at high risk for fracture, meaning patients who have had a fracture related to osteoporosis, or who have multiple risk factors for fracture   Cannot use another osteoporosis medicine or other osteoporosis medicines did not work well   The timeline for early/late injections would be 4 weeks early and any time after the 6 month bella. If a patient receives their injection late, then the subsequent injection would be 6 months from the date that they actually received the injection    Discussed indications, risks and benefits of all medications prescribed, and answered questions to patient's satisfaction.  The  longitudinal plan of care for the diagnosis(es)/condition(s) as documented were addressed during this visit. Due to the added complexity in care, I will continue to support Ya in the subsequent management and with ongoing continuity of care.  All questions were answered.  The patient indicates understanding of the above issues and agrees with the plan set forth.      Follow-up:  As noted in AVS    Mary Funes MD  Endocrinology  [facility]  CC: Devi Romano      Again, thank you for allowing me to participate in the care of your patient.        Sincerely,        Mary Funes MD

## 2024-03-19 ENCOUNTER — THERAPY VISIT (OUTPATIENT)
Dept: SPEECH THERAPY | Facility: CLINIC | Age: 84
End: 2024-03-19
Payer: COMMERCIAL

## 2024-03-19 DIAGNOSIS — R05.3 CHRONIC COUGH: ICD-10-CM

## 2024-03-19 DIAGNOSIS — R13.10 DYSPHAGIA, UNSPECIFIED TYPE: Primary | ICD-10-CM

## 2024-03-19 DIAGNOSIS — R49.0 DYSPHONIA: ICD-10-CM

## 2024-03-19 PROCEDURE — 92526 ORAL FUNCTION THERAPY: CPT | Mod: GN | Performed by: SPEECH-LANGUAGE PATHOLOGIST

## 2024-03-19 PROCEDURE — 92507 TX SP LANG VOICE COMM INDIV: CPT | Mod: GN | Performed by: SPEECH-LANGUAGE PATHOLOGIST

## 2024-03-28 ENCOUNTER — TELEPHONE (OUTPATIENT)
Dept: NEUROLOGY | Facility: CLINIC | Age: 84
End: 2024-03-28
Payer: COMMERCIAL

## 2024-03-28 NOTE — TELEPHONE ENCOUNTER
Left Voicemail (1st Attempt) and Sent Mychart (1st Attempt) for the patient to call back and schedule the following:    Appointment type: Follow up  Provider: Dr. Laird  Return date: July, 2024  Specialty phone number: [phone]  Additional appointment(s) needed:   Additonal Notes:

## 2024-04-02 ENCOUNTER — TELEPHONE (OUTPATIENT)
Dept: NEUROLOGY | Facility: CLINIC | Age: 84
End: 2024-04-02
Payer: COMMERCIAL

## 2024-04-02 NOTE — TELEPHONE ENCOUNTER
Sent Mychart (1st Attempt) and Left Voicemail (2nd Attempt) for the patient to call back and schedule the following:    Appointment type: Follow Up  Provider: Dr. Laird  Return date: July, 2024  Specialty phone number: [phone]  Additional appointment(s) needed:   Additonal Notes:     ** Pt needs to resched their July appointment, appt canceled due to change in providers sched **

## 2024-04-03 ENCOUNTER — THERAPY VISIT (OUTPATIENT)
Dept: SPEECH THERAPY | Facility: CLINIC | Age: 84
End: 2024-04-03
Payer: COMMERCIAL

## 2024-04-03 DIAGNOSIS — R13.10 DYSPHAGIA, UNSPECIFIED TYPE: Primary | ICD-10-CM

## 2024-04-03 DIAGNOSIS — R49.0 DYSPHONIA: ICD-10-CM

## 2024-04-03 DIAGNOSIS — R05.3 CHRONIC COUGH: ICD-10-CM

## 2024-04-03 PROCEDURE — 92526 ORAL FUNCTION THERAPY: CPT | Mod: GN | Performed by: SPEECH-LANGUAGE PATHOLOGIST

## 2024-04-03 PROCEDURE — 92507 TX SP LANG VOICE COMM INDIV: CPT | Mod: GN | Performed by: SPEECH-LANGUAGE PATHOLOGIST

## 2024-04-05 NOTE — PROGRESS NOTES
OP SLP PROGRESS NOTE   04/03/24 0500   Appointment Info   Treating Provider VIVI Portillo, Karen Snell MA CCC-SLP   Visits Used 7/10   Medical Diagnosis Chronic cough (R05.3)  - Primary    Dysphagia, unspecified type (R13.10)    Dysphonia (R49.0)   SLP Tx Diagnosis Moderate to severe oropharyngeal dysphagia, Moderate dysphonia   Quick Adds Certification;Student Supervision   Progress Note/Certification   Start Of Care Date 02/13/24   Onset Of Illness/injury Or Date Of Surgery 12/19/23  (Date of MD orders.)   Therapy Frequency 2x/month   Predicted Duration 60 days   Certification date from 04/05/24   Certification date to 06/03/24   Progress Note Due Date 06/03/24   Progress Note Completed Date 02/20/24   Supervision   Student Supervision Therapy services provided with the co-signing licensed therapist guiding and directing the services, and providing the skilled judgement and assessment throughout the session   Subjective Report   Subjective Report Pt on time, reports noticing an increase in coughing.   SLP Goals   SLP Goals 1;2;3;4   SLP Goal 1   Goal Identifier LTG [id number]   Goal Description Patient will demonstrate use of 3 safe swallow strategies independently to tolerate the least restrictive diet without overt s/x of penetration/aspiration across 1 week of meals, per patient and family report.   Rationale To maximize safety, ease and/or independence of oral intake   Goal Progress 03/12/2024: Reviewed trained swallow strategies Pt endorses use of 1. reduced amts, 2. alternating viscosities 3. adding moisture to dry foods as necessary, and 4 complete mastication.   Target Date 06/03/24   SLP Goal 2   Goal Identifier LTG [id number]   Goal Description Patient will complete recommended oralpharyngeal exercises 10x each provided min to mod cues in order to improve swallow function for least restrictive diet without overt s/x of penetration/aspiration across 1 week of meals, per patient  "and family report.   Rationale To maximize safety, ease and/or independence of oral intake   Goal Progress 03/19/2024: pt reports she can now hold Shaker exercise for 16 sec. LTG 1-Yunpfbbjy-Riglzbyb pharyngeal exercises, Pt verbalized and demonstrated understanding, endorsed daily completion 1-2x/day 10-15 reps. Pt required mod. re-education on effortful swallow exercise, demonstrated understanding w/ 100%.   Target Date 06/03/24   SLP Goal 3   Goal Identifier LTG 3-Voice   Goal Description Pt will learn, demonstrate and impliment use of 2-3 vocal endurance and quality exercises given min cues from SLP to promote reduced laryngeal tension and improved vocal quality/volume for communication with her .   Rationale To maximize functional communication within the home or community   Goal Progress 04/03/2024: Pt reports daily completion of vocal quality exercises, including SOVT cup and bubbles. Pt believes that SOVT exercises are most helpful for improving her vocal quality. LTG 3-SLP re-trained pt on cup and bubbles tissue mobilization exercises. Pt was demonstrating more coughing than typical, trialed shorter duration of phonation which reduced cough frequency. SLP cued for pt to complete swallow after each exercise, which was also found to reduce cough. Pt required mod. cues to complete glide and pitch change exercises. Pt completed 1-3x reps of each vocal exercise with the cup demonstrating relaxed/gentle phonation and addittional shorter duration + final swallow recommendations. Vocal quality exercises completed in session, pt required cues for volume incr. during \"ah\" sustained phonation, which was noted to improve vocal quality and reduce cough. SLP also recommends decr. duration to reduce strain/cough. On phonemic segmentation exercise, SLP trained pt in easy onset- pt was previously demonstrating a harsh onset. Pt demonstrates understanding of easy onset, incr. volume, and decr. duration w/ 100% " "  Target Date 06/03/24   Treatment Interventions (SLP)   Treatment Interventions Treatment Swallow/Oral dysfunction;Treatment Speech/Lang/Voice   Treatment Swallow/Oral dysfunction   Treatment of Swallowing Dysfunction &/or Oral Function for Feeding Minutes (84999) 20 Minutes   Skilled Intervention Educated patient on swallowing strategies.;Educated patient on risks of aspiration;Demonstrated safe swallow strategies   Patient Response/Progress HEP-ongoing daily completion reported. Pt reports getting ham stuck to the roof of her mouth over the weekend. SLP provided education on strategies to use in terms of hydration, small sips, adding moisture to dryer textures. Pt demonstrates understanding w/ 100%.   Treatment Speech/Lang/Voice   Treatment of Speech, Language, Voice Communication&/or Auditory Processing (72373) 20 Minutes   Skilled Intervention Provided written and verbal information on.;Demonstrated voice exercises;Provided feedback on performance of tasks   Patient Response/Progress HEP-Pt reports daily completion of vocal quality exercises, including SOVT cup and bubbles. Pt believes that SOVT exercises are most helpful for improving her vocal quality. LTG 3-SLP re-trained pt on cup and bubbles tissue mobilization exercises. Pt was demonstrating more coughing than typical, trialed shorter duration of phonation which reduced cough frequency. SLP cued for pt to complete swallow after each exercise, which was also found to reduce cough. Pt required mod. cues to complete glide and pitch change exercises. Pt completed 1-3x reps of each vocal exercise with the cup demonstrating relaxed/gentle phonation and addittional shorter duration + final swallow recommendations. Vocal quality exercises completed in session, pt required cues for volume incr. during \"ah\" sustained phonation, which was noted to improve vocal quality and reduce cough. SLP also recommends decr. duration to reduce strain/cough. On phonemic " segmentation exercise, SLP trained pt in easy onset- pt was previously demonstrating a harsh onset. Pt demonstrates understanding of easy onset, incr. volume, and decr. duration w/ 100%   Education   Learner/Method Patient;Listening;Demonstration;No Barriers to Learning   Education Comments re-education on vocal quality/SOVT exercises, cough reduction strategies.   Plan   Home program Safe swallow strategies, throat clearing reduction strategies. Vocal quality exercises, SOVT 2-3x/day   Plan for next session Review strategies and swallow exercises, voice-review vocal quality exercises. Review and traincough reduction strategies.   Total Session Time   Total Treatment Time (sum of timed and untimed services) 40           PLAN  Continue therapy per current plan of care, to ensure accuracy and consistency with HEP over time. Pt continues to benefit from re-education to ensure accurate completion of voice exercises to promote optimal voice and vocal health. Pt is scheduled to complete a video swallow on 4/30, plan to review dysphagia exercises and compensatory strategies pending those results.      Beginning/End Dates of Progress Note Reporting Period:  02/20/24  to 04/03/2024    Referring Provider:  Partha Cooper  PCP: Dr. EDINSON Romano MD     [facility]                                                                                   OUTPATIENT SPEECH LANGUAGE PATHOLOGY    PLAN OF TREATMENT FOR OUTPATIENT REHABILITATION   Patient's Last Name, First Name, Ya Turk YOB: 1940   Provider's Name   [facility]   Medical Record No.  [id number]     Onset Date: 12/19/23 (Date of MD orders.) Start of Care Date: 02/13/24     Medical Diagnosis:  Chronic cough (R05.3)  - Primary    Dysphagia, unspecified type (R13.10)    Dysphonia (R49.0)      SLP Treatment Diagnosis: Moderate to severe oropharyngeal dysphagia, Moderate dysphonia  Plan of  Treatment  Frequency/Duration: (P) 2x/month  / 60 days     Certification date from (P) 04/05/24   To (P) 06/03/24          See note for plan of treatment details and functional goals     Karen Snell, SLP                         I CERTIFY THE NEED FOR THESE SERVICES FURNISHED UNDER        THIS PLAN OF TREATMENT AND WHILE UNDER MY CARE .             Physician Signature               Date    X_____________________________________________________                  Referring Provider:  Partha Cooper  PCP: Dr. EDINSON Romano MD     Initial Assessment  See Epic Evaluation- 02/13/24    Thank you for referring Ya Stahl to outpatient therapy at [facility]. Please call Karen Snell MA, SLP-CCC at [phone] email [email or web address] with any questions or concerns.      Kraen Snell M.A., SLP-CCC  Speech-Language Pathologist

## 2024-04-16 ENCOUNTER — MYC REFILL (OUTPATIENT)
Dept: FAMILY MEDICINE | Facility: CLINIC | Age: 84
End: 2024-04-16
Payer: COMMERCIAL

## 2024-04-16 DIAGNOSIS — R39.15 URINARY URGENCY: Primary | ICD-10-CM

## 2024-04-17 ENCOUNTER — THERAPY VISIT (OUTPATIENT)
Dept: SPEECH THERAPY | Facility: CLINIC | Age: 84
End: 2024-04-17
Payer: COMMERCIAL

## 2024-04-17 DIAGNOSIS — R13.10 DYSPHAGIA, UNSPECIFIED TYPE: Primary | ICD-10-CM

## 2024-04-17 DIAGNOSIS — R05.3 CHRONIC COUGH: ICD-10-CM

## 2024-04-17 DIAGNOSIS — R49.0 DYSPHONIA: ICD-10-CM

## 2024-04-17 PROCEDURE — 92507 TX SP LANG VOICE COMM INDIV: CPT | Mod: GN | Performed by: SPEECH-LANGUAGE PATHOLOGIST

## 2024-04-17 PROCEDURE — 92526 ORAL FUNCTION THERAPY: CPT | Mod: GN | Performed by: SPEECH-LANGUAGE PATHOLOGIST

## 2024-04-17 RX ORDER — MIRABEGRON 50 MG/1
50 TABLET, EXTENDED RELEASE ORAL DAILY
Qty: 90 TABLET | Refills: 0 | Status: SHIPPED | OUTPATIENT
Start: 2024-04-17 | End: 2024-07-10

## 2024-04-22 ENCOUNTER — HOSPITAL ENCOUNTER (OUTPATIENT)
Dept: MAMMOGRAPHY | Facility: CLINIC | Age: 84
Discharge: HOME | End: 2024-04-22
Attending: FAMILY MEDICINE | Admitting: FAMILY MEDICINE
Payer: COMMERCIAL

## 2024-04-22 DIAGNOSIS — Z12.31 BREAST CANCER SCREENING BY MAMMOGRAM: ICD-10-CM

## 2024-04-22 PROCEDURE — 77063 BREAST TOMOSYNTHESIS BI: CPT

## 2024-04-30 ENCOUNTER — HOSPITAL ENCOUNTER (OUTPATIENT)
Dept: GENERAL RADIOLOGY | Facility: CLINIC | Age: 84
Discharge: HOME | End: 2024-04-30
Attending: FAMILY MEDICINE
Payer: COMMERCIAL

## 2024-04-30 ENCOUNTER — HOSPITAL ENCOUNTER (OUTPATIENT)
Dept: SPEECH THERAPY | Facility: CLINIC | Age: 84
Discharge: HOME | End: 2024-04-30
Attending: FAMILY MEDICINE
Payer: COMMERCIAL

## 2024-04-30 DIAGNOSIS — R49.0 DYSPHONIA: ICD-10-CM

## 2024-04-30 DIAGNOSIS — R05.3 CHRONIC COUGH: ICD-10-CM

## 2024-04-30 DIAGNOSIS — R13.10 DYSPHAGIA, UNSPECIFIED TYPE: ICD-10-CM

## 2024-04-30 DIAGNOSIS — R13.10 DYSPHAGIA: ICD-10-CM

## 2024-04-30 PROCEDURE — 92611 MOTION FLUOROSCOPY/SWALLOW: CPT | Mod: GN | Performed by: SPEECH-LANGUAGE PATHOLOGIST

## 2024-04-30 PROCEDURE — 74230 X-RAY XM SWLNG FUNCJ C+: CPT

## 2024-04-30 RX ORDER — BARIUM SULFATE 400 MG/ML
SUSPENSION ORAL ONCE
Status: COMPLETED | OUTPATIENT
Start: 2024-04-30 | End: 2024-04-30

## 2024-04-30 RX ADMIN — BARIUM SULFATE 40 ML: 400 SUSPENSION ORAL at 11:38

## 2024-04-30 NOTE — PROGRESS NOTES
SPEECH LANGUAGE PATHOLOGY EVALUATION    See electronic medical record for Abuse and Falls Screening details.    Subjective   Patient is a very pleasant 83 year ol female who was referred for a video swallow study by her OP speech therapist. She reports 6 month history of increased difficulty swallowing. She reports choking with food if its too dry and rough, coughing at times with thin liquids, globus sensation, and numbness of the lips and tongue at times. Pt also reports an ongoing cough throughout the day that is longstanding without known source and variable hoarse voicing that seems to worsen later in the day. Pt reports she has GERD symptoms, but that they are fairly well controlled with medications. No history of recent pneumonia.   Presenting condition or subjective complaint:    Date of onset:   10/12/23   Relevant medical history: Sjogrens, GERDS    Prior diagnostic imaging/testing results: MRI 9/9/23:   1. No acute intracranial process.  2.  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  3.  Generalized brain atrophy and presumed microvascular ischemic changes as detailed above.  4.  Partial bilateral mastoid effusions.  VFSS 7/21/21 Results reveal functional oral and pharyngeal phases of swallow. Oral phase of swallow is characterized by prolonged but thorough mastication, likely impacted by xerostomia. Onset of pharyngeal swallow is variable from the valleculae to pyriform sinuses. Patient with very mildly reduced laryngeal vestibular closure and very mildly reduced posterior pharyngeal wall contraction. Patient tolerating small single sips of thin liquids, mildly thick liquids, pudding, and cookie boluses without penetration or aspiration. Patient with shallow, transient penetration with a larger single cup sip of thin liquid. Larger, consecutive cup sips of thin liquids and single straw sips of thin liquids led to consistent penetration which was again transient and never reached the vocal  folds. No aspiration was observed. No oral or pharyngeal residue remained after any textures presented. Regular diet and thin liquids was recommend with swallow strategies.   Last VFSS 10/12/23 revealed: Pt presents with mild oral-pharyngeal dysphagia per today's evaluations.  Deficits include report of intermittent lip and tongue numbness and drooling at times, min oral motor deficits during the exam, hoarse voicing, delayed swallows at times, mild impaired tongue base retraction, curved epiglottis with decreased closure at times , and a tight UES with residue under and at the UES.  Deficits resulted in min-mild to no pharyngeal residue, moderate flash penetration of thin by cup x 1, and aspiration of thin liquids x 1 during large sip by straw.  The chin tuck was successful at eliminating penetration/aspiration of thin liquids by straw.  Recommend pt continue a regular diet with self-selection of soft food items with added sauce/gravy and use of recommended safe swallow strategies.  Pt stated she is concerned regarding her oral numbness and drooling as well as her difficulty swallowing and would like OP swallow Tx follow up.  Recommend OP clinical SLP swallow Tx to assess diet tolerance, train strategies, and provide pt with a home exercise program for oral-pharyngeal exercises.  Recommend pt follow up with MD regarding her oral motor nerve/neurological concerns and recent MRI.  Recommend consideration of a GI consult if increased esophageal dysphagia concerns arise.       Prior therapy history for the same diagnosis, illness or injury: Patient currently being followed by OP Speech Therapy.         Objective     SWALLOW EVALUTION    VIDEOFLUOROSCOPIC SWALLOW STUDY  Radiologist: MAHAD  Views Taken: left lateral, A/P   [facility]  VFSS textures trialed:   VFSS Eval: Thin Liquids  Mode of Presentation: cup, self-fed   Order of Presentation: 1 2 8 10  Preparatory Phase:  Poor bolus control  Oral  Phase: premature pharyngeal entry  Bolus Location When Swallow Initiated: pyriforms  Pharyngeal Phase: impaired epiglottic movement, impaired pharyngoesophageal segment opening, impaired tongue base retraction  Rosenbeck's Penetration Aspiration Scale: 6 - contrast passes glottis, no subglottic residue remains (aspiration)  Response to Aspiration: productive reflexive cough  Strategies and Compensations:  Chin tuck resulted in deep penetration with small amount of aspiration.   Diagnostic Statement: Flash mild to moderately deep without aspiration via the cup. A chin tuck resulted in deep penetration with small amount of aspiration.     VFSS Eval: Slightly Thick Liquids   Mode of Presentation: cup, self-fed   Order of Presentation: 3 4  Preparatory Phase: poor bolus control  Oral Phase: premature pharyngeal entry  Bolus Location When Swallow Initiated: pyriforms  Pharyngeal Phase: impaired epiglottic movement, impaired pharyngoesophageal segment opening, impaired tongue base retraction  Rosenbeck's Penetration Aspiration Scale: 2 - contrast enters airway, remains above the vocal cords, no residue remains (penetration)  Diagnostic Statement: Mild to moderately deep flash penetration of the vestibule without residue.     VFSS Eval: Mildly Thick Liquids  Mode of Presentation: cup, self-fed   Order of Presentation: 5 6 12  Preparatory Phase: WFL  Oral Phase: WFL  Bolus Location When Swallow Initiated: valleculae  Pharyngeal Phase: impaired epiglottic movement, impaired pharyngoesophageal segment opening, impaired tongue base retraction  Rosenbeck's Penetration Aspiration Scale: 1 - no aspiration, contrast does not enter airway  Diagnostic Statement: No penetration or aspiration. Mildly decreased epiglottic retraction.     VFSS Eval: Purees  Mode of Presentation: spoon, self-fed   Order of Presentation: 7  Preparatory Phase: WFL  Oral Phase: residue in oral cavity  Bolus Location When Swallow Initiated: posterior angle  of ramus  Pharyngeal Phase: impaired pharyngoesophageal segment opening  Rosenbeck s Penetration Aspiration Scale: 1 - no aspiration, contrast does not enter airway  Diagnostic Statement: Minimal oral residue in the oral cavity that cleared with an additional swallow. No pharyngeal residue.     VFSS Eval: Solids  Mode of Presentation: spoon, self-fed   Order of Presentation: 9 10 11  Preparatory Phase: WFL  Oral Phase: WFL  Bolus Location When Swallow Initiated: posterior angle of ramus  Pharyngeal Phase: impaired pharyngoesophageal segment opening   Rosenbeck s Penetration Aspiration Scale: 1 - no aspiration, contrast does not enter airway  Diagnostic Statement: Patient noted to have an moderate size osteophyte in the upper esophagus that the flow of the bolus slows. She was also noted to cough after swallowing that was not in response to any penetration/aspiration x2.      ESOPHAGEAL PHASE OF SWALLOW  patient presents with symptoms of esophageal dysphagia  esophageal sweep performed during today's videofluoroscopic exam  please refer to radiologist's report for details  AP view completed with noted possible motility issues.       SWALLOW ASSESSMENT CLINICAL IMPRESSIONS AND RATIONALE    CLINICAL IMPRESSIONS   Medical Diagnosis:    Dysphagia  Treatment Diagnosis:   Mild oral and pharyngeal dysphagia with esophageal component   Impression/Assessment: Patient presents with mild oral and pharyngeal dysphagia on today's study characterized by premature entry of thin and slightly thick liquids to the pyriform sinuses with one episode of mild to moderately deep flash penetration of the vestibule. A chin tuck maneuver was not effective and resulted in deep penetration to the cords with a small amount of aspiration with a cough response. There was no penetration with mildly thick liquids but noted slow retraction of the epiglottis. She was noted to have a tight UES with an osteophyte in the upper esophagus with slowing of  the flow with the solid material but was not obstructing. This maybe the the globus sensation she feels. There was no vallecular or pyriform sinus residue to account for the globus sensation. Note she was coughing on trials of the solid that was not in response to penetration/aspiration. Suspect its due to her xerostomia. Patient is at an increased risk for aspiration with thin and slightly thick liquids. Patient was provided education after the study.  Recommend: 1. May continue on a regular diet (softer moist food selections due to Sjogren's.) and thin liquids. I did give her packets of mildly thick to trial. 2. Follow GERDS precautions try warm liquids at the start of the meals, small bites/sips, and alternate liquids/solids as needed. 3. Continue with OP Speech Therapy. 4. Follow up with MD/GI and could consider a repeat esophagram completing a double contrast study verses single.      Risks and benefits of evaluation/treatment have been explained.   Patient/Family/caregiver agrees with Plan of Care.   Evaluation Time: 30   Signing Clinician: Kenia Sullivan, MS [facility] SLP    [facility]                                                                                   OUTPATIENT SPEECH LANGUAGE PATHOLOGY  Referring Provider:  Devi Romano

## 2024-05-03 ENCOUNTER — THERAPY VISIT (OUTPATIENT)
Dept: SPEECH THERAPY | Facility: CLINIC | Age: 84
End: 2024-05-03
Payer: COMMERCIAL

## 2024-05-03 DIAGNOSIS — R13.10 DYSPHAGIA, UNSPECIFIED TYPE: Primary | ICD-10-CM

## 2024-05-03 PROCEDURE — 92526 ORAL FUNCTION THERAPY: CPT | Mod: GN | Performed by: SPEECH-LANGUAGE PATHOLOGIST

## 2024-05-08 ENCOUNTER — TELEPHONE (OUTPATIENT)
Dept: FAMILY MEDICINE | Facility: CLINIC | Age: 84
End: 2024-05-08

## 2024-05-08 DIAGNOSIS — M81.0 SENILE OSTEOPOROSIS: Primary | ICD-10-CM

## 2024-05-13 ENCOUNTER — ANCILLARY PROCEDURE (OUTPATIENT)
Dept: BONE DENSITY | Facility: CLINIC | Age: 84
End: 2024-05-13
Payer: COMMERCIAL

## 2024-05-13 DIAGNOSIS — Z78.0 MENOPAUSE: ICD-10-CM

## 2024-05-13 DIAGNOSIS — M81.0 SENILE OSTEOPOROSIS: ICD-10-CM

## 2024-05-13 PROCEDURE — 77081 DXA BONE DENSITY APPENDICULR: CPT | Mod: TC

## 2024-05-26 ENCOUNTER — HEALTH MAINTENANCE LETTER (OUTPATIENT)
Age: 84
End: 2024-05-26

## 2024-05-30 ENCOUNTER — TELEPHONE (OUTPATIENT)
Dept: FAMILY MEDICINE | Facility: CLINIC | Age: 84
End: 2024-05-30

## 2024-05-30 ENCOUNTER — LAB (OUTPATIENT)
Dept: LAB | Facility: CLINIC | Age: 84
End: 2024-05-30
Payer: COMMERCIAL

## 2024-05-30 DIAGNOSIS — Z92.29 HISTORY OF DRUG THERAPY: ICD-10-CM

## 2024-05-30 DIAGNOSIS — M81.8 IDIOPATHIC OSTEOPOROSIS: ICD-10-CM

## 2024-05-30 DIAGNOSIS — M81.0 SENILE OSTEOPOROSIS: ICD-10-CM

## 2024-05-30 DIAGNOSIS — Q78.2 SEVERE OSTEOPETROSIS: ICD-10-CM

## 2024-05-30 PROCEDURE — 36415 COLL VENOUS BLD VENIPUNCTURE: CPT

## 2024-05-30 PROCEDURE — 82565 ASSAY OF CREATININE: CPT

## 2024-05-30 PROCEDURE — 82310 ASSAY OF CALCIUM: CPT

## 2024-05-30 NOTE — TELEPHONE ENCOUNTER
"Patient is scheduled for Prolia injection today at 1 PM but labs (creatinine and calcium) ordered by Dr. Funes were not done. After Visit Summary from the last visit note from Dr. Funes says: \"You will need labs few days prior to PROLIA (calcium labs)- please schedule lab appointment.\"    Patient was instructed to have labs today - done and results are now \"in process\". Turnaround time is 2-3 days.     Please follow-up with patient regarding lab results and if patient needs to make an appointment with Dr. Funes first prior to Prolia injection.     Will await endo team's response before rescheduling patient.   "

## 2024-05-31 ENCOUNTER — THERAPY VISIT (OUTPATIENT)
Dept: SPEECH THERAPY | Facility: CLINIC | Age: 84
End: 2024-05-31
Payer: COMMERCIAL

## 2024-05-31 DIAGNOSIS — R05.3 CHRONIC COUGH: ICD-10-CM

## 2024-05-31 DIAGNOSIS — R49.0 DYSPHONIA: ICD-10-CM

## 2024-05-31 DIAGNOSIS — R13.10 DYSPHAGIA, UNSPECIFIED TYPE: Primary | ICD-10-CM

## 2024-05-31 LAB
CALCIUM SERPL-MCNC: 10 MG/DL (ref 8.8–10.2)
CREAT SERPL-MCNC: 1.12 MG/DL (ref 0.51–0.95)
EGFRCR SERPLBLD CKD-EPI 2021: 49 ML/MIN/1.73M2

## 2024-05-31 PROCEDURE — 92526 ORAL FUNCTION THERAPY: CPT | Mod: GN | Performed by: SPEECH-LANGUAGE PATHOLOGIST

## 2024-05-31 PROCEDURE — 92507 TX SP LANG VOICE COMM INDIV: CPT | Mod: GN | Performed by: SPEECH-LANGUAGE PATHOLOGIST

## 2024-05-31 NOTE — PROGRESS NOTES
OP SLP DISCHARGE NOTE     05/31/24 0500   Appointment Info   Treating Provider Karen Snell MA CCC-SLP   Visits Used 3/10, 10 POC   Medical Diagnosis Chronic cough (R05.3)  - Primary    Dysphagia, unspecified type (R13.10)    Dysphonia (R49.0)   SLP Tx Diagnosis Moderate to severe oropharyngeal dysphagia, Moderate dysphonia   Quick Adds Certification   Progress Note/Certification   Start Of Care Date 02/13/24   Onset Of Illness/injury Or Date Of Surgery 12/19/23  (Date of MD orders.)   Therapy Frequency 2x/month   Predicted Duration 60 days   Certification date from 04/05/24   Certification date to 06/03/24   Progress Note Due Date 06/03/24   Progress Note Completed Date 02/20/24   Subjective Report   Subjective Report Pt doing well, reports she is no longer experiencing hoarsness towards the ends of the day.   SLP Goals   SLP Goals 1;2;3;4   SLP Goal 1   Goal Identifier LTG 2-Vxzveemyn-Pxpmekokmh   Goal Description Patient will demonstrate use of 3 safe swallow strategies independently to tolerate the least restrictive diet without overt s/x of penetration/aspiration across 1 week of meals, per patient and family report.   Rationale To maximize safety, ease and/or independence of oral intake   Goal Progress 03/12/2024: Reviewed trained swallow strategies Pt endorses use of 1. reduced amts, 2. alternating viscosities 3. adding moisture to dry foods as necessary, and 4 complete mastication.   Target Date 06/03/24   Date Met 05/31/24   SLP Goal 2   Goal Identifier LTG 5-Byemuqrlj-Mkfykvftz   Goal Description Patient will complete recommended oralpharyngeal exercises 10x each provided min to mod cues in order to improve swallow function for least restrictive diet without overt s/x of penetration/aspiration across 1 week of meals, per patient and family report.   Rationale To maximize safety, ease and/or independence of oral intake   Goal Progress 03/19/2024: pt reports she can now hold Shaker exercise for 16 sec. LTG  "8-Bnggbtzcv-Uirtzxlr pharyngeal exercises, Pt verbalized and demonstrated understanding, endorsed daily completion 1-2x/day 10-15 reps. Pt required mod. re-education on effortful swallow exercise, demonstrated understanding w/ 100%.   Target Date 06/03/24   Date Met 05/31/24   SLP Goal 3   Goal Identifier LTG 3-Voice   Goal Description Pt will learn, demonstrate and impliment use of 2-3 vocal endurance and quality exercises given min cues from SLP to promote reduced laryngeal tension and improved vocal quality/volume for communication with her .   Rationale To maximize functional communication within the home or community   Goal Progress 04/03/2024: Pt reports daily completion of vocal quality exercises, including SOVT cup and bubbles. Pt believes that SOVT exercises are most helpful for improving her vocal quality. LTG 3-SLP re-trained pt on cup and bubbles tissue mobilization exercises. Pt was demonstrating more coughing than typical, trialed shorter duration of phonation which reduced cough frequency. SLP cued for pt to complete swallow after each exercise, which was also found to reduce cough. Pt required mod. cues to complete glide and pitch change exercises. Pt completed 1-3x reps of each vocal exercise with the cup demonstrating relaxed/gentle phonation and addittional shorter duration + final swallow recommendations. Vocal quality exercises completed in session, pt required cues for volume incr. during \"ah\" sustained phonation, which was noted to improve vocal quality and reduce cough. SLP also recommends decr. duration to reduce strain/cough. On phonemic segmentation exercise, SLP trained pt in easy onset- pt was previously demonstrating a harsh onset. Pt demonstrates understanding of easy onset, incr. volume, and decr. duration w/ 100%   Target Date 06/03/24   Date Met 05/31/24   Treatment Interventions (SLP)   Treatment Interventions Treatment Swallow/Oral dysfunction   Treatment Swallow/Oral " dysfunction   Treatment of Swallowing Dysfunction &/or Oral Function for Feeding Minutes (68501) 10 Minutes   Skilled Intervention Educated patient on swallowing strategies.;Educated patient on risks of aspiration;Demonstrated safe swallow strategies   Patient Response/Progress LTG 3-Stratgies-reviewed swallow strategies including; slow rate, small bite, complete mastication. Pt endorses trial of Thick It at home but found that if she allows it to sit for a long time it continues to thicken, this is unplesant. SLP provided education on overt s/sx of aspiration/penetration and increased risk associated with food, suggest that if she feels safer with thicker liquids to trial use during meals where she may drink more often/finish the glass faster. NCX-Aoicfqciu-qhasjplqgfi. Reviewed reflux recommendations, Pt to follow up with [facility] this summer, suggest Pt diso medication changes with provider as she continues to experince breakthrough symptoms with specific foods and body positions.   Treatment Speech/Lang/Voice   Treatment of Speech, Language, Voice Communication&/or Auditory Processing (20592) 20 Minutes   Skilled Intervention Provided written and verbal information on.;Demonstrated voice exercises;Provided feedback on performance of tasks   Patient Response/Progress LTG 3-Voice-Pt demonstrating reduced vocal hoarsnes today, has eliminated the voice exercises since last session as directed to trial 1 month off, Pt reporting less vocal  hoarsness in the evenings. Has also been working on cough and throat clear reduction, notes this is hard but feels she is coughing more gently and can calm the tickle sometimes with water. Recommend continued focus on cough/throat clear reduction-elimination to reduce larygneal irritation.   Education   Learner/Method Patient;Listening;Demonstration;No Barriers to Learning   Plan   Home program Safe swallow strategies, throat clearing reduction strategies.   Plan for next session  Discharge.   Total Session Time   Total Treatment Time (sum of timed and untimed services) 30         DISCHARGE  Reason for Discharge: Patient has met all goals.    Equipment Issued: none     Discharge Plan: Patient to continue home program. Pt to follow up with [facility], may consider changes to medications per MD recommendation given that she is continually reporting s/sx of reflux.     Referring Provider:  Partha Cooper    Thank you for referring Ya Stahl to outpatient therapy at [facility]. Please call Karen Snell MA, SLP-CCC at [phone] email [email or web address] with any questions or concerns.      Karen Snell M.A., SLP-CCC  Speech-Language Pathologist

## 2024-06-02 NOTE — RESULT ENCOUNTER NOTE
Ya    Recently done endocrinology lab test/ imaging test showed:  Calcium normal.  Kidney function at baseline.    Here is a copy for your records.  Follow up as discussed in last clinic visit.    Please call endocrinology clinic ( [phone]) if questions.    Mary Funes MD  Endocrinology   [facility]  June 2, 2024

## 2024-06-03 ENCOUNTER — ALLIED HEALTH/NURSE VISIT (OUTPATIENT)
Dept: NURSING | Facility: CLINIC | Age: 84
End: 2024-06-03
Payer: COMMERCIAL

## 2024-06-03 ENCOUNTER — TELEPHONE (OUTPATIENT)
Dept: ENDOCRINOLOGY | Facility: CLINIC | Age: 84
End: 2024-06-03

## 2024-06-03 DIAGNOSIS — M81.8 IDIOPATHIC OSTEOPOROSIS: Primary | ICD-10-CM

## 2024-06-03 PROCEDURE — 99207 PR NO CHARGE NURSE ONLY: CPT

## 2024-06-03 PROCEDURE — 99207 PR NO CHARGE NURSE ONLY: CPT | Performed by: INTERNAL MEDICINE

## 2024-06-03 PROCEDURE — 96372 THER/PROPH/DIAG INJ SC/IM: CPT | Performed by: INTERNAL MEDICINE

## 2024-06-03 NOTE — TELEPHONE ENCOUNTER
Plan to continue PROLIA.  Due for 12/2024.  DEXA as scheduled 3/2024.  Follow up in 1 year.     Calcium check within 2 weeks prior to PROLIA

## 2024-06-03 NOTE — TELEPHONE ENCOUNTER
Patient was here today for Prolia injection, next appointment scheduled for 12/3/2024.  Patient wants to know when she should follow up next for labs.    Please advise.    Thank you,  Akshat Andersen, Triage RN [facility]  3:59 PM 6/3/2024

## 2024-06-03 NOTE — PROGRESS NOTES
Clinic Administered Medication Documentation      Prolia Documentation    Indication: Prolia  (denosumab) is a prescription medicine used to treat osteoporosis in patients who:   Are at high risk for fracture, meaning patients who have had a fracture related to osteoporosis, or who have multiple risk factors for fracture.  Cannot use another osteoporosis medicine or other osteoporosis medicines did not work well.  The timeline for early/late injections would be 4 weeks early and any time after the 6 month bella. If a patient receives their injection late, then the subsequent injection would be 6 months from the date that they actually received the injection.    When was the last injection?  2023  Was the last injection at least 6 months ago? Yes  Has the prior authorization been completed?  Yes  Is there an active order (written within the past 365 days, with administrations remaining, not ) in the chart?  Yes   GFR Estimate   Date Value Ref Range Status   2024 49 (L) >60 mL/min/1.73m2 Final   2021 79 >60 mL/min/[1.73_m2] Final     Comment:     Non  GFR Calc  Starting 2018, serum creatinine based estimated GFR (eGFR) will be   calculated using the Chronic Kidney Disease Epidemiology Collaboration   (CKD-EPI) equation.       Has patient had a GFR within the last 12 months? Yes   Is GFR under 30, or patient has a diagnosis of CKD4 or CKD5? No   Patient denies gastric bypass or parathyroid surgery in past 6 months? Yes - patient denies.   Patient denies dental work in the past two months involving drilling into the bone, such as implants/extractions, oral surgery or a tooth extraction that has not healed yet?  Yes  Patient denies plans for an emergency tooth extraction within the next week? Yes    The following steps were completed to comply with the REMS program for Prolia:  Reviewed information in the Medication Guide, including the serious risks of Prolia  and the  symptoms of each risk.  Advised patient to seek prompt medical attention if they have signs or symptoms of any of the serious risks.  Provided each patient a copy of the Medication Guide and Patient Guide.    Prior to injection, verified patient identity using patient's name and date of birth. Medication was administered. Please see MAR and medication order for additional information. Patient instructed to remain in clinic for 15 minutes and report any adverse reaction to staff immediately.    Vial/Syringe: Syringe  Was this medication supplied by the patient? No  Verified that the patient has refills remaining in their prescription.      Patient tolerated well. Follow up appointment scheduled. Patient want to know when she should do her labs next, message sent to Dr. Batista.     Dec 03, 2024 11:00 AM  Nurse Only with Ri Rn  [facility] ([facility] ) [phone]       Thank you,  Akshat Andersen, Triage RN [facility]  3:57 PM 6/3/2024

## 2024-06-05 DIAGNOSIS — K21.9 GASTROESOPHAGEAL REFLUX DISEASE, UNSPECIFIED WHETHER ESOPHAGITIS PRESENT: ICD-10-CM

## 2024-06-05 RX ORDER — FAMOTIDINE 40 MG/1
40 TABLET, FILM COATED ORAL
Qty: 90 TABLET | Refills: 0 | Status: SHIPPED | OUTPATIENT
Start: 2024-06-05 | End: 2024-07-10

## 2024-06-20 ENCOUNTER — TRANSCRIBE ORDERS (OUTPATIENT)
Dept: OTHER | Age: 84
End: 2024-06-20

## 2024-06-20 DIAGNOSIS — R13.10 DYSPHAGIA, UNSPECIFIED TYPE: Primary | ICD-10-CM

## 2024-07-03 NOTE — TELEPHONE ENCOUNTER
7/3/2024  1st attempt to complete Follow-Up  for Outpatient Care Management, left message. Will mail unable to assess letter.      Sharon called in stating she does not understand the letter Dr. Spencer sent to her dated 2/5/18. She would like for Dr. Spencer or a nurse to give her a call back to help her understand the letter. Ya can be reached at [phone].

## 2024-07-09 ENCOUNTER — OFFICE VISIT (OUTPATIENT)
Dept: FAMILY MEDICINE | Facility: CLINIC | Age: 84
End: 2024-07-09
Payer: COMMERCIAL

## 2024-07-09 VITALS
WEIGHT: 146.6 LBS | OXYGEN SATURATION: 100 % | SYSTOLIC BLOOD PRESSURE: 158 MMHG | HEART RATE: 42 BPM | HEIGHT: 64 IN | BODY MASS INDEX: 25.03 KG/M2 | TEMPERATURE: 97.1 F | DIASTOLIC BLOOD PRESSURE: 72 MMHG | RESPIRATION RATE: 16 BRPM

## 2024-07-09 DIAGNOSIS — I10 HYPERTENSION GOAL BP (BLOOD PRESSURE) < 140/90: ICD-10-CM

## 2024-07-09 DIAGNOSIS — M25.512 ACUTE PAIN OF LEFT SHOULDER: ICD-10-CM

## 2024-07-09 DIAGNOSIS — R05.3 CHRONIC COUGH: ICD-10-CM

## 2024-07-09 DIAGNOSIS — H61.23 BILATERAL IMPACTED CERUMEN: ICD-10-CM

## 2024-07-09 DIAGNOSIS — I25.83 CORONARY ARTERY DISEASE DUE TO LIPID RICH PLAQUE: ICD-10-CM

## 2024-07-09 DIAGNOSIS — I25.10 CORONARY ARTERY DISEASE DUE TO LIPID RICH PLAQUE: ICD-10-CM

## 2024-07-09 DIAGNOSIS — Z01.818 PREOP GENERAL PHYSICAL EXAM: Primary | ICD-10-CM

## 2024-07-09 LAB
ERYTHROCYTE [DISTWIDTH] IN BLOOD BY AUTOMATED COUNT: 12.8 % (ref 10–15)
HCT VFR BLD AUTO: 42 % (ref 35–47)
HGB BLD-MCNC: 13.4 G/DL (ref 11.7–15.7)
MCH RBC QN AUTO: 31.9 PG (ref 26.5–33)
MCHC RBC AUTO-ENTMCNC: 31.9 G/DL (ref 31.5–36.5)
MCV RBC AUTO: 100 FL (ref 78–100)
PLATELET # BLD AUTO: 272 10E3/UL (ref 150–450)
RBC # BLD AUTO: 4.2 10E6/UL (ref 3.8–5.2)
WBC # BLD AUTO: 7 10E3/UL (ref 4–11)

## 2024-07-09 PROCEDURE — 99214 OFFICE O/P EST MOD 30 MIN: CPT

## 2024-07-09 PROCEDURE — 85027 COMPLETE CBC AUTOMATED: CPT

## 2024-07-09 PROCEDURE — 69209 REMOVE IMPACTED EAR WAX UNI: CPT | Mod: 4MD

## 2024-07-09 PROCEDURE — 93000 ELECTROCARDIOGRAM COMPLETE: CPT

## 2024-07-09 PROCEDURE — 80048 BASIC METABOLIC PNL TOTAL CA: CPT

## 2024-07-09 PROCEDURE — G2211 COMPLEX E/M VISIT ADD ON: HCPCS

## 2024-07-09 PROCEDURE — 36415 COLL VENOUS BLD VENIPUNCTURE: CPT

## 2024-07-09 RX ORDER — AMOXICILLIN 500 MG/1
TABLET, FILM COATED ORAL
COMMUNITY
Start: 2024-04-17

## 2024-07-09 RX ORDER — VALSARTAN 80 MG/1
80 TABLET ORAL DAILY
Qty: 90 TABLET | Refills: 0 | Status: SHIPPED | OUTPATIENT
Start: 2024-07-09 | End: 2024-09-09 | Stop reason: SINTOL

## 2024-07-09 ASSESSMENT — PAIN SCALES - GENERAL: PAINLEVEL: MODERATE PAIN (4)

## 2024-07-09 NOTE — PROGRESS NOTES
Preoperative Evaluation  [facility]  [address] [facility] [address]  Phone: [phone]  Primary Provider: Devi Romano MD  Pre-op Performing Provider: SINTIA Richter CNP  Jul 9, 2024 7/9/2024   Surgical Information   What procedure is being done? left shoulder replacement   Facility or Hospital where procedure/surgery will be performed: [facility]   Who is doing the procedure / surgery? Dr. Melvin Martinez   Date of surgery / procedure: August 6th   Time of surgery / procedure: TBD   Where do you plan to recover after surgery? at home with family      Fax number for surgical facility: [phone]    Assessment & Plan     The proposed surgical procedure is considered INTERMEDIATE risk.    (Z01.818) Preop general physical exam  (primary encounter diagnosis)  (M25.512) Acute pain of left shoulder  Comment: ok to proceed w/ procedure. EKG performed today, NSB w/ 1st degree AVB. Discussed medication hold times. Will get labs updated. Will follow up on results and whether further direction is necessary.Patient fully understands and is agreeable with plan of care, at this point patient will follow up as needed unless acute concerns arise in the meantime.  Plan: EKG 12-lead complete w/read - Clinics, Basic         metabolic panel  (Ca, Cl, CO2, Creat, Gluc, K,         Na, BUN), CBC with platelets    (I10) Hypertension goal BP (blood pressure) < 140/90  (R05.3) Chronic cough  Comment: Patient BP elevated today but ok to proceed w/ procedure. Left shoulder pain could be contributing to elevated blood pressure.  patient has had chronic dry cough for multiple years now, quite bothersome. Patient is currently on lisinopril, do not suspect any infectious nature. Patient does not complain of any GERD flares nor does she have any PND symptoms. No hx of lung disease. Will trial valsartan and discontinue lisinopril. Will follow up w/ patient in a  couple weeks to see how BP has been at home. Discussed medication risks and benefits of Valsartan with patient in detail with patient verbal understanding. Patient fully understands and is agreeable with plan of care, at this point patient will follow up as needed unless acute concerns arise in the meantime.  Plan: valsartan (DIOVAN) 80 MG tablet    (I25.10,  I25.83) Coronary artery disease due to lipid rich plaque  Comment: follows cardiology.     (H61.23) Bilateral impacted cerumen  Comment: tolerated.   Plan: NH REMOVAL IMPACTED CERUMEN IRRIGATION/LVG         UNILAT     - No identified additional risk factors other than previously addressed    Antiplatelet or Anticoagulation Medication Instructions   - aspirin: Discontinue aspirin 7-10 days prior to procedure to reduce bleeding risk. It should be resumed postoperatively.     Additional Medication Instructions  Take all scheduled medications on the day of surgery EXCEPT for modifications listed below:  Mirbegron- hold day of   - ACE/ARB: DO NOT TAKE on day of surgery (minimum 11 hours for general anesthesia).   - Beta Blockers: Continue taking on the day of surgery.   - Statins: Continue taking on the day of surgery.    - fiber, laxatives: DO NOT TAKE day of surgery   - Herbal medications (fish oil) and vitamins: DO NOT TAKE 14 days prior to surgery.    Recommendation  Approval given to proceed with proposed procedure, without further diagnostic evaluation.    Janet Pandya is a 84 year old, presenting for the following:  Pre-Op Exam        7/9/2024     1:37 PM   Additional Questions   Roomed by Soraya MUSTAFA MA         7/9/2024   Forms   Any forms needing to be completed Yes      HPI related to upcoming procedure: ligament damage causing left shoulder replacement necessary.         7/9/2024   Pre-Op Questionnaire   Have you ever had a heart attack or stroke? No   Have you ever had surgery on your heart or blood vessels, such as a stent placement, a coronary  artery bypass, or surgery on an artery in your head, neck, heart, or legs? No   Do you have chest pain with activity? No   Do you have a history of heart failure? No   Do you currently have a cold, bronchitis or symptoms of other infection? No   Do you have a cough, shortness of breath, or wheezing? (!) YES chronic cough   Do you or anyone in your family have previous history of blood clots? No   Do you or does anyone in your family have a serious bleeding problem such as prolonged bleeding following surgeries or cuts? (!) YES personal history of bleeding a lot when scratched   Have you ever had problems with anemia or been told to take iron pills? No   Have you had any abnormal blood loss such as black, tarry or bloody stools, or abnormal vaginal bleeding? No   Have you ever had a blood transfusion? No   Are you willing to have a blood transfusion if it is medically needed before, during, or after your surgery? Yes   Have you or any of your relatives ever had problems with anesthesia? (!) YES person hx, if she gets too much there is difficulty waking up.    Do you have sleep apnea, excessive snoring or daytime drowsiness? No   Do you have any artifical heart valves or other implanted medical devices like a pacemaker, defibrillator, or continuous glucose monitor? No   Do you have artificial joints? (!) YES   Are you allergic to latex? No        Health Care Directive  Patient has a Health Care Directive on file    Preoperative Review of    reviewed - no record of controlled substances prescribed.      Status of Chronic Conditions:  See problem list for active medical problems.  Problems all longstanding and stable, except as noted/documented.  See ROS for pertinent symptoms related to these conditions.    Patient Active Problem List    Diagnosis Date Noted    Dysphagia, unspecified type 11/08/2023     Priority: Medium    Pelvic hematoma in female 09/19/2023     Priority: Medium    Coronary artery disease due to  "lipid rich plaque 06/30/2022     Priority: Medium    Dyslipidemia 06/30/2022     Priority: Medium    Idiopathic osteoporosis 05/02/2022     Priority: Medium    History of drug therapy 05/02/2022     Priority: Medium    Elevated glucose 03/18/2022     Priority: Medium    Severe osteopetrosis 03/18/2022     Priority: Medium    Ascending aorta dilation (H24) 01/06/2022     Priority: Medium     Echo needed yearly      Other osteoarthritis involving multiple joints 01/06/2022     Priority: Medium    Dry mouth 01/06/2022     Priority: Medium    Status post total replacement of right hip 08/04/2020     Priority: Medium    S/P lumbar fusion 10/31/2018     Priority: Medium    Complication of anesthesia      Priority: Medium     \"hard time waking up / N & V\"      Hypertension goal BP (blood pressure) < 140/90 02/20/2018     Priority: Medium    Epigastric pain 10/19/2017     Priority: Medium    MGUS (monoclonal gammopathy of unknown significance) 01/14/2017     Priority: Medium      Would just repeat her SPEP and serum free light chains every 1-2 years.  Please feel free to refer her back to me if labs are trending up.   See phone message 1/11/2021 with Hematology    From staff message end of January 2021:  Light chains went up a bit, but still relatively low, and everything else looks fine (hemoglobin, creatinine, M-spike, calcium).  I'd continue to monitor it as MGUS.  Would just continue doing annual labs as you are, and please feel free to reach out with questions anytime.         Monoclonal paraproteinemia 01/12/2017     Priority: Medium    Macrocytosis 12/01/2016     Priority: Medium    ASCVD (arteriosclerotic cardiovascular disease) 03/09/2016     Priority: Medium    Primary osteoarthritis of both hands 09/22/2015     Priority: Medium    Gastroesophageal reflux disease, unspecified whether esophagitis present 05/20/2015     Priority: Medium    Senile osteoporosis 08/12/2014     Priority: Medium     started alendronate " "Aug 2014      Status post-operative repair of hip fracture, RIGHT 04/01/2014     Priority: Medium    Impaired fasting glucose 12/09/2012     Priority: Medium    Chronic cough 09/10/2012     Priority: Medium    HL (hearing loss) 12/01/2011     Priority: Medium    Mixed incontinence 12/01/2011     Priority: Medium    Sjogren's disease (H24) 06/02/2011     Priority: Medium    Hyperlipidemia LDL goal <70 10/31/2010     Priority: Medium    Bile reflux gastritis 04/15/2009     Priority: Medium    Grave's disease 09/08/2008     Priority: Medium     IMO update changed this record. Please review for accuracy      Hypothyroidism 11/01/2004     Priority: Medium     Problem list name updated by automated process. Provider to review        Past Medical History:   Diagnosis Date    Arthritis     Cerebral infarction (H) 2019    Complication of anesthesia     \"hard time waking up / N & V\"    Coronary artery disease     Disease of thyroid gland     Displacement of lumbar intervertebral disc without myelopathy     Gastro-oesophageal reflux disease     GERD (gastroesophageal reflux disease)     Hearing loss     has bilateral aids    Heart attack (H) 03/2016    History of anesthesia complications     delayed emergence    History of angina     Hypertension     Low back pain     Migraine headache     Myocardial infarction (H)     Numbness and tingling     down right leg (associated with back pain)    PONV (postoperative nausea and vomiting)     Sicca syndrome (H24)     Sjogren's disease (H24)     Sjogren's syndrome (H24)     sees specialist; dentist    Spider veins     Unspecified hypothyroidism      Past Surgical History:   Procedure Laterality Date    ARTHROPLASTY HIP ANTERIOR Left 10/14/2022    Procedure: LEFT TOTAL HIP ARTHROPLASTY DIRECT ANTERIOR APPROACH;  Surgeon: Selvin Saab MD;  Location: [facility] OR    ARTHROSCOPY KNEE  10/05/2012    R Procedure: ARTHROSCOPY KNEE;  RIGHT KNEE ARTHROSCOPY, PARTIAL MEDIAL MENISCECTOMY;  Surgeon: " Karli Zaragoza MD;  Location: [facility]    BACK SURGERY  About. 5 years ago    Fusion of 4&5 lumbar    BLADDER SURGERY      BUNIONECTOMY  ~2010    L foot.     CATARACT IOL, RT/LT Bilateral 07/2017    COLONOSCOPY N/A 01/17/2017    normal; no repeat Procedure: COLONOSCOPY;  Surgeon: Fan Giordano MD;  Location:  GI    ENDOSCOPIC RELEASE CARPAL TUNNEL  09/11/2012    R Procedure: ENDOSCOPIC RELEASE CARPAL TUNNEL;  Right Endoscopic carpal tunnel release, left ringer finger cortizon injection;  Surgeon: Anne Marie Catherine MD;  Location:  OR    ESOPHAGOSCOPY, GASTROSCOPY, DUODENOSCOPY (EGD), COMBINED N/A 12/26/2014    Procedure: COMBINED ESOPHAGOSCOPY, GASTROSCOPY, DUODENOSCOPY (EGD);  Surgeon: Fan Giordano MD;  Location:  GI    ESOPHAGOSCOPY, GASTROSCOPY, DUODENOSCOPY (EGD), COMBINED N/A 11/28/2023    Procedure: ESOPHAGOGASTRODUODENOSCOPY, WITH BIOPSY;  Surgeon: Partha Cooper, DO;  Location: [facility]    EXCISE EXOSTOSIS FOOT Left 12/01/2016    Procedure: EXCISE EXOSTOSIS FOOT;  Surgeon: Jdoy Gallagher DPM, Pod;  Location:  OR    GYN SURGERY  1978    ovaries removed    HEART CATH FYI  03/04/2016    dz <40%    HIP HARDWARE REMOVAL Right 08/04/2020    Procedure: HARDWARE REMOVAL - LATERAL APPROACH;  Surgeon: Charlie Wolfe MD;  Location: [facility];  Service: Orthopedics    HYSTERECTOMY      INJECT STEROID (LOCATION)  09/11/2012    Procedure: INJECT STEROID (LOCATION);;  Surgeon: Anne Marie Catherine MD;  Location:  OR    LAPAROSCOPIC CHOLECYSTECTOMY N/A 04/07/2016    Procedure: LAPAROSCOPIC CHOLECYSTECTOMY;  Surgeon: Hans Medina MD;  Location:  OR    OPTICAL TRACKING SYSTEM FUSION SPINE POSTERIOR LUMBAR TWO LEVELS N/A 10/31/2018    Procedure: Central L3-4 L4-5 lamenectomies L4-5 posterior instrumented fusion;  Surgeon: Aroldo Burdick MD;  Location:  OR    ORTHOPEDIC SURGERY Right 2014    karli for fx femur    RECONSTRUCT FOREFOOT WITH METATARSOPHALANGEAL (MTP) FUSION Left  04/28/2017    Procedure: RECONSTRUCT FOREFOOT WITH METATARSOPHALANGEAL (MTP) FUSION;  1. Resection arthroplasty, fifth metatarsophalangeal joint, left foot.   2. Irrigation and debridement of fifth metatarsophalangeal joint, left foot (excisional to the level of the bone).     ;  Surgeon: Fabián Phipps MD;  Location:  OR    RELEASE CARPAL TUNNEL      RIGHT HIP ORIF 4/1/2014      ROTATOR CUFF REPAIR RT/LT  ~1998    Lt shoulder; rotator cuff    SURGICAL HISTORY OF -   distant past    ablation for palpitations.    SURGICAL HISTORY OF -   06/2015    left carpal tunnel surgery    [facility] NONSPECIFIC PROCEDURE  1976    D&C    [facility] TOTAL HIP ARTHROPLASTY Right 08/04/2020    Procedure: RIGHT TOTAL HIP ARTHROPLASTY;  Surgeon: Charlie Wolfe MD;  Location: [facility];  Service: Orthopedics    [facility] TOTAL KNEE ARTHROPLASTY Right 01/2020     Current Outpatient Medications   Medication Sig Dispense Refill    acetaminophen (TYLENOL) 500 MG tablet Take 1,000 mg by mouth every 8 hours as needed for mild pain      aspirin 81 MG EC tablet Take 81 mg by mouth daily      atorvastatin (LIPITOR) 20 MG tablet Take 1 tablet (20 mg) by mouth daily 90 tablet 3    Calcium Carb-Cholecalciferol (SM CALCIUM/VITAMIN D) 600-800 MG-UNIT TABS Take 1 tablet by mouth 2 times daily Each tablet is 500 calcium and 600 Vit D      Carboxymethylcellulose Sodium (REFRESH LIQUIGEL OP) Place 1 drop into both eyes every hour as needed (dry eyes)      cetirizine (ZYRTEC) 10 MG tablet Take 10 mg by mouth daily as needed for allergies      cevimeline (EVOXAC) 30 MG capsule Take 30 mg by mouth 3 times daily      co-enzyme Q-10 100 MG CAPS capsule Take 100 mg by mouth daily      ELDERBERRY PO Take 1 tablet by mouth daily      famotidine (PEPCID) 40 MG tablet TAKE ONE TABLET BY MOUTH AT BEDTIME 90 tablet 0    LANsoprazole (PREVACID) 30 MG DR capsule TAKE 1 CAPSULE BY MOUTH IN THE  MORNING 90 capsule 3    levothyroxine (SYNTHROID/LEVOTHROID) 88 MCG  tablet Take 1 tablet (88 mcg) by mouth daily 90 tablet 3    Lutein-Zeaxanthin 25-5 MG CAPS Take 1 capsule by mouth daily      Melatonin 5 MG TBDP Take 1 tablet by mouth every evening      metoprolol succinate ER (TOPROL XL) 25 MG 24 hr tablet Take 1 tablet (25 mg) by mouth daily 90 tablet 11    mirabegron (MYRBETRIQ) 50 MG 24 hr tablet Take 1 tablet (50 mg) by mouth daily 90 tablet 0    Omega-3 Fatty Acids (OMEGA-3 FISH OIL PO) Take 1 g by mouth 2 times daily (with meals)       polyethylene glycol (MIRALAX) 17 GM/Dose powder Take 1 capful by mouth 2 times daily In 8 ounces of liquid      valsartan (DIOVAN) 80 MG tablet Take 1 tablet (80 mg) by mouth daily 90 tablet 0    Vitamin D, Cholecalciferol, 25 MCG (1000 UT) CAPS Take 1 capsule by mouth 2 times daily      amoxicillin (AMOXIL) 500 MG tablet TAKE 4 TABLETS BY MOUTH 1 HOUR BEFORE DENTAL APPOINTMENT (Patient not taking: Reported on 7/9/2024)      EPINEPHrine (ANY BX GENERIC EQUIV) 0.3 MG/0.3ML injection 2-pack Inject 0.3 mLs (0.3 mg) into the muscle as needed for anaphylaxis (Patient not taking: Reported on 7/9/2024) 2 each 0    nitroGLYcerin (NITROSTAT) 0.4 MG sublingual tablet Place 1 tablet (0.4 mg) under the tongue every 5 minutes as needed for chest pain (Patient not taking: Reported on 7/9/2024) 25 tablet 0       Allergies   Allergen Reactions    Codeine GI Disturbance     fatigue    Morphine GI Disturbance    Morphine And Codeine      fatigue    Oxycodone     Vicodin [Acetaminophen] Fatigue    Adhesive Tape Rash        Social History     Tobacco Use    Smoking status: Never    Smokeless tobacco: Never   Substance Use Topics    Alcohol use: Not Currently     Comment: Maybe 1x amonth     Family History   Problem Relation Age of Onset    C.A.D. Mother 76    Cancer Mother         non Hodgekin's Lymphoma    Heart Disease Mother         enlarged heart    Breast Cancer Mother     C.A.D. Father 59    Heart Disease Father         valve problem    Hypertension  "Brother     Neuropathy Brother     Heart Disease Brother         open heart to repair mitral valve.    Connective Tissue Disorder Daughter         scleroderma    Colon Cancer No family hx of      History   Drug Use No             Review of Systems  Constitutional, HEENT, cardiovascular, pulmonary, GI, , musculoskeletal, neuro, skin, endocrine and psych systems are negative, except as otherwise noted.    Objective    BP (!) 158/72 (BP Location: Left arm, Patient Position: Sitting, Cuff Size: Adult Regular)   Pulse (!) 42   Temp 97.1  F (36.2  C) (Temporal)   Resp 16   Ht 1.626 m (5' 4\")   Wt 66.5 kg (146 lb 9.6 oz)   LMP  (LMP Unknown)   SpO2 100%   BMI 25.16 kg/m     Estimated body mass index is 25.16 kg/m  as calculated from the following:    Height as of this encounter: 1.626 m (5' 4\").    Weight as of this encounter: 66.5 kg (146 lb 9.6 oz).  Physical Exam  GENERAL: alert and no distress  EYES: Eyes grossly normal to inspection, PERRL and conjunctivae and sclerae normal  HENT: ear canals and TM's normal, nose and mouth without ulcers or lesions  NECK: no adenopathy, no asymmetry, masses, or scars  RESP: lungs clear to auscultation - no rales, rhonchi or wheezes  CV: regular rate and rhythm, normal S1 S2, no S3 or S4, no murmur, click or rub, no peripheral edema  ABDOMEN: soft, nontender, no hepatosplenomegaly, no masses and bowel sounds normal  MS: no gross musculoskeletal defects noted, no edema  SKIN: no suspicious lesions or rashes  NEURO: Normal strength and tone, mentation intact and speech normal  PSYCH: mentation appears normal, affect normal/bright    Recent Labs   Lab Test 05/30/24  0952 11/21/23  1202 11/09/23  1657 10/18/23  1021   HGB  --   --  12.8 12.3   PLT  --   --  246 305   NA  --   --  139 138   POTASSIUM  --   --  4.5 4.4   CR 1.12* 0.96* 0.79 0.84        Diagnostics  Labs pending at this time.  Results will be reviewed when available.   EKG required for known coronary heart disease " and not completed in the last 90 days.   EKG: sinus bradycardia, 1st degree AVB, normal axis, normal intervals, no acute ST/T changes c/w ischemia, no LVH by voltage criteria, unchanged from previous tracings    Revised Cardiac Risk Index (RCRI)  The patient has the following serious cardiovascular risks for perioperative complications:   - Coronary Artery Disease (MI, positive stress test, angina, Qs on EKG) = 1 point     RCRI Interpretation: 1 point: Class II (low risk - 0.9% complication rate)    Signed Electronically by: SINTIA Richter CNP  Copy of this evaluation report is provided to requesting physician.

## 2024-07-09 NOTE — PATIENT INSTRUCTIONS
Switch from lisinopril to valsartan - break valsartan in half, ensure tolerating then move to full pill (80 mg)    How to Take Your Medication Before Surgery  Preoperative Medication Instructions   Antiplatelet or Anticoagulation Medication Instructions   - aspirin: Discontinue aspirin 7-10 days prior to procedure to reduce bleeding risk. It should be resumed postoperatively.     Additional Medication Instructions  Take all scheduled medications on the day of surgery EXCEPT for modifications listed below:  Mirbegron- hold day of   - ACE/ARB: DO NOT TAKE on day of surgery (minimum 11 hours for general anesthesia).   - Beta Blockers: Continue taking on the day of surgery.   - Statins: Continue taking on the day of surgery.    - fiber, laxatives: DO NOT TAKE day of surgery   - Herbal medications (fish oil) and vitamins: DO NOT TAKE 14 days prior to surgery.       Patient Education   Preparing for Your Surgery  Getting started  A nurse will call you to review your health history and instructions. They will give you an arrival time based on your scheduled surgery time. Please be ready to share:  Your doctor's clinic name and phone number  Your medical, surgical, and anesthesia history  A list of allergies and sensitivities  A list of medicines, including herbal treatments and over-the-counter drugs  Whether the patient has a legal guardian (ask how to send us the papers in advance)  Please tell us if you're pregnant--or if there's any chance you might be pregnant. Some surgeries may injure a fetus (unborn baby), so they require a pregnancy test. Surgeries that are safe for a fetus don't always need a test, and you can choose whether to have one.   If you have a child who's having surgery, please ask for a copy of Preparing for Your Child's Surgery.    Preparing for surgery  Within 10 to 30 days of surgery: Have a pre-op exam (sometimes called an H&P, or History and Physical). This can be done at a clinic or pre-operative  center.  If you're having a , you may not need this exam. Talk to your care team.  At your pre-op exam, talk to your care team about all medicines you take. If you need to stop any medicines before surgery, ask when to start taking them again.  We do this for your safety. Many medicines can make you bleed too much during surgery. Some change how well surgery (anesthesia) drugs work.  Call your insurance company to let them know you're having surgery. (If you don't have insurance, call [phone].)  Call your clinic if there's any change in your health. This includes signs of a cold or flu (sore throat, runny nose, cough, rash, fever). It also includes a scrape or scratch near the surgery site.  If you have questions on the day of surgery, call your hospital or surgery center.  Eating and drinking guidelines  For your safety: Unless your surgeon tells you otherwise, follow the guidelines below.  Eat and drink as usual until 8 hours before you arrive for surgery. After that, no food or milk.  Drink clear liquids until 2 hours before you arrive. These are liquids you can see through, like water, Gatorade, and Propel Water. They also include plain black coffee and tea (no cream or milk), candy, and breath mints. You can spit out gum when you arrive.  If you drink alcohol: Stop drinking it the night before surgery.  If your care team tells you to take medicine on the morning of surgery, it's okay to take it with a sip of water.  Preventing infection  Shower or bathe the night before and morning of your surgery. Follow the instructions your clinic gave you. (If no instructions, use regular soap.)  Don't shave or clip hair near your surgery site. We'll remove the hair if needed.  Don't smoke or vape the morning of surgery. You may chew nicotine gum up to 2 hours before surgery. A nicotine patch is okay.  Note: Some surgeries require you to completely quit smoking and nicotine. Check with your surgeon.  Your care  team will make every effort to keep you safe from infection. We will:  Clean our hands often with soap and water (or an alcohol-based hand rub).  Clean the skin at your surgery site with a special soap that kills germs.  Give you a special gown to keep you warm. (Cold raises the risk of infection.)  Wear special hair covers, masks, gowns and gloves during surgery.  Give antibiotic medicine, if prescribed. Not all surgeries need antibiotics.  What to bring on the day of surgery  Photo ID and insurance card  Copy of your health care directive, if you have one  Glasses and hearing aids (bring cases)  You can't wear contacts during surgery  Inhaler and eye drops, if you use them (tell us about these when you arrive)  CPAP machine or breathing device, if you use them  A few personal items, if spending the night  If you have . . .  A pacemaker, ICD (cardiac defibrillator) or other implant: Bring the ID card.  An implanted stimulator: Bring the remote control.  A legal guardian: Bring a copy of the certified (court-stamped) guardianship papers.  Please remove any jewelry, including body piercings. Leave jewelry and other valuables at home.  If you're going home the day of surgery  You must have a responsible adult drive you home. They should stay with you overnight as well.  If you don't have someone to stay with you, and you aren't safe to go home alone, we may keep you overnight. Insurance often won't pay for this.  After surgery  If it's hard to control your pain or you need more pain medicine, please call your surgeon's office.  Questions?   If you have any questions for your care team, list them here: _________________________________________________________________________________________________________________________________________________________________________ ____________________________________ ____________________________________ ____________________________________  For informational purposes only. Not to  replace the advice of your health care provider. Copyright   2003, 2019 [facility]. All rights reserved. Clinically reviewed by Hannah Farias MD. [employer] [id number] - REV 12/22.

## 2024-07-10 ENCOUNTER — OFFICE VISIT (OUTPATIENT)
Dept: PEDIATRICS | Facility: CLINIC | Age: 84
End: 2024-07-10
Payer: COMMERCIAL

## 2024-07-10 VITALS
WEIGHT: 147.8 LBS | HEIGHT: 64 IN | TEMPERATURE: 99 F | HEART RATE: 52 BPM | SYSTOLIC BLOOD PRESSURE: 152 MMHG | OXYGEN SATURATION: 98 % | RESPIRATION RATE: 16 BRPM | DIASTOLIC BLOOD PRESSURE: 76 MMHG | BODY MASS INDEX: 25.23 KG/M2

## 2024-07-10 DIAGNOSIS — K21.9 GASTROESOPHAGEAL REFLUX DISEASE, UNSPECIFIED WHETHER ESOPHAGITIS PRESENT: ICD-10-CM

## 2024-07-10 DIAGNOSIS — I25.83 CORONARY ARTERY DISEASE DUE TO LIPID RICH PLAQUE: ICD-10-CM

## 2024-07-10 DIAGNOSIS — R39.15 URINARY URGENCY: ICD-10-CM

## 2024-07-10 DIAGNOSIS — I25.10 CORONARY ARTERY DISEASE DUE TO LIPID RICH PLAQUE: ICD-10-CM

## 2024-07-10 DIAGNOSIS — Z00.00 ROUTINE GENERAL MEDICAL EXAMINATION AT A HEALTH CARE FACILITY: Primary | ICD-10-CM

## 2024-07-10 PROBLEM — Q78.2 SEVERE OSTEOPETROSIS: Status: RESOLVED | Noted: 2022-03-18 | Resolved: 2024-07-10

## 2024-07-10 PROBLEM — Z96.641 STATUS POST TOTAL REPLACEMENT OF RIGHT HIP: Status: RESOLVED | Noted: 2020-08-04 | Resolved: 2024-07-10

## 2024-07-10 PROBLEM — E78.5 DYSLIPIDEMIA: Status: RESOLVED | Noted: 2022-06-30 | Resolved: 2024-07-10

## 2024-07-10 PROBLEM — Z92.29 HISTORY OF DRUG THERAPY: Status: RESOLVED | Noted: 2022-05-02 | Resolved: 2024-07-10

## 2024-07-10 PROBLEM — M81.8 IDIOPATHIC OSTEOPOROSIS: Status: RESOLVED | Noted: 2022-05-02 | Resolved: 2024-07-10

## 2024-07-10 PROBLEM — N94.89 PELVIC HEMATOMA IN FEMALE: Status: RESOLVED | Noted: 2023-09-19 | Resolved: 2024-07-10

## 2024-07-10 PROBLEM — R73.09 ELEVATED GLUCOSE: Status: RESOLVED | Noted: 2022-03-18 | Resolved: 2024-07-10

## 2024-07-10 PROBLEM — D47.2 MONOCLONAL PARAPROTEINEMIA: Status: RESOLVED | Noted: 2017-01-12 | Resolved: 2024-07-10

## 2024-07-10 LAB
ANION GAP SERPL CALCULATED.3IONS-SCNC: 9 MMOL/L (ref 7–15)
BUN SERPL-MCNC: 25.9 MG/DL (ref 8–23)
CALCIUM SERPL-MCNC: 9.8 MG/DL (ref 8.8–10.2)
CHLORIDE SERPL-SCNC: 101 MMOL/L (ref 98–107)
CREAT SERPL-MCNC: 0.97 MG/DL (ref 0.51–0.95)
DEPRECATED HCO3 PLAS-SCNC: 27 MMOL/L (ref 22–29)
EGFRCR SERPLBLD CKD-EPI 2021: 58 ML/MIN/1.73M2
GLUCOSE SERPL-MCNC: 85 MG/DL (ref 70–99)
POTASSIUM SERPL-SCNC: 4.9 MMOL/L (ref 3.4–5.3)
SODIUM SERPL-SCNC: 137 MMOL/L (ref 135–145)

## 2024-07-10 PROCEDURE — 36415 COLL VENOUS BLD VENIPUNCTURE: CPT | Performed by: INTERNAL MEDICINE

## 2024-07-10 PROCEDURE — G0439 PPPS, SUBSEQ VISIT: HCPCS | Performed by: INTERNAL MEDICINE

## 2024-07-10 PROCEDURE — 80061 LIPID PANEL: CPT | Performed by: INTERNAL MEDICINE

## 2024-07-10 PROCEDURE — 99213 OFFICE O/P EST LOW 20 MIN: CPT | Mod: 25 | Performed by: INTERNAL MEDICINE

## 2024-07-10 RX ORDER — MIRABEGRON 50 MG/1
50 TABLET, EXTENDED RELEASE ORAL DAILY
Qty: 90 TABLET | Refills: 4 | Status: SHIPPED | OUTPATIENT
Start: 2024-07-10 | End: 2024-07-10

## 2024-07-10 RX ORDER — FAMOTIDINE 40 MG/1
40 TABLET, FILM COATED ORAL AT BEDTIME
Qty: 90 TABLET | Refills: 4 | Status: SHIPPED | OUTPATIENT
Start: 2024-07-10

## 2024-07-10 RX ORDER — MIRABEGRON 50 MG/1
50 TABLET, EXTENDED RELEASE ORAL DAILY
Qty: 90 TABLET | Refills: 4 | Status: SHIPPED | OUTPATIENT
Start: 2024-07-10

## 2024-07-10 RX ORDER — MIRABEGRON 50 MG/1
50 TABLET, FILM COATED, EXTENDED RELEASE ORAL DAILY
Qty: 90 TABLET | Refills: 0 | OUTPATIENT
Start: 2024-07-10

## 2024-07-10 SDOH — HEALTH STABILITY: PHYSICAL HEALTH: ON AVERAGE, HOW MANY MINUTES DO YOU ENGAGE IN EXERCISE AT THIS LEVEL?: 60 MIN

## 2024-07-10 SDOH — HEALTH STABILITY: PHYSICAL HEALTH: ON AVERAGE, HOW MANY DAYS PER WEEK DO YOU ENGAGE IN MODERATE TO STRENUOUS EXERCISE (LIKE A BRISK WALK)?: 4 DAYS

## 2024-07-10 ASSESSMENT — SOCIAL DETERMINANTS OF HEALTH (SDOH): HOW OFTEN DO YOU GET TOGETHER WITH FRIENDS OR RELATIVES?: MORE THAN THREE TIMES A WEEK

## 2024-07-10 ASSESSMENT — PAIN SCALES - GENERAL: PAINLEVEL: MODERATE PAIN (5)

## 2024-07-10 NOTE — PROGRESS NOTES
Preventive Care Visit  [facility]  Geronimo Jeter MD, Internal Medicine - Pediatrics  Jul 10, 2024      Assessment & Plan       ICD-10-CM    1. Routine general medical examination at a health care facility  Z00.00       2. Coronary artery disease due to lipid rich plaque  I25.10 Lipid panel reflex to direct LDL Fasting    I25.83 Lipid panel reflex to direct LDL Fasting      3. Gastroesophageal reflux disease, unspecified whether esophagitis present  K21.9 famotidine (PEPCID) 40 MG tablet      4. Urinary urgency  R39.15 mirabegron (MYRBETRIQ) 50 MG 24 hr tablet        Here for AWV. Overall she is feeling well.  Most of her medical issues were addressed at her pre-operative visit yesterday.  Recommend rechecking BP in 1-2 weeks to ensure SBP is better controlled.   Fasting lipid profile ordered.     Counseling  Appropriate preventive services were discussed with this patient, including applicable screening as appropriate for fall prevention, nutrition, physical activity, weight loss and cognition.  Checklist reviewing preventive services available has been given to the patient.  Reviewed patient's diet, addressing concerns and/or questions.   Discussed possible causes of fatigue. Information on urinary incontinence and treatment options given to patient.     Geronimo Jeter MD      Janet Pandya is a 84 year old, presenting for the following:  Medicare Visit        7/10/2024     2:09 PM   Additional Questions   Roomed by Merari         7/10/2024     2:09 PM   Patient Reported Additional Medications   Patient reports taking the following new medications none       Health Care Directive  Patient has a Health Care Directive on file  Advance care planning document is on file and is current.    HPI    Here for AWV. Overall is feeling well.  She had a preoperative visit at her usual clinic yesterday.    HTN. Has been having a dry cough. Yesterday got a new rx for valsartan to replace lisinopril. Has  not yet made the switch. Discussed BP check in about 2 weeks.     ASCVD. Hyperlipidemia. Last ate 6 hours ago. Would like lipids checked.    Lower back pain. Ongoing for the past 8 months or so. No specific injury. Chiro w/o resolution. No radiation of pain. Not able to walk long distances d/t pain.     Gastroesophageal reflux.  Symptoms are under overall good control.          7/10/2024   General Health   How would you rate your overall physical health? Good   Feel stress (tense, anxious, or unable to sleep) Not at all            7/10/2024   Nutrition   Diet: Regular (no restrictions)            7/10/2024   Exercise   Days per week of moderate/strenous exercise 4 days   Average minutes spent exercising at this level 60 min            7/10/2024   Social Factors   Frequency of gathering with friends or relatives More than three times a week   Worry food won't last until get money to buy more No   Food not last or not have enough money for food? No   Do you have housing? (Housing is defined as stable permanent housing and does not include staying ouside in a car, in a tent, in an abandoned building, in an overnight shelter, or couch-surfing.) Yes   Are you worried about losing your housing? No   Lack of transportation? No   Unable to get utilities (heat,electricity)? No            7/10/2024   Fall Risk   Fallen 2 or more times in the past year? No   Trouble with walking or balance? No             7/10/2024   Activities of Daily Living- Home Safety   Needs help with the following daily activites None of the above   Safety concerns in the home None of the above            7/10/2024   Dental   Dentist two times every year? Yes            7/10/2024   Hearing Screening   Hearing concerns? None of the above            7/10/2024   Driving Risk Screening   Patient/family members have concerns about driving No            7/10/2024   General Alertness/Fatigue Screening   Have you been more tired than usual lately? (!) YES             7/10/2024   Urinary Incontinence Screening   Bothered by leaking urine in past 6 months Yes            7/10/2024   TB Screening   Were you born outside of the [place]? No              7/10/2024   Substance Use   Alcohol more than 3/day or more than 7/wk No   Do you have a current opioid prescription? No   How severe/bad is pain from 1 to 10? 5/10   Do you use any other substances recreationally? No        Social History     Tobacco Use    Smoking status: Never    Smokeless tobacco: Never   Vaping Use    Vaping status: Never Used   Substance Use Topics    Alcohol use: Not Currently     Comment: Maybe 1x amonth    Drug use: No           4/22/2024   LAST FHS-7 RESULTS   1st degree relative breast or ovarian cancer Yes   Any relative bilateral breast cancer No   Any male have breast cancer No   Any ONE woman have BOTH breast AND ovarian cancer No   Any woman with breast cancer before 50yrs No   2 or more relatives with breast AND/OR ovarian cancer No   2 or more relatives with breast AND/OR bowel cancer No              Current providers sharing in care for this patient include:  Patient Care Team:  Devi Romano MD as PCP - General (Family Medicine)  Mary Funes MD as Hospitalist (Endocrinology, Diabetes, and Metabolism)  Mary Funes MD as Assigned Endocrinology Provider  Ye Waldrop MD as MD (Allergy & Immunology)  Shankar Eric MD as Referring Physician (Family Practice)  Nuzhat Velasquez APRN CNP as Nurse Practitioner (Cardiovascular Disease)  Kati Busby RPH as Pharmacist (Pharmacist)  Kati Busby RPH as Assigned MTM Pharmacist  Kentrell Hess MD as MD (Neurology)  Sanchez Ho MD as MD (Otolaryngology)  Chano Laird DO as Assigned Neuroscience Provider  Devi Romano MD as Assigned PCP  Partha Cooper DO as Assigned Surgical Provider  Venancio Montanez MD as Assigned Heart and Vascular Provider    The following health  "maintenance items are reviewed in Epic and correct as of today:  Health Maintenance   Topic Date Due    LIPID  02/16/2024    COVID-19 Vaccine (7 - 2023-24 season) 03/01/2024    MEDICARE ANNUAL WELLNESS VISIT  04/10/2024    ANNUAL REVIEW OF HM ORDERS  04/10/2024    INFLUENZA VACCINE (1) 09/01/2024    TSH W/FREE T4 REFLEX  10/18/2024    FALL RISK ASSESSMENT  07/10/2025    DEXA  05/13/2026    ADVANCE CARE PLANNING  04/10/2028    DTAP/TDAP/TD IMMUNIZATION (4 - Td or Tdap) 04/11/2033    PHQ-2 (once per calendar year)  Completed    Pneumococcal Vaccine: 65+ Years  Completed    ZOSTER IMMUNIZATION  Completed    RSV VACCINE (Pregnancy & 60+)  Completed    IPV IMMUNIZATION  Aged Out    HPV IMMUNIZATION  Aged Out    MENINGITIS IMMUNIZATION  Aged Out    RSV MONOCLONAL ANTIBODY  Aged Out    MAMMO SCREENING  Discontinued        Objective    Exam  BP (!) 152/76 (BP Location: Right arm, Patient Position: Sitting, Cuff Size: Adult Regular)   Pulse 52   Temp 99  F (37.2  C) (Oral)   Resp 16   Ht 1.613 m (5' 3.5\")   Wt 67 kg (147 lb 12.8 oz)   LMP  (LMP Unknown)   SpO2 98%   BMI 25.77 kg/m     Estimated body mass index is 25.77 kg/m  as calculated from the following:    Height as of this encounter: 1.613 m (5' 3.5\").    Weight as of this encounter: 67 kg (147 lb 12.8 oz).    Physical Exam  GENERAL: healthy, alert and no distress  EYES: PERRL, EOMI  HENT: ear canals and TM's normal. No nasal discharge. OP moist.  NECK: no adenopathy  RESP: lungs clear to auscultation - no rales, rhonchi or wheezes  CV: regular rate and rhythm, normal S1 S2, no murmur, no peripheral edema  ABDOMEN: soft, nontender, bowel sounds normal  MS: no gross musculoskeletal defects noted  SKIN: no suspicious lesions or rashes  NEURO: Normal strength and tone  PSYCH: mentation appears normal, affect normal          7/10/2024   Mini Cog   Clock Draw Score 2 Normal   3 Item Recall 3 objects recalled   Mini Cog Total Score 5          Signed Electronically " by: Geronimo Jeter MD

## 2024-07-11 ENCOUNTER — TRANSFERRED RECORDS (OUTPATIENT)
Dept: HEALTH INFORMATION MANAGEMENT | Facility: CLINIC | Age: 84
End: 2024-07-11
Payer: COMMERCIAL

## 2024-07-11 LAB
CHOLEST SERPL-MCNC: 166 MG/DL
FASTING STATUS PATIENT QL REPORTED: YES
HDLC SERPL-MCNC: 75 MG/DL
LDLC SERPL CALC-MCNC: 75 MG/DL
NONHDLC SERPL-MCNC: 91 MG/DL
TRIGL SERPL-MCNC: 82 MG/DL

## 2024-07-18 ENCOUNTER — HOSPITAL ENCOUNTER (OUTPATIENT)
Dept: SPEECH THERAPY | Facility: CLINIC | Age: 84
Discharge: HOME | End: 2024-07-18
Attending: INTERNAL MEDICINE
Payer: COMMERCIAL

## 2024-07-18 ENCOUNTER — HOSPITAL ENCOUNTER (OUTPATIENT)
Dept: GENERAL RADIOLOGY | Facility: CLINIC | Age: 84
Discharge: HOME | End: 2024-07-18
Attending: INTERNAL MEDICINE
Payer: COMMERCIAL

## 2024-07-18 DIAGNOSIS — R13.10 DYSPHAGIA, UNSPECIFIED TYPE: ICD-10-CM

## 2024-07-18 PROCEDURE — 92610 EVALUATE SWALLOWING FUNCTION: CPT | Mod: GN

## 2024-07-18 PROCEDURE — 92611 MOTION FLUOROSCOPY/SWALLOW: CPT | Mod: GN

## 2024-07-18 PROCEDURE — 74230 X-RAY XM SWLNG FUNCJ C+: CPT

## 2024-07-18 RX ORDER — BARIUM SULFATE 400 MG/ML
SUSPENSION ORAL ONCE
Status: DISCONTINUED | OUTPATIENT
Start: 2024-07-18 | End: 2024-07-18

## 2024-07-18 NOTE — PROGRESS NOTES
"SPEECH LANGUAGE PATHOLOGY EVALUATION     Subjective      Presenting condition or subjective complaint: difficulty swallowing  Date of onset: 06/19/24 (order date)    Relevant medical history:   cerebral infarction 2019, CAD, disease of thyroid gland, GERD, Grave's disease, hear attack 2016, HTN, myocardial infarction, Sjogren's disease.   Patient goals for therapy:  To improve swallowing    Objective     SWALLOW EVALUTION  Dysphagia history: Referral for VFSS recieved from pt's GI provider (of note, esophagram also ordered and is scheduled for tomorrow). Today, pt describes the following symptoms: \"hard\" time swallowing, food sticks in her throat and she has to \"bring it back up,\" needs to drink lots of fluid during meals to get food down. She also reports she gets more \"garbled\" speech at night and says she's been having difficulty with drooling and a \"thick tongue\" for ~1 year. She denies any URI's or PNA's when asked. She did state her last EGD was <2 months ago though I do not see this in my EMR. She reported at that time they stretched her throat, but it did not help as much as she anticipated it would and symptoms quickly came back.     Prior dysphagia work up, within my EMR:     VFSS's 4/30/24, 10/12/23, 7/21/21. Most recent study results: \"Patient presents with mild oral and pharyngeal dysphagia on today's study characterized by premature entry of thin and slightly thick liquids to the pyriform sinuses with one episode of mild to moderately deep flash penetration of the vestibule. A chin tuck maneuver was not effective and resulted in deep penetration to the cords with a small amount of aspiration with a cough response. There was no penetration with mildly thick liquids but noted slow retraction of the epiglottis. She was noted to have a tight UES with an osteophyte in the upper esophagus with slowing of the flow with the solid material but was not obstructing. This maybe the the globus sensation she feels. " "There was no vallecular or pyriform sinus residue to account for the globus sensation. Note she was coughing on trials of the solid that was not in response to penetration/aspiration. Suspect its due to her xerostomia. Patient is at an increased risk for aspiration with thin and slightly thick liquids. Patient was provided education after the study. Recommend: 1. May continue on a regular diet (softer moist food selections due to Sjogren's.) and thin liquids. I did give her packets of mildly thick to trial. 2. Follow GERDS precautions try warm liquids at the start of the meals, small bites/sips, and alternate liquids/solids as needed. 3. Continue with OP Speech Therapy. 4. Follow up with MD/GI and could consider a repeat esophagram completing a double contrast study verses single.\"    Esophagram's 11/20/23, 7/22/21. Most recent results: \"Normal esophageal peristalsis. No stricture, mass lesion, or mucosal abnormality. There is no spontaneous gastroesophageal reflux observed.\"'    EGD's 11/28/23, 12/26/14. Most recent results: -  Normal second portion of the duodenum.  -  Gastritis, characterized by erythema. -  Gastroesophageal flap valve classified as Hill Grade II (fold present, opens with respiration). - sliding hiatal hernia, 1 cm -  Z-line regular, 38 cm from the incisors.  Biopsied.\"    Current Diet/Method of Nutritional Intake: thin liquids (level 0), regular diet      CLINICAL SWALLOW EVALUATION  Oral Motor Function: Dentition: natural dentition, adequate dentition  Secretion management: pt reports drooling, none observed during today's assessment  Mucosal quality: dry  Mandibular function: intact  Oral labial function: WFL  Lingual function: mild decreased asymmetry on R with relaxation, none with ROM  Velar function: intact   Buccal function: intact  Laryngeal function: cough, volitional swallow, voicing WFL     Level of assist required for feeding: no assistance needed   Textures Trialed:   Clinical " Swallow Eval: Thin Liquids  Mode of presentation: cup, self-fed   Volume presented: 4 oz  Preparatory Phase: WFL  Oral Phase: WFL  Pharyngeal phase of swallow: intact   Diagnostic statement: no overt clinical signs/sx aspiration noted     ADDITIONAL EVAL COMPLETED TODAY : yes; rationale: cannot assess pharyngeal swallow mechanism clinically, pt with symptoms of pharyngeal/esophageal dysphagia    VIDEOFLUOROSCOPIC SWALLOW STUDY  Radiologist: Dr. Gomez  Views Taken: left lateral   Physical location of procedure: [facility], Radiology Dept, Fluoroscopy Suite     VFSS textures trialed:   VFSS Eval: Thin Liquids  Mode of Presentation: cup, straw, self-fed   Order of Presentation: 1 cup, 2 cup, 3 straw, 7 cup, 8 sequential cup sips  Preparatory Phase: poor bolus control  Oral Phase: premature pharyngeal entry  Bolus Location When Swallow Initiated: pyriforms  Pharyngeal Phase: WFL  Brian's Penetration Aspiration Scale: 2 - contrast enters airway, remains above the vocal cords, no residue remains (penetration)  Diagnostic Statement: Mild, deep flash laryngeal penetration across all thin liquid trials, though never reaching cords or progressing to aspiration. This is 2/2 reduced oral control and premature bolus spillage prior to pharyngeal swallow inititation/complete epiglottic inversion.     VFSS Eval: Purees  Mode of Presentation: spoon, self-fed  Order of Presentation: 4  Preparatory Phase: poor bolus control  Oral Phase: premature pharyngeal entry  Bolus Location When Swallow Initiated: pyriforms  Pharyngeal Phase: WFL  Rosenbeck s Penetration Aspiration Scale: 1 - no aspiration, contrast does not enter airway  Diagnostic Statement: delay in swallow, otherwise WFL    VFSS Eval: Solids  Mode of Presentation: self-fed   Order of Presentation: 5, 6  Preparatory Phase: WFL  Oral Phase: WFL  Bolus Location When Swallow Initiated: valleculae  Pharyngeal Phase: WFL   Brian elliott  Penetration Aspiration Scale: 1 - no aspiration, contrast does not enter airway  Diagnostic Statement: WFL     ESOPHAGEAL PHASE OF SWALLOW  patient reports symptoms of esophageal dysphagia   *Pt with scheduled full esophagram tomorrow, therefore esophageal sweep was not completed today.      SWALLOW ASSESSMENT CLINICAL IMPRESSIONS AND RATIONALE  Diet Consistency Recommendations: thin liquids (level 0), regular diet    Recommended Feeding/Eating Techniques: small bolus size, alternate food and liquid intake, maintain upright sitting position for eating, maintain upright posture during/after eating for 30 minutes, minimize distractions during oral intake   Medication Administration Recommendations: per pt preference    Assessment & Plan   CLINICAL IMPRESSIONS   Medical Diagnosis: Dysphagia, unspecified type (R13.10)    Treatment Diagnosis: minimal oropharyngeal dysphagia   Impression/Assessment: Video fluoroscopic swallow study completed with thin liquids, puree solids, regular solid. Pt currently presents with minimal oropharyngeal dysphagia. Labial seal, oral containment, mastication and oral clearance are WFL. Reduced oral control resulted in premature bolus spillage to the pyriform sinuses with thin and puree trials, to the valleculae with regular solids. Base of tongue retraction is WFL, hyolaryngeal elevation/excursion are WFL, epiglottic inversion is complete and pharyngoesophageal segment opening during the swallow is WFL.     Mild, deep flash laryngeal penetration across all thin liquid trials, though never reaching cords or progressing to aspiration. This is 2/2 reduced oral control and premature bolus spillage to the pyriform sinuses prior to pharyngeal swallow inititation/complete epiglottic inversion. No other penetration or aspiration across solids.    No oropharyngeal bolus retention across trials. Pt with scheduled full esophagram tomorrow, therefore esophageal sweep was not completed today.      PLAN  OF CARE  Recommended Referrals to Other Professionals:  back to GI  Education Assessment:   Learner/Method: Patient;Listening;Pictures/Video;No Barriers to Learning  Education Comments: Educated pt on results of VFSS directly following, including video review of cine loop recordings, education re: oropharyngeal anatomy/phsyiology, pt current swallow function and agreement with esophagram/GI follow up. Educated on general safe swallow strategies. Pt verbalized understanding of all.    Risks and benefits of evaluation/treatment have been explained.   Patient/Family/caregiver agrees with Plan of Care.     Evaluation Time:    SLP Eval: oral/pharyngeal swallow function, clinical minutes (62653): 10  SLP Eval: VideoFluoroscopic Swallow function Minutes (00472): 14    Signing Clinician: LATRICE Ramirez      [facility]                                                                                   OUTPATIENT SPEECH LANGUAGE PATHOLOGY  LATRICE Ramirez                       Referring Provider:  Altaf Zhu    Initial Assessment  See Epic Evaluation-

## 2024-07-19 ENCOUNTER — HOSPITAL ENCOUNTER (OUTPATIENT)
Dept: GENERAL RADIOLOGY | Facility: CLINIC | Age: 84
Discharge: HOME | End: 2024-07-19
Attending: INTERNAL MEDICINE | Admitting: INTERNAL MEDICINE
Payer: COMMERCIAL

## 2024-07-19 DIAGNOSIS — R13.10 DYSPHAGIA, UNSPECIFIED TYPE: ICD-10-CM

## 2024-07-19 PROCEDURE — 74221 X-RAY XM ESOPHAGUS 2CNTRST: CPT

## 2024-07-19 PROCEDURE — 250N000013 HC RX MED GY IP 250 OP 250 PS 637: Performed by: RADIOLOGY

## 2024-07-19 RX ADMIN — ANTACID/ANTIFLATULENT 4 G: 380; 550; 10; 10 GRANULE, EFFERVESCENT ORAL at 14:13

## 2024-07-23 ENCOUNTER — TELEPHONE (OUTPATIENT)
Dept: FAMILY MEDICINE | Facility: CLINIC | Age: 84
End: 2024-07-23
Payer: COMMERCIAL

## 2024-07-23 NOTE — TELEPHONE ENCOUNTER
Can we please check in on patient and see how her BP has been the past couple weeks?    SINTIA Richter CNP

## 2024-07-24 ENCOUNTER — ALLIED HEALTH/NURSE VISIT (OUTPATIENT)
Dept: PEDIATRICS | Facility: CLINIC | Age: 84
End: 2024-07-24
Payer: COMMERCIAL

## 2024-07-24 VITALS — DIASTOLIC BLOOD PRESSURE: 68 MMHG | SYSTOLIC BLOOD PRESSURE: 122 MMHG

## 2024-07-24 DIAGNOSIS — Z01.30 BP CHECK: Primary | ICD-10-CM

## 2024-07-24 PROCEDURE — 99207 PR NO CHARGE NURSE ONLY: CPT | Performed by: INTERNAL MEDICINE

## 2024-07-24 NOTE — PROGRESS NOTES
Ya Stahl was evaluated at [facility] on July 24, 2024 at which time her blood pressure was:    BP Readings from Last 1 Encounters:   07/24/24 122/68     No data recorded      Reviewed lifestyle modifications for blood pressure control and reduction: including making healthy food choices, managing weight, getting regular exercise, smoking cessation, reducing alcohol consumption, monitoring blood pressure regularly.     Symptoms: None    BP Goal:< 140/90 mmHg    BP Assessment:  BP at goal    Potential Reasons for BP too high: NA - Not applicable    BP Follow-Up Plan: Recheck BP in 6 months at pharmacy    Recommendation to Provider: none    Note completed by: Kristopher Orlando RPH

## 2024-07-26 ENCOUNTER — OFFICE VISIT (OUTPATIENT)
Dept: URGENT CARE | Facility: URGENT CARE | Age: 84
End: 2024-07-26
Payer: COMMERCIAL

## 2024-07-26 VITALS
TEMPERATURE: 97.3 F | WEIGHT: 141 LBS | DIASTOLIC BLOOD PRESSURE: 73 MMHG | BODY MASS INDEX: 24.59 KG/M2 | SYSTOLIC BLOOD PRESSURE: 118 MMHG | HEART RATE: 52 BPM | OXYGEN SATURATION: 98 %

## 2024-07-26 DIAGNOSIS — B00.1 COLD SORE: ICD-10-CM

## 2024-07-26 DIAGNOSIS — A46 ERYSIPELAS: Primary | ICD-10-CM

## 2024-07-26 PROCEDURE — 99214 OFFICE O/P EST MOD 30 MIN: CPT | Performed by: PHYSICIAN ASSISTANT

## 2024-07-26 RX ORDER — VALACYCLOVIR HYDROCHLORIDE 1 G/1
1000 TABLET, FILM COATED ORAL 2 TIMES DAILY
Qty: 2 TABLET | Refills: 0 | Status: SHIPPED | OUTPATIENT
Start: 2024-07-26 | End: 2024-07-27

## 2024-07-26 RX ORDER — CEPHALEXIN 500 MG/1
500 CAPSULE ORAL 4 TIMES DAILY
Qty: 28 CAPSULE | Refills: 0 | Status: SHIPPED | OUTPATIENT
Start: 2024-07-26 | End: 2024-08-02

## 2024-07-26 ASSESSMENT — PAIN SCALES - GENERAL: PAINLEVEL: MODERATE PAIN (5)

## 2024-07-26 NOTE — PROGRESS NOTES
"Assessment & Plan   1. Erysipelas  As evidenced by erythema, swelling and tenderness to palpation.  Located on her pinna, TM and ear canal both are normal.  Treat with medication as below.  On the abrasion inside her ear, she can use Vaseline or Aquaphor to aid in healing.  - cephALEXin (KEFLEX) 500 MG capsule; Take 1 capsule (500 mg) by mouth 4 times daily for 7 days  Dispense: 28 capsule; Refill: 0    2. Cold sore  Examination is consistent with cold sore.  Treat with medication as below, renally adjusted.  - valACYclovir (VALTREX) 1000 mg tablet; Take 1 tablet (1,000 mg) by mouth 2 times daily for 1 day  Dispense: 2 tablet; Refill: 0    Diagnosis and treatment plan was reviewed with patient and/or family.   We went over any labs or imaging. Discussed worsening symptoms or little to no relief despite treatment plan to follow-up with PCP or return to clinic.  Patient verbalizes understanding. All questions were addressed and answered.     Saira Reese PA-C  [facility] [place]    CHIEF COMPLAINT:   Chief Complaint   Patient presents with    Urgent Care    Ear Problem     X's 2 days, sharp pain when laying down     Mouth/Lip Problem     Cold sore on right upper lip      Subjective     Ya is a 84 year old female who presents to clinic today for evaluation of right sided ear pain.  Symptoms started in the past 2 days, and worsened yesterday.  Pain is much more severe today.  No fever noted.  Pain is located on the outside of her ear.  Denies having any trauma to the area.    ## lip lesion.  Started yesterday and is tender.  She has a remote history of cold sores, but unsure if this feels similar.  No trauma to the area.      Past Medical History:   Diagnosis Date    Arthritis     Cerebral infarction (H) 2019    Complication of anesthesia     \"hard time waking up / N & V\"    Coronary artery disease     Disease of thyroid gland     Displacement of lumbar intervertebral disc without myelopathy  "    Gastro-oesophageal reflux disease     GERD (gastroesophageal reflux disease)     Grave's disease 09/08/2008    O update changed this record. Please review for accuracy      Hearing loss     has bilateral aids    Heart attack (H) 03/2016    History of anesthesia complications     delayed emergence    History of angina     Hypertension     Low back pain     Migraine headache     Myocardial infarction (H)     Numbness and tingling     down right leg (associated with back pain)    Pelvic hematoma in female 09/19/2023    PONV (postoperative nausea and vomiting)     Sicca syndrome (H24)     Sjogren's disease (H24)     Sjogren's syndrome (H24)     sees specialist; dentist    Spider veins     Unspecified hypothyroidism      Past Surgical History:   Procedure Laterality Date    ARTHROPLASTY HIP ANTERIOR Left 10/14/2022    Procedure: LEFT TOTAL HIP ARTHROPLASTY DIRECT ANTERIOR APPROACH;  Surgeon: Selvin Saab MD;  Location:  OR    ARTHROSCOPY KNEE  10/05/2012    R Procedure: ARTHROSCOPY KNEE;  RIGHT KNEE ARTHROSCOPY, PARTIAL MEDIAL MENISCECTOMY;  Surgeon: Karli Zaragoza MD;  Location: [facility]    BACK SURGERY  About. 5 years ago    Fusion of 4&5 lumbar    BLADDER SURGERY      BUNIONECTOMY  ~2010    L foot.     CATARACT IOL, RT/LT Bilateral 07/2017    COLONOSCOPY N/A 01/17/2017    normal; no repeat Procedure: COLONOSCOPY;  Surgeon: Fan Giordano MD;  Location:  GI    ENDOSCOPIC RELEASE CARPAL TUNNEL  09/11/2012    R Procedure: ENDOSCOPIC RELEASE CARPAL TUNNEL;  Right Endoscopic carpal tunnel release, left ringer finger cortizon injection;  Surgeon: Anne Marie Catherine MD;  Location:  OR    ESOPHAGOSCOPY, GASTROSCOPY, DUODENOSCOPY (EGD), COMBINED N/A 12/26/2014    Procedure: COMBINED ESOPHAGOSCOPY, GASTROSCOPY, DUODENOSCOPY (EGD);  Surgeon: Fan Giordano MD;  Location:  GI    ESOPHAGOSCOPY, GASTROSCOPY, DUODENOSCOPY (EGD), COMBINED N/A 11/28/2023    Procedure: ESOPHAGOGASTRODUODENOSCOPY, WITH BIOPSY;   Surgeon: Partha Cooper, DO;  Location: [facility]    EXCISE EXOSTOSIS FOOT Left 12/01/2016    Procedure: EXCISE EXOSTOSIS FOOT;  Surgeon: Jody Gallagher DPM, Pod;  Location:  OR    GYN SURGERY  1978    ovaries removed    HEART CATH FYI  03/04/2016    dz <40%    HIP HARDWARE REMOVAL Right 08/04/2020    Procedure: HARDWARE REMOVAL - LATERAL APPROACH;  Surgeon: Charlie Wolfe MD;  Location: [facility];  Service: Orthopedics    HYSTERECTOMY      INJECT STEROID (LOCATION)  09/11/2012    Procedure: INJECT STEROID (LOCATION);;  Surgeon: Anne Marie Catherine MD;  Location:  OR    LAPAROSCOPIC CHOLECYSTECTOMY N/A 04/07/2016    Procedure: LAPAROSCOPIC CHOLECYSTECTOMY;  Surgeon: Hans Medina MD;  Location:  OR    OPTICAL TRACKING SYSTEM FUSION SPINE POSTERIOR LUMBAR TWO LEVELS N/A 10/31/2018    Procedure: Central L3-4 L4-5 lamenectomies L4-5 posterior instrumented fusion;  Surgeon: Aroldo Burdick MD;  Location:  OR    ORTHOPEDIC SURGERY Right 2014    karli for fx femur    RECONSTRUCT FOREFOOT WITH METATARSOPHALANGEAL (MTP) FUSION Left 04/28/2017    Procedure: RECONSTRUCT FOREFOOT WITH METATARSOPHALANGEAL (MTP) FUSION;  1. Resection arthroplasty, fifth metatarsophalangeal joint, left foot.   2. Irrigation and debridement of fifth metatarsophalangeal joint, left foot (excisional to the level of the bone).     ;  Surgeon: Fabián Phipps MD;  Location:  OR    RELEASE CARPAL TUNNEL      RIGHT HIP ORIF 4/1/2014      ROTATOR CUFF REPAIR RT/LT  ~1998    Lt shoulder; rotator cuff    SURGICAL HISTORY OF -   distant past    ablation for palpitations.    SURGICAL HISTORY OF -   06/2015    left carpal tunnel surgery    [facility] NONSPECIFIC PROCEDURE  1976    D&C    [facility] TOTAL HIP ARTHROPLASTY Right 08/04/2020    Procedure: RIGHT TOTAL HIP ARTHROPLASTY;  Surgeon: Charlie Wolfe MD;  Location: [facility];  Service: Orthopedics    [facility] TOTAL KNEE ARTHROPLASTY Right 01/2020     Social History      Tobacco Use    Smoking status: Never    Smokeless tobacco: Never   Substance Use Topics    Alcohol use: Not Currently     Comment: Maybe 1x amonth     Current Outpatient Medications   Medication Sig Dispense Refill    acetaminophen (TYLENOL) 500 MG tablet Take 1,000 mg by mouth every 8 hours as needed for mild pain      aspirin 81 MG EC tablet Take 81 mg by mouth daily      atorvastatin (LIPITOR) 20 MG tablet Take 1 tablet (20 mg) by mouth daily 90 tablet 3    Calcium Carb-Cholecalciferol (SM CALCIUM/VITAMIN D) 600-800 MG-UNIT TABS Take 1 tablet by mouth 2 times daily Each tablet is 500 calcium and 600 Vit D      cetirizine (ZYRTEC) 10 MG tablet Take 10 mg by mouth daily as needed for allergies      cevimeline (EVOXAC) 30 MG capsule Take 30 mg by mouth 3 times daily      co-enzyme Q-10 100 MG CAPS capsule Take 100 mg by mouth daily      ELDERBERRY PO Take 1 tablet by mouth daily      EPINEPHrine (ANY BX GENERIC EQUIV) 0.3 MG/0.3ML injection 2-pack Inject 0.3 mLs (0.3 mg) into the muscle as needed for anaphylaxis 2 each 0    famotidine (PEPCID) 40 MG tablet Take 1 tablet (40 mg) by mouth at bedtime 90 tablet 4    LANsoprazole (PREVACID) 30 MG DR capsule TAKE 1 CAPSULE BY MOUTH IN THE  MORNING 90 capsule 3    levothyroxine (SYNTHROID/LEVOTHROID) 88 MCG tablet Take 1 tablet (88 mcg) by mouth daily 90 tablet 3    Lutein-Zeaxanthin 25-5 MG CAPS Take 1 capsule by mouth daily      Melatonin 5 MG TBDP Take 1 tablet by mouth every evening      metoprolol succinate ER (TOPROL XL) 25 MG 24 hr tablet Take 1 tablet (25 mg) by mouth daily 90 tablet 11    mirabegron (MYRBETRIQ) 50 MG 24 hr tablet Take 1 tablet (50 mg) by mouth daily 90 tablet 4    Omega-3 Fatty Acids (OMEGA-3 FISH OIL PO) Take 1 g by mouth 2 times daily (with meals)       polyethylene glycol (MIRALAX) 17 GM/Dose powder Take 1 capful by mouth 2 times daily In 8 ounces of liquid      valsartan (DIOVAN) 80 MG tablet Take 1 tablet (80 mg) by mouth daily 90 tablet  0    Vitamin D, Cholecalciferol, 25 MCG (1000 UT) CAPS Take 1 capsule by mouth 2 times daily      amoxicillin (AMOXIL) 500 MG tablet TAKE 4 TABLETS BY MOUTH 1 HOUR BEFORE DENTAL APPOINTMENT (Patient not taking: Reported on 7/9/2024)      Carboxymethylcellulose Sodium (REFRESH LIQUIGEL OP) Place 1 drop into both eyes every hour as needed (dry eyes) (Patient not taking: Reported on 7/26/2024)      nitroGLYcerin (NITROSTAT) 0.4 MG sublingual tablet Place 1 tablet (0.4 mg) under the tongue every 5 minutes as needed for chest pain (Patient not taking: Reported on 7/9/2024) 25 tablet 0     Current Facility-Administered Medications   Medication Dose Route Frequency Provider Last Rate Last Admin    denosumab (PROLIA) injection 60 mg  60 mg Subcutaneous Q6 Months Mary Funes MD   60 mg at 06/03/24 1550    denosumab (PROLIA) injection 60 mg  60 mg Subcutaneous Q6 Months Mary Funes MD        denosumab (PROLIA) injection 60 mg  60 mg Subcutaneous Q6 Months Mary Funes MD   60 mg at 11/29/23 1513     Allergies   Allergen Reactions    Codeine GI Disturbance     fatigue    Morphine GI Disturbance    Morphine And Codeine      fatigue    Oxycodone     Vicodin [Acetaminophen] Fatigue    Adhesive Tape Rash       10 point ROS of systems were all negative except for pertinent positives noted in my HPI.      Exam:   /73   Pulse 52   Temp 97.3  F (36.3  C) (Tympanic)   Wt 64 kg (141 lb)   LMP  (LMP Unknown)   SpO2 98%   BMI 24.59 kg/m    Constitutional: healthy, alert and no distress  ENT: TMs clear and shiny nilo, canals clear. The right pinna has erythema and swelling, a scab is noted with small abrasion.   Nose: nasal mucosa pink and moist  Throat: throat without tonsillar hypertrophy or erythema  Bottom lip on the right side has small shallow ulcer.   Neck: neck is supple, no cervical lymphadenopathy or nuchal rigidity  Cardiovascular: RRR  Respiratory: CTA bilaterally, no rhonchi or  rales  Neurologic: Speech clear, gait normal. Moves all extremities.    No results found for any visits on 07/26/24.    Estimated Creatinine Clearance: 44.4 mL/min (A) (based on SCr of 0.97 mg/dL (H)).

## 2024-07-26 NOTE — PATIENT INSTRUCTIONS
For your ear:  Take antibiotics as prescribed for one week  Use an ointment like vaseline or aquaphor on that part of your ear to help with healing  Tylenol for pain    For the cold sore:  Take 1 pill two times per day   Cover with vaseline / aquaphor to help with healing

## 2024-07-31 ENCOUNTER — OFFICE VISIT (OUTPATIENT)
Dept: NEUROLOGY | Facility: CLINIC | Age: 84
End: 2024-07-31
Payer: COMMERCIAL

## 2024-07-31 VITALS
HEART RATE: 50 BPM | BODY MASS INDEX: 25.84 KG/M2 | DIASTOLIC BLOOD PRESSURE: 71 MMHG | WEIGHT: 148.2 LBS | SYSTOLIC BLOOD PRESSURE: 137 MMHG | OXYGEN SATURATION: 96 %

## 2024-07-31 DIAGNOSIS — G25.0 BENIGN ESSENTIAL TREMOR: Primary | ICD-10-CM

## 2024-07-31 PROCEDURE — 99214 OFFICE O/P EST MOD 30 MIN: CPT | Performed by: PSYCHIATRY & NEUROLOGY

## 2024-07-31 ASSESSMENT — PAIN SCALES - GENERAL: PAINLEVEL: NO PAIN (0)

## 2024-07-31 NOTE — PATIENT INSTRUCTIONS
Your tremor looks improved, and I do not think you need any medication for treatment unless you feel symptoms are changing in some way.  If they do, please contact me and we can discuss options.    Otherwise, your cognitive testing again looks good and I do not at this time have further recommendations for you.

## 2024-07-31 NOTE — LETTER
7/31/2024       RE: Ya Stahl  [id number]  [place] [address]       Dear Colleague,    Thank you for referring your patient, Ya Stahl, to the [facility] [place] [facility]. Please see a copy of my visit note below.    [facility] Neurology Follow Up Visit    Ya Stahl MRN# [id number]   Age: 84 year old YOB: 1940     Brief history of symptoms: The patient was initially seen in neurologic consultation on 8/28/2023 and most recently 2/6/2024 for evaluation of imbalance. Please see the comprehensive neurologic consultation notes from those dates in the Epic records for details.     She has a pertinent history of MGUS, Sjogren's disease, CTS s/p b/l release, Spinal fusion (L3-5 laminectomy with L4-5 fusion in 2018).  At our visit, she had a MoCA of 2730.  Her prior imaging did show a right cerebellar lacunar infarction. Her exam showed a head titubation tremor with mixed tremor in the arms.  MRI brain 9/9/2023 showed chronic right cerebellar lacunar infarction. Consideration towards starting primidone was to be made but I asked her to see me in-person before doing so due to concerns for sedation and efficacy of the drug pending exam.    Today, the patient tells me her tremors are much improved since I saw her last.  Her lip tremors, head tremors, and arm tremors are not impacting. She is able communicate the way she wants, eat and drink without issue, and hold items without issue.  At times she feels her tongue is swollen and makes it difficult for her to get words out.  Sometimes when she eats things it can make her mouth numb, but this goes away relatively quickly (w/in minutes).  She is still driving well without getting lost, managing her schedule and finances without issue. She isn't really concerned about her cognitive function overall.     Physical Exam:   Vitals: /71   Pulse 50    Wt 67.2 kg (148 lb 3.2 oz)   LMP  (LMP Unknown)   SpO2 96%   BMI 25.84 kg/m     General: Seated comfortably in no acute distress.  HEENT: Neck supple with normal range of motion.   Neurologic:     Mental Status: Fully alert, attentive and oriented. Speech clear and fluent, no paraphasic errors. MiniCOG 5/5 (clock 2/2, recall 3/3). Serial 7's without error. Cube copy poor.      Cranial Nerves: EOMI with normal smooth pursuit. Facial movements symmetric. Hearing not formally tested but intact to conversation.  No dysarthria.     Motor: Mild postural tremor and vocal tremor (5-7 hz, moderate amplitude). No errors with archimedes spiral.      Coordination: Finger-nose-finger without dysmetria.     Gait: Normal, steady casual gait.         Assessment and Plan:   Assessment:  Essential tremor, reduced and not impacting day to day life     The patient has mild symptoms of tremor and at this point there is little reason to start any medication. She was relieved to hear her cognitive screen was normal again, and that her tremor wouldn't require intervention.  She was understanding that she could contact me at any time to discuss new issues if they arose, but otherwise could follow up with her PCP moving forward for general health maintenance and cognitive screens.    Plan:  Follow up in Neurology clinic as needed should new concerns arise.      Total time today (30 min) in this patient encounter was spent on pre-charting, counseling and/or coordination of care.         Again, thank you for allowing me to participate in the care of your patient.      Sincerely,    Chano Laird, DO

## 2024-07-31 NOTE — PROGRESS NOTES
[facility] Neurology Follow Up Visit    Ya Stahl MRN# [id number]   Age: 84 year old YOB: 1940     Brief history of symptoms: The patient was initially seen in neurologic consultation on 8/28/2023 and most recently 2/6/2024 for evaluation of imbalance. Please see the comprehensive neurologic consultation notes from those dates in the Epic records for details.     She has a pertinent history of MGUS, Sjogren's disease, CTS s/p b/l release, Spinal fusion (L3-5 laminectomy with L4-5 fusion in 2018).  At our visit, she had a MoCA of 2730.  Her prior imaging did show a right cerebellar lacunar infarction. Her exam showed a head titubation tremor with mixed tremor in the arms.  MRI brain 9/9/2023 showed chronic right cerebellar lacunar infarction. Consideration towards starting primidone was to be made but I asked her to see me in-person before doing so due to concerns for sedation and efficacy of the drug pending exam.    Today, the patient tells me her tremors are much improved since I saw her last.  Her lip tremors, head tremors, and arm tremors are not impacting. She is able communicate the way she wants, eat and drink without issue, and hold items without issue.  At times she feels her tongue is swollen and makes it difficult for her to get words out.  Sometimes when she eats things it can make her mouth numb, but this goes away relatively quickly (w/in minutes).  She is still driving well without getting lost, managing her schedule and finances without issue. She isn't really concerned about her cognitive function overall.     Physical Exam:   Vitals: /71   Pulse 50   Wt 67.2 kg (148 lb 3.2 oz)   LMP  (LMP Unknown)   SpO2 96%   BMI 25.84 kg/m     General: Seated comfortably in no acute distress.  HEENT: Neck supple with normal range of motion.   Neurologic:     Mental Status: Fully alert, attentive and oriented. Speech clear and fluent, no paraphasic errors. MiniCOG 5/5 (clock 2/2, recall  3/3). Serial 7's without error. Cube copy poor.      Cranial Nerves: EOMI with normal smooth pursuit. Facial movements symmetric. Hearing not formally tested but intact to conversation.  No dysarthria.     Motor: Mild postural tremor and vocal tremor (5-7 hz, moderate amplitude). No errors with archimedes spiral.      Coordination: Finger-nose-finger without dysmetria.     Gait: Normal, steady casual gait.         Assessment and Plan:   Assessment:  Essential tremor, reduced and not impacting day to day life     The patient has mild symptoms of tremor and at this point there is little reason to start any medication. She was relieved to hear her cognitive screen was normal again, and that her tremor wouldn't require intervention.  She was understanding that she could contact me at any time to discuss new issues if they arose, but otherwise could follow up with her PCP moving forward for general health maintenance and cognitive screens.    Plan:  Follow up in Neurology clinic as needed should new concerns arise.    DANISH Laird D.O.   of Neurology    Total time today (30 min) in this patient encounter was spent on pre-charting, counseling and/or coordination of care.

## 2024-09-09 ENCOUNTER — OFFICE VISIT (OUTPATIENT)
Dept: PEDIATRICS | Facility: CLINIC | Age: 84
End: 2024-09-09
Payer: COMMERCIAL

## 2024-09-09 VITALS
TEMPERATURE: 98.1 F | HEART RATE: 54 BPM | DIASTOLIC BLOOD PRESSURE: 68 MMHG | SYSTOLIC BLOOD PRESSURE: 124 MMHG | BODY MASS INDEX: 25.23 KG/M2 | OXYGEN SATURATION: 98 % | HEIGHT: 64 IN | WEIGHT: 147.8 LBS | RESPIRATION RATE: 18 BRPM

## 2024-09-09 DIAGNOSIS — Z01.818 PREOP GENERAL PHYSICAL EXAM: Primary | ICD-10-CM

## 2024-09-09 DIAGNOSIS — M19.019 SHOULDER ARTHRITIS: ICD-10-CM

## 2024-09-09 DIAGNOSIS — E03.9 ACQUIRED HYPOTHYROIDISM: ICD-10-CM

## 2024-09-09 DIAGNOSIS — I10 HYPERTENSION GOAL BP (BLOOD PRESSURE) < 140/90: ICD-10-CM

## 2024-09-09 LAB
ERYTHROCYTE [DISTWIDTH] IN BLOOD BY AUTOMATED COUNT: 13.8 % (ref 10–15)
HCT VFR BLD AUTO: 40.3 % (ref 35–47)
HGB BLD-MCNC: 13 G/DL (ref 11.7–15.7)
MCH RBC QN AUTO: 32.3 PG (ref 26.5–33)
MCHC RBC AUTO-ENTMCNC: 32.3 G/DL (ref 31.5–36.5)
MCV RBC AUTO: 100 FL (ref 78–100)
PLATELET # BLD AUTO: 254 10E3/UL (ref 150–450)
RBC # BLD AUTO: 4.03 10E6/UL (ref 3.8–5.2)
WBC # BLD AUTO: 10.9 10E3/UL (ref 4–11)

## 2024-09-09 PROCEDURE — 99214 OFFICE O/P EST MOD 30 MIN: CPT | Performed by: INTERNAL MEDICINE

## 2024-09-09 PROCEDURE — 36415 COLL VENOUS BLD VENIPUNCTURE: CPT | Performed by: INTERNAL MEDICINE

## 2024-09-09 PROCEDURE — 85027 COMPLETE CBC AUTOMATED: CPT | Performed by: INTERNAL MEDICINE

## 2024-09-09 PROCEDURE — 84443 ASSAY THYROID STIM HORMONE: CPT | Performed by: INTERNAL MEDICINE

## 2024-09-09 PROCEDURE — 80053 COMPREHEN METABOLIC PANEL: CPT | Performed by: INTERNAL MEDICINE

## 2024-09-09 RX ORDER — LISINOPRIL 20 MG/1
20 TABLET ORAL DAILY
Qty: 90 TABLET | Refills: 3 | Status: SHIPPED | OUTPATIENT
Start: 2024-09-09

## 2024-09-09 ASSESSMENT — PAIN SCALES - GENERAL: PAINLEVEL: SEVERE PAIN (6)

## 2024-09-09 NOTE — PATIENT INSTRUCTIONS
Stop aspirin 1 week prior to surgery    The morning of surgery:   DO take Lisinopril and Metoprolol and Lansoprazole     DO NOT take any supplements or vitamins that morning

## 2024-09-09 NOTE — PROGRESS NOTES
Preoperative Evaluation  [facility] [place]  [address]  [address]  [place] [address]  Phone: [phone]  Fax: [phone]  Primary Provider: Geronimo Jeter MD  Pre-op Performing Provider: Geronimo Jeter MD  Sep 9, 2024           9/9/2024   Surgical Information   What procedure is being done? left shoulder replaced   Facility or Hospital where procedure/surgery will be performed: [facility]   Who is doing the procedure / surgery? Dr Brian Martinez   Date of surgery / procedure: Oct 1 2024   Where do you plan to recover after surgery? at a TCU (Transitional Care Unit)        Fax number for surgical facility: [phone]    Assessment & Plan     The proposed surgical procedure is considered INTERMEDIATE risk.      ICD-10-CM    1. Preop general physical exam  Z01.818 CBC with platelets     TSH with free T4 reflex     Comprehensive metabolic panel (BMP + Alb, Alk Phos, ALT, AST, Total. Bili, TP)      2. Shoulder arthritis  M19.019 CBC with platelets     TSH with free T4 reflex     Comprehensive metabolic panel (BMP + Alb, Alk Phos, ALT, AST, Total. Bili, TP)      3. Hypertension goal BP (blood pressure) < 140/90  I10 lisinopril (ZESTRIL) 20 MG tablet     Comprehensive metabolic panel (BMP + Alb, Alk Phos, ALT, AST, Total. Bili, TP)      4. Acquired hypothyroidism  E03.9 TSH with free T4 reflex        Patient Instructions   Stop aspirin 1 week prior to surgery    The morning of surgery:   DO take Lisinopril and Metoprolol and Lansoprazole     DO NOT take any supplements or vitamins that morning     Recommendation  Approval given to proceed with proposed procedure, without further diagnostic evaluation.    Janet Pandya is a 84 year old, presenting for the following:  Pre-Op Exam          9/9/2024     1:05 PM   Additional Questions   Roomed by Merari         9/9/2024     1:05 PM   Patient Reported Additional Medications   Patient reports taking the following new medications  none     HPI related to upcoming procedure: Severe left shoulder arthritis. Not responding to conservative measures. Plan for surgical intervention.         9/9/2024   Pre-Op Questionnaire   Have you ever had a heart attack or stroke? No   Have you ever had surgery on your heart or blood vessels, such as a stent placement, a coronary artery bypass, or surgery on an artery in your head, neck, heart, or legs? No   Do you have chest pain with activity? No   Do you have a history of heart failure? No   Do you currently have a cold, bronchitis or symptoms of other infection? No   Do you have a cough, shortness of breath, or wheezing? (!) YES - related to BP medications    Do you or anyone in your family have previous history of blood clots? No   Do you or does anyone in your family have a serious bleeding problem such as prolonged bleeding following surgeries or cuts? (!) YES - easy bleeding (taking ASA)   Have you ever had problems with anemia or been told to take iron pills? No   Have you had any abnormal blood loss such as black, tarry or bloody stools, or abnormal vaginal bleeding? No   Have you ever had a blood transfusion? No   Are you willing to have a blood transfusion if it is medically needed before, during, or after your surgery? Yes   Have you or any of your relatives ever had problems with anesthesia? (!) YES - difficulty waking up    Do you have sleep apnea, excessive snoring or daytime drowsiness? No   Do you have any artifical heart valves or other implanted medical devices like a pacemaker, defibrillator, or continuous glucose monitor? No   Do you have artificial joints? (!) YES   Are you allergic to latex? No      Chronic health issues reviewed.    HTN. BP is controlled. Has been changing BP medications over the past few months due to symptoms. Most recently changed from lisinopril to valsartan. Cough seems worse since this change. She is requesting a return to Lisinopril.     Hypothyroidism. No sx  "related to thyroid.      Patient Active Problem List   Diagnosis    Hypothyroidism    Bile reflux gastritis    Hyperlipidemia LDL goal <70    Sjogren's disease (H24)    HL (hearing loss)    Mixed incontinence    Chronic cough    Impaired fasting glucose    Status post-operative repair of hip fracture, RIGHT    Senile osteoporosis    Gastroesophageal reflux disease, unspecified whether esophagitis present    ASCVD (arteriosclerotic cardiovascular disease)    Macrocytosis    MGUS (monoclonal gammopathy of unknown significance)    Epigastric pain    Hypertension goal BP (blood pressure) < 140/90    Complication of anesthesia    S/P lumbar fusion    Ascending aorta dilation (H24)    Other osteoarthritis involving multiple joints    Dry mouth    Primary osteoarthritis of both hands    Dysphagia, unspecified type         Past Medical History:   Diagnosis Date    Arthritis     Cerebral infarction (H) 2019    Complication of anesthesia     \"hard time waking up / N & V\"    Coronary artery disease     Disease of thyroid gland     Displacement of lumbar intervertebral disc without myelopathy     Gastro-oesophageal reflux disease     GERD (gastroesophageal reflux disease)     Grave's disease 09/08/2008    IMO update changed this record. Please review for accuracy      Hearing loss     has bilateral aids    Heart attack (H) 03/2016    History of anesthesia complications     delayed emergence    History of angina     Hypertension     Low back pain     Migraine headache     Myocardial infarction (H)     Numbness and tingling     down right leg (associated with back pain)    Pelvic hematoma in female 09/19/2023    PONV (postoperative nausea and vomiting)     Sicca syndrome (H24)     Sjogren's disease (H24)     Sjogren's syndrome (H24)     sees specialist; dentist    Spider veins     Unspecified hypothyroidism      Past Surgical History:   Procedure Laterality Date    ARTHROPLASTY HIP ANTERIOR Left 10/14/2022    Procedure: LEFT " TOTAL HIP ARTHROPLASTY DIRECT ANTERIOR APPROACH;  Surgeon: Selvin Saab MD;  Location:  OR    ARTHROSCOPY KNEE  10/05/2012    R Procedure: ARTHROSCOPY KNEE;  RIGHT KNEE ARTHROSCOPY, PARTIAL MEDIAL MENISCECTOMY;  Surgeon: Karli Zaragoza MD;  Location: [facility]    BACK SURGERY  About. 5 years ago    Fusion of 4&5 lumbar    BLADDER SURGERY      BUNIONECTOMY  ~2010    L foot.     CATARACT IOL, RT/LT Bilateral 07/2017    COLONOSCOPY N/A 01/17/2017    normal; no repeat Procedure: COLONOSCOPY;  Surgeon: Fan Giordano MD;  Location:  GI    ENDOSCOPIC RELEASE CARPAL TUNNEL  09/11/2012    R Procedure: ENDOSCOPIC RELEASE CARPAL TUNNEL;  Right Endoscopic carpal tunnel release, left ringer finger cortizon injection;  Surgeon: Anne Marie Catherine MD;  Location:  OR    ESOPHAGOSCOPY, GASTROSCOPY, DUODENOSCOPY (EGD), COMBINED N/A 12/26/2014    Procedure: COMBINED ESOPHAGOSCOPY, GASTROSCOPY, DUODENOSCOPY (EGD);  Surgeon: Fan Giordano MD;  Location:  GI    ESOPHAGOSCOPY, GASTROSCOPY, DUODENOSCOPY (EGD), COMBINED N/A 11/28/2023    Procedure: ESOPHAGOGASTRODUODENOSCOPY, WITH BIOPSY;  Surgeon: Partha Cooper, DO;  Location: [facility]    EXCISE EXOSTOSIS FOOT Left 12/01/2016    Procedure: EXCISE EXOSTOSIS FOOT;  Surgeon: Jody Gallagher, DPM, Pod;  Location:  OR    GYN SURGERY  1978    ovaries removed    HEART CATH FYI  03/04/2016    dz <40%    HIP HARDWARE REMOVAL Right 08/04/2020    Procedure: HARDWARE REMOVAL - LATERAL APPROACH;  Surgeon: Charlie Wolfe MD;  Location: [facility];  Service: Orthopedics    HYSTERECTOMY      INJECT STEROID (LOCATION)  09/11/2012    Procedure: INJECT STEROID (LOCATION);;  Surgeon: Anne Marie Catherine MD;  Location:  OR    LAPAROSCOPIC CHOLECYSTECTOMY N/A 04/07/2016    Procedure: LAPAROSCOPIC CHOLECYSTECTOMY;  Surgeon: Hans Medina MD;  Location:  OR    OPTICAL TRACKING SYSTEM FUSION SPINE POSTERIOR LUMBAR TWO LEVELS N/A 10/31/2018    Procedure: Central  L3-4 L4-5 lamenectomies L4-5 posterior instrumented fusion;  Surgeon: Aroldo Burdick MD;  Location:  OR    ORTHOPEDIC SURGERY Right 2014    karli for fx femur    RECONSTRUCT FOREFOOT WITH METATARSOPHALANGEAL (MTP) FUSION Left 04/28/2017    Procedure: RECONSTRUCT FOREFOOT WITH METATARSOPHALANGEAL (MTP) FUSION;  1. Resection arthroplasty, fifth metatarsophalangeal joint, left foot.   2. Irrigation and debridement of fifth metatarsophalangeal joint, left foot (excisional to the level of the bone).     ;  Surgeon: Fabián Phipps MD;  Location:  OR    RELEASE CARPAL TUNNEL      RIGHT HIP ORIF 4/1/2014      ROTATOR CUFF REPAIR RT/LT  ~1998    Lt shoulder; rotator cuff    SURGICAL HISTORY OF -   distant past    ablation for palpitations.    SURGICAL HISTORY OF -   06/2015    left carpal tunnel surgery    [facility] NONSPECIFIC PROCEDURE  1976    D&C    [facility] TOTAL HIP ARTHROPLASTY Right 08/04/2020    Procedure: RIGHT TOTAL HIP ARTHROPLASTY;  Surgeon: Charlie Wolfe MD;  Location: [facility];  Service: Orthopedics    [facility] TOTAL KNEE ARTHROPLASTY Right 01/2020     Current Outpatient Medications   Medication Sig Dispense Refill    acetaminophen (TYLENOL) 500 MG tablet Take 1,000 mg by mouth every 8 hours as needed for mild pain      amoxicillin (AMOXIL) 500 MG tablet TAKE 4 TABLETS BY MOUTH 1 HOUR BEFORE DENTAL APPOINTMENT (Patient not taking: Reported on 7/9/2024)      aspirin 81 MG EC tablet Take 81 mg by mouth daily      atorvastatin (LIPITOR) 20 MG tablet Take 1 tablet (20 mg) by mouth daily 90 tablet 3    Calcium Carb-Cholecalciferol (SM CALCIUM/VITAMIN D) 600-800 MG-UNIT TABS Take 1 tablet by mouth 2 times daily Each tablet is 500 calcium and 600 Vit D      Carboxymethylcellulose Sodium (REFRESH LIQUIGEL OP) Place 1 drop into both eyes every hour as needed (dry eyes) (Patient not taking: Reported on 7/26/2024)      cetirizine (ZYRTEC) 10 MG tablet Take 10 mg by mouth daily as needed for allergies       cevimeline (EVOXAC) 30 MG capsule Take 30 mg by mouth 3 times daily      co-enzyme Q-10 100 MG CAPS capsule Take 100 mg by mouth daily      ELDERBERRY PO Take 1 tablet by mouth daily      EPINEPHrine (ANY BX GENERIC EQUIV) 0.3 MG/0.3ML injection 2-pack Inject 0.3 mLs (0.3 mg) into the muscle as needed for anaphylaxis 2 each 0    famotidine (PEPCID) 40 MG tablet Take 1 tablet (40 mg) by mouth at bedtime 90 tablet 4    LANsoprazole (PREVACID) 30 MG DR capsule TAKE 1 CAPSULE BY MOUTH IN THE  MORNING 90 capsule 3    levothyroxine (SYNTHROID/LEVOTHROID) 88 MCG tablet Take 1 tablet (88 mcg) by mouth daily 90 tablet 3    Lutein-Zeaxanthin 25-5 MG CAPS Take 1 capsule by mouth daily      Melatonin 5 MG TBDP Take 1 tablet by mouth every evening      metoprolol succinate ER (TOPROL XL) 25 MG 24 hr tablet Take 1 tablet (25 mg) by mouth daily 90 tablet 11    mirabegron (MYRBETRIQ) 50 MG 24 hr tablet Take 1 tablet (50 mg) by mouth daily 90 tablet 4    nitroGLYcerin (NITROSTAT) 0.4 MG sublingual tablet Place 1 tablet (0.4 mg) under the tongue every 5 minutes as needed for chest pain (Patient not taking: Reported on 7/9/2024) 25 tablet 0    Omega-3 Fatty Acids (OMEGA-3 FISH OIL PO) Take 1 g by mouth 2 times daily (with meals)       polyethylene glycol (MIRALAX) 17 GM/Dose powder Take 1 capful by mouth 2 times daily In 8 ounces of liquid      valACYclovir (VALTREX) 1000 mg tablet Take 1 tablet (1,000 mg) by mouth 2 times daily for 1 day 2 tablet 0    valsartan (DIOVAN) 80 MG tablet Take 1 tablet (80 mg) by mouth daily 90 tablet 0    Vitamin D, Cholecalciferol, 25 MCG (1000 UT) CAPS Take 1 capsule by mouth 2 times daily         Allergies   Allergen Reactions    Codeine GI Disturbance     fatigue    Morphine GI Disturbance    Morphine And Codeine      fatigue    Oxycodone     Vicodin [Acetaminophen] Fatigue    Adhesive Tape Rash        Social History     Tobacco Use    Smoking status: Never    Smokeless tobacco: Never   Substance Use  "Topics    Alcohol use: Not Currently     Comment: Maybe 1x amonth       History   Drug Use No               Objective    /68   Pulse 54   Temp 98.1  F (36.7  C) (Tympanic)   Resp 18   Ht 1.626 m (5' 4\")   Wt 67 kg (147 lb 12.8 oz)   LMP  (LMP Unknown)   SpO2 98%   BMI 25.37 kg/m     Estimated body mass index is 25.37 kg/m  as calculated from the following:    Height as of this encounter: 1.626 m (5' 4\").    Weight as of this encounter: 67 kg (147 lb 12.8 oz).  Physical Exam  GENERAL: healthy, alert and no distress  EYES: PERRL, EOMI  HENT: ear canals and TM's normal. No nasal discharge. OP moist.  NECK: no adenopathy  RESP: lungs clear to auscultation - no rales, rhonchi or wheezes  CV: regular rate and rhythm, normal S1 S2, no murmur, no peripheral edema  ABDOMEN: soft, nontender, bowel sounds normal  SKIN: no suspicious lesions or rashes  NEURO: Normal strength and tone      Recent Labs   Lab Test 07/09/24  1516 05/30/24  0952 11/21/23  1202 11/09/23  1657   HGB 13.4  --   --  12.8     --   --  246     --   --  139   POTASSIUM 4.9  --   --  4.5   CR 0.97* 1.12*   < > 0.79    < > = values in this interval not displayed.        Diagnostics  Results for orders placed or performed in visit on 09/09/24   CBC with platelets     Status: Normal   Result Value Ref Range    WBC Count 10.9 4.0 - 11.0 10e3/uL    RBC Count 4.03 3.80 - 5.20 10e6/uL    Hemoglobin 13.0 11.7 - 15.7 g/dL    Hematocrit 40.3 35.0 - 47.0 %     78 - 100 fL    MCH 32.3 26.5 - 33.0 pg    MCHC 32.3 31.5 - 36.5 g/dL    RDW 13.8 10.0 - 15.0 %    Platelet Count 254 150 - 450 10e3/uL   TSH with free T4 reflex     Status: Normal   Result Value Ref Range    TSH 2.64 0.30 - 4.20 uIU/mL   Comprehensive metabolic panel (BMP + Alb, Alk Phos, ALT, AST, Total. Bili, TP)     Status: Abnormal   Result Value Ref Range    Sodium 134 (L) 135 - 145 mmol/L    Potassium 4.3 3.4 - 5.3 mmol/L    Carbon Dioxide (CO2) 23 22 - 29 mmol/L    Anion " Gap 11 7 - 15 mmol/L    Urea Nitrogen 30.1 (H) 8.0 - 23.0 mg/dL    Creatinine 0.91 0.51 - 0.95 mg/dL    GFR Estimate 62 >60 mL/min/1.73m2    Calcium 9.4 8.8 - 10.4 mg/dL    Chloride 100 98 - 107 mmol/L    Glucose 152 (H) 70 - 99 mg/dL    Alkaline Phosphatase 52 40 - 150 U/L    AST 20 0 - 45 U/L    ALT 17 0 - 50 U/L    Protein Total 7.0 6.4 - 8.3 g/dL    Albumin 4.4 3.5 - 5.2 g/dL    Bilirubin Total 0.8 <=1.2 mg/dL       EKG (7/9/24): sinus bradycardia, normal axis, normal intervals, no acute ST/T changes c/w ischemia, no LVH by voltage criteria, unchanged from previous tracings            Signed Electronically by: Geronimo Jeter MD

## 2024-09-10 LAB
ALBUMIN SERPL BCG-MCNC: 4.4 G/DL (ref 3.5–5.2)
ALP SERPL-CCNC: 52 U/L (ref 40–150)
ALT SERPL W P-5'-P-CCNC: 17 U/L (ref 0–50)
ANION GAP SERPL CALCULATED.3IONS-SCNC: 11 MMOL/L (ref 7–15)
AST SERPL W P-5'-P-CCNC: 20 U/L (ref 0–45)
BILIRUB SERPL-MCNC: 0.8 MG/DL
BUN SERPL-MCNC: 30.1 MG/DL (ref 8–23)
CALCIUM SERPL-MCNC: 9.4 MG/DL (ref 8.8–10.4)
CHLORIDE SERPL-SCNC: 100 MMOL/L (ref 98–107)
CREAT SERPL-MCNC: 0.91 MG/DL (ref 0.51–0.95)
EGFRCR SERPLBLD CKD-EPI 2021: 62 ML/MIN/1.73M2
GLUCOSE SERPL-MCNC: 152 MG/DL (ref 70–99)
HCO3 SERPL-SCNC: 23 MMOL/L (ref 22–29)
POTASSIUM SERPL-SCNC: 4.3 MMOL/L (ref 3.4–5.3)
PROT SERPL-MCNC: 7 G/DL (ref 6.4–8.3)
SODIUM SERPL-SCNC: 134 MMOL/L (ref 135–145)
TSH SERPL DL<=0.005 MIU/L-ACNC: 2.64 UIU/ML (ref 0.3–4.2)

## 2024-09-19 ENCOUNTER — TRANSFERRED RECORDS (OUTPATIENT)
Dept: HEALTH INFORMATION MANAGEMENT | Facility: CLINIC | Age: 84
End: 2024-09-19
Payer: COMMERCIAL

## 2024-09-23 ENCOUNTER — OFFICE VISIT (OUTPATIENT)
Dept: PEDIATRICS | Facility: CLINIC | Age: 84
End: 2024-09-23
Payer: COMMERCIAL

## 2024-09-23 VITALS
HEIGHT: 64 IN | RESPIRATION RATE: 16 BRPM | SYSTOLIC BLOOD PRESSURE: 161 MMHG | TEMPERATURE: 98.2 F | OXYGEN SATURATION: 100 % | BODY MASS INDEX: 25.1 KG/M2 | WEIGHT: 147 LBS | DIASTOLIC BLOOD PRESSURE: 63 MMHG | HEART RATE: 55 BPM

## 2024-09-23 DIAGNOSIS — L01.00 IMPETIGO: Primary | ICD-10-CM

## 2024-09-23 PROCEDURE — 99213 OFFICE O/P EST LOW 20 MIN: CPT | Performed by: PHYSICIAN ASSISTANT

## 2024-09-23 RX ORDER — CEPHALEXIN 500 MG/1
500 CAPSULE ORAL 3 TIMES DAILY
Qty: 21 CAPSULE | Refills: 0 | Status: SHIPPED | OUTPATIENT
Start: 2024-09-23

## 2024-09-23 NOTE — PROGRESS NOTES
"  Assessment & Plan     Impetigo  Recurrent symptoms of ear.   Lesion vs irritation from hearing aid. If recurs, recommend follow up with derm to r/o skin lesion/ca  - cephALEXin (KEFLEX) 500 MG capsule; Take 1 capsule (500 mg) by mouth 3 times daily.    Janet Pandya is a 84 year old, presenting for the following health issues:  No chief complaint on file.      History of Present Illness       Reason for visit:  My ear hurts  Symptom onset:  3-7 days ago  Symptoms include:  My ear hurts  Symptom intensity:  Mild  Symptom progression:  Staying the same  Had these symptoms before:  Yes  Has tried/received treatment for these symptoms:  Yes  Previous treatment was successful:  Yes  Prior treatment description:  Dr gave me an anti biatic   She is taking medications regularly.     Acute Illness  Acute illness concerns: Right ear pain  Onset/Duration: A week ago  Symptoms:  Fever: No  Chills/Sweats: No  Headache (location?): No  Sinus Pressure: No  Conjunctivitis:  No  Ear Pain: YES: right  Rhinorrhea: No  Congestion: No  Sore Throat: No  Cough: YES  Wheeze: No  Decreased Appetite: No  Nausea: No  Vomiting: No  Diarrhea: No  Dysuria/Freq.: No  Dysuria or Hematuria: No  Fatigue/Achiness: No  Sick/Strep Exposure: No  Therapies tried and outcome:  salve..has helped with many rashes pt has had in the past    *located in inner crease, pt states it Is painful and also looks red     Review of Systems  Constitutional, HEENT, cardiovascular, pulmonary, gi and gu systems are negative, except as otherwise noted.      Objective    BP (!) 161/63 (BP Location: Right arm, Patient Position: Sitting, Cuff Size: Adult Large)   Pulse 55   Temp 98.2  F (36.8  C) (Temporal)   Resp 16   Ht 1.626 m (5' 4.02\")   Wt 66.7 kg (147 lb)   LMP  (LMP Unknown)   SpO2 100%   BMI 25.22 kg/m    Body mass index is 25.22 kg/m .  Physical Exam   GENERAL: alert and no distress  EYES: Eyes grossly normal to inspection, PERRL and " conjunctivae and sclerae normal  HENT: right external ear reveals a small crack in skin with erythema and yellow crusting. Mildy tender. ear canals and TM's normal, nose and mouth without ulcers or lesions  NECK: no adenopathy, no asymmetry, masses, or scars  RESP: lungs clear to auscultation - no rales, rhonchi or wheezes  CV: regular rate and rhythm, normal S1 S2, no S3 or S4, no murmur      No results found for any visits on 09/23/24.        Signed Electronically by: Estuardo Mejia PA-C

## 2024-09-24 ENCOUNTER — TRANSFERRED RECORDS (OUTPATIENT)
Dept: HEALTH INFORMATION MANAGEMENT | Facility: CLINIC | Age: 84
End: 2024-09-24
Payer: COMMERCIAL

## 2024-09-26 ENCOUNTER — TRANSFERRED RECORDS (OUTPATIENT)
Dept: HEALTH INFORMATION MANAGEMENT | Facility: CLINIC | Age: 84
End: 2024-09-26
Payer: COMMERCIAL

## 2024-10-07 ENCOUNTER — TRANSFERRED RECORDS (OUTPATIENT)
Dept: HEALTH INFORMATION MANAGEMENT | Facility: CLINIC | Age: 84
End: 2024-10-07
Payer: COMMERCIAL

## 2024-10-10 ENCOUNTER — TRANSFERRED RECORDS (OUTPATIENT)
Dept: HEALTH INFORMATION MANAGEMENT | Facility: CLINIC | Age: 84
End: 2024-10-10
Payer: COMMERCIAL

## 2024-10-17 ENCOUNTER — TELEPHONE (OUTPATIENT)
Dept: PEDIATRICS | Facility: CLINIC | Age: 84
End: 2024-10-17
Payer: COMMERCIAL

## 2024-10-17 NOTE — TELEPHONE ENCOUNTER
Forms in provider in basket for completion.  Mail to patient when complete.      Saira SCHWARTZ, - [facility]  [facility] [place]  [facility]

## 2024-10-17 NOTE — TELEPHONE ENCOUNTER
Forms/Letter Request    Type of form/letter: Transportation ([employer])      Is Release of Information needed?: No    Do we have the form/letter: Yes:     Who is the form from? Patient    Where did/will the form come from? Patient or family brought in       When is form/letter needed by: asap    How would you like the form/letter returned: Mail  Is this the correct address?: Yes  [address]  [address]  [facility] [address]    Patient Notified form requests are processed in 5-7 business days:Yes    Could we send this information to you in [employer] or would you prefer to receive a phone call?:   No preference   Okay to leave a detailed message?: No at Other phone number:  na

## 2024-11-04 ENCOUNTER — TELEPHONE (OUTPATIENT)
Dept: ENDOCRINOLOGY | Facility: CLINIC | Age: 84
End: 2024-11-04
Payer: COMMERCIAL

## 2024-11-04 DIAGNOSIS — M81.0 SENILE OSTEOPOROSIS: Primary | ICD-10-CM

## 2024-11-04 NOTE — TELEPHONE ENCOUNTER
Patient is scheduled 12/3/24 for a Prolia injection.    Please place future lab orders.    Then forward encounter to endocrin team to call patient and schedule the lab appointment.    Please advise, thanks.

## 2024-11-08 ENCOUNTER — ALLIED HEALTH/NURSE VISIT (OUTPATIENT)
Dept: PEDIATRICS | Facility: CLINIC | Age: 84
End: 2024-11-08
Payer: COMMERCIAL

## 2024-11-08 DIAGNOSIS — Z01.30 BP CHECK: Primary | ICD-10-CM

## 2024-11-08 PROCEDURE — 99207 PR NO CHARGE NURSE ONLY: CPT | Performed by: INTERNAL MEDICINE

## 2024-11-08 NOTE — PROGRESS NOTES
Ya Stahl was evaluated at [facility] on November 8, 2024 at which time her blood pressure was:    BP Readings from Last 1 Encounters:   09/23/24 (!) 161/63     Systolic (Patient Reported): 124 (11/8/2024  9:12 AM)  Diastolic (Patient Reported): 65 (11/8/2024  9:12 AM)        Reviewed lifestyle modifications for blood pressure control and reduction: including making healthy food choices, managing weight, getting regular exercise, smoking cessation, reducing alcohol consumption, monitoring blood pressure regularly.     Symptoms: None    BP Goal:< 140/90 mmHg    BP Assessment:  BP at goal    Potential Reasons for BP too high: NA - Not applicable    BP Follow-Up Plan: Recheck BP in 6 months at pharmacy    Recommendation to Provider: none    Note completed by: Kristopher Orlando RPH     Thank You   [facility]

## 2024-11-10 ENCOUNTER — TRANSFERRED RECORDS (OUTPATIENT)
Dept: HEALTH INFORMATION MANAGEMENT | Facility: CLINIC | Age: 84
End: 2024-11-10
Payer: COMMERCIAL

## 2024-11-11 ENCOUNTER — TRANSFERRED RECORDS (OUTPATIENT)
Dept: HEALTH INFORMATION MANAGEMENT | Facility: CLINIC | Age: 84
End: 2024-11-11
Payer: COMMERCIAL

## 2024-11-12 ENCOUNTER — TRANSFERRED RECORDS (OUTPATIENT)
Dept: HEALTH INFORMATION MANAGEMENT | Facility: CLINIC | Age: 84
End: 2024-11-12
Payer: COMMERCIAL

## 2024-11-26 ENCOUNTER — LAB (OUTPATIENT)
Dept: LAB | Facility: CLINIC | Age: 84
End: 2024-11-26
Payer: COMMERCIAL

## 2024-11-26 ENCOUNTER — TRANSFERRED RECORDS (OUTPATIENT)
Dept: HEALTH INFORMATION MANAGEMENT | Facility: CLINIC | Age: 84
End: 2024-11-26

## 2024-11-26 DIAGNOSIS — M81.0 SENILE OSTEOPOROSIS: ICD-10-CM

## 2024-11-26 LAB — CALCIUM SERPL-MCNC: 9.5 MG/DL (ref 8.8–10.4)

## 2024-11-26 PROCEDURE — 36415 COLL VENOUS BLD VENIPUNCTURE: CPT

## 2024-11-26 PROCEDURE — 82310 ASSAY OF CALCIUM: CPT

## 2024-12-03 ENCOUNTER — TELEPHONE (OUTPATIENT)
Dept: PEDIATRICS | Facility: CLINIC | Age: 84
End: 2024-12-03

## 2024-12-03 ENCOUNTER — HOSPITAL ENCOUNTER (OUTPATIENT)
Dept: CARDIOLOGY | Facility: CLINIC | Age: 84
Discharge: HOME | End: 2024-12-03
Attending: INTERNAL MEDICINE
Payer: COMMERCIAL

## 2024-12-03 ENCOUNTER — ALLIED HEALTH/NURSE VISIT (OUTPATIENT)
Dept: NURSING | Facility: CLINIC | Age: 84
End: 2024-12-03
Payer: COMMERCIAL

## 2024-12-03 DIAGNOSIS — R60.9 EDEMA, UNSPECIFIED TYPE: ICD-10-CM

## 2024-12-03 DIAGNOSIS — I10 HYPERTENSION GOAL BP (BLOOD PRESSURE) < 140/90: ICD-10-CM

## 2024-12-03 DIAGNOSIS — I77.810 ASCENDING AORTA DILATION (H): ICD-10-CM

## 2024-12-03 DIAGNOSIS — M81.8 IDIOPATHIC OSTEOPOROSIS: Primary | ICD-10-CM

## 2024-12-03 PROCEDURE — 99207 PR NO CHARGE NURSE ONLY: CPT | Performed by: INTERNAL MEDICINE

## 2024-12-03 PROCEDURE — 96372 THER/PROPH/DIAG INJ SC/IM: CPT | Performed by: INTERNAL MEDICINE

## 2024-12-03 PROCEDURE — 93306 TTE W/DOPPLER COMPLETE: CPT

## 2024-12-03 NOTE — TELEPHONE ENCOUNTER
Patient was in today for Prolia injection. Next Prolia injection set up for 06/03/2025. Patient's current Prolia order expires 05/2025. Please place new Prolia order.    Patient does have follow up with endo provider already scheduled.    Mar 19, 2025 9:30 AM  RETURN ENDOCRINE with Mary Funes MD  [facility] ([facility] ) [phone]       Thank you,  Akshat, Triage RN [facility]    11:22 AM 12/3/2024

## 2024-12-03 NOTE — PROGRESS NOTES
Clinic Administered Medication Documentation      Prolia Documentation    Indication: Prolia  (denosumab) is a prescription medicine used to treat osteoporosis in patients who:   Are at high risk for fracture, meaning patients who have had a fracture related to osteoporosis, or who have multiple risk factors for fracture.  Cannot use another osteoporosis medicine or other osteoporosis medicines did not work well.  The timeline for early/late injections would be 4 weeks early and any time after the 6 month bella. If a patient receives their injection late, then the subsequent injection would be 6 months from the date that they actually received the injection.    When was the last injection?  2024  Was the last injection at least 6 months ago? Yes  Has the prior authorization been completed?  Yes  Is there an active order (written within the past 365 days, with administrations remaining, not ) in the chart?  Yes   GFR Estimate   Date Value Ref Range Status   2024 62 >60 mL/min/1.73m2 Final     Comment:     eGFR calculated using  CKD-EPI equation.   2021 79 >60 mL/min/[1.73_m2] Final     Comment:     Non  GFR Calc  Starting 2018, serum creatinine based estimated GFR (eGFR) will be   calculated using the Chronic Kidney Disease Epidemiology Collaboration   (CKD-EPI) equation.       Has patient had a GFR within the last 12 months? Yes   Is GFR under 30, or patient has a diagnosis of CKD4 or CKD5? No   Patient denies gastric bypass or parathyroid surgery in past 6 months? Yes - patient denies.   Patient denies dental work in the past two months involving drilling into the bone, such as implants/extractions, oral surgery or a tooth extraction that has not healed yet?  Yes  Patient denies plans for an emergency tooth extraction within the next week? Yes    The following steps were completed to comply with the REMS program for Prolia:  Reviewed information in the Medication  Guide, including the serious risks of Prolia  and the symptoms of each risk.  Advised patient to seek prompt medical attention if they have signs or symptoms of any of the serious risks.  Provided each patient a copy of the Medication Guide and Patient Guide.    Prior to injection, verified patient identity using patient's name and date of birth. Medication was administered. Please see MAR and medication order for additional information. Patient instructed to remain in clinic for 15 minutes and report any adverse reaction to staff immediately.    Vial/Syringe: Syringe  Was this medication supplied by the patient? No  Patient has no administrations remaining. Pend an order for Prolia and send to the Provider. Patient has appointment to see endo provider in March. Will send message in separate encounter  .    Next prolia appointment set up.      Jun 03, 2025 11:00 AM  Nurse Visit with Tony Grossman  [facility] ([facility] ) [phone]       Thank you,  Akshat, Triage [facility]    11:14 AM 12/3/2024

## 2024-12-10 ENCOUNTER — LAB (OUTPATIENT)
Dept: LAB | Facility: CLINIC | Age: 84
End: 2024-12-10
Payer: COMMERCIAL

## 2024-12-10 ENCOUNTER — OFFICE VISIT (OUTPATIENT)
Dept: CARDIOLOGY | Facility: CLINIC | Age: 84
End: 2024-12-10
Payer: COMMERCIAL

## 2024-12-10 VITALS
HEIGHT: 64 IN | BODY MASS INDEX: 22.88 KG/M2 | DIASTOLIC BLOOD PRESSURE: 60 MMHG | HEART RATE: 54 BPM | OXYGEN SATURATION: 98 % | WEIGHT: 134 LBS | SYSTOLIC BLOOD PRESSURE: 128 MMHG

## 2024-12-10 DIAGNOSIS — R60.9 EDEMA, UNSPECIFIED TYPE: ICD-10-CM

## 2024-12-10 DIAGNOSIS — R07.9 ACUTE CHEST PAIN: ICD-10-CM

## 2024-12-10 DIAGNOSIS — I25.10 ASCVD (ARTERIOSCLEROTIC CARDIOVASCULAR DISEASE): ICD-10-CM

## 2024-12-10 DIAGNOSIS — M81.0 SENILE OSTEOPOROSIS: ICD-10-CM

## 2024-12-10 DIAGNOSIS — I77.810 ASCENDING AORTA DILATION (H): ICD-10-CM

## 2024-12-10 DIAGNOSIS — E78.5 HYPERLIPIDEMIA LDL GOAL <70: ICD-10-CM

## 2024-12-10 DIAGNOSIS — I10 HYPERTENSION GOAL BP (BLOOD PRESSURE) < 140/90: ICD-10-CM

## 2024-12-10 LAB — CALCIUM SERPL-MCNC: 9.4 MG/DL (ref 8.8–10.4)

## 2024-12-10 PROCEDURE — 82310 ASSAY OF CALCIUM: CPT

## 2024-12-10 PROCEDURE — 36415 COLL VENOUS BLD VENIPUNCTURE: CPT

## 2024-12-10 PROCEDURE — 99214 OFFICE O/P EST MOD 30 MIN: CPT | Performed by: INTERNAL MEDICINE

## 2024-12-10 RX ORDER — METOPROLOL SUCCINATE 25 MG/1
25 TABLET, EXTENDED RELEASE ORAL DAILY
Qty: 90 TABLET | Refills: 3 | Status: SHIPPED | OUTPATIENT
Start: 2024-12-10

## 2024-12-10 RX ORDER — NITROGLYCERIN 0.4 MG/1
0.4 TABLET SUBLINGUAL EVERY 5 MIN PRN
Qty: 25 TABLET | Refills: 0 | Status: SHIPPED | OUTPATIENT
Start: 2024-12-10

## 2024-12-10 NOTE — PROGRESS NOTES
HISTORY OF PRESENT ILLNESS:  Ya Stahl a 84 year old female with nonobstructive coronary artery disease, hypothyroidism, sicca syndrome, cholelithiasis (status post laparoscopic cholecystectomy), and ascending aortic dilation was seen today at your request for followup.     For the last 1 year, the patient has noted exercise associated dyspnea especially while climbing steps, or going up hills.  She does not describe typical chest arm neck jaw or back discomfort.  Her hemoglobin has been stable.  Recent echocardiogram to follow-up her ascending aortic aneurysm shows no change in aortic dimensions and well-preserved left ventricular systolic performance.  She has been free of hemoptysis purulent sputum and fever, however chest CT in 2023 showed mild interstitial and pulmonary opacities as well as mild bronchiectasis    Additionally, the patient was trialed on valsartan because of a cough, but with the change made no difference, she went back to lisinopril.  Blood pressure is presently very well-controlled.    Patient is very concerned about tenderness over her right shin with venous changes.  Patient wishes to see a vascular specialist the patient's primary care physician is uncertain about the nature of this right leg lesion.        PAST MEDICAL HISTORY:    1.  Coronary artery disease.  a.  Previous coronary angiogram 2016 showing mild atheromatous change but no significant focal narrowing.  On statin therapy.  2.  Monoclonal gammopathy of uncertain significance -- most recent hemoglobin 13.5, platelet count 241,000.  3.  Dilated ascending aorta -- measures 4.4 on most recent CT scan.  4.  Sjogren's disease.  5.  Hypertension -- currently very well controlled.  6.  Hypothyroidism.  7.  History of gastroesophageal reflux.  8.  Cholelithiasis -- status post laparoscopic cholecystectomy      Orders this Visit:  No orders of the defined types were placed in this encounter.    No orders of the defined types were  placed in this encounter.    Medications Discontinued During This Encounter   Medication Reason    cephALEXin (KEFLEX) 500 MG capsule Stopped by Patient (No AVS)    valACYclovir (VALTREX) 1000 mg tablet Stopped by Patient (No AVS)       Encounter Diagnoses   Name Primary?    Edema, unspecified type     Ascending aorta dilation (H)     Hypertension goal BP (blood pressure) < 140/90     Acute chest pain     Hyperlipidemia LDL goal <70     ASCVD (arteriosclerotic cardiovascular disease)        CURRENT MEDICATIONS:  Current Outpatient Medications   Medication Sig Dispense Refill    acetaminophen (TYLENOL) 500 MG tablet Take 1,000 mg by mouth every 8 hours as needed for mild pain      amoxicillin (AMOXIL) 500 MG tablet Uses prior to dental procedures      aspirin 81 MG EC tablet Take 81 mg by mouth daily      atorvastatin (LIPITOR) 20 MG tablet Take 1 tablet (20 mg) by mouth daily 90 tablet 3    Calcium Carb-Cholecalciferol (SM CALCIUM/VITAMIN D) 600-800 MG-UNIT TABS Take 1 tablet by mouth 2 times daily Each tablet is 500 calcium and 600 Vit D      Carboxymethylcellulose Sodium (REFRESH LIQUIGEL OP) Place 1 drop into both eyes every hour as needed (dry eyes).      cetirizine (ZYRTEC) 10 MG tablet Take 10 mg by mouth daily as needed for allergies      cevimeline (EVOXAC) 30 MG capsule Take 30 mg by mouth daily.      co-enzyme Q-10 100 MG CAPS capsule Take 100 mg by mouth daily      ELDERBERRY PO Take 1 tablet by mouth daily      EPINEPHrine (ANY BX GENERIC EQUIV) 0.3 MG/0.3ML injection 2-pack Inject 0.3 mLs (0.3 mg) into the muscle as needed for anaphylaxis 2 each 0    famotidine (PEPCID) 40 MG tablet Take 1 tablet (40 mg) by mouth at bedtime 90 tablet 4    LANsoprazole (PREVACID) 30 MG DR capsule TAKE 1 CAPSULE BY MOUTH IN THE  MORNING 90 capsule 3    levothyroxine (SYNTHROID/LEVOTHROID) 88 MCG tablet Take 1 tablet (88 mcg) by mouth daily 90 tablet 3    lisinopril (ZESTRIL) 20 MG tablet Take 1 tablet (20 mg) by mouth  "daily. 90 tablet 3    Lutein-Zeaxanthin 25-5 MG CAPS Take 1 capsule by mouth daily      Melatonin 5 MG TBDP Take 1 tablet by mouth every evening      metoprolol succinate ER (TOPROL XL) 25 MG 24 hr tablet Take 1 tablet (25 mg) by mouth daily 90 tablet 11    mirabegron (MYRBETRIQ) 50 MG 24 hr tablet Take 1 tablet (50 mg) by mouth daily 90 tablet 4    nitroGLYcerin (NITROSTAT) 0.4 MG sublingual tablet Place 1 tablet (0.4 mg) under the tongue every 5 minutes as needed for chest pain 25 tablet 0    Omega-3 Fatty Acids (OMEGA-3 FISH OIL PO) Take 1 g by mouth 2 times daily (with meals)       polyethylene glycol (MIRALAX) 17 GM/Dose powder Take 1 capful by mouth 2 times daily In 8 ounces of liquid      Vitamin D, Cholecalciferol, 25 MCG (1000 UT) CAPS Take 1 capsule by mouth 2 times daily         ALLERGIES     Allergies   Allergen Reactions    Codeine GI Disturbance     fatigue    Morphine GI Disturbance    Morphine And Codeine      fatigue    Oxycodone     Vicodin [Acetaminophen] Fatigue    Adhesive Tape Rash       PAST MEDICAL, SURGICAL, FAMILY, SOCIAL HISTORY:  History was reviewed and updated as needed, see medical record.        Review of Systems:  A 12-point review of systems was completed, see medical record for detailed review of systems information.    Physical Exam:  Vitals: /60 (BP Location: Right arm, Patient Position: Sitting, Cuff Size: Adult Regular)   Pulse 54   Ht 1.626 m (5' 4\")   Wt 60.8 kg (134 lb)   LMP  (LMP Unknown)   SpO2 98%   BMI 23.00 kg/m      Constitutional: No apparent distress    HEENT: pupils equal round reactive to light, thyroid normal size, JVP is normal    Chest: Clear to percussion and auscultation    Cardiac: Normal S1, normal S2 no S3, no murmur or click    Abdomen: Scaphoid.  Liver percusses to 6 cm spleen is not palpable aorta is not tender or enlarged    Extremitiies: no edema.  There are venous varicosities in her right leg and there is a region of focal tenderness and " firmness but I am uncertain whether this is a venous thrombosis or subcutaneous skin lesion.    Neurological:  Cranial nerves II through XII are intact, strength equal and symmetrical, displays normal insight and judgment        ASSESSMENT: The patient's documented coronary atherosclerosis and recent symptoms of exertional dyspnea I would favor a nuclear stress test to assess for ischemia.  If no significant ischemia seen, I would advise formal consultation with a pulmonary specialist initiated by her primary care doctor.  Explained to the patient that I am uncertain whether her right leg lesion has a vascular/venous origin the patient is quite insistent on seeing a vascular specialist  as opposed to a dermatologist.  Her aortic dimensions are stable at this time, but she could also obtain formal consultation with the vascular physician for any further recommendations.    RECOMMENDATIONS:   1) exercise nuclear stress test  2) If stress test negative consider pulmonary evaluation  3) vascular referral at request of patient         Recent Lab Results:  LIPID RESULTS:  Lab Results   Component Value Date    CHOL 166 07/10/2024    CHOL 175 01/19/2021    HDL 75 07/10/2024    HDL 89 01/19/2021    LDL 75 07/10/2024    LDL 64 01/19/2021    TRIG 82 07/10/2024    TRIG 108 01/19/2021    CHOLHDLRATIO 2.3 12/02/2014       LIVER ENZYME RESULTS:  Lab Results   Component Value Date    AST 20 09/09/2024    AST 19 01/19/2021    ALT 17 09/09/2024    ALT 25 01/19/2021       CBC RESULTS:  Lab Results   Component Value Date    WBC 10.9 09/09/2024    WBC 9.0 01/19/2021    RBC 4.03 09/09/2024    RBC 3.85 01/19/2021    HGB 13.0 09/09/2024    HGB 12.5 01/19/2021    HCT 40.3 09/09/2024    HCT 38.7 01/19/2021     09/09/2024     (H) 01/19/2021    MCH 32.3 09/09/2024    MCH 32.5 01/19/2021    MCHC 32.3 09/09/2024    MCHC 32.3 01/19/2021    RDW 13.8 09/09/2024    RDW 13.8 01/19/2021     09/09/2024     01/19/2021        BMP RESULTS:  Lab Results   Component Value Date     (L) 09/09/2024     01/19/2021    POTASSIUM 4.3 09/09/2024    POTASSIUM 4.3 10/15/2022    POTASSIUM 4.0 01/19/2021    CHLORIDE 100 09/09/2024    CHLORIDE 99 10/15/2022    CHLORIDE 104 01/19/2021    CO2 23 09/09/2024    CO2 24 10/15/2022    CO2 30 01/19/2021    ANIONGAP 11 09/09/2024    ANIONGAP 9 10/15/2022    ANIONGAP 3 01/19/2021     (H) 09/09/2024     (H) 10/15/2022     (H) 10/15/2022     (H) 01/19/2021    BUN 30.1 (H) 09/09/2024    BUN 19 10/15/2022    BUN 22 01/19/2021    CR 0.91 09/09/2024    CR 0.72 01/19/2021    GFRESTIMATED 62 09/09/2024    GFRESTIMATED 79 01/19/2021    GFRESTBLACK >90 01/19/2021    CARMEN 9.5 11/26/2024    CARMEN 8.9 01/19/2021        A1C RESULTS:  Lab Results   Component Value Date    A1C 6.2 (H) 02/16/2023    A1C 5.9 (H) 01/19/2021       INR RESULTS:  Lab Results   Component Value Date    INR 0.97 08/04/2020    INR 0.96 12/01/2016    INR 1.00 08/23/2016       We greatly appreciate the opportunity to be involved in the care of your patient, Ya Stahl.    Sincerely,  Venancio Montanez MD        Geronimo Jeter MD  [address] [place] [address]

## 2024-12-10 NOTE — LETTER
12/10/2024    Geronimo Jeter MD  [address] [place] [address]    RE: Ya Stahl       Dear Colleague,     I had the pleasure of seeing Ya Stahl in the [facility].  HISTORY OF PRESENT ILLNESS:  Ya Stahl a 84 year old female with nonobstructive coronary artery disease, hypothyroidism, sicca syndrome, cholelithiasis (status post laparoscopic cholecystectomy), and ascending aortic dilation was seen today at your request for followup.     Right leg lesion  Shortness of breath while climbing steps and going up hill one year        PAST MEDICAL HISTORY:    1.  Coronary artery disease.  a.  Previous coronary angiogram showing mild atheromatous change but no significant focal narrowing.  On statin therapy.  2.  Monoclonal gammopathy of uncertain significance -- most recent hemoglobin 13.5, platelet count 241,000.  3.  Dilated ascending aorta -- measures 4.4 on most recent CT scan.  4.  Sjogren's disease.  5.  Hypertension -- currently very well controlled.  6.  Hypothyroidism.  7.  History of gastroesophageal reflux.  8.  Cholelithiasis -- status post laparoscopic cholecystectomy      Orders this Visit:  No orders of the defined types were placed in this encounter.    No orders of the defined types were placed in this encounter.    Medications Discontinued During This Encounter   Medication Reason     cephALEXin (KEFLEX) 500 MG capsule Stopped by Patient (No AVS)     valACYclovir (VALTREX) 1000 mg tablet Stopped by Patient (No AVS)       Encounter Diagnoses   Name Primary?     Edema, unspecified type      Ascending aorta dilation (H)      Hypertension goal BP (blood pressure) < 140/90      Acute chest pain      Hyperlipidemia LDL goal <70      ASCVD (arteriosclerotic cardiovascular disease)        CURRENT MEDICATIONS:  Current Outpatient Medications   Medication Sig Dispense Refill     acetaminophen (TYLENOL) 500 MG tablet Take 1,000 mg by mouth every 8 hours as needed  for mild pain       amoxicillin (AMOXIL) 500 MG tablet Uses prior to dental procedures       aspirin 81 MG EC tablet Take 81 mg by mouth daily       atorvastatin (LIPITOR) 20 MG tablet Take 1 tablet (20 mg) by mouth daily 90 tablet 3     Calcium Carb-Cholecalciferol (SM CALCIUM/VITAMIN D) 600-800 MG-UNIT TABS Take 1 tablet by mouth 2 times daily Each tablet is 500 calcium and 600 Vit D       Carboxymethylcellulose Sodium (REFRESH LIQUIGEL OP) Place 1 drop into both eyes every hour as needed (dry eyes).       cetirizine (ZYRTEC) 10 MG tablet Take 10 mg by mouth daily as needed for allergies       cevimeline (EVOXAC) 30 MG capsule Take 30 mg by mouth daily.       co-enzyme Q-10 100 MG CAPS capsule Take 100 mg by mouth daily       ELDERBERRY PO Take 1 tablet by mouth daily       EPINEPHrine (ANY BX GENERIC EQUIV) 0.3 MG/0.3ML injection 2-pack Inject 0.3 mLs (0.3 mg) into the muscle as needed for anaphylaxis 2 each 0     famotidine (PEPCID) 40 MG tablet Take 1 tablet (40 mg) by mouth at bedtime 90 tablet 4     LANsoprazole (PREVACID) 30 MG DR capsule TAKE 1 CAPSULE BY MOUTH IN THE  MORNING 90 capsule 3     levothyroxine (SYNTHROID/LEVOTHROID) 88 MCG tablet Take 1 tablet (88 mcg) by mouth daily 90 tablet 3     lisinopril (ZESTRIL) 20 MG tablet Take 1 tablet (20 mg) by mouth daily. 90 tablet 3     Lutein-Zeaxanthin 25-5 MG CAPS Take 1 capsule by mouth daily       Melatonin 5 MG TBDP Take 1 tablet by mouth every evening       metoprolol succinate ER (TOPROL XL) 25 MG 24 hr tablet Take 1 tablet (25 mg) by mouth daily 90 tablet 11     mirabegron (MYRBETRIQ) 50 MG 24 hr tablet Take 1 tablet (50 mg) by mouth daily 90 tablet 4     nitroGLYcerin (NITROSTAT) 0.4 MG sublingual tablet Place 1 tablet (0.4 mg) under the tongue every 5 minutes as needed for chest pain 25 tablet 0     Omega-3 Fatty Acids (OMEGA-3 FISH OIL PO) Take 1 g by mouth 2 times daily (with meals)        polyethylene glycol (MIRALAX) 17 GM/Dose powder Take 1  "capful by mouth 2 times daily In 8 ounces of liquid       Vitamin D, Cholecalciferol, 25 MCG (1000 UT) CAPS Take 1 capsule by mouth 2 times daily         ALLERGIES     Allergies   Allergen Reactions     Codeine GI Disturbance     fatigue     Morphine GI Disturbance     Morphine And Codeine      fatigue     Oxycodone      Vicodin [Acetaminophen] Fatigue     Adhesive Tape Rash       PAST MEDICAL, SURGICAL, FAMILY, SOCIAL HISTORY:  History was reviewed and updated as needed, see medical record.        Review of Systems:  A 12-point review of systems was completed, see medical record for detailed review of systems information.    Physical Exam:  Vitals: /60 (BP Location: Right arm, Patient Position: Sitting, Cuff Size: Adult Regular)   Pulse 54   Ht 1.626 m (5' 4\")   Wt 60.8 kg (134 lb)   LMP  (LMP Unknown)   SpO2 98%   BMI 23.00 kg/m      Constitutional: No apparent distress    HEENT: pupils equal round reactive to light, thyroid normal size, JVP is normal    Chest: Clear to percussion and auscultation    Cardiac: Normal S1, normal S2 no S3, no murmur or click    Abdomen: Scaphoid.  Liver percusses to 6 cm spleen is not palpable aorta is not tender or enlarged    Extremitiies: no edema.    Neurological:  Cranial nerves II through XII are intact, strength equal and symmetrical, displays normal insight and judgment        ASSESSMENT: Ya Stahl is a 84 year old female with          We reviewed the benefits of a regular exercise program, a Mediterranean-style weight loss diet, and contacting us for any changes in between now and the time of her next visit.     RECOMMENDATIONS:   1) exercise nuclear stress test  2) If stress test negative consider pulmonary evaluation  3) vascular referral at request of patient         Recent Lab Results:  LIPID RESULTS:  Lab Results   Component Value Date    CHOL 166 07/10/2024    CHOL 175 01/19/2021    HDL 75 07/10/2024    HDL 89 01/19/2021    LDL 75 07/10/2024    LDL 64 " 01/19/2021    TRIG 82 07/10/2024    TRIG 108 01/19/2021    CHOLHDLRATIO 2.3 12/02/2014       LIVER ENZYME RESULTS:  Lab Results   Component Value Date    AST 20 09/09/2024    AST 19 01/19/2021    ALT 17 09/09/2024    ALT 25 01/19/2021       CBC RESULTS:  Lab Results   Component Value Date    WBC 10.9 09/09/2024    WBC 9.0 01/19/2021    RBC 4.03 09/09/2024    RBC 3.85 01/19/2021    HGB 13.0 09/09/2024    HGB 12.5 01/19/2021    HCT 40.3 09/09/2024    HCT 38.7 01/19/2021     09/09/2024     (H) 01/19/2021    MCH 32.3 09/09/2024    MCH 32.5 01/19/2021    MCHC 32.3 09/09/2024    MCHC 32.3 01/19/2021    RDW 13.8 09/09/2024    RDW 13.8 01/19/2021     09/09/2024     01/19/2021       BMP RESULTS:  Lab Results   Component Value Date     (L) 09/09/2024     01/19/2021    POTASSIUM 4.3 09/09/2024    POTASSIUM 4.3 10/15/2022    POTASSIUM 4.0 01/19/2021    CHLORIDE 100 09/09/2024    CHLORIDE 99 10/15/2022    CHLORIDE 104 01/19/2021    CO2 23 09/09/2024    CO2 24 10/15/2022    CO2 30 01/19/2021    ANIONGAP 11 09/09/2024    ANIONGAP 9 10/15/2022    ANIONGAP 3 01/19/2021     (H) 09/09/2024     (H) 10/15/2022     (H) 10/15/2022     (H) 01/19/2021    BUN 30.1 (H) 09/09/2024    BUN 19 10/15/2022    BUN 22 01/19/2021    CR 0.91 09/09/2024    CR 0.72 01/19/2021    GFRESTIMATED 62 09/09/2024    GFRESTIMATED 79 01/19/2021    GFRESTBLACK >90 01/19/2021    CARMEN 9.5 11/26/2024    CARMEN 8.9 01/19/2021        A1C RESULTS:  Lab Results   Component Value Date    A1C 6.2 (H) 02/16/2023    A1C 5.9 (H) 01/19/2021       INR RESULTS:  Lab Results   Component Value Date    INR 0.97 08/04/2020    INR 0.96 12/01/2016    INR 1.00 08/23/2016       We greatly appreciate the opportunity to be involved in the care of your patient, Ya Stahl.    Sincerely,  Venancio Montanez MD        Geronimo Jeter MD  [address] [place] [address]                                                                        Thank you for allowing me to participate in the care of your patient.      Sincerely,     Venancio Montanez MD     [facility]  cc:   Geronimo Jeter MD  [address] [place] [address]

## 2024-12-17 ENCOUNTER — HOSPITAL ENCOUNTER (OUTPATIENT)
Dept: CARDIOLOGY | Facility: CLINIC | Age: 84
Discharge: HOME | End: 2024-12-17
Attending: INTERNAL MEDICINE
Payer: COMMERCIAL

## 2024-12-17 VITALS
OXYGEN SATURATION: 98 % | SYSTOLIC BLOOD PRESSURE: 130 MMHG | HEIGHT: 64 IN | BODY MASS INDEX: 23.93 KG/M2 | WEIGHT: 140.2 LBS | DIASTOLIC BLOOD PRESSURE: 76 MMHG

## 2024-12-17 VITALS — HEART RATE: 65 BPM | SYSTOLIC BLOOD PRESSURE: 142 MMHG | DIASTOLIC BLOOD PRESSURE: 80 MMHG

## 2024-12-17 DIAGNOSIS — I25.10 ASCVD (ARTERIOSCLEROTIC CARDIOVASCULAR DISEASE): ICD-10-CM

## 2024-12-17 DIAGNOSIS — E78.5 HYPERLIPIDEMIA LDL GOAL <70: ICD-10-CM

## 2024-12-17 DIAGNOSIS — R60.9 EDEMA, UNSPECIFIED TYPE: ICD-10-CM

## 2024-12-17 DIAGNOSIS — I10 HYPERTENSION GOAL BP (BLOOD PRESSURE) < 140/90: ICD-10-CM

## 2024-12-17 DIAGNOSIS — I77.810 ASCENDING AORTA DILATION (H): ICD-10-CM

## 2024-12-17 LAB
CV BLOOD PRESSURE: 57 MMHG
CV STRESS MAX HR HE: 88
NUC STRESS EJECTION FRACTION: 58 %
RATE PRESSURE PRODUCT: 9680
STRESS ECHO BASELINE DIASTOLIC HE: 80
STRESS ECHO BASELINE HR: 57 BPM
STRESS ECHO BASELINE SYSTOLIC BP: 142
STRESS ECHO CALCULATED PERCENT HR: 64 %
STRESS ECHO LAST STRESS DIASTOLIC BP: 70
STRESS ECHO LAST STRESS SYSTOLIC BP: 110
STRESS ECHO TARGET HR: 137

## 2024-12-17 PROCEDURE — 343N000001 HC RX 343 MED OP 636: Performed by: INTERNAL MEDICINE

## 2024-12-17 PROCEDURE — 78452 HT MUSCLE IMAGE SPECT MULT: CPT

## 2024-12-17 PROCEDURE — 250N000011 HC RX IP 250 OP 636: Performed by: INTERNAL MEDICINE

## 2024-12-17 PROCEDURE — 93017 CV STRESS TEST TRACING ONLY: CPT

## 2024-12-17 PROCEDURE — A9502 TC99M TETROFOSMIN: HCPCS | Performed by: INTERNAL MEDICINE

## 2024-12-17 RX ORDER — ALBUTEROL SULFATE 90 UG/1
2 INHALANT RESPIRATORY (INHALATION) EVERY 5 MIN PRN
Status: DISCONTINUED | OUTPATIENT
Start: 2024-12-17 | End: 2024-12-18 | Stop reason: HOSPADM

## 2024-12-17 RX ORDER — NITROGLYCERIN 0.4 MG/1
0.4 TABLET SUBLINGUAL EVERY 5 MIN PRN
Status: DISCONTINUED | OUTPATIENT
Start: 2024-12-17 | End: 2024-12-17 | Stop reason: HOSPADM

## 2024-12-17 RX ORDER — CAFFEINE CITRATE 20 MG/ML
60 SOLUTION INTRAVENOUS
Status: DISCONTINUED | OUTPATIENT
Start: 2024-12-17 | End: 2024-12-17 | Stop reason: HOSPADM

## 2024-12-17 RX ORDER — REGADENOSON 0.08 MG/ML
0.4 INJECTION, SOLUTION INTRAVENOUS ONCE
Status: COMPLETED | OUTPATIENT
Start: 2024-12-17 | End: 2024-12-17

## 2024-12-17 RX ORDER — LIDOCAINE 40 MG/G
CREAM TOPICAL
Status: DISCONTINUED | OUTPATIENT
Start: 2024-12-17 | End: 2024-12-18 | Stop reason: HOSPADM

## 2024-12-17 RX ORDER — CAFFEINE 200 MG
200 TABLET ORAL
Status: DISCONTINUED | OUTPATIENT
Start: 2024-12-17 | End: 2024-12-17 | Stop reason: HOSPADM

## 2024-12-17 RX ORDER — AMINOPHYLLINE 25 MG/ML
50-100 INJECTION, SOLUTION INTRAVENOUS
Status: DISCONTINUED | OUTPATIENT
Start: 2024-12-17 | End: 2024-12-18 | Stop reason: HOSPADM

## 2024-12-17 RX ADMIN — TETROFOSMIN 3.5 MILLICURIE: 1.38 INJECTION, POWDER, LYOPHILIZED, FOR SOLUTION INTRAVENOUS at 13:19

## 2024-12-17 RX ADMIN — TETROFOSMIN 8.9 MILLICURIE: 1.38 INJECTION, POWDER, LYOPHILIZED, FOR SOLUTION INTRAVENOUS at 15:10

## 2024-12-17 RX ADMIN — REGADENOSON 0.4 MG: 0.08 INJECTION, SOLUTION INTRAVENOUS at 15:06

## 2024-12-19 NOTE — RESULT ENCOUNTER NOTE
Results and recommendations routed Via My Chart with call back information if needed.   Yes I think if the patient still complains of dyspnea a pulmonary cause should be considered in view of her collagen vascular disease. I would tell her to speak with her primary care MD about a consult  with pulmonary

## 2025-01-06 ENCOUNTER — OFFICE VISIT (OUTPATIENT)
Dept: PHARMACY | Facility: CLINIC | Age: 85
End: 2025-01-06
Payer: COMMERCIAL

## 2025-01-06 VITALS
OXYGEN SATURATION: 96 % | BODY MASS INDEX: 24.25 KG/M2 | WEIGHT: 141.3 LBS | SYSTOLIC BLOOD PRESSURE: 129 MMHG | DIASTOLIC BLOOD PRESSURE: 66 MMHG | HEART RATE: 54 BPM

## 2025-01-06 DIAGNOSIS — I10 HYPERTENSION GOAL BP (BLOOD PRESSURE) < 140/90: ICD-10-CM

## 2025-01-06 DIAGNOSIS — N39.46 MIXED INCONTINENCE: ICD-10-CM

## 2025-01-06 DIAGNOSIS — I25.10 ASCVD (ARTERIOSCLEROTIC CARDIOVASCULAR DISEASE): ICD-10-CM

## 2025-01-06 DIAGNOSIS — E03.9 HYPOTHYROIDISM, UNSPECIFIED TYPE: ICD-10-CM

## 2025-01-06 DIAGNOSIS — Q78.2 SEVERE OSTEOPETROSIS: ICD-10-CM

## 2025-01-06 DIAGNOSIS — M15.8 OTHER OSTEOARTHRITIS INVOLVING MULTIPLE JOINTS: ICD-10-CM

## 2025-01-06 DIAGNOSIS — Z78.9 TAKES DIETARY SUPPLEMENTS: ICD-10-CM

## 2025-01-06 DIAGNOSIS — E78.5 HYPERLIPIDEMIA LDL GOAL <70: Primary | ICD-10-CM

## 2025-01-06 DIAGNOSIS — F51.01 PRIMARY INSOMNIA: ICD-10-CM

## 2025-01-06 DIAGNOSIS — K21.9 GASTROESOPHAGEAL REFLUX DISEASE, UNSPECIFIED WHETHER ESOPHAGITIS PRESENT: ICD-10-CM

## 2025-01-06 DIAGNOSIS — J30.9 ALLERGIC RHINITIS: ICD-10-CM

## 2025-01-06 DIAGNOSIS — R68.2 DRY MOUTH: ICD-10-CM

## 2025-01-06 DIAGNOSIS — H04.123 DRY EYES: ICD-10-CM

## 2025-01-06 PROCEDURE — 99207 PR NO CHARGE LOS: CPT | Performed by: PHARMACIST

## 2025-01-06 RX ORDER — YEAST,DRIED (S. CEREVISIAE)
1 POWDER (GRAM) ORAL DAILY
COMMUNITY

## 2025-01-06 RX ORDER — SPIRONOLACTONE 25 MG
1 TABLET ORAL DAILY
COMMUNITY

## 2025-01-06 NOTE — PROGRESS NOTES
Medication Therapy Management (MTM) Encounter    ASSESSMENT:                            Medication Adherence/Access: No issues identified.    Hypertension /CAD:  Patient is meeting blood pressure goal of < 140/90mmHg however she is often bradycardic and feeling tired. I will reach out to primary care provider and cardiologist to discuss if we should decrease her metoprolol.     Hyperlipidemia   Stable     Allergy   Stable     GERD    Stable    Insomnia   Stable    Bone Health   Osteoporosis:   Recommend rechecking vitamin D level annually to ensure supplementation is adequate.     Supplements   Patient would benefit from rechecking vitamin D and could consider stopping separate vitamin D supplement based on labs.     Hypothyroidism   Last TSH is within normal limits.       Left total hip replacement/Osteoarthritis pain:    Stable    Dry mouth/dry eyes/Sjogren's  Stable    Urinary Incontinence  Recommended she try pelvic physical therapy again and she is willing. I will reach out to primary care provider to place order if in agreement with plan.    PLAN:                            I will message Dr. Jeter and ask that he put in a referral for pulmonology as requested by cardiology provider and also ask about pelvic floor physical therapy.  Could consider stopping the separate vitamin D as long as you continue the combination calcium/vitamin D and get at least 1000 IU vitamin D daily. I would recommend when you see Dr. Jeter next to ask about rechecking vitamin D.  I will message Dr. Jeter and Dr. Montanez about your lower pulse and ask that someone reach out to follow up. If you don't hear from someone regarding this then reach out to cardiology, Dr. Montanez, office to discuss.    Follow-up: Return in 52 weeks (on 1/5/2026).    SUBJECTIVE/OBJECTIVE:                          Ya Stahl is a 84 year old female seen for a follow-up visit from 11/20/23. Patient was accompanied by her  Jadiel.      Reason for visit:  "annual medication review.    Allergies/ADRs: Reviewed in chart  Past Medical History: Reviewed in chart  Tobacco: She reports that she has never smoked. She has never been exposed to tobacco smoke. She has never used smokeless tobacco.  Alcohol: not currently using    Specialty Providers  Cardiologist: Dr. Montanez   Rheumatologist: Dr. Soraya Elizabeth - [facility]  Ophthalmologist: ??- goes once a year.   Gastroenterologist: Dr. Altaf Zhu at [facility]  Urologist: MITRA Garrido  Pulmonologist: needs referral for new provider    Medication Adherence/Access: Patient uses pill box(es).  Patient takes medications 3 time(s) per day.   Per patient, misses medication 0 times per week.   Medication barriers: none.   The patient fills medications at [place]: NO, fills medications at [facility] and [facility]    Hypertension /CAD:  Lisinopril 20 mg once daily  metoprolol ER 25 mg once daily   Aspirin 81 mg daily  Nitroglycerin as needed    Had follow-up with cardiology on 12/10/24 and no changes made. Per that encounter \"The patient's documented coronary atherosclerosis and recent symptoms of exertional dyspnea I would favor a nuclear stress test to assess for ischemia. If no significant ischemia seen, I would advise formal consultation with a pulmonary specialist initiated by her primary care doctor. \" Her stress test results were negative for inducible myocardial ischemia or infarction.    Patient reports she feel tired a lot and getting pulses at home in the upper 40's and lower 50's most of the time.  Patient self monitors blood pressure.  Home BP monitoring 170/68.        Hyperlipidemia   atorvastatin 20 mg daily     Patient reports no significant myalgias or other side effects.       Allergy   Cetirizine  10 mg daily as needed.    Feels this works okay and no side effects noted.     GERD    Prevacid (lansoprazole) 30 mg once daily in the morning  Pepcid (famotidine) 40 mg at bedtime once daily at bedtime     No side effects " reported. She does follow with GI.       Insomnia   Melatonin 5 mg once daily    Reports this works well and no side effects.        Bone Health   Osteoporosis:   calcium 600/Vitamin D 800 IU twice daily with meals  Vitamin D 1000 IU twice daily.  Prolia every 6 months - next scheduled 6/3/25    Patient is not experiencing side effects. She has been taking the extra vitamin D since hip surgery about 4 years ago. Last vitamin D was in the normal range on 3/18/22.    DEXA History: 2/14/22  Risk factors:   post-menopausal  chronic PPI use due to hiatal hernia      Supplements   coenzyme Q10 100 mg once daily  elderberry 1 tablet by mouth daily  Lutein  20 mg once daily  Fish oil 1 gm twice daily    No reported issues at this time. Wondering if she needs all these supplements.       Hypothyroidism   Levothyroxine 88 mcg daily.     Patient is having the following symptoms: none.         Left total hip replacement/Osteoarthritis pain:    acetaminophen 500 mg twice daily as needed (currently using daily in the morning but not every day)  Move free joint supplement with glucosamine once daily    Reports no side effects.    Dry mouth/dry eyes/Sjogren's:   Cevimeline 30 mg once daily   Refresh eyedrops in each eye as needed 2-3 times daily  Refresh eye gel at night    Patient reports this is working with no side effects.    Urinary Incontinence:   Myrbetriq 50 mg once daily in the morning      She has done pelvic floor strengthening in the past and feels she could just do it at home but would be interested in trying therapy again. Feels this works on some days and not on other days.    Today's Vitals: /66   Pulse 54   Wt 141 lb 4.8 oz (64.1 kg)   LMP  (LMP Unknown)   SpO2 96%   BMI 24.25 kg/m    ----------------    I spent 40 minutes with this patient today.      A summary of these recommendations was given to the patient.    Nilda Busby, PharmD  Medication Therapy Management Pharmacist     Medication Therapy  Recommendations  Hypertension goal BP (blood pressure) < 140/90   1 Current Medication: metoprolol succinate ER (TOPROL XL) 25 MG 24 hr tablet   Current Medication Sig: Take 1 tablet (25 mg) by mouth daily.   Rationale: Undesirable effect - Adverse medication event - Safety   Recommendation: Decrease Dose   Status: Contact Provider - Awaiting Response   Identified Date: 1/6/2025         Severe osteopetrosis   1 Current Medication: Vitamin D, Cholecalciferol, 25 MCG (1000 UT) CAPS   Current Medication Sig: Take 1 capsule by mouth 2 times daily   Rationale: Medication requires monitoring - Needs additional monitoring   Recommendation: Order Lab   Status: Contact Provider - Awaiting Response   Identified Date: 1/6/2025

## 2025-01-06 NOTE — Clinical Note
I sent you a separate staff message and included her cardiologist regarding the bradycardia as well as ordering some referrals. I also thing we should recheck her vitamin D with her having osteoporosis and feeling tired. She is taking a good amount of vitamin D and want to also make sure she is not overdoing it. Let me know your thoughts, thanks!  Nilda Busby, PharmD Medication Therapy Management Pharmacist [facility] - Monday and Wednesday 7:30 - 4:00

## 2025-01-06 NOTE — PATIENT INSTRUCTIONS
"Recommendations from today's MTM visit:                                                    MTM (medication therapy management) is a service provided by a clinical pharmacist designed to help you get the most of out of your medicines.   Today we reviewed what your medicines are for, how to know if they are working, that your medicines are safe and how to make your medicine regimen as easy as possible.      I will message Dr. Jeter and ask that he put in a referral for pulmonology as requested by cardiology provider and also ask about pelvic floor physical therapy.  Could consider stopping the separate vitamin D as long as you continue the combination calcium/vitamin D and get at least 1000 IU vitamin D daily. I would recommend when you see Dr. Jeter next to ask about rechecking vitamin D.  I will message Dr. Jeter and Dr. Montanez about your lower pulse and ask that someone reach out to follow up. If you dont hear from someone regarding this then reach out to cardiology, Dr. Montanez, office to discuss.    Follow-up: Return in 52 weeks (on 1/5/2026).    It was great speaking with you today.  I value your experience and would be very thankful for your time in providing feedback in our clinic survey. In the next few days, you may receive an email or text message from [employer] with a link to a survey related to your  clinical pharmacist.\"     To schedule another MTM appointment, please call the clinic directly or you may call the MTM scheduling line at [phone] or toll-free at [phone].     My Clinical Pharmacist's contact information:                                                      Please feel free to contact me with any questions or concerns you have.      Nilda Busby, PharmD  Medication Therapy Management Pharmacist  [facility] - Monday and Wednesday 7:30 - 4:00    "

## 2025-01-08 DIAGNOSIS — E03.9 HYPOTHYROIDISM, UNSPECIFIED TYPE: ICD-10-CM

## 2025-01-08 RX ORDER — LEVOTHYROXINE SODIUM 88 UG/1
88 TABLET ORAL DAILY
Qty: 90 TABLET | Refills: 1 | Status: SHIPPED | OUTPATIENT
Start: 2025-01-08

## 2025-01-09 ENCOUNTER — TRANSFERRED RECORDS (OUTPATIENT)
Dept: HEALTH INFORMATION MANAGEMENT | Facility: CLINIC | Age: 85
End: 2025-01-09
Payer: COMMERCIAL

## 2025-01-09 DIAGNOSIS — R06.09 DOE (DYSPNEA ON EXERTION): ICD-10-CM

## 2025-01-09 DIAGNOSIS — R05.3 CHRONIC COUGH: Primary | ICD-10-CM

## 2025-01-13 ENCOUNTER — OFFICE VISIT (OUTPATIENT)
Dept: PULMONOLOGY | Facility: CLINIC | Age: 85
End: 2025-01-13
Attending: INTERNAL MEDICINE
Payer: COMMERCIAL

## 2025-01-13 VITALS
HEART RATE: 56 BPM | HEIGHT: 66 IN | WEIGHT: 138 LBS | SYSTOLIC BLOOD PRESSURE: 124 MMHG | OXYGEN SATURATION: 94 % | BODY MASS INDEX: 22.18 KG/M2 | DIASTOLIC BLOOD PRESSURE: 68 MMHG

## 2025-01-13 DIAGNOSIS — R05.3 CHRONIC COUGH: ICD-10-CM

## 2025-01-13 DIAGNOSIS — I10 ESSENTIAL HYPERTENSION: Primary | ICD-10-CM

## 2025-01-13 DIAGNOSIS — R06.09 DOE (DYSPNEA ON EXERTION): ICD-10-CM

## 2025-01-13 LAB — HGB BLD-MCNC: 11.9 G/DL

## 2025-01-13 PROCEDURE — 99207 PR NO BILLABLE SERVICE THIS VISIT: CPT | Performed by: INTERNAL MEDICINE

## 2025-01-13 PROCEDURE — 94726 PLETHYSMOGRAPHY LUNG VOLUMES: CPT | Mod: 26 | Performed by: INTERNAL MEDICINE

## 2025-01-13 PROCEDURE — 94375 RESPIRATORY FLOW VOLUME LOOP: CPT | Mod: 26 | Performed by: INTERNAL MEDICINE

## 2025-01-13 PROCEDURE — 94729 DIFFUSING CAPACITY: CPT | Mod: 26 | Performed by: INTERNAL MEDICINE

## 2025-01-13 PROCEDURE — 85018 HEMOGLOBIN: CPT | Mod: QW

## 2025-01-13 PROCEDURE — 99205 OFFICE O/P NEW HI 60 MIN: CPT | Mod: 25 | Performed by: INTERNAL MEDICINE

## 2025-01-13 RX ORDER — LOSARTAN POTASSIUM 100 MG/1
100 TABLET ORAL DAILY
Qty: 90 TABLET | Refills: 3 | Status: SHIPPED | OUTPATIENT
Start: 2025-01-13

## 2025-01-13 NOTE — LETTER
1/13/2025      Ya Stahl  [address]  [facility] [facility] [address]      Dear Colleague,    Thank you for referring your patient, Ya Stahl, to the [facility]. Please see a copy of my visit note below.    Pulmonary Clinic Outpatient Consultation    Assessment and Plan:  84 year old female never smoker with a history of osteoarthritis s/p bilateral CLARKE and right TKA, stroke, CAD, GERD, hypothyroidism, hearing loss, HTN, low back surgery, migraines, Sjogren syndrome, laparoscopic cholecystectomy, insomnia, and chronic cough, presenting for evaluation.    Chronic cough: Present since at least 2002. Has previously been evaluated and followed by Sonya Dorman and Waqas at [facility] and Dr. Foster at [facility]. Broad DDx. She has been tried off ACEI in the past without improvement by her report, but need to take kinin-induced cough out of the equation; will therefore change her to ARB. She expresses history suggestive of chronic rhinosinusitis, and does not consistently take nasal fluticasone or cetirizine, but has these at home; will start them consistently for at least 6 weeks. Also continues on PPI and H2 blocker for GERD. Normal pulmonary function and no history to suggest asthma, though one of Dr. Foster's notes indicates that her cough improved on mometasone-formoterol; she does not recall taking this. Minimal oropharyngeal dysphagia on VFSS in July 2024. Nothing overtly concerning on her high-resolution chest CT in June 2023; some fibrotic-appearing peripheral interstitial lung abnormalities but would not qualify as IPF, and TLC and DLCO are normal. Also in the background of all this are sicca symptoms from Sjogren syndrome; xerostomia can be a factor in cough.    Plan:  - change from lisinopril 20 mg daily to losartan 100 mg daily; she will monitor BP with home sphygmomanometer and notify us if SBP <100  - start taking nasal fluticasone one  "spray in each nostril daily  - start taking cetirizine 10 mg daily  - if nasal dryness or epistaxis with nasal steroid, advised use of nasal saline gel (eg, Ayr brand) up to TID as needed  - benzonatate 200 mg TID as needed  - continues on lansoprazole and famotidine for GERD; may need ongoing GI follow-up as esophagram showed persistent reflux in July 2024; not a good candidate for Nissen however with her age and comorbidities  - safe swallowing techniques  - recommend remaining up to date with respiratory vaccinations  - follow up in 4-6 months  - if above ineffective, consider possible sinus CT, possible methacholine challenge, possible trial of gabapentin for refractory chronic cough    Yehuda Madrid MD  [facility]  Office [phone]  he/him    CCx: chronic cough    HPI: 84 year old female never smoker with a history of osteoarthritis s/p bilateral CLARKE and right TKA, stroke, CAD, GERD, hypothyroidism, hearing loss, HTN, low back surgery, migraines, Sjogren syndrome, laparoscopic cholecystectomy, insomnia, and chronic cough, presenting for evaluation. Cough since at least 2002. Has previously been evaluated and followed by Sonya Dorman and Waqas at [facility] and Dr. Foster at [facility]. Dry cough, present for at least 20 years as noted above. No wheeze. Does have dyspnea going up a flight of stairs or walking up a hill. The cough is a dry, tickling cough. No childhood asthma. Has had GERD symptoms, seems well-controlled on PPI and H2 blocker. Nose runs \"constantly.\" Does not consistently take nasal fluticasone or cetirizine but has these at home. Tried off lisinopril in past and cough does not improved, but went back on it. She thinks she was on gabapentin in the past, she does not remember the indication, does not recall having any side effects from it. Never smoked, but  smoked the first 10 years of their marriage. [place].    ROS:  A 12-system review was " obtained and was negative with the exception of the symptoms endorsed in the history of present illness.    PMH:  never smoker with a history of osteoarthritis s/p bilateral CLARKE and right TKA, stroke, CAD, GERD, hypothyroidism, hearing loss, HTN, low back surgery, migraines, Sjogren syndrome, laparoscopic cholecystectomy, insomnia, and chronic cough    PSH:  Past Surgical History:   Procedure Laterality Date     ARTHROPLASTY HIP ANTERIOR Left 10/14/2022    Procedure: LEFT TOTAL HIP ARTHROPLASTY DIRECT ANTERIOR APPROACH;  Surgeon: Selvin Saab MD;  Location:  OR     ARTHROSCOPY KNEE  10/05/2012    R Procedure: ARTHROSCOPY KNEE;  RIGHT KNEE ARTHROSCOPY, PARTIAL MEDIAL MENISCECTOMY;  Surgeon: Karli Zaragoza MD;  Location:  SD     BACK SURGERY  About. 5 years ago    Fusion of 4&5 lumbar     BLADDER SURGERY       BUNIONECTOMY  ~2010    L foot.      CATARACT IOL, RT/LT Bilateral 07/2017     COLONOSCOPY N/A 01/17/2017    normal; no repeat Procedure: COLONOSCOPY;  Surgeon: Fan Giordano MD;  Location:  GI     ENDOSCOPIC RELEASE CARPAL TUNNEL  09/11/2012    R Procedure: ENDOSCOPIC RELEASE CARPAL TUNNEL;  Right Endoscopic carpal tunnel release, left ringer finger cortizon injection;  Surgeon: Anne Marie Catherine MD;  Location:  OR     ESOPHAGOSCOPY, GASTROSCOPY, DUODENOSCOPY (EGD), COMBINED N/A 12/26/2014    Procedure: COMBINED ESOPHAGOSCOPY, GASTROSCOPY, DUODENOSCOPY (EGD);  Surgeon: Fan Giordano MD;  Location:  GI     ESOPHAGOSCOPY, GASTROSCOPY, DUODENOSCOPY (EGD), COMBINED N/A 11/28/2023    Procedure: ESOPHAGOGASTRODUODENOSCOPY, WITH BIOPSY;  Surgeon: Partha Cooper, DO;  Location: [facility]     EXCISE EXOSTOSIS FOOT Left 12/01/2016    Procedure: EXCISE EXOSTOSIS FOOT;  Surgeon: Jody Gallagher DPM, Pod;  Location:  OR     GYN SURGERY  1978    ovaries removed     HEART CATH FYI  03/04/2016    dz <40%     HIP HARDWARE REMOVAL Right 08/04/2020    Procedure: HARDWARE REMOVAL - LATERAL  APPROACH;  Surgeon: Charlie Wolfe MD;  Location: [facility];  Service: Orthopedics     HYSTERECTOMY       INJECT STEROID (LOCATION)  09/11/2012    Procedure: INJECT STEROID (LOCATION);;  Surgeon: Anne Marie Catherine MD;  Location:  OR     LAPAROSCOPIC CHOLECYSTECTOMY N/A 04/07/2016    Procedure: LAPAROSCOPIC CHOLECYSTECTOMY;  Surgeon: Hans Medina MD;  Location:  OR     OPTICAL TRACKING SYSTEM FUSION SPINE POSTERIOR LUMBAR TWO LEVELS N/A 10/31/2018    Procedure: Central L3-4 L4-5 lamenectomies L4-5 posterior instrumented fusion;  Surgeon: Aroldo Burdick MD;  Location:  OR     ORTHOPEDIC SURGERY Right 2014    karli for fx femur     RECONSTRUCT FOREFOOT WITH METATARSOPHALANGEAL (MTP) FUSION Left 04/28/2017    Procedure: RECONSTRUCT FOREFOOT WITH METATARSOPHALANGEAL (MTP) FUSION;  1. Resection arthroplasty, fifth metatarsophalangeal joint, left foot.   2. Irrigation and debridement of fifth metatarsophalangeal joint, left foot (excisional to the level of the bone).     ;  Surgeon: Fabián Phipps MD;  Location:  OR     RELEASE CARPAL TUNNEL       RIGHT HIP ORIF 4/1/2014       ROTATOR CUFF REPAIR RT/LT  ~1998    Lt shoulder; rotator cuff     SURGICAL HISTORY OF -   distant past    ablation for palpitations.     SURGICAL HISTORY OF -   06/2015    left carpal tunnel surgery     [facility] NONSPECIFIC PROCEDURE  1976    D&C     [facility] TOTAL HIP ARTHROPLASTY Right 08/04/2020    Procedure: RIGHT TOTAL HIP ARTHROPLASTY;  Surgeon: Charlie Wolfe MD;  Location: [facility];  Service: Orthopedics     [facility] TOTAL KNEE ARTHROPLASTY Right 01/2020       Allergies:  Allergies   Allergen Reactions     Codeine GI Disturbance     fatigue     Morphine GI Disturbance     Morphine And Codeine      fatigue     Oxycodone      Vicodin [Acetaminophen] Fatigue     Adhesive Tape Rash       Family HX:  Family History   Problem Relation Age of Onset     C.A.D. Mother 76     Cancer Mother         non Hodgekin's Lymphoma      Heart Disease Mother         enlarged heart     Breast Cancer Mother      C.A.D. Father 59     Heart Disease Father         valve problem     Hypertension Brother      Neuropathy Brother      Heart Disease Brother         open heart to repair mitral valve.     Connective Tissue Disorder Daughter         scleroderma     Colon Cancer No family hx of        Social Hx:  Social History     Socioeconomic History     Marital status:      Spouse name: Not on file     Number of children: Not on file     Years of education: Not on file     Highest education level: Not on file   Occupational History     Not on file   Tobacco Use     Smoking status: Never     Passive exposure: Never     Smokeless tobacco: Never   Vaping Use     Vaping status: Never Used   Substance and Sexual Activity     Alcohol use: Not Currently     Comment: Maybe 1x amonth     Drug use: No     Sexual activity: Not Currently     Partners: Male     Comment: Am to old   Other Topics Concern     Parent/sibling w/ CABG, MI or angioplasty before 65F 55M? No      Service Not Asked     Blood Transfusions Not Asked     Caffeine Concern No     Comment: 1 capp. daily     Occupational Exposure Not Asked     Hobby Hazards Not Asked     Sleep Concern No     Stress Concern Not Asked     Weight Concern Not Asked     Special Diet No     Back Care Not Asked     Exercise Yes     Comment: walks 3 miles 6 days per week , curves 3 days per week     Bike Helmet Not Asked     Seat Belt Not Asked     Self-Exams Not Asked   Social History Narrative    Dairy/d 0-1 servings/d.     Caffeine 0 servings/d    Exercise 6 x week walk and curves    Sunscreen used - Yes    Seatbelts used - Yes    Working smoke/CO detectors in the home - Yes    Guns stored in the home - Yes LOCKED    Self Breast Exams - sometimes    Self Testicular Exam - NA    Eye Exam up to date - Yes 4/2009    Dental Exam up to date - Yes 12/2009    Pap Smear up to date - Yes - Hysterectomy    Mammogram up to  date - Yes 11/25/2009    PSA up to date - NA    Dexa Scan up to date - 2006    Flex Sig / Colonoscopy up to date - Yes 5/14/2008    Immunizations up to date -9-2007 tetanus    Abuse: Current or Past(Physical, Sexual or Emotional)- No    Do you feel safe in your environment - Yes    DAMION Shaffer, [facility]    11/25/2009 updated     Social Drivers of Health     Financial Resource Strain: Low Risk  (7/10/2024)    Financial Resource Strain      Within the past 12 months, have you or your family members you live with been unable to get utilities (heat, electricity) when it was really needed?: No   Food Insecurity: Patient Declined (10/1/2024)    [facility] [facility]    Hunger Vital Sign      Worried About Running Out of Food in the Last Year: Patient declined      Ran Out of Food in the Last Year: Patient declined   Transportation Needs: Patient Declined (10/1/2024)    Received from [facility]    PRAPARE - Transportation      Lack of Transportation (Medical): Patient declined      Lack of Transportation (Non-Medical): Patient declined   Physical Activity: Sufficiently Active (7/10/2024)    Exercise Vital Sign      Days of Exercise per Week: 4 days      Minutes of Exercise per Session: 60 min   Stress: No Stress Concern Present (7/10/2024)    [employer] - Occupational Stress Questionnaire      Feeling of Stress : Not at all   Social Connections: Unknown (7/10/2024)    Social Connection and Isolation Panel [NHANES]      Frequency of Communication with Friends and Family: Not on file      Frequency of Social Gatherings with Friends and Family: More than three times a week      Attends Latter day Services: Not on file      Active Member of Clubs or Organizations: Not on file      Attends Club or Organization Meetings: Not on file      Marital Status: Not on file   Interpersonal Safety: Not At Risk (10/1/2024)    Received from  [facility] IP Custom IPV      Do you feel UNSAFE in any of your personal relationships with your family members or any other acquaintances?: No   Housing Stability: Patient Declined (10/1/2024)    Received from [facility]    Housing Stability Vital Sign      Unable to Pay for Housing in the Last Year: Patient declined      Number of Times Moved in the Last Year: 0      Homeless in the Last Year: Patient declined       Current Meds:  Current Outpatient Medications   Medication Sig Dispense Refill     acetaminophen (TYLENOL) 500 MG tablet Take 1,000 mg by mouth every 8 hours as needed for mild pain       amoxicillin (AMOXIL) 500 MG tablet Uses prior to dental procedures       aspirin 81 MG EC tablet Take 81 mg by mouth daily       atorvastatin (LIPITOR) 20 MG tablet Take 1 tablet (20 mg) by mouth daily 90 tablet 3     benzonatate (TESSALON) 200 MG capsule Take 1 capsule (200 mg) by mouth 3 times daily as needed for cough. 90 capsule 1     Calcium Carb-Cholecalciferol (SM CALCIUM/VITAMIN D) 600-800 MG-UNIT TABS Take 1 tablet by mouth 2 times daily. Each tablet is 600 calcium and 800 IU Vit D       Carboxymethylcellulose Sodium (REFRESH LIQUIGEL OP) Place 1 drop into both eyes every hour as needed (dry eyes).       cetirizine (ZYRTEC) 10 MG tablet Take 10 mg by mouth daily as needed for allergies       co-enzyme Q-10 100 MG CAPS capsule Take 100 mg by mouth daily       ELDERBERRY PO Take 1 tablet by mouth daily       EPINEPHrine (ANY BX GENERIC EQUIV) 0.3 MG/0.3ML injection 2-pack Inject 0.3 mLs (0.3 mg) into the muscle as needed for anaphylaxis 2 each 0     famotidine (PEPCID) 40 MG tablet Take 1 tablet (40 mg) by mouth at bedtime 90 tablet 4     Glucos-Chond-Hyal Ac-Ca Fructo (MOVE FREE JOINT HEALTH ADVANCE) TABS Take 1 tablet by mouth daily.       LANsoprazole (PREVACID) 30 MG DR capsule TAKE 1 CAPSULE BY MOUTH IN THE  MORNING 90 capsule 3  "    levothyroxine (SYNTHROID/LEVOTHROID) 88 MCG tablet Take 1 tablet (88 mcg) by mouth daily. 90 tablet 1     losartan (COZAAR) 100 MG tablet Take 1 tablet (100 mg) by mouth daily. 90 tablet 3     Lutein 20 MG CAPS Take 1 capsule by mouth daily.       Melatonin 5 MG TBDP Take 1 tablet by mouth every evening       mirabegron (MYRBETRIQ) 50 MG 24 hr tablet Take 1 tablet (50 mg) by mouth daily 90 tablet 4     nitroGLYcerin (NITROSTAT) 0.4 MG sublingual tablet Place 1 tablet (0.4 mg) under the tongue every 5 minutes as needed for chest pain. 25 tablet 0     Omega-3 Fatty Acids (OMEGA-3 FISH OIL PO) Take 1 g by mouth 2 times daily (with meals)        polyethylene glycol (MIRALAX) 17 GM/Dose powder Take 1 capful by mouth 2 times daily In 8 ounces of liquid       Vitamin D, Cholecalciferol, 25 MCG (1000 UT) CAPS Take 1 capsule by mouth 2 times daily       cevimeline (EVOXAC) 30 MG capsule Take 30 mg by mouth daily.       metoprolol succinate ER (TOPROL XL) 25 MG 24 hr tablet Take 1 tablet (25 mg) by mouth daily. (Patient not taking: Reported on 1/13/2025) 90 tablet 3       Physical Exam:  /68   Pulse 56   Ht 1.664 m (5' 5.5\")   Wt 62.6 kg (138 lb)   LMP  (LMP Unknown)   SpO2 94%   BMI 22.62 kg/m    Gen: alert, oriented, no distress  HEENT: nasal mucosa is unremarkable, no oropharyngeal lesions, no cervical or supraclavicular lymphadenopathy  CV: unique, regular, no M/G/R  Resp: CTAB, no focal crackles or wheezes  Skin: no apparent rashes  Ext: no cyanosis, clubbing or edema  Neuro: alert, nonfocal    Labs:  reviewed    Imaging:  High-resolution chest CT (June 2023):  - images directly reviewed, formal interpretation follows:  FINDINGS:   Chest/mediastinum: Mild cardiomegaly. No significant pericardial  effusion. Aneurysmal dilatation of the ascending thoracic aorta  measuring 4.2 cm in diameter. Moderate cardiovascular calcification of  the coronary arteries. Small hiatal hernia. No significant mediastinal  or " hilar lymphadenopathy.     Lung/pleura: No pleural effusion or pneumothorax. Minimal  bronchiectatic change and associated areas of bronchiolar mucoid  impaction in the right middle lobe and lingula, nonspecific, can be  seen with infection including atypical mycobacterial infection.  Subpleural and posterior predominant areas of mild interstitial  peripheral opacities, not significantly changed as compared to  11/12/2021 exam. A few scattered nodular areas along the lung fissures  including 4 mm nodule along the superior aspect of the left major  fissure (series 4 image 66), not significantly changed as compared to  11/12/2021 exam, likely representing benign intrafissural lymph node.     Upper abdomen: Limited evaluation of the upper abdomen due to lack of  coverage and contrast. Right upper quadrant post cholecystectomy  clips.     Bones and soft tissue: Multilevel degenerative changes of the spine.  No suspicious osseous lesion.                                                                      IMPRESSION:    1. Subpleural and posterior predominant areas of mild interstitial  pulmonary opacities, not significantly changed as compared to  11/12/2021 exam could represent mild interstitial lung disease.  2. Minimal bronchiectatic changes and associated areas of bronchiolar  mucoid impaction in the right middle lobe and lingula, nonspecific,  can be seen with infection including atypical mycobacterial infection,  not significantly change as compared to 11/12/2021 exam.  3. Moderate atherosclerotic vascular calcification of the coronary  arteries.    VFSS (July 2024):  Medical Diagnosis: Dysphagia, unspecified type (R13.10)    Treatment Diagnosis: minimal oropharyngeal dysphagia   Impression/Assessment: Video fluoroscopic swallow study completed with thin liquids, puree solids, regular solid. Pt currently presents with minimal oropharyngeal dysphagia. Labial seal, oral containment, mastication and oral clearance  are WFL. Reduced oral control resulted in premature bolus spillage to the pyriform sinuses with thin and puree trials, to the valleculae with regular solids. Base of tongue retraction is WFL, hyolaryngeal elevation/excursion are WFL, epiglottic inversion is complete and pharyngoesophageal segment opening during the swallow is WFL.      Mild, deep flash laryngeal penetration across all thin liquid trials, though never reaching cords or progressing to aspiration. This is 2/2 reduced oral control and premature bolus spillage to the pyriform sinuses prior to pharyngeal swallow inititation/complete epiglottic inversion. No other penetration or aspiration across solids.     No oropharyngeal bolus retention across trials. Pt with scheduled full esophagram tomorrow, therefore esophageal sweep was not completed today.    Esophagram (July 2024):  FINDINGS: Total fluoroscopy time was 0.7 minutes.  10 spot  fluoroscopic images, and/or cine clips were obtained. 6 views  obtained. The patient was given effervescent crystals followed by  barium to drink. Esophagram was performed. No esophageal mucosal  lesion identified. No hiatal hernia. Normal GE junction. There is  moderate intraesophageal reflux to the upper esophagus along with  limited clearance of the barium from the esophagus.                                                                      IMPRESSION:  Moderate intraesophageal reflux with limited clearance of  the barium from the esophagus after the primary swallow.    Pulmonary Function Testing  January 2025  Normal complete PFT    Time spent on chart and image review, meeting with the patient to obtain history, perform physical exam, discuss test results, diagnostic possibilities, further testing options, treatment plan options, and care coordination: 64 minutes    The longitudinal plan of care for the diagnosis(es)/condition(s) as documented were addressed during this visit. Due to the added complexity in care, I  will continue to support Ya in the subsequent management and with ongoing continuity of care.      Again, thank you for allowing me to participate in the care of your patient.        Sincerely,        Yehuda Madrid MD    Electronically signed

## 2025-01-13 NOTE — PROGRESS NOTES
Pulmonary Clinic Outpatient Consultation    Assessment and Plan:  84 year old female never smoker with a history of osteoarthritis s/p bilateral CLARKE and right TKA, stroke, CAD, GERD, hypothyroidism, hearing loss, HTN, low back surgery, migraines, Sjogren syndrome, laparoscopic cholecystectomy, insomnia, and chronic cough, presenting for evaluation.    Chronic cough: Present since at least 2002. Has previously been evaluated and followed by Sonya Dorman and Waqas at [facility] and Dr. Foster at [facility]. Broad DDx. She has been tried off ACEI in the past without improvement by her report, but need to take kinin-induced cough out of the equation; will therefore change her to ARB. She expresses history suggestive of chronic rhinosinusitis, and does not consistently take nasal fluticasone or cetirizine, but has these at home; will start them consistently for at least 6 weeks. Also continues on PPI and H2 blocker for GERD. Normal pulmonary function and no history to suggest asthma, though one of Dr. Foster's notes indicates that her cough improved on mometasone-formoterol; she does not recall taking this. Minimal oropharyngeal dysphagia on VFSS in July 2024. Nothing overtly concerning on her high-resolution chest CT in June 2023; some fibrotic-appearing peripheral interstitial lung abnormalities but would not qualify as IPF, and TLC and DLCO are normal. Also in the background of all this are sicca symptoms from Sjogren syndrome; xerostomia can be a factor in cough.    Plan:  - change from lisinopril 20 mg daily to losartan 100 mg daily; she will monitor BP with home sphygmomanometer and notify us if SBP <100  - start taking nasal fluticasone one spray in each nostril daily  - start taking cetirizine 10 mg daily  - if nasal dryness or epistaxis with nasal steroid, advised use of nasal saline gel (eg, Ayr brand) up to TID as needed  - benzonatate 200 mg TID as needed  - continues on lansoprazole and  "famotidine for GERD; may need ongoing GI follow-up as esophagram showed persistent reflux in July 2024; not a good candidate for Nissen however with her age and comorbidities  - safe swallowing techniques  - recommend remaining up to date with respiratory vaccinations  - follow up in 4-6 months  - if above ineffective, consider possible sinus CT, possible methacholine challenge, possible trial of gabapentin for refractory chronic cough    Yehuda Madrid MD  [facility]  Office [phone]  he/him    CCx: chronic cough    HPI: 84 year old female never smoker with a history of osteoarthritis s/p bilateral CLARKE and right TKA, stroke, CAD, GERD, hypothyroidism, hearing loss, HTN, low back surgery, migraines, Sjogren syndrome, laparoscopic cholecystectomy, insomnia, and chronic cough, presenting for evaluation. Cough since at least 2002. Has previously been evaluated and followed by Sonya Dorman and Waqas at [facility] and Dr. Foster at [facility]. Dry cough, present for at least 20 years as noted above. No wheeze. Does have dyspnea going up a flight of stairs or walking up a hill. The cough is a dry, tickling cough. No childhood asthma. Has had GERD symptoms, seems well-controlled on PPI and H2 blocker. Nose runs \"constantly.\" Does not consistently take nasal fluticasone or cetirizine but has these at home. Tried off lisinopril in past and cough does not improved, but went back on it. She thinks she was on gabapentin in the past, she does not remember the indication, does not recall having any side effects from it. Never smoked, but  smoked the first 10 years of their marriage. [place].    ROS:  A 12-system review was obtained and was negative with the exception of the symptoms endorsed in the history of present illness.    PMH:  never smoker with a history of osteoarthritis s/p bilateral CLARKE and right TKA, stroke, CAD, GERD, hypothyroidism, hearing loss, HTN, low back " surgery, migraines, Sjogren syndrome, laparoscopic cholecystectomy, insomnia, and chronic cough    PSH:  Past Surgical History:   Procedure Laterality Date    ARTHROPLASTY HIP ANTERIOR Left 10/14/2022    Procedure: LEFT TOTAL HIP ARTHROPLASTY DIRECT ANTERIOR APPROACH;  Surgeon: Selvin Saab MD;  Location:  OR    ARTHROSCOPY KNEE  10/05/2012    R Procedure: ARTHROSCOPY KNEE;  RIGHT KNEE ARTHROSCOPY, PARTIAL MEDIAL MENISCECTOMY;  Surgeon: Karli Zaragoza MD;  Location: [facility]    BACK SURGERY  About. 5 years ago    Fusion of 4&5 lumbar    BLADDER SURGERY      BUNIONECTOMY  ~2010    L foot.     CATARACT IOL, RT/LT Bilateral 07/2017    COLONOSCOPY N/A 01/17/2017    normal; no repeat Procedure: COLONOSCOPY;  Surgeon: Fan Giordano MD;  Location:  GI    ENDOSCOPIC RELEASE CARPAL TUNNEL  09/11/2012    R Procedure: ENDOSCOPIC RELEASE CARPAL TUNNEL;  Right Endoscopic carpal tunnel release, left ringer finger cortizon injection;  Surgeon: Anne Marie Catherine MD;  Location:  OR    ESOPHAGOSCOPY, GASTROSCOPY, DUODENOSCOPY (EGD), COMBINED N/A 12/26/2014    Procedure: COMBINED ESOPHAGOSCOPY, GASTROSCOPY, DUODENOSCOPY (EGD);  Surgeon: Fan Giordano MD;  Location:  GI    ESOPHAGOSCOPY, GASTROSCOPY, DUODENOSCOPY (EGD), COMBINED N/A 11/28/2023    Procedure: ESOPHAGOGASTRODUODENOSCOPY, WITH BIOPSY;  Surgeon: Partha Cooper DO;  Location: [facility]    EXCISE EXOSTOSIS FOOT Left 12/01/2016    Procedure: EXCISE EXOSTOSIS FOOT;  Surgeon: Jody Gallagher DPM, Pod;  Location:  OR    GYN SURGERY  1978    ovaries removed    HEART CATH FYI  03/04/2016    dz <40%    HIP HARDWARE REMOVAL Right 08/04/2020    Procedure: HARDWARE REMOVAL - LATERAL APPROACH;  Surgeon: Charlie Wolfe MD;  Location: [facility];  Service: Orthopedics    HYSTERECTOMY      INJECT STEROID (LOCATION)  09/11/2012    Procedure: INJECT STEROID (LOCATION);;  Surgeon: Anne Marie Catherine MD;  Location:  OR    LAPAROSCOPIC  CHOLECYSTECTOMY N/A 04/07/2016    Procedure: LAPAROSCOPIC CHOLECYSTECTOMY;  Surgeon: Hans Medina MD;  Location:  OR    OPTICAL TRACKING SYSTEM FUSION SPINE POSTERIOR LUMBAR TWO LEVELS N/A 10/31/2018    Procedure: Central L3-4 L4-5 lamenectomies L4-5 posterior instrumented fusion;  Surgeon: Aroldo Burdick MD;  Location:  OR    ORTHOPEDIC SURGERY Right 2014    karli for fx femur    RECONSTRUCT FOREFOOT WITH METATARSOPHALANGEAL (MTP) FUSION Left 04/28/2017    Procedure: RECONSTRUCT FOREFOOT WITH METATARSOPHALANGEAL (MTP) FUSION;  1. Resection arthroplasty, fifth metatarsophalangeal joint, left foot.   2. Irrigation and debridement of fifth metatarsophalangeal joint, left foot (excisional to the level of the bone).     ;  Surgeon: Fabián Phipps MD;  Location:  OR    RELEASE CARPAL TUNNEL      RIGHT HIP ORIF 4/1/2014      ROTATOR CUFF REPAIR RT/LT  ~1998    Lt shoulder; rotator cuff    SURGICAL HISTORY OF -   distant past    ablation for palpitations.    SURGICAL HISTORY OF -   06/2015    left carpal tunnel surgery    [facility] NONSPECIFIC PROCEDURE  1976    D&C    [facility] TOTAL HIP ARTHROPLASTY Right 08/04/2020    Procedure: RIGHT TOTAL HIP ARTHROPLASTY;  Surgeon: Charlie Wolfe MD;  Location: [facility];  Service: Orthopedics    [facility] TOTAL KNEE ARTHROPLASTY Right 01/2020       Allergies:  Allergies   Allergen Reactions    Codeine GI Disturbance     fatigue    Morphine GI Disturbance    Morphine And Codeine      fatigue    Oxycodone     Vicodin [Acetaminophen] Fatigue    Adhesive Tape Rash       Family HX:  Family History   Problem Relation Age of Onset    C.A.D. Mother 76    Cancer Mother         non Hodgekin's Lymphoma    Heart Disease Mother         enlarged heart    Breast Cancer Mother     C.A.D. Father 59    Heart Disease Father         valve problem    Hypertension Brother     Neuropathy Brother     Heart Disease Brother         open heart to repair mitral valve.    Connective Tissue Disorder  Daughter         scleroderma    Colon Cancer No family hx of        Social Hx:  Social History     Socioeconomic History    Marital status:      Spouse name: Not on file    Number of children: Not on file    Years of education: Not on file    Highest education level: Not on file   Occupational History    Not on file   Tobacco Use    Smoking status: Never     Passive exposure: Never    Smokeless tobacco: Never   Vaping Use    Vaping status: Never Used   Substance and Sexual Activity    Alcohol use: Not Currently     Comment: Maybe 1x amonth    Drug use: No    Sexual activity: Not Currently     Partners: Male     Comment: Am to old   Other Topics Concern    Parent/sibling w/ CABG, MI or angioplasty before 65F 55M? No     Service Not Asked    Blood Transfusions Not Asked    Caffeine Concern No     Comment: 1 capp. daily    Occupational Exposure Not Asked    Hobby Hazards Not Asked    Sleep Concern No    Stress Concern Not Asked    Weight Concern Not Asked    Special Diet No    Back Care Not Asked    Exercise Yes     Comment: walks 3 miles 6 days per week , curves 3 days per week    Bike Helmet Not Asked    Seat Belt Not Asked    Self-Exams Not Asked   Social History Narrative    Dairy/d 0-1 servings/d.     Caffeine 0 servings/d    Exercise 6 x week walk and curves    Sunscreen used - Yes    Seatbelts used - Yes    Working smoke/CO detectors in the home - Yes    Guns stored in the home - Yes LOCKED    Self Breast Exams - sometimes    Self Testicular Exam - NA    Eye Exam up to date - Yes 4/2009    Dental Exam up to date - Yes 12/2009    Pap Smear up to date - Yes - Hysterectomy    Mammogram up to date - Yes 11/25/2009    PSA up to date - NA    Dexa Scan up to date - 2006    Flex Sig / Colonoscopy up to date - Yes 5/14/2008    Immunizations up to date -9-2007 tetanus    Abuse: Current or Past(Physical, Sexual or Emotional)- No    Do you feel safe in your environment - Yes    DAMION Shaffer CMA    11/25/2009  updated     Social Drivers of Health     Financial Resource Strain: Low Risk  (7/10/2024)    Financial Resource Strain     Within the past 12 months, have you or your family members you live with been unable to get utilities (heat, electricity) when it was really needed?: No   Food Insecurity: Patient Declined (10/1/2024)    [facility] [employer] [facility] [employer] [facility]    Hunger Vital Sign     Worried About Running Out of Food in the Last Year: Patient declined     Ran Out of Food in the Last Year: Patient declined   Transportation Needs: Patient Declined (10/1/2024)    [place] [facility] [employer] [facility]    PRAPARE - Transportation     Lack of Transportation (Medical): Patient declined     Lack of Transportation (Non-Medical): Patient declined   Physical Activity: Sufficiently Active (7/10/2024)    Exercise Vital Sign     Days of Exercise per Week: 4 days     Minutes of Exercise per Session: 60 min   Stress: No Stress Concern Present (7/10/2024)    [employer] - Occupational Stress Questionnaire     Feeling of Stress : Not at all   Social Connections: Unknown (7/10/2024)    Social Connection and Isolation Panel [NHANES]     Frequency of Communication with Friends and Family: Not on file     Frequency of Social Gatherings with Friends and Family: More than three times a week     Attends Bahai Services: Not on file     Active Member of Clubs or Organizations: Not on file     Attends Club or Organization Meetings: Not on file     Marital Status: Not on file   Interpersonal Safety: Not At Risk (10/1/2024)    Received from [facility] [employer] [facility] [employer] [facility]    EH IP Custom IPV     Do you feel UNSAFE in any of your personal relationships with your family members or any other acquaintances?: No   Housing Stability: Patient Declined (10/1/2024)    Received from [facility] [employer] [facility] [employer] [facility]    Housing Stability Vital Sign      Unable to Pay for Housing in the Last Year: Patient declined     Number of Times Moved in the Last Year: 0     Homeless in the Last Year: Patient declined       Current Meds:  Current Outpatient Medications   Medication Sig Dispense Refill    acetaminophen (TYLENOL) 500 MG tablet Take 1,000 mg by mouth every 8 hours as needed for mild pain      amoxicillin (AMOXIL) 500 MG tablet Uses prior to dental procedures      aspirin 81 MG EC tablet Take 81 mg by mouth daily      atorvastatin (LIPITOR) 20 MG tablet Take 1 tablet (20 mg) by mouth daily 90 tablet 3    benzonatate (TESSALON) 200 MG capsule Take 1 capsule (200 mg) by mouth 3 times daily as needed for cough. 90 capsule 1    Calcium Carb-Cholecalciferol (SM CALCIUM/VITAMIN D) 600-800 MG-UNIT TABS Take 1 tablet by mouth 2 times daily. Each tablet is 600 calcium and 800 IU Vit D      Carboxymethylcellulose Sodium (REFRESH LIQUIGEL OP) Place 1 drop into both eyes every hour as needed (dry eyes).      cetirizine (ZYRTEC) 10 MG tablet Take 10 mg by mouth daily as needed for allergies      co-enzyme Q-10 100 MG CAPS capsule Take 100 mg by mouth daily      ELDERBERRY PO Take 1 tablet by mouth daily      EPINEPHrine (ANY BX GENERIC EQUIV) 0.3 MG/0.3ML injection 2-pack Inject 0.3 mLs (0.3 mg) into the muscle as needed for anaphylaxis 2 each 0    famotidine (PEPCID) 40 MG tablet Take 1 tablet (40 mg) by mouth at bedtime 90 tablet 4    Glucos-Chond-Hyal Ac-Ca Fructo (MOVE FREE Counts include 234 beds at the [facility] ADVANCE) TABS Take 1 tablet by mouth daily.      LANsoprazole (PREVACID) 30 MG DR capsule TAKE 1 CAPSULE BY MOUTH IN THE  MORNING 90 capsule 3    levothyroxine (SYNTHROID/LEVOTHROID) 88 MCG tablet Take 1 tablet (88 mcg) by mouth daily. 90 tablet 1    losartan (COZAAR) 100 MG tablet Take 1 tablet (100 mg) by mouth daily. 90 tablet 3    Lutein 20 MG CAPS Take 1 capsule by mouth daily.      Melatonin 5 MG TBDP Take 1 tablet by mouth every evening      mirabegron (MYRBETRIQ) 50 MG 24 hr  "tablet Take 1 tablet (50 mg) by mouth daily 90 tablet 4    nitroGLYcerin (NITROSTAT) 0.4 MG sublingual tablet Place 1 tablet (0.4 mg) under the tongue every 5 minutes as needed for chest pain. 25 tablet 0    Omega-3 Fatty Acids (OMEGA-3 FISH OIL PO) Take 1 g by mouth 2 times daily (with meals)       polyethylene glycol (MIRALAX) 17 GM/Dose powder Take 1 capful by mouth 2 times daily In 8 ounces of liquid      Vitamin D, Cholecalciferol, 25 MCG (1000 UT) CAPS Take 1 capsule by mouth 2 times daily      cevimeline (EVOXAC) 30 MG capsule Take 30 mg by mouth daily.      metoprolol succinate ER (TOPROL XL) 25 MG 24 hr tablet Take 1 tablet (25 mg) by mouth daily. (Patient not taking: Reported on 1/13/2025) 90 tablet 3       Physical Exam:  /68   Pulse 56   Ht 1.664 m (5' 5.5\")   Wt 62.6 kg (138 lb)   LMP  (LMP Unknown)   SpO2 94%   BMI 22.62 kg/m    Gen: alert, oriented, no distress  HEENT: nasal mucosa is unremarkable, no oropharyngeal lesions, no cervical or supraclavicular lymphadenopathy  CV: unique, regular, no M/G/R  Resp: CTAB, no focal crackles or wheezes  Skin: no apparent rashes  Ext: no cyanosis, clubbing or edema  Neuro: alert, nonfocal    Labs:  reviewed    Imaging:  High-resolution chest CT (June 2023):  - images directly reviewed, formal interpretation follows:  FINDINGS:   Chest/mediastinum: Mild cardiomegaly. No significant pericardial  effusion. Aneurysmal dilatation of the ascending thoracic aorta  measuring 4.2 cm in diameter. Moderate cardiovascular calcification of  the coronary arteries. Small hiatal hernia. No significant mediastinal  or hilar lymphadenopathy.     Lung/pleura: No pleural effusion or pneumothorax. Minimal  bronchiectatic change and associated areas of bronchiolar mucoid  impaction in the right middle lobe and lingula, nonspecific, can be  seen with infection including atypical mycobacterial infection.  Subpleural and posterior predominant areas of mild " interstitial  peripheral opacities, not significantly changed as compared to  11/12/2021 exam. A few scattered nodular areas along the lung fissures  including 4 mm nodule along the superior aspect of the left major  fissure (series 4 image 66), not significantly changed as compared to  11/12/2021 exam, likely representing benign intrafissural lymph node.     Upper abdomen: Limited evaluation of the upper abdomen due to lack of  coverage and contrast. Right upper quadrant post cholecystectomy  clips.     Bones and soft tissue: Multilevel degenerative changes of the spine.  No suspicious osseous lesion.                                                                      IMPRESSION:    1. Subpleural and posterior predominant areas of mild interstitial  pulmonary opacities, not significantly changed as compared to  11/12/2021 exam could represent mild interstitial lung disease.  2. Minimal bronchiectatic changes and associated areas of bronchiolar  mucoid impaction in the right middle lobe and lingula, nonspecific,  can be seen with infection including atypical mycobacterial infection,  not significantly change as compared to 11/12/2021 exam.  3. Moderate atherosclerotic vascular calcification of the coronary  arteries.    VFSS (July 2024):  Medical Diagnosis: Dysphagia, unspecified type (R13.10)    Treatment Diagnosis: minimal oropharyngeal dysphagia   Impression/Assessment: Video fluoroscopic swallow study completed with thin liquids, puree solids, regular solid. Pt currently presents with minimal oropharyngeal dysphagia. Labial seal, oral containment, mastication and oral clearance are WFL. Reduced oral control resulted in premature bolus spillage to the pyriform sinuses with thin and puree trials, to the valleculae with regular solids. Base of tongue retraction is WFL, hyolaryngeal elevation/excursion are WFL, epiglottic inversion is complete and pharyngoesophageal segment opening during the swallow is WFL.       Mild, deep flash laryngeal penetration across all thin liquid trials, though never reaching cords or progressing to aspiration. This is 2/2 reduced oral control and premature bolus spillage to the pyriform sinuses prior to pharyngeal swallow inititation/complete epiglottic inversion. No other penetration or aspiration across solids.     No oropharyngeal bolus retention across trials. Pt with scheduled full esophagram tomorrow, therefore esophageal sweep was not completed today.    Esophagram (July 2024):  FINDINGS: Total fluoroscopy time was 0.7 minutes.  10 spot  fluoroscopic images, and/or cine clips were obtained. 6 views  obtained. The patient was given effervescent crystals followed by  barium to drink. Esophagram was performed. No esophageal mucosal  lesion identified. No hiatal hernia. Normal GE junction. There is  moderate intraesophageal reflux to the upper esophagus along with  limited clearance of the barium from the esophagus.                                                                      IMPRESSION:  Moderate intraesophageal reflux with limited clearance of  the barium from the esophagus after the primary swallow.    Pulmonary Function Testing  January 2025  Normal complete PFT    Time spent on chart and image review, meeting with the patient to obtain history, perform physical exam, discuss test results, diagnostic possibilities, further testing options, treatment plan options, and care coordination: 64 minutes    The longitudinal plan of care for the diagnosis(es)/condition(s) as documented were addressed during this visit. Due to the added complexity in care, I will continue to support Ya in the subsequent management and with ongoing continuity of care.

## 2025-01-13 NOTE — PATIENT INSTRUCTIONS
It was good to meet you in clinic today. This is what we discussed:    There are many possible causes of chronic cough.  Lisinopril can contribute to chronic cough.  Stop lisinopril.  Start losartan 100 mg daily every morning.  Chronic nasal and sinus inflammation can also contribute to chronic cough.  Start using nasal fluticasone (Flonase) one spray in each nostril every morning.  If you get nasal dryness of bloody nose from this, you can use [place] nasal saline get up to three times daily as needed to moisturize the nasal passages.  Start taking cetirizine (Zyrtec) 10 mg every morning.  Take benzonatate 200 mg three times daily as needed for cough.  Your lung function is normal.  I will see you in 4-6 months.  Contact me with questions or concerning symptoms.    Yehuda Madrid MD  Pulmonary and Critical Care Medicine  [facility]  Office [phone]

## 2025-01-14 LAB
DLCOCOR-%PRED-PRE: 114 %
DLCOCOR-PRE: 21.54 ML/MIN/MMHG
DLCOUNC-%PRED-PRE: 109 %
DLCOUNC-PRE: 20.48 ML/MIN/MMHG
DLCOUNC-PRED: 18.78 ML/MIN/MMHG
ERV-%PRED-PRE: 78 %
ERV-PRE: 0.68 L
ERV-PRED: 0.87 L
EXPTIME-PRE: 6.82 SEC
FEF2575-%PRED-PRE: 92 %
FEF2575-PRE: 1.32 L/SEC
FEF2575-PRED: 1.43 L/SEC
FEFMAX-%PRED-PRE: 111 %
FEFMAX-PRE: 5.18 L/SEC
FEFMAX-PRED: 4.66 L/SEC
FEV1-%PRED-PRE: 90 %
FEV1-PRE: 1.64 L
FEV1FEV6-PRE: 77 %
FEV1FEV6-PRED: 77 %
FEV1FVC-PRE: 77 %
FEV1FVC-PRED: 77 %
FEV1SVC-PRE: 72 %
FEV1SVC-PRED: 59 %
FIFMAX-PRE: 4.4 L/SEC
FRCPLETH-%PRED-PRE: 100 %
FRCPLETH-PRE: 3.07 L
FRCPLETH-PRED: 3.07 L
FVC-%PRED-PRE: 89 %
FVC-PRE: 2.13 L
FVC-PRED: 2.37 L
IC-%PRED-PRE: 92 %
IC-PRE: 1.6 L
IC-PRED: 1.74 L
RVPLETH-%PRED-PRE: 101 %
RVPLETH-PRE: 2.39 L
RVPLETH-PRED: 2.35 L
TLCPLETH-%PRED-PRE: 89 %
TLCPLETH-PRE: 4.67 L
TLCPLETH-PRED: 5.19 L
VA-%PRED-PRE: 86 %
VA-PRE: 4 L
VC-%PRED-PRE: 74 %
VC-PRE: 2.28 L
VC-PRED: 3.07 L

## 2025-02-10 ENCOUNTER — OFFICE VISIT (OUTPATIENT)
Dept: PHARMACY | Facility: CLINIC | Age: 85
End: 2025-02-10
Payer: COMMERCIAL

## 2025-02-10 VITALS
OXYGEN SATURATION: 98 % | HEART RATE: 57 BPM | DIASTOLIC BLOOD PRESSURE: 72 MMHG | WEIGHT: 139.4 LBS | BODY MASS INDEX: 22.84 KG/M2 | SYSTOLIC BLOOD PRESSURE: 115 MMHG

## 2025-02-10 DIAGNOSIS — I10 HYPERTENSION GOAL BP (BLOOD PRESSURE) < 140/90: Primary | ICD-10-CM

## 2025-02-10 DIAGNOSIS — I25.10 ASCVD (ARTERIOSCLEROTIC CARDIOVASCULAR DISEASE): ICD-10-CM

## 2025-02-10 PROCEDURE — 99207 PR NO CHARGE LOS: CPT | Performed by: PHARMACIST

## 2025-02-10 NOTE — PROGRESS NOTES
Medication Therapy Management (MTM) Encounter    ASSESSMENT:                            Medication Adherence/Access: No issues identified.    Hypertension (per cardiology goal <130/80)/CAD:  Patient is meeting blood pressure goal of < 130/80 mmHg and her pulse is in the 50's. Home pulses are also in the 50-60's. Her home blood pressure cough is reading a bit higher more so higher systolic. I did recommend she purchase a new cuff.  She is not having any symptoms of hypotension and not having pulses under 50. Will continue current medications without change today.      PLAN:                            Continue your blood pressure medicines without change. If you notice you start to have pulses that are consistently in the 40's then reach out to Dr. Montanez for guidance on adjusting your metoprolol.  Schedule 4 month follow-up with Dr. Madrid, pulmonology - [phone]  Consider getting a new home blood pressure cuff.    Follow-up: Return in about 1 year (around 2/10/2026).    SUBJECTIVE/OBJECTIVE:                          Ya Stahl is a 84 year old female seen for a follow-up visit from 1/6/25.       Reason for visit: follow-up blood pressure.    Allergies/ADRs: Reviewed in chart  Past Medical History: Reviewed in chart  Tobacco: She reports that she has never smoked. She has never been exposed to tobacco smoke. She has never used smokeless tobacco.  Alcohol: not currently using    Medication Adherence/Access: no issues reported.    Hypertension (per cardiology goal <130/80)/CAD:  Losartan 100 mg once daily - switched from lisinopril 20 mg by Dr. Madrid pulmonology 1/13/25.  metoprolol ER 25 mg once daily in the evening - switched to evening 1/24/25  Aspirin 81 mg daily  Nitroglycerin as needed    After last visit she was to decrease metoprolol due to bradycardia, however she never decreased her dose and just switched metoprolol to evening.    Patient reports she feel tired a lot after physical therapy as it is  an hour and tends to be a lot of work. Patient self monitors blood pressure.  Home BP monitoring from log book: 126/74, 123/77, 136/69, 164/69, 145/77, 157/86, 143/79, 132/81, 132/69, 142/85, 110/70, 149/75, 117/69, 153/83; pulse 50-60's    She brought her home cuff today with readin/82 pulse 54  Clinic automatic cuff was 115/72 pulse 57       Today's Vitals: /72   Pulse 57   Wt 139 lb 6.4 oz (63.2 kg)   LMP  (LMP Unknown)   SpO2 98%   BMI 22.84 kg/m    ----------------      I spent 20 minutes with this patient today.      A summary of these recommendations was sent via [employer].    Nilda Busby, PharmD  Medication Therapy Management Pharmacist     Medication Therapy Recommendations  No medication therapy recommendations to display

## 2025-02-10 NOTE — PATIENT INSTRUCTIONS
"Recommendations from today's MTM visit:                                                       Continue your blood pressure medicines without change. If you notice you start to have pulses that are consistently in the 40's then reach out to Dr. Montanez for guidance on adjusting your metoprolol.  Schedule 4 month follow-up with Dr. Madrid, pulmonology - [phone]  Consider getting a new home blood pressure cuff.    Follow-up: Return in about 1 year (around 2/10/2026).    It was great speaking with you today.  I value your experience and would be very thankful for your time in providing feedback in our clinic survey. In the next few days, you may receive an email or text message from [employer] with a link to a survey related to your  clinical pharmacist.\"     To schedule another MTM appointment, please call the clinic directly or you may call the MTM scheduling line at [phone] or toll-free at [phone].     My Clinical Pharmacist's contact information:                                                      Please feel free to contact me with any questions or concerns you have.        Nilda Busby, PharmD  Medication Therapy Management Pharmacist  [facility] - Monday and Wednesday 7:30 - 4:00      "

## 2025-02-26 ENCOUNTER — TRANSFERRED RECORDS (OUTPATIENT)
Dept: HEALTH INFORMATION MANAGEMENT | Facility: CLINIC | Age: 85
End: 2025-02-26
Payer: COMMERCIAL

## 2025-03-14 ENCOUNTER — TRANSFERRED RECORDS (OUTPATIENT)
Dept: HEALTH INFORMATION MANAGEMENT | Facility: CLINIC | Age: 85
End: 2025-03-14
Payer: COMMERCIAL

## 2025-03-19 ENCOUNTER — OFFICE VISIT (OUTPATIENT)
Dept: ENDOCRINOLOGY | Facility: CLINIC | Age: 85
End: 2025-03-19
Payer: COMMERCIAL

## 2025-03-19 VITALS
RESPIRATION RATE: 14 BRPM | WEIGHT: 136.5 LBS | TEMPERATURE: 97.2 F | DIASTOLIC BLOOD PRESSURE: 81 MMHG | OXYGEN SATURATION: 98 % | BODY MASS INDEX: 22.37 KG/M2 | SYSTOLIC BLOOD PRESSURE: 144 MMHG | HEART RATE: 50 BPM

## 2025-03-19 DIAGNOSIS — M89.9 BONE DISEASE: ICD-10-CM

## 2025-03-19 DIAGNOSIS — Z87.81 H/O FRACTURE OF RIGHT HIP: ICD-10-CM

## 2025-03-19 DIAGNOSIS — Z78.0 ASYMPTOMATIC MENOPAUSAL STATE: ICD-10-CM

## 2025-03-19 DIAGNOSIS — M81.0 SENILE OSTEOPOROSIS: Primary | ICD-10-CM

## 2025-03-19 DIAGNOSIS — K21.9 GASTROESOPHAGEAL REFLUX DISEASE, UNSPECIFIED WHETHER ESOPHAGITIS PRESENT: ICD-10-CM

## 2025-03-19 DIAGNOSIS — Z92.29 HISTORY OF BISPHOSPHONATE THERAPY: ICD-10-CM

## 2025-03-19 DIAGNOSIS — Z98.1 S/P LUMBAR FUSION: ICD-10-CM

## 2025-03-19 DIAGNOSIS — Z87.81 HISTORY OF HIP FRACTURE: ICD-10-CM

## 2025-03-19 DIAGNOSIS — Z92.29 PERSONAL HISTORY OF OTHER DRUG THERAPY: ICD-10-CM

## 2025-03-19 PROCEDURE — 3077F SYST BP >= 140 MM HG: CPT | Performed by: INTERNAL MEDICINE

## 2025-03-19 PROCEDURE — 1126F AMNT PAIN NOTED NONE PRSNT: CPT | Performed by: INTERNAL MEDICINE

## 2025-03-19 PROCEDURE — G2211 COMPLEX E/M VISIT ADD ON: HCPCS | Performed by: INTERNAL MEDICINE

## 2025-03-19 PROCEDURE — 99214 OFFICE O/P EST MOD 30 MIN: CPT | Performed by: INTERNAL MEDICINE

## 2025-03-19 PROCEDURE — 3079F DIAST BP 80-89 MM HG: CPT | Performed by: INTERNAL MEDICINE

## 2025-03-19 ASSESSMENT — PAIN SCALES - GENERAL: PAINLEVEL_OUTOF10: NO PAIN (0)

## 2025-03-19 NOTE — LETTER
3/19/2025      Ya Stahl  [address]  [facility] [address]      Dear Colleague,    Thank you for referring your patient, Ya Stahl, to the [facility]. Please see a copy of my visit note below.    Name: Ya Stahl  Seen for f/u ofosteoporosis.     HPI:  Ya Stahl is a 84 year old female who presents for the evaluation of osteoporosis.   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    DEXA 2006- normal BMD  DEXA 2011, 2014- Osteopenia  DEXA 2/2022: osteoporosis  Has upcoming DEXA appointment.    Diagnosed with osteoporosis in 2/2022 based on DEXA scan.  DEXA 2/2022: Severe osteoporosis on the basis of hip fracture. There has been probably no significant cahnge in bone density of the hip(s). The forearm was not included on the previous study so comparison is not possible.    The spine is not acceptable for evaluation due to previous surgical changes.    The right femur is not acceptable for evaluation due to previous surgical repair.  ( H/o lumbar fusion and h/o right hip fracture s/p hip replacement)    Previous medication- Fosamax x 5 yrs and went off it.   (8/2014-1/2021)  Started PROLIA 2022.    DEXA 5/2024:  INTERVAL CHANGE   -There has been a 21.6% increase in the left radius 33% BMD.   (hip and/or  "spine could not be measured or interpreted. )    Prolia dates: (get at [facility])  5/2022  11/2022  05/15/23   11/29/23   6/3/24  12/3/24        Tolerating well.    In 1/2024-- she tripped and fall but no fractures.      GERD: On PPI- under well controled.  Dental; OK. No upcoming dental procedure. Has gum disease.    Smoke:No  Family History:mother  Menstrual history/Birthing: s/p menopause at age 37. No HRT  Fractures: Fracture of right femur and pelvis at age 74 from a fall.Treated surgically. S/p hip replacement. (Hip fracture history?  Not clear based on chart review)  Kidney stones: No  GI Surgery:s/p cholecystectomy  Duration of therapy:   Exercise:walks 3 miles/day and does weights 5 days a week.  Diet: takes boost every few days  Ca/Vitamin D: calcium 600 mg BID, vit D 4000 international unit(s)/day.  Alcohol:  No  Eating Disorder: No  Steroid Use:  No  PMH/PSH:  Past Medical History:   Diagnosis Date     Arthritis      Cerebral infarction (H) 2019     Complication of anesthesia     \"hard time waking up / N & V\"     Coronary artery disease      Disease of thyroid gland      Displacement of lumbar intervertebral disc without myelopathy      Gastro-oesophageal reflux disease      GERD (gastroesophageal reflux disease)      Grave's disease 09/08/2008    IMO update changed this record. Please review for accuracy       Hearing loss     has bilateral aids     Heart attack (H) 03/2016     History of anesthesia complications     delayed emergence     History of angina      Hypertension      Low back pain      Migraine headache      Myocardial infarction (H)      Numbness and tingling     down right leg (associated with back pain)     Pelvic hematoma in female 09/19/2023     PONV (postoperative nausea and vomiting)      Sicca syndrome      Sjogren's disease      Sjogren's syndrome     sees specialist; dentist     Spider veins      Unspecified hypothyroidism      Past Surgical History:   Procedure Laterality Date     " ARTHROPLASTY HIP ANTERIOR Left 10/14/2022    Procedure: LEFT TOTAL HIP ARTHROPLASTY DIRECT ANTERIOR APPROACH;  Surgeon: Selvin Saab MD;  Location:  OR     ARTHROSCOPY KNEE  10/05/2012    R Procedure: ARTHROSCOPY KNEE;  RIGHT KNEE ARTHROSCOPY, PARTIAL MEDIAL MENISCECTOMY;  Surgeon: Karli Zaragoza MD;  Location: [facility]     BACK SURGERY  About. 5 years ago    Fusion of 4&5 lumbar     BLADDER SURGERY       BUNIONECTOMY  ~2010    L foot.      CATARACT IOL, RT/LT Bilateral 07/2017     COLONOSCOPY N/A 01/17/2017    normal; no repeat Procedure: COLONOSCOPY;  Surgeon: Fan Giordano MD;  Location:  GI     ENDOSCOPIC RELEASE CARPAL TUNNEL  09/11/2012    R Procedure: ENDOSCOPIC RELEASE CARPAL TUNNEL;  Right Endoscopic carpal tunnel release, left ringer finger cortizon injection;  Surgeon: Anne Marie Catherine MD;  Location:  OR     ESOPHAGOSCOPY, GASTROSCOPY, DUODENOSCOPY (EGD), COMBINED N/A 12/26/2014    Procedure: COMBINED ESOPHAGOSCOPY, GASTROSCOPY, DUODENOSCOPY (EGD);  Surgeon: Fan Giordano MD;  Location:  GI     ESOPHAGOSCOPY, GASTROSCOPY, DUODENOSCOPY (EGD), COMBINED N/A 11/28/2023    Procedure: ESOPHAGOGASTRODUODENOSCOPY, WITH BIOPSY;  Surgeon: Partha Cooper, DO;  Location: [facility]     EXCISE EXOSTOSIS FOOT Left 12/01/2016    Procedure: EXCISE EXOSTOSIS FOOT;  Surgeon: Jody Gallagher, DPM, Pod;  Location:  OR     GYN SURGERY  1978    ovaries removed     HEART CATH FYI  03/04/2016    dz <40%     HIP HARDWARE REMOVAL Right 08/04/2020    Procedure: HARDWARE REMOVAL - LATERAL APPROACH;  Surgeon: Charlie Wolfe MD;  Location: [facility];  Service: Orthopedics     HYSTERECTOMY       INJECT STEROID (LOCATION)  09/11/2012    Procedure: INJECT STEROID (LOCATION);;  Surgeon: Anne Marie Catherine MD;  Location:  OR     LAPAROSCOPIC CHOLECYSTECTOMY N/A 04/07/2016    Procedure: LAPAROSCOPIC CHOLECYSTECTOMY;  Surgeon: Hans Medina MD;  Location:  OR     OPTICAL TRACKING SYSTEM  FUSION SPINE POSTERIOR LUMBAR TWO LEVELS N/A 10/31/2018    Procedure: Central L3-4 L4-5 lamenectomies L4-5 posterior instrumented fusion;  Surgeon: Aroldo Burdick MD;  Location:  OR     ORTHOPEDIC SURGERY Right 2014    karli for fx femur     RECONSTRUCT FOREFOOT WITH METATARSOPHALANGEAL (MTP) FUSION Left 04/28/2017    Procedure: RECONSTRUCT FOREFOOT WITH METATARSOPHALANGEAL (MTP) FUSION;  1. Resection arthroplasty, fifth metatarsophalangeal joint, left foot.   2. Irrigation and debridement of fifth metatarsophalangeal joint, left foot (excisional to the level of the bone).     ;  Surgeon: Fabián Phipps MD;  Location:  OR     RELEASE CARPAL TUNNEL       RIGHT HIP ORIF 4/1/2014       ROTATOR CUFF REPAIR RT/LT  ~1998    Lt shoulder; rotator cuff     SURGICAL HISTORY OF -   distant past    ablation for palpitations.     SURGICAL HISTORY OF -   06/2015    left carpal tunnel surgery     [facility] NONSPECIFIC PROCEDURE  1976    D&C     [facility] TOTAL HIP ARTHROPLASTY Right 08/04/2020    Procedure: RIGHT TOTAL HIP ARTHROPLASTY;  Surgeon: Charlie Wolfe MD;  Location: [facility];  Service: Orthopedics     [facility] TOTAL KNEE ARTHROPLASTY Right 01/2020     Family Hx:  Family History   Problem Relation Age of Onset     C.A.D. Mother 76     Cancer Mother         non Hodgekin's Lymphoma     Heart Disease Mother         enlarged heart     Breast Cancer Mother      C.A.D. Father 59     Heart Disease Father         valve problem     Hypertension Brother      Neuropathy Brother      Heart Disease Brother         open heart to repair mitral valve.     Connective Tissue Disorder Daughter         scleroderma     Colon Cancer No family hx of        Social Hx:  Social History     Socioeconomic History     Marital status:      Spouse name: Not on file     Number of children: Not on file     Years of education: Not on file     Highest education level: Not on file   Occupational History     Not on file   Tobacco Use     Smoking  status: Never     Passive exposure: Never     Smokeless tobacco: Never   Vaping Use     Vaping status: Never Used   Substance and Sexual Activity     Alcohol use: Not Currently     Comment: Maybe 1x amonth     Drug use: No     Sexual activity: Not Currently     Partners: Male     Comment: Am to old   Other Topics Concern     Parent/sibling w/ CABG, MI or angioplasty before 65F 55M? No      Service Not Asked     Blood Transfusions Not Asked     Caffeine Concern No     Comment: 1 capp. daily     Occupational Exposure Not Asked     Hobby Hazards Not Asked     Sleep Concern No     Stress Concern Not Asked     Weight Concern Not Asked     Special Diet No     Back Care Not Asked     Exercise Yes     Comment: walks 3 miles 6 days per week , curves 3 days per week     Bike Helmet Not Asked     Seat Belt Not Asked     Self-Exams Not Asked   Social History Narrative    Dairy/d 0-1 servings/d.     Caffeine 0 servings/d    Exercise 6 x week walk and curves    Sunscreen used - Yes    Seatbelts used - Yes    Working smoke/CO detectors in the home - Yes    Guns stored in the home - Yes LOCKED    Self Breast Exams - sometimes    Self Testicular Exam - NA    Eye Exam up to date - Yes 4/2009    Dental Exam up to date - Yes 12/2009    Pap Smear up to date - Yes - Hysterectomy    Mammogram up to date - Yes 11/25/2009    PSA up to date - NA    Dexa Scan up to date - 2006    Flex Sig / Colonoscopy up to date - Yes 5/14/2008    Immunizations up to date -9-2007 tetanus    Abuse: Current or Past(Physical, Sexual or Emotional)- No    Do you feel safe in your environment - Yes    DAMION Shaffer, [facility]    11/25/2009 updated     Social Drivers of Health     Financial Resource Strain: Low Risk  (7/10/2024)    Financial Resource Strain      Within the past 12 months, have you or your family members you live with been unable to get utilities (heat, electricity) when it was really needed?: No   Food Insecurity: Patient Declined (10/1/2024)     [facility] [employer] [facility]    Hunger Vital Sign      Worried About Running Out of Food in the Last Year: Patient declined      Ran Out of Food in the Last Year: Patient declined   Transportation Needs: Patient Declined (10/1/2024)    [facility] [employer] [facility]    PRAPARE - Transportation      Lack of Transportation (Medical): Patient declined      Lack of Transportation (Non-Medical): Patient declined   Physical Activity: Sufficiently Active (7/10/2024)    Exercise Vital Sign      Days of Exercise per Week: 4 days      Minutes of Exercise per Session: 60 min   Stress: No Stress Concern Present (7/10/2024)    [employer] [place] [employer] - Occupational Stress Questionnaire      Feeling of Stress : Not at all   Social Connections: Unknown (7/10/2024)    Social Connection and Isolation Panel [NHANES]      Frequency of Communication with Friends and Family: Not on file      Frequency of Social Gatherings with Friends and Family: More than three times a week      Attends Buddhist Services: Not on file      Active Member of Clubs or Organizations: Not on file      Attends Club or Organization Meetings: Not on file      Marital Status: Not on file   Interpersonal Safety: Not At Risk (10/1/2024)    [facility] [employer] [facility] [employer] [facility] [employer]      Do you feel UNSAFE in any of your personal relationships with your family members or any other acquaintances?: No   Housing Stability: Patient Declined (10/1/2024)    Received from [employer] [facility] [employer] [facility]    Housing Stability Vital Sign      Unable to Pay for Housing in the Last Year: Patient declined      Number of Times Moved in the Last Year: 0      Homeless in the Last Year: Patient declined          MEDICATIONS:  has a current medication list which includes the following prescription(s): acetaminophen, aspirin, atorvastatin, benzonatate,  sm calcium/vitamin d, carboxymethylcellulose sodium, cetirizine, cevimeline, co-enzyme q-10, elderberry, epinephrine, famotidine, move free joint health advance, lansoprazole, levothyroxine, losartan, lutein, melatonin, metoprolol succinate er, mirabegron, omega-3 fatty acids, polyethylene glycol, vitamin d (cholecalciferol), amoxicillin, and nitroglycerin.    ROS     ROS: 10 point ROS neg other than the symptoms noted above in the HPI.    Physical Exam   VS: BP (!) 144/81 (BP Location: Left arm, Patient Position: Sitting, Cuff Size: Adult Regular)   Pulse 50   Temp 97.2  F (36.2  C) (Tympanic)   Resp 14   Wt 61.9 kg (136 lb 8 oz)   LMP  (LMP Unknown)   SpO2 98%   BMI 22.37 kg/m    GENERAL: healthy, alert and no distress  EYES: Eyes grossly normal to inspection, conjunctivae and sclerae normal  ENT: no nose swelling, nasal discharge.  Thyroid: no apparent thyroid nodules  RESP: no audible wheeze, cough, or visible cyanosis.  No visible retractions or increased work of breathing.  Able to speak fully in complete sentences.  ABDO: not evaluated.  EXTREMITIES: no hand tremors.  NEURO: Cranial nerves grossly intact, mentation intact and speech normal  SKIN: No apparent skin lesions, rash or edema seen   PSYCH: mentation appears normal, affect normal/bright, judgement and insight intact, normal speech and appearance well-groomed    LABS:  BMP:  Last Comprehensive Metabolic Panel:  Lab Results   Component Value Date     (L) 09/09/2024    POTASSIUM 4.3 09/09/2024    CHLORIDE 100 09/09/2024    CO2 23 09/09/2024    ANIONGAP 11 09/09/2024     (H) 09/09/2024    BUN 30.1 (H) 09/09/2024    CR 0.91 09/09/2024    GFRESTIMATED 62 09/09/2024    CARMEN 9.4 12/10/2024     TFTs:  TSH   Date Value Ref Range Status   09/09/2024 2.64 0.30 - 4.20 uIU/mL Final   01/06/2022 1.39 0.40 - 4.00 mU/L Final   04/16/2021 1.87 0.40 - 4.00 mU/L Final       PTH:  ENDO CALCIUM LABS-UMP Latest Ref Rng & Units 3/18/2022   PARATHYROID  HORMONE INTACT 18 - 80 pg/mL 38       Vitamin D:  Vitamin D Deficiency Screening Results:  Lab Results   Component Value Date    VITDT 55 03/18/2022    VITDT 48 12/05/2013     DEXA:  DEXA 2/2022:  FINDINGS:                Left Femoral Neck           T-score:  -0.6                       Forearm (radius 33%)      T-score:  -2.3  The spine is not acceptable for evaluation due to previous surgical changes.    The right femur is not acceptable for evaluation due to previous surgical repair.                           Left Total Hip BMD: 0.861   Previous: 0.882                       Forearm (radius 33%) BMD: 0.677  Previous: NA     Comparison is made to another DXA performed on a different [employer] machine on 7/21/2014.      IMPRESSION  Severe osteoporosis on the basis of hip fracture.      There has been probably no significant cahnge in bone density of the hip(s). The forearm was not included on the previous study so comparison is not possible.       Recommendations include ensuring adequate Calcium and Vitamin D.     DEXA 5/2024:  DXA RESULTS  -LEFT Radius 33%: BMD: 0.823 g/cm2. T-score: -0.6. Z-score: 2.4.   INTERVAL CHANGE   -There has been a 21.6% increase in the left radius 33% BMD.  IMPRESSION: NORMAL. Bone mineral density measurements are within normal limits using T score.     All pertinent notes, labs, and images personally reviewed by me.     A/P  Ms.Sharon SAMRA Stahl is a 84 year old here for the evaluation of:    #1 Osteoporosis:  Severe osteoporosis in the setting of history of right hip fracture.  Risk factors for low bone density include personal history of fracture or family history,  advanced age, female,Estrogen deficiency,early menopause before age 46, Hip fracture, fractures of pelvis and foot.  She has been on Fosamax from [id number].  DEXA 2/2022 as noted above consistent with severe osteoporosis.  History of GERD-under good control with PPI  No upcoming dental procedure.  Labs showing normal calcium,  kidney function, vitamin D and PTH levels.  On PROLIA since 5/2022. Tolerating well.  DEXA 3/2024: There has been a 21.6% increase in the left radius 33% BMD.   (hip and/or spine could not be measured or interpreted. )  Plan:  Discussed diagnosis, pathophysiology, management and treatment options of condition with pt.  Discussed osteoporosis in general.  Plan to continue PROLIA. Next in 6/2025, 12/2025  DEXA 5/2026 onwards-- needs to be ordered.  Follow up in 1 year.    The pt was advised to  Maintain an adequate calcium and vitamin D intake and to supplement vitamin D if needed to maintain serum levels of 25 hydroxy D (25 OH D) between 30-60 ng/ml.  Limit alcohol intake to no more than 2 servings per day.  Limit caffeine intake.  Maintain an active lifestyle including weight-bearing exercises for at least 30 mins daily.  Take measures to reduce the risk of falling.    All possible major side effects of Denosumab (PROLIA) were discussed including risk for atypical femur fractures, hypersensitivity, low calcium levels, osteonecrosis of jaw (which can manifest as jaw pain, osteomyelitis, osteitis, bone erosion, tooth/periodontal infection, toothache, gingival ulceration/erosion). Other s/e include but not limited to Hypertension, Headache and peripheral edema. I also told her to let her dentist lnow about the medication if plan for major dental procedure (currently no plans for dental procedure)  Indication: Prolia  (denosumab) is a prescription medicine used to treat osteoporosis in patients who:   Are at high risk for fracture, meaning patients who have had a fracture related to osteoporosis, or who have multiple risk factors for fracture   Cannot use another osteoporosis medicine or other osteoporosis medicines did not work well   The timeline for early/late injections would be 4 weeks early and any time after the 6 month bella. If a patient receives their injection late, then the subsequent injection would be 6  months from the date that they actually received the injection    Discussed indications, risks and benefits of all medications prescribed, and answered questions to patient's satisfaction.  The longitudinal plan of care for the diagnosis(es)/condition(s) as documented were addressed during this visit. Due to the added complexity in care, I will continue to support Ya in the subsequent management and with ongoing continuity of care.  All questions were answered.  The patient indicates understanding of the above issues and agrees with the plan set forth.      Follow-up:  As noted in AVS    Mary uFnes MD  Endocrinology  [facility]  CC: Devi Romano following steps were completed to comply with the REMS program for Prolia:  Reviewed the serious risks of Prolia  and the symptoms of each risk.  Advised patient to seek prompt medical attention if they have signs or symptoms of any of the serious risks.  Patient will be provided a copy of the Medication Guide and Patient Brochure prior to first injection.    Mary Funes MD      Again, thank you for allowing me to participate in the care of your patient.        Sincerely,        Mary Funes MD    Electronically signed

## 2025-03-19 NOTE — PROGRESS NOTES
Name: Ya Stahl  Seen for f/u ofosteoporosis.     HPI:  Ya Stahl is a 84 year old female who presents for the evaluation of osteoporosis.   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    DEXA 2006- normal BMD  DEXA 2011, 2014- Osteopenia  DEXA 2/2022: osteoporosis  Has upcoming DEXA appointment.    Diagnosed with osteoporosis in 2/2022 based on DEXA scan.  DEXA 2/2022: Severe osteoporosis on the basis of hip fracture. There has been probably no significant cahnge in bone density of the hip(s). The forearm was not included on the previous study so comparison is not possible.    The spine is not acceptable for evaluation due to previous surgical changes.    The right femur is not acceptable for evaluation due to previous surgical repair.  ( H/o lumbar fusion and h/o right hip fracture s/p hip replacement)    Previous medication- Fosamax x 5 yrs and went off it.   (8/2014-1/2021)  Started PROLIA 2022.    DEXA 5/2024:  INTERVAL CHANGE   -There has been a 21.6% increase in the left radius 33% BMD.   (hip and/or spine could not be measured or interpreted. )    Prolia dates: (get at [facility])  5/2022  11/2022  05/15/23   11/29/23   6/3/24  12/3/24        Tolerating well.    In 1/2024-- she tripped and fall but no fractures.      GERD: On PPI- under well  "controled.  Dental; OK. No upcoming dental procedure. Has gum disease.    Smoke:No  Family History:mother  Menstrual history/Birthing: s/p menopause at age 37. No HRT  Fractures: Fracture of right femur and pelvis at age 74 from a fall.Treated surgically. S/p hip replacement. (Hip fracture history?  Not clear based on chart review)  Kidney stones: No  GI Surgery:s/p cholecystectomy  Duration of therapy:   Exercise:walks 3 miles/day and does weights 5 days a week.  Diet: takes boost every few days  Ca/Vitamin D: calcium 600 mg BID, vit D 4000 international unit(s)/day.  Alcohol:  No  Eating Disorder: No  Steroid Use:  No  PMH/PSH:  Past Medical History:   Diagnosis Date    Arthritis     Cerebral infarction (H) 2019    Complication of anesthesia     \"hard time waking up / N & V\"    Coronary artery disease     Disease of thyroid gland     Displacement of lumbar intervertebral disc without myelopathy     Gastro-oesophageal reflux disease     GERD (gastroesophageal reflux disease)     Grave's disease 09/08/2008    IMO update changed this record. Please review for accuracy      Hearing loss     has bilateral aids    Heart attack (H) 03/2016    History of anesthesia complications     delayed emergence    History of angina     Hypertension     Low back pain     Migraine headache     Myocardial infarction (H)     Numbness and tingling     down right leg (associated with back pain)    Pelvic hematoma in female 09/19/2023    PONV (postoperative nausea and vomiting)     Sicca syndrome     Sjogren's disease     Sjogren's syndrome     sees specialist; dentist    Spider veins     Unspecified hypothyroidism      Past Surgical History:   Procedure Laterality Date    ARTHROPLASTY HIP ANTERIOR Left 10/14/2022    Procedure: LEFT TOTAL HIP ARTHROPLASTY DIRECT ANTERIOR APPROACH;  Surgeon: Selvin Saab MD;  Location:  OR    ARTHROSCOPY KNEE  10/05/2012    R Procedure: ARTHROSCOPY KNEE;  RIGHT KNEE ARTHROSCOPY, PARTIAL MEDIAL " MENISCECTOMY;  Surgeon: Karli Zaragoza MD;  Location: [facility]    BACK SURGERY  About. 5 years ago    Fusion of 4&5 lumbar    BLADDER SURGERY      BUNIONECTOMY  ~2010    L foot.     CATARACT IOL, RT/LT Bilateral 07/2017    COLONOSCOPY N/A 01/17/2017    normal; no repeat Procedure: COLONOSCOPY;  Surgeon: Fan Giordano MD;  Location:  GI    ENDOSCOPIC RELEASE CARPAL TUNNEL  09/11/2012    R Procedure: ENDOSCOPIC RELEASE CARPAL TUNNEL;  Right Endoscopic carpal tunnel release, left ringer finger cortizon injection;  Surgeon: Anne Marie Catherine MD;  Location:  OR    ESOPHAGOSCOPY, GASTROSCOPY, DUODENOSCOPY (EGD), COMBINED N/A 12/26/2014    Procedure: COMBINED ESOPHAGOSCOPY, GASTROSCOPY, DUODENOSCOPY (EGD);  Surgeon: Fan Giordano MD;  Location:  GI    ESOPHAGOSCOPY, GASTROSCOPY, DUODENOSCOPY (EGD), COMBINED N/A 11/28/2023    Procedure: ESOPHAGOGASTRODUODENOSCOPY, WITH BIOPSY;  Surgeon: Partha Cooper, DO;  [facility]    EXCISE EXOSTOSIS FOOT Left 12/01/2016    Procedure: EXCISE EXOSTOSIS FOOT;  Surgeon: Jody Gallagher, DPM, Pod;  Location:  OR    GYN SURGERY  1978    ovaries removed    HEART CATH FYI  03/04/2016    dz <40%    HIP HARDWARE REMOVAL Right 08/04/2020    Procedure: HARDWARE REMOVAL - LATERAL APPROACH;  Surgeon: Charlie Wolfe MD;  Location: [facility];  Service: Orthopedics    HYSTERECTOMY      INJECT STEROID (LOCATION)  09/11/2012    Procedure: INJECT STEROID (LOCATION);;  Surgeon: Anne Marie Catherine MD;  Location:  OR    LAPAROSCOPIC CHOLECYSTECTOMY N/A 04/07/2016    Procedure: LAPAROSCOPIC CHOLECYSTECTOMY;  Surgeon: Hans Medina MD;  Location:  OR    OPTICAL TRACKING SYSTEM FUSION SPINE POSTERIOR LUMBAR TWO LEVELS N/A 10/31/2018    Procedure: Central L3-4 L4-5 lamenectomies L4-5 posterior instrumented fusion;  Surgeon: Aroldo Burdick MD;  Location:  OR    ORTHOPEDIC SURGERY Right 2014    karli for fx femur    RECONSTRUCT FOREFOOT WITH METATARSOPHALANGEAL  (MTP) FUSION Left 04/28/2017    Procedure: RECONSTRUCT FOREFOOT WITH METATARSOPHALANGEAL (MTP) FUSION;  1. Resection arthroplasty, fifth metatarsophalangeal joint, left foot.   2. Irrigation and debridement of fifth metatarsophalangeal joint, left foot (excisional to the level of the bone).     ;  Surgeon: Fabián Phipps MD;  Location:  OR    RELEASE CARPAL TUNNEL      RIGHT HIP ORIF 4/1/2014      ROTATOR CUFF REPAIR RT/LT  ~1998    Lt shoulder; rotator cuff    SURGICAL HISTORY OF -   distant past    ablation for palpitations.    SURGICAL HISTORY OF -   06/2015    left carpal tunnel surgery    [facility] NONSPECIFIC PROCEDURE  1976    D&C    [facility] TOTAL HIP ARTHROPLASTY Right 08/04/2020    Procedure: RIGHT TOTAL HIP ARTHROPLASTY;  Surgeon: Charlie Wolfe MD;  Location: [facility];  Service: Orthopedics    [facility] TOTAL KNEE ARTHROPLASTY Right 01/2020     Family Hx:  Family History   Problem Relation Age of Onset    C.A.D. Mother 76    Cancer Mother         non Hodgekin's Lymphoma    Heart Disease Mother         enlarged heart    Breast Cancer Mother     C.A.D. Father 59    Heart Disease Father         valve problem    Hypertension Brother     Neuropathy Brother     Heart Disease Brother         open heart to repair mitral valve.    Connective Tissue Disorder Daughter         scleroderma    Colon Cancer No family hx of        Social Hx:  Social History     Socioeconomic History    Marital status:      Spouse name: Not on file    Number of children: Not on file    Years of education: Not on file    Highest education level: Not on file   Occupational History    Not on file   Tobacco Use    Smoking status: Never     Passive exposure: Never    Smokeless tobacco: Never   Vaping Use    Vaping status: Never Used   Substance and Sexual Activity    Alcohol use: Not Currently     Comment: Maybe 1x amonth    Drug use: No    Sexual activity: Not Currently     Partners: Male     Comment: Am to old   Other Topics  Concern    Parent/sibling w/ CABG, MI or angioplasty before 65F 55M? No     Service Not Asked    Blood Transfusions Not Asked    Caffeine Concern No     Comment: 1 capp. daily    Occupational Exposure Not Asked    Hobby Hazards Not Asked    Sleep Concern No    Stress Concern Not Asked    Weight Concern Not Asked    Special Diet No    Back Care Not Asked    Exercise Yes     Comment: walks 3 miles 6 days per week , curves 3 days per week    Bike Helmet Not Asked    Seat Belt Not Asked    Self-Exams Not Asked   Social History Narrative    Dairy/d 0-1 servings/d.     Caffeine 0 servings/d    Exercise 6 x week walk and curves    Sunscreen used - Yes    Seatbelts used - Yes    Working smoke/CO detectors in the home - Yes    Guns stored in the home - Yes LOCKED    Self Breast Exams - sometimes    Self Testicular Exam - NA    Eye Exam up to date - Yes 4/2009    Dental Exam up to date - Yes 12/2009    Pap Smear up to date - Yes - Hysterectomy    Mammogram up to date - Yes 11/25/2009    PSA up to date - NA    Dexa Scan up to date - 2006    Flex Sig / Colonoscopy up to date - Yes 5/14/2008    Immunizations up to date -9-2007 tetanus    Abuse: Current or Past(Physical, Sexual or Emotional)- No    Do you feel safe in your environment - Yes    DAMION Shaffer, [facility]    11/25/2009 updated     Social Drivers of Health     Financial Resource Strain: Low Risk  (7/10/2024)    Financial Resource Strain     Within the past 12 months, have you or your family members you live with been unable to get utilities (heat, electricity) when it was really needed?: No   Food Insecurity: Patient Declined (10/1/2024)    Received from [facility]    Hunger Vital Sign     Worried About Running Out of Food in the Last Year: Patient declined     Ran Out of Food in the Last Year: Patient declined   Transportation Needs: Patient Declined (10/1/2024)    Received from [facility] [facility]     PRAPARE - Transportation     Lack of Transportation (Medical): Patient declined     Lack of Transportation (Non-Medical): Patient declined   Physical Activity: Sufficiently Active (7/10/2024)    Exercise Vital Sign     Days of Exercise per Week: 4 days     Minutes of Exercise per Session: 60 min   Stress: No Stress Concern Present (7/10/2024)    Citizen of [place] [employer] - Occupational Stress Questionnaire     Feeling of Stress : Not at all   Social Connections: Unknown (7/10/2024)    Social Connection and Isolation Panel [NHANES]     Frequency of Communication with Friends and Family: Not on file     Frequency of Social Gatherings with Friends and Family: More than three times a week     Attends [employer] Services: Not on file     Active Member of Clubs or Organizations: Not on file     Attends Club or Organization Meetings: Not on file     Marital Status: Not on file   Interpersonal Safety: Not At Risk (10/1/2024)    Received from [facility] IP Custom IPV     Do you feel UNSAFE in any of your personal relationships with your family members or any other acquaintances?: No   Housing Stability: Patient Declined (10/1/2024)    Received from [facility]    Housing Stability Vital Sign     Unable to Pay for Housing in the Last Year: Patient declined     Number of Times Moved in the Last Year: 0     Homeless in the Last Year: Patient declined          MEDICATIONS:  has a current medication list which includes the following prescription(s): acetaminophen, aspirin, atorvastatin, benzonatate, sm calcium/vitamin d, carboxymethylcellulose sodium, cetirizine, cevimeline, co-enzyme q-10, elderberry, epinephrine, famotidine, move free joint health advance, lansoprazole, levothyroxine, losartan, lutein, melatonin, metoprolol succinate er, mirabegron, omega-3 fatty acids, polyethylene glycol, vitamin d (cholecalciferol), amoxicillin, and  nitroglycerin.    ROS     ROS: 10 point ROS neg other than the symptoms noted above in the HPI.    Physical Exam   VS: BP (!) 144/81 (BP Location: Left arm, Patient Position: Sitting, Cuff Size: Adult Regular)   Pulse 50   Temp 97.2  F (36.2  C) (Tympanic)   Resp 14   Wt 61.9 kg (136 lb 8 oz)   LMP  (LMP Unknown)   SpO2 98%   BMI 22.37 kg/m    GENERAL: healthy, alert and no distress  EYES: Eyes grossly normal to inspection, conjunctivae and sclerae normal  ENT: no nose swelling, nasal discharge.  Thyroid: no apparent thyroid nodules  RESP: no audible wheeze, cough, or visible cyanosis.  No visible retractions or increased work of breathing.  Able to speak fully in complete sentences.  ABDO: not evaluated.  EXTREMITIES: no hand tremors.  NEURO: Cranial nerves grossly intact, mentation intact and speech normal  SKIN: No apparent skin lesions, rash or edema seen   PSYCH: mentation appears normal, affect normal/bright, judgement and insight intact, normal speech and appearance well-groomed    LABS:  BMP:  Last Comprehensive Metabolic Panel:  Lab Results   Component Value Date     (L) 09/09/2024    POTASSIUM 4.3 09/09/2024    CHLORIDE 100 09/09/2024    CO2 23 09/09/2024    ANIONGAP 11 09/09/2024     (H) 09/09/2024    BUN 30.1 (H) 09/09/2024    CR 0.91 09/09/2024    GFRESTIMATED 62 09/09/2024    CARMEN 9.4 12/10/2024     TFTs:  TSH   Date Value Ref Range Status   09/09/2024 2.64 0.30 - 4.20 uIU/mL Final   01/06/2022 1.39 0.40 - 4.00 mU/L Final   04/16/2021 1.87 0.40 - 4.00 mU/L Final       PTH:  ENDO CALCIUM LABS-[facility] Latest Ref Rng & Units 3/18/2022   PARATHYROID HORMONE INTACT 18 - 80 pg/mL 38       Vitamin D:  Vitamin D Deficiency Screening Results:  Lab Results   Component Value Date    VITDT 55 03/18/2022    VITDT 48 12/05/2013     DEXA:  DEXA 2/2022:  FINDINGS:                Left Femoral Neck           T-score:  -0.6                       Forearm (radius 33%)      T-score:  -2.3  The spine is not  acceptable for evaluation due to previous surgical changes.    The right femur is not acceptable for evaluation due to previous surgical repair.                           Left Total Hip BMD: 0.861   Previous: 0.882                       Forearm (radius 33%) BMD: 0.677  Previous: NA     Comparison is made to another DXA performed on a different Lunar machine on 7/21/2014.      IMPRESSION  Severe osteoporosis on the basis of hip fracture.      There has been probably no significant cahnge in bone density of the hip(s). The forearm was not included on the previous study so comparison is not possible.       Recommendations include ensuring adequate Calcium and Vitamin D.     DEXA 5/2024:  DXA RESULTS  -LEFT Radius 33%: BMD: 0.823 g/cm2. T-score: -0.6. Z-score: 2.4.   INTERVAL CHANGE   -There has been a 21.6% increase in the left radius 33% BMD.  IMPRESSION: NORMAL. Bone mineral density measurements are within normal limits using T score.     All pertinent notes, labs, and images personally reviewed by me.     A/P  Ms.Sharon SAMRA Stahl is a 84 year old here for the evaluation of:    #1 Osteoporosis:  Severe osteoporosis in the setting of history of right hip fracture.  Risk factors for low bone density include personal history of fracture or family history,  advanced age, female,Estrogen deficiency,early menopause before age 46, Hip fracture, fractures of pelvis and foot.  She has been on Fosamax from [id number].  DEXA 2/2022 as noted above consistent with severe osteoporosis.  History of GERD-under good control with PPI  No upcoming dental procedure.  Labs showing normal calcium, kidney function, vitamin D and PTH levels.  On PROLIA since 5/2022. Tolerating well.  DEXA 3/2024: There has been a 21.6% increase in the left radius 33% BMD.   (hip and/or spine could not be measured or interpreted. )  Plan:  Discussed diagnosis, pathophysiology, management and treatment options of condition with pt.  Discussed osteoporosis in  general.  Plan to continue PROLIA. Next in 6/2025, 12/2025  DEXA 5/2026 onwards-- needs to be ordered.  Follow up in 1 year.    The pt was advised to  Maintain an adequate calcium and vitamin D intake and to supplement vitamin D if needed to maintain serum levels of 25 hydroxy D (25 OH D) between 30-60 ng/ml.  Limit alcohol intake to no more than 2 servings per day.  Limit caffeine intake.  Maintain an active lifestyle including weight-bearing exercises for at least 30 mins daily.  Take measures to reduce the risk of falling.    All possible major side effects of Denosumab (PROLIA) were discussed including risk for atypical femur fractures, hypersensitivity, low calcium levels, osteonecrosis of jaw (which can manifest as jaw pain, osteomyelitis, osteitis, bone erosion, tooth/periodontal infection, toothache, gingival ulceration/erosion). Other s/e include but not limited to Hypertension, Headache and peripheral edema. I also told her to let her dentist lnow about the medication if plan for major dental procedure (currently no plans for dental procedure)  Indication: Prolia  (denosumab) is a prescription medicine used to treat osteoporosis in patients who:   Are at high risk for fracture, meaning patients who have had a fracture related to osteoporosis, or who have multiple risk factors for fracture   Cannot use another osteoporosis medicine or other osteoporosis medicines did not work well   The timeline for early/late injections would be 4 weeks early and any time after the 6 month bella. If a patient receives their injection late, then the subsequent injection would be 6 months from the date that they actually received the injection    Discussed indications, risks and benefits of all medications prescribed, and answered questions to patient's satisfaction.  The longitudinal plan of care for the diagnosis(es)/condition(s) as documented were addressed during this visit. Due to the added complexity in care, I will  continue to support Ya in the subsequent management and with ongoing continuity of care.  All questions were answered.  The patient indicates understanding of the above issues and agrees with the plan set forth.      Follow-up:  As noted in AVS    Mary Funes MD  Endocrinology  [facility]  CC: Devi Romano following steps were completed to comply with the REMS program for Prolia:  Reviewed the serious risks of Prolia  and the symptoms of each risk.  Advised patient to seek prompt medical attention if they have signs or symptoms of any of the serious risks.  Patient will be provided a copy of the Medication Guide and Patient Brochure prior to first injection.    Mary Funes MD

## 2025-03-19 NOTE — PATIENT INSTRUCTIONS
[facility]  Dr Funes, Endocrinology Department    [facility]   [id number]  [place] [address]  Appointment [phone]  Fax: [phone]  [place]: Monday - Thursday      Plan to continue PROLIA. Next in 2025, 2025  Follow up in 1 year.    PROLIA Scheduling information:  It will be done in clinic.   You need to make nurse only appointment. (call [place]: [phone] or [place]:[phone] and schedule Nurse only appointment)  You will need labs few days prior to PROLIA (calcium labs)- please schedule lab appointment.  PROLIA is every 6 months injection- so please schedule accordingly.  It is recommended NOT to miss or delay PROLIA injections.    Possible major side effects of Denosumab (PROLIA) include risk for atypical femur fractures, hypersensitivity, low calcium levels, osteonecrosis of jaw (which can manifest as jaw pain, osteomyelitis, osteitis, bone erosion, tooth/periodontal infection, toothache, gingival ulceration/erosion). Other s/e include but not limited to Hypertension, Headache and peripheral edema.   Always let your dentist lnow about the medication if plan for major dental procedure.    Indication: Prolia  (denosumab) is a prescription medicine used to treat osteoporosis in patients who:   Are at high risk for fracture, meaning patients who have had a fracture related to osteoporosis, or who have multiple risk factors for fracture   Cannot use another osteoporosis medicine or other osteoporosis medicines did not work well   The timeline for early/late injections would be 4 weeks early and any time after the 6 month bella. If a patient receives their injection late, then the subsequent injection would be 6 months from the date that they actually received the injection    The pt was advised to  Maintain an adequate calcium and vitamin D intake and to supplement vitamin D if needed to maintain serum levels of 25 hydroxy D  (25 OH D) between 30-60 ng/ml.  Limit alcohol intake to no more than 2 servings per day.  Limit caffeine intake.  Maintain an active lifestyle including weight-bearing exercises for at least 30 mins daily.  Take measures to reduce the risk of falling.     You should get 1000- 1200 mg/day calcium in divided doses of no more than 500 mg/dose.  This INCLUDES what is in your food as well as what is in any supplements you may be taking.    Vit D about 800-1000 IU/day ( unless you have vit D deficiency- in that case higher dose)  Dietary sources of calcium:: These also contain vitamin D  Milk                            8 oz            300 mg calcium  Yogurt                          1 cup           400 mg calcium   Hard cheese                     1.5 oz          300 mg  Cottage cheese                  2 cup           300 mg  Orange juice with Calcium       8 oz            300 mg  Low fat dairy sources are recommended        You should get 30 minutes of moderate weight bearing exercise on most days of the week .  Weight bearing exercise includes such things as walking, jogging, hiking, dancing.  You should also get Strength training 2 or more times/week in addition to other weight -being exercise. Strength training uses weight or resistance beyond that seen in everyday activities -(pilates, weight training with free weights, weight machines or resistance bands)     Living with Osteoporosis: Preventing Fractures  If you have osteoporosis, you can do a lot to reduce its effect on your life. Knowing how to prevent fractures and spinal curvature can help you live more comfortably and safely with this disease.  Reducing your risk for fractures  The most common fracture sites in people with osteoporosis are the wrist, spine, and hip. These fractures are often caused by accidents and falls. All fractures are painful and may limit what you can do. But hip fractures are very serious. They often need surgery, and it can take months  to recover. To reduce your risk for fractures:  Get regular exercise. Try walking, swimming, or weight training.  Eat foods that are rich in calcium, or take calcium supplements.  Make your home safe to help avoid accidents.  Take extra precautions not to fall in risky areas, such as icy sidewalks.  Understanding spinal fractures  Your spine is made up of many bones called vertebrae. Osteoporosis can cause the vertebrae in your spine to collapse. As a result, your upper back may arch forward, creating a curvature. Spine fractures may also result from back strain and bad posture. You will also lose height. Your lower spine must then adjust to keep your body balanced. This can cause back pain. To prevent or lessen these spinal changes:  Practice good posture.  Use proper techniques if you need to lift heavy objects.  Do back exercises to help your posture.  Lie on your back when you have pain.  Ask your healthcare provider about these and other ways to help your spine.  [employer] last reviewed this educational content on 5/1/2018 2000-2021 [employer]. All rights reserved. This information is not intended as a substitute for professional medical care. Always follow your healthcare professional's instructions.          Living with Osteoporosis: Regular Exercise  If you have osteoporosis, exercise is vital for your health. It can prevent bone fractures and spine changes. It will slow bone loss. Exercise will strengthen your body. It can also be fun. A variety of exercises is best. See below for exercises that can help you. But before you start, talk with your healthcare provider to be sure these exercises are right for you.   Resistance exercises. These build muscle strength and maintain bone mass. They also make you less prone to injury. Exercises include lifting small weights, doing push-ups and sit-ups, using elastic exercise bands, and using weight machines.   Weight-bearing activities. These help  your whole body. They also help you maintain bone mass. Activities include walking, dancing, and housework.   Non-weight-bearing exercises. These help prevent back strain and pain. They do this by building the trunk and leg muscles. Exercises that help with flexibility can prevent falls. Examples include swimming, water exercise, and stretching.   Staying safe  Here are tips to stay safe:   Always check with your healthcare provider before starting any new exercise program.  Use weights only as instructed.  Stop any exercise that causes pain.  [employer] last reviewed this educational content on 5/1/2018 2000-2021 [employer]. All rights reserved. This information is not intended as a substitute for professional medical care. Always follow your healthcare professional's instructions.          Preventing Osteoporosis: Avoiding Bone Loss  Certain factors can speed up bone loss or decrease bone growth. For example, alcohol, cigarettes, and certain medicines reduce bone mass. Some foods make it hard for your body to absorb calcium.  Things to avoid  Here are things to avoid to help prevent osteoporosis:  Alcohol. This is toxic to bones. It is a major cause of bone loss. Heavy drinking can cause osteoporosis even if you have no other risk factors.  Smoking. This reduces bone mass. Smoking may also interfere with estrogen levels and cause early menopause.  Inactivity. Not being active makes your bones lose strength and become thinner. Over time, thin bones may break. Women who aren't active are at a high risk for osteoporosis.  Certain medicines. Some medicines, such as cortisone, increase bone loss. They also decrease bone growth. Ask your healthcare provider about any side effects of your medicines, and how to prevent them.  Protein-rich or salty foods. Eaten in large amounts, these foods may deplete calcium.  Caffeine. This increases calcium loss. People who drink a lot of coffee, tea, or soda lose more  calcium than those who don't.  [employer] last reviewed this educational content on 2018-2021 [employer]. All rights reserved. This information is not intended as a substitute for professional medical care. Always follow your healthcare professional's instructions.          Preventing Osteoporosis: Meeting Your Calcium Needs  Your body needs calcium to build and repair bones. But it can't make calcium on its own. That's why it's important to eat calcium-rich foods. Some foods are naturally rich in calcium. Others have calcium added (fortified). It's best to get calcium from the foods you eat. But if you can't get enough, you may want to take calcium supplements. To meet your daily calcium needs, try the foods listed below.                          Dairy Fish & beans Other sources   Source   Calcium (mg) per serving   Source   Calcium (mg) per serving   Source   Calcium (mg) per serving   Low-fat yogurt, plain   415 mg/8 oz.   Sardines, [place], canned, with bones   351 mg/3 oz.   Oatmeal, instant, fortified   215 mg/1 cup   Nonfat milk   302 mg/1 cup   [place], sockeye, canned, with bones   239 mg/3 oz.   Tofu made with calcium sulfate   204 mg/3 oz.   Low-fat milk   297 mg/1 cup   Soybeans, fresh, boiled   131 mg/1/2 cup   Collards   179 mg/1/2 cup   Swiss cheese   272 mg/1 oz.   White beans, cooked   81 mg/1/2 cup   English muffin, whole wheat   175 mg/1 muffin   Cheddar cheese   205 mg/1 oz.   Navy beans, cooked   79 mg/1/2 cup   Kale   90 mg/1/2 cup   Ice cream strawberry   79 mg/1/2 cup           Orange, navel   56 mg/1 medium   Note: Calcium levels may vary depending on brand and size.  Daily calcium needs  14 to 18 years old: 1,300 mg  19 to 30 years old: 1,000 mg  31 to 50 years old: 1,000 mg  51 to 70 years old, women: 1,200 mg  51 to 70 years old, men: 1,000 mg  Pregnant or nursin to 18 years old: 1,300 mg, 19 to 50 years old: 1,000 mg  Older than 70 (women and men): 1,200 mg    [employer] last reviewed this educational content on 5/1/2018 2000-2021 [employer]. All rights reserved. This information is not intended as a substitute for professional medical care. Always follow your healthcare professional's instructions.

## 2025-04-01 NOTE — PROGRESS NOTES
Chronic, controlled with A1c 7.1  Follows with endocrinology  Continue Semglee 50 units daily, Humalog 16 units with meals  Continue gabapentin 100 mg twice daily for neuropathy   DTP #6 resolved, received Shingrix vaccine

## 2025-04-07 DIAGNOSIS — I25.10 ASCVD (ARTERIOSCLEROTIC CARDIOVASCULAR DISEASE): ICD-10-CM

## 2025-04-07 DIAGNOSIS — E78.5 HYPERLIPIDEMIA LDL GOAL <70: ICD-10-CM

## 2025-04-07 RX ORDER — ATORVASTATIN CALCIUM 20 MG/1
20 TABLET, FILM COATED ORAL DAILY
Qty: 90 TABLET | Refills: 1 | Status: SHIPPED | OUTPATIENT
Start: 2025-04-07

## 2025-04-10 ENCOUNTER — TRANSFERRED RECORDS (OUTPATIENT)
Dept: HEALTH INFORMATION MANAGEMENT | Facility: CLINIC | Age: 85
End: 2025-04-10
Payer: COMMERCIAL

## 2025-04-18 ENCOUNTER — TRANSFERRED RECORDS (OUTPATIENT)
Dept: HEALTH INFORMATION MANAGEMENT | Facility: CLINIC | Age: 85
End: 2025-04-18
Payer: COMMERCIAL

## 2025-04-21 ENCOUNTER — LAB (OUTPATIENT)
Dept: LAB | Facility: CLINIC | Age: 85
End: 2025-04-21
Payer: COMMERCIAL

## 2025-04-21 DIAGNOSIS — M25.551 RIGHT HIP PAIN: Primary | ICD-10-CM

## 2025-04-21 LAB
CRP SERPL-MCNC: <3 MG/L
ERYTHROCYTE [DISTWIDTH] IN BLOOD BY AUTOMATED COUNT: 14.4 % (ref 10–15)
ERYTHROCYTE [SEDIMENTATION RATE] IN BLOOD BY WESTERGREN METHOD: 12 MM/HR (ref 0–30)
HCT VFR BLD AUTO: 36.9 % (ref 35–47)
HGB BLD-MCNC: 11.8 G/DL (ref 11.7–15.7)
MCH RBC QN AUTO: 31.1 PG (ref 26.5–33)
MCHC RBC AUTO-ENTMCNC: 32 G/DL (ref 31.5–36.5)
MCV RBC AUTO: 97 FL (ref 78–100)
PLATELET # BLD AUTO: 256 10E3/UL (ref 150–450)
RBC # BLD AUTO: 3.79 10E6/UL (ref 3.8–5.2)
WBC # BLD AUTO: 6.5 10E3/UL (ref 4–11)

## 2025-04-21 PROCEDURE — 85652 RBC SED RATE AUTOMATED: CPT

## 2025-04-21 PROCEDURE — 86140 C-REACTIVE PROTEIN: CPT

## 2025-04-21 PROCEDURE — 36415 COLL VENOUS BLD VENIPUNCTURE: CPT

## 2025-04-21 PROCEDURE — 85014 HEMATOCRIT: CPT

## 2025-05-09 ENCOUNTER — TRANSFERRED RECORDS (OUTPATIENT)
Dept: HEALTH INFORMATION MANAGEMENT | Facility: CLINIC | Age: 85
End: 2025-05-09
Payer: COMMERCIAL

## 2025-05-19 ENCOUNTER — TELEPHONE (OUTPATIENT)
Dept: ENDOCRINOLOGY | Facility: CLINIC | Age: 85
End: 2025-05-19
Payer: COMMERCIAL

## 2025-05-19 NOTE — TELEPHONE ENCOUNTER
Patient has upcoming prolia injection, PA pending. Routing to CAM team for review.     Appointments in Next Year      Jun 03, 2025 11:00 AM  Nurse Visit with Tony Grossman  [facility] ([facility] ) [phone]     Summer RN 10:44 AM May 19, 2025   [facility]

## 2025-05-23 ENCOUNTER — TRANSFERRED RECORDS (OUTPATIENT)
Dept: HEALTH INFORMATION MANAGEMENT | Facility: CLINIC | Age: 85
End: 2025-05-23
Payer: COMMERCIAL

## 2025-05-24 ENCOUNTER — TRANSFERRED RECORDS (OUTPATIENT)
Dept: HEALTH INFORMATION MANAGEMENT | Facility: CLINIC | Age: 85
End: 2025-05-24
Payer: COMMERCIAL

## 2025-05-29 ENCOUNTER — TRANSFERRED RECORDS (OUTPATIENT)
Dept: HEALTH INFORMATION MANAGEMENT | Facility: CLINIC | Age: 85
End: 2025-05-29
Payer: COMMERCIAL

## 2025-05-31 ENCOUNTER — LAB (OUTPATIENT)
Dept: LAB | Facility: CLINIC | Age: 85
End: 2025-05-31
Payer: COMMERCIAL

## 2025-05-31 DIAGNOSIS — Z47.1 AFTERCARE FOLLOWING JOINT REPLACEMENT: ICD-10-CM

## 2025-05-31 DIAGNOSIS — Z78.0 ASYMPTOMATIC MENOPAUSAL STATE: ICD-10-CM

## 2025-05-31 DIAGNOSIS — M81.0 SENILE OSTEOPOROSIS: ICD-10-CM

## 2025-05-31 DIAGNOSIS — Z98.1 S/P LUMBAR FUSION: ICD-10-CM

## 2025-05-31 DIAGNOSIS — Z87.81 H/O FRACTURE OF RIGHT HIP: ICD-10-CM

## 2025-05-31 DIAGNOSIS — Z92.29 PERSONAL HISTORY OF OTHER DRUG THERAPY: ICD-10-CM

## 2025-05-31 DIAGNOSIS — Z92.29 HISTORY OF BISPHOSPHONATE THERAPY: ICD-10-CM

## 2025-05-31 DIAGNOSIS — K21.9 GASTROESOPHAGEAL REFLUX DISEASE, UNSPECIFIED WHETHER ESOPHAGITIS PRESENT: ICD-10-CM

## 2025-05-31 DIAGNOSIS — Z87.81 HISTORY OF HIP FRACTURE: ICD-10-CM

## 2025-05-31 DIAGNOSIS — S42.123A: ICD-10-CM

## 2025-05-31 DIAGNOSIS — M89.9 BONE DISEASE: ICD-10-CM

## 2025-05-31 PROCEDURE — 82565 ASSAY OF CREATININE: CPT

## 2025-05-31 PROCEDURE — 80076 HEPATIC FUNCTION PANEL: CPT

## 2025-05-31 PROCEDURE — 82306 VITAMIN D 25 HYDROXY: CPT

## 2025-05-31 PROCEDURE — 36415 COLL VENOUS BLD VENIPUNCTURE: CPT

## 2025-05-31 PROCEDURE — 82310 ASSAY OF CALCIUM: CPT

## 2025-06-01 LAB
ALBUMIN SERPL BCG-MCNC: 4.2 G/DL (ref 3.5–5.2)
ALP SERPL-CCNC: 58 U/L (ref 40–150)
ALT SERPL W P-5'-P-CCNC: 17 U/L (ref 0–50)
AST SERPL W P-5'-P-CCNC: 26 U/L (ref 0–45)
BILIRUB SERPL-MCNC: 0.6 MG/DL
BILIRUBIN DIRECT (ROCHE PRO & PURE): 0.23 MG/DL (ref 0–0.45)
CALCIUM SERPL-MCNC: 9.8 MG/DL (ref 8.8–10.4)
CREAT SERPL-MCNC: 1.11 MG/DL (ref 0.51–0.95)
EGFRCR SERPLBLD CKD-EPI 2021: 49 ML/MIN/1.73M2
PROT SERPL-MCNC: 6.9 G/DL (ref 6.4–8.3)
VIT D+METAB SERPL-MCNC: 66 NG/ML (ref 20–50)

## 2025-06-03 ENCOUNTER — ALLIED HEALTH/NURSE VISIT (OUTPATIENT)
Dept: NURSING | Facility: CLINIC | Age: 85
End: 2025-06-03
Payer: COMMERCIAL

## 2025-06-03 DIAGNOSIS — M81.8 IDIOPATHIC OSTEOPOROSIS: Primary | ICD-10-CM

## 2025-06-03 PROCEDURE — 99207 PR NO CHARGE NURSE ONLY: CPT | Performed by: INTERNAL MEDICINE

## 2025-06-03 PROCEDURE — 96372 THER/PROPH/DIAG INJ SC/IM: CPT | Performed by: INTERNAL MEDICINE

## 2025-06-03 NOTE — PROGRESS NOTES
Clinic Administered Medication Documentation      Prolia Documentation    Indication: Prolia  (denosumab) is a prescription medicine used to treat osteoporosis in patients who:   Are at high risk for fracture, meaning patients who have had a fracture related to osteoporosis, or who have multiple risk factors for fracture.  Cannot use another osteoporosis medicine or other osteoporosis medicines did not work well.  The timeline for early/late injections would be 4 weeks early and any time after the 6 month bella. If a patient receives their injection late, then the subsequent injection would be 6 months from the date that they actually received the injection.    When was the last injection?  12/3/24  Was the last injection at least 6 months ago? Yes  Has the prior authorization been completed?  Yes  Is there an active order (written within the past 365 days, with administrations remaining, not ) in the chart?  Yes   Calcium   Date Value Ref Range Status   2025 9.8 8.8 - 10.4 mg/dL Final   2021 8.9 8.5 - 10.1 mg/dL Final     Has patient had a Calcium test in the last 12 months? Yes   Is the calcium result 8.8 or above? Yes   GFR Estimate   Date Value Ref Range Status   2025 49 (L) >60 mL/min/1.73m2 Final     Comment:     eGFR calculated using  CKD-EPI equation.   2021 79 >60 mL/min/[1.73_m2] Final     Comment:     Non  GFR Calc  Starting 2018, serum creatinine based estimated GFR (eGFR) will be   calculated using the Chronic Kidney Disease Epidemiology Collaboration   (CKD-EPI) equation.       Has patient had a GFR within the last 12 months? Yes   Is GFR under 30, or patient has a diagnosis of CKD4 or CKD5? No   Patient denies gastric bypass or parathyroid surgery in past 6 months? Yes - patient denies.   Patient denies undergoing any dental procedures involving drilling into the bone, such as implants, extractions, or oral surgery, within the past two months  that have not yet healed?  Yes - patient denies  Patient denies plans for an emergency tooth extraction within the next week? Yes    The following steps were completed to comply with the REMS program for Prolia:  Reviewed information in the Medication Guide, including the serious risks of Prolia  and the symptoms of each risk.  Advised patient to seek prompt medical attention if they have signs or symptoms of any of the serious risks.  Provided each patient a copy of the Medication Guide and Patient Guide.    Prior to injection, verified patient identity using patient's name and date of birth. Medication was administered. Please see MAR and medication order for additional information. Patient instructed to remain in clinic for 15 minutes and report any adverse reaction to staff immediately.    Vial/Syringe: Syringe  Was this medication supplied by the patient? No  Verified that the patient has administrations remaining in their prescription.

## 2025-06-04 DIAGNOSIS — R05.3 CHRONIC COUGH: ICD-10-CM

## 2025-06-04 RX ORDER — BENZONATATE 200 MG/1
200 CAPSULE ORAL 3 TIMES DAILY PRN
Qty: 90 CAPSULE | Refills: 1 | Status: SHIPPED | OUTPATIENT
Start: 2025-06-04

## 2025-06-26 DIAGNOSIS — E03.9 HYPOTHYROIDISM, UNSPECIFIED TYPE: ICD-10-CM

## 2025-06-26 RX ORDER — LEVOTHYROXINE SODIUM 88 UG/1
88 TABLET ORAL DAILY
Qty: 90 TABLET | Refills: 0 | Status: SHIPPED | OUTPATIENT
Start: 2025-06-26

## 2025-07-10 ENCOUNTER — TRANSFERRED RECORDS (OUTPATIENT)
Dept: HEALTH INFORMATION MANAGEMENT | Facility: CLINIC | Age: 85
End: 2025-07-10
Payer: COMMERCIAL

## 2025-07-14 ENCOUNTER — OFFICE VISIT (OUTPATIENT)
Dept: PEDIATRICS | Facility: CLINIC | Age: 85
End: 2025-07-14
Payer: COMMERCIAL

## 2025-07-14 VITALS
OXYGEN SATURATION: 99 % | SYSTOLIC BLOOD PRESSURE: 143 MMHG | WEIGHT: 142.9 LBS | HEART RATE: 45 BPM | HEIGHT: 64 IN | RESPIRATION RATE: 20 BRPM | BODY MASS INDEX: 24.4 KG/M2 | TEMPERATURE: 97 F | DIASTOLIC BLOOD PRESSURE: 69 MMHG

## 2025-07-14 DIAGNOSIS — E78.5 HYPERLIPIDEMIA LDL GOAL <70: ICD-10-CM

## 2025-07-14 DIAGNOSIS — R39.15 URINARY URGENCY: ICD-10-CM

## 2025-07-14 DIAGNOSIS — Z00.00 ROUTINE GENERAL MEDICAL EXAMINATION AT A HEALTH CARE FACILITY: Primary | ICD-10-CM

## 2025-07-14 DIAGNOSIS — K21.9 GASTROESOPHAGEAL REFLUX DISEASE, UNSPECIFIED WHETHER ESOPHAGITIS PRESENT: ICD-10-CM

## 2025-07-14 DIAGNOSIS — I10 ESSENTIAL HYPERTENSION: ICD-10-CM

## 2025-07-14 DIAGNOSIS — E03.9 HYPOTHYROIDISM, UNSPECIFIED TYPE: ICD-10-CM

## 2025-07-14 DIAGNOSIS — I25.10 ASCVD (ARTERIOSCLEROTIC CARDIOVASCULAR DISEASE): ICD-10-CM

## 2025-07-14 DIAGNOSIS — R05.3 CHRONIC COUGH: ICD-10-CM

## 2025-07-14 DIAGNOSIS — Z12.31 ENCOUNTER FOR SCREENING MAMMOGRAM FOR BREAST CANCER: ICD-10-CM

## 2025-07-14 LAB
ALBUMIN SERPL BCG-MCNC: 4.3 G/DL (ref 3.5–5.2)
ALP SERPL-CCNC: 48 U/L (ref 40–150)
ALT SERPL W P-5'-P-CCNC: 17 U/L (ref 0–50)
ANION GAP SERPL CALCULATED.3IONS-SCNC: 9 MMOL/L (ref 7–15)
AST SERPL W P-5'-P-CCNC: 30 U/L (ref 0–45)
BILIRUB SERPL-MCNC: 1 MG/DL
BUN SERPL-MCNC: 26.3 MG/DL (ref 8–23)
CALCIUM SERPL-MCNC: 9.7 MG/DL (ref 8.8–10.4)
CHLORIDE SERPL-SCNC: 99 MMOL/L (ref 98–107)
CHOLEST SERPL-MCNC: 156 MG/DL
CREAT SERPL-MCNC: 1.01 MG/DL (ref 0.51–0.95)
EGFRCR SERPLBLD CKD-EPI 2021: 55 ML/MIN/1.73M2
FASTING STATUS PATIENT QL REPORTED: YES
FASTING STATUS PATIENT QL REPORTED: YES
GLUCOSE SERPL-MCNC: 111 MG/DL (ref 70–99)
HCO3 SERPL-SCNC: 27 MMOL/L (ref 22–29)
HDLC SERPL-MCNC: 74 MG/DL
LDLC SERPL CALC-MCNC: 66 MG/DL
NONHDLC SERPL-MCNC: 82 MG/DL
POTASSIUM SERPL-SCNC: 4.6 MMOL/L (ref 3.4–5.3)
PROT SERPL-MCNC: 7.2 G/DL (ref 6.4–8.3)
SODIUM SERPL-SCNC: 135 MMOL/L (ref 135–145)
T4 FREE SERPL-MCNC: 1.39 NG/DL (ref 0.9–1.7)
TRIGL SERPL-MCNC: 82 MG/DL
TSH SERPL DL<=0.005 MIU/L-ACNC: 5.82 UIU/ML (ref 0.3–4.2)

## 2025-07-14 PROCEDURE — 84439 ASSAY OF FREE THYROXINE: CPT | Performed by: INTERNAL MEDICINE

## 2025-07-14 PROCEDURE — 1125F AMNT PAIN NOTED PAIN PRSNT: CPT | Performed by: INTERNAL MEDICINE

## 2025-07-14 PROCEDURE — 3048F LDL-C <100 MG/DL: CPT | Performed by: INTERNAL MEDICINE

## 2025-07-14 PROCEDURE — G0439 PPPS, SUBSEQ VISIT: HCPCS | Performed by: INTERNAL MEDICINE

## 2025-07-14 PROCEDURE — 80053 COMPREHEN METABOLIC PANEL: CPT | Performed by: INTERNAL MEDICINE

## 2025-07-14 PROCEDURE — 3077F SYST BP >= 140 MM HG: CPT | Performed by: INTERNAL MEDICINE

## 2025-07-14 PROCEDURE — 3078F DIAST BP <80 MM HG: CPT | Performed by: INTERNAL MEDICINE

## 2025-07-14 PROCEDURE — 99214 OFFICE O/P EST MOD 30 MIN: CPT | Mod: 25 | Performed by: INTERNAL MEDICINE

## 2025-07-14 PROCEDURE — 36415 COLL VENOUS BLD VENIPUNCTURE: CPT | Performed by: INTERNAL MEDICINE

## 2025-07-14 PROCEDURE — 80061 LIPID PANEL: CPT | Performed by: INTERNAL MEDICINE

## 2025-07-14 PROCEDURE — 84443 ASSAY THYROID STIM HORMONE: CPT | Performed by: INTERNAL MEDICINE

## 2025-07-14 RX ORDER — MIRABEGRON 50 MG/1
50 TABLET, FILM COATED, EXTENDED RELEASE ORAL DAILY
Qty: 90 TABLET | Refills: 4 | Status: SHIPPED | OUTPATIENT
Start: 2025-07-14

## 2025-07-14 RX ORDER — FAMOTIDINE 40 MG/1
40 TABLET, FILM COATED ORAL AT BEDTIME
Qty: 90 TABLET | Refills: 4 | Status: SHIPPED | OUTPATIENT
Start: 2025-07-14

## 2025-07-14 RX ORDER — LEVOTHYROXINE SODIUM 88 UG/1
88 TABLET ORAL DAILY
Qty: 90 TABLET | Refills: 4 | Status: SHIPPED | OUTPATIENT
Start: 2025-07-14

## 2025-07-14 RX ORDER — BENZONATATE 200 MG/1
200 CAPSULE ORAL 3 TIMES DAILY PRN
Qty: 90 CAPSULE | Refills: 4 | Status: SHIPPED | OUTPATIENT
Start: 2025-07-14

## 2025-07-14 RX ORDER — ATORVASTATIN CALCIUM 20 MG/1
20 TABLET, FILM COATED ORAL DAILY
Qty: 90 TABLET | Refills: 4 | Status: SHIPPED | OUTPATIENT
Start: 2025-07-14

## 2025-07-14 RX ORDER — LOSARTAN POTASSIUM 50 MG/1
50 TABLET ORAL DAILY
Qty: 90 TABLET | Refills: 3 | Status: SHIPPED | OUTPATIENT
Start: 2025-07-14

## 2025-07-14 SDOH — HEALTH STABILITY: PHYSICAL HEALTH: ON AVERAGE, HOW MANY DAYS PER WEEK DO YOU ENGAGE IN MODERATE TO STRENUOUS EXERCISE (LIKE A BRISK WALK)?: 0 DAYS

## 2025-07-14 ASSESSMENT — SOCIAL DETERMINANTS OF HEALTH (SDOH): HOW OFTEN DO YOU GET TOGETHER WITH FRIENDS OR RELATIVES?: TWICE A WEEK

## 2025-07-14 ASSESSMENT — PAIN SCALES - GENERAL: PAINLEVEL_OUTOF10: MODERATE PAIN (4)

## 2025-07-14 NOTE — PROGRESS NOTES
Preventive Care Visit  [facility]  Geronimo Jeter MD, Internal Medicine - Pediatrics  Jul 14, 2025      Assessment & Plan       ICD-10-CM    1. Routine general medical examination at a health care facility  Z00.00       2. Hyperlipidemia LDL goal <70  E78.5 atorvastatin (LIPITOR) 20 MG tablet     Lipid panel reflex to direct LDL Fasting     Comprehensive metabolic panel     Lipid panel reflex to direct LDL Fasting     Comprehensive metabolic panel      3. ASCVD (arteriosclerotic cardiovascular disease)  I25.10 atorvastatin (LIPITOR) 20 MG tablet     Comprehensive metabolic panel     Comprehensive metabolic panel      4. Chronic cough  R05.3 benzonatate (TESSALON) 200 MG capsule      5. Gastroesophageal reflux disease, unspecified whether esophagitis present  K21.9 famotidine (PEPCID) 40 MG tablet     Comprehensive metabolic panel     Comprehensive metabolic panel      6. Hypothyroidism, unspecified type  E03.9 levothyroxine (SYNTHROID/LEVOTHROID) 88 MCG tablet     TSH with free T4 reflex     TSH with free T4 reflex     T4 free      7. Urinary urgency  R39.15 mirabegron (MYRBETRIQ) 50 MG 24 hr tablet      8. Essential hypertension  I10 losartan (COZAAR) 50 MG tablet     Comprehensive metabolic panel     Comprehensive metabolic panel      9. Encounter for screening mammogram for breast cancer  Z12.31 MA Screen Bilateral w/Catarino        Here for AWV. HM reviewed, discussed. Is fasting today for labwork. Mammogram order signed.    Chronic health issues reviewed.    ASCVD, HTN, Hyperlipidemia. Overall well controlled. No active cardiac symptoms. Labwork ordered as needed. Meds updated.    Chronic cough. Benzonatate refilled.    GERD. No dysphagia. Continue famotidine.    Hypothyroid. After visit:  Lab Results   Component Value Date    TSH 5.82 07/14/2025    TSH 2.64 09/09/2024     Lab Results   Component Value Date    T4 1.39 07/14/2025   TSH is slightly elevated but fT4 is normal. For now, continue  current dosing.    Urinary urgency. Refilled Myrbetriq.    Left thigh numbness. Clinically c/w meralgia paresthetica. No specific intervention needed.    Right leg numbness. Follow-up as planned with [facility].     Counseling  Appropriate preventive services were addressed with this patient via screening, questionnaire, or discussion as appropriate for fall prevention, nutrition, physical activity, Tobacco-use cessation, social engagement, weight loss and cognition.  Checklist reviewing preventive services available has been given to the patient.  Reviewed patient's diet, addressing concerns and/or questions.   Discussed possible causes of fatigue. Updated plan of care.  Patient reported difficulty with activities of daily living were addressed today.The patient was provided with written information regarding signs of hearing loss.   Information on urinary incontinence and treatment options given to patient.       Geronimo Jeter MD     Janet Pandya is a 85 year old, presenting for the following:  Annual Visit        7/14/2025     9:09 AM   Additional Questions   Roomed by [facility]        HPI  Here for AWV. Overall is feeling well.     Has noted left thigh numbness, intermittent. Mainly anterior to lateral. No known inciting cause.     Also lower leg on the right side. Independent of the left thigh sx.  Does have pain in the right buttock area - has upcoming visit at [facility] for evaluation.     Chronic health issues reviewed.    ASCVD, HTN, hyperlipidemia. No cardiac sx such as CP, palpitations, PND, orthopnea, YATES or peripheral edema.  BP Readings from Last 3 Encounters:   07/14/25 (!) 143/69   03/19/25 (!) 144/81   02/10/25 115/72     Reviewed goals for BP control.    Chronic cough. Benzonatate helps.    GERD. No dysphagia sx.    Hypothyroid. No sx related to thyroid.     Urinary urgency. Myrbetriq helps.     Advance Care Planning    Patient states has Health Care Directive and will send to [employer].        7/14/2025    General Health   How would you rate your overall physical health? (!) FAIR   Feel stress (tense, anxious, or unable to sleep) Only a little   (!) STRESS CONCERN      7/14/2025   Nutrition   Diet: Regular (no restrictions)         7/14/2025   Exercise   Days per week of moderate/strenous exercise 0 days   (!) EXERCISE CONCERN      7/14/2025   Social Factors   Frequency of gathering with friends or relatives Twice a week   Worry food won't last until get money to buy more No   Food not last or not have enough money for food? No   Do you have housing? (Housing is defined as stable permanent housing and does not include staying outside in a car, in a tent, in an abandoned building, in an overnight shelter, or couch-surfing.) Yes   Are you worried about losing your housing? No   Lack of transportation? No   Unable to get utilities (heat,electricity)? No         7/14/2025   Fall Risk   Fallen 2 or more times in the past year? No    No   Trouble with walking or balance? No    Yes       Multiple values from one day are sorted in reverse-chronological order          7/14/2025   Activities of Daily Living- Home Safety   Needs help with the following daily activites Housework   Do you have the help that you need? Yes for Some   Safety concerns in the home None of the above         7/14/2025   Dental   Dentist two times every year? Yes         7/14/2025   Hearing Screening   Hearing concerns? (!) I NEED TO ASK PEOPLE TO SPEAK UP OR REPEAT THEMSELVES.    (!) IT'S HARD TO FOLLOW A CONVERSATION IN A NOISY RESTAURANT OR CROWDED ROOM.    (!) TROUBLE UNDERSTANDING SOFT OR WHISPERED SPEECH.   Would you like a referral for hearing testing? I already have hearing aids       Multiple values from one day are sorted in reverse-chronological order         7/14/2025   Driving Risk Screening   Patient/family members have concerns about driving No         7/14/2025   General Alertness/Fatigue Screening   Have you been more tired than usual  lately? (!) YES         7/14/2025   Urinary Incontinence Screening   Bothered by leaking urine in past 6 months Yes         Today's PHQ-2 Score:       7/14/2025     8:59 AM   PHQ-2 ( 1999 Pfizer)   Q1: Little interest or pleasure in doing things 0   Q2: Feeling down, depressed or hopeless 1   PHQ-2 Score 1    Q1: Little interest or pleasure in doing things Not at all   Q2: Feeling down, depressed or hopeless Several days   PHQ-2 Score 1       Patient-reported           7/14/2025   Substance Use   Alcohol more than 3/day or more than 7/wk No   Do you have a current opioid prescription? No   How severe/bad is pain from 1 to 10? 3/10   Do you use any other substances recreationally? No     Social History     Tobacco Use    Smoking status: Never     Passive exposure: Never    Smokeless tobacco: Never   Vaping Use    Vaping status: Never Used   Substance Use Topics    Alcohol use: Not Currently     Comment: Maybe 1x amonth    Drug use: No           4/22/2024   LAST FHS-7 RESULTS   1st degree relative breast or ovarian cancer Yes   Any relative bilateral breast cancer No   Any male have breast cancer No   Any ONE woman have BOTH breast AND ovarian cancer No   Any woman with breast cancer before 50yrs No   2 or more relatives with breast AND/OR ovarian cancer No   2 or more relatives with breast AND/OR bowel cancer No           Current providers sharing in care for this patient include:  Patient Care Team:  Geronimo Jeter MD as PCP - General (Internal Medicine - Pediatrics)  Mary Funes MD as Hospitalist (Endocrinology, Diabetes, and Metabolism)  Mary Funes MD as Assigned Endocrinology Provider  Ye Waldrop MD as MD (Allergy & Immunology)  Shankar Eric MD as Referring Physician (Family Practice)  Nuzhat Velasquez APRN CNP as Nurse Practitioner (Cardiovascular Disease)  Kati Busby RPH as Pharmacist (Pharmacist)  Kati Busby RPH as Assigned [facility] Pharmacist  Kentrell Hess MD as MD  "(Neurology)  Sanchez Ho MD as MD (Otolaryngology)  Chano Laird DO as Assigned Neuroscience Provider  Venancio Montanez MD as Assigned Heart and Vascular Provider  Geronimo Jeter MD as Assigned PCP  Cassie Black MD as MD (Cardiovascular Disease)  Yehuda Madrid MD as Assigned Pulmonology Provider    The following health maintenance items are reviewed in Epic and correct as of today:  Health Maintenance   Topic Date Due    ANNUAL REVIEW OF HM ORDERS  04/10/2024    COVID-19 VACCINE (8 - 2024-25 season) 03/21/2025    LIPID  07/10/2025    MEDICARE ANNUAL WELLNESS VISIT  07/10/2025    INFLUENZA VACCINE (1) 09/01/2025    BMP  09/09/2025    TSH W/FREE T4 REFLEX  09/09/2025    DEXA  05/13/2026    FALL RISK ASSESSMENT  07/14/2026    ADVANCE CARE PLANNING  07/13/2029    DTAP/TDAP/TD VACCINE (4 - Td or Tdap) 04/11/2033    PHQ-2 (once per calendar year)  Completed    PNEUMOCOCCAL VACCINE 50+ YEARS  Completed    ZOSTER VACCINE  Completed    RSV VACCINE  Completed    HPV VACCINE  Aged Out    MENINGITIS VACCINE  Aged Out    MAMMO SCREENING  Discontinued            Objective    Exam  BP (!) 143/69 (BP Location: Right arm, Patient Position: Sitting, Cuff Size: Adult Regular)   Pulse (!) 45   Temp 97  F (36.1  C) (Temporal)   Resp 20   Ht 1.613 m (5' 3.5\")   Wt 64.8 kg (142 lb 14.4 oz)   LMP  (LMP Unknown)   SpO2 99%   BMI 24.91 kg/m     Estimated body mass index is 24.91 kg/m  as calculated from the following:    Height as of this encounter: 1.613 m (5' 3.5\").    Weight as of this encounter: 64.8 kg (142 lb 14.4 oz).    Physical Exam  GENERAL: healthy, alert and no distress  EYES: PERRL, EOMI  HENT: ear canals and TM's normal. No nasal discharge. OP moist.  NECK: no adenopathy  RESP: lungs clear to auscultation - no rales, rhonchi or wheezes  CV: regular rate and rhythm, normal S1 S2, no murmur, no peripheral edema  ABDOMEN: soft, nontender, bowel sounds normal  MS: no gross " musculoskeletal defects noted. Hips with normal ROM. No  point tenderness over the lumbar spine.   SKIN: no suspicious lesions or rashes  NEURO: Normal strength and tone  PSYCH: mentation appears normal, affect normal          7/14/2025   Mini Cog   Clock Draw Score 2 Normal   3 Item Recall 3 objects recalled   Mini Cog Total Score 5              Signed Electronically by: Geronimo Jeter MD

## 2025-07-15 ENCOUNTER — HOSPITAL ENCOUNTER (OUTPATIENT)
Dept: MAMMOGRAPHY | Facility: CLINIC | Age: 85
Discharge: HOME | End: 2025-07-15
Attending: INTERNAL MEDICINE
Payer: COMMERCIAL

## 2025-07-15 ENCOUNTER — PATIENT OUTREACH (OUTPATIENT)
Dept: CARE COORDINATION | Facility: CLINIC | Age: 85
End: 2025-07-15
Payer: COMMERCIAL

## 2025-07-15 DIAGNOSIS — Z12.31 ENCOUNTER FOR SCREENING MAMMOGRAM FOR BREAST CANCER: ICD-10-CM

## 2025-07-15 PROCEDURE — 77063 BREAST TOMOSYNTHESIS BI: CPT

## 2025-07-18 ENCOUNTER — TRANSFERRED RECORDS (OUTPATIENT)
Dept: HEALTH INFORMATION MANAGEMENT | Facility: CLINIC | Age: 85
End: 2025-07-18
Payer: COMMERCIAL

## 2025-08-20 ENCOUNTER — ALLIED HEALTH/NURSE VISIT (OUTPATIENT)
Dept: PEDIATRICS | Facility: CLINIC | Age: 85
End: 2025-08-20
Payer: COMMERCIAL

## 2025-08-20 VITALS — SYSTOLIC BLOOD PRESSURE: 146 MMHG | DIASTOLIC BLOOD PRESSURE: 79 MMHG | HEART RATE: 47 BPM

## 2025-08-20 DIAGNOSIS — Z01.30 BP CHECK: Primary | ICD-10-CM

## 2025-08-20 PROCEDURE — 3077F SYST BP >= 140 MM HG: CPT | Performed by: INTERNAL MEDICINE

## 2025-08-20 PROCEDURE — 3078F DIAST BP <80 MM HG: CPT | Performed by: INTERNAL MEDICINE

## 2025-08-20 PROCEDURE — 99207 PR NO CHARGE NURSE ONLY: CPT | Performed by: INTERNAL MEDICINE

## 2025-08-25 ENCOUNTER — OFFICE VISIT (OUTPATIENT)
Dept: PULMONOLOGY | Facility: CLINIC | Age: 85
End: 2025-08-25
Attending: INTERNAL MEDICINE
Payer: COMMERCIAL

## 2025-08-25 VITALS
HEART RATE: 60 BPM | OXYGEN SATURATION: 95 % | BODY MASS INDEX: 25.17 KG/M2 | SYSTOLIC BLOOD PRESSURE: 130 MMHG | WEIGHT: 144.4 LBS | DIASTOLIC BLOOD PRESSURE: 60 MMHG

## 2025-08-25 DIAGNOSIS — R05.3 CHRONIC COUGH: Primary | ICD-10-CM

## 2025-08-25 PROCEDURE — G2211 COMPLEX E/M VISIT ADD ON: HCPCS | Performed by: INTERNAL MEDICINE

## 2025-08-25 PROCEDURE — 99214 OFFICE O/P EST MOD 30 MIN: CPT | Performed by: INTERNAL MEDICINE

## 2025-08-25 PROCEDURE — 3075F SYST BP GE 130 - 139MM HG: CPT | Performed by: INTERNAL MEDICINE

## 2025-08-25 PROCEDURE — 3078F DIAST BP <80 MM HG: CPT | Performed by: INTERNAL MEDICINE

## 2025-08-25 RX ORDER — IPRATROPIUM BROMIDE 42 UG/1
SPRAY, METERED NASAL
Qty: 15 ML | Refills: 11 | Status: SHIPPED | OUTPATIENT
Start: 2025-08-25

## (undated) DEVICE — DRAPE C-ARM MINI 5423

## (undated) DEVICE — DRAPE STERI U 1015

## (undated) DEVICE — SU STRATAFIX PDS PLUS 2-0 SPIRAL CT-1 30CM SXPP1B410

## (undated) DEVICE — GLOVE PROTEXIS W/NEU-THERA 8.0  2D73TE80

## (undated) DEVICE — CATH IV ANGIO INTRO 12GA 382277

## (undated) DEVICE — SU VICRYL 0 CTX CR 8X18" J764D

## (undated) DEVICE — ESU GROUND PAD ADULT W/CORD E7507

## (undated) DEVICE — MANIFOLD NEPTUNE 4 PORT 700-20

## (undated) DEVICE — TOURNIQUET CUFF 12" STERILE 60-7070-102

## (undated) DEVICE — CLOSURE SYS SKIN PREMIERPRO EXOFIN FUSION 4X22CM STRL 3472

## (undated) DEVICE — GLOVE PROTEXIS POWDER FREE 8.0 ORTHOPEDIC 2D73ET80

## (undated) DEVICE — CUSHION INSERT LG PRONE VIEW JACKSON TABLE

## (undated) DEVICE — SOL NACL 0.9% INJ 1000ML BAG 2B1324X

## (undated) DEVICE — LINEN DRAPE 54X72" 5467

## (undated) DEVICE — PACK SET-UP STD 9102

## (undated) DEVICE — SUCTION CANISTER STRAW 65652-008

## (undated) DEVICE — DRSG TEGADERM 4X4 3/4" 1626W

## (undated) DEVICE — SUCTION TIP YANKAUER STR K87

## (undated) DEVICE — SU MONOCRYL 3-0 PS-2 27" Y427H

## (undated) DEVICE — MARKER SPHERES PASSIVE MEDT PACK 5 8801075

## (undated) DEVICE — LINEN TOWEL PACK X10 5473

## (undated) DEVICE — GLOVE PROTEXIS POWDER FREE 8.5 ORTHOPEDIC 2D73ET85

## (undated) DEVICE — LINEN HALF SHEET 5512

## (undated) DEVICE — ESU ELEC BLADE 2.75" COATED/INSULATED E1455

## (undated) DEVICE — CAST PADDING 4" STERILE 9044S

## (undated) DEVICE — DRAPE STERI TOWEL LG 1010

## (undated) DEVICE — DRAPE C-ARM 60X42" 1013

## (undated) DEVICE — SU DERMABOND ADVANCED .7ML DNX12

## (undated) DEVICE — GLOVE BIOGEL PI MICRO INDICATOR UNDERGLOVE SZ 8.5 48985

## (undated) DEVICE — SU VICRYL 2-0 CP-1 27" UND J266H

## (undated) DEVICE — HOOD FLYTE W/PEELAWAY 408-800-100

## (undated) DEVICE — PACK LOWER EXTREMITY RIDGES

## (undated) DEVICE — PREP DURAPREP 26ML APL 8630

## (undated) DEVICE — BAG CLEAR TRASH 1.3M 39X33" P4040C

## (undated) DEVICE — SUCTION CANISTER MEDIVAC LINER 3000ML W/LID 65651-530

## (undated) DEVICE — Device

## (undated) DEVICE — SYR 50ML LL W/O NDL 309653

## (undated) DEVICE — NDL 20GA 1.5"

## (undated) DEVICE — DRAPE SHEET REV FOLD 3/4 9349

## (undated) DEVICE — SU STRATAFIX PDS PLUS 0 CT 45CM SXPP1A406

## (undated) DEVICE — SPONGE SURGIFOAM 01GM POWDER 1978

## (undated) DEVICE — SOL NACL 0.9% IRRIG 1000ML BOTTLE 2F7124

## (undated) DEVICE — ESU BIPOLAR SEALER AQUAMANTYS 6MM 23-112-1

## (undated) DEVICE — CATH TRAY FOLEY COUDE SURESTEP 16FR W/DRN BAG LATEX A304416A

## (undated) DEVICE — DRSG KERLIX FLUFFS X5

## (undated) DEVICE — DRAPE IOBAN INCISE 23X17" 6650EZ

## (undated) DEVICE — LINEN ORTHO ACL PACK 5447

## (undated) DEVICE — DRAPE IOBAN ISOLATION VERTICAL 320X21CM 6617

## (undated) DEVICE — PREP CHLORAPREP 26ML TINTED ORANGE  260815

## (undated) DEVICE — SOL NACL 0.9% INJ 250ML BAG 2B1322Q

## (undated) DEVICE — SU VICRYL 2-0 CT-2 27" UND J269H

## (undated) DEVICE — SU VICRYL 2-0 CT-1 27" UND J259H

## (undated) DEVICE — PREP CHLORAPREP 26ML TINTED HI-LITE ORANGE 930815

## (undated) DEVICE — SU ETHILON 2-0 PS 18" 585H

## (undated) DEVICE — SOL WATER IRRIG 1000ML BOTTLE 2F7114

## (undated) DEVICE — NDL BLUNT 18GA 1" W/O FILTER 305181

## (undated) DEVICE — SU ETHILON 4-0 PS-2 18" BLACK 1667H

## (undated) DEVICE — MIDAS REX DISSECTING TOOL  14MH30

## (undated) DEVICE — ESU GROUND PAD UNIVERSAL W/O CORD

## (undated) DEVICE — KIT PATIENT CARE HANA TABLE PROFX SUPINE 6855

## (undated) DEVICE — LINEN FULL SHEET 5511

## (undated) DEVICE — SYR 10ML LL W/O NDL 302995

## (undated) DEVICE — LINEN POUCH DBL 5427

## (undated) DEVICE — SUCTION FRAZIER 12FR W/OBTURATOR 33120

## (undated) DEVICE — DRAIN HEMOVAC RESERVOIR KIT 10FR 1/8" MED 00-2550-002-10

## (undated) DEVICE — GLOVE PROTEXIS BLUE W/NEU-THERA 8.5  2D73EB85

## (undated) DEVICE — DRSG ABDOMINAL 07 1/2X8" 7197D

## (undated) DEVICE — BLADE SAW SAGITTAL STRK 18X90X1.27MM HD SYS 6 6118-127-090

## (undated) DEVICE — SYR 03ML LL W/O NDL 309657

## (undated) DEVICE — SPONGE COTTONOID 1/2X1/2" 80-1400

## (undated) DEVICE — PAD CHUX UNDERPAD 23X24" 7136

## (undated) DEVICE — BLADE SAW OSCIL/SAG STRK MICRO 9X25X0.64MM 2296-033-522

## (undated) DEVICE — PACK TOTAL HIP W/U DRAPE SOP15HUFSC

## (undated) DEVICE — CAST BUCKET

## (undated) DEVICE — SU ETHIBOND 2 V-37 4X30" MX69G

## (undated) DEVICE — SU VICRYL 1 CT-1 27" J341H

## (undated) DEVICE — SOLUTION WOUND CLEANSING 3/4OZ 10% PVP EA-L3011FB-50

## (undated) DEVICE — DRAPE CONVERTORS U-DRAPE 60X72" 8476

## (undated) DEVICE — CAST PADDING 3" UNSTERILE 9043

## (undated) DEVICE — PACK SMALL SPINE RIDGES

## (undated) DEVICE — LINEN TOWEL PACK X5 5464

## (undated) DEVICE — BONE CLEANING TIP INTERPULSE  0210-010-000

## (undated) DEVICE — GLOVE PROTEXIS MICRO 8.0  2D73PM80

## (undated) DEVICE — BLADE KNIFE SURG 15 371115

## (undated) DEVICE — SUCTION IRR SYSTEM W/O TIP INTERPULSE HANDPIECE 0210-100-000

## (undated) DEVICE — DRAPE LAP W/ARMBOARD 29410

## (undated) DEVICE — ADH REMOVER PAD UNI-SOLVE 402300

## (undated) RX ORDER — PHENYLEPHRINE HCL IN 0.9% NACL 1 MG/10 ML
SYRINGE (ML) INTRAVENOUS
Status: DISPENSED
Start: 2018-10-31

## (undated) RX ORDER — CEFAZOLIN SODIUM 1 G/3ML
INJECTION, POWDER, FOR SOLUTION INTRAMUSCULAR; INTRAVENOUS
Status: DISPENSED
Start: 2018-10-31

## (undated) RX ORDER — PROPOFOL 10 MG/ML
INJECTION, EMULSION INTRAVENOUS
Status: DISPENSED
Start: 2018-10-31

## (undated) RX ORDER — ONDANSETRON 2 MG/ML
INJECTION INTRAMUSCULAR; INTRAVENOUS
Status: DISPENSED
Start: 2018-10-31

## (undated) RX ORDER — PROPOFOL 10 MG/ML
INJECTION, EMULSION INTRAVENOUS
Status: DISPENSED
Start: 2022-10-14

## (undated) RX ORDER — FENTANYL CITRATE 50 UG/ML
INJECTION, SOLUTION INTRAMUSCULAR; INTRAVENOUS
Status: DISPENSED
Start: 2017-04-28

## (undated) RX ORDER — BUPIVACAINE HYDROCHLORIDE AND EPINEPHRINE 2.5; 5 MG/ML; UG/ML
INJECTION, SOLUTION EPIDURAL; INFILTRATION; INTRACAUDAL; PERINEURAL
Status: DISPENSED
Start: 2018-10-31

## (undated) RX ORDER — DEXAMETHASONE SODIUM PHOSPHATE 4 MG/ML
INJECTION, SOLUTION INTRA-ARTICULAR; INTRALESIONAL; INTRAMUSCULAR; INTRAVENOUS; SOFT TISSUE
Status: DISPENSED
Start: 2022-10-14

## (undated) RX ORDER — EPHEDRINE SULFATE 50 MG/ML
INJECTION, SOLUTION INTRAMUSCULAR; INTRAVENOUS; SUBCUTANEOUS
Status: DISPENSED
Start: 2018-10-31

## (undated) RX ORDER — GLYCOPYRROLATE 0.2 MG/ML
INJECTION INTRAMUSCULAR; INTRAVENOUS
Status: DISPENSED
Start: 2018-10-31

## (undated) RX ORDER — PREGABALIN 150 MG/1
CAPSULE ORAL
Status: DISPENSED
Start: 2022-10-14

## (undated) RX ORDER — FENTANYL CITRATE 50 UG/ML
INJECTION, SOLUTION INTRAMUSCULAR; INTRAVENOUS
Status: DISPENSED
Start: 2022-10-14

## (undated) RX ORDER — CEFAZOLIN SODIUM 2 G/100ML
INJECTION, SOLUTION INTRAVENOUS
Status: DISPENSED
Start: 2018-10-31

## (undated) RX ORDER — HYDRALAZINE HYDROCHLORIDE 20 MG/ML
INJECTION INTRAMUSCULAR; INTRAVENOUS
Status: DISPENSED
Start: 2018-10-31

## (undated) RX ORDER — NEOSTIGMINE METHYLSULFATE 1 MG/ML
VIAL (ML) INJECTION
Status: DISPENSED
Start: 2018-10-31

## (undated) RX ORDER — CEFAZOLIN SODIUM/WATER 2 G/20 ML
SYRINGE (ML) INTRAVENOUS
Status: DISPENSED
Start: 2022-10-14

## (undated) RX ORDER — TRANEXAMIC ACID 650 MG/1
TABLET ORAL
Status: DISPENSED
Start: 2022-10-14

## (undated) RX ORDER — FENTANYL CITRATE 50 UG/ML
INJECTION, SOLUTION INTRAMUSCULAR; INTRAVENOUS
Status: DISPENSED
Start: 2018-10-31

## (undated) RX ORDER — ACETAMINOPHEN 325 MG/1
TABLET ORAL
Status: DISPENSED
Start: 2017-04-28

## (undated) RX ORDER — BUPIVACAINE HYDROCHLORIDE 5 MG/ML
INJECTION, SOLUTION EPIDURAL; INTRACAUDAL
Status: DISPENSED
Start: 2017-04-28

## (undated) RX ORDER — PROPOFOL 10 MG/ML
INJECTION, EMULSION INTRAVENOUS
Status: DISPENSED
Start: 2017-04-28

## (undated) RX ORDER — LIDOCAINE HYDROCHLORIDE 10 MG/ML
INJECTION, SOLUTION EPIDURAL; INFILTRATION; INTRACAUDAL; PERINEURAL
Status: DISPENSED
Start: 2017-04-28

## (undated) RX ORDER — REGADENOSON 0.08 MG/ML
INJECTION, SOLUTION INTRAVENOUS
Status: DISPENSED
Start: 2024-12-17

## (undated) RX ORDER — GLYCOPYRROLATE 0.2 MG/ML
INJECTION, SOLUTION INTRAMUSCULAR; INTRAVENOUS
Status: DISPENSED
Start: 2022-10-14

## (undated) RX ORDER — DEXAMETHASONE SODIUM PHOSPHATE 4 MG/ML
INJECTION, SOLUTION INTRA-ARTICULAR; INTRALESIONAL; INTRAMUSCULAR; INTRAVENOUS; SOFT TISSUE
Status: DISPENSED
Start: 2017-04-28

## (undated) RX ORDER — VANCOMYCIN HYDROCHLORIDE 1 G/20ML
INJECTION, POWDER, LYOPHILIZED, FOR SOLUTION INTRAVENOUS
Status: DISPENSED
Start: 2022-10-14

## (undated) RX ORDER — ACETAMINOPHEN 325 MG/1
TABLET ORAL
Status: DISPENSED
Start: 2022-10-14

## (undated) RX ORDER — CELECOXIB 200 MG/1
CAPSULE ORAL
Status: DISPENSED
Start: 2022-10-14

## (undated) RX ORDER — NEOSTIGMINE METHYLSULFATE 1 MG/ML
VIAL (ML) INJECTION
Status: DISPENSED
Start: 2022-10-14

## (undated) RX ORDER — LIDOCAINE HYDROCHLORIDE 20 MG/ML
INJECTION, SOLUTION EPIDURAL; INFILTRATION; INTRACAUDAL; PERINEURAL
Status: DISPENSED
Start: 2022-10-14

## (undated) RX ORDER — GLYCOPYRROLATE 0.2 MG/ML
INJECTION INTRAMUSCULAR; INTRAVENOUS
Status: DISPENSED
Start: 2017-04-28

## (undated) RX ORDER — LIDOCAINE HYDROCHLORIDE 10 MG/ML
INJECTION, SOLUTION EPIDURAL; INFILTRATION; INTRACAUDAL; PERINEURAL
Status: DISPENSED
Start: 2018-10-31

## (undated) RX ORDER — DEXAMETHASONE SODIUM PHOSPHATE 4 MG/ML
INJECTION, SOLUTION INTRA-ARTICULAR; INTRALESIONAL; INTRAMUSCULAR; INTRAVENOUS; SOFT TISSUE
Status: DISPENSED
Start: 2018-10-31

## (undated) RX ORDER — CEFAZOLIN SODIUM 2 G/100ML
INJECTION, SOLUTION INTRAVENOUS
Status: DISPENSED
Start: 2017-04-28

## (undated) RX ORDER — KETAMINE HCL IN 0.9 % NACL 50 MG/5 ML
SYRINGE (ML) INTRAVENOUS
Status: DISPENSED
Start: 2018-10-31

## (undated) RX ORDER — ONDANSETRON 2 MG/ML
INJECTION INTRAMUSCULAR; INTRAVENOUS
Status: DISPENSED
Start: 2017-04-28

## (undated) RX ORDER — ONDANSETRON 2 MG/ML
INJECTION INTRAMUSCULAR; INTRAVENOUS
Status: DISPENSED
Start: 2022-10-14